# Patient Record
Sex: MALE | Race: WHITE | NOT HISPANIC OR LATINO | URBAN - METROPOLITAN AREA
[De-identification: names, ages, dates, MRNs, and addresses within clinical notes are randomized per-mention and may not be internally consistent; named-entity substitution may affect disease eponyms.]

---

## 2017-01-01 ENCOUNTER — ONCOLOGY VISIT (OUTPATIENT)
Dept: TRANSPLANT | Facility: CLINIC | Age: 57
End: 2017-01-01
Attending: INTERNAL MEDICINE
Payer: COMMERCIAL

## 2017-01-01 ENCOUNTER — RESULTS ONLY (OUTPATIENT)
Dept: OTHER | Facility: CLINIC | Age: 57
End: 2017-01-01

## 2017-01-01 ENCOUNTER — APPOINTMENT (OUTPATIENT)
Dept: OCCUPATIONAL THERAPY | Facility: CLINIC | Age: 57
DRG: 014 | End: 2017-01-01
Attending: PHYSICIAN ASSISTANT
Payer: COMMERCIAL

## 2017-01-01 ENCOUNTER — OFFICE VISIT (OUTPATIENT)
Dept: TRANSPLANT | Facility: CLINIC | Age: 57
End: 2017-01-01
Attending: PHYSICIAN ASSISTANT
Payer: COMMERCIAL

## 2017-01-01 ENCOUNTER — TRANSFERRED RECORDS (OUTPATIENT)
Dept: HEALTH INFORMATION MANAGEMENT | Facility: CLINIC | Age: 57
End: 2017-01-01

## 2017-01-01 ENCOUNTER — MEDICAL CORRESPONDENCE (OUTPATIENT)
Dept: TRANSPLANT | Facility: CLINIC | Age: 57
End: 2017-01-01

## 2017-01-01 ENCOUNTER — TELEPHONE (OUTPATIENT)
Dept: TRANSPLANT | Facility: CLINIC | Age: 57
End: 2017-01-01

## 2017-01-01 ENCOUNTER — ONCOLOGY VISIT (OUTPATIENT)
Dept: TRANSPLANT | Facility: CLINIC | Age: 57
End: 2017-01-01
Attending: STUDENT IN AN ORGANIZED HEALTH CARE EDUCATION/TRAINING PROGRAM
Payer: COMMERCIAL

## 2017-01-01 ENCOUNTER — ONCOLOGY VISIT (OUTPATIENT)
Dept: RADIATION ONCOLOGY | Facility: CLINIC | Age: 57
End: 2017-01-01

## 2017-01-01 ENCOUNTER — APPOINTMENT (OUTPATIENT)
Dept: LAB | Facility: CLINIC | Age: 57
End: 2017-01-01
Attending: PHYSICIAN ASSISTANT
Payer: COMMERCIAL

## 2017-01-01 ENCOUNTER — APPOINTMENT (OUTPATIENT)
Dept: INTERVENTIONAL RADIOLOGY/VASCULAR | Facility: CLINIC | Age: 57
DRG: 014 | End: 2017-01-01
Attending: INTERNAL MEDICINE
Payer: COMMERCIAL

## 2017-01-01 ENCOUNTER — ALLIED HEALTH/NURSE VISIT (OUTPATIENT)
Dept: RESEARCH | Facility: CLINIC | Age: 57
End: 2017-01-01

## 2017-01-01 ENCOUNTER — RESEARCH ENCOUNTER (OUTPATIENT)
Dept: ONCOLOGY | Facility: CLINIC | Age: 57
End: 2017-01-01

## 2017-01-01 ENCOUNTER — OFFICE VISIT (OUTPATIENT)
Dept: INFECTIOUS DISEASES | Facility: CLINIC | Age: 57
End: 2017-01-01
Attending: INTERNAL MEDICINE
Payer: COMMERCIAL

## 2017-01-01 ENCOUNTER — OFFICE VISIT (OUTPATIENT)
Dept: TRANSPLANT | Facility: CLINIC | Age: 57
End: 2017-01-01

## 2017-01-01 ENCOUNTER — OFFICE VISIT (OUTPATIENT)
Dept: RADIATION ONCOLOGY | Facility: CLINIC | Age: 57
End: 2017-01-01
Attending: RADIOLOGY
Payer: COMMERCIAL

## 2017-01-01 ENCOUNTER — APPOINTMENT (OUTPATIENT)
Dept: LAB | Facility: CLINIC | Age: 57
End: 2017-01-01
Attending: INTERNAL MEDICINE
Payer: COMMERCIAL

## 2017-01-01 ENCOUNTER — NURSE TRIAGE (OUTPATIENT)
Dept: NURSING | Facility: CLINIC | Age: 57
End: 2017-01-01

## 2017-01-01 ENCOUNTER — APPOINTMENT (OUTPATIENT)
Dept: PHYSICAL THERAPY | Facility: CLINIC | Age: 57
DRG: 014 | End: 2017-01-01
Attending: INTERNAL MEDICINE
Payer: COMMERCIAL

## 2017-01-01 ENCOUNTER — APPOINTMENT (OUTPATIENT)
Dept: LAB | Facility: CLINIC | Age: 57
End: 2017-01-01
Attending: STUDENT IN AN ORGANIZED HEALTH CARE EDUCATION/TRAINING PROGRAM
Payer: COMMERCIAL

## 2017-01-01 ENCOUNTER — APPOINTMENT (OUTPATIENT)
Dept: LAB | Facility: CLINIC | Age: 57
End: 2017-01-01
Attending: NURSE PRACTITIONER
Payer: COMMERCIAL

## 2017-01-01 ENCOUNTER — APPOINTMENT (OUTPATIENT)
Dept: MEDSURG UNIT | Facility: CLINIC | Age: 57
DRG: 014 | End: 2017-01-01
Attending: INTERNAL MEDICINE
Payer: COMMERCIAL

## 2017-01-01 ENCOUNTER — HOSPITAL ENCOUNTER (INPATIENT)
Facility: CLINIC | Age: 57
LOS: 17 days | Discharge: HOME | DRG: 014 | End: 2017-08-04
Attending: INTERNAL MEDICINE | Admitting: INTERNAL MEDICINE
Payer: COMMERCIAL

## 2017-01-01 ENCOUNTER — OFFICE VISIT (OUTPATIENT)
Dept: PULMONOLOGY | Facility: CLINIC | Age: 57
End: 2017-01-01
Attending: INTERNAL MEDICINE
Payer: COMMERCIAL

## 2017-01-01 ENCOUNTER — OFFICE VISIT (OUTPATIENT)
Dept: INTERVENTIONAL RADIOLOGY/VASCULAR | Facility: CLINIC | Age: 57
End: 2017-01-01
Attending: INTERNAL MEDICINE

## 2017-01-01 ENCOUNTER — TELEPHONE (OUTPATIENT)
Dept: OTHER | Facility: CLINIC | Age: 57
End: 2017-01-01

## 2017-01-01 ENCOUNTER — HOSPITAL ENCOUNTER (OUTPATIENT)
Dept: NUCLEAR MEDICINE | Facility: CLINIC | Age: 57
Setting detail: NUCLEAR MEDICINE
Discharge: HOME | End: 2017-06-27
Attending: INTERNAL MEDICINE | Admitting: INTERNAL MEDICINE
Payer: COMMERCIAL

## 2017-01-01 ENCOUNTER — CARE COORDINATION (OUTPATIENT)
Dept: TRANSPLANT | Facility: CLINIC | Age: 57
End: 2017-01-01

## 2017-01-01 ENCOUNTER — HOSPITAL PATHOLOGY (OUTPATIENT)
Dept: OTHER | Facility: CLINIC | Age: 57
End: 2017-01-01

## 2017-01-01 ENCOUNTER — TELEPHONE (OUTPATIENT)
Dept: PHARMACY | Facility: CLINIC | Age: 57
End: 2017-01-01

## 2017-01-01 ENCOUNTER — OFFICE VISIT (OUTPATIENT)
Dept: ENDOCRINOLOGY | Facility: CLINIC | Age: 57
End: 2017-01-01

## 2017-01-01 ENCOUNTER — HOSPITAL ENCOUNTER (OUTPATIENT)
Facility: CLINIC | Age: 57
Setting detail: SPECIMEN
Discharge: HOME | End: 2017-04-27
Attending: INTERNAL MEDICINE | Admitting: INTERNAL MEDICINE
Payer: COMMERCIAL

## 2017-01-01 ENCOUNTER — HOSPITAL ENCOUNTER (OUTPATIENT)
Facility: CLINIC | Age: 57
Setting detail: SPECIMEN
Discharge: HOME | End: 2017-06-21
Attending: INTERNAL MEDICINE
Payer: COMMERCIAL

## 2017-01-01 ENCOUNTER — SURGERY (OUTPATIENT)
Age: 57
End: 2017-01-01

## 2017-01-01 ENCOUNTER — APPOINTMENT (OUTPATIENT)
Dept: OCCUPATIONAL THERAPY | Facility: CLINIC | Age: 57
DRG: 014 | End: 2017-01-01
Attending: INTERNAL MEDICINE
Payer: COMMERCIAL

## 2017-01-01 ENCOUNTER — TELEPHONE (OUTPATIENT)
Dept: PULMONOLOGY | Facility: CLINIC | Age: 57
End: 2017-01-01

## 2017-01-01 ENCOUNTER — ONCOLOGY VISIT (OUTPATIENT)
Dept: TRANSPLANT | Facility: CLINIC | Age: 57
End: 2017-01-01
Attending: NURSE PRACTITIONER
Payer: COMMERCIAL

## 2017-01-01 ENCOUNTER — HOSPITAL ENCOUNTER (OUTPATIENT)
Facility: CLINIC | Age: 57
Discharge: HOME | End: 2017-06-30
Attending: INTERNAL MEDICINE | Admitting: INTERNAL MEDICINE
Payer: COMMERCIAL

## 2017-01-01 VITALS
SYSTOLIC BLOOD PRESSURE: 151 MMHG | WEIGHT: 205.9 LBS | BODY MASS INDEX: 31.21 KG/M2 | TEMPERATURE: 97.4 F | HEART RATE: 84 BPM | SYSTOLIC BLOOD PRESSURE: 147 MMHG | HEART RATE: 78 BPM | TEMPERATURE: 97.9 F | DIASTOLIC BLOOD PRESSURE: 89 MMHG | RESPIRATION RATE: 16 BRPM | DIASTOLIC BLOOD PRESSURE: 88 MMHG | WEIGHT: 198.2 LBS | OXYGEN SATURATION: 98 % | OXYGEN SATURATION: 96 % | BODY MASS INDEX: 30.14 KG/M2 | RESPIRATION RATE: 16 BRPM

## 2017-01-01 VITALS
SYSTOLIC BLOOD PRESSURE: 142 MMHG | BODY MASS INDEX: 29.99 KG/M2 | DIASTOLIC BLOOD PRESSURE: 83 MMHG | RESPIRATION RATE: 16 BRPM | WEIGHT: 197.2 LBS | OXYGEN SATURATION: 98 % | TEMPERATURE: 97.9 F | HEART RATE: 77 BPM

## 2017-01-01 VITALS
BODY MASS INDEX: 30.05 KG/M2 | DIASTOLIC BLOOD PRESSURE: 85 MMHG | OXYGEN SATURATION: 97 % | RESPIRATION RATE: 16 BRPM | SYSTOLIC BLOOD PRESSURE: 141 MMHG | WEIGHT: 197.6 LBS | HEART RATE: 64 BPM | TEMPERATURE: 97.4 F

## 2017-01-01 VITALS
TEMPERATURE: 98.2 F | BODY MASS INDEX: 31.08 KG/M2 | WEIGHT: 205.1 LBS | RESPIRATION RATE: 18 BRPM | DIASTOLIC BLOOD PRESSURE: 85 MMHG | SYSTOLIC BLOOD PRESSURE: 135 MMHG | OXYGEN SATURATION: 98 % | HEART RATE: 89 BPM

## 2017-01-01 VITALS
SYSTOLIC BLOOD PRESSURE: 132 MMHG | DIASTOLIC BLOOD PRESSURE: 81 MMHG | TEMPERATURE: 97.3 F | RESPIRATION RATE: 16 BRPM | BODY MASS INDEX: 30.93 KG/M2 | OXYGEN SATURATION: 98 % | HEIGHT: 68 IN | HEART RATE: 89 BPM | WEIGHT: 204.1 LBS

## 2017-01-01 VITALS
DIASTOLIC BLOOD PRESSURE: 89 MMHG | WEIGHT: 203.5 LBS | TEMPERATURE: 97.4 F | OXYGEN SATURATION: 98 % | HEART RATE: 89 BPM | SYSTOLIC BLOOD PRESSURE: 146 MMHG | BODY MASS INDEX: 30.84 KG/M2

## 2017-01-01 VITALS
WEIGHT: 204.9 LBS | HEART RATE: 82 BPM | SYSTOLIC BLOOD PRESSURE: 138 MMHG | RESPIRATION RATE: 18 BRPM | DIASTOLIC BLOOD PRESSURE: 78 MMHG | TEMPERATURE: 97.3 F | OXYGEN SATURATION: 96 %

## 2017-01-01 VITALS
DIASTOLIC BLOOD PRESSURE: 99 MMHG | SYSTOLIC BLOOD PRESSURE: 160 MMHG | TEMPERATURE: 98.6 F | RESPIRATION RATE: 16 BRPM | HEART RATE: 96 BPM

## 2017-01-01 VITALS
HEART RATE: 73 BPM | SYSTOLIC BLOOD PRESSURE: 134 MMHG | DIASTOLIC BLOOD PRESSURE: 71 MMHG | OXYGEN SATURATION: 98 % | WEIGHT: 203.4 LBS | BODY MASS INDEX: 30.83 KG/M2 | RESPIRATION RATE: 16 BRPM | TEMPERATURE: 97.6 F

## 2017-01-01 VITALS
DIASTOLIC BLOOD PRESSURE: 88 MMHG | SYSTOLIC BLOOD PRESSURE: 160 MMHG | HEART RATE: 75 BPM | OXYGEN SATURATION: 99 % | RESPIRATION RATE: 16 BRPM | TEMPERATURE: 98.7 F

## 2017-01-01 VITALS
DIASTOLIC BLOOD PRESSURE: 84 MMHG | BODY MASS INDEX: 30.81 KG/M2 | SYSTOLIC BLOOD PRESSURE: 152 MMHG | OXYGEN SATURATION: 98 % | TEMPERATURE: 97.6 F | HEART RATE: 85 BPM | WEIGHT: 203.3 LBS

## 2017-01-01 VITALS — BODY MASS INDEX: 30.35 KG/M2 | WEIGHT: 200 LBS

## 2017-01-01 VITALS
BODY MASS INDEX: 30.92 KG/M2 | SYSTOLIC BLOOD PRESSURE: 145 MMHG | HEART RATE: 82 BPM | TEMPERATURE: 97.7 F | OXYGEN SATURATION: 97 % | WEIGHT: 204 LBS | DIASTOLIC BLOOD PRESSURE: 79 MMHG

## 2017-01-01 VITALS
TEMPERATURE: 98.7 F | OXYGEN SATURATION: 99 % | HEIGHT: 69 IN | SYSTOLIC BLOOD PRESSURE: 179 MMHG | HEART RATE: 89 BPM | DIASTOLIC BLOOD PRESSURE: 93 MMHG | BODY MASS INDEX: 30.21 KG/M2 | WEIGHT: 204 LBS

## 2017-01-01 VITALS
TEMPERATURE: 98.1 F | OXYGEN SATURATION: 96 % | SYSTOLIC BLOOD PRESSURE: 134 MMHG | RESPIRATION RATE: 16 BRPM | HEART RATE: 90 BPM | BODY MASS INDEX: 31.37 KG/M2 | WEIGHT: 207 LBS | DIASTOLIC BLOOD PRESSURE: 86 MMHG

## 2017-01-01 VITALS
DIASTOLIC BLOOD PRESSURE: 85 MMHG | WEIGHT: 203.3 LBS | HEART RATE: 79 BPM | BODY MASS INDEX: 30.81 KG/M2 | RESPIRATION RATE: 18 BRPM | SYSTOLIC BLOOD PRESSURE: 133 MMHG | TEMPERATURE: 97.8 F | OXYGEN SATURATION: 97 %

## 2017-01-01 VITALS
RESPIRATION RATE: 16 BRPM | BODY MASS INDEX: 30.9 KG/M2 | HEART RATE: 71 BPM | TEMPERATURE: 97.7 F | WEIGHT: 196.2 LBS | HEART RATE: 94 BPM | TEMPERATURE: 98.2 F | SYSTOLIC BLOOD PRESSURE: 151 MMHG | WEIGHT: 203.9 LBS | DIASTOLIC BLOOD PRESSURE: 83 MMHG | DIASTOLIC BLOOD PRESSURE: 81 MMHG | SYSTOLIC BLOOD PRESSURE: 149 MMHG | OXYGEN SATURATION: 97 % | HEIGHT: 68 IN | HEIGHT: 68 IN | BODY MASS INDEX: 29.73 KG/M2 | OXYGEN SATURATION: 98 %

## 2017-01-01 VITALS
SYSTOLIC BLOOD PRESSURE: 146 MMHG | BODY MASS INDEX: 30.01 KG/M2 | HEIGHT: 68 IN | HEART RATE: 78 BPM | WEIGHT: 198 LBS | OXYGEN SATURATION: 97 % | DIASTOLIC BLOOD PRESSURE: 82 MMHG | RESPIRATION RATE: 18 BRPM | TEMPERATURE: 97.7 F

## 2017-01-01 VITALS
HEIGHT: 68 IN | TEMPERATURE: 98 F | OXYGEN SATURATION: 100 % | RESPIRATION RATE: 16 BRPM | SYSTOLIC BLOOD PRESSURE: 137 MMHG | DIASTOLIC BLOOD PRESSURE: 79 MMHG | BODY MASS INDEX: 30.36 KG/M2 | WEIGHT: 200.3 LBS | HEART RATE: 78 BPM

## 2017-01-01 VITALS
HEIGHT: 68 IN | TEMPERATURE: 98 F | SYSTOLIC BLOOD PRESSURE: 143 MMHG | HEART RATE: 95 BPM | WEIGHT: 206 LBS | OXYGEN SATURATION: 98 % | BODY MASS INDEX: 31.22 KG/M2 | DIASTOLIC BLOOD PRESSURE: 83 MMHG | RESPIRATION RATE: 16 BRPM

## 2017-01-01 VITALS
OXYGEN SATURATION: 100 % | HEART RATE: 71 BPM | RESPIRATION RATE: 16 BRPM | DIASTOLIC BLOOD PRESSURE: 84 MMHG | TEMPERATURE: 98.5 F | WEIGHT: 197.8 LBS | SYSTOLIC BLOOD PRESSURE: 145 MMHG | BODY MASS INDEX: 30.08 KG/M2

## 2017-01-01 VITALS
BODY MASS INDEX: 30.42 KG/M2 | HEIGHT: 68 IN | OXYGEN SATURATION: 98 % | WEIGHT: 200.7 LBS | TEMPERATURE: 98.4 F | RESPIRATION RATE: 18 BRPM | HEART RATE: 95 BPM | SYSTOLIC BLOOD PRESSURE: 148 MMHG | DIASTOLIC BLOOD PRESSURE: 88 MMHG

## 2017-01-01 VITALS — HEIGHT: 68 IN | BODY MASS INDEX: 30.46 KG/M2 | WEIGHT: 201 LBS

## 2017-01-01 VITALS
SYSTOLIC BLOOD PRESSURE: 146 MMHG | OXYGEN SATURATION: 94 % | RESPIRATION RATE: 10 BRPM | WEIGHT: 200 LBS | DIASTOLIC BLOOD PRESSURE: 83 MMHG | BODY MASS INDEX: 29.62 KG/M2 | HEIGHT: 69 IN

## 2017-01-01 VITALS
HEART RATE: 100 BPM | DIASTOLIC BLOOD PRESSURE: 96 MMHG | BODY MASS INDEX: 31.02 KG/M2 | TEMPERATURE: 97.8 F | WEIGHT: 204.7 LBS | SYSTOLIC BLOOD PRESSURE: 169 MMHG | OXYGEN SATURATION: 99 %

## 2017-01-01 VITALS
HEART RATE: 81 BPM | SYSTOLIC BLOOD PRESSURE: 150 MMHG | WEIGHT: 203 LBS | RESPIRATION RATE: 18 BRPM | DIASTOLIC BLOOD PRESSURE: 91 MMHG | TEMPERATURE: 98 F | OXYGEN SATURATION: 98 % | BODY MASS INDEX: 30.77 KG/M2

## 2017-01-01 VITALS
BODY MASS INDEX: 31.21 KG/M2 | WEIGHT: 205.91 LBS | SYSTOLIC BLOOD PRESSURE: 138 MMHG | HEART RATE: 77 BPM | TEMPERATURE: 97.8 F | RESPIRATION RATE: 18 BRPM | DIASTOLIC BLOOD PRESSURE: 9 MMHG | OXYGEN SATURATION: 97 %

## 2017-01-01 VITALS
RESPIRATION RATE: 16 BRPM | WEIGHT: 197.3 LBS | OXYGEN SATURATION: 96 % | DIASTOLIC BLOOD PRESSURE: 83 MMHG | HEART RATE: 80 BPM | BODY MASS INDEX: 30.01 KG/M2 | TEMPERATURE: 97.8 F | SYSTOLIC BLOOD PRESSURE: 138 MMHG

## 2017-01-01 VITALS
HEART RATE: 74 BPM | DIASTOLIC BLOOD PRESSURE: 90 MMHG | HEIGHT: 68 IN | BODY MASS INDEX: 29.86 KG/M2 | WEIGHT: 197 LBS | SYSTOLIC BLOOD PRESSURE: 139 MMHG

## 2017-01-01 VITALS
BODY MASS INDEX: 30.08 KG/M2 | TEMPERATURE: 97.9 F | DIASTOLIC BLOOD PRESSURE: 83 MMHG | HEART RATE: 78 BPM | OXYGEN SATURATION: 99 % | SYSTOLIC BLOOD PRESSURE: 157 MMHG | WEIGHT: 197.75 LBS

## 2017-01-01 VITALS
SYSTOLIC BLOOD PRESSURE: 129 MMHG | OXYGEN SATURATION: 97 % | RESPIRATION RATE: 18 BRPM | HEIGHT: 68 IN | HEART RATE: 77 BPM | BODY MASS INDEX: 30.26 KG/M2 | TEMPERATURE: 97.2 F | DIASTOLIC BLOOD PRESSURE: 77 MMHG | WEIGHT: 199.7 LBS

## 2017-01-01 VITALS
TEMPERATURE: 97.9 F | HEART RATE: 74 BPM | BODY MASS INDEX: 30.77 KG/M2 | WEIGHT: 203 LBS | SYSTOLIC BLOOD PRESSURE: 130 MMHG | OXYGEN SATURATION: 98 % | DIASTOLIC BLOOD PRESSURE: 84 MMHG

## 2017-01-01 VITALS
BODY MASS INDEX: 31.05 KG/M2 | OXYGEN SATURATION: 96 % | SYSTOLIC BLOOD PRESSURE: 137 MMHG | RESPIRATION RATE: 16 BRPM | HEART RATE: 78 BPM | TEMPERATURE: 97.5 F | DIASTOLIC BLOOD PRESSURE: 79 MMHG | WEIGHT: 204.9 LBS

## 2017-01-01 VITALS
OXYGEN SATURATION: 98 % | SYSTOLIC BLOOD PRESSURE: 154 MMHG | RESPIRATION RATE: 16 BRPM | HEART RATE: 66 BPM | DIASTOLIC BLOOD PRESSURE: 81 MMHG | TEMPERATURE: 98 F

## 2017-01-01 VITALS
TEMPERATURE: 97.7 F | SYSTOLIC BLOOD PRESSURE: 148 MMHG | OXYGEN SATURATION: 99 % | HEART RATE: 92 BPM | DIASTOLIC BLOOD PRESSURE: 89 MMHG | BODY MASS INDEX: 30.79 KG/M2 | WEIGHT: 202.9 LBS

## 2017-01-01 VITALS
RESPIRATION RATE: 16 BRPM | SYSTOLIC BLOOD PRESSURE: 137 MMHG | DIASTOLIC BLOOD PRESSURE: 80 MMHG | OXYGEN SATURATION: 98 % | TEMPERATURE: 98.4 F | HEART RATE: 70 BPM

## 2017-01-01 VITALS — SYSTOLIC BLOOD PRESSURE: 145 MMHG | OXYGEN SATURATION: 99 % | DIASTOLIC BLOOD PRESSURE: 84 MMHG | HEART RATE: 85 BPM

## 2017-01-01 DIAGNOSIS — C92.01 AML (ACUTE MYELOID LEUKEMIA) IN REMISSION (H): Primary | ICD-10-CM

## 2017-01-01 DIAGNOSIS — C92.01 ACUTE MYELOID LEUKEMIA IN REMISSION (H): ICD-10-CM

## 2017-01-01 DIAGNOSIS — C92.01 AML (ACUTE MYELOID LEUKEMIA) IN REMISSION (H): ICD-10-CM

## 2017-01-01 DIAGNOSIS — R91.8 PULMONARY NODULES: Primary | ICD-10-CM

## 2017-01-01 DIAGNOSIS — C92.00 AML (ACUTE MYELOGENOUS LEUKEMIA) (H): ICD-10-CM

## 2017-01-01 DIAGNOSIS — B44.9 ASPERGILLOSIS (H): ICD-10-CM

## 2017-01-01 DIAGNOSIS — E11.9 TYPE 2 DIABETES MELLITUS WITHOUT COMPLICATION, WITHOUT LONG-TERM CURRENT USE OF INSULIN (H): Primary | ICD-10-CM

## 2017-01-01 DIAGNOSIS — Z71.9 VISIT FOR COUNSELING: Primary | ICD-10-CM

## 2017-01-01 DIAGNOSIS — C92.01 ACUTE MYELOID LEUKEMIA IN REMISSION (H): Primary | ICD-10-CM

## 2017-01-01 DIAGNOSIS — B49 FUNGAL PNEUMONIA: Primary | ICD-10-CM

## 2017-01-01 DIAGNOSIS — Z94.84 HISTORY OF PERIPHERAL STEM CELL TRANSPLANT (H): ICD-10-CM

## 2017-01-01 DIAGNOSIS — Z86.2 PERSONAL HISTORY OF DISEASES OF BLOOD AND BLOOD-FORMING ORGANS: ICD-10-CM

## 2017-01-01 DIAGNOSIS — Z76.82 STEM CELL TRANSPLANT CANDIDATE: ICD-10-CM

## 2017-01-01 DIAGNOSIS — Z71.9 ENCOUNTER FOR COUNSELING: Primary | ICD-10-CM

## 2017-01-01 DIAGNOSIS — C92.00 ACUTE MYELOID LEUKEMIA NOT HAVING ACHIEVED REMISSION (H): Primary | ICD-10-CM

## 2017-01-01 DIAGNOSIS — Z53.9 ERRONEOUS ENCOUNTER--DISREGARD: Primary | ICD-10-CM

## 2017-01-01 DIAGNOSIS — R93.89 ABNORMAL FINDING ON IMAGING: Primary | ICD-10-CM

## 2017-01-01 DIAGNOSIS — Z76.82 STEM CELL TRANSPLANT CANDIDATE: Primary | ICD-10-CM

## 2017-01-01 DIAGNOSIS — J16.8 FUNGAL PNEUMONIA: Primary | ICD-10-CM

## 2017-01-01 DIAGNOSIS — Z94.81 S/P ALLOGENEIC BONE MARROW TRANSPLANT (H): ICD-10-CM

## 2017-01-01 DIAGNOSIS — C92.00 ACUTE MYELOGENOUS LEUKEMIA (H): Primary | ICD-10-CM

## 2017-01-01 DIAGNOSIS — C92.00 AML (ACUTE MYELOGENOUS LEUKEMIA) (H): Primary | ICD-10-CM

## 2017-01-01 DIAGNOSIS — B44.9 ASPERGILLOSIS (H): Primary | ICD-10-CM

## 2017-01-01 DIAGNOSIS — C92.00 AML (ACUTE MYELOID LEUKEMIA) (H): Primary | ICD-10-CM

## 2017-01-01 DIAGNOSIS — E11.9 TYPE 2 DIABETES MELLITUS WITHOUT COMPLICATION, WITHOUT LONG-TERM CURRENT USE OF INSULIN (H): ICD-10-CM

## 2017-01-01 DIAGNOSIS — Z94.84 HISTORY OF PERIPHERAL STEM CELL TRANSPLANT (H): Primary | ICD-10-CM

## 2017-01-01 LAB
A* LOCUS: NORMAL
A*: NORMAL
ABO + RH BLD: NORMAL
ABTEST METHOD: NORMAL
ACID FAST STN SPEC QL: NORMAL
ACID FAST STN SPEC QL: NORMAL
ALBUMIN SERPL-MCNC: 2.8 G/DL (ref 3.4–5)
ALBUMIN SERPL-MCNC: 3 G/DL (ref 3.4–5)
ALBUMIN SERPL-MCNC: 3.2 G/DL (ref 3.4–5)
ALBUMIN SERPL-MCNC: 3.3 G/DL (ref 3.4–5)
ALBUMIN SERPL-MCNC: 3.3 G/DL (ref 3.4–5)
ALBUMIN SERPL-MCNC: 3.4 G/DL (ref 3.4–5)
ALBUMIN SERPL-MCNC: 3.5 G/DL (ref 3.4–5)
ALBUMIN SERPL-MCNC: 3.5 G/DL (ref 3.4–5)
ALBUMIN SERPL-MCNC: 3.6 G/DL (ref 3.4–5)
ALBUMIN SERPL-MCNC: 3.6 G/DL (ref 3.4–5)
ALBUMIN SERPL-MCNC: 3.7 G/DL (ref 3.4–5)
ALBUMIN SERPL-MCNC: 3.8 G/DL (ref 3.4–5)
ALBUMIN SERPL-MCNC: 3.8 G/DL (ref 3.4–5)
ALBUMIN SERPL-MCNC: 3.9 G/DL (ref 3.4–5)
ALBUMIN UR-MCNC: NEGATIVE MG/DL
ALP SERPL-CCNC: 100 U/L (ref 40–150)
ALP SERPL-CCNC: 53 U/L (ref 40–150)
ALP SERPL-CCNC: 55 U/L (ref 40–150)
ALP SERPL-CCNC: 58 U/L (ref 40–150)
ALP SERPL-CCNC: 65 U/L (ref 40–150)
ALP SERPL-CCNC: 66 U/L (ref 40–150)
ALP SERPL-CCNC: 72 U/L (ref 40–150)
ALP SERPL-CCNC: 75 U/L (ref 40–150)
ALP SERPL-CCNC: 75 U/L (ref 40–150)
ALP SERPL-CCNC: 76 U/L (ref 40–150)
ALP SERPL-CCNC: 76 U/L (ref 40–150)
ALP SERPL-CCNC: 80 U/L (ref 40–150)
ALP SERPL-CCNC: 80 U/L (ref 40–150)
ALP SERPL-CCNC: 82 U/L (ref 40–150)
ALP SERPL-CCNC: 90 U/L (ref 40–150)
ALP SERPL-CCNC: 92 U/L (ref 40–150)
ALT SERPL W P-5'-P-CCNC: 21 U/L (ref 0–70)
ALT SERPL W P-5'-P-CCNC: 22 U/L (ref 0–70)
ALT SERPL W P-5'-P-CCNC: 23 U/L (ref 0–70)
ALT SERPL W P-5'-P-CCNC: 23 U/L (ref 0–70)
ALT SERPL W P-5'-P-CCNC: 24 U/L (ref 0–70)
ALT SERPL W P-5'-P-CCNC: 25 U/L (ref 0–70)
ALT SERPL W P-5'-P-CCNC: 26 U/L (ref 0–70)
ALT SERPL W P-5'-P-CCNC: 27 U/L (ref 0–70)
ALT SERPL W P-5'-P-CCNC: 28 U/L (ref 0–70)
ALT SERPL W P-5'-P-CCNC: 30 U/L (ref 0–70)
ALT SERPL W P-5'-P-CCNC: 32 U/L (ref 0–70)
ALT SERPL W P-5'-P-CCNC: 32 U/L (ref 0–70)
ALT SERPL W P-5'-P-CCNC: 49 U/L (ref 0–70)
ANION GAP SERPL CALCULATED.3IONS-SCNC: 10 MMOL/L (ref 3–14)
ANION GAP SERPL CALCULATED.3IONS-SCNC: 11 MMOL/L (ref 3–14)
ANION GAP SERPL CALCULATED.3IONS-SCNC: 5 MMOL/L (ref 3–14)
ANION GAP SERPL CALCULATED.3IONS-SCNC: 6 MMOL/L (ref 3–14)
ANION GAP SERPL CALCULATED.3IONS-SCNC: 7 MMOL/L (ref 3–14)
ANION GAP SERPL CALCULATED.3IONS-SCNC: 8 MMOL/L (ref 3–14)
ANION GAP SERPL CALCULATED.3IONS-SCNC: 9 MMOL/L (ref 3–14)
ANISOCYTOSIS BLD QL SMEAR: SLIGHT
APPEARANCE CSF: CLEAR
APPEARANCE FLD: CLEAR
APPEARANCE FLD: CLEAR
APPEARANCE UR: CLEAR
APTT PPP: 26 SEC (ref 22–37)
APTT PPP: 30 SEC (ref 22–37)
ASBTTEST METHOD: NORMAL
ASPERGILLUS GALACTOMANNAN ANTIGEN BAL: ABNORMAL
ASPERGILLUS GALACTOMANNAN ANTIGEN BAL: ABNORMAL
AST SERPL W P-5'-P-CCNC: 10 U/L (ref 0–45)
AST SERPL W P-5'-P-CCNC: 14 U/L (ref 0–45)
AST SERPL W P-5'-P-CCNC: 15 U/L (ref 0–45)
AST SERPL W P-5'-P-CCNC: 16 U/L (ref 0–45)
AST SERPL W P-5'-P-CCNC: 18 U/L (ref 0–45)
AST SERPL W P-5'-P-CCNC: 20 U/L (ref 0–45)
AST SERPL W P-5'-P-CCNC: 21 U/L (ref 0–45)
AST SERPL W P-5'-P-CCNC: 21 U/L (ref 0–45)
AST SERPL W P-5'-P-CCNC: 22 U/L (ref 0–45)
AST SERPL W P-5'-P-CCNC: 26 U/L (ref 0–45)
AST SERPL W P-5'-P-CCNC: 7 U/L (ref 0–45)
B* LOCUS: NORMAL
B*: NORMAL
BACTERIA SPEC CULT: NORMAL
BASOPHILS # BLD AUTO: 0 10E9/L (ref 0–0.2)
BASOPHILS # BLD AUTO: 0.1 10E9/L (ref 0–0.2)
BASOPHILS NFR BLD AUTO: 0 %
BASOPHILS NFR BLD AUTO: 0.2 %
BASOPHILS NFR BLD AUTO: 0.3 %
BASOPHILS NFR BLD AUTO: 0.6 %
BASOPHILS NFR BLD AUTO: 0.7 %
BASOPHILS NFR BLD AUTO: 0.8 %
BASOPHILS NFR BLD AUTO: 0.9 %
BASOPHILS NFR BLD AUTO: 1 %
BASOPHILS NFR BLD AUTO: 1 %
BASOPHILS NFR BLD AUTO: 1.6 %
BASOPHILS NFR BLD AUTO: 1.8 %
BASOPHILS NFR BLD AUTO: 1.8 %
BASOPHILS NFR BLD AUTO: 2.1 %
BILIRUB DIRECT SERPL-MCNC: 0.1 MG/DL (ref 0–0.2)
BILIRUB DIRECT SERPL-MCNC: 0.1 MG/DL (ref 0–0.2)
BILIRUB SERPL-MCNC: 0.3 MG/DL (ref 0.2–1.3)
BILIRUB SERPL-MCNC: 0.3 MG/DL (ref 0.2–1.3)
BILIRUB SERPL-MCNC: 0.5 MG/DL (ref 0.2–1.3)
BILIRUB SERPL-MCNC: 0.6 MG/DL (ref 0.2–1.3)
BILIRUB SERPL-MCNC: 0.7 MG/DL (ref 0.2–1.3)
BILIRUB SERPL-MCNC: 0.8 MG/DL (ref 0.2–1.3)
BILIRUB SERPL-MCNC: 0.9 MG/DL (ref 0.2–1.3)
BILIRUB UR QL STRIP: NEGATIVE
BLD GP AB SCN SERPL QL: NORMAL
BLOOD BANK CMNT PATIENT-IMP: NORMAL
BRONCHOSCOPY: NORMAL
BUN SERPL-MCNC: 10 MG/DL (ref 7–30)
BUN SERPL-MCNC: 11 MG/DL (ref 7–30)
BUN SERPL-MCNC: 12 MG/DL (ref 7–30)
BUN SERPL-MCNC: 13 MG/DL (ref 7–30)
BUN SERPL-MCNC: 14 MG/DL (ref 7–30)
BUN SERPL-MCNC: 15 MG/DL (ref 7–30)
BUN SERPL-MCNC: 16 MG/DL (ref 7–30)
BUN SERPL-MCNC: 16 MG/DL (ref 7–30)
BUN SERPL-MCNC: 18 MG/DL (ref 7–30)
BUN SERPL-MCNC: 19 MG/DL (ref 7–30)
BUN SERPL-MCNC: 8 MG/DL (ref 7–30)
BW-1: NORMAL
C* LOCUS: NORMAL
C*: NORMAL
CALCIUM SERPL-MCNC: 7.8 MG/DL (ref 8.5–10.1)
CALCIUM SERPL-MCNC: 7.8 MG/DL (ref 8.5–10.1)
CALCIUM SERPL-MCNC: 8.1 MG/DL (ref 8.5–10.1)
CALCIUM SERPL-MCNC: 8.3 MG/DL (ref 8.5–10.1)
CALCIUM SERPL-MCNC: 8.4 MG/DL (ref 8.5–10.1)
CALCIUM SERPL-MCNC: 8.5 MG/DL (ref 8.5–10.1)
CALCIUM SERPL-MCNC: 8.6 MG/DL (ref 8.5–10.1)
CALCIUM SERPL-MCNC: 8.7 MG/DL (ref 8.5–10.1)
CALCIUM SERPL-MCNC: 8.8 MG/DL (ref 8.5–10.1)
CALCIUM SERPL-MCNC: 8.9 MG/DL (ref 8.5–10.1)
CALCIUM SERPL-MCNC: 9 MG/DL (ref 8.5–10.1)
CHLORIDE SERPL-SCNC: 102 MMOL/L (ref 94–109)
CHLORIDE SERPL-SCNC: 102 MMOL/L (ref 94–109)
CHLORIDE SERPL-SCNC: 103 MMOL/L (ref 94–109)
CHLORIDE SERPL-SCNC: 104 MMOL/L (ref 94–109)
CHLORIDE SERPL-SCNC: 105 MMOL/L (ref 94–109)
CHLORIDE SERPL-SCNC: 106 MMOL/L (ref 94–109)
CHLORIDE SERPL-SCNC: 107 MMOL/L (ref 94–109)
CHLORIDE SERPL-SCNC: 107 MMOL/L (ref 94–109)
CHLORIDE SERPL-SCNC: 108 MMOL/L (ref 94–109)
CHLORIDE SERPL-SCNC: 108 MMOL/L (ref 94–109)
CHLORIDE SERPL-SCNC: 110 MMOL/L (ref 94–109)
CHLORIDE SERPL-SCNC: 99 MMOL/L (ref 94–109)
CMV DNA SPEC NAA+PROBE-ACNC: NORMAL [IU]/ML
CMV DNA SPEC NAA+PROBE-LOG#: NORMAL {LOG_IU}/ML
CMV IGG SERPL QL IA: 4.2 AI (ref 0–0.8)
CO2 SERPL-SCNC: 23 MMOL/L (ref 20–32)
CO2 SERPL-SCNC: 24 MMOL/L (ref 20–32)
CO2 SERPL-SCNC: 25 MMOL/L (ref 20–32)
CO2 SERPL-SCNC: 26 MMOL/L (ref 20–32)
CO2 SERPL-SCNC: 27 MMOL/L (ref 20–32)
CO2 SERPL-SCNC: 27 MMOL/L (ref 20–32)
CO2 SERPL-SCNC: 28 MMOL/L (ref 20–32)
CO2 SERPL-SCNC: 29 MMOL/L (ref 20–32)
COLOR CSF: COLORLESS
COLOR FLD: COLORLESS
COLOR FLD: COLORLESS
COLOR UR AUTO: YELLOW
COPATH REPORT: NORMAL
CREAT SERPL-MCNC: 0.6 MG/DL (ref 0.66–1.25)
CREAT SERPL-MCNC: 0.65 MG/DL (ref 0.66–1.25)
CREAT SERPL-MCNC: 0.65 MG/DL (ref 0.66–1.25)
CREAT SERPL-MCNC: 0.66 MG/DL (ref 0.66–1.25)
CREAT SERPL-MCNC: 0.66 MG/DL (ref 0.66–1.25)
CREAT SERPL-MCNC: 0.7 MG/DL (ref 0.66–1.25)
CREAT SERPL-MCNC: 0.71 MG/DL (ref 0.66–1.25)
CREAT SERPL-MCNC: 0.72 MG/DL (ref 0.66–1.25)
CREAT SERPL-MCNC: 0.72 MG/DL (ref 0.66–1.25)
CREAT SERPL-MCNC: 0.73 MG/DL (ref 0.66–1.25)
CREAT SERPL-MCNC: 0.74 MG/DL (ref 0.66–1.25)
CREAT SERPL-MCNC: 0.75 MG/DL (ref 0.66–1.25)
CREAT SERPL-MCNC: 0.76 MG/DL (ref 0.66–1.25)
CREAT SERPL-MCNC: 0.76 MG/DL (ref 0.66–1.25)
CREAT SERPL-MCNC: 0.83 MG/DL (ref 0.66–1.25)
CREAT SERPL-MCNC: 0.83 MG/DL (ref 0.66–1.25)
CREAT SERPL-MCNC: 0.88 MG/DL (ref 0.66–1.25)
CREAT SERPL-MCNC: 0.93 MG/DL (ref 0.66–1.25)
CREAT SERPL-MCNC: 0.93 MG/DL (ref 0.66–1.25)
CREAT SERPL-MCNC: 0.94 MG/DL (ref 0.66–1.25)
CREAT SERPL-MCNC: 0.96 MG/DL (ref 0.66–1.25)
CREAT SERPL-MCNC: 0.96 MG/DL (ref 0.66–1.25)
CREAT SERPL-MCNC: 0.98 MG/DL (ref 0.66–1.25)
CREAT SERPL-MCNC: 0.99 MG/DL (ref 0.66–1.25)
CREAT SERPL-MCNC: 1 MG/DL (ref 0.66–1.25)
CREAT SERPL-MCNC: 1.02 MG/DL (ref 0.66–1.25)
CREAT SERPL-MCNC: 1.03 MG/DL (ref 0.66–1.25)
CREAT SERPL-MCNC: 1.05 MG/DL (ref 0.66–1.25)
CREAT SERPL-MCNC: 1.08 MG/DL (ref 0.66–1.25)
CREAT SERPL-MCNC: 1.09 MG/DL (ref 0.66–1.25)
CREAT SERPL-MCNC: 1.1 MG/DL (ref 0.66–1.25)
CREAT SERPL-MCNC: 1.16 MG/DL (ref 0.66–1.25)
CREAT SERPL-MCNC: 1.16 MG/DL (ref 0.66–1.25)
CREAT SERPL-MCNC: 1.17 MG/DL (ref 0.66–1.25)
CREAT SERPL-MCNC: 1.21 MG/DL (ref 0.66–1.25)
CREAT SERPL-MCNC: 1.22 MG/DL (ref 0.66–1.25)
CREAT SERPL-MCNC: 1.23 MG/DL (ref 0.66–1.25)
CREAT SERPL-MCNC: 1.25 MG/DL (ref 0.66–1.25)
CREAT SERPL-MCNC: 1.26 MG/DL (ref 0.66–1.25)
CREAT SERPL-MCNC: 1.28 MG/DL (ref 0.66–1.25)
CREAT SERPL-MCNC: 1.39 MG/DL (ref 0.66–1.25)
DACRYOCYTES BLD QL SMEAR: SLIGHT
DIFFERENTIAL METHOD BLD: ABNORMAL
DLCOCOR-%PRED-PRE: 125 %
DLCOCOR-PRE: 36.25 ML/MIN/MMHG
DLCOUNC-%PRED-PRE: 95 %
DLCOUNC-PRE: 27.62 ML/MIN/MMHG
DLCOUNC-PRED: 28.8 ML/MIN/MMHG
DPA1*: NORMAL
DPA1*LOCUS: NORMAL
DPB1*: NORMAL
DPB1*LOCUS: NORMAL
DQA1*: NORMAL
DQA1*LOCUS: NORMAL
DQB1* LOCUS: NORMAL
DQB1*: NORMAL
DRB1* LOCUS: NORMAL
DRB1*: NORMAL
DRB3* LOCUS: NORMAL
DRSBTTEST METHOD: NORMAL
DRSSO TEST METHOD: NORMAL
EBV DNA # SPEC NAA+PROBE: NORMAL {COPIES}/ML
EBV DNA SPEC NAA+PROBE-LOG#: NORMAL {LOG_COPIES}/ML
EBV VCA IGG SER QL IA: 1.1 AI (ref 0–0.8)
EOSINOPHIL # BLD AUTO: 0 10E9/L (ref 0–0.7)
EOSINOPHIL # BLD AUTO: 0.1 10E9/L (ref 0–0.7)
EOSINOPHIL # BLD AUTO: 0.2 10E9/L (ref 0–0.7)
EOSINOPHIL # BLD AUTO: 0.2 10E9/L (ref 0–0.7)
EOSINOPHIL # BLD AUTO: 0.3 10E9/L (ref 0–0.7)
EOSINOPHIL # BLD AUTO: 0.3 10E9/L (ref 0–0.7)
EOSINOPHIL NFR BLD AUTO: 0 %
EOSINOPHIL NFR BLD AUTO: 0.1 %
EOSINOPHIL NFR BLD AUTO: 0.9 %
EOSINOPHIL NFR BLD AUTO: 0.9 %
EOSINOPHIL NFR BLD AUTO: 1 %
EOSINOPHIL NFR BLD AUTO: 1.2 %
EOSINOPHIL NFR BLD AUTO: 1.3 %
EOSINOPHIL NFR BLD AUTO: 1.5 %
EOSINOPHIL NFR BLD AUTO: 1.6 %
EOSINOPHIL NFR BLD AUTO: 1.6 %
EOSINOPHIL NFR BLD AUTO: 1.7 %
EOSINOPHIL NFR BLD AUTO: 1.8 %
EOSINOPHIL NFR BLD AUTO: 1.8 %
EOSINOPHIL NFR BLD AUTO: 2.9 %
EOSINOPHIL NFR BLD AUTO: 3 %
EOSINOPHIL NFR BLD AUTO: 3.1 %
EOSINOPHIL NFR BLD AUTO: 3.3 %
EOSINOPHIL NFR BLD AUTO: 3.6 %
EOSINOPHIL NFR BLD AUTO: 4.4 %
EOSINOPHIL NFR BLD AUTO: 4.6 %
EOSINOPHIL NFR BLD AUTO: 5.1 %
ERV-%PRED-PRE: 112 %
ERV-PRE: 1.19 L
ERV-PRED: 1.06 L
ERYTHROCYTE [DISTWIDTH] IN BLOOD BY AUTOMATED COUNT: 13.3 % (ref 10–15)
ERYTHROCYTE [DISTWIDTH] IN BLOOD BY AUTOMATED COUNT: 13.3 % (ref 10–15)
ERYTHROCYTE [DISTWIDTH] IN BLOOD BY AUTOMATED COUNT: 13.4 % (ref 10–15)
ERYTHROCYTE [DISTWIDTH] IN BLOOD BY AUTOMATED COUNT: 13.6 % (ref 10–15)
ERYTHROCYTE [DISTWIDTH] IN BLOOD BY AUTOMATED COUNT: 13.9 % (ref 10–15)
ERYTHROCYTE [DISTWIDTH] IN BLOOD BY AUTOMATED COUNT: 14.3 % (ref 10–15)
ERYTHROCYTE [DISTWIDTH] IN BLOOD BY AUTOMATED COUNT: 14.6 % (ref 10–15)
ERYTHROCYTE [DISTWIDTH] IN BLOOD BY AUTOMATED COUNT: 14.8 % (ref 10–15)
ERYTHROCYTE [DISTWIDTH] IN BLOOD BY AUTOMATED COUNT: 14.9 % (ref 10–15)
ERYTHROCYTE [DISTWIDTH] IN BLOOD BY AUTOMATED COUNT: 15 % (ref 10–15)
ERYTHROCYTE [DISTWIDTH] IN BLOOD BY AUTOMATED COUNT: 15 % (ref 10–15)
ERYTHROCYTE [DISTWIDTH] IN BLOOD BY AUTOMATED COUNT: 15.2 % (ref 10–15)
ERYTHROCYTE [DISTWIDTH] IN BLOOD BY AUTOMATED COUNT: 15.3 % (ref 10–15)
ERYTHROCYTE [DISTWIDTH] IN BLOOD BY AUTOMATED COUNT: 15.5 % (ref 10–15)
ERYTHROCYTE [DISTWIDTH] IN BLOOD BY AUTOMATED COUNT: 15.6 % (ref 10–15)
ERYTHROCYTE [DISTWIDTH] IN BLOOD BY AUTOMATED COUNT: 15.7 % (ref 10–15)
ERYTHROCYTE [DISTWIDTH] IN BLOOD BY AUTOMATED COUNT: 15.8 % (ref 10–15)
ERYTHROCYTE [DISTWIDTH] IN BLOOD BY AUTOMATED COUNT: 15.9 % (ref 10–15)
ERYTHROCYTE [DISTWIDTH] IN BLOOD BY AUTOMATED COUNT: 16 % (ref 10–15)
ERYTHROCYTE [DISTWIDTH] IN BLOOD BY AUTOMATED COUNT: 16.2 % (ref 10–15)
ERYTHROCYTE [DISTWIDTH] IN BLOOD BY AUTOMATED COUNT: 16.2 % (ref 10–15)
ERYTHROCYTE [DISTWIDTH] IN BLOOD BY AUTOMATED COUNT: 16.3 % (ref 10–15)
ERYTHROCYTE [DISTWIDTH] IN BLOOD BY AUTOMATED COUNT: 16.4 % (ref 10–15)
ERYTHROCYTE [DISTWIDTH] IN BLOOD BY AUTOMATED COUNT: 16.4 % (ref 10–15)
ERYTHROCYTE [DISTWIDTH] IN BLOOD BY AUTOMATED COUNT: 16.6 % (ref 10–15)
ERYTHROCYTE [DISTWIDTH] IN BLOOD BY AUTOMATED COUNT: 17 % (ref 10–15)
ERYTHROCYTE [DISTWIDTH] IN BLOOD BY AUTOMATED COUNT: 17 % (ref 10–15)
ERYTHROCYTE [DISTWIDTH] IN BLOOD BY AUTOMATED COUNT: 17.2 % (ref 10–15)
ERYTHROCYTE [DISTWIDTH] IN BLOOD BY AUTOMATED COUNT: 17.5 % (ref 10–15)
ERYTHROCYTE [DISTWIDTH] IN BLOOD BY AUTOMATED COUNT: 17.6 % (ref 10–15)
ERYTHROCYTE [DISTWIDTH] IN BLOOD BY AUTOMATED COUNT: 19.2 % (ref 10–15)
ERYTHROCYTE [DISTWIDTH] IN BLOOD BY AUTOMATED COUNT: 20.2 % (ref 10–15)
EXPTIME-PRE: 8.64 SEC
FEF2575-%PRED-PRE: 85 %
FEF2575-PRE: 2.52 L/SEC
FEF2575-PRED: 2.96 L/SEC
FEFMAX-%PRED-PRE: 110 %
FEFMAX-PRE: 10.24 L/SEC
FEFMAX-PRED: 9.24 L/SEC
FEV1-%PRED-PRE: 101 %
FEV1-PRE: 3.4 L
FEV1FEV6-PRE: 76 %
FEV1FEV6-PRED: 80 %
FEV1FVC-PRE: 75 %
FEV1FVC-PRED: 79 %
FEV1SVC-PRE: 71 %
FEV1SVC-PRED: 69 %
FIFMAX-PRE: 7.78 L/SEC
FLUAV H1 2009 PAND RNA SPEC QL NAA+PROBE: NEGATIVE
FLUAV H1 2009 PAND RNA SPEC QL NAA+PROBE: NEGATIVE
FLUAV H1 RNA SPEC QL NAA+PROBE: NEGATIVE
FLUAV H1 RNA SPEC QL NAA+PROBE: NEGATIVE
FLUAV H3 RNA SPEC QL NAA+PROBE: NEGATIVE
FLUAV H3 RNA SPEC QL NAA+PROBE: NEGATIVE
FLUAV RNA SPEC QL NAA+PROBE: NEGATIVE
FLUAV RNA SPEC QL NAA+PROBE: NEGATIVE
FLUBV RNA SPEC QL NAA+PROBE: NEGATIVE
FLUBV RNA SPEC QL NAA+PROBE: NEGATIVE
FRCPLETH-%PRED-PRE: 100 %
FRCPLETH-PRE: 3.54 L
FRCPLETH-PRED: 3.52 L
FUNGUS SPEC CULT: NORMAL
FUNGUS SPEC CULT: NORMAL
FVC-%PRED-PRE: 107 %
FVC-PRE: 4.54 L
FVC-PRED: 4.24 L
GALACTOMANNAN AG SERPL-ACNC: 2.27
GALACTOMANNAN AG SERPL-ACNC: 4.05
GFR SERPL CREATININE-BSD FRML MDRD: 53 ML/MIN/1.7M2
GFR SERPL CREATININE-BSD FRML MDRD: 58 ML/MIN/1.7M2
GFR SERPL CREATININE-BSD FRML MDRD: 59 ML/MIN/1.7M2
GFR SERPL CREATININE-BSD FRML MDRD: 60 ML/MIN/1.7M2
GFR SERPL CREATININE-BSD FRML MDRD: 61 ML/MIN/1.7M2
GFR SERPL CREATININE-BSD FRML MDRD: 61 ML/MIN/1.7M2
GFR SERPL CREATININE-BSD FRML MDRD: 62 ML/MIN/1.7M2
GFR SERPL CREATININE-BSD FRML MDRD: 64 ML/MIN/1.7M2
GFR SERPL CREATININE-BSD FRML MDRD: 65 ML/MIN/1.7M2
GFR SERPL CREATININE-BSD FRML MDRD: 65 ML/MIN/1.7M2
GFR SERPL CREATININE-BSD FRML MDRD: 69 ML/MIN/1.7M2
GFR SERPL CREATININE-BSD FRML MDRD: 70 ML/MIN/1.7M2
GFR SERPL CREATININE-BSD FRML MDRD: 71 ML/MIN/1.7M2
GFR SERPL CREATININE-BSD FRML MDRD: 73 ML/MIN/1.7M2
GFR SERPL CREATININE-BSD FRML MDRD: 75 ML/MIN/1.7M2
GFR SERPL CREATININE-BSD FRML MDRD: 75 ML/MIN/1.7M2
GFR SERPL CREATININE-BSD FRML MDRD: 77 ML/MIN/1.7M2
GFR SERPL CREATININE-BSD FRML MDRD: 78 ML/MIN/1.7M2
GFR SERPL CREATININE-BSD FRML MDRD: 79 ML/MIN/1.7M2
GFR SERPL CREATININE-BSD FRML MDRD: 81 ML/MIN/1.7M2
GFR SERPL CREATININE-BSD FRML MDRD: 81 ML/MIN/1.7M2
GFR SERPL CREATININE-BSD FRML MDRD: 83 ML/MIN/1.7M2
GFR SERPL CREATININE-BSD FRML MDRD: 84 ML/MIN/1.7M2
GFR SERPL CREATININE-BSD FRML MDRD: 84 ML/MIN/1.7M2
GFR SERPL CREATININE-BSD FRML MDRD: 89 ML/MIN/1.7M2
GFR SERPL CREATININE-BSD FRML MDRD: >90 ML/MIN/1.7M2
GFR SERPL CREATININE-BSD FRML MDRD: ABNORMAL ML/MIN/1.7M2
GLUCOSE BLDC GLUCOMTR-MCNC: 110 MG/DL (ref 70–99)
GLUCOSE BLDC GLUCOMTR-MCNC: 111 MG/DL (ref 70–99)
GLUCOSE BLDC GLUCOMTR-MCNC: 113 MG/DL (ref 70–99)
GLUCOSE BLDC GLUCOMTR-MCNC: 116 MG/DL (ref 70–99)
GLUCOSE BLDC GLUCOMTR-MCNC: 117 MG/DL (ref 70–99)
GLUCOSE BLDC GLUCOMTR-MCNC: 122 MG/DL (ref 70–99)
GLUCOSE BLDC GLUCOMTR-MCNC: 123 MG/DL (ref 70–99)
GLUCOSE BLDC GLUCOMTR-MCNC: 124 MG/DL (ref 70–99)
GLUCOSE BLDC GLUCOMTR-MCNC: 125 MG/DL (ref 70–99)
GLUCOSE BLDC GLUCOMTR-MCNC: 125 MG/DL (ref 70–99)
GLUCOSE BLDC GLUCOMTR-MCNC: 129 MG/DL (ref 70–99)
GLUCOSE BLDC GLUCOMTR-MCNC: 131 MG/DL (ref 70–99)
GLUCOSE BLDC GLUCOMTR-MCNC: 134 MG/DL (ref 70–99)
GLUCOSE BLDC GLUCOMTR-MCNC: 134 MG/DL (ref 70–99)
GLUCOSE BLDC GLUCOMTR-MCNC: 135 MG/DL (ref 70–99)
GLUCOSE BLDC GLUCOMTR-MCNC: 137 MG/DL (ref 70–99)
GLUCOSE BLDC GLUCOMTR-MCNC: 139 MG/DL (ref 70–99)
GLUCOSE BLDC GLUCOMTR-MCNC: 142 MG/DL (ref 70–99)
GLUCOSE BLDC GLUCOMTR-MCNC: 143 MG/DL (ref 70–99)
GLUCOSE BLDC GLUCOMTR-MCNC: 144 MG/DL (ref 70–99)
GLUCOSE BLDC GLUCOMTR-MCNC: 148 MG/DL (ref 70–99)
GLUCOSE BLDC GLUCOMTR-MCNC: 149 MG/DL (ref 70–99)
GLUCOSE BLDC GLUCOMTR-MCNC: 150 MG/DL (ref 70–99)
GLUCOSE BLDC GLUCOMTR-MCNC: 151 MG/DL (ref 70–99)
GLUCOSE BLDC GLUCOMTR-MCNC: 151 MG/DL (ref 70–99)
GLUCOSE BLDC GLUCOMTR-MCNC: 153 MG/DL (ref 70–99)
GLUCOSE BLDC GLUCOMTR-MCNC: 154 MG/DL (ref 70–99)
GLUCOSE BLDC GLUCOMTR-MCNC: 158 MG/DL (ref 70–99)
GLUCOSE BLDC GLUCOMTR-MCNC: 163 MG/DL (ref 70–99)
GLUCOSE BLDC GLUCOMTR-MCNC: 166 MG/DL (ref 70–99)
GLUCOSE BLDC GLUCOMTR-MCNC: 167 MG/DL (ref 70–99)
GLUCOSE BLDC GLUCOMTR-MCNC: 170 MG/DL (ref 70–99)
GLUCOSE BLDC GLUCOMTR-MCNC: 174 MG/DL (ref 70–99)
GLUCOSE BLDC GLUCOMTR-MCNC: 181 MG/DL (ref 70–99)
GLUCOSE BLDC GLUCOMTR-MCNC: 183 MG/DL (ref 70–99)
GLUCOSE BLDC GLUCOMTR-MCNC: 190 MG/DL (ref 70–99)
GLUCOSE BLDC GLUCOMTR-MCNC: 191 MG/DL (ref 70–99)
GLUCOSE BLDC GLUCOMTR-MCNC: 193 MG/DL (ref 70–99)
GLUCOSE BLDC GLUCOMTR-MCNC: 195 MG/DL (ref 70–99)
GLUCOSE BLDC GLUCOMTR-MCNC: 196 MG/DL (ref 70–99)
GLUCOSE BLDC GLUCOMTR-MCNC: 197 MG/DL (ref 70–99)
GLUCOSE BLDC GLUCOMTR-MCNC: 199 MG/DL (ref 70–99)
GLUCOSE BLDC GLUCOMTR-MCNC: 201 MG/DL (ref 70–99)
GLUCOSE BLDC GLUCOMTR-MCNC: 202 MG/DL (ref 70–99)
GLUCOSE BLDC GLUCOMTR-MCNC: 202 MG/DL (ref 70–99)
GLUCOSE BLDC GLUCOMTR-MCNC: 204 MG/DL (ref 70–99)
GLUCOSE BLDC GLUCOMTR-MCNC: 205 MG/DL (ref 70–99)
GLUCOSE BLDC GLUCOMTR-MCNC: 206 MG/DL (ref 70–99)
GLUCOSE BLDC GLUCOMTR-MCNC: 211 MG/DL (ref 70–99)
GLUCOSE BLDC GLUCOMTR-MCNC: 215 MG/DL (ref 70–99)
GLUCOSE BLDC GLUCOMTR-MCNC: 215 MG/DL (ref 70–99)
GLUCOSE BLDC GLUCOMTR-MCNC: 219 MG/DL (ref 70–99)
GLUCOSE BLDC GLUCOMTR-MCNC: 224 MG/DL (ref 70–99)
GLUCOSE BLDC GLUCOMTR-MCNC: 229 MG/DL (ref 70–99)
GLUCOSE BLDC GLUCOMTR-MCNC: 230 MG/DL (ref 70–99)
GLUCOSE BLDC GLUCOMTR-MCNC: 235 MG/DL (ref 70–99)
GLUCOSE BLDC GLUCOMTR-MCNC: 243 MG/DL (ref 70–99)
GLUCOSE BLDC GLUCOMTR-MCNC: 244 MG/DL (ref 70–99)
GLUCOSE BLDC GLUCOMTR-MCNC: 244 MG/DL (ref 70–99)
GLUCOSE BLDC GLUCOMTR-MCNC: 258 MG/DL (ref 70–99)
GLUCOSE BLDC GLUCOMTR-MCNC: 259 MG/DL (ref 70–99)
GLUCOSE BLDC GLUCOMTR-MCNC: 271 MG/DL (ref 70–99)
GLUCOSE BLDC GLUCOMTR-MCNC: 273 MG/DL (ref 70–99)
GLUCOSE BLDC GLUCOMTR-MCNC: 274 MG/DL (ref 70–99)
GLUCOSE BLDC GLUCOMTR-MCNC: 275 MG/DL (ref 70–99)
GLUCOSE BLDC GLUCOMTR-MCNC: 280 MG/DL (ref 70–99)
GLUCOSE BLDC GLUCOMTR-MCNC: 293 MG/DL (ref 70–99)
GLUCOSE BLDC GLUCOMTR-MCNC: 293 MG/DL (ref 70–99)
GLUCOSE BLDC GLUCOMTR-MCNC: 307 MG/DL (ref 70–99)
GLUCOSE BLDC GLUCOMTR-MCNC: 310 MG/DL (ref 70–99)
GLUCOSE BLDC GLUCOMTR-MCNC: 311 MG/DL (ref 70–99)
GLUCOSE BLDC GLUCOMTR-MCNC: 340 MG/DL (ref 70–99)
GLUCOSE BLDC GLUCOMTR-MCNC: 92 MG/DL (ref 70–99)
GLUCOSE CSF-MCNC: 78 MG/DL (ref 40–70)
GLUCOSE SERPL-MCNC: 107 MG/DL (ref 70–99)
GLUCOSE SERPL-MCNC: 112 MG/DL (ref 70–99)
GLUCOSE SERPL-MCNC: 122 MG/DL (ref 70–99)
GLUCOSE SERPL-MCNC: 122 MG/DL (ref 70–99)
GLUCOSE SERPL-MCNC: 132 MG/DL (ref 70–99)
GLUCOSE SERPL-MCNC: 133 MG/DL (ref 70–99)
GLUCOSE SERPL-MCNC: 137 MG/DL (ref 70–99)
GLUCOSE SERPL-MCNC: 140 MG/DL (ref 70–99)
GLUCOSE SERPL-MCNC: 142 MG/DL (ref 70–99)
GLUCOSE SERPL-MCNC: 142 MG/DL (ref 70–99)
GLUCOSE SERPL-MCNC: 144 MG/DL (ref 70–99)
GLUCOSE SERPL-MCNC: 145 MG/DL (ref 70–99)
GLUCOSE SERPL-MCNC: 148 MG/DL (ref 70–99)
GLUCOSE SERPL-MCNC: 150 MG/DL (ref 70–99)
GLUCOSE SERPL-MCNC: 152 MG/DL (ref 70–99)
GLUCOSE SERPL-MCNC: 153 MG/DL (ref 70–99)
GLUCOSE SERPL-MCNC: 154 MG/DL (ref 70–99)
GLUCOSE SERPL-MCNC: 158 MG/DL (ref 70–99)
GLUCOSE SERPL-MCNC: 158 MG/DL (ref 70–99)
GLUCOSE SERPL-MCNC: 159 MG/DL (ref 70–99)
GLUCOSE SERPL-MCNC: 160 MG/DL (ref 70–99)
GLUCOSE SERPL-MCNC: 168 MG/DL (ref 70–99)
GLUCOSE SERPL-MCNC: 170 MG/DL (ref 70–99)
GLUCOSE SERPL-MCNC: 172 MG/DL (ref 70–99)
GLUCOSE SERPL-MCNC: 172 MG/DL (ref 70–99)
GLUCOSE SERPL-MCNC: 177 MG/DL (ref 70–99)
GLUCOSE SERPL-MCNC: 178 MG/DL (ref 70–99)
GLUCOSE SERPL-MCNC: 186 MG/DL (ref 70–99)
GLUCOSE SERPL-MCNC: 194 MG/DL (ref 70–99)
GLUCOSE SERPL-MCNC: 195 MG/DL (ref 70–99)
GLUCOSE SERPL-MCNC: 197 MG/DL (ref 70–99)
GLUCOSE SERPL-MCNC: 203 MG/DL (ref 70–99)
GLUCOSE SERPL-MCNC: 203 MG/DL (ref 70–99)
GLUCOSE SERPL-MCNC: 207 MG/DL (ref 70–99)
GLUCOSE SERPL-MCNC: 224 MG/DL (ref 70–99)
GLUCOSE SERPL-MCNC: 228 MG/DL (ref 70–99)
GLUCOSE SERPL-MCNC: 240 MG/DL (ref 70–99)
GLUCOSE UR STRIP-MCNC: NEGATIVE MG/DL
GRAM STN SPEC: NORMAL
GRAM STN SPEC: NORMAL
HADV DNA SPEC QL NAA+PROBE: NEGATIVE
HBA1C MFR BLD: 5.7 % (ref 4.3–6)
HBV CORE AB SERPL QL IA: NONREACTIVE
HBV SURFACE AB SERPL IA-ACNC: 2.7 M[IU]/ML
HBV SURFACE AG SERPL QL IA: NONREACTIVE
HCT VFR BLD AUTO: 26 % (ref 40–53)
HCT VFR BLD AUTO: 26.9 % (ref 40–53)
HCT VFR BLD AUTO: 30.6 % (ref 40–53)
HCT VFR BLD AUTO: 30.7 % (ref 40–53)
HCT VFR BLD AUTO: 30.7 % (ref 40–53)
HCT VFR BLD AUTO: 30.9 % (ref 40–53)
HCT VFR BLD AUTO: 31 % (ref 40–53)
HCT VFR BLD AUTO: 31.1 % (ref 40–53)
HCT VFR BLD AUTO: 31.4 % (ref 40–53)
HCT VFR BLD AUTO: 31.4 % (ref 40–53)
HCT VFR BLD AUTO: 31.6 % (ref 40–53)
HCT VFR BLD AUTO: 31.8 % (ref 40–53)
HCT VFR BLD AUTO: 31.9 % (ref 40–53)
HCT VFR BLD AUTO: 32.2 % (ref 40–53)
HCT VFR BLD AUTO: 32.5 % (ref 40–53)
HCT VFR BLD AUTO: 32.6 % (ref 40–53)
HCT VFR BLD AUTO: 32.7 % (ref 40–53)
HCT VFR BLD AUTO: 32.7 % (ref 40–53)
HCT VFR BLD AUTO: 33 % (ref 40–53)
HCT VFR BLD AUTO: 33.1 % (ref 40–53)
HCT VFR BLD AUTO: 33.2 % (ref 40–53)
HCT VFR BLD AUTO: 33.3 % (ref 40–53)
HCT VFR BLD AUTO: 33.5 % (ref 40–53)
HCT VFR BLD AUTO: 33.8 % (ref 40–53)
HCT VFR BLD AUTO: 33.9 % (ref 40–53)
HCT VFR BLD AUTO: 34 % (ref 40–53)
HCT VFR BLD AUTO: 34.1 % (ref 40–53)
HCT VFR BLD AUTO: 34.3 % (ref 40–53)
HCT VFR BLD AUTO: 34.4 % (ref 40–53)
HCT VFR BLD AUTO: 34.8 % (ref 40–53)
HCT VFR BLD AUTO: 34.8 % (ref 40–53)
HCT VFR BLD AUTO: 35.2 % (ref 40–53)
HCT VFR BLD AUTO: 35.3 % (ref 40–53)
HCT VFR BLD AUTO: 35.6 % (ref 40–53)
HCT VFR BLD AUTO: 35.7 % (ref 40–53)
HCT VFR BLD AUTO: 36.3 % (ref 40–53)
HCV AB SERPL QL IA: NORMAL
HGB BLD-MCNC: 10.4 G/DL (ref 13.3–17.7)
HGB BLD-MCNC: 10.5 G/DL (ref 13.3–17.7)
HGB BLD-MCNC: 10.6 G/DL (ref 13.3–17.7)
HGB BLD-MCNC: 10.6 G/DL (ref 13.3–17.7)
HGB BLD-MCNC: 10.7 G/DL (ref 13.3–17.7)
HGB BLD-MCNC: 10.8 G/DL (ref 13.3–17.7)
HGB BLD-MCNC: 10.8 G/DL (ref 13.3–17.7)
HGB BLD-MCNC: 10.9 G/DL (ref 13.3–17.7)
HGB BLD-MCNC: 11 G/DL (ref 13.3–17.7)
HGB BLD-MCNC: 11.1 G/DL (ref 13.3–17.7)
HGB BLD-MCNC: 11.2 G/DL (ref 13.3–17.7)
HGB BLD-MCNC: 11.3 G/DL (ref 13.3–17.7)
HGB BLD-MCNC: 11.4 G/DL (ref 13.3–17.7)
HGB BLD-MCNC: 11.5 G/DL (ref 13.3–17.7)
HGB BLD-MCNC: 11.5 G/DL (ref 13.3–17.7)
HGB BLD-MCNC: 11.6 G/DL (ref 13.3–17.7)
HGB BLD-MCNC: 11.6 G/DL (ref 13.3–17.7)
HGB BLD-MCNC: 11.7 G/DL (ref 13.3–17.7)
HGB BLD-MCNC: 11.7 G/DL (ref 13.3–17.7)
HGB BLD-MCNC: 11.8 G/DL (ref 13.3–17.7)
HGB BLD-MCNC: 12 G/DL (ref 13.3–17.7)
HGB BLD-MCNC: 8.3 G/DL (ref 13.3–17.7)
HGB BLD-MCNC: 8.5 G/DL (ref 13.3–17.7)
HGB UR QL STRIP: NEGATIVE
HIV 1+2 AB+HIV1 P24 AG SERPL QL IA: NORMAL
HMPV RNA SPEC QL NAA+PROBE: NEGATIVE
HMPV RNA SPEC QL NAA+PROBE: NEGATIVE
HPIV1 RNA SPEC QL NAA+PROBE: NEGATIVE
HPIV1 RNA SPEC QL NAA+PROBE: NEGATIVE
HPIV2 RNA SPEC QL NAA+PROBE: NEGATIVE
HPIV2 RNA SPEC QL NAA+PROBE: NEGATIVE
HPIV3 RNA SPEC QL NAA+PROBE: NEGATIVE
HPIV3 RNA SPEC QL NAA+PROBE: NEGATIVE
HSV1 IGG SERPL QL IA: 1.6 AI (ref 0–0.8)
HSV2 IGG SERPL QL IA: ABNORMAL AI (ref 0–0.8)
HTLV I+II AB PATRN SER IB-IMP: NORMAL
HYPOCHROMIA BLD QL: PRESENT
IC-%PRED-PRE: 93 %
IC-PRE: 3.57 L
IC-PRED: 3.83 L
IGA SERPL-MCNC: 167 MG/DL (ref 70–380)
IGG SERPL-MCNC: 821 MG/DL (ref 695–1620)
IGM SERPL-MCNC: 27 MG/DL (ref 60–265)
IMM GRANULOCYTES # BLD: 0 10E9/L (ref 0–0.4)
IMM GRANULOCYTES # BLD: 0.1 10E9/L (ref 0–0.4)
IMM GRANULOCYTES # BLD: 0.1 10E9/L (ref 0–0.4)
IMM GRANULOCYTES # BLD: 0.2 10E9/L (ref 0–0.4)
IMM GRANULOCYTES NFR BLD: 0.1 %
IMM GRANULOCYTES NFR BLD: 0.2 %
IMM GRANULOCYTES NFR BLD: 0.2 %
IMM GRANULOCYTES NFR BLD: 0.3 %
IMM GRANULOCYTES NFR BLD: 0.4 %
IMM GRANULOCYTES NFR BLD: 0.5 %
IMM GRANULOCYTES NFR BLD: 0.6 %
IMM GRANULOCYTES NFR BLD: 0.7 %
IMM GRANULOCYTES NFR BLD: 0.7 %
IMM GRANULOCYTES NFR BLD: 0.9 %
IMM GRANULOCYTES NFR BLD: 1 %
IMM GRANULOCYTES NFR BLD: 1 %
IMM GRANULOCYTES NFR BLD: 1.1 %
IMM GRANULOCYTES NFR BLD: 1.2 %
IMM GRANULOCYTES NFR BLD: 1.3 %
IMM GRANULOCYTES NFR BLD: 1.5 %
IMM GRANULOCYTES NFR BLD: 1.6 %
INR PPP: 0.96 (ref 0.86–1.14)
INR PPP: 1.05 (ref 0.86–1.14)
INR PPP: 1.11 (ref 0.86–1.14)
INR PPP: 1.14 (ref 0.86–1.14)
INTERPRETATION ECG - MUSE: NORMAL
KETONES UR STRIP-MCNC: NEGATIVE MG/DL
LEUKOCYTE ESTERASE UR QL STRIP: NEGATIVE
LYMPHOCYTES # BLD AUTO: 0 10E9/L (ref 0.8–5.3)
LYMPHOCYTES # BLD AUTO: 0.1 10E9/L (ref 0.8–5.3)
LYMPHOCYTES # BLD AUTO: 0.1 10E9/L (ref 0.8–5.3)
LYMPHOCYTES # BLD AUTO: 0.2 10E9/L (ref 0.8–5.3)
LYMPHOCYTES # BLD AUTO: 0.3 10E9/L (ref 0.8–5.3)
LYMPHOCYTES # BLD AUTO: 0.4 10E9/L (ref 0.8–5.3)
LYMPHOCYTES # BLD AUTO: 0.4 10E9/L (ref 0.8–5.3)
LYMPHOCYTES # BLD AUTO: 0.5 10E9/L (ref 0.8–5.3)
LYMPHOCYTES # BLD AUTO: 0.5 10E9/L (ref 0.8–5.3)
LYMPHOCYTES # BLD AUTO: 0.7 10E9/L (ref 0.8–5.3)
LYMPHOCYTES # BLD AUTO: 0.8 10E9/L (ref 0.8–5.3)
LYMPHOCYTES # BLD AUTO: 0.9 10E9/L (ref 0.8–5.3)
LYMPHOCYTES # BLD AUTO: 1 10E9/L (ref 0.8–5.3)
LYMPHOCYTES # BLD AUTO: 1.2 10E9/L (ref 0.8–5.3)
LYMPHOCYTES # BLD AUTO: 1.3 10E9/L (ref 0.8–5.3)
LYMPHOCYTES # BLD AUTO: 1.3 10E9/L (ref 0.8–5.3)
LYMPHOCYTES # BLD AUTO: 1.4 10E9/L (ref 0.8–5.3)
LYMPHOCYTES # BLD AUTO: 1.4 10E9/L (ref 0.8–5.3)
LYMPHOCYTES NFR BLD AUTO: 0.7 %
LYMPHOCYTES NFR BLD AUTO: 0.8 %
LYMPHOCYTES NFR BLD AUTO: 1.1 %
LYMPHOCYTES NFR BLD AUTO: 1.6 %
LYMPHOCYTES NFR BLD AUTO: 1.7 %
LYMPHOCYTES NFR BLD AUTO: 12.4 %
LYMPHOCYTES NFR BLD AUTO: 13 %
LYMPHOCYTES NFR BLD AUTO: 13.3 %
LYMPHOCYTES NFR BLD AUTO: 13.5 %
LYMPHOCYTES NFR BLD AUTO: 14.7 %
LYMPHOCYTES NFR BLD AUTO: 14.9 %
LYMPHOCYTES NFR BLD AUTO: 15.7 %
LYMPHOCYTES NFR BLD AUTO: 16.2 %
LYMPHOCYTES NFR BLD AUTO: 17.4 %
LYMPHOCYTES NFR BLD AUTO: 19.3 %
LYMPHOCYTES NFR BLD AUTO: 20.2 %
LYMPHOCYTES NFR BLD AUTO: 20.8 %
LYMPHOCYTES NFR BLD AUTO: 21.1 %
LYMPHOCYTES NFR BLD AUTO: 21.8 %
LYMPHOCYTES NFR BLD AUTO: 23 %
LYMPHOCYTES NFR BLD AUTO: 24.2 %
LYMPHOCYTES NFR BLD AUTO: 24.3 %
LYMPHOCYTES NFR BLD AUTO: 27.2 %
LYMPHOCYTES NFR BLD AUTO: 27.8 %
LYMPHOCYTES NFR BLD AUTO: 28.4 %
LYMPHOCYTES NFR BLD AUTO: 28.8 %
LYMPHOCYTES NFR BLD AUTO: 3.4 %
LYMPHOCYTES NFR BLD AUTO: 3.9 %
LYMPHOCYTES NFR BLD AUTO: 30.8 %
LYMPHOCYTES NFR BLD AUTO: 31.8 %
LYMPHOCYTES NFR BLD AUTO: 32.1 %
LYMPHOCYTES NFR BLD AUTO: 33.9 %
LYMPHOCYTES NFR BLD AUTO: 35.4 %
LYMPHOCYTES NFR BLD AUTO: 36.1 %
LYMPHOCYTES NFR BLD AUTO: 38 %
LYMPHOCYTES NFR BLD AUTO: 39.6 %
LYMPHOCYTES NFR BLD AUTO: 39.6 %
LYMPHOCYTES NFR BLD AUTO: 39.9 %
LYMPHOCYTES NFR BLD AUTO: 42.4 %
LYMPHOCYTES NFR BLD AUTO: 42.6 %
LYMPHOCYTES NFR BLD AUTO: 43.6 %
LYMPHOCYTES NFR BLD AUTO: 48.2 %
LYMPHOCYTES NFR FLD MANUAL: 1 %
LYMPHOCYTES NFR FLD MANUAL: 15 %
Lab: NORMAL
MACROCYTES BLD QL SMEAR: PRESENT
MAGNESIUM SERPL-MCNC: 1.4 MG/DL (ref 1.6–2.3)
MAGNESIUM SERPL-MCNC: 1.4 MG/DL (ref 1.6–2.3)
MAGNESIUM SERPL-MCNC: 1.5 MG/DL (ref 1.6–2.3)
MAGNESIUM SERPL-MCNC: 1.6 MG/DL (ref 1.6–2.3)
MAGNESIUM SERPL-MCNC: 1.7 MG/DL (ref 1.6–2.3)
MAGNESIUM SERPL-MCNC: 1.8 MG/DL (ref 1.6–2.3)
MAGNESIUM SERPL-MCNC: 1.9 MG/DL (ref 1.6–2.3)
MAGNESIUM SERPL-MCNC: 2 MG/DL (ref 1.6–2.3)
MAGNESIUM SERPL-MCNC: 2.3 MG/DL (ref 1.6–2.3)
MAGNESIUM SERPL-MCNC: 2.4 MG/DL (ref 1.6–2.3)
MAGNESIUM SERPL-MCNC: 2.5 MG/DL (ref 1.6–2.3)
MCH RBC QN AUTO: 29.5 PG (ref 26.5–33)
MCH RBC QN AUTO: 29.6 PG (ref 26.5–33)
MCH RBC QN AUTO: 29.9 PG (ref 26.5–33)
MCH RBC QN AUTO: 30 PG (ref 26.5–33)
MCH RBC QN AUTO: 30 PG (ref 26.5–33)
MCH RBC QN AUTO: 30.1 PG (ref 26.5–33)
MCH RBC QN AUTO: 30.2 PG (ref 26.5–33)
MCH RBC QN AUTO: 30.3 PG (ref 26.5–33)
MCH RBC QN AUTO: 30.4 PG (ref 26.5–33)
MCH RBC QN AUTO: 30.5 PG (ref 26.5–33)
MCH RBC QN AUTO: 30.6 PG (ref 26.5–33)
MCH RBC QN AUTO: 30.7 PG (ref 26.5–33)
MCH RBC QN AUTO: 30.8 PG (ref 26.5–33)
MCH RBC QN AUTO: 30.9 PG (ref 26.5–33)
MCH RBC QN AUTO: 31 PG (ref 26.5–33)
MCH RBC QN AUTO: 31.1 PG (ref 26.5–33)
MCH RBC QN AUTO: 31.2 PG (ref 26.5–33)
MCH RBC QN AUTO: 31.2 PG (ref 26.5–33)
MCH RBC QN AUTO: 31.4 PG (ref 26.5–33)
MCH RBC QN AUTO: 31.4 PG (ref 26.5–33)
MCH RBC QN AUTO: 31.5 PG (ref 26.5–33)
MCH RBC QN AUTO: 31.7 PG (ref 26.5–33)
MCH RBC QN AUTO: 31.8 PG (ref 26.5–33)
MCH RBC QN AUTO: 32.1 PG (ref 26.5–33)
MCHC RBC AUTO-ENTMCNC: 31.6 G/DL (ref 31.5–36.5)
MCHC RBC AUTO-ENTMCNC: 31.9 G/DL (ref 31.5–36.5)
MCHC RBC AUTO-ENTMCNC: 32.2 G/DL (ref 31.5–36.5)
MCHC RBC AUTO-ENTMCNC: 32.3 G/DL (ref 31.5–36.5)
MCHC RBC AUTO-ENTMCNC: 32.3 G/DL (ref 31.5–36.5)
MCHC RBC AUTO-ENTMCNC: 32.4 G/DL (ref 31.5–36.5)
MCHC RBC AUTO-ENTMCNC: 32.4 G/DL (ref 31.5–36.5)
MCHC RBC AUTO-ENTMCNC: 32.6 G/DL (ref 31.5–36.5)
MCHC RBC AUTO-ENTMCNC: 33 G/DL (ref 31.5–36.5)
MCHC RBC AUTO-ENTMCNC: 33 G/DL (ref 31.5–36.5)
MCHC RBC AUTO-ENTMCNC: 33.1 G/DL (ref 31.5–36.5)
MCHC RBC AUTO-ENTMCNC: 33.2 G/DL (ref 31.5–36.5)
MCHC RBC AUTO-ENTMCNC: 33.3 G/DL (ref 31.5–36.5)
MCHC RBC AUTO-ENTMCNC: 33.4 G/DL (ref 31.5–36.5)
MCHC RBC AUTO-ENTMCNC: 33.4 G/DL (ref 31.5–36.5)
MCHC RBC AUTO-ENTMCNC: 33.5 G/DL (ref 31.5–36.5)
MCHC RBC AUTO-ENTMCNC: 33.6 G/DL (ref 31.5–36.5)
MCHC RBC AUTO-ENTMCNC: 33.8 G/DL (ref 31.5–36.5)
MCHC RBC AUTO-ENTMCNC: 33.9 G/DL (ref 31.5–36.5)
MCHC RBC AUTO-ENTMCNC: 33.9 G/DL (ref 31.5–36.5)
MCHC RBC AUTO-ENTMCNC: 34 G/DL (ref 31.5–36.5)
MCHC RBC AUTO-ENTMCNC: 34.1 G/DL (ref 31.5–36.5)
MCHC RBC AUTO-ENTMCNC: 34.2 G/DL (ref 31.5–36.5)
MCHC RBC AUTO-ENTMCNC: 34.2 G/DL (ref 31.5–36.5)
MCHC RBC AUTO-ENTMCNC: 34.3 G/DL (ref 31.5–36.5)
MCHC RBC AUTO-ENTMCNC: 34.3 G/DL (ref 31.5–36.5)
MCHC RBC AUTO-ENTMCNC: 34.5 G/DL (ref 31.5–36.5)
MCHC RBC AUTO-ENTMCNC: 34.6 G/DL (ref 31.5–36.5)
MCHC RBC AUTO-ENTMCNC: 34.7 G/DL (ref 31.5–36.5)
MCHC RBC AUTO-ENTMCNC: 34.8 G/DL (ref 31.5–36.5)
MCHC RBC AUTO-ENTMCNC: 35 G/DL (ref 31.5–36.5)
MCHC RBC AUTO-ENTMCNC: 35 G/DL (ref 31.5–36.5)
MCV RBC AUTO: 90 FL (ref 78–100)
MCV RBC AUTO: 91 FL (ref 78–100)
MCV RBC AUTO: 92 FL (ref 78–100)
MCV RBC AUTO: 93 FL (ref 78–100)
MCV RBC AUTO: 94 FL (ref 78–100)
MCV RBC AUTO: 95 FL (ref 78–100)
MCV RBC AUTO: 95 FL (ref 78–100)
METAMYELOCYTES # BLD: 0 10E9/L
METAMYELOCYTES # BLD: 0.1 10E9/L
METAMYELOCYTES # BLD: 0.1 10E9/L
METAMYELOCYTES # BLD: 0.2 10E9/L
METAMYELOCYTES NFR BLD MANUAL: 0.9 %
METAMYELOCYTES NFR BLD MANUAL: 1.8 %
METAMYELOCYTES NFR BLD MANUAL: 1.8 %
METAMYELOCYTES NFR BLD MANUAL: 2 %
METAMYELOCYTES NFR BLD MANUAL: 2.7 %
MICRO REPORT STATUS: NORMAL
MICROBIOLOGIST REVIEW: NORMAL
MICROBIOLOGIST REVIEW: NORMAL
MONOCYTES # BLD AUTO: 0 10E9/L (ref 0–1.3)
MONOCYTES # BLD AUTO: 0.1 10E9/L (ref 0–1.3)
MONOCYTES # BLD AUTO: 0.1 10E9/L (ref 0–1.3)
MONOCYTES # BLD AUTO: 0.2 10E9/L (ref 0–1.3)
MONOCYTES # BLD AUTO: 0.3 10E9/L (ref 0–1.3)
MONOCYTES # BLD AUTO: 0.4 10E9/L (ref 0–1.3)
MONOCYTES # BLD AUTO: 0.5 10E9/L (ref 0–1.3)
MONOCYTES # BLD AUTO: 0.6 10E9/L (ref 0–1.3)
MONOCYTES # BLD AUTO: 0.7 10E9/L (ref 0–1.3)
MONOCYTES # BLD AUTO: 0.8 10E9/L (ref 0–1.3)
MONOCYTES # BLD AUTO: 1 10E9/L (ref 0–1.3)
MONOCYTES # BLD AUTO: 1 10E9/L (ref 0–1.3)
MONOCYTES # BLD AUTO: 1.1 10E9/L (ref 0–1.3)
MONOCYTES # BLD AUTO: 1.4 10E9/L (ref 0–1.3)
MONOCYTES NFR BLD AUTO: 0.4 %
MONOCYTES NFR BLD AUTO: 0.7 %
MONOCYTES NFR BLD AUTO: 0.7 %
MONOCYTES NFR BLD AUTO: 1 %
MONOCYTES NFR BLD AUTO: 1.6 %
MONOCYTES NFR BLD AUTO: 1.8 %
MONOCYTES NFR BLD AUTO: 10.6 %
MONOCYTES NFR BLD AUTO: 11 %
MONOCYTES NFR BLD AUTO: 11.9 %
MONOCYTES NFR BLD AUTO: 12.1 %
MONOCYTES NFR BLD AUTO: 12.1 %
MONOCYTES NFR BLD AUTO: 12.6 %
MONOCYTES NFR BLD AUTO: 12.6 %
MONOCYTES NFR BLD AUTO: 12.7 %
MONOCYTES NFR BLD AUTO: 13.4 %
MONOCYTES NFR BLD AUTO: 13.7 %
MONOCYTES NFR BLD AUTO: 13.9 %
MONOCYTES NFR BLD AUTO: 14.2 %
MONOCYTES NFR BLD AUTO: 14.9 %
MONOCYTES NFR BLD AUTO: 15.1 %
MONOCYTES NFR BLD AUTO: 15.2 %
MONOCYTES NFR BLD AUTO: 15.3 %
MONOCYTES NFR BLD AUTO: 16 %
MONOCYTES NFR BLD AUTO: 17.5 %
MONOCYTES NFR BLD AUTO: 18 %
MONOCYTES NFR BLD AUTO: 18.4 %
MONOCYTES NFR BLD AUTO: 18.5 %
MONOCYTES NFR BLD AUTO: 18.7 %
MONOCYTES NFR BLD AUTO: 18.9 %
MONOCYTES NFR BLD AUTO: 19.1 %
MONOCYTES NFR BLD AUTO: 2.3 %
MONOCYTES NFR BLD AUTO: 20.4 %
MONOCYTES NFR BLD AUTO: 21 %
MONOCYTES NFR BLD AUTO: 22.5 %
MONOCYTES NFR BLD AUTO: 27 %
MONOCYTES NFR BLD AUTO: 28.1 %
MONOCYTES NFR BLD AUTO: 4.4 %
MONOCYTES NFR BLD AUTO: 7.9 %
MONOCYTES NFR BLD AUTO: 8.1 %
MONOCYTES NFR BLD AUTO: 8.8 %
MONOCYTES NFR BLD AUTO: 8.9 %
MONOCYTES NFR BLD AUTO: 9.1 %
MPX SERIES: NORMAL
MUCOUS THREADS #/AREA URNS LPF: PRESENT /LPF
MYCOBACTERIUM SPEC CULT: NORMAL
MYELOCYTES # BLD: 0.1 10E9/L
MYELOCYTES # BLD: 0.2 10E9/L
MYELOCYTES NFR BLD MANUAL: 0.9 %
MYELOCYTES NFR BLD MANUAL: 1.8 %
MYELOCYTES NFR BLD MANUAL: 1.8 %
MYELOCYTES NFR BLD MANUAL: 2.6 %
MYELOCYTES NFR BLD MANUAL: 4.5 %
NEUTROPHILS # BLD AUTO: 0.2 10E9/L (ref 1.6–8.3)
NEUTROPHILS # BLD AUTO: 0.3 10E9/L (ref 1.6–8.3)
NEUTROPHILS # BLD AUTO: 0.8 10E9/L (ref 1.6–8.3)
NEUTROPHILS # BLD AUTO: 0.8 10E9/L (ref 1.6–8.3)
NEUTROPHILS # BLD AUTO: 1 10E9/L (ref 1.6–8.3)
NEUTROPHILS # BLD AUTO: 1 10E9/L (ref 1.6–8.3)
NEUTROPHILS # BLD AUTO: 1.2 10E9/L (ref 1.6–8.3)
NEUTROPHILS # BLD AUTO: 1.4 10E9/L (ref 1.6–8.3)
NEUTROPHILS # BLD AUTO: 1.5 10E9/L (ref 1.6–8.3)
NEUTROPHILS # BLD AUTO: 1.5 10E9/L (ref 1.6–8.3)
NEUTROPHILS # BLD AUTO: 1.6 10E9/L (ref 1.6–8.3)
NEUTROPHILS # BLD AUTO: 1.6 10E9/L (ref 1.6–8.3)
NEUTROPHILS # BLD AUTO: 1.7 10E9/L (ref 1.6–8.3)
NEUTROPHILS # BLD AUTO: 1.8 10E9/L (ref 1.6–8.3)
NEUTROPHILS # BLD AUTO: 1.9 10E9/L (ref 1.6–8.3)
NEUTROPHILS # BLD AUTO: 11.8 10E9/L (ref 1.6–8.3)
NEUTROPHILS # BLD AUTO: 2 10E9/L (ref 1.6–8.3)
NEUTROPHILS # BLD AUTO: 2 10E9/L (ref 1.6–8.3)
NEUTROPHILS # BLD AUTO: 2.3 10E9/L (ref 1.6–8.3)
NEUTROPHILS # BLD AUTO: 2.3 10E9/L (ref 1.6–8.3)
NEUTROPHILS # BLD AUTO: 2.4 10E9/L (ref 1.6–8.3)
NEUTROPHILS # BLD AUTO: 2.4 10E9/L (ref 1.6–8.3)
NEUTROPHILS # BLD AUTO: 2.6 10E9/L (ref 1.6–8.3)
NEUTROPHILS # BLD AUTO: 2.7 10E9/L (ref 1.6–8.3)
NEUTROPHILS # BLD AUTO: 2.7 10E9/L (ref 1.6–8.3)
NEUTROPHILS # BLD AUTO: 3 10E9/L (ref 1.6–8.3)
NEUTROPHILS # BLD AUTO: 3.3 10E9/L (ref 1.6–8.3)
NEUTROPHILS # BLD AUTO: 3.4 10E9/L (ref 1.6–8.3)
NEUTROPHILS # BLD AUTO: 3.5 10E9/L (ref 1.6–8.3)
NEUTROPHILS # BLD AUTO: 3.5 10E9/L (ref 1.6–8.3)
NEUTROPHILS # BLD AUTO: 4.3 10E9/L (ref 1.6–8.3)
NEUTROPHILS # BLD AUTO: 4.4 10E9/L (ref 1.6–8.3)
NEUTROPHILS # BLD AUTO: 5 10E9/L (ref 1.6–8.3)
NEUTROPHILS # BLD AUTO: 5.5 10E9/L (ref 1.6–8.3)
NEUTROPHILS # BLD AUTO: 7.6 10E9/L (ref 1.6–8.3)
NEUTROPHILS # BLD AUTO: 9.7 10E9/L (ref 1.6–8.3)
NEUTROPHILS NFR BLD AUTO: 22.8 %
NEUTROPHILS NFR BLD AUTO: 39 %
NEUTROPHILS NFR BLD AUTO: 42.4 %
NEUTROPHILS NFR BLD AUTO: 42.6 %
NEUTROPHILS NFR BLD AUTO: 42.9 %
NEUTROPHILS NFR BLD AUTO: 43.2 %
NEUTROPHILS NFR BLD AUTO: 44.8 %
NEUTROPHILS NFR BLD AUTO: 45.7 %
NEUTROPHILS NFR BLD AUTO: 46.7 %
NEUTROPHILS NFR BLD AUTO: 47.8 %
NEUTROPHILS NFR BLD AUTO: 47.9 %
NEUTROPHILS NFR BLD AUTO: 48.5 %
NEUTROPHILS NFR BLD AUTO: 50.3 %
NEUTROPHILS NFR BLD AUTO: 50.4 %
NEUTROPHILS NFR BLD AUTO: 51.6 %
NEUTROPHILS NFR BLD AUTO: 53.7 %
NEUTROPHILS NFR BLD AUTO: 54 %
NEUTROPHILS NFR BLD AUTO: 55 %
NEUTROPHILS NFR BLD AUTO: 56.7 %
NEUTROPHILS NFR BLD AUTO: 56.9 %
NEUTROPHILS NFR BLD AUTO: 57.7 %
NEUTROPHILS NFR BLD AUTO: 59.1 %
NEUTROPHILS NFR BLD AUTO: 59.1 %
NEUTROPHILS NFR BLD AUTO: 59.6 %
NEUTROPHILS NFR BLD AUTO: 62.6 %
NEUTROPHILS NFR BLD AUTO: 62.8 %
NEUTROPHILS NFR BLD AUTO: 63.1 %
NEUTROPHILS NFR BLD AUTO: 63.8 %
NEUTROPHILS NFR BLD AUTO: 64.2 %
NEUTROPHILS NFR BLD AUTO: 65.1 %
NEUTROPHILS NFR BLD AUTO: 68.4 %
NEUTROPHILS NFR BLD AUTO: 71.7 %
NEUTROPHILS NFR BLD AUTO: 72.6 %
NEUTROPHILS NFR BLD AUTO: 73 %
NEUTROPHILS NFR BLD AUTO: 76.3 %
NEUTROPHILS NFR BLD AUTO: 93.3 %
NEUTROPHILS NFR BLD AUTO: 94.7 %
NEUTROPHILS NFR BLD AUTO: 94.8 %
NEUTROPHILS NFR BLD AUTO: 96.8 %
NEUTROPHILS NFR BLD AUTO: 97.8 %
NEUTROPHILS NFR BLD AUTO: 97.8 %
NEUTROPHILS NFR BLD AUTO: 98.2 %
NEUTS BAND NFR FLD MANUAL: 24 %
NEUTS BAND NFR FLD MANUAL: 5 %
NITRATE UR QL: NEGATIVE
NRBC # BLD AUTO: 0 10*3/UL
NRBC # BLD AUTO: 0.1 10*3/UL
NRBC # BLD AUTO: 0.1 10*3/UL
NRBC # BLD AUTO: 0.3 10*3/UL
NRBC # BLD AUTO: 0.3 10*3/UL
NRBC BLD AUTO-RTO: 0 /100
NRBC BLD AUTO-RTO: 1 /100
NRBC BLD AUTO-RTO: 2 /100
NRBC BLD AUTO-RTO: 4 /100
NRBC BLD AUTO-RTO: 5 /100
OTHER CELLS FLD MANUAL: 75 %
OTHER CELLS FLD MANUAL: 80 %
OVALOCYTES BLD QL SMEAR: SLIGHT
PH UR STRIP: 5 PH (ref 5–7)
PHOSPHATE SERPL-MCNC: 2.1 MG/DL (ref 2.5–4.5)
PHOSPHATE SERPL-MCNC: 2.1 MG/DL (ref 2.5–4.5)
PHOSPHATE SERPL-MCNC: 2.3 MG/DL (ref 2.5–4.5)
PHOSPHATE SERPL-MCNC: 2.4 MG/DL (ref 2.5–4.5)
PHOSPHATE SERPL-MCNC: 3.1 MG/DL (ref 2.5–4.5)
PHOSPHATE SERPL-MCNC: 3.1 MG/DL (ref 2.5–4.5)
PLATELET # BLD AUTO: 114 10E9/L (ref 150–450)
PLATELET # BLD AUTO: 115 10E9/L (ref 150–450)
PLATELET # BLD AUTO: 117 10E9/L (ref 150–450)
PLATELET # BLD AUTO: 132 10E9/L (ref 150–450)
PLATELET # BLD AUTO: 133 10E9/L (ref 150–450)
PLATELET # BLD AUTO: 134 10E9/L (ref 150–450)
PLATELET # BLD AUTO: 134 10E9/L (ref 150–450)
PLATELET # BLD AUTO: 135 10E9/L (ref 150–450)
PLATELET # BLD AUTO: 137 10E9/L (ref 150–450)
PLATELET # BLD AUTO: 137 10E9/L (ref 150–450)
PLATELET # BLD AUTO: 138 10E9/L (ref 150–450)
PLATELET # BLD AUTO: 142 10E9/L (ref 150–450)
PLATELET # BLD AUTO: 144 10E9/L (ref 150–450)
PLATELET # BLD AUTO: 145 10E9/L (ref 150–450)
PLATELET # BLD AUTO: 147 10E9/L (ref 150–450)
PLATELET # BLD AUTO: 152 10E9/L (ref 150–450)
PLATELET # BLD AUTO: 156 10E9/L (ref 150–450)
PLATELET # BLD AUTO: 157 10E9/L (ref 150–450)
PLATELET # BLD AUTO: 158 10E9/L (ref 150–450)
PLATELET # BLD AUTO: 158 10E9/L (ref 150–450)
PLATELET # BLD AUTO: 163 10E9/L (ref 150–450)
PLATELET # BLD AUTO: 182 10E9/L (ref 150–450)
PLATELET # BLD AUTO: 182 10E9/L (ref 150–450)
PLATELET # BLD AUTO: 193 10E9/L (ref 150–450)
PLATELET # BLD AUTO: 223 10E9/L (ref 150–450)
PLATELET # BLD AUTO: 259 10E9/L (ref 150–450)
PLATELET # BLD AUTO: 30 10E9/L (ref 150–450)
PLATELET # BLD AUTO: 31 10E9/L (ref 150–450)
PLATELET # BLD AUTO: 36 10E9/L (ref 150–450)
PLATELET # BLD AUTO: 38 10E9/L (ref 150–450)
PLATELET # BLD AUTO: 46 10E9/L (ref 150–450)
PLATELET # BLD AUTO: 56 10E9/L (ref 150–450)
PLATELET # BLD AUTO: 66 10E9/L (ref 150–450)
PLATELET # BLD AUTO: 74 10E9/L (ref 150–450)
PLATELET # BLD AUTO: 75 10E9/L (ref 150–450)
PLATELET # BLD AUTO: 79 10E9/L (ref 150–450)
PLATELET # BLD AUTO: 82 10E9/L (ref 150–450)
PLATELET # BLD AUTO: 87 10E9/L (ref 150–450)
PLATELET # BLD AUTO: 94 10E9/L (ref 150–450)
PLATELET # BLD EST: ABNORMAL 10*3/UL
POIKILOCYTOSIS BLD QL SMEAR: SLIGHT
POIKILOCYTOSIS BLD QL SMEAR: SLIGHT
POLYCHROMASIA BLD QL SMEAR: SLIGHT
POTASSIUM SERPL-SCNC: 3.5 MMOL/L (ref 3.4–5.3)
POTASSIUM SERPL-SCNC: 3.6 MMOL/L (ref 3.4–5.3)
POTASSIUM SERPL-SCNC: 3.7 MMOL/L (ref 3.4–5.3)
POTASSIUM SERPL-SCNC: 3.8 MMOL/L (ref 3.4–5.3)
POTASSIUM SERPL-SCNC: 3.8 MMOL/L (ref 3.4–5.3)
POTASSIUM SERPL-SCNC: 3.9 MMOL/L (ref 3.4–5.3)
POTASSIUM SERPL-SCNC: 4 MMOL/L (ref 3.4–5.3)
POTASSIUM SERPL-SCNC: 4.1 MMOL/L (ref 3.4–5.3)
POTASSIUM SERPL-SCNC: 4.2 MMOL/L (ref 3.4–5.3)
POTASSIUM SERPL-SCNC: 4.3 MMOL/L (ref 3.4–5.3)
POTASSIUM SERPL-SCNC: 4.4 MMOL/L (ref 3.4–5.3)
PROMYELOCYTES # BLD MANUAL: 0 10E9/L
PROMYELOCYTES NFR BLD MANUAL: 0.9 %
PROT CSF-MCNC: 43 MG/DL (ref 15–60)
PROT SERPL-MCNC: 5.6 G/DL (ref 6.8–8.8)
PROT SERPL-MCNC: 6 G/DL (ref 6.8–8.8)
PROT SERPL-MCNC: 6.1 G/DL (ref 6.8–8.8)
PROT SERPL-MCNC: 6.5 G/DL (ref 6.8–8.8)
PROT SERPL-MCNC: 6.7 G/DL (ref 6.8–8.8)
PROT SERPL-MCNC: 6.8 G/DL (ref 6.8–8.8)
PROT SERPL-MCNC: 6.9 G/DL (ref 6.8–8.8)
PROT SERPL-MCNC: 7.1 G/DL (ref 6.8–8.8)
PROT SERPL-MCNC: 7.2 G/DL (ref 6.8–8.8)
PROT SERPL-MCNC: 7.3 G/DL (ref 6.8–8.8)
PROT SERPL-MCNC: 7.3 G/DL (ref 6.8–8.8)
PROT SERPL-MCNC: 7.4 G/DL (ref 6.8–8.8)
RBC # BLD AUTO: 2.8 10E12/L (ref 4.4–5.9)
RBC # BLD AUTO: 2.88 10E12/L (ref 4.4–5.9)
RBC # BLD AUTO: 3.34 10E12/L (ref 4.4–5.9)
RBC # BLD AUTO: 3.38 10E12/L (ref 4.4–5.9)
RBC # BLD AUTO: 3.41 10E12/L (ref 4.4–5.9)
RBC # BLD AUTO: 3.43 10E12/L (ref 4.4–5.9)
RBC # BLD AUTO: 3.46 10E12/L (ref 4.4–5.9)
RBC # BLD AUTO: 3.47 10E12/L (ref 4.4–5.9)
RBC # BLD AUTO: 3.47 10E12/L (ref 4.4–5.9)
RBC # BLD AUTO: 3.49 10E12/L (ref 4.4–5.9)
RBC # BLD AUTO: 3.52 10E12/L (ref 4.4–5.9)
RBC # BLD AUTO: 3.52 10E12/L (ref 4.4–5.9)
RBC # BLD AUTO: 3.54 10E12/L (ref 4.4–5.9)
RBC # BLD AUTO: 3.54 10E12/L (ref 4.4–5.9)
RBC # BLD AUTO: 3.56 10E12/L (ref 4.4–5.9)
RBC # BLD AUTO: 3.59 10E12/L (ref 4.4–5.9)
RBC # BLD AUTO: 3.6 10E12/L (ref 4.4–5.9)
RBC # BLD AUTO: 3.6 10E12/L (ref 4.4–5.9)
RBC # BLD AUTO: 3.61 10E12/L (ref 4.4–5.9)
RBC # BLD AUTO: 3.62 10E12/L (ref 4.4–5.9)
RBC # BLD AUTO: 3.63 10E12/L (ref 4.4–5.9)
RBC # BLD AUTO: 3.63 10E12/L (ref 4.4–5.9)
RBC # BLD AUTO: 3.65 10E12/L (ref 4.4–5.9)
RBC # BLD AUTO: 3.67 10E12/L (ref 4.4–5.9)
RBC # BLD AUTO: 3.68 10E12/L (ref 4.4–5.9)
RBC # BLD AUTO: 3.7 10E12/L (ref 4.4–5.9)
RBC # BLD AUTO: 3.72 10E12/L (ref 4.4–5.9)
RBC # BLD AUTO: 3.73 10E12/L (ref 4.4–5.9)
RBC # BLD AUTO: 3.73 10E12/L (ref 4.4–5.9)
RBC # BLD AUTO: 3.74 10E12/L (ref 4.4–5.9)
RBC # BLD AUTO: 3.75 10E12/L (ref 4.4–5.9)
RBC # BLD AUTO: 3.76 10E12/L (ref 4.4–5.9)
RBC # BLD AUTO: 3.76 10E12/L (ref 4.4–5.9)
RBC # BLD AUTO: 3.8 10E12/L (ref 4.4–5.9)
RBC # BLD AUTO: 3.81 10E12/L (ref 4.4–5.9)
RBC # BLD AUTO: 3.81 10E12/L (ref 4.4–5.9)
RBC # BLD AUTO: 3.87 10E12/L (ref 4.4–5.9)
RBC # BLD AUTO: 3.88 10E12/L (ref 4.4–5.9)
RBC # CSF MANUAL: 0 /UL (ref 0–2)
RBC #/AREA URNS AUTO: 1 /HPF (ref 0–2)
RBC INCLUSIONS BLD: SLIGHT
RBC MORPH BLD: NORMAL
RBC MORPH BLD: NORMAL
RHINOVIRUS RNA SPEC QL NAA+PROBE: NEGATIVE
RHINOVIRUS RNA SPEC QL NAA+PROBE: NEGATIVE
RSV RNA SPEC QL NAA+PROBE: NEGATIVE
RVPLETH-%PRED-PRE: 101 %
RVPLETH-PRE: 2.35 L
RVPLETH-PRED: 2.31 L
SBTA* LOCUS: NORMAL
SBTA*: NORMAL
SBTB* LOCUS: NORMAL
SBTB*: NORMAL
SBTC* LOCUS: NORMAL
SBTC*: NORMAL
SBTDQB1*: NORMAL
SBTDQB1*LOCUS: NORMAL
SBTDRB1* LOCUS - FCIS: NORMAL
SBTDRB1*: NORMAL
SBTDRB3*LOCUS NMDP: NORMAL
SBTDRB3*LOCUS: NORMAL
SODIUM SERPL-SCNC: 133 MMOL/L (ref 133–144)
SODIUM SERPL-SCNC: 135 MMOL/L (ref 133–144)
SODIUM SERPL-SCNC: 136 MMOL/L (ref 133–144)
SODIUM SERPL-SCNC: 137 MMOL/L (ref 133–144)
SODIUM SERPL-SCNC: 138 MMOL/L (ref 133–144)
SODIUM SERPL-SCNC: 139 MMOL/L (ref 133–144)
SODIUM SERPL-SCNC: 140 MMOL/L (ref 133–144)
SODIUM SERPL-SCNC: 141 MMOL/L (ref 133–144)
SODIUM SERPL-SCNC: 141 MMOL/L (ref 133–144)
SODIUM SERPL-SCNC: 142 MMOL/L (ref 133–144)
SODIUM SERPL-SCNC: 142 MMOL/L (ref 133–144)
SP GR UR STRIP: 1.01 (ref 1–1.03)
SPECIMEN EXP DATE BLD: NORMAL
SPECIMEN SOURCE FLD: NORMAL
SPECIMEN SOURCE FLD: NORMAL
SPECIMEN SOURCE: NORMAL
T CRUZI AB SER DONR QL: NORMAL
T PALLIDUM IGG+IGM SER QL: NEGATIVE
TACROLIMUS BLD-MCNC: 10.2 UG/L (ref 5–15)
TACROLIMUS BLD-MCNC: 11 UG/L (ref 5–15)
TACROLIMUS BLD-MCNC: 11.3 UG/L (ref 5–15)
TACROLIMUS BLD-MCNC: 11.4 UG/L (ref 5–15)
TACROLIMUS BLD-MCNC: 11.5 UG/L (ref 5–15)
TACROLIMUS BLD-MCNC: 11.8 UG/L (ref 5–15)
TACROLIMUS BLD-MCNC: 11.9 UG/L (ref 5–15)
TACROLIMUS BLD-MCNC: 12.3 UG/L (ref 5–15)
TACROLIMUS BLD-MCNC: 12.7 UG/L (ref 5–15)
TACROLIMUS BLD-MCNC: 13.2 UG/L (ref 5–15)
TACROLIMUS BLD-MCNC: 13.4 UG/L (ref 5–15)
TACROLIMUS BLD-MCNC: 13.6 UG/L (ref 5–15)
TACROLIMUS BLD-MCNC: 14.3 UG/L (ref 5–15)
TACROLIMUS BLD-MCNC: 15.3 UG/L (ref 5–15)
TACROLIMUS BLD-MCNC: 16.6 UG/L (ref 5–15)
TACROLIMUS BLD-MCNC: 18.4 UG/L (ref 5–15)
TACROLIMUS BLD-MCNC: 21.4 UG/L (ref 5–15)
TACROLIMUS BLD-MCNC: 7.8 UG/L (ref 5–15)
TACROLIMUS BLD-MCNC: 9 UG/L (ref 5–15)
TACROLIMUS BLD-MCNC: 9.3 UG/L (ref 5–15)
TLCPLETH-%PRED-PRE: 102 %
TLCPLETH-PRE: 7.12 L
TLCPLETH-PRED: 6.92 L
TME LAST DOSE: ABNORMAL H
TME LAST DOSE: NORMAL H
TUBE # CSF: 3 #
URATE SERPL-MCNC: 4.8 MG/DL (ref 3.5–7.2)
URATE SERPL-MCNC: 6.4 MG/DL (ref 3.5–7.2)
URN SPEC COLLECT METH UR: ABNORMAL
UROBILINOGEN UR STRIP-MCNC: 0 MG/DL (ref 0–2)
VA-%PRED-PRE: 99 %
VA-PRE: 6.57 L
VARIANT LYMPHS BLD QL SMEAR: PRESENT
VC-%PRED-PRE: 97 %
VC-PRE: 4.76 L
VC-PRED: 4.89 L
VZV IGG SER QL IA: 1.5 AI (ref 0–0.8)
WBC # BLD AUTO: 0.7 10E9/L (ref 4–11)
WBC # BLD AUTO: 1 10E9/L (ref 4–11)
WBC # BLD AUTO: 1 10E9/L (ref 4–11)
WBC # BLD AUTO: 1.3 10E9/L (ref 4–11)
WBC # BLD AUTO: 1.5 10E9/L (ref 4–11)
WBC # BLD AUTO: 10.4 10E9/L (ref 4–11)
WBC # BLD AUTO: 12.4 10E9/L (ref 4–11)
WBC # BLD AUTO: 2.2 10E9/L (ref 4–11)
WBC # BLD AUTO: 2.4 10E9/L (ref 4–11)
WBC # BLD AUTO: 2.4 10E9/L (ref 4–11)
WBC # BLD AUTO: 2.5 10E9/L (ref 4–11)
WBC # BLD AUTO: 2.7 10E9/L (ref 4–11)
WBC # BLD AUTO: 2.8 10E9/L (ref 4–11)
WBC # BLD AUTO: 3 10E9/L (ref 4–11)
WBC # BLD AUTO: 3.1 10E9/L (ref 4–11)
WBC # BLD AUTO: 3.2 10E9/L (ref 4–11)
WBC # BLD AUTO: 3.3 10E9/L (ref 4–11)
WBC # BLD AUTO: 3.4 10E9/L (ref 4–11)
WBC # BLD AUTO: 3.5 10E9/L (ref 4–11)
WBC # BLD AUTO: 3.7 10E9/L (ref 4–11)
WBC # BLD AUTO: 4.1 10E9/L (ref 4–11)
WBC # BLD AUTO: 4.3 10E9/L (ref 4–11)
WBC # BLD AUTO: 4.5 10E9/L (ref 4–11)
WBC # BLD AUTO: 5.1 10E9/L (ref 4–11)
WBC # BLD AUTO: 5.4 10E9/L (ref 4–11)
WBC # BLD AUTO: 5.6 10E9/L (ref 4–11)
WBC # BLD AUTO: 5.7 10E9/L (ref 4–11)
WBC # BLD AUTO: 5.8 10E9/L (ref 4–11)
WBC # BLD AUTO: 6.7 10E9/L (ref 4–11)
WBC # BLD AUTO: 6.9 10E9/L (ref 4–11)
WBC # BLD AUTO: 7.6 10E9/L (ref 4–11)
WBC # BLD AUTO: 7.8 10E9/L (ref 4–11)
WBC # CSF MANUAL: 0 /UL (ref 0–5)
WBC # FLD AUTO: 120 /UL
WBC # FLD AUTO: 164 /UL
WBC #/AREA URNS AUTO: 1 /HPF (ref 0–2)
WNV RNA SPEC QL NAA+PROBE: NONREACTIVE

## 2017-01-01 PROCEDURE — 36592 COLLECT BLOOD FROM PICC: CPT

## 2017-01-01 PROCEDURE — 83735 ASSAY OF MAGNESIUM: CPT | Performed by: INTERNAL MEDICINE

## 2017-01-01 PROCEDURE — 82962 GLUCOSE BLOOD TEST: CPT

## 2017-01-01 PROCEDURE — 25000132 ZZH RX MED GY IP 250 OP 250 PS 637: Performed by: NURSE PRACTITIONER

## 2017-01-01 PROCEDURE — G0364 BONE MARROW ASPIRATE &BIOPSY: HCPCS | Mod: ZF

## 2017-01-01 PROCEDURE — 25000132 ZZH RX MED GY IP 250 OP 250 PS 637: Performed by: PHYSICIAN ASSISTANT

## 2017-01-01 PROCEDURE — 80053 COMPREHEN METABOLIC PANEL: CPT | Performed by: PHYSICIAN ASSISTANT

## 2017-01-01 PROCEDURE — 20000002 ZZH R&B BMT INTERMEDIATE

## 2017-01-01 PROCEDURE — 00000146 ZZHCL STATISTIC GLUCOSE BY METER IP

## 2017-01-01 PROCEDURE — 81370 HLA I & II TYPING LR: CPT | Performed by: INTERNAL MEDICINE

## 2017-01-01 PROCEDURE — 77412 RADIATION TX DELIVERY LVL 3: CPT | Performed by: RADIOLOGY

## 2017-01-01 PROCEDURE — 00000161 ZZHCL STATISTIC H-SPHEME PROCESS B/S: Performed by: PHYSICIAN ASSISTANT

## 2017-01-01 PROCEDURE — 25000125 ZZHC RX 250: Performed by: PHYSICIAN ASSISTANT

## 2017-01-01 PROCEDURE — 25000132 ZZH RX MED GY IP 250 OP 250 PS 637: Performed by: INTERNAL MEDICINE

## 2017-01-01 PROCEDURE — 81382 HLA II TYPING 1 LOC HR: CPT | Performed by: INTERNAL MEDICINE

## 2017-01-01 PROCEDURE — 80197 ASSAY OF TACROLIMUS: CPT | Performed by: INTERNAL MEDICINE

## 2017-01-01 PROCEDURE — 85610 PROTHROMBIN TIME: CPT | Performed by: PHYSICIAN ASSISTANT

## 2017-01-01 PROCEDURE — 88280 CHROMOSOME KARYOTYPE STUDY: CPT | Performed by: PHYSICIAN ASSISTANT

## 2017-01-01 PROCEDURE — 76937 US GUIDE VASCULAR ACCESS: CPT

## 2017-01-01 PROCEDURE — 20600000 ZZH R&B BMT

## 2017-01-01 PROCEDURE — 83735 ASSAY OF MAGNESIUM: CPT | Performed by: PHYSICIAN ASSISTANT

## 2017-01-01 PROCEDURE — 86850 RBC ANTIBODY SCREEN: CPT | Performed by: PHYSICIAN ASSISTANT

## 2017-01-01 PROCEDURE — 25000128 H RX IP 250 OP 636: Performed by: INTERNAL MEDICINE

## 2017-01-01 PROCEDURE — 80048 BASIC METABOLIC PNL TOTAL CA: CPT | Performed by: PHYSICIAN ASSISTANT

## 2017-01-01 PROCEDURE — 85025 COMPLETE CBC W/AUTO DIFF WBC: CPT | Performed by: PHYSICIAN ASSISTANT

## 2017-01-01 PROCEDURE — 25000128 H RX IP 250 OP 636: Mod: ZF | Performed by: INTERNAL MEDICINE

## 2017-01-01 PROCEDURE — 25000128 H RX IP 250 OP 636: Performed by: NURSE PRACTITIONER

## 2017-01-01 PROCEDURE — 34300033 ZZH RX 343: Performed by: INTERNAL MEDICINE

## 2017-01-01 PROCEDURE — 25000125 ZZHC RX 250: Mod: ZF | Performed by: NURSE PRACTITIONER

## 2017-01-01 PROCEDURE — 88184 FLOWCYTOMETRY/ TC 1 MARKER: CPT | Performed by: INTERNAL MEDICINE

## 2017-01-01 PROCEDURE — 99212 OFFICE O/P EST SF 10 MIN: CPT

## 2017-01-01 PROCEDURE — 97110 THERAPEUTIC EXERCISES: CPT | Mod: GO

## 2017-01-01 PROCEDURE — C1751 CATH, INF, PER/CENT/MIDLINE: HCPCS

## 2017-01-01 PROCEDURE — 25000131 ZZH RX MED GY IP 250 OP 636 PS 637: Performed by: PHYSICIAN ASSISTANT

## 2017-01-01 PROCEDURE — 40000133 ZZH STATISTIC OT WARD VISIT: Performed by: OCCUPATIONAL THERAPIST

## 2017-01-01 PROCEDURE — A9560 TC99M LABELED RBC: HCPCS | Performed by: INTERNAL MEDICINE

## 2017-01-01 PROCEDURE — 88313 SPECIAL STAINS GROUP 2: CPT | Performed by: INTERNAL MEDICINE

## 2017-01-01 PROCEDURE — 25800025 ZZH RX 258: Performed by: PHYSICIAN ASSISTANT

## 2017-01-01 PROCEDURE — S5010 5% DEXTROSE AND 0.45% SALINE: HCPCS | Performed by: PHYSICIAN ASSISTANT

## 2017-01-01 PROCEDURE — 36415 COLL VENOUS BLD VENIPUNCTURE: CPT | Performed by: INTERNAL MEDICINE

## 2017-01-01 PROCEDURE — 80197 ASSAY OF TACROLIMUS: CPT | Performed by: PHYSICIAN ASSISTANT

## 2017-01-01 PROCEDURE — 38221 DX BONE MARROW BIOPSIES: CPT | Mod: ZF

## 2017-01-01 PROCEDURE — 82945 GLUCOSE OTHER FLUID: CPT | Performed by: INTERNAL MEDICINE

## 2017-01-01 PROCEDURE — 40000951 ZZHCL STATISTIC BONE MARROW INTERP TC 85097: Performed by: INTERNAL MEDICINE

## 2017-01-01 PROCEDURE — 00000058 ZZHCL STATISTIC BONE MARROW ASP PERF TC 38220: Performed by: PHYSICIAN ASSISTANT

## 2017-01-01 PROCEDURE — 77331 SPECIAL RADIATION DOSIMETRY: CPT | Performed by: RADIOLOGY

## 2017-01-01 PROCEDURE — 77470 SPECIAL RADIATION TREATMENT: CPT | Performed by: RADIOLOGY

## 2017-01-01 PROCEDURE — 40000133 ZZH STATISTIC OT WARD VISIT

## 2017-01-01 PROCEDURE — 25000128 H RX IP 250 OP 636: Mod: ZF | Performed by: PHYSICIAN ASSISTANT

## 2017-01-01 PROCEDURE — 84550 ASSAY OF BLOOD/URIC ACID: CPT | Performed by: INTERNAL MEDICINE

## 2017-01-01 PROCEDURE — 40000268 ZZH STATISTIC NO CHARGES: Mod: ZF

## 2017-01-01 PROCEDURE — 84100 ASSAY OF PHOSPHORUS: CPT | Performed by: PHYSICIAN ASSISTANT

## 2017-01-01 PROCEDURE — 96372 THER/PROPH/DIAG INJ SC/IM: CPT

## 2017-01-01 PROCEDURE — 25000128 H RX IP 250 OP 636: Performed by: PHYSICIAN ASSISTANT

## 2017-01-01 PROCEDURE — 89051 BODY FLUID CELL COUNT: CPT | Mod: 91 | Performed by: INTERNAL MEDICINE

## 2017-01-01 PROCEDURE — 88275 CYTOGENETICS 100-300: CPT | Performed by: PHYSICIAN ASSISTANT

## 2017-01-01 PROCEDURE — 85730 THROMBOPLASTIN TIME PARTIAL: CPT | Performed by: INTERNAL MEDICINE

## 2017-01-01 PROCEDURE — 99152 MOD SED SAME PHYS/QHP 5/>YRS: CPT

## 2017-01-01 PROCEDURE — 88264 CHROMOSOME ANALYSIS 20-25: CPT | Performed by: PHYSICIAN ASSISTANT

## 2017-01-01 PROCEDURE — 77336 RADIATION PHYSICS CONSULT: CPT | Performed by: RADIOLOGY

## 2017-01-01 PROCEDURE — 81378 HLA I & II TYPING HR: CPT | Mod: XU | Performed by: INTERNAL MEDICINE

## 2017-01-01 PROCEDURE — 82248 BILIRUBIN DIRECT: CPT | Performed by: PHYSICIAN ASSISTANT

## 2017-01-01 PROCEDURE — 85025 COMPLETE CBC W/AUTO DIFF WBC: CPT | Performed by: INTERNAL MEDICINE

## 2017-01-01 PROCEDURE — 99213 OFFICE O/P EST LOW 20 MIN: CPT

## 2017-01-01 PROCEDURE — 27210732 ZZH ACCESSORY CR1

## 2017-01-01 PROCEDURE — 36415 COLL VENOUS BLD VENIPUNCTURE: CPT | Mod: ZF

## 2017-01-01 PROCEDURE — 85025 COMPLETE CBC W/AUTO DIFF WBC: CPT | Performed by: STUDENT IN AN ORGANIZED HEALTH CARE EDUCATION/TRAINING PROGRAM

## 2017-01-01 PROCEDURE — 25000131 ZZH RX MED GY IP 250 OP 636 PS 637: Performed by: NURSE PRACTITIONER

## 2017-01-01 PROCEDURE — 88311 DECALCIFY TISSUE: CPT | Performed by: PHYSICIAN ASSISTANT

## 2017-01-01 PROCEDURE — 93010 ELECTROCARDIOGRAM REPORT: CPT | Mod: ZP | Performed by: INTERNAL MEDICINE

## 2017-01-01 PROCEDURE — 81376 HLA II TYPING 1 LOCUS LR: CPT | Mod: XU | Performed by: INTERNAL MEDICINE

## 2017-01-01 PROCEDURE — 25000128 H RX IP 250 OP 636

## 2017-01-01 PROCEDURE — 82784 ASSAY IGA/IGD/IGG/IGM EACH: CPT | Performed by: PHYSICIAN ASSISTANT

## 2017-01-01 PROCEDURE — 25000125 ZZHC RX 250: Performed by: INTERNAL MEDICINE

## 2017-01-01 PROCEDURE — 88237 TISSUE CULTURE BONE MARROW: CPT | Performed by: PHYSICIAN ASSISTANT

## 2017-01-01 PROCEDURE — 87081 CULTURE SCREEN ONLY: CPT | Performed by: PHYSICIAN ASSISTANT

## 2017-01-01 PROCEDURE — 40000424 ZZHCL STATISTIC BONE MARROW CORE PERF TC 38221: Performed by: PHYSICIAN ASSISTANT

## 2017-01-01 PROCEDURE — 80076 HEPATIC FUNCTION PANEL: CPT | Performed by: PHYSICIAN ASSISTANT

## 2017-01-01 PROCEDURE — 86790 VIRUS ANTIBODY NOS: CPT | Performed by: INTERNAL MEDICINE

## 2017-01-01 PROCEDURE — 99212 OFFICE O/P EST SF 10 MIN: CPT | Mod: ZF

## 2017-01-01 PROCEDURE — 88185 FLOWCYTOMETRY/TC ADD-ON: CPT | Performed by: PHYSICIAN ASSISTANT

## 2017-01-01 PROCEDURE — 80048 BASIC METABOLIC PNL TOTAL CA: CPT | Performed by: INTERNAL MEDICINE

## 2017-01-01 PROCEDURE — 97165 OT EVAL LOW COMPLEX 30 MIN: CPT | Mod: GO | Performed by: OCCUPATIONAL THERAPIST

## 2017-01-01 PROCEDURE — 81500001 ZZH ACQUISITION BONE MARROW RELATED DONOR

## 2017-01-01 PROCEDURE — 87205 SMEAR GRAM STAIN: CPT | Performed by: INTERNAL MEDICINE

## 2017-01-01 PROCEDURE — 99211 OFF/OP EST MAY X REQ PHY/QHP: CPT | Mod: ZF

## 2017-01-01 PROCEDURE — 87798 DETECT AGENT NOS DNA AMP: CPT | Performed by: INTERNAL MEDICINE

## 2017-01-01 PROCEDURE — 88305 TISSUE EXAM BY PATHOLOGIST: CPT | Performed by: INTERNAL MEDICINE

## 2017-01-01 PROCEDURE — 88185 FLOWCYTOMETRY/TC ADD-ON: CPT | Performed by: INTERNAL MEDICINE

## 2017-01-01 PROCEDURE — 85610 PROTHROMBIN TIME: CPT | Performed by: INTERNAL MEDICINE

## 2017-01-01 PROCEDURE — 87516 HEPATITIS B DNA AMP PROBE: CPT | Performed by: INTERNAL MEDICINE

## 2017-01-01 PROCEDURE — 88161 CYTOPATH SMEAR OTHER SOURCE: CPT | Performed by: PHYSICIAN ASSISTANT

## 2017-01-01 PROCEDURE — 80053 COMPREHEN METABOLIC PANEL: CPT | Performed by: INTERNAL MEDICINE

## 2017-01-01 PROCEDURE — 88271 CYTOGENETICS DNA PROBE: CPT | Performed by: INTERNAL MEDICINE

## 2017-01-01 PROCEDURE — 85025 COMPLETE CBC W/AUTO DIFF WBC: CPT | Performed by: NURSE PRACTITIONER

## 2017-01-01 PROCEDURE — 25000125 ZZHC RX 250: Performed by: NURSE PRACTITIONER

## 2017-01-01 PROCEDURE — 86695 HERPES SIMPLEX TYPE 1 TEST: CPT | Performed by: INTERNAL MEDICINE

## 2017-01-01 PROCEDURE — 40000809 ZZH STATISTIC NO DOCUMENTATION TO SUPPORT CHARGE

## 2017-01-01 PROCEDURE — 86901 BLOOD TYPING SEROLOGIC RH(D): CPT | Performed by: PHYSICIAN ASSISTANT

## 2017-01-01 PROCEDURE — 87799 DETECT AGENT NOS DNA QUANT: CPT | Performed by: INTERNAL MEDICINE

## 2017-01-01 PROCEDURE — 07DR3ZX EXTRACTION OF ILIAC BONE MARROW, PERCUTANEOUS APPROACH, DIAGNOSTIC: ICD-10-PCS | Performed by: PHYSICIAN ASSISTANT

## 2017-01-01 PROCEDURE — 87799 DETECT AGENT NOS DNA QUANT: CPT | Performed by: PHYSICIAN ASSISTANT

## 2017-01-01 PROCEDURE — 88271 CYTOGENETICS DNA PROBE: CPT | Performed by: PHYSICIAN ASSISTANT

## 2017-01-01 PROCEDURE — 00000345 ZZHCL STATISTIC REV BONE MARROW OUTSIDE SLIDES TC 88321: Performed by: INTERNAL MEDICINE

## 2017-01-01 PROCEDURE — 25000131 ZZH RX MED GY IP 250 OP 636 PS 637: Performed by: INTERNAL MEDICINE

## 2017-01-01 PROCEDURE — 94642 AEROSOL INHALATION TREATMENT: CPT

## 2017-01-01 PROCEDURE — 40000611 ZZHCL STATISTIC MORPHOLOGY W/INTERP HEMEPATH TC 85060: Performed by: INTERNAL MEDICINE

## 2017-01-01 PROCEDURE — 77332 RADIATION TREATMENT AID(S): CPT | Performed by: RADIOLOGY

## 2017-01-01 PROCEDURE — 88184 FLOWCYTOMETRY/ TC 1 MARKER: CPT | Performed by: PHYSICIAN ASSISTANT

## 2017-01-01 PROCEDURE — 99152 MOD SED SAME PHYS/QHP 5/>YRS: CPT | Performed by: INTERNAL MEDICINE

## 2017-01-01 PROCEDURE — 87340 HEPATITIS B SURFACE AG IA: CPT | Performed by: INTERNAL MEDICINE

## 2017-01-01 PROCEDURE — 88342 IMHCHEM/IMCYTCHM 1ST ANTB: CPT | Performed by: PHYSICIAN ASSISTANT

## 2017-01-01 PROCEDURE — 00000058 ZZHCL STATISTIC BONE MARROW ASP PERF TC 38220: Performed by: INTERNAL MEDICINE

## 2017-01-01 PROCEDURE — 80048 BASIC METABOLIC PNL TOTAL CA: CPT | Performed by: NURSE PRACTITIONER

## 2017-01-01 PROCEDURE — 25000128 H RX IP 250 OP 636: Mod: ZF | Performed by: NURSE PRACTITIONER

## 2017-01-01 PROCEDURE — 88280 CHROMOSOME KARYOTYPE STUDY: CPT | Performed by: INTERNAL MEDICINE

## 2017-01-01 PROCEDURE — 87305 ASPERGILLUS AG IA: CPT | Mod: 91 | Performed by: INTERNAL MEDICINE

## 2017-01-01 PROCEDURE — G0008 ADMIN INFLUENZA VIRUS VAC: HCPCS

## 2017-01-01 PROCEDURE — 97110 THERAPEUTIC EXERCISES: CPT | Mod: GO | Performed by: OCCUPATIONAL THERAPIST

## 2017-01-01 PROCEDURE — 40001005 ZZHCL STATISTIC FLOW >15 ABY TC 88189: Performed by: PHYSICIAN ASSISTANT

## 2017-01-01 PROCEDURE — 88264 CHROMOSOME ANALYSIS 20-25: CPT | Performed by: INTERNAL MEDICINE

## 2017-01-01 PROCEDURE — 89050 BODY FLUID CELL COUNT: CPT | Performed by: INTERNAL MEDICINE

## 2017-01-01 PROCEDURE — 81267 CHIMERISM ANAL NO CELL SELEC: CPT | Performed by: PHYSICIAN ASSISTANT

## 2017-01-01 PROCEDURE — 81268 CHIMERISM ANAL W/CELL SELECT: CPT | Performed by: PHYSICIAN ASSISTANT

## 2017-01-01 PROCEDURE — 86900 BLOOD TYPING SEROLOGIC ABO: CPT | Performed by: PHYSICIAN ASSISTANT

## 2017-01-01 PROCEDURE — 86706 HEP B SURFACE ANTIBODY: CPT | Performed by: INTERNAL MEDICINE

## 2017-01-01 PROCEDURE — 84157 ASSAY OF PROTEIN OTHER: CPT | Performed by: INTERNAL MEDICINE

## 2017-01-01 PROCEDURE — 40000951 ZZHCL STATISTIC BONE MARROW INTERP TC 85097: Performed by: PHYSICIAN ASSISTANT

## 2017-01-01 PROCEDURE — 87102 FUNGUS ISOLATION CULTURE: CPT | Performed by: INTERNAL MEDICINE

## 2017-01-01 PROCEDURE — 87206 SMEAR FLUORESCENT/ACID STAI: CPT | Mod: 91 | Performed by: INTERNAL MEDICINE

## 2017-01-01 PROCEDURE — 99212 OFFICE O/P EST SF 10 MIN: CPT | Mod: 25

## 2017-01-01 PROCEDURE — 87081 CULTURE SCREEN ONLY: CPT | Performed by: INTERNAL MEDICINE

## 2017-01-01 PROCEDURE — 25000128 H RX IP 250 OP 636: Mod: ZF | Performed by: STUDENT IN AN ORGANIZED HEALTH CARE EDUCATION/TRAINING PROGRAM

## 2017-01-01 PROCEDURE — 83735 ASSAY OF MAGNESIUM: CPT | Performed by: STUDENT IN AN ORGANIZED HEALTH CARE EDUCATION/TRAINING PROGRAM

## 2017-01-01 PROCEDURE — 62270 DX LMBR SPI PNXR: CPT | Mod: ZF

## 2017-01-01 PROCEDURE — 40000172 ZZH STATISTIC PROCEDURE PREP ONLY

## 2017-01-01 PROCEDURE — 40000269 ZZH STATISTIC NO CHARGE FACILITY FEE

## 2017-01-01 PROCEDURE — 86665 EPSTEIN-BARR CAPSID VCA: CPT | Performed by: INTERNAL MEDICINE

## 2017-01-01 PROCEDURE — 99214 OFFICE O/P EST MOD 30 MIN: CPT

## 2017-01-01 PROCEDURE — 87521 HEPATITIS C PROBE&RVRS TRNSC: CPT | Performed by: INTERNAL MEDICINE

## 2017-01-01 PROCEDURE — 40000424 ZZHCL STATISTIC BONE MARROW CORE PERF TC 38221: Performed by: INTERNAL MEDICINE

## 2017-01-01 PROCEDURE — 25000132 ZZH RX MED GY IP 250 OP 250 PS 637: Mod: ZF | Performed by: INTERNAL MEDICINE

## 2017-01-01 PROCEDURE — 84132 ASSAY OF SERUM POTASSIUM: CPT | Performed by: PHYSICIAN ASSISTANT

## 2017-01-01 PROCEDURE — 80053 COMPREHEN METABOLIC PANEL: CPT | Performed by: STUDENT IN AN ORGANIZED HEALTH CARE EDUCATION/TRAINING PROGRAM

## 2017-01-01 PROCEDURE — 40000611 ZZHCL STATISTIC MORPHOLOGY W/INTERP HEMEPATH TC 85060: Performed by: PHYSICIAN ASSISTANT

## 2017-01-01 PROCEDURE — 81268 CHIMERISM ANAL W/CELL SELECT: CPT | Performed by: INTERNAL MEDICINE

## 2017-01-01 PROCEDURE — 86787 VARICELLA-ZOSTER ANTIBODY: CPT | Performed by: INTERNAL MEDICINE

## 2017-01-01 PROCEDURE — 88305 TISSUE EXAM BY PATHOLOGIST: CPT | Performed by: PHYSICIAN ASSISTANT

## 2017-01-01 PROCEDURE — 87015 SPECIMEN INFECT AGNT CONCNTJ: CPT | Performed by: INTERNAL MEDICINE

## 2017-01-01 PROCEDURE — 38214 VOLUME DEPLETE OF HARVEST: CPT | Performed by: INTERNAL MEDICINE

## 2017-01-01 PROCEDURE — 27210738 ZZH ACCESSORY CR2

## 2017-01-01 PROCEDURE — 85730 THROMBOPLASTIN TIME PARTIAL: CPT | Performed by: PHYSICIAN ASSISTANT

## 2017-01-01 PROCEDURE — 84295 ASSAY OF SERUM SODIUM: CPT | Performed by: PHYSICIAN ASSISTANT

## 2017-01-01 PROCEDURE — 99215 OFFICE O/P EST HI 40 MIN: CPT | Mod: 25,ZF

## 2017-01-01 PROCEDURE — 86803 HEPATITIS C AB TEST: CPT | Performed by: INTERNAL MEDICINE

## 2017-01-01 PROCEDURE — 80048 BASIC METABOLIC PNL TOTAL CA: CPT | Performed by: STUDENT IN AN ORGANIZED HEALTH CARE EDUCATION/TRAINING PROGRAM

## 2017-01-01 PROCEDURE — 00000346 ZZHCL STATISTIC REVIEW OUTSIDE SLIDES TC 88321: Performed by: INTERNAL MEDICINE

## 2017-01-01 PROCEDURE — 00000161 ZZHCL STATISTIC H-SPHEME PROCESS B/S: Performed by: INTERNAL MEDICINE

## 2017-01-01 PROCEDURE — 87070 CULTURE OTHR SPECIMN AEROBIC: CPT | Performed by: INTERNAL MEDICINE

## 2017-01-01 PROCEDURE — 81378 HLA I & II TYPING HR: CPT | Performed by: INTERNAL MEDICINE

## 2017-01-01 PROCEDURE — 83735 ASSAY OF MAGNESIUM: CPT | Performed by: NURSE PRACTITIONER

## 2017-01-01 PROCEDURE — 86644 CMV ANTIBODY: CPT | Performed by: INTERNAL MEDICINE

## 2017-01-01 PROCEDURE — 88161 CYTOPATH SMEAR OTHER SOURCE: CPT | Performed by: INTERNAL MEDICINE

## 2017-01-01 PROCEDURE — 88108 CYTOPATH CONCENTRATE TECH: CPT | Performed by: INTERNAL MEDICINE

## 2017-01-01 PROCEDURE — 40001005 ZZHCL STATISTIC FLOW >15 ABY TC 88189: Performed by: INTERNAL MEDICINE

## 2017-01-01 PROCEDURE — 06H033Z INSERTION OF INFUSION DEVICE INTO INFERIOR VENA CAVA, PERCUTANEOUS APPROACH: ICD-10-PCS | Performed by: PHYSICIAN ASSISTANT

## 2017-01-01 PROCEDURE — 25000131 ZZH RX MED GY IP 250 OP 636 PS 637: Performed by: CLINICAL NURSE SPECIALIST

## 2017-01-01 PROCEDURE — 87535 HIV-1 PROBE&REVERSE TRNSCRPJ: CPT | Performed by: INTERNAL MEDICINE

## 2017-01-01 PROCEDURE — 77290 THER RAD SIMULAJ FIELD CPLX: CPT | Performed by: NURSE PRACTITIONER

## 2017-01-01 PROCEDURE — 93005 ELECTROCARDIOGRAM TRACING: CPT

## 2017-01-01 PROCEDURE — 31624 DX BRONCHOSCOPE/LAVAGE: CPT | Performed by: INTERNAL MEDICINE

## 2017-01-01 PROCEDURE — 86780 TREPONEMA PALLIDUM: CPT | Performed by: INTERNAL MEDICINE

## 2017-01-01 PROCEDURE — 81376 HLA II TYPING 1 LOCUS LR: CPT | Performed by: INTERNAL MEDICINE

## 2017-01-01 PROCEDURE — 00000102 ZZHCL STATISTIC CYTO WRIGHT STAIN TC: Performed by: INTERNAL MEDICINE

## 2017-01-01 PROCEDURE — 3E04305 INTRODUCTION OF OTHER ANTINEOPLASTIC INTO CENTRAL VEIN, PERCUTANEOUS APPROACH: ICD-10-PCS | Performed by: INTERNAL MEDICINE

## 2017-01-01 PROCEDURE — 40000795 ZZHCL STATISTIC DNA PROCESS AND HOLD: Performed by: INTERNAL MEDICINE

## 2017-01-01 PROCEDURE — 77321 SPECIAL TELETX PORT PLAN: CPT | Performed by: RADIOLOGY

## 2017-01-01 PROCEDURE — 84550 ASSAY OF BLOOD/URIC ACID: CPT | Performed by: PHYSICIAN ASSISTANT

## 2017-01-01 PROCEDURE — 88237 TISSUE CULTURE BONE MARROW: CPT | Performed by: INTERNAL MEDICINE

## 2017-01-01 PROCEDURE — 77334 RADIATION TREATMENT AID(S): CPT | Performed by: RADIOLOGY

## 2017-01-01 PROCEDURE — 90686 IIV4 VACC NO PRSV 0.5 ML IM: CPT | Mod: ZF | Performed by: INTERNAL MEDICINE

## 2017-01-01 PROCEDURE — 88275 CYTOGENETICS 100-300: CPT | Performed by: INTERNAL MEDICINE

## 2017-01-01 PROCEDURE — 80197 ASSAY OF TACROLIMUS: CPT | Performed by: STUDENT IN AN ORGANIZED HEALTH CARE EDUCATION/TRAINING PROGRAM

## 2017-01-01 PROCEDURE — 87389 HIV-1 AG W/HIV-1&-2 AB AG IA: CPT | Performed by: INTERNAL MEDICINE

## 2017-01-01 PROCEDURE — 30243Y2 TRANSFUSION OF ALLOGENEIC RELATED HEMATOPOIETIC STEM CELLS INTO CENTRAL VEIN, PERCUTANEOUS APPROACH: ICD-10-PCS | Performed by: INTERNAL MEDICINE

## 2017-01-01 PROCEDURE — 86704 HEP B CORE ANTIBODY TOTAL: CPT | Performed by: INTERNAL MEDICINE

## 2017-01-01 PROCEDURE — 81267 CHIMERISM ANAL NO CELL SELEC: CPT | Performed by: INTERNAL MEDICINE

## 2017-01-01 PROCEDURE — 99153 MOD SED SAME PHYS/QHP EA: CPT

## 2017-01-01 PROCEDURE — 78472 GATED HEART PLANAR SINGLE: CPT

## 2017-01-01 PROCEDURE — 81001 URINALYSIS AUTO W/SCOPE: CPT | Performed by: INTERNAL MEDICINE

## 2017-01-01 PROCEDURE — 81265 STR MARKERS SPECIMEN ANAL: CPT | Performed by: INTERNAL MEDICINE

## 2017-01-01 PROCEDURE — 86696 HERPES SIMPLEX TYPE 2 TEST: CPT | Performed by: INTERNAL MEDICINE

## 2017-01-01 PROCEDURE — 93010 ELECTROCARDIOGRAM REPORT: CPT | Performed by: INTERNAL MEDICINE

## 2017-01-01 PROCEDURE — 77001 FLUOROGUIDE FOR VEIN DEVICE: CPT

## 2017-01-01 PROCEDURE — 87116 MYCOBACTERIA CULTURE: CPT | Performed by: INTERNAL MEDICINE

## 2017-01-01 PROCEDURE — 27210995 ZZH RX 272: Performed by: INTERNAL MEDICINE

## 2017-01-01 PROCEDURE — C1769 GUIDE WIRE: HCPCS

## 2017-01-01 PROCEDURE — 88311 DECALCIFY TISSUE: CPT | Performed by: INTERNAL MEDICINE

## 2017-01-01 PROCEDURE — 99213 OFFICE O/P EST LOW 20 MIN: CPT | Mod: ZF

## 2017-01-01 PROCEDURE — 86901 BLOOD TYPING SEROLOGIC RH(D): CPT | Performed by: INTERNAL MEDICINE

## 2017-01-01 PROCEDURE — 27210904 ZZH KIT CR6

## 2017-01-01 PROCEDURE — 27210908 ZZH NEEDLE CR4

## 2017-01-01 PROCEDURE — 86850 RBC ANTIBODY SCREEN: CPT | Performed by: INTERNAL MEDICINE

## 2017-01-01 PROCEDURE — 86900 BLOOD TYPING SEROLOGIC ABO: CPT | Performed by: INTERNAL MEDICINE

## 2017-01-01 PROCEDURE — 87633 RESP VIRUS 12-25 TARGETS: CPT | Performed by: INTERNAL MEDICINE

## 2017-01-01 RX ORDER — HEPARIN SODIUM,PORCINE 10 UNIT/ML
5 VIAL (ML) INTRAVENOUS ONCE
Status: COMPLETED | OUTPATIENT
Start: 2017-01-01 | End: 2017-01-01

## 2017-01-01 RX ORDER — DEXAMETHASONE 4 MG/1
8 TABLET ORAL ONCE
Status: COMPLETED | OUTPATIENT
Start: 2017-01-01 | End: 2017-01-01

## 2017-01-01 RX ORDER — DIPHENHYDRAMINE HCL 25 MG
25 CAPSULE ORAL ONCE
Status: CANCELLED | OUTPATIENT
Start: 2017-01-01

## 2017-01-01 RX ORDER — CYCLOBENZAPRINE HCL 5 MG
5 TABLET ORAL 3 TIMES DAILY PRN
Status: DISCONTINUED | OUTPATIENT
Start: 2017-01-01 | End: 2017-01-01 | Stop reason: HOSPADM

## 2017-01-01 RX ORDER — HEPARIN SODIUM,PORCINE 10 UNIT/ML
5 VIAL (ML) INTRAVENOUS
Status: CANCELLED | OUTPATIENT
Start: 2017-01-01

## 2017-01-01 RX ORDER — ACYCLOVIR 800 MG/1
800 TABLET ORAL 2 TIMES DAILY
Qty: 150 TABLET | Refills: 11 | COMMUNITY
Start: 2017-01-01

## 2017-01-01 RX ORDER — HYDRALAZINE HYDROCHLORIDE 20 MG/ML
10-20 INJECTION INTRAMUSCULAR; INTRAVENOUS EVERY 4 HOURS PRN
Status: DISCONTINUED | OUTPATIENT
Start: 2017-01-01 | End: 2017-01-01 | Stop reason: HOSPADM

## 2017-01-01 RX ORDER — METHYLPREDNISOLONE SODIUM SUCCINATE 125 MG/2ML
125 INJECTION, POWDER, LYOPHILIZED, FOR SOLUTION INTRAMUSCULAR; INTRAVENOUS
Status: CANCELLED
Start: 2017-01-01

## 2017-01-01 RX ORDER — URSODIOL 300 MG/1
300 CAPSULE ORAL 3 TIMES DAILY
Qty: 270 CAPSULE | Refills: 3 | Status: SHIPPED | OUTPATIENT
Start: 2017-01-01 | End: 2017-01-01

## 2017-01-01 RX ORDER — FUROSEMIDE 10 MG/ML
10 INJECTION INTRAMUSCULAR; INTRAVENOUS
Status: ACTIVE | OUTPATIENT
Start: 2017-01-01 | End: 2017-01-01

## 2017-01-01 RX ORDER — ZOLPIDEM TARTRATE 5 MG/1
5-10 TABLET ORAL
Status: DISCONTINUED | OUTPATIENT
Start: 2017-01-01 | End: 2017-01-01 | Stop reason: HOSPADM

## 2017-01-01 RX ORDER — HEPARIN SODIUM,PORCINE 10 UNIT/ML
5 VIAL (ML) INTRAVENOUS
Status: DISCONTINUED | OUTPATIENT
Start: 2017-01-01 | End: 2017-01-01 | Stop reason: HOSPADM

## 2017-01-01 RX ORDER — MEPERIDINE HYDROCHLORIDE 25 MG/ML
25 INJECTION INTRAMUSCULAR; INTRAVENOUS; SUBCUTANEOUS EVERY 30 MIN PRN
Status: CANCELLED | OUTPATIENT
Start: 2017-01-01

## 2017-01-01 RX ORDER — CEFAZOLIN SODIUM 2 G/100ML
2 INJECTION, SOLUTION INTRAVENOUS
Status: COMPLETED | OUTPATIENT
Start: 2017-01-01 | End: 2017-01-01

## 2017-01-01 RX ORDER — ONDANSETRON 4 MG/1
TABLET, ORALLY DISINTEGRATING ORAL EVERY 4 HOURS PRN
Status: ON HOLD | COMMUNITY
End: 2017-01-01

## 2017-01-01 RX ORDER — DEXAMETHASONE 4 MG/1
4 TABLET ORAL ONCE
Status: COMPLETED | OUTPATIENT
Start: 2017-01-01 | End: 2017-01-01

## 2017-01-01 RX ORDER — LORAZEPAM 2 MG/ML
0.5 INJECTION INTRAMUSCULAR EVERY 4 HOURS PRN
Status: CANCELLED
Start: 2017-01-01

## 2017-01-01 RX ORDER — DILTIAZEM HYDROCHLORIDE 180 MG/1
180 CAPSULE, COATED, EXTENDED RELEASE ORAL DAILY
Status: DISCONTINUED | OUTPATIENT
Start: 2017-01-01 | End: 2017-01-01

## 2017-01-01 RX ORDER — DILTIAZEM HYDROCHLORIDE 120 MG/1
240 CAPSULE, COATED, EXTENDED RELEASE ORAL DAILY
Status: DISCONTINUED | OUTPATIENT
Start: 2017-01-01 | End: 2017-01-01

## 2017-01-01 RX ORDER — OXYCODONE HYDROCHLORIDE 5 MG/1
5 CAPSULE ORAL EVERY 4 HOURS PRN
COMMUNITY
End: 2017-01-01

## 2017-01-01 RX ORDER — NALOXONE HYDROCHLORIDE 0.4 MG/ML
.1-.4 INJECTION, SOLUTION INTRAMUSCULAR; INTRAVENOUS; SUBCUTANEOUS
Status: DISCONTINUED | OUTPATIENT
Start: 2017-01-01 | End: 2017-01-01 | Stop reason: HOSPADM

## 2017-01-01 RX ORDER — FENTANYL CITRATE 50 UG/ML
25-50 INJECTION, SOLUTION INTRAMUSCULAR; INTRAVENOUS EVERY 5 MIN PRN
Status: DISCONTINUED | OUTPATIENT
Start: 2017-01-01 | End: 2017-01-01 | Stop reason: HOSPADM

## 2017-01-01 RX ORDER — LANCETS 28 GAUGE
EACH MISCELLANEOUS
Status: ON HOLD | COMMUNITY
End: 2017-01-01

## 2017-01-01 RX ORDER — METFORMIN HCL 500 MG
1000 TABLET, EXTENDED RELEASE 24 HR ORAL
Qty: 60 TABLET | Refills: 11 | Status: SHIPPED | OUTPATIENT
Start: 2017-01-01

## 2017-01-01 RX ORDER — MAGNESIUM HYDROXIDE 1200 MG/15ML
LIQUID ORAL PRN
Status: DISCONTINUED | OUTPATIENT
Start: 2017-01-01 | End: 2017-01-01 | Stop reason: HOSPADM

## 2017-01-01 RX ORDER — ACYCLOVIR 800 MG/1
800 TABLET ORAL
Status: DISCONTINUED | OUTPATIENT
Start: 2017-01-01 | End: 2017-01-01 | Stop reason: HOSPADM

## 2017-01-01 RX ORDER — NICOTINE POLACRILEX 4 MG
15-30 LOZENGE BUCCAL
Status: DISCONTINUED | OUTPATIENT
Start: 2017-01-01 | End: 2017-01-01 | Stop reason: HOSPADM

## 2017-01-01 RX ORDER — PANTOPRAZOLE SODIUM 40 MG/1
40 TABLET, DELAYED RELEASE ORAL DAILY
Status: DISCONTINUED | OUTPATIENT
Start: 2017-01-01 | End: 2017-01-01 | Stop reason: HOSPADM

## 2017-01-01 RX ORDER — LORAZEPAM 0.5 MG/1
.5-1 TABLET ORAL EVERY 4 HOURS PRN
Qty: 40 TABLET | Refills: 0 | Status: SHIPPED | OUTPATIENT
Start: 2017-01-01 | End: 2017-01-01

## 2017-01-01 RX ORDER — PENTAMIDINE ISETHIONATE 300 MG/300MG
300 INHALANT RESPIRATORY (INHALATION) ONCE
Status: CANCELLED
Start: 2017-01-01 | End: 2017-01-01

## 2017-01-01 RX ORDER — EPINEPHRINE 0.3 MG/.3ML
0.3 INJECTION SUBCUTANEOUS EVERY 5 MIN PRN
Status: CANCELLED | OUTPATIENT
Start: 2017-01-01

## 2017-01-01 RX ORDER — HEPARIN SODIUM,PORCINE 10 UNIT/ML
5-10 VIAL (ML) INTRAVENOUS
Status: DISCONTINUED | OUTPATIENT
Start: 2017-01-01 | End: 2017-01-01 | Stop reason: HOSPADM

## 2017-01-01 RX ORDER — SODIUM CHLORIDE 9 MG/ML
INJECTION, SOLUTION INTRAVENOUS CONTINUOUS
Status: DISCONTINUED | OUTPATIENT
Start: 2017-01-01 | End: 2017-01-01

## 2017-01-01 RX ORDER — POTASSIUM CHLORIDE 1.5 G/1.58G
20-40 POWDER, FOR SOLUTION ORAL
Status: DISCONTINUED | OUTPATIENT
Start: 2017-01-01 | End: 2017-01-01 | Stop reason: HOSPADM

## 2017-01-01 RX ORDER — SODIUM CHLORIDE 9 MG/ML
1000 INJECTION, SOLUTION INTRAVENOUS CONTINUOUS PRN
Status: CANCELLED
Start: 2017-01-01

## 2017-01-01 RX ORDER — ACETAMINOPHEN 325 MG/1
650 TABLET ORAL ONCE
Status: COMPLETED | OUTPATIENT
Start: 2017-01-01 | End: 2017-01-01

## 2017-01-01 RX ORDER — FUROSEMIDE 10 MG/ML
20 INJECTION INTRAMUSCULAR; INTRAVENOUS EVERY 8 HOURS PRN
Status: ACTIVE | OUTPATIENT
Start: 2017-01-01 | End: 2017-01-01

## 2017-01-01 RX ORDER — DIPHENHYDRAMINE HYDROCHLORIDE 50 MG/ML
50 INJECTION INTRAMUSCULAR; INTRAVENOUS
Status: DISCONTINUED | OUTPATIENT
Start: 2017-01-01 | End: 2017-01-01 | Stop reason: HOSPADM

## 2017-01-01 RX ORDER — FUROSEMIDE 10 MG/ML
20 INJECTION INTRAMUSCULAR; INTRAVENOUS EVERY 8 HOURS PRN
Status: DISCONTINUED | OUTPATIENT
Start: 2017-01-01 | End: 2017-01-01

## 2017-01-01 RX ORDER — SODIUM CHLORIDE 9 MG/ML
1000 INJECTION, SOLUTION INTRAVENOUS CONTINUOUS PRN
Status: DISCONTINUED | OUTPATIENT
Start: 2017-01-01 | End: 2017-01-01 | Stop reason: HOSPADM

## 2017-01-01 RX ORDER — GUAIFENESIN 600 MG/1
600 TABLET, EXTENDED RELEASE ORAL
COMMUNITY
Start: 2017-01-01

## 2017-01-01 RX ORDER — DIPHENHYDRAMINE HCL 25 MG
25 CAPSULE ORAL ONCE
Status: COMPLETED | OUTPATIENT
Start: 2017-01-01 | End: 2017-01-01

## 2017-01-01 RX ORDER — MAGNESIUM OXIDE 400 MG/1
400 TABLET ORAL 2 TIMES DAILY
Qty: 60 TABLET | Refills: 1 | Status: SHIPPED | OUTPATIENT
Start: 2017-01-01 | End: 2017-01-01

## 2017-01-01 RX ORDER — LOSARTAN POTASSIUM 50 MG/1
50 TABLET ORAL DAILY
Status: DISCONTINUED | OUTPATIENT
Start: 2017-01-01 | End: 2017-01-01

## 2017-01-01 RX ORDER — MAGNESIUM OXIDE 400 MG/1
400 TABLET ORAL 3 TIMES DAILY
Qty: 60 TABLET | Refills: 1 | COMMUNITY
Start: 2017-01-01 | End: 2017-01-01

## 2017-01-01 RX ORDER — DIPHENHYDRAMINE HYDROCHLORIDE 50 MG/ML
50 INJECTION INTRAMUSCULAR; INTRAVENOUS
Status: CANCELLED
Start: 2017-01-01

## 2017-01-01 RX ORDER — ACYCLOVIR 800 MG/1
800 TABLET ORAL
Status: DISCONTINUED | OUTPATIENT
Start: 2017-01-01 | End: 2017-01-01

## 2017-01-01 RX ORDER — TRIAMCINOLONE ACETONIDE 1 MG/G
CREAM TOPICAL
Qty: 80 G | Refills: 1 | Status: SHIPPED | OUTPATIENT
Start: 2017-01-01

## 2017-01-01 RX ORDER — VORICONAZOLE 200 MG/1
300 TABLET, FILM COATED ORAL 2 TIMES DAILY
Qty: 90 TABLET | Refills: 1 | Status: SHIPPED | OUTPATIENT
Start: 2017-01-01 | End: 2017-01-01

## 2017-01-01 RX ORDER — TACROLIMUS 0.5 MG/1
2 CAPSULE ORAL 2 TIMES DAILY
Qty: 56 CAPSULE | Refills: 0 | Status: SHIPPED | OUTPATIENT
Start: 2017-01-01 | End: 2017-01-01

## 2017-01-01 RX ORDER — MYCOPHENOLATE MOFETIL 500 MG/1
1500 TABLET ORAL 2 TIMES DAILY
Qty: 84 TABLET | Refills: 0 | Status: SHIPPED | OUTPATIENT
Start: 2017-01-01 | End: 2017-01-01

## 2017-01-01 RX ORDER — PENTAMIDINE ISETHIONATE 300 MG/300MG
300 INHALANT RESPIRATORY (INHALATION) ONCE
Status: COMPLETED | OUTPATIENT
Start: 2017-01-01 | End: 2017-01-01

## 2017-01-01 RX ORDER — METRONIDAZOLE 500 MG/1
500 TABLET ORAL
COMMUNITY
End: 2017-01-01

## 2017-01-01 RX ORDER — DIPHENHYDRAMINE HCL 25 MG
25 CAPSULE ORAL ONCE
Status: DISCONTINUED | OUTPATIENT
Start: 2017-01-01 | End: 2017-01-01 | Stop reason: HOSPADM

## 2017-01-01 RX ORDER — EPINEPHRINE 1 MG/ML
0.3 INJECTION INTRAMUSCULAR; INTRAVENOUS; SUBCUTANEOUS EVERY 5 MIN PRN
Status: CANCELLED | OUTPATIENT
Start: 2017-01-01

## 2017-01-01 RX ORDER — VORICONAZOLE 200 MG/1
200 TABLET, FILM COATED ORAL 2 TIMES DAILY
Status: DISCONTINUED | OUTPATIENT
Start: 2017-01-01 | End: 2017-01-01

## 2017-01-01 RX ORDER — LEVOFLOXACIN 250 MG/1
250 TABLET, FILM COATED ORAL DAILY
Qty: 14 TABLET | Refills: 0 | Status: SHIPPED | OUTPATIENT
Start: 2017-01-01 | End: 2017-01-01

## 2017-01-01 RX ORDER — DEXTROSE MONOHYDRATE 50 MG/ML
INJECTION, SOLUTION INTRAVENOUS
Status: COMPLETED
Start: 2017-01-01 | End: 2017-01-01

## 2017-01-01 RX ORDER — LEVOFLOXACIN 250 MG/1
250 TABLET, FILM COATED ORAL
Status: DISCONTINUED | OUTPATIENT
Start: 2017-01-01 | End: 2017-01-01 | Stop reason: HOSPADM

## 2017-01-01 RX ORDER — ONDANSETRON 8 MG/1
8 TABLET, FILM COATED ORAL EVERY 8 HOURS
Status: DISCONTINUED | OUTPATIENT
Start: 2017-01-01 | End: 2017-01-01 | Stop reason: HOSPADM

## 2017-01-01 RX ORDER — PROCHLORPERAZINE MALEATE 5 MG
5-10 TABLET ORAL EVERY 6 HOURS PRN
Status: DISCONTINUED | OUTPATIENT
Start: 2017-01-01 | End: 2017-01-01 | Stop reason: HOSPADM

## 2017-01-01 RX ORDER — ONDANSETRON 8 MG/1
8 TABLET, FILM COATED ORAL EVERY 8 HOURS PRN
Qty: 30 TABLET | Refills: 1 | Status: SHIPPED | OUTPATIENT
Start: 2017-01-01

## 2017-01-01 RX ORDER — OXYCODONE HYDROCHLORIDE 5 MG/1
5 TABLET ORAL EVERY 4 HOURS PRN
Status: DISCONTINUED | OUTPATIENT
Start: 2017-01-01 | End: 2017-01-01 | Stop reason: HOSPADM

## 2017-01-01 RX ORDER — VORICONAZOLE 200 MG/1
200 TABLET, FILM COATED ORAL 2 TIMES DAILY
Qty: 60 TABLET | Refills: 1 | COMMUNITY
Start: 2017-01-01

## 2017-01-01 RX ORDER — SULFAMETHOXAZOLE/TRIMETHOPRIM 800-160 MG
1 TABLET ORAL
Status: DISCONTINUED | OUTPATIENT
Start: 2017-01-01 | End: 2017-01-01

## 2017-01-01 RX ORDER — LOSARTAN POTASSIUM 25 MG/1
TABLET ORAL
Qty: 30 TABLET | Refills: 1 | Status: SHIPPED | OUTPATIENT
Start: 2017-01-01 | End: 2017-01-01

## 2017-01-01 RX ORDER — ALBUTEROL SULFATE 0.83 MG/ML
2.5 SOLUTION RESPIRATORY (INHALATION)
Status: CANCELLED | OUTPATIENT
Start: 2017-01-01

## 2017-01-01 RX ORDER — MYCOPHENOLATE MOFETIL 500 MG/1
1500 TABLET ORAL 2 TIMES DAILY
COMMUNITY
Start: 2017-01-01 | End: 2017-01-01

## 2017-01-01 RX ORDER — PANTOPRAZOLE SODIUM 40 MG/1
40 TABLET, DELAYED RELEASE ORAL DAILY
Qty: 30 TABLET | Refills: 1 | Status: SHIPPED | OUTPATIENT
Start: 2017-01-01 | End: 2017-01-01

## 2017-01-01 RX ORDER — ACYCLOVIR 800 MG/1
800 TABLET ORAL
Qty: 150 TABLET | Refills: 11 | Status: SHIPPED | OUTPATIENT
Start: 2017-01-01 | End: 2017-01-01

## 2017-01-01 RX ORDER — FUROSEMIDE 10 MG/ML
10 INJECTION INTRAMUSCULAR; INTRAVENOUS
Status: DISCONTINUED | OUTPATIENT
Start: 2017-01-01 | End: 2017-01-01

## 2017-01-01 RX ORDER — LORAZEPAM 0.5 MG/1
.5-1 TABLET ORAL EVERY 4 HOURS PRN
Qty: 40 TABLET | Refills: 0 | Status: SHIPPED | OUTPATIENT
Start: 2017-01-01

## 2017-01-01 RX ORDER — ACETAMINOPHEN 325 MG/1
650 TABLET ORAL ONCE
Status: CANCELLED | OUTPATIENT
Start: 2017-01-01

## 2017-01-01 RX ORDER — ALBUTEROL SULFATE 90 UG/1
1-2 AEROSOL, METERED RESPIRATORY (INHALATION)
Status: CANCELLED
Start: 2017-01-01

## 2017-01-01 RX ORDER — DILTIAZEM HYDROCHLORIDE 120 MG/1
CAPSULE, EXTENDED RELEASE ORAL DAILY
Status: ON HOLD | COMMUNITY
End: 2017-01-01

## 2017-01-01 RX ORDER — ALBUTEROL SULFATE 90 UG/1
1-2 AEROSOL, METERED RESPIRATORY (INHALATION)
Status: DISCONTINUED | OUTPATIENT
Start: 2017-01-01 | End: 2017-01-01 | Stop reason: HOSPADM

## 2017-01-01 RX ORDER — DILTIAZEM HYDROCHLORIDE 180 MG/1
360 CAPSULE, COATED, EXTENDED RELEASE ORAL DAILY
Status: DISCONTINUED | OUTPATIENT
Start: 2017-01-01 | End: 2017-01-01 | Stop reason: HOSPADM

## 2017-01-01 RX ORDER — SULFAMETHOXAZOLE/TRIMETHOPRIM 800-160 MG
TABLET ORAL
Qty: 30 TABLET | Refills: 1 | Status: SHIPPED | OUTPATIENT
Start: 2017-01-01

## 2017-01-01 RX ORDER — HEPARIN SODIUM,PORCINE 10 UNIT/ML
3 VIAL (ML) INTRAVENOUS ONCE
Status: DISCONTINUED | OUTPATIENT
Start: 2017-01-01 | End: 2017-01-01 | Stop reason: HOSPADM

## 2017-01-01 RX ORDER — LIDOCAINE 40 MG/G
CREAM TOPICAL
Status: DISCONTINUED | OUTPATIENT
Start: 2017-01-01 | End: 2017-01-01 | Stop reason: HOSPADM

## 2017-01-01 RX ORDER — URSODIOL 300 MG/1
300 CAPSULE ORAL 3 TIMES DAILY
Qty: 90 CAPSULE | Refills: 1 | Status: SHIPPED | OUTPATIENT
Start: 2017-01-01 | End: 2017-01-01

## 2017-01-01 RX ORDER — ACETAMINOPHEN 325 MG/1
325-650 TABLET ORAL EVERY 4 HOURS PRN
Status: DISCONTINUED | OUTPATIENT
Start: 2017-01-01 | End: 2017-01-01 | Stop reason: HOSPADM

## 2017-01-01 RX ORDER — LORATADINE 10 MG/1
10 TABLET ORAL DAILY
Qty: 7 TABLET | Refills: 0 | Status: SHIPPED | OUTPATIENT
Start: 2017-01-01 | End: 2017-01-01

## 2017-01-01 RX ORDER — LORATADINE 10 MG/1
10 TABLET ORAL DAILY
Status: DISCONTINUED | OUTPATIENT
Start: 2017-01-01 | End: 2017-01-01 | Stop reason: HOSPADM

## 2017-01-01 RX ORDER — FLUCONAZOLE 100 MG/1
100 TABLET ORAL DAILY
Qty: 30 TABLET | Refills: 11 | Status: SHIPPED | OUTPATIENT
Start: 2017-01-01 | End: 2017-01-01

## 2017-01-01 RX ORDER — ZOLPIDEM TARTRATE 5 MG/1
5-10 TABLET ORAL
Qty: 45 TABLET | Refills: 0 | Status: SHIPPED | OUTPATIENT
Start: 2017-01-01

## 2017-01-01 RX ORDER — FUROSEMIDE 10 MG/ML
20 INJECTION INTRAMUSCULAR; INTRAVENOUS ONCE
Status: COMPLETED | OUTPATIENT
Start: 2017-01-01 | End: 2017-01-01

## 2017-01-01 RX ORDER — POTASSIUM CHLORIDE 7.45 MG/ML
10 INJECTION INTRAVENOUS
Status: DISCONTINUED | OUTPATIENT
Start: 2017-01-01 | End: 2017-01-01 | Stop reason: HOSPADM

## 2017-01-01 RX ORDER — SULFAMETHOXAZOLE/TRIMETHOPRIM 800-160 MG
1 TABLET ORAL
Status: DISCONTINUED | OUTPATIENT
Start: 2017-01-01 | End: 2017-01-01 | Stop reason: HOSPADM

## 2017-01-01 RX ORDER — VORICONAZOLE 200 MG/1
200 TABLET, FILM COATED ORAL 2 TIMES DAILY
Status: DISCONTINUED | OUTPATIENT
Start: 2017-01-01 | End: 2017-01-01 | Stop reason: HOSPADM

## 2017-01-01 RX ORDER — HYDRALAZINE HYDROCHLORIDE 20 MG/ML
10 INJECTION INTRAMUSCULAR; INTRAVENOUS ONCE
Status: COMPLETED | OUTPATIENT
Start: 2017-01-01 | End: 2017-01-01

## 2017-01-01 RX ORDER — TACROLIMUS 0.5 MG/1
1 CAPSULE ORAL 2 TIMES DAILY
Qty: 56 CAPSULE | Refills: 0 | COMMUNITY
Start: 2017-01-01 | End: 2018-01-01

## 2017-01-01 RX ORDER — ACETAMINOPHEN 325 MG/1
650 TABLET ORAL
Status: ON HOLD | COMMUNITY
Start: 2017-01-01 | End: 2017-01-01

## 2017-01-01 RX ORDER — MEPERIDINE HYDROCHLORIDE 25 MG/ML
25 INJECTION INTRAMUSCULAR; INTRAVENOUS; SUBCUTANEOUS EVERY 30 MIN PRN
Status: DISCONTINUED | OUTPATIENT
Start: 2017-01-01 | End: 2017-01-01 | Stop reason: HOSPADM

## 2017-01-01 RX ORDER — DILTIAZEM HYDROCHLORIDE 120 MG/1
120 CAPSULE, COATED, EXTENDED RELEASE ORAL
Status: ON HOLD | COMMUNITY
End: 2017-01-01

## 2017-01-01 RX ORDER — DEXTROSE MONOHYDRATE 25 G/50ML
25-50 INJECTION, SOLUTION INTRAVENOUS
Status: DISCONTINUED | OUTPATIENT
Start: 2017-01-01 | End: 2017-01-01

## 2017-01-01 RX ORDER — MYCOPHENOLATE MOFETIL 250 MG/1
1500 CAPSULE ORAL 2 TIMES DAILY
Qty: 84 CAPSULE | Refills: 0 | Status: SHIPPED | OUTPATIENT
Start: 2017-01-01 | End: 2017-01-01

## 2017-01-01 RX ORDER — FUROSEMIDE 10 MG/ML
10 INJECTION INTRAMUSCULAR; INTRAVENOUS ONCE
Status: COMPLETED | OUTPATIENT
Start: 2017-01-01 | End: 2017-01-01

## 2017-01-01 RX ORDER — CYCLOBENZAPRINE HCL 5 MG
5 TABLET ORAL 3 TIMES DAILY PRN
COMMUNITY
Start: 2017-01-01

## 2017-01-01 RX ORDER — EPINEPHRINE 0.3 MG/.3ML
0.3 INJECTION SUBCUTANEOUS EVERY 5 MIN PRN
Status: DISCONTINUED | OUTPATIENT
Start: 2017-01-01 | End: 2017-01-01 | Stop reason: HOSPADM

## 2017-01-01 RX ORDER — TACROLIMUS 0.5 MG/1
1.5 CAPSULE ORAL 2 TIMES DAILY
Qty: 180 CAPSULE | Refills: 1 | Status: SHIPPED | OUTPATIENT
Start: 2017-01-01 | End: 2017-01-01 | Stop reason: DRUGHIGH

## 2017-01-01 RX ORDER — IBUPROFEN 200 MG
200-600 TABLET ORAL
COMMUNITY
End: 2017-01-01

## 2017-01-01 RX ORDER — HEPARIN SODIUM,PORCINE 10 UNIT/ML
3 VIAL (ML) INTRAVENOUS DAILY
Status: ON HOLD | COMMUNITY
End: 2017-01-01

## 2017-01-01 RX ORDER — PRAVASTATIN SODIUM 20 MG
20 TABLET ORAL DAILY
Qty: 30 TABLET | Refills: 11 | Status: SHIPPED | OUTPATIENT
Start: 2017-01-01

## 2017-01-01 RX ORDER — LOSARTAN POTASSIUM 50 MG/1
TABLET ORAL
Qty: 30 TABLET | Refills: 1 | Status: SHIPPED | OUTPATIENT
Start: 2017-01-01 | End: 2017-01-01

## 2017-01-01 RX ORDER — URSODIOL 300 MG/1
300 CAPSULE ORAL 3 TIMES DAILY
Status: DISCONTINUED | OUTPATIENT
Start: 2017-01-01 | End: 2017-01-01 | Stop reason: HOSPADM

## 2017-01-01 RX ORDER — ZOLPIDEM TARTRATE 10 MG/1
TABLET ORAL
Status: ON HOLD | COMMUNITY
End: 2017-01-01

## 2017-01-01 RX ORDER — LORAZEPAM 2 MG/ML
0.5 INJECTION INTRAMUSCULAR EVERY 4 HOURS PRN
Status: DISCONTINUED | OUTPATIENT
Start: 2017-01-01 | End: 2017-01-01 | Stop reason: HOSPADM

## 2017-01-01 RX ORDER — TADALAFIL 20 MG/1
20 TABLET ORAL
Status: ON HOLD | COMMUNITY
Start: 2017-01-01 | End: 2017-01-01

## 2017-01-01 RX ORDER — CYCLOBENZAPRINE HCL 5 MG
TABLET ORAL
Status: ON HOLD | COMMUNITY
Start: 2017-01-01 | End: 2017-01-01

## 2017-01-01 RX ORDER — POTASSIUM CL/LIDO/0.9 % NACL 10MEQ/0.1L
10 INTRAVENOUS SOLUTION, PIGGYBACK (ML) INTRAVENOUS
Status: DISCONTINUED | OUTPATIENT
Start: 2017-01-01 | End: 2017-01-01 | Stop reason: HOSPADM

## 2017-01-01 RX ORDER — MYCOPHENOLATE MOFETIL 250 MG/1
1500 CAPSULE ORAL
Status: DISCONTINUED | OUTPATIENT
Start: 2017-01-01 | End: 2017-01-01 | Stop reason: HOSPADM

## 2017-01-01 RX ORDER — PANTOPRAZOLE SODIUM 40 MG/1
40 TABLET, DELAYED RELEASE ORAL DAILY
Qty: 90 TABLET | Refills: 1 | Status: SHIPPED | OUTPATIENT
Start: 2017-01-01

## 2017-01-01 RX ORDER — MAGNESIUM SULFATE HEPTAHYDRATE 40 MG/ML
4 INJECTION, SOLUTION INTRAVENOUS EVERY 4 HOURS PRN
Status: DISCONTINUED | OUTPATIENT
Start: 2017-01-01 | End: 2017-01-01 | Stop reason: HOSPADM

## 2017-01-01 RX ORDER — TACROLIMUS 1 MG/1
2 CAPSULE ORAL
Status: DISCONTINUED | OUTPATIENT
Start: 2017-01-01 | End: 2017-01-01

## 2017-01-01 RX ORDER — DILTIAZEM HYDROCHLORIDE 360 MG/1
360 CAPSULE, EXTENDED RELEASE ORAL DAILY
Qty: 30 CAPSULE | Refills: 1 | Status: SHIPPED | OUTPATIENT
Start: 2017-01-01 | End: 2017-01-01

## 2017-01-01 RX ORDER — HEPARIN SODIUM,PORCINE 10 UNIT/ML
5 VIAL (ML) INTRAVENOUS EVERY 24 HOURS
Status: DISCONTINUED | OUTPATIENT
Start: 2017-01-01 | End: 2017-01-01 | Stop reason: HOSPADM

## 2017-01-01 RX ORDER — TACROLIMUS 1 MG/1
1 CAPSULE ORAL
Status: DISCONTINUED | OUTPATIENT
Start: 2017-01-01 | End: 2017-01-01 | Stop reason: HOSPADM

## 2017-01-01 RX ORDER — HEPARIN SODIUM,PORCINE 10 UNIT/ML
5 VIAL (ML) INTRAVENOUS DAILY
Qty: 30 VIAL | Refills: 3 | Status: SHIPPED | OUTPATIENT
Start: 2017-01-01

## 2017-01-01 RX ORDER — LORAZEPAM 2 MG/ML
.5-1 INJECTION INTRAMUSCULAR EVERY 4 HOURS PRN
Status: DISCONTINUED | OUTPATIENT
Start: 2017-01-01 | End: 2017-01-01 | Stop reason: HOSPADM

## 2017-01-01 RX ORDER — POTASSIUM CHLORIDE 29.8 MG/ML
20 INJECTION INTRAVENOUS
Status: DISCONTINUED | OUTPATIENT
Start: 2017-01-01 | End: 2017-01-01 | Stop reason: HOSPADM

## 2017-01-01 RX ORDER — MAGNESIUM OXIDE 400 MG/1
TABLET ORAL
Qty: 60 TABLET | Refills: 1 | COMMUNITY
Start: 2017-01-01 | End: 2017-01-01

## 2017-01-01 RX ORDER — ALLOPURINOL 300 MG/1
300 TABLET ORAL DAILY
Status: COMPLETED | OUTPATIENT
Start: 2017-01-01 | End: 2017-01-01

## 2017-01-01 RX ORDER — ALBUTEROL SULFATE 0.83 MG/ML
2.5 SOLUTION RESPIRATORY (INHALATION)
Status: DISCONTINUED | OUTPATIENT
Start: 2017-01-01 | End: 2017-01-01 | Stop reason: HOSPADM

## 2017-01-01 RX ORDER — DILTIAZEM HYDROCHLORIDE 120 MG/1
120 CAPSULE, COATED, EXTENDED RELEASE ORAL DAILY
Status: DISCONTINUED | OUTPATIENT
Start: 2017-01-01 | End: 2017-01-01

## 2017-01-01 RX ORDER — LORAZEPAM 0.5 MG/1
.5-1 TABLET ORAL EVERY 4 HOURS PRN
Status: DISCONTINUED | OUTPATIENT
Start: 2017-01-01 | End: 2017-01-01 | Stop reason: HOSPADM

## 2017-01-01 RX ORDER — VORICONAZOLE 200 MG/1
300 TABLET, FILM COATED ORAL 2 TIMES DAILY
Qty: 90 TABLET | Refills: 3 | Status: SHIPPED | OUTPATIENT
Start: 2017-01-01 | End: 2017-01-01

## 2017-01-01 RX ORDER — ALBUTEROL SULFATE 0.83 MG/ML
SOLUTION RESPIRATORY (INHALATION) PRN
Status: DISCONTINUED | OUTPATIENT
Start: 2017-01-01 | End: 2017-01-01 | Stop reason: HOSPADM

## 2017-01-01 RX ORDER — METHYLPREDNISOLONE SODIUM SUCCINATE 125 MG/2ML
125 INJECTION, POWDER, LYOPHILIZED, FOR SOLUTION INTRAMUSCULAR; INTRAVENOUS
Status: DISCONTINUED | OUTPATIENT
Start: 2017-01-01 | End: 2017-01-01 | Stop reason: HOSPADM

## 2017-01-01 RX ORDER — HYDRALAZINE HYDROCHLORIDE 25 MG/1
25 TABLET, FILM COATED ORAL EVERY 6 HOURS PRN
Status: DISCONTINUED | OUTPATIENT
Start: 2017-01-01 | End: 2017-01-01

## 2017-01-01 RX ORDER — POTASSIUM CHLORIDE 750 MG/1
20-40 TABLET, EXTENDED RELEASE ORAL
Status: DISCONTINUED | OUTPATIENT
Start: 2017-01-01 | End: 2017-01-01 | Stop reason: HOSPADM

## 2017-01-01 RX ORDER — MICAFUNGIN 20 MG/ML
INJECTION, POWDER, LYOPHILIZED, FOR SOLUTION INTRAVENOUS DAILY
Status: ON HOLD | COMMUNITY
End: 2017-01-01

## 2017-01-01 RX ORDER — MYCOPHENOLATE MOFETIL 500 MG/1
1500 TABLET ORAL
Status: DISCONTINUED | OUTPATIENT
Start: 2017-01-01 | End: 2017-01-01

## 2017-01-01 RX ORDER — HEPARIN SODIUM,PORCINE 10 UNIT/ML
5 VIAL (ML) INTRAVENOUS
Status: COMPLETED | OUTPATIENT
Start: 2017-01-01 | End: 2017-01-01

## 2017-01-01 RX ORDER — MAGNESIUM OXIDE 400 MG/1
TABLET ORAL
Qty: 100 TABLET | Refills: 1 | Status: SHIPPED | OUTPATIENT
Start: 2017-01-01

## 2017-01-01 RX ORDER — MAGNESIUM OXIDE 400 MG/1
800 TABLET ORAL 2 TIMES DAILY
Qty: 60 TABLET | Refills: 1 | COMMUNITY
Start: 2017-01-01 | End: 2017-01-01

## 2017-01-01 RX ORDER — LOSARTAN POTASSIUM 50 MG/1
50 TABLET ORAL DAILY
Qty: 30 TABLET | Refills: 1 | Status: SHIPPED | OUTPATIENT
Start: 2017-01-01 | End: 2018-01-01

## 2017-01-01 RX ORDER — MEPERIDINE HYDROCHLORIDE 25 MG/ML
25-50 INJECTION INTRAMUSCULAR; INTRAVENOUS; SUBCUTANEOUS
Status: ACTIVE | OUTPATIENT
Start: 2017-01-01 | End: 2017-01-01

## 2017-01-01 RX ORDER — ONDANSETRON 8 MG/1
8 TABLET, FILM COATED ORAL EVERY 8 HOURS
Status: DISCONTINUED | OUTPATIENT
Start: 2017-01-01 | End: 2017-01-01

## 2017-01-01 RX ORDER — LEVOFLOXACIN 250 MG/1
250 TABLET, FILM COATED ORAL
Status: DISCONTINUED | OUTPATIENT
Start: 2017-01-01 | End: 2017-01-01

## 2017-01-01 RX ORDER — SODIUM CHLORIDE 9 MG/ML
INJECTION, SOLUTION INTRAVENOUS CONTINUOUS
Status: ACTIVE | OUTPATIENT
Start: 2017-01-01 | End: 2017-01-01

## 2017-01-01 RX ORDER — HEPARIN SODIUM,PORCINE 10 UNIT/ML
5-10 VIAL (ML) INTRAVENOUS EVERY 24 HOURS
Status: DISCONTINUED | OUTPATIENT
Start: 2017-01-01 | End: 2017-01-01 | Stop reason: HOSPADM

## 2017-01-01 RX ORDER — DILTIAZEM HYDROCHLORIDE 360 MG/1
360 CAPSULE, EXTENDED RELEASE ORAL DAILY
Qty: 90 CAPSULE | Refills: 1 | Status: SHIPPED | OUTPATIENT
Start: 2017-01-01 | End: 2018-01-01

## 2017-01-01 RX ORDER — ZOLPIDEM TARTRATE 5 MG/1
5 TABLET ORAL
Status: DISCONTINUED | OUTPATIENT
Start: 2017-01-01 | End: 2017-01-01

## 2017-01-01 RX ORDER — METFORMIN HCL 500 MG
TABLET, EXTENDED RELEASE 24 HR ORAL
Qty: 60 TABLET | Refills: 3 | Status: SHIPPED | OUTPATIENT
Start: 2017-01-01 | End: 2017-01-01

## 2017-01-01 RX ORDER — LOSARTAN POTASSIUM 50 MG/1
50 TABLET ORAL DAILY
Qty: 30 TABLET | Refills: 1 | Status: SHIPPED | OUTPATIENT
Start: 2017-01-01 | End: 2017-01-01

## 2017-01-01 RX ORDER — FENTANYL CITRATE 50 UG/ML
INJECTION, SOLUTION INTRAMUSCULAR; INTRAVENOUS PRN
Status: DISCONTINUED | OUTPATIENT
Start: 2017-01-01 | End: 2017-01-01 | Stop reason: HOSPADM

## 2017-01-01 RX ORDER — SODIUM CHLORIDE 9 MG/ML
INJECTION, SOLUTION INTRAVENOUS CONTINUOUS
Status: DISCONTINUED | OUTPATIENT
Start: 2017-01-01 | End: 2017-01-01 | Stop reason: HOSPADM

## 2017-01-01 RX ORDER — FLUMAZENIL 0.1 MG/ML
0.2 INJECTION, SOLUTION INTRAVENOUS
Status: DISCONTINUED | OUTPATIENT
Start: 2017-01-01 | End: 2017-01-01 | Stop reason: HOSPADM

## 2017-01-01 RX ORDER — ACYCLOVIR 800 MG/1
800 TABLET ORAL
Qty: 150 TABLET | Refills: 1 | Status: SHIPPED | OUTPATIENT
Start: 2017-01-01 | End: 2017-01-01

## 2017-01-01 RX ORDER — LEVOFLOXACIN 500 MG/1
500 TABLET, FILM COATED ORAL
COMMUNITY
Start: 2017-01-01 | End: 2017-01-01

## 2017-01-01 RX ADMIN — ZOLPIDEM TARTRATE 10 MG: 5 TABLET, FILM COATED ORAL at 22:15

## 2017-01-01 RX ADMIN — SODIUM CHLORIDE, PRESERVATIVE FREE 5 ML: 5 INJECTION INTRAVENOUS at 08:30

## 2017-01-01 RX ADMIN — Medication 2 G: at 06:12

## 2017-01-01 RX ADMIN — ZOLPIDEM TARTRATE 5 MG: 5 TABLET, FILM COATED ORAL at 22:30

## 2017-01-01 RX ADMIN — ONDANSETRON HYDROCHLORIDE 8 MG: 8 TABLET, FILM COATED ORAL at 08:46

## 2017-01-01 RX ADMIN — MYCOPHENOLATE MOFETIL 1500 MG: 500 INJECTION, POWDER, LYOPHILIZED, FOR SOLUTION INTRAVENOUS at 20:01

## 2017-01-01 RX ADMIN — CYCLOBENZAPRINE HYDROCHLORIDE 5 MG: 5 TABLET, FILM COATED ORAL at 22:19

## 2017-01-01 RX ADMIN — URSODIOL 300 MG: 300 CAPSULE ORAL at 14:32

## 2017-01-01 RX ADMIN — LORAZEPAM 1 MG: 0.5 TABLET ORAL at 02:18

## 2017-01-01 RX ADMIN — FLUDARABINE PHOSPHATE 64 MG: 25 INJECTION, SOLUTION INTRAVENOUS at 17:59

## 2017-01-01 RX ADMIN — TACROLIMUS 2 MG: 1 CAPSULE ORAL at 20:42

## 2017-01-01 RX ADMIN — LEVOFLOXACIN 250 MG: 250 TABLET, FILM COATED ORAL at 10:56

## 2017-01-01 RX ADMIN — CYCLOBENZAPRINE HYDROCHLORIDE 5 MG: 5 TABLET, FILM COATED ORAL at 23:08

## 2017-01-01 RX ADMIN — ONDANSETRON HYDROCHLORIDE 8 MG: 8 TABLET, FILM COATED ORAL at 17:25

## 2017-01-01 RX ADMIN — FUROSEMIDE 20 MG: 10 INJECTION, SOLUTION INTRAVENOUS at 10:04

## 2017-01-01 RX ADMIN — ACYCLOVIR 800 MG: 800 TABLET ORAL at 17:49

## 2017-01-01 RX ADMIN — PANTOPRAZOLE SODIUM 40 MG: 40 TABLET, DELAYED RELEASE ORAL at 08:45

## 2017-01-01 RX ADMIN — LOSARTAN POTASSIUM 50 MG: 50 TABLET, FILM COATED ORAL at 08:23

## 2017-01-01 RX ADMIN — ONDANSETRON HYDROCHLORIDE 8 MG: 8 TABLET, FILM COATED ORAL at 16:30

## 2017-01-01 RX ADMIN — DEXAMETHASONE 10 MG: 2 TABLET ORAL at 08:14

## 2017-01-01 RX ADMIN — SODIUM CHLORIDE: 9 INJECTION, SOLUTION INTRAVENOUS at 14:03

## 2017-01-01 RX ADMIN — ZOLPIDEM TARTRATE 5 MG: 5 TABLET, FILM COATED ORAL at 23:01

## 2017-01-01 RX ADMIN — CYCLOBENZAPRINE HYDROCHLORIDE 5 MG: 5 TABLET, FILM COATED ORAL at 22:29

## 2017-01-01 RX ADMIN — PSYLLIUM HUSK 1 PACKET: 3.4 POWDER ORAL at 08:33

## 2017-01-01 RX ADMIN — DILTIAZEM HYDROCHLORIDE 360 MG: 180 CAPSULE, EXTENDED RELEASE ORAL at 18:26

## 2017-01-01 RX ADMIN — FENTANYL CITRATE 25 MCG: 50 INJECTION, SOLUTION INTRAMUSCULAR; INTRAVENOUS at 09:04

## 2017-01-01 RX ADMIN — FILGRASTIM 480 MCG: 480 INJECTION, SOLUTION INTRAVENOUS; SUBCUTANEOUS at 19:56

## 2017-01-01 RX ADMIN — URSODIOL 300 MG: 300 CAPSULE ORAL at 20:10

## 2017-01-01 RX ADMIN — ACYCLOVIR 800 MG: 800 TABLET ORAL at 18:22

## 2017-01-01 RX ADMIN — MIDAZOLAM 1 MG: 1 INJECTION INTRAMUSCULAR; INTRAVENOUS at 10:53

## 2017-01-01 RX ADMIN — ACETAMINOPHEN 650 MG: 325 TABLET, FILM COATED ORAL at 22:33

## 2017-01-01 RX ADMIN — MICAFUNGIN SODIUM 100 MG: 10 INJECTION, POWDER, LYOPHILIZED, FOR SOLUTION INTRAVENOUS at 19:45

## 2017-01-01 RX ADMIN — SODIUM CHLORIDE, PRESERVATIVE FREE 5 ML: 5 INJECTION INTRAVENOUS at 12:02

## 2017-01-01 RX ADMIN — PSYLLIUM HUSK 1 PACKET: 3.4 POWDER ORAL at 08:16

## 2017-01-01 RX ADMIN — URSODIOL 300 MG: 300 CAPSULE ORAL at 15:09

## 2017-01-01 RX ADMIN — MICAFUNGIN SODIUM 100 MG: 10 INJECTION, POWDER, LYOPHILIZED, FOR SOLUTION INTRAVENOUS at 20:21

## 2017-01-01 RX ADMIN — SODIUM CHLORIDE: 9 INJECTION, SOLUTION INTRAVENOUS at 08:58

## 2017-01-01 RX ADMIN — ACYCLOVIR 800 MG: 800 TABLET ORAL at 11:00

## 2017-01-01 RX ADMIN — ACYCLOVIR 800 MG: 800 TABLET ORAL at 14:41

## 2017-01-01 RX ADMIN — DILTIAZEM HYDROCHLORIDE 300 MG: 180 CAPSULE, EXTENDED RELEASE ORAL at 18:52

## 2017-01-01 RX ADMIN — INSULIN GLARGINE 10 UNITS: 100 INJECTION, SOLUTION SUBCUTANEOUS at 11:02

## 2017-01-01 RX ADMIN — SODIUM CHLORIDE, PRESERVATIVE FREE 5 ML: 5 INJECTION INTRAVENOUS at 02:16

## 2017-01-01 RX ADMIN — MESNA 930 MG: 100 INJECTION, SOLUTION INTRAVENOUS at 02:04

## 2017-01-01 RX ADMIN — ONDANSETRON HYDROCHLORIDE 8 MG: 8 TABLET, FILM COATED ORAL at 22:18

## 2017-01-01 RX ADMIN — DEXTROSE AND SODIUM CHLORIDE: 5; 450 INJECTION, SOLUTION INTRAVENOUS at 23:32

## 2017-01-01 RX ADMIN — MESNA 930 MG: 100 INJECTION, SOLUTION INTRAVENOUS at 08:18

## 2017-01-01 RX ADMIN — FILGRASTIM 480 MCG: 480 INJECTION, SOLUTION INTRAVENOUS; SUBCUTANEOUS at 19:41

## 2017-01-01 RX ADMIN — Medication 2 G: at 15:08

## 2017-01-01 RX ADMIN — CYCLOBENZAPRINE HYDROCHLORIDE 5 MG: 5 TABLET, FILM COATED ORAL at 22:34

## 2017-01-01 RX ADMIN — URSODIOL 300 MG: 300 CAPSULE ORAL at 08:00

## 2017-01-01 RX ADMIN — ACYCLOVIR 800 MG: 800 TABLET ORAL at 10:35

## 2017-01-01 RX ADMIN — LORAZEPAM 1 MG: 0.5 TABLET ORAL at 14:11

## 2017-01-01 RX ADMIN — ACYCLOVIR 800 MG: 800 TABLET ORAL at 22:23

## 2017-01-01 RX ADMIN — PANTOPRAZOLE SODIUM 40 MG: 40 TABLET, DELAYED RELEASE ORAL at 08:23

## 2017-01-01 RX ADMIN — ZOLPIDEM TARTRATE 5 MG: 5 TABLET, FILM COATED ORAL at 22:42

## 2017-01-01 RX ADMIN — HYDRALAZINE HYDROCHLORIDE 20 MG: 20 INJECTION INTRAMUSCULAR; INTRAVENOUS at 15:53

## 2017-01-01 RX ADMIN — HEPARIN SODIUM 5000 UNITS: 1000 INJECTION, SOLUTION INTRAVENOUS; SUBCUTANEOUS at 10:26

## 2017-01-01 RX ADMIN — PANTOPRAZOLE SODIUM 40 MG: 40 TABLET, DELAYED RELEASE ORAL at 08:14

## 2017-01-01 RX ADMIN — FILGRASTIM 480 MCG: 480 INJECTION, SOLUTION INTRAVENOUS; SUBCUTANEOUS at 19:03

## 2017-01-01 RX ADMIN — SODIUM CHLORIDE, PRESERVATIVE FREE 5 ML: 5 INJECTION INTRAVENOUS at 10:45

## 2017-01-01 RX ADMIN — ACYCLOVIR 800 MG: 800 TABLET ORAL at 11:14

## 2017-01-01 RX ADMIN — ALLOPURINOL 300 MG: 300 TABLET ORAL at 08:10

## 2017-01-01 RX ADMIN — ACYCLOVIR 800 MG: 800 TABLET ORAL at 08:28

## 2017-01-01 RX ADMIN — SODIUM CHLORIDE, PRESERVATIVE FREE 5 ML: 5 INJECTION INTRAVENOUS at 10:19

## 2017-01-01 RX ADMIN — ACYCLOVIR 800 MG: 800 TABLET ORAL at 09:06

## 2017-01-01 RX ADMIN — TACROLIMUS 0.03 MG/KG/DAY: 5 INJECTION, SOLUTION INTRAVENOUS at 09:43

## 2017-01-01 RX ADMIN — TACROLIMUS 60 MCG/HR: 5 INJECTION, SOLUTION INTRAVENOUS at 22:34

## 2017-01-01 RX ADMIN — MIDAZOLAM 2 MG: 1 INJECTION INTRAMUSCULAR; INTRAVENOUS at 13:35

## 2017-01-01 RX ADMIN — ACYCLOVIR 800 MG: 800 TABLET ORAL at 13:59

## 2017-01-01 RX ADMIN — MYCOPHENOLATE MOFETIL 1500 MG: 500 INJECTION, POWDER, LYOPHILIZED, FOR SOLUTION INTRAVENOUS at 20:12

## 2017-01-01 RX ADMIN — MIDAZOLAM HYDROCHLORIDE 1 MG: 1 INJECTION, SOLUTION INTRAMUSCULAR; INTRAVENOUS at 08:52

## 2017-01-01 RX ADMIN — DILTIAZEM HYDROCHLORIDE 240 MG: 120 CAPSULE, EXTENDED RELEASE ORAL at 17:42

## 2017-01-01 RX ADMIN — ACYCLOVIR 800 MG: 800 TABLET ORAL at 09:02

## 2017-01-01 RX ADMIN — ZOLPIDEM TARTRATE 5 MG: 5 TABLET, FILM COATED ORAL at 22:31

## 2017-01-01 RX ADMIN — POTASSIUM CHLORIDE 20 MEQ: 29.8 INJECTION, SOLUTION INTRAVENOUS at 06:12

## 2017-01-01 RX ADMIN — TACROLIMUS 1.5 MG: 1 CAPSULE ORAL at 20:02

## 2017-01-01 RX ADMIN — URSODIOL 300 MG: 300 CAPSULE ORAL at 08:32

## 2017-01-01 RX ADMIN — Medication 62.5 MG: at 07:58

## 2017-01-01 RX ADMIN — MIDAZOLAM 1 MG: 1 INJECTION INTRAMUSCULAR; INTRAVENOUS at 10:12

## 2017-01-01 RX ADMIN — INSULIN ASPART 3 UNITS: 100 INJECTION, SOLUTION INTRAVENOUS; SUBCUTANEOUS at 09:00

## 2017-01-01 RX ADMIN — ONDANSETRON HYDROCHLORIDE 8 MG: 8 TABLET, FILM COATED ORAL at 08:59

## 2017-01-01 RX ADMIN — ACYCLOVIR 800 MG: 800 TABLET ORAL at 22:34

## 2017-01-01 RX ADMIN — VORICONAZOLE 200 MG: 200 TABLET, FILM COATED ORAL at 21:43

## 2017-01-01 RX ADMIN — ACETAMINOPHEN 650 MG: 325 TABLET, FILM COATED ORAL at 09:00

## 2017-01-01 RX ADMIN — SODIUM CHLORIDE, PRESERVATIVE FREE 5 ML: 5 INJECTION INTRAVENOUS at 09:13

## 2017-01-01 RX ADMIN — PSYLLIUM HUSK 1 PACKET: 3.4 POWDER ORAL at 08:34

## 2017-01-01 RX ADMIN — FILGRASTIM 480 MCG: 480 INJECTION, SOLUTION INTRAVENOUS; SUBCUTANEOUS at 20:35

## 2017-01-01 RX ADMIN — INSULIN GLARGINE 15 UNITS: 100 INJECTION, SOLUTION SUBCUTANEOUS at 13:08

## 2017-01-01 RX ADMIN — CYCLOBENZAPRINE HYDROCHLORIDE 5 MG: 5 TABLET, FILM COATED ORAL at 22:32

## 2017-01-01 RX ADMIN — SODIUM CHLORIDE, PRESERVATIVE FREE 5 ML: 5 INJECTION INTRAVENOUS at 16:40

## 2017-01-01 RX ADMIN — ACYCLOVIR 800 MG: 800 TABLET ORAL at 22:29

## 2017-01-01 RX ADMIN — VORICONAZOLE 200 MG: 200 TABLET, FILM COATED ORAL at 08:10

## 2017-01-01 RX ADMIN — ONDANSETRON HYDROCHLORIDE 8 MG: 8 TABLET, FILM COATED ORAL at 22:22

## 2017-01-01 RX ADMIN — ONDANSETRON HYDROCHLORIDE 8 MG: 8 TABLET, FILM COATED ORAL at 15:45

## 2017-01-01 RX ADMIN — LORAZEPAM 1 MG: 0.5 TABLET ORAL at 15:39

## 2017-01-01 RX ADMIN — ACYCLOVIR 800 MG: 800 TABLET ORAL at 17:42

## 2017-01-01 RX ADMIN — HYDRALAZINE HYDROCHLORIDE 20 MG: 20 INJECTION INTRAMUSCULAR; INTRAVENOUS at 08:39

## 2017-01-01 RX ADMIN — ONDANSETRON HYDROCHLORIDE 8 MG: 8 TABLET, FILM COATED ORAL at 00:10

## 2017-01-01 RX ADMIN — ACYCLOVIR 800 MG: 800 TABLET ORAL at 11:12

## 2017-01-01 RX ADMIN — INSULIN ASPART 2 UNITS: 100 INJECTION, SOLUTION INTRAVENOUS; SUBCUTANEOUS at 22:36

## 2017-01-01 RX ADMIN — PSYLLIUM HUSK 1 PACKET: 3.4 POWDER ORAL at 08:43

## 2017-01-01 RX ADMIN — ONDANSETRON HYDROCHLORIDE 8 MG: 8 TABLET, FILM COATED ORAL at 23:43

## 2017-01-01 RX ADMIN — OXYCODONE HYDROCHLORIDE 5 MG: 5 TABLET ORAL at 09:10

## 2017-01-01 RX ADMIN — HYDRALAZINE HYDROCHLORIDE 20 MG: 20 INJECTION INTRAMUSCULAR; INTRAVENOUS at 18:51

## 2017-01-01 RX ADMIN — SODIUM CHLORIDE, PRESERVATIVE FREE 5 ML: 5 INJECTION INTRAVENOUS at 11:47

## 2017-01-01 RX ADMIN — PSYLLIUM HUSK 1 PACKET: 3.4 POWDER ORAL at 08:11

## 2017-01-01 RX ADMIN — SODIUM CHLORIDE, PRESERVATIVE FREE 5 ML: 5 INJECTION INTRAVENOUS at 16:10

## 2017-01-01 RX ADMIN — TACROLIMUS 0.03 MG/KG/DAY: 5 INJECTION, SOLUTION INTRAVENOUS at 02:56

## 2017-01-01 RX ADMIN — ACYCLOVIR 800 MG: 800 TABLET ORAL at 10:56

## 2017-01-01 RX ADMIN — TACROLIMUS 0.03 MG/KG/DAY: 5 INJECTION, SOLUTION INTRAVENOUS at 03:35

## 2017-01-01 RX ADMIN — TACROLIMUS 2 MG: 1 CAPSULE ORAL at 08:22

## 2017-01-01 RX ADMIN — SODIUM CHLORIDE, PRESERVATIVE FREE 5 ML: 5 INJECTION INTRAVENOUS at 11:25

## 2017-01-01 RX ADMIN — SODIUM CHLORIDE, PRESERVATIVE FREE 5 ML: 5 INJECTION INTRAVENOUS at 11:33

## 2017-01-01 RX ADMIN — INSULIN GLARGINE 15 UNITS: 100 INJECTION, SOLUTION SUBCUTANEOUS at 13:16

## 2017-01-01 RX ADMIN — ACYCLOVIR 800 MG: 800 TABLET ORAL at 08:12

## 2017-01-01 RX ADMIN — DEXTROSE MONOHYDRATE 1000 ML: 50 INJECTION, SOLUTION INTRAVENOUS at 19:02

## 2017-01-01 RX ADMIN — LOSARTAN POTASSIUM 50 MG: 50 TABLET, FILM COATED ORAL at 08:28

## 2017-01-01 RX ADMIN — ONDANSETRON HYDROCHLORIDE 8 MG: 8 TABLET, FILM COATED ORAL at 08:17

## 2017-01-01 RX ADMIN — SODIUM CHLORIDE, PRESERVATIVE FREE 5 ML: 5 INJECTION INTRAVENOUS at 11:32

## 2017-01-01 RX ADMIN — SODIUM CHLORIDE, PRESERVATIVE FREE 5 ML: 5 INJECTION INTRAVENOUS at 10:35

## 2017-01-01 RX ADMIN — SODIUM CHLORIDE, PRESERVATIVE FREE 5 ML: 5 INJECTION INTRAVENOUS at 10:21

## 2017-01-01 RX ADMIN — PROCHLORPERAZINE MALEATE 10 MG: 5 TABLET, FILM COATED ORAL at 14:32

## 2017-01-01 RX ADMIN — ACETAMINOPHEN 650 MG: 325 TABLET, FILM COATED ORAL at 02:04

## 2017-01-01 RX ADMIN — PANTOPRAZOLE SODIUM 40 MG: 40 TABLET, DELAYED RELEASE ORAL at 08:33

## 2017-01-01 RX ADMIN — DEXAMETHASONE 8 MG: 4 TABLET ORAL at 08:59

## 2017-01-01 RX ADMIN — INSULIN ASPART 1 UNITS: 100 INJECTION, SOLUTION INTRAVENOUS; SUBCUTANEOUS at 22:14

## 2017-01-01 RX ADMIN — FENTANYL CITRATE 50 MCG: 50 INJECTION, SOLUTION INTRAMUSCULAR; INTRAVENOUS at 10:38

## 2017-01-01 RX ADMIN — ONDANSETRON HYDROCHLORIDE 8 MG: 8 TABLET, FILM COATED ORAL at 22:30

## 2017-01-01 RX ADMIN — LEVOFLOXACIN 250 MG: 250 TABLET, FILM COATED ORAL at 10:01

## 2017-01-01 RX ADMIN — DIPHENHYDRAMINE HYDROCHLORIDE 25 MG: 25 CAPSULE ORAL at 17:58

## 2017-01-01 RX ADMIN — ACYCLOVIR 800 MG: 800 TABLET ORAL at 12:03

## 2017-01-01 RX ADMIN — PANTOPRAZOLE SODIUM 40 MG: 40 TABLET, DELAYED RELEASE ORAL at 08:43

## 2017-01-01 RX ADMIN — ACETAMINOPHEN 650 MG: 325 TABLET, FILM COATED ORAL at 19:31

## 2017-01-01 RX ADMIN — HYDRALAZINE HYDROCHLORIDE 10 MG: 20 INJECTION INTRAMUSCULAR; INTRAVENOUS at 20:31

## 2017-01-01 RX ADMIN — SODIUM CHLORIDE: 9 INJECTION, SOLUTION INTRAVENOUS at 10:27

## 2017-01-01 RX ADMIN — PANTOPRAZOLE SODIUM 40 MG: 40 TABLET, DELAYED RELEASE ORAL at 08:28

## 2017-01-01 RX ADMIN — URSODIOL 300 MG: 300 CAPSULE ORAL at 19:53

## 2017-01-01 RX ADMIN — URSODIOL 300 MG: 300 CAPSULE ORAL at 14:09

## 2017-01-01 RX ADMIN — INSULIN ASPART 1 UNITS: 100 INJECTION, SOLUTION INTRAVENOUS; SUBCUTANEOUS at 08:40

## 2017-01-01 RX ADMIN — LEVOFLOXACIN 250 MG: 250 TABLET, FILM COATED ORAL at 10:32

## 2017-01-01 RX ADMIN — PANTOPRAZOLE SODIUM 40 MG: 40 TABLET, DELAYED RELEASE ORAL at 08:16

## 2017-01-01 RX ADMIN — VORICONAZOLE 200 MG: 200 TABLET, FILM COATED ORAL at 20:57

## 2017-01-01 RX ADMIN — ACYCLOVIR 800 MG: 800 TABLET ORAL at 11:07

## 2017-01-01 RX ADMIN — ACYCLOVIR 800 MG: 800 TABLET ORAL at 08:45

## 2017-01-01 RX ADMIN — ACYCLOVIR 800 MG: 800 TABLET ORAL at 10:44

## 2017-01-01 RX ADMIN — SODIUM CHLORIDE, PRESERVATIVE FREE 5 ML: 5 INJECTION INTRAVENOUS at 11:21

## 2017-01-01 RX ADMIN — VORICONAZOLE 200 MG: 200 TABLET, FILM COATED ORAL at 08:12

## 2017-01-01 RX ADMIN — MYCOPHENOLATE MOFETIL 1500 MG: 500 INJECTION, POWDER, LYOPHILIZED, FOR SOLUTION INTRAVENOUS at 08:03

## 2017-01-01 RX ADMIN — FUROSEMIDE 10 MG: 10 INJECTION, SOLUTION INTRAVENOUS at 13:10

## 2017-01-01 RX ADMIN — LEVOFLOXACIN 250 MG: 250 TABLET, FILM COATED ORAL at 11:07

## 2017-01-01 RX ADMIN — ACYCLOVIR 800 MG: 800 TABLET ORAL at 14:21

## 2017-01-01 RX ADMIN — ONDANSETRON HYDROCHLORIDE 8 MG: 8 TABLET, FILM COATED ORAL at 22:32

## 2017-01-01 RX ADMIN — Medication 62.5 MG: at 08:43

## 2017-01-01 RX ADMIN — VORICONAZOLE 200 MG: 200 TABLET, FILM COATED ORAL at 07:58

## 2017-01-01 RX ADMIN — DILTIAZEM HYDROCHLORIDE 300 MG: 180 CAPSULE, EXTENDED RELEASE ORAL at 17:44

## 2017-01-01 RX ADMIN — URSODIOL 300 MG: 300 CAPSULE ORAL at 08:10

## 2017-01-01 RX ADMIN — SODIUM CHLORIDE, PRESERVATIVE FREE 5 ML: 5 INJECTION INTRAVENOUS at 11:02

## 2017-01-01 RX ADMIN — ACYCLOVIR 800 MG: 800 TABLET ORAL at 18:24

## 2017-01-01 RX ADMIN — SODIUM CHLORIDE, PRESERVATIVE FREE 5 ML: 5 INJECTION INTRAVENOUS at 05:15

## 2017-01-01 RX ADMIN — PANTOPRAZOLE SODIUM 40 MG: 40 TABLET, DELAYED RELEASE ORAL at 07:59

## 2017-01-01 RX ADMIN — LORATADINE 10 MG: 10 TABLET ORAL at 08:12

## 2017-01-01 RX ADMIN — ONDANSETRON HYDROCHLORIDE 8 MG: 8 TABLET, FILM COATED ORAL at 08:43

## 2017-01-01 RX ADMIN — MICAFUNGIN SODIUM 100 MG: 10 INJECTION, POWDER, LYOPHILIZED, FOR SOLUTION INTRAVENOUS at 20:22

## 2017-01-01 RX ADMIN — ACYCLOVIR 800 MG: 800 TABLET ORAL at 10:31

## 2017-01-01 RX ADMIN — PROCHLORPERAZINE MALEATE 10 MG: 5 TABLET, FILM COATED ORAL at 19:04

## 2017-01-01 RX ADMIN — Medication 2 G: at 05:52

## 2017-01-01 RX ADMIN — URSODIOL 300 MG: 300 CAPSULE ORAL at 19:58

## 2017-01-01 RX ADMIN — SODIUM CHLORIDE, PRESERVATIVE FREE 5 ML: 5 INJECTION INTRAVENOUS at 08:43

## 2017-01-01 RX ADMIN — ALLOPURINOL 300 MG: 300 TABLET ORAL at 08:14

## 2017-01-01 RX ADMIN — INSULIN ASPART 2 UNITS: 100 INJECTION, SOLUTION INTRAVENOUS; SUBCUTANEOUS at 17:31

## 2017-01-01 RX ADMIN — LIDOCAINE HYDROCHLORIDE 10 ML: 20 SOLUTION ORAL; TOPICAL at 08:50

## 2017-01-01 RX ADMIN — URSODIOL 300 MG: 300 CAPSULE ORAL at 08:15

## 2017-01-01 RX ADMIN — MYCOPHENOLATE MOFETIL 1500 MG: 500 INJECTION, POWDER, LYOPHILIZED, FOR SOLUTION INTRAVENOUS at 08:11

## 2017-01-01 RX ADMIN — HYDRALAZINE HYDROCHLORIDE 25 MG: 25 TABLET ORAL at 19:56

## 2017-01-01 RX ADMIN — PSYLLIUM HUSK 1 PACKET: 3.4 POWDER ORAL at 08:23

## 2017-01-01 RX ADMIN — MYCOPHENOLATE MOFETIL 1500 MG: 500 INJECTION, POWDER, LYOPHILIZED, FOR SOLUTION INTRAVENOUS at 20:56

## 2017-01-01 RX ADMIN — MYCOPHENOLATE MOFETIL 1500 MG: 250 CAPSULE ORAL at 08:28

## 2017-01-01 RX ADMIN — CYCLOBENZAPRINE HYDROCHLORIDE 5 MG: 5 TABLET, FILM COATED ORAL at 22:30

## 2017-01-01 RX ADMIN — PANTOPRAZOLE SODIUM 40 MG: 40 TABLET, DELAYED RELEASE ORAL at 08:22

## 2017-01-01 RX ADMIN — FENTANYL CITRATE 50 MCG: 50 INJECTION, SOLUTION INTRAMUSCULAR; INTRAVENOUS at 10:53

## 2017-01-01 RX ADMIN — SODIUM CHLORIDE, PRESERVATIVE FREE 5 ML: 5 INJECTION INTRAVENOUS at 09:14

## 2017-01-01 RX ADMIN — TACROLIMUS 1.5 MG: 1 CAPSULE ORAL at 08:57

## 2017-01-01 RX ADMIN — Medication 62.5 MG: at 08:17

## 2017-01-01 RX ADMIN — ACETAMINOPHEN 650 MG: 325 TABLET ORAL at 13:32

## 2017-01-01 RX ADMIN — ACYCLOVIR 800 MG: 800 TABLET ORAL at 17:47

## 2017-01-01 RX ADMIN — DILTIAZEM HYDROCHLORIDE 360 MG: 180 CAPSULE, EXTENDED RELEASE ORAL at 17:51

## 2017-01-01 RX ADMIN — FLUDARABINE PHOSPHATE 64 MG: 25 INJECTION, SOLUTION INTRAVENOUS at 18:20

## 2017-01-01 RX ADMIN — PSYLLIUM HUSK 1 PACKET: 3.4 POWDER ORAL at 22:32

## 2017-01-01 RX ADMIN — ACYCLOVIR 800 MG: 800 TABLET ORAL at 13:56

## 2017-01-01 RX ADMIN — MICAFUNGIN SODIUM 100 MG: 10 INJECTION, POWDER, LYOPHILIZED, FOR SOLUTION INTRAVENOUS at 20:53

## 2017-01-01 RX ADMIN — ZOLPIDEM TARTRATE 10 MG: 5 TABLET, FILM COATED ORAL at 22:34

## 2017-01-01 RX ADMIN — ONDANSETRON HYDROCHLORIDE 8 MG: 8 TABLET, FILM COATED ORAL at 08:33

## 2017-01-01 RX ADMIN — SODIUM CHLORIDE, PRESERVATIVE FREE 5 ML: 5 INJECTION INTRAVENOUS at 12:01

## 2017-01-01 RX ADMIN — LORATADINE 10 MG: 10 TABLET ORAL at 08:35

## 2017-01-01 RX ADMIN — VORICONAZOLE 200 MG: 200 TABLET, FILM COATED ORAL at 08:27

## 2017-01-01 RX ADMIN — URSODIOL 300 MG: 300 CAPSULE ORAL at 14:11

## 2017-01-01 RX ADMIN — DIPHENHYDRAMINE HYDROCHLORIDE 25 MG: 25 CAPSULE ORAL at 11:51

## 2017-01-01 RX ADMIN — MIDAZOLAM HYDROCHLORIDE 0.5 MG: 1 INJECTION, SOLUTION INTRAMUSCULAR; INTRAVENOUS at 08:57

## 2017-01-01 RX ADMIN — ACYCLOVIR 800 MG: 800 TABLET ORAL at 11:23

## 2017-01-01 RX ADMIN — INSULIN ASPART 6 UNITS: 100 INJECTION, SOLUTION INTRAVENOUS; SUBCUTANEOUS at 21:56

## 2017-01-01 RX ADMIN — SODIUM CHLORIDE, PRESERVATIVE FREE 5 ML: 5 INJECTION INTRAVENOUS at 11:00

## 2017-01-01 RX ADMIN — ZOLPIDEM TARTRATE 5 MG: 5 TABLET, FILM COATED ORAL at 22:32

## 2017-01-01 RX ADMIN — SODIUM CHLORIDE, PRESERVATIVE FREE 5 ML: 5 INJECTION INTRAVENOUS at 10:07

## 2017-01-01 RX ADMIN — URSODIOL 300 MG: 300 CAPSULE ORAL at 14:52

## 2017-01-01 RX ADMIN — MICAFUNGIN SODIUM 100 MG: 10 INJECTION, POWDER, LYOPHILIZED, FOR SOLUTION INTRAVENOUS at 19:53

## 2017-01-01 RX ADMIN — SODIUM CHLORIDE: 9 INJECTION, SOLUTION INTRAVENOUS at 10:00

## 2017-01-01 RX ADMIN — ONDANSETRON HYDROCHLORIDE 8 MG: 8 TABLET, FILM COATED ORAL at 23:08

## 2017-01-01 RX ADMIN — SODIUM CHLORIDE, PRESERVATIVE FREE 5 ML: 5 INJECTION INTRAVENOUS at 10:23

## 2017-01-01 RX ADMIN — ZOLPIDEM TARTRATE 5 MG: 5 TABLET, FILM COATED ORAL at 22:48

## 2017-01-01 RX ADMIN — PANTOPRAZOLE SODIUM 40 MG: 40 TABLET, DELAYED RELEASE ORAL at 08:10

## 2017-01-01 RX ADMIN — ACETAMINOPHEN 650 MG: 325 TABLET, FILM COATED ORAL at 22:34

## 2017-01-01 RX ADMIN — ACETAMINOPHEN 650 MG: 325 TABLET, FILM COATED ORAL at 22:38

## 2017-01-01 RX ADMIN — SODIUM CHLORIDE, PRESERVATIVE FREE 5 ML: 5 INJECTION INTRAVENOUS at 15:19

## 2017-01-01 RX ADMIN — ZOLPIDEM TARTRATE 10 MG: 5 TABLET, FILM COATED ORAL at 22:21

## 2017-01-01 RX ADMIN — ACYCLOVIR 800 MG: 800 TABLET ORAL at 21:26

## 2017-01-01 RX ADMIN — DILTIAZEM HYDROCHLORIDE 120 MG: 120 CAPSULE, EXTENDED RELEASE ORAL at 20:08

## 2017-01-01 RX ADMIN — SODIUM CHLORIDE, PRESERVATIVE FREE 5 ML: 5 INJECTION INTRAVENOUS at 11:27

## 2017-01-01 RX ADMIN — ACYCLOVIR 800 MG: 800 TABLET ORAL at 18:02

## 2017-01-01 RX ADMIN — MIDAZOLAM 2 MG: 1 INJECTION INTRAMUSCULAR; INTRAVENOUS at 14:40

## 2017-01-01 RX ADMIN — MESNA 930 MG: 100 INJECTION, SOLUTION INTRAVENOUS at 22:57

## 2017-01-01 RX ADMIN — TACROLIMUS 2 MG: 1 CAPSULE ORAL at 20:57

## 2017-01-01 RX ADMIN — ONDANSETRON HYDROCHLORIDE 8 MG: 8 TABLET, FILM COATED ORAL at 23:45

## 2017-01-01 RX ADMIN — DILTIAZEM HYDROCHLORIDE 300 MG: 180 CAPSULE, EXTENDED RELEASE ORAL at 17:47

## 2017-01-01 RX ADMIN — ONDANSETRON HYDROCHLORIDE 8 MG: 8 TABLET, FILM COATED ORAL at 08:30

## 2017-01-01 RX ADMIN — FENTANYL CITRATE 50 MCG: 50 INJECTION, SOLUTION INTRAMUSCULAR; INTRAVENOUS at 08:52

## 2017-01-01 RX ADMIN — Medication 2 G: at 05:28

## 2017-01-01 RX ADMIN — SODIUM CHLORIDE, PRESERVATIVE FREE 5 ML: 5 INJECTION INTRAVENOUS at 11:16

## 2017-01-01 RX ADMIN — ONDANSETRON HYDROCHLORIDE 8 MG: 8 TABLET, FILM COATED ORAL at 15:54

## 2017-01-01 RX ADMIN — DEXTROSE AND SODIUM CHLORIDE: 5; 450 INJECTION, SOLUTION INTRAVENOUS at 08:27

## 2017-01-01 RX ADMIN — PSYLLIUM HUSK 1 PACKET: 3.4 POWDER ORAL at 14:21

## 2017-01-01 RX ADMIN — ONDANSETRON HYDROCHLORIDE 8 MG: 8 TABLET, FILM COATED ORAL at 17:58

## 2017-01-01 RX ADMIN — ACYCLOVIR 800 MG: 800 TABLET ORAL at 18:52

## 2017-01-01 RX ADMIN — ACYCLOVIR 800 MG: 800 TABLET ORAL at 22:48

## 2017-01-01 RX ADMIN — PSYLLIUM HUSK 1 PACKET: 3.4 POWDER ORAL at 13:59

## 2017-01-01 RX ADMIN — ACYCLOVIR 800 MG: 800 TABLET ORAL at 22:21

## 2017-01-01 RX ADMIN — ACYCLOVIR 800 MG: 800 TABLET ORAL at 12:25

## 2017-01-01 RX ADMIN — INSULIN ASPART 4 UNITS: 100 INJECTION, SOLUTION INTRAVENOUS; SUBCUTANEOUS at 12:15

## 2017-01-01 RX ADMIN — LORAZEPAM 0.5 MG: 0.5 TABLET ORAL at 17:45

## 2017-01-01 RX ADMIN — URSODIOL 300 MG: 300 CAPSULE ORAL at 13:20

## 2017-01-01 RX ADMIN — PANTOPRAZOLE SODIUM 40 MG: 40 TABLET, DELAYED RELEASE ORAL at 09:06

## 2017-01-01 RX ADMIN — SODIUM PHOSPHATE, MONOBASIC, MONOHYDRATE AND SODIUM PHOSPHATE, DIBASIC, ANHYDROUS 15 MMOL: 276; 142 INJECTION, SOLUTION INTRAVENOUS at 08:49

## 2017-01-01 RX ADMIN — VORICONAZOLE 200 MG: 200 TABLET, FILM COATED ORAL at 19:44

## 2017-01-01 RX ADMIN — ONDANSETRON HYDROCHLORIDE 8 MG: 8 TABLET, FILM COATED ORAL at 16:11

## 2017-01-01 RX ADMIN — ONDANSETRON HYDROCHLORIDE 8 MG: 8 TABLET, FILM COATED ORAL at 08:40

## 2017-01-01 RX ADMIN — PROCHLORPERAZINE MALEATE 5 MG: 5 TABLET, FILM COATED ORAL at 14:58

## 2017-01-01 RX ADMIN — PSYLLIUM HUSK 1 PACKET: 3.4 POWDER ORAL at 08:47

## 2017-01-01 RX ADMIN — SODIUM CHLORIDE, PRESERVATIVE FREE 5 ML: 5 INJECTION INTRAVENOUS at 10:54

## 2017-01-01 RX ADMIN — MYCOPHENOLATE MOFETIL 1500 MG: 500 INJECTION, POWDER, LYOPHILIZED, FOR SOLUTION INTRAVENOUS at 08:17

## 2017-01-01 RX ADMIN — LORAZEPAM 0.5 MG: 0.5 TABLET ORAL at 20:17

## 2017-01-01 RX ADMIN — SODIUM CHLORIDE, PRESERVATIVE FREE 5 ML: 5 INJECTION INTRAVENOUS at 05:33

## 2017-01-01 RX ADMIN — MIDAZOLAM 1 MG: 1 INJECTION INTRAMUSCULAR; INTRAVENOUS at 10:38

## 2017-01-01 RX ADMIN — LORAZEPAM 1 MG: 0.5 TABLET ORAL at 12:45

## 2017-01-01 RX ADMIN — SODIUM CHLORIDE, PRESERVATIVE FREE 5 ML: 5 INJECTION INTRAVENOUS at 21:06

## 2017-01-01 RX ADMIN — HYDRALAZINE HYDROCHLORIDE 10 MG: 20 INJECTION INTRAMUSCULAR; INTRAVENOUS at 23:57

## 2017-01-01 RX ADMIN — LEVOFLOXACIN 250 MG: 250 TABLET, FILM COATED ORAL at 11:00

## 2017-01-01 RX ADMIN — ACETAMINOPHEN 650 MG: 325 TABLET, FILM COATED ORAL at 23:40

## 2017-01-01 RX ADMIN — POTASSIUM CHLORIDE 20 MEQ: 750 TABLET, EXTENDED RELEASE ORAL at 08:18

## 2017-01-01 RX ADMIN — ACETAMINOPHEN 650 MG: 325 TABLET, FILM COATED ORAL at 22:31

## 2017-01-01 RX ADMIN — CYCLOBENZAPRINE HYDROCHLORIDE 5 MG: 5 TABLET, FILM COATED ORAL at 22:16

## 2017-01-01 RX ADMIN — Medication 2 G: at 05:06

## 2017-01-01 RX ADMIN — URSODIOL 300 MG: 300 CAPSULE ORAL at 20:06

## 2017-01-01 RX ADMIN — DIPHENHYDRAMINE HYDROCHLORIDE 25 MG: 25 CAPSULE ORAL at 13:50

## 2017-01-01 RX ADMIN — ACETAMINOPHEN 650 MG: 325 TABLET ORAL at 11:51

## 2017-01-01 RX ADMIN — PANTOPRAZOLE SODIUM 40 MG: 40 TABLET, DELAYED RELEASE ORAL at 08:12

## 2017-01-01 RX ADMIN — LEVOFLOXACIN 250 MG: 250 TABLET, FILM COATED ORAL at 10:25

## 2017-01-01 RX ADMIN — ACYCLOVIR 800 MG: 800 TABLET ORAL at 08:22

## 2017-01-01 RX ADMIN — LORAZEPAM 1 MG: 0.5 TABLET ORAL at 12:00

## 2017-01-01 RX ADMIN — LORATADINE 10 MG: 10 TABLET ORAL at 08:17

## 2017-01-01 RX ADMIN — MIDAZOLAM 2 MG: 1 INJECTION INTRAMUSCULAR; INTRAVENOUS at 09:40

## 2017-01-01 RX ADMIN — ACYCLOVIR 800 MG: 800 TABLET ORAL at 18:42

## 2017-01-01 RX ADMIN — ONDANSETRON HYDROCHLORIDE 8 MG: 8 TABLET, FILM COATED ORAL at 16:07

## 2017-01-01 RX ADMIN — ACETAMINOPHEN 650 MG: 325 TABLET, FILM COATED ORAL at 05:32

## 2017-01-01 RX ADMIN — FLUDARABINE PHOSPHATE 64 MG: 25 INJECTION, SOLUTION INTRAVENOUS at 17:34

## 2017-01-01 RX ADMIN — HYDRALAZINE HYDROCHLORIDE 20 MG: 20 INJECTION INTRAMUSCULAR; INTRAVENOUS at 18:57

## 2017-01-01 RX ADMIN — LORAZEPAM 1 MG: 0.5 TABLET ORAL at 11:18

## 2017-01-01 RX ADMIN — ONDANSETRON HYDROCHLORIDE 8 MG: 8 TABLET, FILM COATED ORAL at 16:33

## 2017-01-01 RX ADMIN — Medication 62.5 MG: at 08:32

## 2017-01-01 RX ADMIN — URSODIOL 300 MG: 300 CAPSULE ORAL at 20:48

## 2017-01-01 RX ADMIN — LORAZEPAM 1 MG: 0.5 TABLET ORAL at 19:11

## 2017-01-01 RX ADMIN — FLUDARABINE PHOSPHATE 64 MG: 25 INJECTION, SOLUTION INTRAVENOUS at 17:22

## 2017-01-01 RX ADMIN — ACYCLOVIR 800 MG: 800 TABLET ORAL at 11:44

## 2017-01-01 RX ADMIN — URSODIOL 300 MG: 300 CAPSULE ORAL at 08:29

## 2017-01-01 RX ADMIN — TACROLIMUS 0.03 MG/KG/DAY: 5 INJECTION, SOLUTION INTRAVENOUS at 00:24

## 2017-01-01 RX ADMIN — INSULIN ASPART 1 UNITS: 100 INJECTION, SOLUTION INTRAVENOUS; SUBCUTANEOUS at 22:24

## 2017-01-01 RX ADMIN — CYCLOBENZAPRINE HYDROCHLORIDE 5 MG: 5 TABLET, FILM COATED ORAL at 22:31

## 2017-01-01 RX ADMIN — Medication 62.5 MG: at 08:12

## 2017-01-01 RX ADMIN — MICAFUNGIN SODIUM 100 MG: 10 INJECTION, POWDER, LYOPHILIZED, FOR SOLUTION INTRAVENOUS at 20:12

## 2017-01-01 RX ADMIN — ONDANSETRON HYDROCHLORIDE 8 MG: 8 TABLET, FILM COATED ORAL at 00:05

## 2017-01-01 RX ADMIN — MESNA 930 MG: 100 INJECTION, SOLUTION INTRAVENOUS at 05:06

## 2017-01-01 RX ADMIN — MICAFUNGIN SODIUM 100 MG: 10 INJECTION, POWDER, LYOPHILIZED, FOR SOLUTION INTRAVENOUS at 19:52

## 2017-01-01 RX ADMIN — CYCLOBENZAPRINE HYDROCHLORIDE 5 MG: 5 TABLET, FILM COATED ORAL at 12:03

## 2017-01-01 RX ADMIN — DEXAMETHASONE 4 MG: 4 TABLET ORAL at 08:45

## 2017-01-01 RX ADMIN — TACROLIMUS 0.03 MG/KG/DAY: 5 INJECTION, SOLUTION INTRAVENOUS at 06:12

## 2017-01-01 RX ADMIN — URSODIOL 300 MG: 300 CAPSULE ORAL at 14:18

## 2017-01-01 RX ADMIN — PSYLLIUM HUSK 1 PACKET: 3.4 POWDER ORAL at 08:12

## 2017-01-01 RX ADMIN — PSYLLIUM HUSK 1 PACKET: 3.4 POWDER ORAL at 14:41

## 2017-01-01 RX ADMIN — SODIUM CHLORIDE: 9 INJECTION, SOLUTION INTRAVENOUS at 12:01

## 2017-01-01 RX ADMIN — FILGRASTIM 480 MCG: 480 INJECTION, SOLUTION INTRAVENOUS; SUBCUTANEOUS at 14:12

## 2017-01-01 RX ADMIN — ACETAMINOPHEN 650 MG: 325 TABLET, FILM COATED ORAL at 20:50

## 2017-01-01 RX ADMIN — URSODIOL 300 MG: 300 CAPSULE ORAL at 14:25

## 2017-01-01 RX ADMIN — VORICONAZOLE 200 MG: 200 TABLET, FILM COATED ORAL at 08:16

## 2017-01-01 RX ADMIN — INSULIN ASPART 1 UNITS: 100 INJECTION, SOLUTION INTRAVENOUS; SUBCUTANEOUS at 22:36

## 2017-01-01 RX ADMIN — ALLOPURINOL 300 MG: 300 TABLET ORAL at 08:28

## 2017-01-01 RX ADMIN — ACYCLOVIR 800 MG: 800 TABLET ORAL at 07:59

## 2017-01-01 RX ADMIN — SODIUM CHLORIDE, PRESERVATIVE FREE 5 ML: 5 INJECTION INTRAVENOUS at 22:19

## 2017-01-01 RX ADMIN — MYCOPHENOLATE MOFETIL 1500 MG: 500 INJECTION, POWDER, LYOPHILIZED, FOR SOLUTION INTRAVENOUS at 19:01

## 2017-01-01 RX ADMIN — URSODIOL 300 MG: 300 CAPSULE ORAL at 08:13

## 2017-01-01 RX ADMIN — ACYCLOVIR 800 MG: 800 TABLET ORAL at 18:25

## 2017-01-01 RX ADMIN — ONDANSETRON HYDROCHLORIDE 8 MG: 8 TABLET, FILM COATED ORAL at 16:39

## 2017-01-01 RX ADMIN — LORAZEPAM 0.5 MG: 0.5 TABLET ORAL at 11:07

## 2017-01-01 RX ADMIN — URSODIOL 300 MG: 300 CAPSULE ORAL at 19:56

## 2017-01-01 RX ADMIN — URSODIOL 300 MG: 300 CAPSULE ORAL at 20:02

## 2017-01-01 RX ADMIN — ONDANSETRON HYDROCHLORIDE 8 MG: 8 TABLET, FILM COATED ORAL at 08:00

## 2017-01-01 RX ADMIN — ACYCLOVIR 800 MG: 800 TABLET ORAL at 14:53

## 2017-01-01 RX ADMIN — URSODIOL 300 MG: 300 CAPSULE ORAL at 13:15

## 2017-01-01 RX ADMIN — ACETAMINOPHEN 650 MG: 325 TABLET ORAL at 13:50

## 2017-01-01 RX ADMIN — URSODIOL 300 MG: 300 CAPSULE ORAL at 14:49

## 2017-01-01 RX ADMIN — PSYLLIUM HUSK 1 PACKET: 3.4 POWDER ORAL at 13:15

## 2017-01-01 RX ADMIN — CYCLOBENZAPRINE HYDROCHLORIDE 5 MG: 5 TABLET, FILM COATED ORAL at 23:01

## 2017-01-01 RX ADMIN — POTASSIUM CHLORIDE 20 MEQ: 750 TABLET, EXTENDED RELEASE ORAL at 18:46

## 2017-01-01 RX ADMIN — ACYCLOVIR 800 MG: 800 TABLET ORAL at 11:04

## 2017-01-01 RX ADMIN — SODIUM CHLORIDE, PRESERVATIVE FREE 5 ML: 5 INJECTION INTRAVENOUS at 10:55

## 2017-01-01 RX ADMIN — ACYCLOVIR 800 MG: 800 TABLET ORAL at 18:06

## 2017-01-01 RX ADMIN — VORICONAZOLE 200 MG: 200 TABLET, FILM COATED ORAL at 08:33

## 2017-01-01 RX ADMIN — URSODIOL 300 MG: 300 CAPSULE ORAL at 14:53

## 2017-01-01 RX ADMIN — LORAZEPAM 1 MG: 0.5 TABLET ORAL at 09:22

## 2017-01-01 RX ADMIN — INSULIN ASPART 1 UNITS: 100 INJECTION, SOLUTION INTRAVENOUS; SUBCUTANEOUS at 22:33

## 2017-01-01 RX ADMIN — PSYLLIUM HUSK 1 PACKET: 3.4 POWDER ORAL at 14:32

## 2017-01-01 RX ADMIN — SODIUM CHLORIDE 1000 ML: 9 INJECTION, SOLUTION INTRAVENOUS at 00:48

## 2017-01-01 RX ADMIN — Medication 62.5 MG: at 08:33

## 2017-01-01 RX ADMIN — LORAZEPAM 0.5 MG: 0.5 TABLET ORAL at 20:05

## 2017-01-01 RX ADMIN — SODIUM PHOSPHATE, MONOBASIC, MONOHYDRATE AND SODIUM PHOSPHATE, DIBASIC, ANHYDROUS 15 MMOL: 276; 142 INJECTION, SOLUTION INTRAVENOUS at 05:36

## 2017-01-01 RX ADMIN — ONDANSETRON HYDROCHLORIDE 8 MG: 8 TABLET, FILM COATED ORAL at 15:53

## 2017-01-01 RX ADMIN — DEXAMETHASONE 6 MG: 2 TABLET ORAL at 08:33

## 2017-01-01 RX ADMIN — ALLOPURINOL 300 MG: 300 TABLET ORAL at 08:33

## 2017-01-01 RX ADMIN — INSULIN ASPART 4 UNITS: 100 INJECTION, SOLUTION INTRAVENOUS; SUBCUTANEOUS at 22:21

## 2017-01-01 RX ADMIN — CYCLOBENZAPRINE HYDROCHLORIDE 5 MG: 5 TABLET, FILM COATED ORAL at 22:21

## 2017-01-01 RX ADMIN — ACYCLOVIR 800 MG: 800 TABLET ORAL at 22:33

## 2017-01-01 RX ADMIN — ONDANSETRON HYDROCHLORIDE 8 MG: 8 TABLET, FILM COATED ORAL at 00:04

## 2017-01-01 RX ADMIN — Medication 2 G: at 06:02

## 2017-01-01 RX ADMIN — VORICONAZOLE 200 MG: 200 TABLET, FILM COATED ORAL at 08:22

## 2017-01-01 RX ADMIN — URSODIOL 300 MG: 300 CAPSULE ORAL at 19:44

## 2017-01-01 RX ADMIN — MIDAZOLAM 2 MG: 1 INJECTION INTRAMUSCULAR; INTRAVENOUS at 09:29

## 2017-01-01 RX ADMIN — PANTOPRAZOLE SODIUM 40 MG: 40 TABLET, DELAYED RELEASE ORAL at 08:29

## 2017-01-01 RX ADMIN — LEVOFLOXACIN 250 MG: 250 TABLET, FILM COATED ORAL at 10:04

## 2017-01-01 RX ADMIN — CYCLOBENZAPRINE HYDROCHLORIDE 5 MG: 5 TABLET, FILM COATED ORAL at 21:06

## 2017-01-01 RX ADMIN — ACYCLOVIR 800 MG: 800 TABLET ORAL at 14:49

## 2017-01-01 RX ADMIN — CYCLOBENZAPRINE HYDROCHLORIDE 5 MG: 5 TABLET, FILM COATED ORAL at 22:48

## 2017-01-01 RX ADMIN — INSULIN ASPART 3 UNITS: 100 INJECTION, SOLUTION INTRAVENOUS; SUBCUTANEOUS at 17:59

## 2017-01-01 RX ADMIN — PENTAMIDINE ISETHIONATE 300 MG: 300 INHALANT RESPIRATORY (INHALATION) at 12:17

## 2017-01-01 RX ADMIN — POTASSIUM CHLORIDE 20 MEQ: 750 TABLET, EXTENDED RELEASE ORAL at 15:08

## 2017-01-01 RX ADMIN — ACYCLOVIR 800 MG: 800 TABLET ORAL at 14:18

## 2017-01-01 RX ADMIN — DILTIAZEM HYDROCHLORIDE 240 MG: 120 CAPSULE, EXTENDED RELEASE ORAL at 17:30

## 2017-01-01 RX ADMIN — MICAFUNGIN SODIUM 100 MG: 10 INJECTION, POWDER, LYOPHILIZED, FOR SOLUTION INTRAVENOUS at 20:09

## 2017-01-01 RX ADMIN — ACYCLOVIR 800 MG: 800 TABLET ORAL at 14:19

## 2017-01-01 RX ADMIN — LORAZEPAM 0.5 MG: 0.5 TABLET ORAL at 10:26

## 2017-01-01 RX ADMIN — VORICONAZOLE 200 MG: 200 TABLET, FILM COATED ORAL at 08:43

## 2017-01-01 RX ADMIN — POTASSIUM CHLORIDE 20 MEQ: 29.8 INJECTION, SOLUTION INTRAVENOUS at 04:16

## 2017-01-01 RX ADMIN — PANTOPRAZOLE SODIUM 40 MG: 40 TABLET, DELAYED RELEASE ORAL at 08:59

## 2017-01-01 RX ADMIN — SODIUM CHLORIDE, PRESERVATIVE FREE 5 ML: 5 INJECTION INTRAVENOUS at 10:01

## 2017-01-01 RX ADMIN — ZOLPIDEM TARTRATE 10 MG: 5 TABLET, FILM COATED ORAL at 22:29

## 2017-01-01 RX ADMIN — FUROSEMIDE 20 MG: 10 INJECTION, SOLUTION INTRAVENOUS at 11:50

## 2017-01-01 RX ADMIN — Medication 2 G: at 04:48

## 2017-01-01 RX ADMIN — MYCOPHENOLATE MOFETIL 1500 MG: 500 INJECTION, POWDER, LYOPHILIZED, FOR SOLUTION INTRAVENOUS at 08:46

## 2017-01-01 RX ADMIN — URSODIOL 300 MG: 300 CAPSULE ORAL at 20:51

## 2017-01-01 RX ADMIN — LORAZEPAM 1 MG: 0.5 TABLET ORAL at 13:48

## 2017-01-01 RX ADMIN — ACYCLOVIR 800 MG: 800 TABLET ORAL at 08:33

## 2017-01-01 RX ADMIN — URSODIOL 300 MG: 300 CAPSULE ORAL at 19:03

## 2017-01-01 RX ADMIN — URSODIOL 300 MG: 300 CAPSULE ORAL at 15:08

## 2017-01-01 RX ADMIN — PROCHLORPERAZINE MALEATE 10 MG: 5 TABLET, FILM COATED ORAL at 10:03

## 2017-01-01 RX ADMIN — LOSARTAN POTASSIUM 50 MG: 50 TABLET, FILM COATED ORAL at 08:17

## 2017-01-01 RX ADMIN — URSODIOL 300 MG: 300 CAPSULE ORAL at 19:31

## 2017-01-01 RX ADMIN — PANTOPRAZOLE SODIUM 40 MG: 40 TABLET, DELAYED RELEASE ORAL at 15:07

## 2017-01-01 RX ADMIN — MYCOPHENOLATE MOFETIL 1500 MG: 500 INJECTION, POWDER, LYOPHILIZED, FOR SOLUTION INTRAVENOUS at 09:02

## 2017-01-01 RX ADMIN — URSODIOL 300 MG: 300 CAPSULE ORAL at 20:57

## 2017-01-01 RX ADMIN — HYDRALAZINE HYDROCHLORIDE 10 MG: 20 INJECTION INTRAMUSCULAR; INTRAVENOUS at 11:04

## 2017-01-01 RX ADMIN — ACYCLOVIR 800 MG: 800 TABLET ORAL at 22:30

## 2017-01-01 RX ADMIN — ACYCLOVIR 800 MG: 800 TABLET ORAL at 22:15

## 2017-01-01 RX ADMIN — Medication 62.5 MG: at 08:44

## 2017-01-01 RX ADMIN — ACYCLOVIR 800 MG: 800 TABLET ORAL at 13:15

## 2017-01-01 RX ADMIN — SODIUM CHLORIDE, PRESERVATIVE FREE 5 ML: 5 INJECTION INTRAVENOUS at 10:17

## 2017-01-01 RX ADMIN — ZOLPIDEM TARTRATE 10 MG: 5 TABLET, FILM COATED ORAL at 22:30

## 2017-01-01 RX ADMIN — PSYLLIUM HUSK 1 PACKET: 3.4 POWDER ORAL at 19:32

## 2017-01-01 RX ADMIN — DILTIAZEM HYDROCHLORIDE 180 MG: 180 CAPSULE, COATED, EXTENDED RELEASE ORAL at 19:09

## 2017-01-01 RX ADMIN — ACYCLOVIR 800 MG: 800 TABLET ORAL at 14:52

## 2017-01-01 RX ADMIN — ONDANSETRON HYDROCHLORIDE 8 MG: 8 TABLET, FILM COATED ORAL at 08:22

## 2017-01-01 RX ADMIN — MYCOPHENOLATE MOFETIL 1500 MG: 500 INJECTION, POWDER, LYOPHILIZED, FOR SOLUTION INTRAVENOUS at 08:12

## 2017-01-01 RX ADMIN — PSYLLIUM HUSK 1 PACKET: 3.4 POWDER ORAL at 14:09

## 2017-01-01 RX ADMIN — SODIUM PHOSPHATE, MONOBASIC, MONOHYDRATE AND SODIUM PHOSPHATE, DIBASIC, ANHYDROUS 15 MMOL: 276; 142 INJECTION, SOLUTION INTRAVENOUS at 06:12

## 2017-01-01 RX ADMIN — ACETAMINOPHEN 650 MG: 325 TABLET, FILM COATED ORAL at 12:03

## 2017-01-01 RX ADMIN — POTASSIUM CHLORIDE 20 MEQ: 750 TABLET, EXTENDED RELEASE ORAL at 18:55

## 2017-01-01 RX ADMIN — DIPHENHYDRAMINE HYDROCHLORIDE 25 MG: 25 CAPSULE ORAL at 12:56

## 2017-01-01 RX ADMIN — ALLOPURINOL 300 MG: 300 TABLET ORAL at 08:59

## 2017-01-01 RX ADMIN — URSODIOL 300 MG: 300 CAPSULE ORAL at 19:52

## 2017-01-01 RX ADMIN — MYCOPHENOLATE MOFETIL 1500 MG: 250 CAPSULE ORAL at 20:58

## 2017-01-01 RX ADMIN — URSODIOL 300 MG: 300 CAPSULE ORAL at 20:08

## 2017-01-01 RX ADMIN — DIPHENHYDRAMINE HYDROCHLORIDE 25 MG: 25 CAPSULE ORAL at 19:31

## 2017-01-01 RX ADMIN — SODIUM CHLORIDE, PRESERVATIVE FREE 5 ML: 5 INJECTION INTRAVENOUS at 10:59

## 2017-01-01 RX ADMIN — ACETAMINOPHEN 650 MG: 325 TABLET, FILM COATED ORAL at 14:28

## 2017-01-01 RX ADMIN — FILGRASTIM 480 MCG: 480 INJECTION, SOLUTION INTRAVENOUS; SUBCUTANEOUS at 19:45

## 2017-01-01 RX ADMIN — ACYCLOVIR 800 MG: 800 TABLET ORAL at 17:30

## 2017-01-01 RX ADMIN — LEVOFLOXACIN 250 MG: 250 TABLET, FILM COATED ORAL at 11:23

## 2017-01-01 RX ADMIN — MIDAZOLAM HYDROCHLORIDE 1 MG: 1 INJECTION, SOLUTION INTRAMUSCULAR; INTRAVENOUS at 08:55

## 2017-01-01 RX ADMIN — ACYCLOVIR 800 MG: 800 TABLET ORAL at 08:10

## 2017-01-01 RX ADMIN — ZOLPIDEM TARTRATE 5 MG: 5 TABLET, FILM COATED ORAL at 23:08

## 2017-01-01 RX ADMIN — HYDRALAZINE HYDROCHLORIDE 20 MG: 20 INJECTION INTRAMUSCULAR; INTRAVENOUS at 22:36

## 2017-01-01 RX ADMIN — SODIUM CHLORIDE, PRESERVATIVE FREE 5 ML: 5 INJECTION INTRAVENOUS at 10:00

## 2017-01-01 RX ADMIN — MIDAZOLAM HYDROCHLORIDE 0.5 MG: 1 INJECTION, SOLUTION INTRAMUSCULAR; INTRAVENOUS at 09:04

## 2017-01-01 RX ADMIN — ACYCLOVIR 800 MG: 800 TABLET ORAL at 18:58

## 2017-01-01 RX ADMIN — LORAZEPAM 0.5 MG: 0.5 TABLET ORAL at 09:57

## 2017-01-01 RX ADMIN — Medication 2 MG: at 20:00

## 2017-01-01 RX ADMIN — MICAFUNGIN SODIUM 100 MG: 10 INJECTION, POWDER, LYOPHILIZED, FOR SOLUTION INTRAVENOUS at 19:01

## 2017-01-01 RX ADMIN — ACYCLOVIR 800 MG: 800 TABLET ORAL at 08:15

## 2017-01-01 RX ADMIN — FILGRASTIM 480 MCG: 480 INJECTION, SOLUTION INTRAVENOUS; SUBCUTANEOUS at 19:59

## 2017-01-01 RX ADMIN — CYCLOBENZAPRINE HYDROCHLORIDE 5 MG: 5 TABLET, FILM COATED ORAL at 22:35

## 2017-01-01 RX ADMIN — SODIUM CHLORIDE, PRESERVATIVE FREE 5 ML: 5 INJECTION INTRAVENOUS at 10:20

## 2017-01-01 RX ADMIN — DILTIAZEM HYDROCHLORIDE 360 MG: 180 CAPSULE, EXTENDED RELEASE ORAL at 18:21

## 2017-01-01 RX ADMIN — URSODIOL 300 MG: 300 CAPSULE ORAL at 08:33

## 2017-01-01 RX ADMIN — FENTANYL CITRATE 50 MCG: 50 INJECTION, SOLUTION INTRAMUSCULAR; INTRAVENOUS at 08:54

## 2017-01-01 RX ADMIN — ONDANSETRON HYDROCHLORIDE 8 MG: 8 TABLET, FILM COATED ORAL at 23:01

## 2017-01-01 RX ADMIN — HYDRALAZINE HYDROCHLORIDE 20 MG: 20 INJECTION INTRAMUSCULAR; INTRAVENOUS at 17:01

## 2017-01-01 RX ADMIN — Medication 62.5 MG: at 08:27

## 2017-01-01 RX ADMIN — PSYLLIUM HUSK 1 PACKET: 3.4 POWDER ORAL at 20:42

## 2017-01-01 RX ADMIN — HYDRALAZINE HYDROCHLORIDE 20 MG: 20 INJECTION INTRAMUSCULAR; INTRAVENOUS at 18:16

## 2017-01-01 RX ADMIN — ONDANSETRON HYDROCHLORIDE 8 MG: 8 TABLET, FILM COATED ORAL at 00:34

## 2017-01-01 RX ADMIN — ONDANSETRON HYDROCHLORIDE 8 MG: 8 TABLET, FILM COATED ORAL at 22:35

## 2017-01-01 RX ADMIN — LORATADINE 10 MG: 10 TABLET ORAL at 08:28

## 2017-01-01 RX ADMIN — ALBUTEROL SULFATE 2.5 MG: 2.5 SOLUTION RESPIRATORY (INHALATION) at 08:37

## 2017-01-01 RX ADMIN — MYCOPHENOLATE MOFETIL 1500 MG: 500 INJECTION, POWDER, LYOPHILIZED, FOR SOLUTION INTRAVENOUS at 08:34

## 2017-01-01 RX ADMIN — FENTANYL CITRATE 50 MCG: 50 INJECTION, SOLUTION INTRAMUSCULAR; INTRAVENOUS at 10:12

## 2017-01-01 RX ADMIN — ACYCLOVIR 800 MG: 800 TABLET ORAL at 17:44

## 2017-01-01 RX ADMIN — ACETAMINOPHEN 650 MG: 325 TABLET, FILM COATED ORAL at 23:49

## 2017-01-01 RX ADMIN — SODIUM CHLORIDE, PRESERVATIVE FREE 5 ML: 5 INJECTION INTRAVENOUS at 10:22

## 2017-01-01 RX ADMIN — VORICONAZOLE 200 MG: 200 TABLET, FILM COATED ORAL at 20:02

## 2017-01-01 RX ADMIN — CYCLOPHOSPHAMIDE 4625 MG: 2 INJECTION, POWDER, FOR SOLUTION INTRAVENOUS; ORAL at 20:16

## 2017-01-01 RX ADMIN — DIPHENHYDRAMINE HYDROCHLORIDE 25 MG: 25 CAPSULE ORAL at 13:32

## 2017-01-01 RX ADMIN — LORAZEPAM 0.5 MG: 0.5 TABLET ORAL at 18:01

## 2017-01-01 RX ADMIN — ONDANSETRON HYDROCHLORIDE 8 MG: 8 TABLET, FILM COATED ORAL at 16:40

## 2017-01-01 RX ADMIN — URSODIOL 300 MG: 300 CAPSULE ORAL at 13:56

## 2017-01-01 RX ADMIN — URSODIOL 300 MG: 300 CAPSULE ORAL at 08:46

## 2017-01-01 RX ADMIN — SODIUM CHLORIDE 240 ML: 900 IRRIGANT IRRIGATION at 09:07

## 2017-01-01 RX ADMIN — INSULIN ASPART 1 UNITS: 100 INJECTION, SOLUTION INTRAVENOUS; SUBCUTANEOUS at 12:25

## 2017-01-01 RX ADMIN — DILTIAZEM HYDROCHLORIDE 300 MG: 180 CAPSULE, EXTENDED RELEASE ORAL at 17:58

## 2017-01-01 RX ADMIN — DEXAMETHASONE 10 MG: 2 TABLET ORAL at 08:40

## 2017-01-01 RX ADMIN — ACETAMINOPHEN 650 MG: 325 TABLET, FILM COATED ORAL at 15:13

## 2017-01-01 RX ADMIN — URSODIOL 300 MG: 300 CAPSULE ORAL at 08:12

## 2017-01-01 RX ADMIN — MYCOPHENOLATE MOFETIL 1500 MG: 500 INJECTION, POWDER, LYOPHILIZED, FOR SOLUTION INTRAVENOUS at 09:04

## 2017-01-01 RX ADMIN — INSULIN GLARGINE 15 UNITS: 100 INJECTION, SOLUTION SUBCUTANEOUS at 12:02

## 2017-01-01 RX ADMIN — LEVOFLOXACIN 250 MG: 250 TABLET, FILM COATED ORAL at 11:04

## 2017-01-01 RX ADMIN — PSYLLIUM HUSK 1 PACKET: 3.4 POWDER ORAL at 15:53

## 2017-01-01 RX ADMIN — ONDANSETRON HYDROCHLORIDE 8 MG: 8 TABLET, FILM COATED ORAL at 08:24

## 2017-01-01 RX ADMIN — ONDANSETRON HYDROCHLORIDE 8 MG: 8 TABLET, FILM COATED ORAL at 08:28

## 2017-01-01 RX ADMIN — Medication 62.5 MG: at 08:59

## 2017-01-01 RX ADMIN — PSYLLIUM HUSK 1 PACKET: 3.4 POWDER ORAL at 08:02

## 2017-01-01 RX ADMIN — ZOLPIDEM TARTRATE 5 MG: 5 TABLET, FILM COATED ORAL at 22:23

## 2017-01-01 RX ADMIN — PENTAMIDINE ISETHIONATE 300 MG: 300 INHALANT RESPIRATORY (INHALATION) at 12:11

## 2017-01-01 RX ADMIN — ACYCLOVIR 800 MG: 800 TABLET ORAL at 20:58

## 2017-01-01 RX ADMIN — ALLOPURINOL 300 MG: 300 TABLET ORAL at 08:45

## 2017-01-01 RX ADMIN — ACETAMINOPHEN 650 MG: 325 TABLET, FILM COATED ORAL at 17:59

## 2017-01-01 RX ADMIN — HYDRALAZINE HYDROCHLORIDE 10 MG: 20 INJECTION INTRAMUSCULAR; INTRAVENOUS at 13:10

## 2017-01-01 RX ADMIN — ONDANSETRON HYDROCHLORIDE 8 MG: 8 TABLET, FILM COATED ORAL at 08:12

## 2017-01-01 RX ADMIN — ONDANSETRON HYDROCHLORIDE 8 MG: 8 TABLET, FILM COATED ORAL at 15:47

## 2017-01-01 RX ADMIN — ONDANSETRON HYDROCHLORIDE 8 MG: 8 TABLET, FILM COATED ORAL at 08:10

## 2017-01-01 RX ADMIN — ACETAMINOPHEN 650 MG: 325 TABLET, FILM COATED ORAL at 22:35

## 2017-01-01 RX ADMIN — TACROLIMUS 60 MCG/HR: 5 INJECTION, SOLUTION INTRAVENOUS at 00:48

## 2017-01-01 RX ADMIN — ACYCLOVIR 800 MG: 800 TABLET ORAL at 22:16

## 2017-01-01 RX ADMIN — PENTAMIDINE ISETHIONATE 300 MG: 300 INHALANT RESPIRATORY (INHALATION) at 13:04

## 2017-01-01 RX ADMIN — HYDRALAZINE HYDROCHLORIDE 20 MG: 20 INJECTION INTRAMUSCULAR; INTRAVENOUS at 10:48

## 2017-01-01 RX ADMIN — URSODIOL 300 MG: 300 CAPSULE ORAL at 14:21

## 2017-01-01 RX ADMIN — SODIUM CHLORIDE 1000 ML: 9 INJECTION, SOLUTION INTRAVENOUS at 20:29

## 2017-01-01 RX ADMIN — Medication 62.5 MG: at 08:10

## 2017-01-01 RX ADMIN — PSYLLIUM HUSK 1 PACKET: 3.4 POWDER ORAL at 20:02

## 2017-01-01 RX ADMIN — ACYCLOVIR 800 MG: 800 TABLET ORAL at 12:35

## 2017-01-01 RX ADMIN — MYCOPHENOLATE MOFETIL 1500 MG: 250 CAPSULE ORAL at 08:01

## 2017-01-01 RX ADMIN — Medication 62.5 MG: at 08:39

## 2017-01-01 RX ADMIN — URSODIOL 300 MG: 300 CAPSULE ORAL at 08:23

## 2017-01-01 RX ADMIN — SODIUM CHLORIDE, PRESERVATIVE FREE 5 ML: 5 INJECTION INTRAVENOUS at 08:03

## 2017-01-01 RX ADMIN — FILGRASTIM 480 MCG: 480 INJECTION, SOLUTION INTRAVENOUS; SUBCUTANEOUS at 20:11

## 2017-01-01 RX ADMIN — LIDOCAINE HYDROCHLORIDE 15 ML: 10 INJECTION, SOLUTION EPIDURAL; INFILTRATION; INTRACAUDAL; PERINEURAL at 09:01

## 2017-01-01 RX ADMIN — LORAZEPAM 0.5 MG: 0.5 TABLET ORAL at 04:03

## 2017-01-01 RX ADMIN — ACETAMINOPHEN 650 MG: 325 TABLET, FILM COATED ORAL at 10:48

## 2017-01-01 RX ADMIN — URSODIOL 300 MG: 300 CAPSULE ORAL at 13:10

## 2017-01-01 RX ADMIN — DEXTROSE AND SODIUM CHLORIDE 1000 ML: 5; 450 INJECTION, SOLUTION INTRAVENOUS at 15:10

## 2017-01-01 RX ADMIN — TACROLIMUS 50 MCG/HR: 5 INJECTION, SOLUTION INTRAVENOUS at 22:30

## 2017-01-01 RX ADMIN — PSYLLIUM HUSK 1 PACKET: 3.4 POWDER ORAL at 20:01

## 2017-01-01 RX ADMIN — ONDANSETRON HYDROCHLORIDE 8 MG: 8 TABLET, FILM COATED ORAL at 16:59

## 2017-01-01 RX ADMIN — LORAZEPAM 0.5 MG: 0.5 TABLET ORAL at 11:01

## 2017-01-01 RX ADMIN — FENTANYL CITRATE 25 MCG: 50 INJECTION, SOLUTION INTRAMUSCULAR; INTRAVENOUS at 09:01

## 2017-01-01 RX ADMIN — ACETAMINOPHEN 325 MG: 325 TABLET, FILM COATED ORAL at 22:15

## 2017-01-01 RX ADMIN — ONDANSETRON HYDROCHLORIDE 8 MG: 8 TABLET, FILM COATED ORAL at 16:45

## 2017-01-01 RX ADMIN — DILTIAZEM HYDROCHLORIDE 360 MG: 180 CAPSULE, EXTENDED RELEASE ORAL at 18:24

## 2017-01-01 RX ADMIN — SODIUM CHLORIDE, PRESERVATIVE FREE 5 ML: 5 INJECTION INTRAVENOUS at 15:20

## 2017-01-01 RX ADMIN — MYCOPHENOLATE MOFETIL 1500 MG: 250 CAPSULE ORAL at 20:02

## 2017-01-01 RX ADMIN — CEFAZOLIN SODIUM 2 G: 2 INJECTION, SOLUTION INTRAVENOUS at 08:58

## 2017-01-01 RX ADMIN — LEVOFLOXACIN 250 MG: 250 TABLET, FILM COATED ORAL at 10:44

## 2017-01-01 RX ADMIN — ACETAMINOPHEN 650 MG: 325 TABLET, FILM COATED ORAL at 18:55

## 2017-01-01 RX ADMIN — DEXTROSE AND SODIUM CHLORIDE 1000 ML: 5; 450 INJECTION, SOLUTION INTRAVENOUS at 00:05

## 2017-01-01 RX ADMIN — ACETAMINOPHEN 650 MG: 325 TABLET ORAL at 12:56

## 2017-01-01 RX ADMIN — SODIUM CHLORIDE, PRESERVATIVE FREE 5 ML: 5 INJECTION INTRAVENOUS at 11:35

## 2017-01-01 RX ADMIN — POTASSIUM CHLORIDE 20 MEQ: 750 TABLET, EXTENDED RELEASE ORAL at 08:21

## 2017-01-01 RX ADMIN — MYCOPHENOLATE MOFETIL 1500 MG: 500 INJECTION, POWDER, LYOPHILIZED, FOR SOLUTION INTRAVENOUS at 20:30

## 2017-01-01 RX ADMIN — VORICONAZOLE 200 MG: 200 TABLET, FILM COATED ORAL at 20:43

## 2017-01-01 RX ADMIN — DEXTROSE AND SODIUM CHLORIDE 1000 ML: 5; 450 INJECTION, SOLUTION INTRAVENOUS at 15:12

## 2017-01-01 RX ADMIN — DILTIAZEM HYDROCHLORIDE 300 MG: 180 CAPSULE, EXTENDED RELEASE ORAL at 18:42

## 2017-01-01 RX ADMIN — TACROLIMUS 1.5 MG: 1 CAPSULE ORAL at 08:00

## 2017-01-01 RX ADMIN — ALLOPURINOL 300 MG: 300 TABLET ORAL at 15:09

## 2017-01-01 RX ADMIN — MIDAZOLAM 1 MG: 1 INJECTION INTRAMUSCULAR; INTRAVENOUS at 10:19

## 2017-01-01 RX ADMIN — URSODIOL 300 MG: 300 CAPSULE ORAL at 13:59

## 2017-01-01 RX ADMIN — Medication 2 G: at 05:16

## 2017-01-01 RX ADMIN — ONDANSETRON HYDROCHLORIDE 8 MG: 8 TABLET, FILM COATED ORAL at 08:32

## 2017-01-01 RX ADMIN — DEXAMETHASONE 10 MG: 2 TABLET ORAL at 08:28

## 2017-01-01 RX ADMIN — ZOLPIDEM TARTRATE 10 MG: 5 TABLET, FILM COATED ORAL at 22:35

## 2017-01-01 RX ADMIN — DILTIAZEM HYDROCHLORIDE 240 MG: 120 CAPSULE, EXTENDED RELEASE ORAL at 17:44

## 2017-01-01 RX ADMIN — ONDANSETRON HYDROCHLORIDE 8 MG: 8 TABLET, FILM COATED ORAL at 15:58

## 2017-01-01 RX ADMIN — POTASSIUM CHLORIDE 20 MEQ: 750 TABLET, EXTENDED RELEASE ORAL at 08:28

## 2017-01-01 RX ADMIN — ACYCLOVIR 800 MG: 800 TABLET ORAL at 13:20

## 2017-01-01 RX ADMIN — SODIUM CHLORIDE, PRESERVATIVE FREE 5 ML: 5 INJECTION INTRAVENOUS at 14:21

## 2017-01-01 RX ADMIN — DEXAMETHASONE 10 MG: 2 TABLET ORAL at 17:25

## 2017-01-01 RX ADMIN — LORAZEPAM 0.5 MG: 0.5 TABLET ORAL at 11:18

## 2017-01-01 RX ADMIN — Medication 62.5 MG: at 08:22

## 2017-01-01 RX ADMIN — ACETAMINOPHEN 650 MG: 325 TABLET, FILM COATED ORAL at 22:21

## 2017-01-01 RX ADMIN — TACROLIMUS 50 MCG/HR: 5 INJECTION, SOLUTION INTRAVENOUS at 19:44

## 2017-01-01 RX ADMIN — LORATADINE 10 MG: 10 TABLET ORAL at 08:00

## 2017-01-01 RX ADMIN — ACYCLOVIR 800 MG: 800 TABLET ORAL at 14:09

## 2017-01-01 RX ADMIN — INSULIN ASPART 1 UNITS: 100 INJECTION, SOLUTION INTRAVENOUS; SUBCUTANEOUS at 08:23

## 2017-01-01 RX ADMIN — SODIUM PHOSPHATE, MONOBASIC, MONOHYDRATE AND SODIUM PHOSPHATE, DIBASIC, ANHYDROUS 15 MMOL: 276; 142 INJECTION, SOLUTION INTRAVENOUS at 05:53

## 2017-01-01 RX ADMIN — SODIUM CHLORIDE, PRESERVATIVE FREE 5 ML: 5 INJECTION INTRAVENOUS at 08:31

## 2017-01-01 RX ADMIN — FENTANYL CITRATE 25 MCG: 50 INJECTION, SOLUTION INTRAMUSCULAR; INTRAVENOUS at 08:56

## 2017-01-01 RX ADMIN — URSODIOL 300 MG: 300 CAPSULE ORAL at 13:40

## 2017-01-01 RX ADMIN — POTASSIUM CHLORIDE 20 MEQ: 29.8 INJECTION, SOLUTION INTRAVENOUS at 03:53

## 2017-01-01 RX ADMIN — INSULIN ASPART 3 UNITS: 100 INJECTION, SOLUTION INTRAVENOUS; SUBCUTANEOUS at 22:33

## 2017-01-01 RX ADMIN — SODIUM CHLORIDE, PRESERVATIVE FREE 5 ML: 5 INJECTION INTRAVENOUS at 08:39

## 2017-01-01 RX ADMIN — URSODIOL 300 MG: 300 CAPSULE ORAL at 08:27

## 2017-01-01 RX ADMIN — VORICONAZOLE 200 MG: 200 TABLET, FILM COATED ORAL at 08:32

## 2017-01-01 RX ADMIN — URSODIOL 300 MG: 300 CAPSULE ORAL at 20:12

## 2017-01-01 RX ADMIN — LORAZEPAM 0.5 MG: 0.5 TABLET ORAL at 22:23

## 2017-01-01 RX ADMIN — URSODIOL 300 MG: 300 CAPSULE ORAL at 08:59

## 2017-01-01 RX ADMIN — SODIUM CHLORIDE, PRESERVATIVE FREE 5 ML: 5 INJECTION INTRAVENOUS at 18:59

## 2017-01-01 RX ADMIN — ONDANSETRON HYDROCHLORIDE 8 MG: 8 TABLET, FILM COATED ORAL at 22:31

## 2017-01-01 RX ADMIN — HYDRALAZINE HYDROCHLORIDE 20 MG: 20 INJECTION INTRAMUSCULAR; INTRAVENOUS at 08:44

## 2017-01-01 RX ADMIN — Medication 26.5 MCI.: at 10:30

## 2017-01-01 RX ADMIN — ACETAMINOPHEN 650 MG: 325 TABLET, FILM COATED ORAL at 22:28

## 2017-01-01 RX ADMIN — Medication 2 G: at 05:00

## 2017-01-01 RX ADMIN — ACYCLOVIR 800 MG: 800 TABLET ORAL at 08:43

## 2017-01-01 RX ADMIN — SODIUM CHLORIDE, PRESERVATIVE FREE 5 ML: 5 INJECTION INTRAVENOUS at 14:20

## 2017-01-01 RX ADMIN — MYCOPHENOLATE MOFETIL 1500 MG: 500 INJECTION, POWDER, LYOPHILIZED, FOR SOLUTION INTRAVENOUS at 19:59

## 2017-01-01 RX ADMIN — URSODIOL 300 MG: 300 CAPSULE ORAL at 14:41

## 2017-01-01 RX ADMIN — PSYLLIUM HUSK 1 PACKET: 3.4 POWDER ORAL at 19:58

## 2017-01-01 RX ADMIN — MESNA 930 MG: 100 INJECTION, SOLUTION INTRAVENOUS at 19:56

## 2017-01-01 RX ADMIN — PSYLLIUM HUSK 1 PACKET: 3.4 POWDER ORAL at 19:59

## 2017-01-01 RX ADMIN — SODIUM CHLORIDE, PRESERVATIVE FREE 5 ML: 5 INJECTION INTRAVENOUS at 20:53

## 2017-01-01 RX ADMIN — Medication 2 G: at 05:01

## 2017-01-01 RX ADMIN — HYDRALAZINE HYDROCHLORIDE 20 MG: 20 INJECTION INTRAMUSCULAR; INTRAVENOUS at 23:10

## 2017-01-01 RX ADMIN — HYDRALAZINE HYDROCHLORIDE 20 MG: 20 INJECTION INTRAMUSCULAR; INTRAVENOUS at 00:09

## 2017-01-01 RX ADMIN — LORAZEPAM 0.5 MG: 0.5 TABLET ORAL at 20:16

## 2017-01-01 RX ADMIN — URSODIOL 300 MG: 300 CAPSULE ORAL at 08:16

## 2017-01-01 RX ADMIN — INFLUENZA A VIRUS A/MICHIGAN/45/2015 X-275 (H1N1) ANTIGEN (FORMALDEHYDE INACTIVATED), INFLUENZA A VIRUS A/HONG KONG/4801/2014 X-263B (H3N2) ANTIGEN (FORMALDEHYDE INACTIVATED), INFLUENZA B VIRUS B/PHUKET/3073/2013 ANTIGEN (FORMALDEHYDE INACTIVATED), AND INFLUENZA B VIRUS B/BRISBANE/60/2008 ANTIGEN (FORMALDEHYDE INACTIVATED) 0.5 ML: 15; 15; 15; 15 INJECTION, SUSPENSION INTRAMUSCULAR at 12:57

## 2017-01-01 RX ADMIN — ACETAMINOPHEN 650 MG: 325 TABLET, FILM COATED ORAL at 13:27

## 2017-01-01 RX ADMIN — SODIUM CHLORIDE, PRESERVATIVE FREE 5 ML: 5 INJECTION INTRAVENOUS at 12:16

## 2017-01-01 RX ADMIN — URSODIOL 300 MG: 300 CAPSULE ORAL at 08:43

## 2017-01-01 RX ADMIN — MYCOPHENOLATE MOFETIL 1500 MG: 250 CAPSULE ORAL at 20:40

## 2017-01-01 RX ADMIN — LEVOFLOXACIN 250 MG: 250 TABLET, FILM COATED ORAL at 10:35

## 2017-01-01 RX ADMIN — ACYCLOVIR 800 MG: 800 TABLET ORAL at 13:40

## 2017-01-01 RX ADMIN — DILTIAZEM HYDROCHLORIDE 360 MG: 180 CAPSULE, EXTENDED RELEASE ORAL at 18:58

## 2017-01-01 RX ADMIN — PROCHLORPERAZINE MALEATE 10 MG: 5 TABLET, FILM COATED ORAL at 20:48

## 2017-01-01 RX ADMIN — FENTANYL CITRATE 50 MCG: 50 INJECTION, SOLUTION INTRAMUSCULAR; INTRAVENOUS at 10:19

## 2017-01-01 RX ADMIN — PSYLLIUM HUSK 1 PACKET: 3.4 POWDER ORAL at 20:48

## 2017-01-01 RX ADMIN — DILTIAZEM HYDROCHLORIDE 300 MG: 180 CAPSULE, EXTENDED RELEASE ORAL at 18:02

## 2017-01-01 RX ADMIN — PSYLLIUM HUSK 1 PACKET: 3.4 POWDER ORAL at 13:56

## 2017-01-01 RX ADMIN — DEXTROSE AND SODIUM CHLORIDE: 5; 450 INJECTION, SOLUTION INTRAVENOUS at 08:23

## 2017-01-01 RX ADMIN — ALLOPURINOL 300 MG: 300 TABLET ORAL at 08:39

## 2017-01-01 RX ADMIN — ACYCLOVIR 800 MG: 800 TABLET ORAL at 14:32

## 2017-01-01 RX ADMIN — PSYLLIUM HUSK 1 PACKET: 3.4 POWDER ORAL at 08:28

## 2017-01-01 RX ADMIN — VORICONAZOLE 200 MG: 200 TABLET, FILM COATED ORAL at 19:58

## 2017-01-01 RX ADMIN — LOSARTAN POTASSIUM 50 MG: 50 TABLET, FILM COATED ORAL at 08:16

## 2017-01-01 RX ADMIN — FLUDARABINE PHOSPHATE 64 MG: 25 INJECTION, SOLUTION INTRAVENOUS at 17:37

## 2017-01-01 RX ADMIN — MYCOPHENOLATE MOFETIL 1500 MG: 500 INJECTION, POWDER, LYOPHILIZED, FOR SOLUTION INTRAVENOUS at 20:07

## 2017-01-01 RX ADMIN — MYCOPHENOLATE MOFETIL 1500 MG: 250 CAPSULE ORAL at 08:23

## 2017-01-01 RX ADMIN — URSODIOL 300 MG: 300 CAPSULE ORAL at 20:42

## 2017-01-01 RX ADMIN — LIDOCAINE HYDROCHLORIDE 9 ML: 40 INJECTION, SOLUTION RETROBULBAR; TOPICAL at 08:56

## 2017-01-01 RX ADMIN — VORICONAZOLE 200 MG: 200 TABLET, FILM COATED ORAL at 20:51

## 2017-01-01 RX ADMIN — MYCOPHENOLATE MOFETIL 1500 MG: 500 INJECTION, POWDER, LYOPHILIZED, FOR SOLUTION INTRAVENOUS at 20:54

## 2017-01-01 RX ADMIN — VORICONAZOLE 200 MG: 200 TABLET, FILM COATED ORAL at 19:31

## 2017-01-01 RX ADMIN — SODIUM CHLORIDE, PRESERVATIVE FREE 5 ML: 5 INJECTION INTRAVENOUS at 17:31

## 2017-01-01 RX ADMIN — URSODIOL 300 MG: 300 CAPSULE ORAL at 08:40

## 2017-01-01 RX ADMIN — LORATADINE 10 MG: 10 TABLET ORAL at 08:22

## 2017-01-01 RX ADMIN — LIDOCAINE HYDROCHLORIDE 20 ML: 10 INJECTION, SOLUTION EPIDURAL; INFILTRATION; INTRACAUDAL; PERINEURAL at 10:25

## 2017-01-01 RX ADMIN — DILTIAZEM HYDROCHLORIDE 120 MG: 120 CAPSULE, EXTENDED RELEASE ORAL at 19:52

## 2017-01-01 RX ADMIN — MYCOPHENOLATE MOFETIL 1500 MG: 500 INJECTION, POWDER, LYOPHILIZED, FOR SOLUTION INTRAVENOUS at 08:39

## 2017-01-01 RX ADMIN — Medication 2 G: at 03:53

## 2017-01-01 ASSESSMENT — ENCOUNTER SYMPTOMS
INCREASED ENERGY: 1
ORTHOPNEA: 0
FEVER: 0
DOUBLE VISION: 0
PALPITATIONS: 0
WEIGHT LOSS: 0
MYALGIAS: 0
EYE IRRITATION: 0
DECREASED APPETITE: 1
TASTE DISTURBANCE: 1
SYNCOPE: 0
CLAUDICATION: 0
BRUISES/BLEEDS EASILY: 0
ARTHRALGIAS: 0
SINUS PAIN: 0
SINUS CONGESTION: 0
SKIN CHANGES: 0
EXERCISE INTOLERANCE: 0
EYE PAIN: 0
NAIL CHANGES: 0
HALLUCINATIONS: 0
MUSCLE CRAMPS: 0
NIGHT SWEATS: 0
TACHYCARDIA: 0
HYPERTENSION: 1
LIGHT-HEADEDNESS: 0
HYPOTENSION: 0
SLEEP DISTURBANCES DUE TO BREATHING: 0
POLYDIPSIA: 0
FATIGUE: 1
LEG SWELLING: 1
BACK PAIN: 0
STIFFNESS: 0
JOINT SWELLING: 0
MUSCLE WEAKNESS: 0
POLYPHAGIA: 0
EYE WATERING: 0
HOARSE VOICE: 0
CHILLS: 0
POOR WOUND HEALING: 0
EYE REDNESS: 0
NECK PAIN: 0
LEG PAIN: 0
WEIGHT GAIN: 0
SORE THROAT: 0
TROUBLE SWALLOWING: 0
NECK MASS: 0
SMELL DISTURBANCE: 0
ALTERED TEMPERATURE REGULATION: 1

## 2017-01-01 ASSESSMENT — PAIN DESCRIPTION - DESCRIPTORS
DESCRIPTORS: ACHING
DESCRIPTORS: SORE
DESCRIPTORS: TENDER
DESCRIPTORS: TENDER
DESCRIPTORS: SORE
DESCRIPTORS: SORE
DESCRIPTORS: HEADACHE
DESCRIPTORS: ACHING
DESCRIPTORS: HEADACHE
DESCRIPTORS: SORE
DESCRIPTORS: TENDER

## 2017-01-01 ASSESSMENT — PAIN SCALES - GENERAL
PAINLEVEL: NO PAIN (0)
PAINLEVEL: MODERATE PAIN (4)
PAINLEVEL: NO PAIN (0)
PAINLEVEL: MILD PAIN (3)
PAINLEVEL: NO PAIN (0)

## 2017-01-01 ASSESSMENT — PATIENT HEALTH QUESTIONNAIRE - PHQ9: SUM OF ALL RESPONSES TO PHQ QUESTIONS 1-9: 4

## 2017-02-07 ENCOUNTER — TRANSFERRED RECORDS (OUTPATIENT)
Dept: HEALTH INFORMATION MANAGEMENT | Facility: CLINIC | Age: 57
End: 2017-02-07

## 2017-05-31 NOTE — MR AVS SNAPSHOT
"              After Visit Summary   5/31/2017    Norman Real    MRN: [id number]           Patient Information     Date Of Birth          1960        Visit Information        Provider Department      5/31/2017 10:30 AM Coordinator, Pete Bmt Nurse;  2 118 CONSULT [facility] Blood and Marrow Transplant        Today's Diagnoses     AML (acute myeloid leukemia) (H)    -  1          Clinics and Surgery Center ([facility])  [address] [place] [address]  Phone: [phone]  Clinic Hours:   Monday-Thursday: 7am to 7pm   Friday: 7am to 5:30pm   Weekends and holidays:    8am to noon (in general)  If your fever is 100.5  or greater,   call the clinic.  After hours call the   hospital at [phone] or   [phone]. Ask for the BMT   fellow on-call            Follow-ups after your visit        Who to contact     If you have questions or need follow up information about today's clinic visit or your schedule please contact [facility] BLOOD AND MARROW TRANSPLANT directly at [phone].  Normal or non-critical lab and imaging results will be communicated to you by [employer], letter or phone within 4 business days after the clinic has received the results. If you do not hear from us within 7 days, please contact the clinic through [employer] or phone. If you have a critical or abnormal lab result, we will notify you by phone as soon as possible.  Submit refill requests through [employer] or call your pharmacy and they will forward the refill request to us. Please allow 3 business days for your refill to be completed.          Additional Information About Your Visit        [employer] Information     [employer] lets you send messages to your doctor, view your test results, renew your prescriptions, schedule appointments and more. To sign up, go to [email or web address] . Click on \"Log in\" on the left side of the screen, which will take you to the Welcome page. Then click on \"Sign up Now\" on the right side of the page. "     You will be asked to enter the access code listed below, as well as some personal information. Please follow the directions to create your username and password.     Your access code is: [email or web address]  Expires: 8/15/2017  6:30 AM     Your access code will  in 90 days. If you need help or a new code, please call your [place] clinic or [phone].        Care EveryWhere ID     This is your Care EveryWhere ID. This could be used by other organizations to access your [place] medical records  [id number]         Blood Pressure from Last 3 Encounters:   17 138/78    Weight from Last 3 Encounters:   17 92.9 kg (204 lb 14.4 oz)              Today, you had the following     No orders found for display       Recent Review Flowsheet Data     There is no flowsheet data to display.               Primary Care Provider    None Specified       No primary provider on file.        Thank you!     Thank you for choosing [facility] BLOOD AND MARROW TRANSPLANT  for your care. Our goal is always to provide you with excellent care. Hearing back from our patients is one way we can continue to improve our services. Please take a few minutes to complete the written survey that you may receive in the mail after your visit with us. Thank you!             Your Updated Medication List - Protect others around you: Learn how to safely use, store and throw away your medicines at www.disposemymeds.org.          This list is accurate as of: 17 11:10 AM.  Always use your most recent med list.                   Brand Name Dispense Instructions for use    aspirin 81 MG tablet      Take by mouth daily       cholecalciferol 1000 UNITS capsule    vitamin  -D     Take 1 capsule by mouth daily       diltiazem 120 MG 24 hr ER beaded capsule    TIAZAC     Take by mouth daily       FLONASE NA          GUAIFENESIN 600/ PO          LIPITOR PO      Take 10 mg by mouth       metFORMIN 500 MG tablet    GLUCOPHAGE     Take 500 mg by  mouth daily       MULTI COMPLETE PO          ondansetron 4 MG ODT tab    ZOFRAN-ODT     Take by mouth every 4 hours as needed for nausea       oxyCODONE 5 MG capsule    OXY-IR     Take 5 mg by mouth every 4 hours as needed for moderate to severe pain       PSYLLIUM PO      1 tsp by mouth 2 times daily       zolpidem 10 MG tablet    AMBIEN     Take by mouth nightly as needed for sleep 1/2 tab to one tablet at bedtime

## 2017-05-31 NOTE — LETTER
5/31/2017      RE: Norman Real  [address]   [place] [address]       May 31, 2017       Jose Pitts MD    [facility]    [address] [place] [address]  [id number]      RE:  Norman Farhan       Dear Jose:       Today I had the pleasure to see and examine in consultation Mr. Norman Real, a 56-year-old gentleman with recently diagnosed acute myelogenous leukemia.  The patient came today accompanied by his wife.  You are well aware of the patient's medical history, but I will summarize it for our medical records.       The patient was found to have acute leukemia on random blood test.  He went for his annual physical and to check his hemoglobin A1c.  He was found to have abnormal blood counts; therefore, it was repeated the next day and was found to have elevated peripheral blood blast count.  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  By flow cytometry, the blast population was CD13, CD33, CD34, , HLA-DR and MPO positive.  Molecular tests for NPM1 and CEBPA1 were sent and are reportedly negative.  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  The source documents for those reports are still being obtained by our office.  The patient underwent induction chemotherapy with 7+3 with idarubicin and cytarabine.  The recovery marrow obtained recently shows no morphological evidence of acute myelogenous leukemia.  However, flow cytometry identifies about 0.5% of blasts that have a similar phenotype as the original diagnostic materials, consistent with minimal residual disease.       The patient's induction course was fairly well-tolerated with some low-grade temperatures, but no serious infections.  He otherwise is doing well except that he has a lot of right leg pain since he had the second bone  marrow biopsy.  The pain had gotten worse in the last week and now he is using crutches instead of a cane.  The source of that pain is unclear and has not been investigated yet.       Today he has a good appetite and food is tasting normal again.  He has had no fevers.  He is taking his usual baby aspirin.  He has had no rashes, fevers, cough or shortness of breath.      The remaining complete review of systems is significant for type 2 diabetes and hypertension.  He has no other findings on his complete review of systems except for what is mentioned above.        Past Medical History:  While the patient denies any allergies, in his chart he is reported to have developed a reaction to ACE inhibitors and to prazosin which caused sedation and sleepiness with edema.  The ACE inhibitor caused hives, cough and anaphylaxis.  The patient did not bring that up when asked about allergies.  His past medical history is also very significant for prostate cancer diagnosed 6 years ago.  He underwent surgery in  and then had an elevation of the PSA and was treated with radiation therapy for 39 doses in .  He also had recurrent cardiac arrhythmia with tachycardia and required three ablations.  That has been asymptomatic.  In 2012, just before the radiation, he had a left-sided colectomy for recurrent diverticulitis that he had had at that point for over 10 years.  He had red cells and platelet transfusions with his induction and had no serious reactions to them.        He stopped smoking several years ago.  He does smoke 1 pack per day for several decades.  He denies abuse of alcohol or drugs.      His family history is significant for one sister, age 60, who is healthy.  He has one son, age 29, and a daughter, age 34, both in good clinical health.  His father  in an accident and his mother  of COPD.  He has an uncle who had prostate cancer.       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  His wife comes with him and they live together [place] of [place].       On physical exam today, the patient is alert and oriented and not in acute distress.  He is not pale or jaundiced.  He is very pleasant and his mood is appropriate and not stressed.  He does not have any palpable adenopathy in the neck, supraclavicular, submandibular or axillary regions.  His buccal mucosa shows good dentition and no drainage from the nasopharynx.  His lungs are clear bilaterally with no crackles or wheezes.  His heart has a regular rhythm and rate with no gallop, rub or murmur and no clinical arrhythmia.  His abdomen is flat, soft and nontender with no palpable organomegaly or masses.  He has bilateral trace edema in the lower extremities.  He has no skin rashes.  Deep tendon reflexes in his legs are symmetric.  His strength seems symmetric as well, but he had difficulty with some maneuvers because of the acute pain that it causes on the right hip area rather than the back.  Close examination of the bone marrow biopsy site shows no erythema, edema or any inflammatory signs.  There is also no pain on palpation of the biopsy site as well.       Assessment and Recommendations:  Mr. Norman Real is a 56-year-old gentleman with recently diagnosed acute myelogenous leukemia, with trisomy 3 and no molecular markers.       I first reviewed with the patient the natural history and risk stratification of acute myelogenous leukemia.  This patient's AML is clearly not good risk.  In addition, this is possibly secondary to his radiation therapy treatments for his prostate cancer.  With these key features, I am afraid that the probability of long-term disease-free survival is poor with chemotherapy alone and I would recommend for this patient to consider allogeneic transplantation in first complete remission.  I explained to him the trade-offs of consolidation  chemotherapy versus allogeneic transplant in first complete remission; in particular, the early risk of treatment-related mortality versus superior long-term disease-free survival with allogeneic transplant as compared to very low risk of treatment-related mortality with chemotherapy alone, but a very high risk of relapse and poor disease-free survival.       We then talked about how we evaluate and get a patient to transplantation, starting with the pre-transplant evaluation, the hospitalization for the chemotherapy, the post-transplant recovery in both the inpatient and outpatient settings.  We also talked about the most serious and significant complications of transplantation including infections, organ damage, leukemia relapse and yrgen-xetfuj-nkkx disease.  I spent a little time talking about acute and chronic epbnc-rejctb-znks disease, its clinical manifestations and treatment with prednisone.      We then talked about donors.  In his case we already know that his sister is a matched donor which could facilitate proceeding with an allogeneic transplant.  Because of his age and associated comorbidities including the radiation to the pelvis, the cardiac arrhythmias and the history of diverticulitis and diabetes, he would in our center receive a reduced-intensity conditioning regimen with the transplant.  He would also be a potential candidate for post-transplant maintenance therapy to reduce the risk of relapse.  Overall, I quoted to the patient a 35-40% chance of long-term survival with the difference in that being split between treatment-related mortality and relapse.       The patient and his wife had the opportunity to ask several questions that were answered to their satisfaction.  As we discussed on the phone today, I agree with the plan of giving him one cycle of consolidation with high-dose cytarabine.  Once he recovers counts from the cytarabine, I would refer him back to us so we can pursue the  pre-transplant evaluation with a bone marrow biopsy and organ function examination in our center.  We will obtain a spinal tap as usual, although your initial one was already negative.  Lastly, I would consider consultation to evaluate this right hip pain that is quite unusual and it has been quite limiting to his movement.  The cause of this has to be elucidated before transplant procedures are initiated.           Charanjit Umanzor MD

## 2017-05-31 NOTE — MR AVS SNAPSHOT
"              After Visit Summary   5/31/2017    Norman Real    MRN: [id number]           Patient Information     Date Of Birth          1960        Visit Information        Provider Department      5/31/2017 9:00 AM Charanjit Umanzor MD [facility] Blood and Marrow Transplant        Today's Diagnoses     Acute myeloid leukemia in remission (H)    -  1          Clinics and Surgery Center ([facility]  [address] [place] [address]  Phone: [phone]  Clinic Hours:   Monday-Thursday:7am to 7pm   Friday: 7am to 5pm   Weekends and holidays:    8am to noon (in general)  If your fever is 100.5  or greater,   call the clinic.  After hours call the   hospital at [phone] or   [phone]. Ask for the BMT   fellow on-call           Care Instructions    Pt left/no instructions as 1142    [place]  BMT          Follow-ups after your visit        Who to contact     If you have questions or need follow up information about today's clinic visit or your schedule please contact [facility] BLOOD AND MARROW TRANSPLANT directly at [phone].  Normal or non-critical lab and imaging results will be communicated to you by [employer], letter or phone within 4 business days after the clinic has received the results. If you do not hear from us within 7 days, please contact the clinic through [employer] or phone. If you have a critical or abnormal lab result, we will notify you by phone as soon as possible.  Submit refill requests through [employer] or call your pharmacy and they will forward the refill request to us. Please allow 3 business days for your refill to be completed.          Additional Information About Your Visit        [employer] Information     [employer] lets you send messages to your doctor, view your test results, renew your prescriptions, schedule appointments and more. To sign up, go to [email or web address] . Click on \"Log in\" on the left side of the screen, which will take you to the Welcome page. " "Then click on \"Sign up Now\" on the right side of the page.     You will be asked to enter the access code listed below, as well as some personal information. Please follow the directions to create your username and password.     Your access code is: [email or web address]  Expires: 8/15/2017  6:30 AM     Your access code will  in 90 days. If you need help or a new code, please call your [place] clinic or [phone].        Care EveryWhere ID     This is your Care EveryWhere ID. This could be used by other organizations to access your [place] medical records  [id number]        Your Vitals Were     Pulse Temperature Respirations Pulse Oximetry          82 97.3  F (36.3  C) (Oral) 18 96%         Blood Pressure from Last 3 Encounters:   17 138/78    Weight from Last 3 Encounters:   17 92.9 kg (204 lb 14.4 oz)              Today, you had the following     No orders found for display       Recent Review Flowsheet Data     There is no flowsheet data to display.               Primary Care Provider    None Specified       No primary provider on file.        Thank you!     Thank you for choosing [facility] BLOOD AND MARROW TRANSPLANT  for your care. Our goal is always to provide you with excellent care. Hearing back from our patients is one way we can continue to improve our services. Please take a few minutes to complete the written survey that you may receive in the mail after your visit with us. Thank you!             Your Updated Medication List - Protect others around you: Learn how to safely use, store and throw away your medicines at www.disposemymeds.org.          This list is accurate as of: 17 11:59 PM.  Always use your most recent med list.                   Brand Name Dispense Instructions for use    aspirin 81 MG tablet      Take by mouth daily       cholecalciferol 1000 UNITS capsule    vitamin  -D     Take 1 capsule by mouth daily       diltiazem 120 MG 24 hr ER beaded capsule    TIAZAC     Take " by mouth daily       FLONASE NA          GUAIFENESIN 600/ PO          LIPITOR PO      Take 10 mg by mouth       metFORMIN 500 MG tablet    GLUCOPHAGE     Take 500 mg by mouth daily       MULTI COMPLETE PO          ondansetron 4 MG ODT tab    ZOFRAN-ODT     Take by mouth every 4 hours as needed for nausea       oxyCODONE 5 MG capsule    OXY-IR     Take 5 mg by mouth every 4 hours as needed for moderate to severe pain       PSYLLIUM PO      1 tsp by mouth 2 times daily       zolpidem 10 MG tablet    CHING     Take by mouth nightly as needed for sleep 1/2 tab to one tablet at bedtime

## 2017-05-31 NOTE — PROGRESS NOTES
May 31, 2017       Jose Pitts MD    [facility]    [address] [place] [address]  [id number]      RE:  Norman Real       Dear Jose:       Today I had the pleasure to see and examine in consultation Mr. Norman Real, a 56-year-old gentleman with recently diagnosed acute myelogenous leukemia.  The patient came today accompanied by his wife.  You are well aware of the patient's medical history, but I will summarize it for our medical records.       The patient was found to have acute leukemia on random blood test.  He went for his annual physical and to check his hemoglobin A1c.  He was found to have abnormal blood counts; therefore, it was repeated the next day and was found to have elevated peripheral blood blast count.  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  By flow cytometry, the blast population was CD13, CD33, CD34, , HLA-DR and MPO positive.  Molecular tests for NPM1 and CEBPA1 were sent and are reportedly negative.  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  The source documents for those reports are still being obtained by our office.  The patient underwent induction chemotherapy with 7+3 with idarubicin and cytarabine.  The recovery marrow obtained recently shows no morphological evidence of acute myelogenous leukemia.  However, flow cytometry identifies about 0.5% of blasts that have a similar phenotype as the original diagnostic materials, consistent with minimal residual disease.       The patient's induction course was fairly well-tolerated with some low-grade temperatures, but no serious infections.  He otherwise is doing well except that he has a lot of right leg pain since he had the second bone marrow biopsy.  The pain had gotten worse in the last week and now he is  using crutches instead of a cane.  The source of that pain is unclear and has not been investigated yet.       Today he has a good appetite and food is tasting normal again.  He has had no fevers.  He is taking his usual baby aspirin.  He has had no rashes, fevers, cough or shortness of breath.      The remaining complete review of systems is significant for type 2 diabetes and hypertension.  He has no other findings on his complete review of systems except for what is mentioned above.        Past Medical History:  While the patient denies any allergies, in his chart he is reported to have developed a reaction to ACE inhibitors and to prazosin which caused sedation and sleepiness with edema.  The ACE inhibitor caused hives, cough and anaphylaxis.  The patient did not bring that up when asked about allergies.  His past medical history is also very significant for prostate cancer diagnosed 6 years ago.  He underwent surgery in  and then had an elevation of the PSA and was treated with radiation therapy for 39 doses in .  He also had recurrent cardiac arrhythmia with tachycardia and required three ablations.  That has been asymptomatic.  In , just before the radiation, he had a left-sided colectomy for recurrent diverticulitis that he had had at that point for over 10 years.  He had red cells and platelet transfusions with his induction and had no serious reactions to them.        He stopped smoking several years ago.  He does smoke 1 pack per day for several decades.  He denies abuse of alcohol or drugs.      His family history is significant for one sister, age 60, who is healthy.  He has one son, age 29, and a daughter, age 34, both in good clinical health.  His father  in an accident and his mother  of COPD.  He has an uncle who had prostate cancer.       His social history shows that he is a salesperson and just recently retired and was going to start working for another company for a couple of  years.  His wife comes with him and they live together [place] [facility].       On physical exam today, the patient is alert and oriented and not in acute distress.  He is not pale or jaundiced.  He is very pleasant and his mood is appropriate and not stressed.  He does not have any palpable adenopathy in the neck, supraclavicular, submandibular or axillary regions.  His buccal mucosa shows good dentition and no drainage from the nasopharynx.  His lungs are clear bilaterally with no crackles or wheezes.  His heart has a regular rhythm and rate with no gallop, rub or murmur and no clinical arrhythmia.  His abdomen is flat, soft and nontender with no palpable organomegaly or masses.  He has bilateral trace edema in the lower extremities.  He has no skin rashes.  Deep tendon reflexes in his legs are symmetric.  His strength seems symmetric as well, but he had difficulty with some maneuvers because of the acute pain that it causes on the right hip area rather than the back.  Close examination of the bone marrow biopsy site shows no erythema, edema or any inflammatory signs.  There is also no pain on palpation of the biopsy site as well.       Assessment and Recommendations:  Mr. Norman Real is a 56-year-old gentleman with recently diagnosed acute myelogenous leukemia, with trisomy 3 and no molecular markers.       I first reviewed with the patient the natural history and risk stratification of acute myelogenous leukemia.  This patient's AML is clearly not good risk.  In addition, this is possibly secondary to his radiation therapy treatments for his prostate cancer.  With these key features, I am afraid that the probability of long-term disease-free survival is poor with chemotherapy alone and I would recommend for this patient to consider allogeneic transplantation in first complete remission.  I explained to him the trade-offs of consolidation chemotherapy versus allogeneic transplant in first complete remission; in  particular, the early risk of treatment-related mortality versus superior long-term disease-free survival with allogeneic transplant as compared to very low risk of treatment-related mortality with chemotherapy alone, but a very high risk of relapse and poor disease-free survival.       We then talked about how we evaluate and get a patient to transplantation, starting with the pre-transplant evaluation, the hospitalization for the chemotherapy, the post-transplant recovery in both the inpatient and outpatient settings.  We also talked about the most serious and significant complications of transplantation including infections, organ damage, leukemia relapse and khczm-ellwpv-dalg disease.  I spent a little time talking about acute and chronic uhdbb-vhcdft-pwbi disease, its clinical manifestations and treatment with prednisone.      We then talked about donors.  In his case we already know that his sister is a matched donor which could facilitate proceeding with an allogeneic transplant.  Because of his age and associated comorbidities including the radiation to the pelvis, the cardiac arrhythmias and the history of diverticulitis and diabetes, he would in our center receive a reduced-intensity conditioning regimen with the transplant.  He would also be a potential candidate for post-transplant maintenance therapy to reduce the risk of relapse.  Overall, I quoted to the patient a 35-40% chance of long-term survival with the difference in that being split between treatment-related mortality and relapse.       The patient and his wife had the opportunity to ask several questions that were answered to their satisfaction.  As we discussed on the phone today, I agree with the plan of giving him one cycle of consolidation with high-dose cytarabine.  Once he recovers counts from the cytarabine, I would refer him back to us so we can pursue the pre-transplant evaluation with a bone marrow biopsy and organ function  examination in our center.  We will obtain a spinal tap as usual, although your initial one was already negative.  Lastly, I would consider consultation to evaluate this right hip pain that is quite unusual and it has been quite limiting to his movement.  The cause of this has to be elucidated before transplant procedures are initiated.

## 2017-05-31 NOTE — PROGRESS NOTES
Met with patient and wife after new evaluation with Dr Umanzor regarding plan and BMT nurse coordinator role. Provided patient with Andrea Vines's contact information for future questions and plan. Patient's sister is a match per Dr Umanzor. Patient verbalized understanding of provided information.

## 2017-05-31 NOTE — MR AVS SNAPSHOT
"              After Visit Summary   5/31/2017    Norman Real    MRN: [id number]           Patient Information     Date Of Birth          1960        Visit Information        Provider Department      5/31/2017 11:00 AM [facility];  2 [address] CONSULT [facility] Blood and Marrow Transplant        Today's Diagnoses     Visit for counseling    -  1          [facility] ([facility]  [address] [place] [id number]  Phone: [phone]  Clinic Hours:   Monday-Thursday:7am to 7pm   Friday: 7am to 5pm   Weekends and holidays:    8am to noon (in general)  If your fever is 100.5  or greater,   call the clinic.  After hours call the   hospital at [phone] or   [phone]. Ask for the BMT   fellow on-call            Follow-ups after your visit        Who to contact     If you have questions or need follow up information about today's clinic visit or your schedule please contact [facility] BLOOD AND MARROW TRANSPLANT directly at [phone].  Normal or non-critical lab and imaging results will be communicated to you by Milohart, letter or phone within 4 business days after the clinic has received the results. If you do not hear from us within 7 days, please contact the clinic through Diwaneet or phone. If you have a critical or abnormal lab result, we will notify you by phone as soon as possible.  Submit refill requests through [email or web address] or call your pharmacy and they will forward the refill request to us. Please allow 3 business days for your refill to be completed.          Additional Information About Your Visit        Milohart Information     [email or web address] lets you send messages to your doctor, view your test results, renew your prescriptions, schedule appointments and more. To sign up, go to [email or web address] . Click on \"Log in\" on the left side of the screen, which will take you to the Welcome page. Then click on \"Sign up Now\" on the right side of the page.     You will be asked " to enter the access code listed below, as well as some personal information. Please follow the directions to create your username and password.     Your access code is: [email or web address]  Expires: 8/15/2017  6:30 AM     Your access code will  in 90 days. If you need help or a new code, please call your [place] clinic or [phone].        Care EveryWhere ID     This is your Care EveryWhere ID. This could be used by other organizations to access your [place] medical records  [id number]         Blood Pressure from Last 3 Encounters:   17 138/78    Weight from Last 3 Encounters:   17 92.9 kg (204 lb 14.4 oz)              Today, you had the following     No orders found for display       Recent Review Flowsheet Data     There is no flowsheet data to display.               Primary Care Provider    None Specified       No primary provider on file.        Thank you!     Thank you for choosing [facility] BLOOD AND MARROW TRANSPLANT  for your care. Our goal is always to provide you with excellent care. Hearing back from our patients is one way we can continue to improve our services. Please take a few minutes to complete the written survey that you may receive in the mail after your visit with us. Thank you!             Your Updated Medication List - Protect others around you: Learn how to safely use, store and throw away your medicines at www.disposemymeds.org.          This list is accurate as of: 17 11:59 PM.  Always use your most recent med list.                   Brand Name Dispense Instructions for use    aspirin 81 MG tablet      Take by mouth daily       cholecalciferol 1000 UNITS capsule    vitamin  -D     Take 1 capsule by mouth daily       diltiazem 120 MG 24 hr ER beaded capsule    TIAZAC     Take by mouth daily       FLONASE NA          GUAIFENESIN 600/ PO          LIPITOR PO      Take 10 mg by mouth       metFORMIN 500 MG tablet    GLUCOPHAGE     Take 500 mg by mouth daily        MULTI COMPLETE PO          ondansetron 4 MG ODT tab    ZOFRAN-ODT     Take by mouth every 4 hours as needed for nausea       oxyCODONE 5 MG capsule    OXY-IR     Take 5 mg by mouth every 4 hours as needed for moderate to severe pain       PSYLLIUM PO      1 tsp by mouth 2 times daily       zolpidem 10 MG tablet    AMBIEN     Take by mouth nightly as needed for sleep 1/2 tab to one tablet at bedtime

## 2017-05-31 NOTE — NURSING NOTE
Oncology Rooming Note    May 31, 2017 9:08 AM   Norman Real is a 56 year old male who presents for:    Chief Complaint   Patient presents with     RECHECK     New eval with AML here for provider     Initial Vitals: /78  Pulse 82  Temp 97.3  F (36.3  C) (Oral)  Resp 18  Wt 92.9 kg (204 lb 14.4 oz)  SpO2 96% There is no height or weight on file to calculate BMI. There is no height or weight on file to calculate BSA.  Moderate Pain (4) Comment: hip   No LMP for male patient.  Allergies reviewed: Yes  Medications reviewed: Yes    Medications: Medication refills not needed today.  Pharmacy name entered into EPIC: Data Unavailable    Clinical concerns: NA NA was NOT notified.    11 minutes for nursing intake (face to face time)     Marsha De CMA      Unable to do Oncology screening.

## 2017-06-02 NOTE — PROGRESS NOTES
Blood and Marrow Transplant   New Transplant Visit with   Clinical     Norman Salazar Farhan  6/2/2017    With whom do you live:   Wife - Agapito Real    Relocation Requirement:   Norman and his wife live in [place], [place] - this is 4 hr 20 min / 230 miles from the medical center. Patient will be required to relocate for a minimum of 100 days post-transplant.     Diagnosis:   AML    Transplant Type:   Allogeneic    Family Information  Next of Kin:   Agapito Real    Phone Number:   [phone]    Siblings:   Isabelle (sister) - lives in [place], [place]    Children:   Mendy German - daughter; [place], [place]; aware of his diagnosis  Mushtaq Real - son; [place], [place]; aware of his diagnosis    Grandchildren:  Gilmar (9; very close to patient) and Loki (2) - aware of his illness    Employment  Patient was previously employed as a part-time  for [employer]. He is not currently working.    Source of Income:   They have savings and Norman has applied for SSDI - first check will be issued in October 2017.    Spouse/Partner Employed:   Yes - ambulance manager - her boss says that she can be away and maintain her job and HR states she cannot. They will figure this out and then let her know. The plan was always for Agapito to retire when Norman did so they are able to travel together. If her job is not there when she is ready to return she is prepared for this.    Do you have concerns or questions about finances or insurance related to BMT?:   No    Have you completed a health care directive?:   Norman has filled out the from but has not given it to any providers. He and his wife have had conversation about his wishes for medical care if he were unable to share those with providers.     Support:  Is there a network of people who are available to support you and/or your family?:   Yes - although somewhat limited d/t distance to their home.     Education Provided:   BMT packet provided, BMT book provided, caregiver,  local lodging, HCD, and resources    Comments:   Norman comes to his NT appointment with his wife Agapito. They live north of [place], [place] and are aware of the need to stay locally post-transplant. They are also aware that Isabelle could stay at [facility] while she is donating as well - she may choose to stay with her daughter in [place]. They are considering touring [facility] after their appointment today to see what this option is like. We also talked about [facility] apartments as an option for them. This may allow their family to visit more if they have a place to stay. They plan to check their benefits for meals/travel/lodging with their insurance to see if reimbursement would be available for [facility]. Norman and his grand kids stay in touch by Face Time when they are not able to see in other in person. He has a matched sibling as a donor and the medical team believes that he will be ready in 3-4 weeks to begin Work-up. He is going to be hospitalized again next week for more chemo. No other questions or concerns at this time.     Stefany Hilton  [phone] - Pager  6/2/2017

## 2017-06-26 NOTE — PROGRESS NOTES
Pt arrived, ambulatory to BMT clinic.  Accompanied by wife.  PT here for nurse workup visit.  Pt's HT/WT measured and VSS.  PT's calendar review completed and rationale for upcoming exams/consults discussed.  Multiple questions answered re: allo BMT and common complications, engraftment, inpatient routines, neutropenic diet, activity restrictions, infection control.  Pt reports understanding to teaching received.  Pt's sister to be donor for BMT.  Clinic consent discussed and pt's signature obtained.  Labs obtained via PICC line.  Excellent blood return obtained.  UA collected.  Pt to have CT and chest XR after this appt.  PT reports understanding of today's plan of care.  Use of complimentary [facility] shuttle encouraged by this RN.  [facility] and reports knowledge of this shuttle already.  Total time spent with patient is 60 min.

## 2017-06-26 NOTE — NURSING NOTE
"Oncology Rooming Note    June 26, 2017 4:55 PM   Norman Real is a 56 year old male who presents for:    Chief Complaint   Patient presents with     RECHECK     Nurse visit Pre BMT work-up for AML     Initial Vitals: /88  Pulse 95  Temp 98.4  F (36.9  C) (Oral)  Resp 18  Ht 1.729 m (5' 8.07\")  Wt 91 kg (200 lb 11.2 oz)  SpO2 98%  BMI 30.45 kg/m2 Estimated body mass index is 30.45 kg/(m^2) as calculated from the following:    Height as of this encounter: 1.729 m (5' 8.07\").    Weight as of this encounter: 91 kg (200 lb 11.2 oz). Body surface area is 2.09 meters squared.  No Pain (0) Comment: Data Unavailable   No LMP for male patient.  Allergies reviewed: Yes  Medications reviewed: Yes, pt unaware of current abx he is taking. Pt will bring copy of home meds for update tomorrow.     Medications: Medication refills not needed today.  Pharmacy name entered into EPIC: Data Unavailable    Clinical concerns: None, see details of visit in progress note.     60 minutes for nursing intake (face to face time)     Felipe Toscano RN              "

## 2017-06-26 NOTE — MR AVS SNAPSHOT
After Visit Summary   6/26/2017    Norman Real    MRN: [id number]           Patient Information     Date Of Birth          1960        Visit Information        Provider Department      6/26/2017 1:00 PM 1Pete Bmt Nurse [facility] Blood [facility] Trans[facility]        Today's Diagnoses     Acute myeloid leukemia in remission (H)        Personal history of diseases of blood and blood-forming organs              [facility] [place]  [address] [place] [address]  Phone: [phone]  Clinic Hours:   Monday-Thursday:7am to 7pm   Friday: 7am to 5pm   Weekends and holidays:    8am to noon (in general)  If your fever is 100.5  or greater,   call the clinic.  After hours call the   hospital at [phone] or   [phone]. Ask for the BMT   fellow on-call            Follow-ups after your visit        Your next 10 appointments already scheduled     Jun 27, 2017  9:30 AM CDT   CONSULT with Alfred Scherer MD   Radiation Oncology Clinic ([facility])    [facility]  1st Floor  [address] [address] [phone]            Jun 27, 2017 11:00 AM CDT   NM HEART MUGA REST with UUNM3   [facility] [place], Nuclear Medicine ([facility] [place])    [address] [facility] [address] [phone]           Please bring a list of your medicines to the exam. (Include vitamins, minerals and over-the-counter drugs.) You should wear comfortable clothes. Leave your valuables at home. Please bring related prior results and films.  Tell your doctor:   If you are breastfeeding or may be pregnant.   If you have had a barium test within the past few days. Barium may change the results of certain exams.   If you think you may need sedation (medicine to help you relax).  You may eat and drink as normal.  Please call your Imaging Department at your exam site with any questions.            Jun  27, 2017  1:00 PM CDT   PHARM D CONSULT WITH ROOM with  Bmt Pharm D, [facility],  2 119 CONSULT [facility] Blood and Marrow Transplant ([facility])    [address]  [address] [facility] [id number] [phone]            Jun 27, 2017  1:30 PM CDT   BMT NURSE COORD WITH ROOM with  Bmt Nurse Coordinator,  2 119 CONSULT [facility] Blood and Marrow Transplant ([facility])    [address]  [address] [facility] [address] [phone]            Jun 28, 2017 12:30 PM CDT   (Arrive by 12:15 PM)   New Patient Visit with KAVYA Mcneil [facility] Interventional Radiology ([facility])    [address] [place] [address]  [address]st [facility] [address] [phone]            Jun 28, 2017  2:00 PM CDT   FULL PULMONARY FUNCTION with  PFL D   [facility] Pulmonary Function Testing ([facility])    [address] [place] [address]  3rd Floor  [facility] [id number] [phone]            Jun 28, 2017  3:00 PM CDT   RESEARCH WITH ROOM with  Bmt Research Coordinator,  2 119 CONSULT [facility] Blood and Marrow Transplant ([facility])    [address] [place] [address]  [address] [facility] [id number] [phone]              Who to contact     If you have questions or need follow up information about today's clinic visit or your schedule please contact [facility] BLOOD AND MARROW TRANSPLANT directly at [phone].  Normal or non-critical lab and imaging results will be communicated to you by MyChart, letter or phone within 4 business days after the clinic has received the results. If you do not hear from us within 7 days, please contact the clinic through MyChart or phone. If you have a critical or abnormal lab result, we will notify you by phone as soon as possible.  Submit refill requests through "[employer] or call your pharmacy and they will forward the refill request  "to us. Please allow 3 business days for your refill to be completed.          Additional Information About Your Visit        MyChart Information     [employer] lets you send messages to your doctor, view your test results, renew your prescriptions, schedule appointments and more. To sign up, go to [email or web address] . Click on \"Log in\" on the left side of the screen, which will take you to the Welcome page. Then click on \"Sign up Now\" on the right side of the page.     You will be asked to enter the access code listed below, as well as some personal information. Please follow the directions to create your username and password.     Your access code is: [email or web address]  Expires: 8/15/2017  6:30 AM     Your access code will  in 90 days. If you need help or a new code, please call your [facility] clinic or [phone].        Care EveryWhere ID     This is your Care EveryWhere ID. This could be used by other organizations to access your [facility] medical records  [id number]        Your Vitals Were     Pulse Temperature Respirations Height Pulse Oximetry BMI (Body Mass Index)    95 98.4  F (36.9  C) (Oral) 18 1.729 m (5' 8.07\") 98% 30.45 kg/m2       Blood Pressure from Last 3 Encounters:   17 148/88   17 138/78    Weight from Last 3 Encounters:   17 91 kg (200 lb 11.2 oz)   17 92.9 kg (204 lb 14.4 oz)              We Performed the Following     ABO/Rh type and screen     Anti Treponema     CBC with platelets differential     CMV Antibody IgG - BMT recipient     Comprehensive metabolic panel     EBV Capsid Antibody IgG - BMT recipient     HBV HCV HIV WNV by STONEY-BMT recipient     Hepatitis B core antibody-BMT recipient     Hepatitis B Surface Antibody     Hepatitis B surface antigen-BMT recipient     Hepatitis C antibody     Herpes Simplex Virus 1 and 2 IgG     HIV Antigen Antibody Combo - BMT recipient     HTLV I and 2 antibody with reflex-BMT recipient     If Allo: Pre BMT Polymorphism " "Determination Recep. Molecular Diagnostic Lab: (If double cord chava slip, \"Double Cord Transplant\")     INR     Partial thromboplastin time     Routine UA with microscopic     Trypanosoma Cruzi-BMT recipient     Uric acid     Varicella Zoster Virus Antibody IgG          Today's Medication Changes          These changes are accurate as of: 6/26/17  5:03 PM.  If you have any questions, ask your nurse or doctor.               Stop taking these medicines if you haven't already. Please contact your care team if you have questions.     GUAIFENESIN 600/ PO   Stopped by:  1,  Bmt Nurse           PSYLLIUM PO   Stopped by:  1,  Bmt Nurse                    Recent Review Flowsheet Data     BMT Recent Results Latest Ref Rng & Units 6/26/2017    WBC 4.0 - 11.0 10e9/L 6.7    Hemoglobin 13.3 - 17.7 g/dL 8.3(L)    Platelet Count 150 - 450 10e9/L 259    Neutrophils (Absolute) 1.6 - 8.3 10e9/L 4.3    INR 0.86 - 1.14 1.14    Sodium 133 - 144 mmol/L 138    Potassium 3.4 - 5.3 mmol/L 3.8    Chloride 94 - 109 mmol/L 106    Glucose 70 - 99 mg/dL 142(H)    Urea Nitrogen 7 - 30 mg/dL 10    Creatinine 0.66 - 1.25 mg/dL 0.83    Calcium (Total) 8.5 - 10.1 mg/dL 8.4(L)    Protein (Total) 6.8 - 8.8 g/dL 6.8    Albumin 3.4 - 5.0 g/dL 3.3(L)    Alkaline Phosphatase 40 - 150 U/L 82    AST 0 - 45 U/L 18    ALT 0 - 70 U/L 23    MCV 78 - 100 fl 93               Primary Care Provider    None Specified       No primary provider on file.        Equal Access to Services     VAL FLANAGAN : Hadruby Ratliff, joana manning, qavishal kendrick. So [facility] [phone].    ATENCIÓN: Si habla español, tiene a gusman disposición servicios gratuitos de asistencia lingüística. Llame al [phone].    We comply with applicable federal civil rights laws and [place] laws. We do not discriminate on the basis of race, color, national origin, age, disability sex, sexual orientation or gender " identity.            Thank you!     Thank you for choosing [facility] BLOOD AND MARROW TRANSPLANT  for your care. Our goal is always to provide you with excellent care. Hearing back from our patients is one way we can continue to improve our services. Please take a few minutes to complete the written survey that you may receive in the mail after your visit with us. Thank you!             Your Updated Medication List - Protect others around you: Learn how to safely use, store and throw away your medicines at www.disposemymeds.org.          This list is accurate as of: 6/26/17  5:03 PM.  Always use your most recent med list.                   Brand Name Dispense Instructions for use Diagnosis    aspirin 81 MG tablet      Take by mouth daily        cholecalciferol 1000 UNITS capsule    vitamin  -D     Take 1 capsule by mouth daily        cyclobenzaprine 5 MG tablet    FLEXERIL     Flexeril Take 5mg by mouth at bedtime and increase gradually to 1 tablet every 8-12hours as needed for muscle pain/spasm. Max 3 tablets/day        diltiazem 120 MG 24 hr ER beaded capsule    TIAZAC     Take by mouth daily        FLONASE NA           heparin lock flush 10 UNIT/ML Soln injection      3 mLs by Intracatheter route daily In each lumen        LIPITOR PO      Take 10 mg by mouth        metFORMIN 500 MG tablet    GLUCOPHAGE     Take 500 mg by mouth daily        MULTI COMPLETE PO           ondansetron 4 MG ODT tab    ZOFRAN-ODT     Take by mouth every 4 hours as needed for nausea        oxyCODONE 5 MG capsule    OXY-IR     Take 5 mg by mouth every 4 hours as needed for moderate to severe pain        zolpidem 10 MG tablet    AMBIEN     Take by mouth nightly as needed for sleep 1/2 tab to one tablet at bedtime

## 2017-06-27 NOTE — PROGRESS NOTES
Radiation Oncology Consult  Patient comes in for initial consultation in the Radiation Oncology Department at the request of Dr. Umanzor to determine eligibility for TBI prior to transplant.     History of Present Illness:  Norman ramirez is a pleasant 56 year old male in no acute distress and is accompanied by his wife.  He was at a routine appointment and having blood work when he was found to have abnormal counts.  He did have slight fatigue but hadn't thought much about it.  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  He started induction with 7+3 with idarubicin and cytarabine.  Bone marrow after induction was negative but MRD testing showed 0.4% leukemic blasts.  He had one round of consolidation with HiDAC starting on 6/1/17.  His bone marrow after consolidation was clear of any leukemia.  He has a repeat BM and LP on 6/29/17.  He feels good now with just slight fatigue.  He tolerated his chemotherapy very well.    He does have a history of prostate cancer and did receive radiation (see below).  He tolerated radiation very well.    His sister will be his donor.        Previous Radiation:  6,840 cGy prostate/pelvic radiation in 38 fractions of 180 cGy completed on 11/29/2012 at [facility].    Previous Chemotherapy:  As above per HPI.    Implantable Cardiac Device (ICD):  NONE    Medications:  Current Outpatient Prescriptions   Medication     heparin lock flush 10 UNIT/ML SOLN injection     cyclobenzaprine (FLEXERIL) 5 MG tablet     ondansetron (ZOFRAN-ODT) 4 MG ODT tab     oxyCODONE (OXY-IR) 5 MG capsule     Atorvastatin Calcium (LIPITOR PO)     diltiazem (TIAZAC) 120 MG 24 hr ER beaded capsule     Fluticasone Propionate (FLONASE NA)     metFORMIN (GLUCOPHAGE) 500 MG tablet     zolpidem (AMBIEN) 10 MG tablet     Multiple Vitamins-Minerals (MULTI COMPLETE PO)     cholecalciferol (VITAMIN  -D)  1000 UNITS capsule     aspirin 81 MG tablet     No current facility-administered medications for this visit.      Facility-Administered Medications Ordered in Other Visits   Medication     technetium Tc99M labeled red blood cells (UltraTag RBC) radioisotope injection 20-30 mCi       Allergies:   No Known Allergies    Past Medical History:  Past Medical History:   Diagnosis Date     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 05/26/2017     Diabetes (H)      Hypertension      Prostate neoplasm, M0 (H) 2011     Tachycardia        Past Surgical History:  Past Surgical History:   Procedure Laterality Date     CARDIAC SURGERY      ablation       Family History:  family history includes Chronic Obstructive Pulmonary Disease in his mother.        Social History:  Patient is  and lives in [place], [place].  Retired as a  for [employer].  Has 2 children.    Pain Assessment 0-10:  Patient rates pain at a 0.    Review of Symptoms:  Constitutional: Negative for fever, chills, weight loss.  Mild fatigue.   Overall patient feels well.  HENT: Negative for nosebleeds, congestion, sore throat and neck pain.   Respiratory: Negative for cough, hemoptysis, sputum production, shortness of breath and wheezing.   Cardiovascular: Negative for chest pain, palpitations, claudication, leg swelling and PND.   Gastrointestinal: Negative for heartburn, nausea, vomiting, abdominal pain, diarrhea, constipation and blood in stool.   Genitourinary: Negative for dysuria.   Musculoskeletal: Negative for myalgias and joint pain.  He does have some low back and hip pain since his last BM biopsy.  It is improving and he is working with a chiropractor.  Skin: Negative for itching and rash.   Neurological: Negative for dizziness, tingling, weakness and headaches.   Endo/Heme/Allergies: Does not bruise/bleed easily.   Psychiatric/Behavioral: Negative for depression and suicidal ideas. The patient is not nervous/anxious.     Physical  Exam:  Wt 90.7 kg (200 lb)  BMI 30.35 kg/m2  GENERAL: Well-developed, well-nourished, globally oriented.   HEENT: Normocephalic, atraumatic. Oral cavity and oropharynx without mucosal lesions.   NECK: Supple, full range of motion, no palpable cervical or supraclavicular lymphadenopathy.   LUNGS: Clear to auscultation bilaterally.   CARDIOVASCULAR: Regular rate and rhythm without murmur.   ABDOMEN: Soft, nondistended, nontender, no hepatosplenomegaly.  EXTREMITIES: Without cyanosis, clubbing or edema. .   LYMPHATICS: No palpable supraclavicular, axillary, inguinal, femoral adenopathy.   NEUROLOGIC: Alert and oriented.  He walks with a cane and slight limp.    Labs:  CBC RESULTS:   Recent Labs   Lab Test  06/26/17   1455   WBC  6.7   RBC  2.80*   HGB  8.3*   HCT  26.0*   MCV  93   MCH  29.6   MCHC  31.9   RDW  19.2*   PLT  259       Pregnancy status:  Male    All pertinent labs, scans, and test results have been reviewed.      Assessment/Plan:  AML  Patient currently undergoing work-up for sibling NMA transplant per protocol 2015-32 which calls for  cGy.    PS: STAFF RADIATION ONCOLOGIST    I saw the patient who appears to be a suitable candidate for TBI prior to hematopoietic transplant per protocol.  Conditioning for this protocol is nonmyeloablative and includes chemotherapy and total body irradiation given in 1 treatment for a total dose of 200 cGy.  Patient has previous radiation to prostate, however, he will be able tolerate 200 cGy and recommend 200 cGy to be delivered as total body irradiation(TBI) using photons. The treatment will consist of  a single treatment(fraction). The total body will be treated with patient in incumbent position with compensators.    Risks and benefits of TBI were discussed including the potential short term side effects but not limited to nausea, vomiting, mucositis, alopecia, and potential organ damage.  Also discussed potential long term side effects including but not  limited to cataracts, sterility, chronic organ dysfunction, neurological effects, hormone insufficiencies, and secondary malignancies.    Patient and family were given a chance to ask questions.  They seem to understand risks and benefits of radiation conditioning prior to transplant.  Informed consent was obtained.  Simulation and measurements were performed under my direction.    If you have any questions or concerns please do not hesitate to contact me. Patient will be followed by BMT staff after transplant.    Alfred Scherer MD  [phone]    CC  Patient Care Team:  Charanjit Reed MD as BMT Physician (Internal Medicine)  Jose Pitts MD as Referring Physician  CHARANJIT REED

## 2017-06-27 NOTE — MR AVS SNAPSHOT
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)    -  1       Follow-ups after your visit        Your next 10 appointments already scheduled     Jun 27, 2017  1:00 PM CDT   PHARM D CONSULT WITH ROOM with  Bmt Pharm D, RPH,  2 119 CONSULT [facility] Blood and Marrow Transplant ([facility])    [address] [place] [address]  [address] [facility] [address]   [phone]            Jun 27, 2017  1:30 PM CDT   BMT NURSE COORD WITH ROOM with  Bmt Nurse Coordinator,  2 119 CONSULT [facility] Blood and Marrow Transplant ([facility])    [address] [place] [address]  [address] [facility] [address]   [phone]            Jun 28, 2017 12:30 PM CDT   (Arrive by 12:15 PM)   New Patient [facility] with [facility]il [facility] Inter[facility] ([facility])    [address] [place] [address]  1st [facility] [id number] [phone]            Jun 28, 2017  2:00 PM CDT   FULL PULMONARY FUNCTION with  PFL LUZ MARIA   [facility] Pulmonary Function Testing ([facility])    [address] [place] [address]  3rd Floor  [facility] [address]   [phone]            Jun 28, 2017  3:00 PM CDT   RESEARCH WITH ROOM with  Bmt Research Coordinator,  2 119 CONSULT [facility] Blood and Marrow Transplant ([facility])    [address] [place] [address]  [address] [facility] [address]   [phone]            Jun 29, 2017  9:00 AM CDT   [facility] Lab Draw with  [facility] LAB DRAW   [facility] Lab Draw ([facility])    [address] [place] [address]  [address] [facility] [id number] [phone]             2017  9:30 AM CDT   Bone Marrow Biopsy with  BMT MIMA #1   [facility] Blood and Marrow Transplant ([facility] [place])    [address] [facility]  [address] [facility] [address] [phone]              Who to contact     Please call your clinic at [phone] to:    Ask questions about your health    Make or cancel appointments    Discuss your medicines    Learn about your test results    Speak to your doctor   If you have compliments or concerns about an experience at your clinic, or if you wish to file a complaint, please contact [facility] Patient Relations at [phone] or email us at [email or web address]         Additional Information About Your Visit        [employer] Information     [employer] is an electronic gateway that provides easy, online access to your medical records. With [employer], you can request a clinic appointment, read your test results, renew a prescription or communicate with your care team.     To sign up for [employer] visit the website at [email or web address]   You will be asked to enter the access code listed below, as well as some personal information. Please follow the directions to create your username and password.     Your access code is: [email or web address]  Expires: 8/15/2017  6:30 AM     Your access code will  in 90 days. If you need help or a new code, please contact your [facility] or call [phone] for assistance.        Care EveryWhere ID     This is your Care EveryWhere ID. This could be used by other organizations to access your [facility] medical records  [id number]        Your Vitals Were     BMI (Body Mass Index)                   30.35 kg/m2            Blood Pressure from Last 3 Encounters:   17 148/88   17 138/78    Weight from Last 3 Encounters:   17 90.7 kg (200 lb)   17 91 kg (200 lb 11.2 oz)   17 92.9 kg (204 lb 14.4 oz)               Today, you had the following     No orders found for display       Primary Care Provider    None Specified       No primary provider on file.        Equal Access to Services     ASHLYN FLANAGAN : Hadii aad ku hadsimonegeovanna Ratliff, joana rainerjasvirha, raven jain camisherely, vishal kayin hayaanorah almanzajohanna nicolassueal lópez. So [facility] [phone].    ATENCIÓN: Si habla español, tiene a gusman disposición servicios gratuitos de asistencia lingüística. Llame al [phone].    We comply with applicable federal civil rights laws and [place] laws. We do not discriminate on the basis of race, color, national origin, age, disability sex, sexual orientation or gender identity.            Thank you!     Thank you for choosing RADIATION ONCOLOGY CLINIC  for your care. Our goal is always to provide you with excellent care. Hearing back from our patients is one way we can continue to improve our services. Please take a few minutes to complete the written survey that you may receive in the mail after your visit with us. Thank you!             Your Updated Medication List - Protect others around you: Learn how to safely use, store and throw away your medicines at www.disposemymeds.org.          This list is accurate as of: 6/27/17 12:28 PM.  Always use your most recent med list.                   Brand Name Dispense Instructions for use Diagnosis    aspirin 81 MG tablet      Take by mouth daily        cholecalciferol 1000 UNITS capsule    vitamin  -D     Take 1 capsule by mouth daily        cyclobenzaprine 5 MG tablet    FLEXERIL     Flexeril Take 5mg by mouth at bedtime and increase gradually to 1 tablet every 8-12hours as needed for muscle pain/spasm. Max 3 tablets/day        diltiazem 120 MG 24 hr ER beaded capsule    TIAZAC     Take by mouth daily        FLONASE NA           heparin lock flush 10 UNIT/ML Soln injection      3 mLs by Intracatheter route daily In each lumen        LIPITOR PO      Take 10 mg by mouth        metFORMIN  500 MG tablet    GLUCOPHAGE     Take 500 mg by mouth daily        MULTI COMPLETE PO           ondansetron 4 MG ODT tab    ZOFRAN-ODT     Take by mouth every 4 hours as needed for nausea        oxyCODONE 5 MG capsule    OXY-IR     Take 5 mg by mouth every 4 hours as needed for moderate to severe pain        zolpidem 10 MG tablet    AMBIEN     Take by mouth nightly as needed for sleep 1/2 tab to one tablet at bedtime

## 2017-06-27 NOTE — PROGRESS NOTES
Pharmacy Assessment - Pre-Stem Cell Transplant    Assessments & Recommendations:  1) Hold metformin and atorvastatin during the acute phase of transplant  2) Hold MVI around chemotherapy  3) Ursodiol for VOD prophylaxis    History of Present Illness:  Norman Real is a 56 year old year old male diagnosed with AML.  He has been treated with 7+3.  He is now being work up for NMA allo HCT Stem Cell Transplant on protocol MT 2015-32, which utilizes Cy?Flu/TBI as a conditioning regimen.    Pertinent labs/tests:  Viral Testing:  CMV(+) / HSV1(+) / EBV(+)  Ejection Fraction: _____% (6/27/17)  QTc: ______msec (pending date)    Weights:   Wt Readings from Last 3 Encounters:   06/27/17 90.7 kg (200 lb)   06/26/17 91 kg (200 lb 11.2 oz)   05/31/17 92.9 kg (204 lb 14.4 oz)   Ideal body weight: 68.6 kg (151 lb 2.5 oz)  Adjusted ideal body weight: 77.4 kg (170 lb 11.1 oz)  % IBW:  132  There is no height or weight on file to calculate BMI.    Primary BMT Physician: Dr. Umanzor  BMT RN Coordinator:  Janie Robles    Past Medical History:  Past Medical History:   Diagnosis Date     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 05/26/2017     Diabetes (H)      Hypertension      Prostate neoplasm, M0 (H) 2011     Tachycardia        Medication Allergies:  No Known Allergies    Current Medications (pre-admit):  Current Outpatient Prescriptions   Medication Sig Dispense Refill     heparin lock flush 10 UNIT/ML SOLN injection 3 mLs by Intracatheter route daily In each lumen       cyclobenzaprine (FLEXERIL) 5 MG tablet Flexeril Take 5mg by mouth at bedtime and increase gradually to 1 tablet every 8-12hours as needed for muscle pain/spasm. Max 3 tablets/day       ondansetron (ZOFRAN-ODT) 4 MG ODT tab Take by mouth every 4 hours as needed for nausea       oxyCODONE (OXY-IR) 5 MG capsule Take 5 mg by mouth every 4 hours as needed for moderate to severe pain       Atorvastatin Calcium (LIPITOR PO) Take 10 mg by mouth       diltiazem  (TIAZAC) 120 MG 24 hr ER beaded capsule Take by mouth daily       Fluticasone Propionate (FLONASE NA)        metFORMIN (GLUCOPHAGE) 500 MG tablet Take 500 mg by mouth daily       zolpidem (AMBIEN) 10 MG tablet Take by mouth nightly as needed for sleep 1/2 tab to one tablet at bedtime       Multiple Vitamins-Minerals (MULTI COMPLETE PO)        cholecalciferol (VITAMIN  -D) 1000 UNITS capsule Take 1 capsule by mouth daily       aspirin 81 MG tablet Take by mouth daily         Herbal Medication/Nutritional Supplements:  Swamp tea, he will stop drinking the tea while neutropenic     Smoking/Past Drug Use:  Previous smoker (1 ppd) now quits several years ago. Now chews gum.      Nausea/Vomiting, Pain, or other issues:  No issues, states ondansetron has worked well, and uses prune juice for constipation and issues with his diverticulitis. Pain issues -  Hip post bx states having some nerve damage but slightly improved. He takes oxycodone as needed.     Summary:  I met with Norman Real and his wife for approximately 30 minutes.  We discussed his current and transplant medications including the conditioning chemotherapy, antiemetics prophylactic antibiotics, immunosuppression, GVHD medications and miscellaneous meds (filgrastim, ursodiol, MgO, amlodipine and vaccinations).

## 2017-06-27 NOTE — MR AVS SNAPSHOT
After Visit Summary   6/27/2017    Norman Real    MRN: [id number]           Patient Information     Date Of Birth          1960        Visit Information        Provider Department      6/27/2017 1:00 PM D, Pete Bmt Pharm, RPH;  2 119 CONSULT  [facility] Blood and Marrow Transplant        Today's Diagnoses     Acute myeloid leukemia in remission (H)        Personal history of diseases of blood and blood-forming organs              [facility] ([facility])  [address] [place] [address]  Phone: [phone]  Clinic Hours:   Monday-Thursday:7am to 7pm   Friday: 7am to 5pm   Weekends and holidays:    8am to noon (in general)  If your fever is 100.5  or greater,   call the clinic.  After hours call the   hospital at [phone] or   [phone]. Ask for the BMT   fellow on-call            Follow-ups after your visit        Your next 10 appointments already scheduled     Jun 28, 2017 12:30 PM CDT   (Arrive by 12:15 PM)   New Patient Visit with KAVYA Mcneil   [facility] Interventional Radiology ([facility])    [address] [place] [address]  1st [facility] [id number] [phone]            Jun 28, 2017  2:00 PM CDT   FULL PULMONARY FUNCTION with  PFL LUZ MARIA   [facility] Pulmonary Function Testing ([facility])    [address] [place] [address]  3rd [facility] [address]   [phone]            Jun 28, 2017  3:00 PM CDT   RESEARCH WITH ROOM with  Bmt Research Coordinator,  2 119 CONSULT [facility] Blood and Marrow Transplant ([facility])    [address] [place] [address]  2nd [facility] [address]   [phone]            Jun 29, 2017  9:00 AM CDT   [facility] Lab Draw with  [facility] LAB DRAW   [facility] Lab Draw ([facility])    [address] [place] [address]   [phone]            Jun 29, 2017  9:30 AM CDT   Bone  "Marrow Biopsy with  BMT MIMA #1, UU BONE MARROW BIOPSY   [facility] Blood and Marrow Transplant ([facility])    [address] [facility]  2nd [facility] [address]   [phone]            Jun 29, 2017 10:30 AM CDT   Lumbar Puncture with  BMT MIMA #1   [facility] Blood and Marrow Transplant ([facility])    [address] [address]   [phone]            Jun 29, 2017 11:30 AM CDT   BMT Nurse Visit with  Bmt Nurse 1   [facility] Blood and Marrow Transplant ([facility])    [address] [address]   [phone]            Jul 05, 2017  9:30 AM CDT   (Arrive by 9:15 AM)   BMT SW PSA with LEANDRO Yeboah   [facility] Blood and Marrow Transplant ([facility])    [address] [id number] [phone]              Who to contact     If you have questions or need follow up information about today's clinic visit or your schedule please contact [facility] BLOOD AND MARROW TRANSPLANT directly at [phone].  Normal or non-critical lab and imaging results will be communicated to you by [employer], letter or phone within 4 business days after the clinic has received the results. If you do not hear from us within 7 days, please contact the clinic through [employer] or phone. If you have a critical or abnormal lab result, we will notify you by phone as soon as possible.  Submit refill requests through [employer] or call your pharmacy and they will forward the refill request to us. Please allow 3 business days for your refill to be completed.          Additional Information About Your Visit        [employer] Information     [employer] lets you send messages to your doctor, view your test results, renew your prescriptions, schedule appointments and more. To sign up, go to [email or web address] . Click on \"Log in\" on the left side " "of the screen, which will take you to the Welcome page. Then click on \"Sign up Now\" on the right side of the page.     You will be asked to enter the access code listed below, as well as some personal information. Please follow the directions to create your username and password.     Your access code is: [email or web address]  Expires: 8/15/2017  6:30 AM     Your access code will  in 90 days. If you need help or a new code, please call your [place] clinic or [phone].        Care EveryWhere ID     This is your Care EveryWhere ID. This could be used by other organizations to access your [place] medical records  [id number]         Blood Pressure from Last 3 Encounters:   17 148/88   17 138/78    Weight from Last 3 Encounters:   17 90.7 kg (200 lb)   17 91 kg (200 lb 11.2 oz)   17 92.9 kg (204 lb 14.4 oz)              We Performed the Following     Consult with PharmD        Recent Review Flowsheet Data     BMT Recent Results Latest Ref Rng & Units 2017    WBC 4.0 - 11.0 10e9/L 6.7    Hemoglobin 13.3 - 17.7 g/dL 8.3(L)    Platelet Count 150 - 450 10e9/L 259    Neutrophils (Absolute) 1.6 - 8.3 10e9/L 4.3    INR 0.86 - 1.14 1.14    Sodium 133 - 144 mmol/L 138    Potassium 3.4 - 5.3 mmol/L 3.8    Chloride 94 - 109 mmol/L 106    Glucose 70 - 99 mg/dL 142(H)    Urea Nitrogen 7 - 30 mg/dL 10    Creatinine 0.66 - 1.25 mg/dL 0.83    Calcium (Total) 8.5 - 10.1 mg/dL 8.4(L)    Protein (Total) 6.8 - 8.8 g/dL 6.8    Albumin 3.4 - 5.0 g/dL 3.3(L)    Alkaline Phosphatase 40 - 150 U/L 82    AST 0 - 45 U/L 18    ALT 0 - 70 U/L 23    MCV 78 - 100 fl 93               Primary Care Provider    None Specified       No primary provider on file.        Equal Access to Services     ASHLYN LÓPEZ: Hadii sapna Ratliff, joana manning, raven mchugh, vishal lópez. [facility] [phone].    ATENCIÓN: Si habla español, tiene a gusman disposición servicios " ammon de asistencia lingüística. Leyla pepe [phone].    We comply with applicable federal civil rights laws and [place] laws. We do not discriminate on the basis of race, color, national origin, age, disability sex, sexual orientation or gender identity.            Thank you!     Thank you for choosing [facility] BLOOD AND MARROW TRANSPLANT  for your care. Our goal is always to provide you with excellent care. Hearing back from our patients is one way we can continue to improve our services. Please take a few minutes to complete the written survey that you may receive in the mail after your visit with us. Thank you!             Your Updated Medication List - Protect others around you: Learn how to safely use, store and throw away your medicines at www.disposemymeds.org.          This list is accurate as of: 6/27/17  2:18 PM.  Always use your most recent med list.                   Brand Name Dispense Instructions for use Diagnosis    aspirin 81 MG tablet      Take by mouth daily        cholecalciferol 1000 UNITS capsule    vitamin  -D     Take 1 capsule by mouth daily        cyclobenzaprine 5 MG tablet    FLEXERIL     Flexeril Take 5mg by mouth at bedtime and increase gradually to 1 tablet every 8-12hours as needed for muscle pain/spasm. Max 3 tablets/day        diltiazem 120 MG 24 hr ER beaded capsule    TIAZAC     Take by mouth daily        FLONASE NA           heparin lock flush 10 UNIT/ML Soln injection      3 mLs by Intracatheter route daily In each lumen        LIPITOR PO      Take 10 mg by mouth        metFORMIN 500 MG tablet    GLUCOPHAGE     Take 500 mg by mouth daily        MULTI COMPLETE PO           ondansetron 4 MG ODT tab    ZOFRAN-ODT     Take by mouth every 4 hours as needed for nausea        oxyCODONE 5 MG capsule    OXY-IR     Take 5 mg by mouth every 4 hours as needed for moderate to severe pain        zolpidem 10 MG tablet    AMBIEN     Take by mouth nightly as needed for sleep 1/2 tab to  one tablet at bedtime

## 2017-06-27 NOTE — MR AVS SNAPSHOT
After Visit Summary   6/27/2017    Norman Real    MRN: [id number]           Patient Information     Date Of Birth          1960        Visit Information        Provider Department      6/27/2017 1:30 PM Coordinator, Pete Bmt Nurse;  2 119 CONSULT [facility] Blood and Marrow Transplant        Today's Diagnoses     Acute myeloid leukemia in remission (H)        Personal history of diseases of blood and blood-forming organs              [facility] ([facility])  [address] [place] [address]  Phone: [phone]  Clinic Hours:   Monday-Thursday:7am to 7pm   Friday: 7am to 5pm   Weekends and holidays:    8am to noon (in general)  If your fever is 100.5  or greater,   call the clinic.  After hours call the   hospital at [phone] or   [phone]. Ask for the BMT   fellow on-call            Follow-ups after your visit        Your next 10 appointments already scheduled     Jun 29, 2017 11:30 AM CDT   BMT Nurse Visit with  Bmt Nurse [address] [place] [address] Blood and Marrow Transplant ([facility])    [address] [place] [id number] [phone]            Jun 29, 2017  3:00 PM CDT   (Arrive by 2:45 PM)   NEW LUNG NODULE with Tim Ahn MD   [facility]    [address] [place] [id number] [phone]            Jun 30, 2017   Procedure with Tim Ahn MD   [facility] End[facility] ([facility])    [address] [facility] [id number] [phone]           The [facility] is located on the corner of [facility] and [facility] on the [facility]. It is easily accessible from virtually any point in the [place] area, via [address].            Jul 05, 2017  9:30 AM CDT   (Arrive by 9:15 AM)   BMT SW PSA with LEANDRO Yeboah,  2  "114 CONSULT [facility] Blood and Marrow Transplant ([facility])    [address] [facility]  2nd [facility] [address] [phone]            Jul 05, 2017 11:00 AM CDT   BMT Workup Visit with UC BMT DOM   [facility] Blood and Marrow Transplant ([facility])    [address] [id number] [phone]              Future tests that were ordered for you today     Open Future Orders        Priority Expected Expires Ordered    BRONCHOSCOPY W LAVAGE Routine 6/28/2017 6/29/2018 6/28/2017            Who to contact     If you have questions or need follow up information about today's clinic visit or your schedule please contact [facility] BLOOD AND MARROW TRANSPLANT directly at [phone].  Normal or non-critical lab and imaging results will be communicated to you by [employer], letter or phone within 4 business days after the clinic has received the results. If you do not hear from us within 7 days, please contact the clinic through [employer] or phone. If you have a critical or abnormal lab result, we will notify you by phone as soon as possible.  Submit refill requests through [employer] or call your pharmacy and they will forward the refill request to us. Please allow 3 business days for your refill to be completed.          Additional Information About Your Visit        [employer] Information     [employer] lets you send messages to your doctor, view your test results, renew your prescriptions, schedule appointments and more. To sign up, go to [email or web address] . Click on \"Log in\" on the left side of the screen, which will take you to the Welcome page. Then click on \"Sign up Now\" on the right side of the page.     You will be asked to enter the access code listed below, as well as some personal information. Please follow the directions to create your username and password.     Your access code is: [email or web address]  Expires: 8/15/2017  6:30 " AM     Your access code will  in 90 days. If you need help or a new code, please call your [place] clinic or [phone].        Care EveryWhere ID     This is your Care EveryWhere ID. This could be used by other organizations to access your [place] medical records  [id number]         Blood Pressure from Last 3 Encounters:   17 148/89   17 145/84   17 148/88    Weight from Last 3 Encounters:   17 92 kg (202 lb 14.4 oz)   17 90.7 kg (200 lb)   17 91 kg (200 lb 11.2 oz)              We Performed the Following     BMT Coordinator        Recent Review Flowsheet Data     BMT Recent Results Latest Ref Rng & Units 2017    WBC 4.0 - 11.0 10e9/L 6.7 5.4    Hemoglobin 13.3 - 17.7 g/dL 8.3(L) 8.5(L)    Platelet Count 150 - 450 10e9/L 259 223    Neutrophils (Absolute) 1.6 - 8.3 10e9/L 4.3 3.5    INR 0.86 - 1.14 1.14 -    Sodium 133 - 144 mmol/L 138 -    Potassium 3.4 - 5.3 mmol/L 3.8 -    Chloride 94 - 109 mmol/L 106 -    Glucose 70 - 99 mg/dL 142(H) -    Urea Nitrogen 7 - 30 mg/dL 10 -    Creatinine 0.66 - 1.25 mg/dL 0.83 -    Calcium (Total) 8.5 - 10.1 mg/dL 8.4(L) -    Protein (Total) 6.8 - 8.8 g/dL 6.8 -    Albumin 3.4 - 5.0 g/dL 3.3(L) -    Alkaline Phosphatase 40 - 150 U/L 82 -    AST 0 - 45 U/L 18 -    ALT 0 - 70 U/L 23 -    MCV 78 - 100 fl 93 93               Primary Care Provider    None Specified       No primary provider on file.        Equal Access to Services     ASHLYN FLANAGAN : Greg Ratliff, joana manning, vishal thao. [facility] [phone].    ATENCIÓN: Si habla español, tiene a gusman disposición servicios gratuitos de asistencia lingüística. Llame al [phone].    We comply with applicable federal civil rights laws and [place] laws. We do not discriminate on the basis of race, color, national origin, age, disability sex, sexual orientation or gender identity.            Thank  you!     Thank you for choosing [facility] BLOOD AND MARROW TRANSPLANT  for your care. Our goal is always to provide you with excellent care. Hearing back from our patients is one way we can continue to improve our services. Please take a few minutes to complete the written survey that you may receive in the mail after your visit with us. Thank you!             Your Updated Medication List - Protect others around you: Learn how to safely use, store and throw away your medicines at www.disposemymeds.org.          This list is accurate as of: 6/27/17 11:59 PM.  Always use your most recent med list.                   Brand Name Dispense Instructions for use Diagnosis    aspirin 81 MG tablet      Take by mouth daily        cholecalciferol 1000 UNITS capsule    vitamin  -D     Take 2 capsules by mouth daily        cyclobenzaprine 5 MG tablet    FLEXERIL     Flexeril Take 5mg by mouth at bedtime and increase gradually to 1 tablet every 8-12hours as needed for muscle pain/spasm. Max 3 tablets/day        diltiazem 120 MG 24 hr ER beaded capsule    TIAZAC     Take by mouth daily        FLONASE NA           heparin lock flush 10 UNIT/ML Soln injection      3 mLs by Intracatheter route daily In each lumen        LIPITOR PO      Take 10 mg by mouth        metFORMIN 500 MG tablet    GLUCOPHAGE     Take 500 mg by mouth daily        MULTI COMPLETE PO           ondansetron 4 MG ODT tab    ZOFRAN-ODT     Take by mouth every 4 hours as needed for nausea        oxyCODONE 5 MG capsule    OXY-IR     Take 5 mg by mouth every 4 hours as needed for moderate to severe pain        zolpidem 10 MG tablet    AMBIEN     Take by mouth nightly as needed for sleep 1/2 tab to one tablet at bedtime

## 2017-06-27 NOTE — LETTER
6/27/2017       RE: Norman Real  PO   [facility] [address]     Dear Colleague,    Thank you for referring your patient, Norman Real, to the RADIATION ONCOLOGY CLINIC. Please see a copy of my visit note below.    Radiation Oncology Consult  Patient comes in for initial consultation in the Radiation Oncology Department at the request of Dr. Umanzor to determine eligibility for TBI prior to transplant.     History of Present Illness:  Norman ramirez is a pleasant 56 year old male in no acute distress and is accompanied by his wife.  He was at a routine appointment and having blood work when he was found to have abnormal counts.  He did have slight fatigue but hadn't thought much about it.  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  He started induction with 7+3 with idarubicin and cytarabine.  Bone marrow after induction was negative but MRD testing showed 0.4% leukemic blasts.  He had one round of consolidation with HiDAC starting on 6/1/17.  His bone marrow after consolidation was clear of any leukemia.  He has a repeat BM and LP on 6/29/17.  He feels good now with just slight fatigue.  He tolerated his chemotherapy very well.    He does have a history of prostate cancer and did receive radiation (see below).  He tolerated radiation very well.    His sister will be his donor.        Previous Radiation:  6,840 cGy prostate/pelvic radiation in 38 fractions of 180 cGy completed on 11/29/2012 at [facility].    Previous Chemotherapy:  As above per HPI.    Implantable Cardiac Device (ICD):  NONE    Medications:  Current Outpatient Prescriptions   Medication     heparin lock flush 10 UNIT/ML SOLN injection     cyclobenzaprine (FLEXERIL) 5 MG tablet     ondansetron (ZOFRAN-ODT) 4 MG ODT tab     oxyCODONE (OXY-IR) 5 MG capsule     Atorvastatin Calcium (LIPITOR PO)     diltiazem (TIAZAC) 120 MG  24 hr ER beaded capsule     Fluticasone Propionate (FLONASE NA)     metFORMIN (GLUCOPHAGE) 500 MG tablet     zolpidem (AMBIEN) 10 MG tablet     Multiple Vitamins-Minerals (MULTI COMPLETE PO)     cholecalciferol (VITAMIN  -D) 1000 UNITS capsule     aspirin 81 MG tablet     No current facility-administered medications for this visit.      Facility-Administered Medications Ordered in Other Visits   Medication     technetium Tc99M labeled red blood cells (UltraTag RBC) radioisotope injection 20-30 mCi       Allergies:   No Known Allergies    Past Medical History:  Past Medical History:   Diagnosis Date     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 05/26/2017     Diabetes (H)      Hypertension      Prostate neoplasm, M0 (H) 2011     Tachycardia        Past Surgical History:  Past Surgical History:   Procedure Laterality Date     CARDIAC SURGERY      ablation       Family History:  family history includes Chronic Obstructive Pulmonary Disease in his mother.        Social History:  Patient is  and lives in [place], [place].  Retired as a  for [employer].  Has 2 children.    Pain Assessment 0-10:  Patient rates pain at a 0.    Review of Symptoms:  Constitutional: Negative for fever, chills, weight loss.  Mild fatigue.   Overall patient feels well.  HENT: Negative for nosebleeds, congestion, sore throat and neck pain.   Respiratory: Negative for cough, hemoptysis, sputum production, shortness of breath and wheezing.   Cardiovascular: Negative for chest pain, palpitations, claudication, leg swelling and PND.   Gastrointestinal: Negative for heartburn, nausea, vomiting, abdominal pain, diarrhea, constipation and blood in stool.   Genitourinary: Negative for dysuria.   Musculoskeletal: Negative for myalgias and joint pain.  He does have some low back and hip pain since his last BM biopsy.  It is improving and he is working with a chiropractor.  Skin: Negative for itching and rash.   Neurological: Negative for  dizziness, tingling, weakness and headaches.   Endo/Heme/Allergies: Does not bruise/bleed easily.   Psychiatric/Behavioral: Negative for depression and suicidal ideas. The patient is not nervous/anxious.     Physical Exam:  Wt 90.7 kg (200 lb)  BMI 30.35 kg/m2  GENERAL: Well-developed, well-nourished, globally oriented.   HEENT: Normocephalic, atraumatic. Oral cavity and oropharynx without mucosal lesions.   NECK: Supple, full range of motion, no palpable cervical or supraclavicular lymphadenopathy.   LUNGS: Clear to auscultation bilaterally.   CARDIOVASCULAR: Regular rate and rhythm without murmur.   ABDOMEN: Soft, nondistended, nontender, no hepatosplenomegaly.  EXTREMITIES: Without cyanosis, clubbing or edema. .   LYMPHATICS: No palpable supraclavicular, axillary, inguinal, femoral adenopathy.   NEUROLOGIC: Alert and oriented.  He walks with a cane and slight limp.    Labs:  CBC RESULTS:   Recent Labs   Lab Test  06/26/17   1455   WBC  6.7   RBC  2.80*   HGB  8.3*   HCT  26.0*   MCV  93   MCH  29.6   MCHC  31.9   RDW  19.2*   PLT  259       Pregnancy status:  Male    All pertinent labs, scans, and test results have been reviewed.      Assessment/Plan:  AML  Patient currently undergoing work-up for sibling NMA transplant per protocol 2015-32 which calls for  cGy.    PS: STAFF RADIATION ONCOLOGIST    I saw the patient who appears to be a suitable candidate for TBI prior to hematopoietic transplant per protocol.  Conditioning for this protocol is nonmyeloablative and includes chemotherapy and total body irradiation given in 1 treatment for a total dose of 200 cGy.  Patient has previous radiation to prostate, however, he will be able tolerate 200 cGy and recommend 200 cGy to be delivered as total body irradiation(TBI) using photons. The treatment will consist of  a single treatment(fraction). The total body will be treated with patient in incumbent position with compensators.    Risks and benefits of TBI were  discussed including the potential short term side effects but not limited to nausea, vomiting, mucositis, alopecia, and potential organ damage.  Also discussed potential long term side effects including but not limited to cataracts, sterility, chronic organ dysfunction, neurological effects, hormone insufficiencies, and secondary malignancies.    Patient and family were given a chance to ask questions.  They seem to understand risks and benefits of radiation conditioning prior to transplant.  Informed consent was obtained.  Simulation and measurements were performed under my direction.    If you have any questions or concerns please do not hesitate to contact me. Patient will be followed by BMT staff after transplant.    Alfred Scherer MD  [phone]    CC  Patient Care Team:  Charanjit Reed MD as BMT Physician (Internal Medicine)  Jose Pitts MD as Referring Physician  CHARANJIT REED      Again, thank you for allowing me to participate in the care of your patient.      Sincerely,    Alfred Scherre MD

## 2017-06-28 PROBLEM — R91.8 PULMONARY NODULES: Status: ACTIVE | Noted: 2017-01-01

## 2017-06-28 PROBLEM — C92.00 AML (ACUTE MYELOGENOUS LEUKEMIA) (H): Status: ACTIVE | Noted: 2017-01-01

## 2017-06-28 NOTE — LETTER
6/28/2017       RE: Norman Real  PO   [facility] [address]     Dear Colleague,    Thank you for referring your patient, Norman Real, to the [facility] INTERVENTIONAL RADIOLOGY at [facility]. Please see a copy of my visit note below.    First Name: Norman  Age: 56 year old   Referring Physician: Dr. Umanzor   REASON FOR REFERRAL: Education and evaluation for tunneled catheter placement  Patient is undergoing w/u for allogeneic BMT using  related donor (sister)    HPI:  This is a pt with a PMH of prostate cancer in 2011, type 2 diabetes, and hypertension.  He was at a routine appointment and having blood work when he was found to have abnormal counts.  He did have slight fatigue but hadn't thought much about it.  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  He started induction with 7+3 with idarubicin and cytarabine.  Bone marrow after induction was negative but MRD testing showed 0.4% leukemic blasts.  He had one round of consolidation with HiDAC starting on 6/1/17.  His bone marrow after consolidation was clear of any leukemia.  He has a repeat BM and LP on 6/29/17. He is now undergoing work-up for allogeneic BMT, using a sister as the donor.    LINE HX:  1. April 2017 to present:  Left arm PICC.  No problems with the functioning of the PICC.    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 05/26/2017     Diabetes (H)      Hypertension      Prostate cancer (H) 2011    had radiatoin therapy     Tachycardia      PAST SURGICAL HISTORY:   Past Surgical History:   Procedure Laterality Date     CARDIAC SURGERY      ablation     FAMILY HISTORY:   Family History   Problem Relation Age of Onset     Chronic Obstructive Pulmonary Disease Mother      SOCIAL HISTORY:   Social History   Substance Use  Topics     Smoking status: Former Smoker     Quit date: 5/31/2010     Smokeless tobacco: Not on file     Alcohol use No     PROBLEM LIST:   Patient Active Problem List    Diagnosis Date Noted     AML (acute myelogenous leukemia) (H) 06/28/2017     Priority: Medium     Pulmonary nodules 06/28/2017     Priority: Medium     MEDICATIONS:   Prescription Medications as of 6/29/2017             ACYCLOVIR PO Take 400 mg by mouth 2 times daily    LOSARTAN POTASSIUM PO Take 50 mg by mouth daily    heparin lock flush 10 UNIT/ML SOLN injection 3 mLs by Intracatheter route daily In each lumen    cyclobenzaprine (FLEXERIL) 5 MG tablet Flexeril Take 5mg by mouth at bedtime and increase gradually to 1 tablet every 8-12hours as needed for muscle pain/spasm. Max 3 tablets/day    ondansetron (ZOFRAN-ODT) 4 MG ODT tab Take by mouth every 4 hours as needed for nausea    oxyCODONE (OXY-IR) 5 MG capsule Take 5 mg by mouth every 4 hours as needed for moderate to severe pain    Atorvastatin Calcium (LIPITOR PO) Take 10 mg by mouth    diltiazem (TIAZAC) 120 MG 24 hr ER beaded capsule Take by mouth daily    Fluticasone Propionate (FLONASE NA)     metFORMIN (GLUCOPHAGE) 500 MG tablet Take 500 mg by mouth daily    zolpidem (AMBIEN) 10 MG tablet Take by mouth nightly as needed for sleep 1/2 tab to one tablet at bedtime    Multiple Vitamins-Minerals (MULTI COMPLETE PO)     cholecalciferol (VITAMIN  -D) 1000 UNITS capsule Take 2 capsules by mouth daily     aspirin 81 MG tablet Take by mouth daily      Facility Administered Medications as of 6/29/2017             midazolam (VERSED) injection 2 mg Inject 2 mLs (2 mg) into the vein once        ALLERGIES:  No Known Allergies  VITALS: /84  Pulse 85  SpO2 99%    ROS:  Answers for HPI/ROS submitted by the patient on 6/28/2017     Physical Examination: Vital signs are reviewed and they are stable  Constitutional: Pleasant, middle aged gentleman, in no acute physical distress, came with his wife,  Agapito to the appt  Neck: Neck supple. No adenopathy  Musculoskeletal: he is walking with a cane, he states that after the last BM bx he had pain down the back of his leg and has been limping since, but it is getting better  Skin: no suspicious lesions or rashes  Neurologic: negative  Psychiatric: affect normal/bright and mentation appears normal.    RESULTS:  BMP RESULTS:  Lab Results   Component Value Date     06/26/2017    POTASSIUM 3.8 06/26/2017    CHLORIDE 106 06/26/2017    CO2 25 06/26/2017    ANIONGAP 7 06/26/2017     (H) 06/26/2017    BUN 10 06/26/2017    CR 0.83 06/26/2017    GFRESTIMATED >90  Non  GFR Calc   06/26/2017    GFRESTBLACK >90   GFR Calc   06/26/2017    SANDRA 8.4 (L) 06/26/2017        CBC RESULTS:  Lab Results   Component Value Date    WBC 5.4 06/29/2017    RBC 2.88 (L) 06/29/2017    HGB 8.5 (L) 06/29/2017    HCT 26.9 (L) 06/29/2017    MCV 93 06/29/2017    MCH 29.5 06/29/2017    MCHC 31.6 06/29/2017    RDW 20.2 (H) 06/29/2017     06/29/2017       INR/PTT:  Lab Results   Component Value Date    INR 1.14 06/26/2017    PTT 30 06/26/2017     NOTE:  Remove the PICC at the end of the procedure  Hold the metformin the morning of the procedure as he will not be eating that morning    ASSESSMENT/PLAN:  Type of catheter: 9.5 Fr.  Double lumen tunneled power Cooper     Preferred Location: Right  Internal Jugular Vein     Platelet count is   Lab Results   Component Value Date     06/26/2017    , and coagulation factors are   Lab Results   Component Value Date    INR 1.14 06/26/2017    ,  Lab Results   Component Value Date    PTT 30 06/26/2017   . The patient is at low risk for bleeding during the procedure.    PROVIDER NOTE:  The tunneled catheter placement procedure and its risks including but not limited to bleeding, infection, fibrin sheath formation and blood clots was explained to Norman and his wife, Agapito.    CONSENT: Affirmation of informed  written consent was not obtained.     INSTRUCTIONS/GUIDELINES:   Eating and drinking restriction guidelines were reviewed., Anti-bacterial scrub was given and instructions for its use were reviewed to decrease the risk of infection on the day of the procedure., I explained the expected length of time the tunneled catheter could remain in place., I explained that when they went to the [facility] they would be taught about exit site dressing care, flushing of the lumens and how to care for the catheter when bathing., The role of Interventional Radiology was reviewed. and The principles of infection control were reviewed.     30 minutes was spent with Norman and his wife, Agapito.  20 minutes was spent in counseling.    CC  Patient Care Team:  Charanjit Umanzor MD as BMT Physician (Internal Medicine)  Jose Pitts MD as Referring Physician    Again, thank you for allowing me to participate in the care of your patient.    KAVYA Rivas CNS

## 2017-06-28 NOTE — MR AVS SNAPSHOT
After Visit Summary   6/28/2017    Norman Real    MRN: [id number]           Patient Information     Date Of Birth          1960        Visit Information        Provider Department      6/28/2017 12:00 PM 1, Pete Bmt Nurse [facility] Blood and Marrow Transplant        Today's Diagnoses     AML (acute myelogenous leukemia) (H)    -  1          [facility] ([facility])  [address] [place] [address]  Phone: [phone]  Clinic Hours:   Monday-Thursday:7am to 7pm   Friday: 7am to 5pm   Weekends and holidays:    8am to noon (in general)  If your fever is 100.5  or greater,   call the clinic.  After hours call the   hospital at [phone] or   [phone]. Ask for the BMT   fellow on-call            Follow-ups after your visit        Your next 10 appointments already scheduled     Jun 28, 2017 12:00 PM CDT   BMT Nurse Visit with  Bmt Nurse 1   [facility] Blood and Marrow Transplant ([facility])    [address] [place] [address]  2nd [facility] [id number] [phone]            Jun 28, 2017 12:30 PM CDT   (Arrive by 12:15 PM)   New Patient Visit with KAVYA Mcneil   [facility] Interventional Radiology ([facility])    [address] [place] [address]  1st [facility] [id number] [phone]            Jun 28, 2017  2:00 PM CDT   FULL PULMONARY FUNCTION with  PFL D   [facility] Pulmonary Function Testing ([facility])    [address] [place] [address]  3rd [facility] [address]   [phone]            Jun 28, 2017  3:00 PM CDT   RESEARCH WITH ROOM with  Bmt Research Coordinator,  2 119 CONSULT RM   [facility] Blood and Marrow Transplant ([facility])    [address] [place] [address]  2nd [facility] [id number] [phone]            Jun 29, 2017  9:00 AM CDT   M[facility]ic Lab Draw with  MASONIC LAB DRAW   [facility] Masonic Lab Draw ([facility]  "Surgery Center)    [address] [address]   [phone]            Jun 29, 2017  9:30 AM CDT   Bone Marrow Biopsy with  BMT MIMA #1, UU BONE MARROW BIOPSY   [facility] Blood and Marrow Transplant ([facility])    [address] [place] [id number]   [phone]            Jun 29, 2017 10:30 AM CDT   Lumbar Puncture with  BMT MIMA #1   [facility] Blood and Marrow Transplant ([facility])    [address] [place] [address]   [phone]            Jun 29, 2017 11:30 AM CDT   BMT Nurse Visit with  Bmt Nurse 1   [facility] Blood [facility] Marrow Transplant ([facility])    [address] [place] [id number]   [phone]              Who to contact     If you have questions or need follow up information about today's clinic visit or your schedule please contact [facility] BLOOD [facility] MARROW TRANSPLANT directly at [phone].  Normal or non-critical lab and imaging results will be communicated to you by bSafehart, letter or phone within 4 business days after the clinic has received the results. If you do not hear from us within 7 days, please contact the clinic through [employer] or phone. If you have a critical or abnormal lab result, we will notify you by phone as soon as possible.  Submit refill requests through [employer] or call your pharmacy and they will forward the refill request to us. Please allow 3 business days for your refill to be completed.          Additional Information About Your Visit        Jigsee Information     Jigsee lets you send messages to your doctor, view your test results, renew your prescriptions, schedule appointments and more. To sign up, go to [email or web address] . Click on \"Log in\" on the left side of the screen, which will take you to the Welcome page. Then click on \"Sign up Now\" on the right side of the page. "     You will be asked to enter the access code listed below, as well as some personal information. Please follow the directions to create your username and password.     Your access code is: [email or web address]  Expires: 8/15/2017  6:30 AM     Your access code will  in 90 days. If you need help or a new code, please call your [place] clinic or [phone].        Care EveryWhere ID     This is your Care EveryWhere ID. This could be used by other organizations to access your [place] medical records  [id number]         Blood Pressure from Last 3 Encounters:   17 148/88   17 138/78    Weight from Last 3 Encounters:   17 90.7 kg (200 lb)   17 91 kg (200 lb 11.2 oz)   17 92.9 kg (204 lb 14.4 oz)              Today, you had the following     No orders found for display       Recent Review Flowsheet Data     BMT Recent Results Latest Ref Rng & Units 2017    WBC 4.0 - 11.0 10e9/L 6.7    Hemoglobin 13.3 - 17.7 g/dL 8.3(L)    Platelet Count 150 - 450 10e9/L 259    Neutrophils (Absolute) 1.6 - 8.3 10e9/L 4.3    INR 0.86 - 1.14 1.14    Sodium 133 - 144 mmol/L 138    Potassium 3.4 - 5.3 mmol/L 3.8    Chloride 94 - 109 mmol/L 106    Glucose 70 - 99 mg/dL 142(H)    Urea Nitrogen 7 - 30 mg/dL 10    Creatinine 0.66 - 1.25 mg/dL 0.83    Calcium (Total) 8.5 - 10.1 mg/dL 8.4(L)    Protein (Total) 6.8 - 8.8 g/dL 6.8    Albumin 3.4 - 5.0 g/dL 3.3(L)    Alkaline Phosphatase 40 - 150 U/L 82    AST 0 - 45 U/L 18    ALT 0 - 70 U/L 23    MCV 78 - 100 fl 93               Primary Care Provider    None Specified       No primary provider on file.        Equal Access to Services     [facility] MASHA : Greg Ratliff, joana spearadaha, qaybta kaalmada vishal mchugh ah. So [facility] [phone].    ATENCIÓN: Si habla español, tiene a gusman disposición servicios gratuitos de asistencia lingüística. Llame al [phone].    We comply with applicable federal civil rights  laws and [place] laws. We do not discriminate on the basis of race, color, national origin, age, disability sex, sexual orientation or gender identity.            Thank you!     Thank you for choosing [facility] BLOOD AND MARROW TRANSPLANT  for your care. Our goal is always to provide you with excellent care. Hearing back from our patients is one way we can continue to improve our services. Please take a few minutes to complete the written survey that you may receive in the mail after your visit with us. Thank you!             Your Updated Medication List - Protect others around you: Learn how to safely use, store and throw away your medicines at www.disposemymeds.org.          This list is accurate as of: 6/28/17 11:26 AM.  Always use your most recent med list.                   Brand Name Dispense Instructions for use Diagnosis    aspirin 81 MG tablet      Take by mouth daily        cholecalciferol 1000 UNITS capsule    vitamin  -D     Take 1 capsule by mouth daily        cyclobenzaprine 5 MG tablet    FLEXERIL     Flexeril Take 5mg by mouth at bedtime and increase gradually to 1 tablet every 8-12hours as needed for muscle pain/spasm. Max 3 tablets/day        diltiazem 120 MG 24 hr ER beaded capsule    TIAZAC     Take by mouth daily        FLONASE NA           heparin lock flush 10 UNIT/ML Soln injection      3 mLs by Intracatheter route daily In each lumen        LIPITOR PO      Take 10 mg by mouth        metFORMIN 500 MG tablet    GLUCOPHAGE     Take 500 mg by mouth daily        MULTI COMPLETE PO           ondansetron 4 MG ODT tab    ZOFRAN-ODT     Take by mouth every 4 hours as needed for nausea        oxyCODONE 5 MG capsule    OXY-IR     Take 5 mg by mouth every 4 hours as needed for moderate to severe pain        zolpidem 10 MG tablet    AMBIEN     Take by mouth nightly as needed for sleep 1/2 tab to one tablet at bedtime

## 2017-06-28 NOTE — PROGRESS NOTES
First Name: Norman  Age: 56 year old   Referring Physician: Dr. Umanzor   REASON FOR REFERRAL: Education and evaluation for tunneled catheter placement  Patient is undergoing w/u for allogeneic BMT using  related donor (sister)    HPI:  This is a pt with a PMH of prostate cancer in 2011, type 2 diabetes, and hypertension.  He was at a routine appointment and having blood work when he was found to have abnormal counts.  He did have slight fatigue but hadn't thought much about it.  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  He started induction with 7+3 with idarubicin and cytarabine.  Bone marrow after induction was negative but MRD testing showed 0.4% leukemic blasts.  He had one round of consolidation with HiDAC starting on 6/1/17.  His bone marrow after consolidation was clear of any leukemia.  He has a repeat BM and LP on 6/29/17. He is now undergoing work-up for allogeneic BMT, using a sister as the donor.    LINE HX:  1. April 2017 to present:  Left arm PICC.  No problems with the functioning of the PICC.    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 05/26/2017     Diabetes (H)      Hypertension      Prostate cancer (H) 2011    had radiatoin therapy     Tachycardia      PAST SURGICAL HISTORY:   Past Surgical History:   Procedure Laterality Date     CARDIAC SURGERY      ablation     FAMILY HISTORY:   Family History   Problem Relation Age of Onset     Chronic Obstructive Pulmonary Disease Mother      SOCIAL HISTORY:   Social History   Substance Use Topics     Smoking status: Former Smoker     Quit date: 5/31/2010     Smokeless tobacco: Not on file     Alcohol use No     PROBLEM LIST:   Patient Active Problem List    Diagnosis Date Noted     AML (acute myelogenous leukemia) (H) 06/28/2017     Priority: Medium     Pulmonary nodules  06/28/2017     Priority: Medium     MEDICATIONS:   Prescription Medications as of 6/29/2017             ACYCLOVIR PO Take 400 mg by mouth 2 times daily    LOSARTAN POTASSIUM PO Take 50 mg by mouth daily    heparin lock flush 10 UNIT/ML SOLN injection 3 mLs by Intracatheter route daily In each lumen    cyclobenzaprine (FLEXERIL) 5 MG tablet Flexeril Take 5mg by mouth at bedtime and increase gradually to 1 tablet every 8-12hours as needed for muscle pain/spasm. Max 3 tablets/day    ondansetron (ZOFRAN-ODT) 4 MG ODT tab Take by mouth every 4 hours as needed for nausea    oxyCODONE (OXY-IR) 5 MG capsule Take 5 mg by mouth every 4 hours as needed for moderate to severe pain    Atorvastatin Calcium (LIPITOR PO) Take 10 mg by mouth    diltiazem (TIAZAC) 120 MG 24 hr ER beaded capsule Take by mouth daily    Fluticasone Propionate (FLONASE NA)     metFORMIN (GLUCOPHAGE) 500 MG tablet Take 500 mg by mouth daily    zolpidem (AMBIEN) 10 MG tablet Take by mouth nightly as needed for sleep 1/2 tab to one tablet at bedtime    Multiple Vitamins-Minerals (MULTI COMPLETE PO)     cholecalciferol (VITAMIN  -D) 1000 UNITS capsule Take 2 capsules by mouth daily     aspirin 81 MG tablet Take by mouth daily      Facility Administered Medications as of 6/29/2017             midazolam (VERSED) injection 2 mg Inject 2 mLs (2 mg) into the vein once        ALLERGIES:  No Known Allergies  VITALS: /84  Pulse 85  SpO2 99%    ROS:  Answers for HPI/ROS submitted by the patient on 6/28/2017   General Symptoms: Yes  Skin Symptoms: Yes  HENT Symptoms: Yes  EYE SYMPTOMS: Yes  HEART SYMPTOMS: Yes  LUNG SYMPTOMS: No  INTESTINAL SYMPTOMS: No  URINARY SYMPTOMS: No  REPRODUCTIVE SYMPTOMS: No  SKELETAL SYMPTOMS: Yes  BLOOD SYMPTOMS: Yes  NERVOUS SYSTEM SYMPTOMS: No  MENTAL HEALTH SYMPTOMS: No  Fever: No  Loss of appetite: Yes  Weight loss: No  Weight gain: No  Fatigue: Yes  Night sweats: No  Chills: No  Increased stress: Yes  Excessive hunger:  No  Excessive thirst: No  Feeling hot or cold when others believe the temperature is normal: Yes  Loss of height: No  Post-operative complications: No  Surgical site pain: No  Hallucinations: No  Change in or Loss of Energy: Yes  Hyperactivity: No  Confusion: No  Changes in hair: Yes  Changes in moles/birth marks: No  Itching: No  Rashes: No  Changes in nails: No  Acne: Yes  Change in facial hair: Yes  Warts: No  Non-healing sores: No  Scarring: No  Flaking of skin: No  Color changes of hands/feet in cold : No  Sun sensitivity: No  Skin thickening: No  Ear pain: No  Ear discharge: No  Hearing loss: No  Tinnitus: No  Nosebleeds: No  Congestion: No  Sinus pain: No  Trouble swallowing: No   Voice hoarseness: No  Mouth sores: Yes  Sore throat: No  Tooth pain: No  Gum tenderness: No  Bleeding gums: No  Change in taste: Yes  Change in sense of smell: No  Dry mouth: No  Hearing aid used: No  Neck lump: No  Eye pain: No  Vision loss: No  Dry eyes: No  Watery eyes: No  Eye bulging: No  Double vision: No  Flashing of lights: No  Spots: Yes  Floaters: No  Redness: No  Crossed eyes: No  Tunnel Vision: No  Yellowing of eyes: No  Eye irritation: No  Chest pain or pressure: No  Fast or irregular heartbeat: No  Pain in legs with walking: No  Swelling in feet or ankles: Yes  Trouble breathing while lying down: No  Fingers or Toes appear blue: No  High blood pressure: Yes  Low blood pressure: No  Fainting: No  Murmurs: No  Chest pain on exertion: No  Chest pain at rest: No  Cramping pain in leg during exercise: No  Pacemaker: No  Varicose veins: No  Edema or swelling: No  Fast heart beat: No  Wake up at night with shortness of breath: No  Heart flutters: No  Light-headedness: No  Exercise intolerance: No  Back pain: No  Muscle aches: No  Neck pain: No  Swollen joints: No  Joint pain: No  Bone pain: No  Muscle cramps: No  Muscle weakness: No  Joint stiffness: No  Bone fracture: No  Anemia: No  Easy bleeding or bruising: No    Physical  Examination: Vital signs are reviewed and they are stable  Constitutional: Pleasant, middle aged gentleman, in no acute physical distress, came with his wife, Agapito to the appt  Neck: Neck supple. No adenopathy  Musculoskeletal: he is walking with a cane, he states that after the last BM bx he had pain down the back of his leg and has been limping since, but it is getting better  Skin: no suspicious lesions or rashes  Neurologic: negative  Psychiatric: affect normal/bright and mentation appears normal.    RESULTS:  BMP RESULTS:  Lab Results   Component Value Date     06/26/2017    POTASSIUM 3.8 06/26/2017    CHLORIDE 106 06/26/2017    CO2 25 06/26/2017    ANIONGAP 7 06/26/2017     (H) 06/26/2017    BUN 10 06/26/2017    CR 0.83 06/26/2017    GFRESTIMATED >90  Non  GFR Calc   06/26/2017    GFRESTBLACK >90   GFR Calc   06/26/2017    SANDRA 8.4 (L) 06/26/2017        CBC RESULTS:  Lab Results   Component Value Date    WBC 5.4 06/29/2017    RBC 2.88 (L) 06/29/2017    HGB 8.5 (L) 06/29/2017    HCT 26.9 (L) 06/29/2017    MCV 93 06/29/2017    MCH 29.5 06/29/2017    MCHC 31.6 06/29/2017    RDW 20.2 (H) 06/29/2017     06/29/2017       INR/PTT:  Lab Results   Component Value Date    INR 1.14 06/26/2017    PTT 30 06/26/2017     NOTE:  Remove the PICC at the end of the procedure  Hold the metformin the morning of the procedure as he will not be eating that morning    ASSESSMENT/PLAN:  Type of catheter: 9.5 Fr.  Double lumen tunneled power Cooper     Preferred Location: Right  Internal Jugular Vein     Platelet count is   Lab Results   Component Value Date     06/26/2017    , and coagulation factors are   Lab Results   Component Value Date    INR 1.14 06/26/2017    ,  Lab Results   Component Value Date    PTT 30 06/26/2017   . The patient is at low risk for bleeding during the procedure.    PROVIDER NOTE:  The tunneled catheter placement procedure and its risks including but  not limited to bleeding, infection, fibrin sheath formation and blood clots was explained to Norman and his wife, Agapito.    CONSENT: Affirmation of informed written consent was not obtained.     INSTRUCTIONS/GUIDELINES:   Eating and drinking restriction guidelines were reviewed., Anti-bacterial scrub was given and instructions for its use were reviewed to decrease the risk of infection on the day of the procedure., I explained the expected length of time the tunneled catheter could remain in place., I explained that when they [facility] they would be taught about exit site dressing care, flushing of the lumens and how to care for the catheter when bathing., The role of Interventional Radiology was reviewed. and The principles of infection control were reviewed.     30 minutes was spent with Norman and his wife, Agapito.  20 minutes was spent in counseling.  Niharika Groves MS, APRN, CNS  Clinical Nurse Specialist  Interventional Radiology  [phone] (voice mail)  [phone] (pager)    CC  Patient Care Team:  Charanjit Reed MD as BMT Physician (Internal Medicine)  Jose Pitts MD as Referring Physician  CHARANJIT REED

## 2017-06-28 NOTE — TELEPHONE ENCOUNTER
Contacted patient to hold ASA prior to possible bronch to be done on Fri 6/30/17 per Dr Ahn. Patient has not been taking ASA as of late.

## 2017-06-28 NOTE — PATIENT INSTRUCTIONS
Tunneled Catheter Placement Instructions    1. Arrive in the [facility] Waiting Room on the day of your procedure, 2nd floor of the hospital, located down the [address] from the main lobby as instructed by the BMT clinic.  You will be coming 1.5 hours prior to the actual procedure time    2. No solid foods or milk products for 6 hours prior to the procedure    3. May have clear liquids up to 2 hours prior to the procedure (water, apple juice, broth, coffee or tea without milk or sugar, jell-o, white grape juice)    4. Anti-bacterial scrub  -Two times the day before your procedure, and once the day of your procedure  -Think of a big square:  mid chest to ear lobes, both sides of the chest and neck  -Use a clean washcloth each time.  Wet the washcloth.  Place a capful of the scrub on the wet washcloth and rub it in, wash the area and leave on for 5 minutes    -After 5 minutes, rinse off the washcloth, then rinse off the skin and pat the skin dry.with a clean towel  -Or if you prefer, you may wash the scrub off in the shower  -No lotion or powders on the neck or chest area.  But you may use deodorant.

## 2017-06-28 NOTE — MR AVS SNAPSHOT
After Visit Summary   6/28/2017    Norman Real    MRN: [id number]           Patient Information     Date Of Birth          1960        Visit Information        Provider Department      6/28/2017 12:30 PM Niharika Groves APRN [facility] Interventional Radiology        Today's Diagnoses     Acute myeloid leukemia in remission (H)        Personal history of diseases of blood and blood-forming organs          Care Instructions    Tunneled Catheter Placement Instructions    1. Arrive in the [facility] Waiting Room on the day of your procedure, 2nd floor of the hospital, located down the [address] from the main lobby as instructed by the BMT clinic.  You will be coming 1.5 hours prior to the actual procedure time    2. No solid foods or milk products for 6 hours prior to the procedure    3. May have clear liquids up to 2 hours prior to the procedure (water, apple juice, broth, coffee or tea without milk or sugar, jell-o, white grape juice)    4. Anti-bacterial scrub  -Two times the day before your procedure, and once the day of your procedure  -Think of a big square:  mid chest to ear lobes, both sides of the chest and neck  -Use a clean washcloth each time.  Wet the washcloth.  Place a capful of the scrub on the wet washcloth and rub it in, wash the area and leave on for 5 minutes    -After 5 minutes, rinse off the washcloth, then rinse off the skin and pat the skin dry.with a clean towel  -Or if you prefer, you may wash the scrub off in the shower  -No lotion or powders on the neck or chest area.  But you may use deodorant.          Follow-ups after your visit        Your next 10 appointments already scheduled     Jun 28, 2017 12:30 PM CDT   (Arrive by 12:15 PM)   New Patient Visit with KAVYA Mcneil [facility] Interventional Radiology ([facility])    [address] [place] [address]  [address] [facility] [address] [phone]            Jun 28, 2017  2:00 PM CDT    FULL PULMONARY FUNCTION with  PFL D   [facility] Pulmonary Function Testing ([facility])    [address]  3rd Floor  [facility] [address] [phone]            Jun 28, 2017  3:00 PM CDT   RESEARCH WITH ROOM with  Bmt Research Coordinator, LENA 2 119 CONSULT RM   [facility] Blood and Marrow Transplant ([facility])    [address]  2nd Floor  [facility] [address]   [phone]            Jun 29, 2017  9:00 AM CDT   [facility] Lab Draw with  MASONIC LAB DRAW   [facility] Mason[facility] Lab Draw ([facility])    [address]  2nd Floor  [facility] [address]   [phone]            Jun 29, 2017  9:30 AM CDT   Bone Marrow Biopsy with  BMT MIMA #1, UU BONE MARROW BIOPSY   [facility] Blood and Marrow Transplant ([facility])    [address]  [address] [facility] [address]   [phone]            Jun 29, 2017 10:30 AM CDT   Lumbar Puncture with  BMT MIMA #1   [facility] Blood and Marrow Transplant ([facility])    [address]  [address] [facility] [address]   [phone]            Jun 29, 2017 11:30 AM CDT   BMT Nurse Visit with  Bmt Nurse 1   [facility] Blood and Marrow Transplant ([facility])    [address] [place] [address]  [address] [facility] [address]   [phone]            Jun 29, 2017  3:00 PM CDT   (Arrive by 2:45 PM)   NEW LUNG NODULE with Tim Ahn MD   [facility] ([facility])    [address] [place] [address]  [address] [facility] [address] [phone]              Who to contact     Please call your clinic at [phone] to:    Ask questions about your health    Make or cancel appointments    Discuss your medicines    Learn about your test results    Speak to your doctor   If you have compliments or concerns about an experience at your clinic, or if  you wish to file a complaint, please contact [facility] Patient Relations at [phone] or email us at [email or web address]         Additional Information About Your Visit        Uniiverse Information     [employer]iverse is an electronic gateway that provides easy, online access to your medical records. With Uniiverse, you can request a clinic appointment, read your test results, renew a prescription or communicate with your care team.     To sign up for [employer] visit the website at [email or web address]   You will be asked to enter the access code listed below, as well as some personal information. Please follow the directions to create your username and password.     Your access code is: [email or web address]  Expires: 8/15/2017  6:30 AM     Your access code will  in 90 days. If you need help or a new code, please contact your [facility] or call [phone] for assistance.        Care EveryWhere ID     This is your Care EveryWhere ID. This could be used by other organizations to access your [facility] medical records  [id number]        Your Vitals Were     Pulse Pulse Oximetry                85 99%           Blood Pressure from Last 3 Encounters:   17 145/84   17 148/88   17 138/78    Weight from Last 3 Encounters:   17 90.7 kg (200 lb)   17 91 kg (200 lb 11.2 oz)   17 92.9 kg (204 lb 14.4 oz)              We Performed the Following     Consult to C.V. Radiology for line placement        Primary Care Provider    None Specified       No primary provider on file.        Equal Access to Services     [facility]: Hadii sapna Ratliff, waaxda luqadaha, qaybta kaalvishal fletcher . So [facility] [phone].    ATENCIÓN: Si habla español, tiene a gusman disposición servicios gratuitos de asistencia lingüística. Llame al [phone].    We comply with applicable federal civil  rights laws and [place] laws. We do not discriminate on the basis of race, color, national origin, age, disability sex, sexual orientation or gender identity.            Thank you!     Thank you for choosing [facility] INTERVENTIONAL RADIOLOGY  for your care. Our goal is always to provide you with excellent care. Hearing back from our patients is one way we can continue to improve our services. Please take a few minutes to complete the written survey that you may receive in the mail after your visit with us. Thank you!             Your Updated Medication List - Protect others around you: Learn how to safely use, store and throw away your medicines at www.disposemymeds.org.          This list is accurate as of: 6/28/17 12:26 PM.  Always use your most recent med list.                   Brand Name Dispense Instructions for use Diagnosis    ACYCLOVIR PO      Take 400 mg by mouth 2 times daily        aspirin 81 MG tablet      Take by mouth daily        cholecalciferol 1000 UNITS capsule    vitamin  -D     Take 2 capsules by mouth daily        cyclobenzaprine 5 MG tablet    FLEXERIL     Flexeril Take 5mg by mouth at bedtime and increase gradually to 1 tablet every 8-12hours as needed for muscle pain/spasm. Max 3 tablets/day        diltiazem 120 MG 24 hr ER beaded capsule    TIAZAC     Take by mouth daily        FLONASE NA           heparin lock flush 10 UNIT/ML Soln injection      3 mLs by Intracatheter route daily In each lumen        LIPITOR PO      Take 10 mg by mouth        LOSARTAN POTASSIUM PO      Take 50 mg by mouth daily        metFORMIN 500 MG tablet    GLUCOPHAGE     Take 500 mg by mouth daily        MULTI COMPLETE PO           ondansetron 4 MG ODT tab    ZOFRAN-ODT     Take by mouth every 4 hours as needed for nausea        oxyCODONE 5 MG capsule    OXY-IR     Take 5 mg by mouth every 4 hours as needed for moderate to severe pain        zolpidem 10 MG tablet    AMBIEN     Take by mouth nightly as needed for  sleep 1/2 tab to one tablet at bedtime

## 2017-06-29 NOTE — PROGRESS NOTES
BMT Teaching Flowsheet    Norman Real is a 56 year old male  Diagnoses of Acute myeloid leukemia in remission (H) and Personal history of diseases of blood and blood-forming organs were pertinent to this visit.    Teaching Topic: Allogeneic related sibling HCT    Person(s) involved in teaching: Patient and Spouse  Motivation Level  Asks Questions: Yes  Eager to Learn: Yes  Cooperative: Yes  Receptive (willing/able to accept information): Yes  Any cultural factors/[employer]al beliefs that may influence understanding or compliance? No    Patient and wife demonstrates understanding of the following:  - Reason for the appointment, diagnosis and treatment plan: Yes  - Knowledge of proper use of medications and conditions for which they are ordered (with special attention to potential side effects or drug interactions): Yes  - Which situations necessitate calling provider and whom to contact: Yes    Teaching concerns addressed: Reviewed transplant calender, consents, medications, side effects, GVHD, clinic flow and routine, limitations after transplant, caregiver role after transplant, and when to call.      Proper use and care of (medical equipment, care aids, etc.) Yes, pt uses a cane occasionally   Pain management techniques: Yes  Patient instructed on hand hygiene: Yes  How and/when to access community resources: Yes    Infection Control:  Patient and wife demonstrates understanding of the following:  Surgical procedure site care taught NA  Signs and symptoms of infection taught Yes  Wound care taught NA  Central venous catheter care taught NA    Instructional Materials Used/Given: BMT teaching binder    Time spent with patient: 90 minutes.    Specific Concerns: No, explain: patient verbalized understanding of provided material via teach back method. No further questions or concerns at this time.

## 2017-06-29 NOTE — PROGRESS NOTES
BMT ONC Adult Lumbar Puncture Procedure Note    June 29, 2017    Procedure: Lumbar Puncture to obtain CSF     Diagnosis: AML    Learning needs assessment complete within 12 months: YES    Vitals reviewed:  YES    Informed Consent: Procedure, benefits, risks, and alternatives explained to the patient who verbalized understanding of the information and agreed to proceed with lumbar puncture. Risks include bleeding, headache, and or infection.  Patient safety checklist completed.    Labs: Reviewed:      Lab Results   Component Value Date    INR 1.14 06/26/2017     06/29/2017       Description:. The patient was seated w/ LP chair.  The L4-5 disc space was prepped and draped in a sterile fashion. Local anesthesia with 5mL 1% preservative-free lidocaine was used. A 22 gauge, 4 inch needle was placed on the second attempt.  8 mL spinal fluid collected. Specimen appears clear. Specimen sent for cell count and differential, cytology, protein and glucose.  The needle was removed and a bandage was applied to the site.  Patient tolerated well.    Post-procedure pain assessment: 0 out of 10 on the numeric pain rating scale  Interventions: No    Complications: No     Disposition: Patient will remain on back for 1 hour post procedure. Avoid soaking in a tub tonight.  Call if develops headaches, fevers and or chills.     Performed by:   Irma Black completed procedure

## 2017-06-29 NOTE — NURSING NOTE
"Oncology Rooming Note    June 29, 2017 2:59 PM   Norman Real is a 56 year old male who presents for:    Chief Complaint   Patient presents with     Oncology Clinic Visit     new lung nodule      Initial Vitals: There were no vitals taken for this visit. Estimated body mass index is 30.79 kg/(m^2) as calculated from the following:    Height as of 6/26/17: 1.729 m (5' 8.07\").    Weight as of an earlier encounter on 6/29/17: 92 kg (202 lb 14.4 oz). There is no height or weight on file to calculate BSA.  Data Unavailable Comment: Data Unavailable   No LMP for male patient.  Allergies reviewed: Yes  Medications reviewed: Yes    Medications: Medication refills not needed today.  Pharmacy name entered into EPIC: Data Unavailable    Clinical concerns: no doc was NOT notified.    5 minutes for nursing intake (face to face time)     Chata Wilder CMA              "

## 2017-06-29 NOTE — PROGRESS NOTES
BMT Teaching Flowsheet    Norman Real is a 56 year old male  Diagnoses of Acute myeloid leukemia in remission (H) and Personal history of diseases of blood and blood-forming organs were pertinent to this visit.    Teaching Topic: bone marrow biopsy    Person(s) involved in teaching: Patient and Spouse  Motivation Level  Asks Questions: Yes  Eager to Learn: Yes  Cooperative: Yes  Receptive (willing/able to accept information): Yes  Any cultural factors/Advent beliefs that may influence understanding or compliance? NA    Patient and Family demonstrates understanding of the following:  - Reason for the appointment, diagnosis and treatment plan: Yes  - Knowledge of proper use of medications and conditions for which they are ordered (with special attention to potential side effects or drug interactions): Yes  - Which situations necessitate calling provider and whom to contact: Yes    Teaching concerns addressed: Pt instructed to keep site dry for 24 hrs, assess site for redness, pain swelling, and drainage as signs of infection, and not to operate machinery as pt has receive sedative medication.     Proper use and care of (medical equipment, care aids, etc.) Yes  Pain management techniques: Yes  Patient instructed on hand hygiene: Yes  How and/when to access community resources: Yes    Infection Control:  Patient and Family demonstrates understanding of the following:  Surgical procedure site care taught Yes  Signs and symptoms of infection taught Yes  Wound care taught Yes  Central venous catheter care taught Yes    Instructional Materials Used/Given: Post procedure information given to pt, pt and family instructed to assess signs of infection at biopsy site post d/c. Teaching provided post biopsy.     Time spent with patient: 30 minutes.    Specific Concerns: No, explain: Vss, pt reports no pain post biopsy, pt remained prone for 30min, site is c,d,i. Denies any concerns. Pt and family verbalize understanding  of pot procedure instructions.

## 2017-06-29 NOTE — PROGRESS NOTES
Chief Complaint   Patient presents with     RECHECK     Pre BMT for AML here for EKG       Marsha De,DEVIN June 29, 2017 1:51 PM

## 2017-06-29 NOTE — LETTER
6/29/2017       RE: Norman Real  PO   [facility] [address]     Dear Colleague,    Thank you for referring your patient, Norman Real, to the [facility] at [facility]. Please see a copy of my visit note below.    Reason for Visit  Norman Real is a 56 year old year old male who is being seen for Oncology Clinic Visit (new lung nodule )    Pulmonary HPI  55 YO with history of AML referred to the Pulmonary BMT clinic by Dr. Umanzor for evaluation of an abnormal CT.  Underwent 7+3 induction chemotherapy without pulmonary complications.  States had URI symptoms followed by productive cough at start of admission for chemotherapy in 4-17, states symptoms lasted for 10 days then resolved.  Does not recall being on specific antibiotics for a respiratory infection at that time.  Does not recall any CT imaging, did have CXR but does not recall that he was told it was abnormal or that he had pneumonia.  Since discharge he has not had any respiratory symptoms, no cough or SOB.  Episode of pneumonia ~ 15 years ago, not hospitalized.  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 No tobacco since 2010, 20 pk year history. CT chest shows tree and bud opacities in RML and LLL, 1.1 x 0.7 cm nodule in RLL.    The patient was seen and examined by Tim Ahn     Current Outpatient Prescriptions   Medication     filgrastim-sndz (ZARXIO) 480 MCG/0.8ML SOSY syringe     tadalafil (CIALIS) 20 MG tablet     PSYLLIUM PO     UNABLE TO FIND     levofloxacin (LEVAQUIN) 500 MG tablet     ibuprofen (ADVIL/MOTRIN) 200 MG tablet     guaiFENesin (MUCINEX) 600 MG 12 hr tablet     blood glucose monitoring (FREESTYLE) lancets     filgrastim (NEUPOGEN) 480 MCG/1.6ML injection     metroNIDAZOLE (FLAGYL) 500 MG tablet     UNABLE TO FIND     diltiazem 120 MG 24 hr capsule     acetaminophen (TYLENOL)  325 MG tablet     blood glucose monitoring (PRECISION XTRA GLUCOSE) test strip     ACYCLOVIR PO     LOSARTAN POTASSIUM PO     heparin lock flush 10 UNIT/ML SOLN injection     cyclobenzaprine (FLEXERIL) 5 MG tablet     ondansetron (ZOFRAN-ODT) 4 MG ODT tab     oxyCODONE (OXY-IR) 5 MG capsule     Atorvastatin Calcium (LIPITOR PO)     diltiazem (TIAZAC) 120 MG 24 hr ER beaded capsule     Fluticasone Propionate (FLONASE NA)     metFORMIN (GLUCOPHAGE) 500 MG tablet     zolpidem (AMBIEN) 10 MG tablet     Multiple Vitamins-Minerals (MULTI COMPLETE PO)     cholecalciferol (VITAMIN  -D) 1000 UNITS capsule     aspirin 81 MG tablet     No current facility-administered medications for this visit.      Allergies   Allergen Reactions     Ace Inhibitors Anaphylaxis, Cough and Hives     Terazosin Swelling     Other reaction(s): Sedation/Sleepy     Past Medical History:   Diagnosis Date     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 2017     Diabetes (H)      Hypertension      Prostate cancer (H)     had radiatoin therapy     Tachycardia        Past Surgical History:   Procedure Laterality Date     CARDIAC SURGERY      ablation       Social History     Social History     Marital status:      Spouse name: N/A     Number of children: N/A     Years of education: N/A     Occupational History     Not on file.     Social History Main Topics     Smoking status: Former Smoker     Quit date: 2010     Smokeless tobacco: Not on file     Alcohol use No     Drug use: No     Sexual activity: Not on file     Other Topics Concern     Not on file     Social History Narrative       FH:  Father-  from trauma, Mother- COPD, uncle- prostate cancer  ROS Pulmonary  A complete ROS was reviewed.  There were no vitals taken for this visit.  Exam:   GENERAL APPEARANCE: Well developed, well nourished, alert, and in no apparent distress.  EYES: PERRL, EOMI  HENT: Nasal mucosa with no edema and no hyperemia. No nasal polyps.  EARS:  Canals clear, TMs normal  MOUTH: Oral mucosa is moist, without any lesions, no tonsillar enlargement, no oropharyngeal exudate.  NECK: supple, no masses, no thyromegaly.  LYMPHATICS: No significant axillary, cervical, or supraclavicular nodes.  RESP:  Good air flow throughout.  No crackles. No rhonchi. No wheezes.  CV: Normal S1, S2, regular rhythm, normal rate. No murmur.  No rub. No gallop. No LE edema.   ABDOMEN:  Bowel sounds normal, soft, nontender, no HSM or masses.   MS: extremities normal. No clubbing. No cyanosis.  SKIN: no rash on limited exam  NEURO: Mentation intact, speech normal, normal strength and tone, normal gait and stance  PSYCH: mentation appears normal. and affect normal/bright  Results:  PFT's personally reviewed in clinic  FVC 4.54 (107%), FEV-1 3.40 (101%), FEV-1/FVC 75%  %, %, %  DLCO 95%  No airflow limitation.  Normal lung volumes.  Normal diffusion capacity.  Recent Results (from the past 168 hour(s))   Routine UA with microscopic    Collection Time: 06/26/17  2:30 PM   Result Value Ref Range    Color Urine Yellow     Appearance Urine Clear     Glucose Urine Negative NEG mg/dL    Bilirubin Urine Negative NEG    Ketones Urine Negative NEG mg/dL    Specific Gravity Urine 1.012 1.003 - 1.035    Blood Urine Negative NEG    pH Urine 5.0 5.0 - 7.0 pH    Protein Albumin Urine Negative NEG mg/dL    Urobilinogen mg/dL 0.0 0.0 - 2.0 mg/dL    Nitrite Urine Negative NEG    Leukocyte Esterase Urine Negative NEG    Source Unspecified Urine     WBC Urine 1 0 - 2 /HPF    RBC Urine 1 0 - 2 /HPF    Mucous Urine Present (A) NEG /LPF   INR    Collection Time: 06/26/17  2:55 PM   Result Value Ref Range    INR 1.14 0.86 - 1.14   Partial thromboplastin time    Collection Time: 06/26/17  2:55 PM   Result Value Ref Range    PTT 30 22 - 37 sec   CBC with platelets differential    Collection Time: 06/26/17  2:55 PM   Result Value Ref Range    WBC 6.7 4.0 - 11.0 10e9/L    RBC Count 2.80 (L) 4.4  - 5.9 10e12/L    Hemoglobin 8.3 (L) 13.3 - 17.7 g/dL    Hematocrit 26.0 (L) 40.0 - 53.0 %    MCV 93 78 - 100 fl    MCH 29.6 26.5 - 33.0 pg    MCHC 31.9 31.5 - 36.5 g/dL    RDW 19.2 (H) 10.0 - 15.0 %    Platelet Count 259 150 - 450 10e9/L    Diff Method Automated Method     % Neutrophils 64.2 %    % Lymphocytes 13.5 %    % Monocytes 20.4 %    % Eosinophils 0.0 %    % Basophils 0.9 %    % Immature Granulocytes 1.0 %    Nucleated RBCs 4 (H) 0 /100    Absolute Neutrophil 4.3 1.6 - 8.3 10e9/L    Absolute Lymphocytes 0.9 0.8 - 5.3 10e9/L    Absolute Monocytes 1.4 (H) 0.0 - 1.3 10e9/L    Absolute Eosinophils 0.0 0.0 - 0.7 10e9/L    Absolute Basophils 0.1 0.0 - 0.2 10e9/L    Abs Immature Granulocytes 0.1 0 - 0.4 10e9/L    Absolute Nucleated RBC 0.3    Varicella Zoster Virus Antibody IgG    Collection Time: 06/26/17  2:55 PM   Result Value Ref Range    Varicella Zoster Virus Antibody IgG 1.5 (H) 0.0 - 0.8 AI   Herpes Simplex Virus 1 and 2 IgG    Collection Time: 06/26/17  2:55 PM   Result Value Ref Range    Herpes Simplex Virus Type 1 IgG 1.6 (H) 0.0 - 0.8 AI    Herpes Simplex Virus Type 2 IgG  0.0 - 0.8 AI     <0.2  No HSV-2 IgG antibodies detected.   Antibody index (AI) values reflect qualitative changes in antibody   concentration that cannot be directly associated with clinical condition or   disease state.     Hepatitis B Surface Antibody    Collection Time: 06/26/17  2:55 PM   Result Value Ref Range    Hepatitis B Surface Antibody 2.70 <8.00 m[IU]/mL   Comprehensive metabolic panel    Collection Time: 06/26/17  2:55 PM   Result Value Ref Range    Sodium 138 133 - 144 mmol/L    Potassium 3.8 3.4 - 5.3 mmol/L    Chloride 106 94 - 109 mmol/L    Carbon Dioxide 25 20 - 32 mmol/L    Anion Gap 7 3 - 14 mmol/L    Glucose 142 (H) 70 - 99 mg/dL    Urea Nitrogen 10 7 - 30 mg/dL    Creatinine 0.83 0.66 - 1.25 mg/dL    GFR Estimate >90  Non  GFR Calc   >60 mL/min/1.7m2    GFR Estimate If Black >90    GFR Calc   >60 mL/min/1.7m2    Calcium 8.4 (L) 8.5 - 10.1 mg/dL    Bilirubin Total 0.5 0.2 - 1.3 mg/dL    Albumin 3.3 (L) 3.4 - 5.0 g/dL    Protein Total 6.8 6.8 - 8.8 g/dL    Alkaline Phosphatase 82 40 - 150 U/L    ALT 23 0 - 70 U/L    AST 18 0 - 45 U/L   Uric acid    Collection Time: 06/26/17  2:55 PM   Result Value Ref Range    Uric Acid 6.4 3.5 - 7.2 mg/dL   CMV Antibody IgG - BMT recipient    Collection Time: 06/26/17  2:55 PM   Result Value Ref Range    CMV Antibody IgG 4.2 (H) 0.0 - 0.8 AI   Hepatitis C antibody    Collection Time: 06/26/17  2:55 PM   Result Value Ref Range    Hepatitis C Antibody  NR     Nonreactive   Assay performance characteristics have not been established for newborns,   infants, and children     Hepatitis B core antibody-BMT recipient    Collection Time: 06/26/17  2:55 PM   Result Value Ref Range    Hepatitis B Core Vandana Nonreactive NR   Anti Treponema    Collection Time: 06/26/17  2:55 PM   Result Value Ref Range    Treponema pallidum Antibody Negative NEG   HIV Antigen Antibody Combo - BMT recipient    Collection Time: 06/26/17  2:55 PM   Result Value Ref Range    HIV Antigen Antibody Combo  NR     Nonreactive   HIV-1 p24 Ag & HIV-1/HIV-2 Ab Not Detected     EBV Capsid Antibody IgG - BMT recipient    Collection Time: 06/26/17  2:55 PM   Result Value Ref Range    EBV Capsid Antibody IgG 1.1 (H) 0.0 - 0.8 AI   Hepatitis B surface antigen-BMT recipient    Collection Time: 06/26/17  2:55 PM   Result Value Ref Range    Hep B Surface Agn Nonreactive NR   HTLV I and 2 antibody with reflex-BMT recipient    Collection Time: 06/26/17  2:55 PM   Result Value Ref Range    HTLV I/II Antibodies       Negative  Reference range: Negative  (Note)  Based on the non-reactive anti-HTLV WINTER screen, the HTLV  Western Blot is not indicated and therefore not performed.  INTERPRETIVE INFORMATION:  HTLV I/II Antibodies w/Reflex                            to Confirm  This assay should not be used for blood  donor screening,  associated re-entry protocols, or for screening Human Cell,  Tissues and Cellular and Tissue-Based Products (HCT/P).  Performed by [facility],  [address], [facility] [place] [address] [phone]  [email or web address], Damien Gil MD, Lab. Director     Trypanosoma Cruzi-BMT recipient    Collection Time: 06/26/17  2:55 PM   Result Value Ref Range    Trypanosoma Cruzi  NR     Nonreactive   Tests performed at [facility] [place] [address]     ABO/Rh type and screen    Collection Time: 06/26/17  3:28 PM   Result Value Ref Range    ABO O     RH(D)  Pos     Antibody Screen Neg     Test Valid Only At       [facility]    Specimen Expires 06/29/2017    HBV HCV HIV WNV by STONEY-BMT recipient    Collection Time: 06/26/17  3:29 PM   Result Value Ref Range    MPX Series       Nonreactive   MPX Series: Failure to detect HIV, HCV or HBV by PCR does not rule out the   possibility of infection. This result should be evaluated in the context of the   individual's risk factors and clinical findings.      West Nile Virus by PCR Nonreactive    General PFT Lab (Please always keep checked)    Collection Time: 06/28/17 12:45 PM   Result Value Ref Range    FVC-Pred 4.24 L    FVC-Pre 4.54 L    FVC-%Pred-Pre 107 %    FEV1-Pre 3.40 L    FEV1-%Pred-Pre 101 %    FEV1FVC-Pred 79 %    FEV1FVC-Pre 75 %    FEFMax-Pred 9.24 L/sec    FEFMax-Pre 10.24 L/sec    FEFMax-%Pred-Pre 110 %    FEF2575-Pred 2.96 L/sec    FEF2575-Pre 2.52 L/sec    UXZ8921-%Pred-Pre 85 %    ExpTime-Pre 8.64 sec    FIFMax-Pre 7.78 L/sec    VC-Pred 4.89 L    VC-Pre 4.76 L    VC-%Pred-Pre 97 %    IC-Pred 3.83 L    IC-Pre 3.57 L    IC-%Pred-Pre 93 %    ERV-Pred 1.06 L    ERV-Pre 1.19 L    ERV-%Pred-Pre 112 %    FEV1FEV6-Pred 80 %    FEV1FEV6-Pre 76 %    FRCPleth-Pred 3.52 L    FRCPleth-Pre 3.54 L    FRCPleth-%Pred-Pre 100 %    RVPleth-Pred 2.31 L    RVPleth-Pre 2.35 L    RVPleth-%Pred-Pre 101 %    TLCPleth-Pred  6.92 L    TLCPleth-Pre 7.12 L    TLCPleth-%Pred-Pre 102 %    DLCOunc-Pred 28.80 ml/min/mmHg    DLCOunc-Pre 27.62 ml/min/mmHg    DLCOunc-%Pred-Pre 95 %    DLCOcor-Pre 36.25 ml/min/mmHg    DLCOcor-%Pred-Pre 125 %    VA-Pre 6.57 L    VA-%Pred-Pre 99 %    FEV1SVC-Pred 69 %    FEV1SVC-Pre 71 %   CBC with platelets differential    Collection Time: 06/29/17  9:20 AM   Result Value Ref Range    WBC 5.4 4.0 - 11.0 10e9/L    RBC Count 2.88 (L) 4.4 - 5.9 10e12/L    Hemoglobin 8.5 (L) 13.3 - 17.7 g/dL    Hematocrit 26.9 (L) 40.0 - 53.0 %    MCV 93 78 - 100 fl    MCH 29.5 26.5 - 33.0 pg    MCHC 31.6 31.5 - 36.5 g/dL    RDW 20.2 (H) 10.0 - 15.0 %    Platelet Count 223 150 - 450 10e9/L    Diff Method Automated Method     % Neutrophils 65.1 %    % Lymphocytes 13.3 %    % Monocytes 18.7 %    % Eosinophils 0.0 %    % Basophils 1.8 %    % Immature Granulocytes 1.1 %    Nucleated RBCs 5 (H) 0 /100    Absolute Neutrophil 3.5 1.6 - 8.3 10e9/L    Absolute Lymphocytes 0.7 (L) 0.8 - 5.3 10e9/L    Absolute Monocytes 1.0 0.0 - 1.3 10e9/L    Absolute Eosinophils 0.0 0.0 - 0.7 10e9/L    Absolute Basophils 0.1 0.0 - 0.2 10e9/L    Abs Immature Granulocytes 0.1 0 - 0.4 10e9/L    Absolute Nucleated RBC 0.3    Glucose CSF: Tube 2    Collection Time: 06/29/17 10:55 AM   Result Value Ref Range    Glucose CSF 78 (H) 40 - 70 mg/dL   Protein total CSF: Tube 3    Collection Time: 06/29/17 10:55 AM   Result Value Ref Range    Protein Total CSF 43 15 - 60 mg/dL       Assessment and plan:   56 YO with AML, no current pulmonary symptoms but has an abnormal CT worrisome for an atypical infection.  Pulmonary symptoms at time of admission for induction chemotherapy but these resolved within a week or 10 days.  No prior history of pulmonary disease.    1. Bronchoscopy in am with lavage of superior and posterior segments of LLL  2. Start Vfend, continue until WBC recovers after transplant and CT normalizes  3. Ok to proceed with transplant as long he is on  treatment dosages of anti-fungal agents  4. Needs f/u CT in 4-6 weeks  5. Will follow pulmonary nodule, may be related to infection but has 20 pk yr tobacco history so will need to exclude malignancy.    RTC in 6 weeks or after discharged from transplant admission.  Patient to call with any questions or concerns      Again, thank you for allowing me to participate in the care of your patient.      Sincerely,    Tim Ahn MD

## 2017-06-29 NOTE — PROGRESS NOTES
BMT Teaching Flowsheet    Norman Real is a 56 year old male  Diagnoses of Acute myeloid leukemia in remission (H) and Personal history of diseases of blood and blood-forming organs were pertinent to this visit.    Teaching Topic: Lumbar Puncture    Person(s) involved in teaching: Patient and Spouse  Motivation Level  Asks Questions: Yes  Eager to Learn: Yes  Cooperative: Yes  Receptive (willing/able to accept information): Yes  Any cultural factors/Sabianism beliefs that may influence understanding or compliance? NA    Patient and Family demonstrates understanding of the following:  - Reason for the appointment, diagnosis and treatment plan: Yes  - Knowledge of proper use of medications and conditions for which they are ordered (with special attention to potential side effects or drug interactions): Yes  - Which situations necessitate calling provider and whom to contact: Yes    Teaching concerns addressed: None    Proper use and care of (medical equipment, care aids, etc.) Yes  Pain management techniques: Yes  Patient instructed on hand hygiene: Yes  How and/when to access community resources: Yes    Infection Control:  Patient and Family demonstrates understanding of the following:  Surgical procedure site care taught Yes  Signs and symptoms of infection taught Yes  Wound care taught Yes  Central venous catheter care taught NA    Time spent with patient: 30 minutes.    Specific Concerns: No, explain:  Pt denies pain and headache post procedure. LP and bmbx site c,d,i. Pt instructed to limit activity for the day and to no operate any machinery. Pt and spouse verbalize signs and symptoms of infection to puncture and biopsy site. Pt instructed to stay hydrated and to drink caffienated beverage to prevent LP induced headache and if headache occurs, to take tylenol. Pt d/c'd with spouse, ambulatory with walker.

## 2017-06-29 NOTE — MR AVS SNAPSHOT
After Visit Summary   6/29/2017    Norman Real    MRN: [id number]           Patient Information     Date Of Birth          1960        Visit Information        Provider Department      6/29/2017 9:30 AM UU BONE MARROW BIOPSY;  BMT MIMA #1 [facility] Blood and Marrow Transplant        Today's Diagnoses     Acute myeloid leukemia in remission (H)        Personal history of diseases of blood and blood-forming organs              [facility] ([facility])  [address] [place] [address]  Phone: [phone]  Clinic Hours:   Monday-Thursday:7am to 7pm   Friday: 7am to 5pm   Weekends and holidays:    8am to noon (in general)  If your fever is 100.5  or greater,   call the clinic.  After hours call the   hospital at [phone] or   [phone]. Ask for the BMT   fellow on-call            Follow-ups after your visit        Your next 10 appointments already scheduled     Jun 29, 2017 10:30 AM CDT   Lumbar Puncture with  BMT MIMA #1   [facility] Blood and Marrow Transplant ([facility])    [address] [place] [address]   [phone]            Jun 29, 2017 11:30 AM CDT   BMT Nurse Visit with  Bmt Nurse 1   [facility] Blood and Marrow Transplant ([facility])    [address] [place] [address]   [phone]            Jun 29, 2017  3:00 PM CDT   (Arrive by 2:45 PM)   NEW LUNG NODULE with Tim Ahn MD   [facility] ([facility])    [address] [place] [address]   [phone]            Jun 30, 2017   Procedure with Tim Ahn MD   [facility] [place], Endoscopy ([facility])    [address] [facility] [id number] [phone]           [facility] is located on the corner of [address] [facility] and [address] [facility]  "of [facility]. It is easily accessible from virtually any point in the [facility] area, via [address] and [address].            Jul 05, 2017  9:30 AM CDT   (Arrive by 9:15 AM)   BMT SW PSA with LEANDRO Yeboah,  2 114 CONSULT [facility] Blood and Marrow Transplant ([facility])    [address]  2nd Floor  [facility] [address] [phone]            Jul 05, 2017 11:00 AM CDT   BMT Workup Visit with LENA BMT DOM   [facility] Blood and Marrow Transplant ([facility])    [address]  [address] [facility] [address] [phone]              Future tests that were ordered for you today     Open Future Orders        Priority Expected Expires Ordered    BRONCHOSCOPY W LAVAGE Routine 6/28/2017 6/29/2018 6/28/2017            Who to contact     If you have questions or need follow up information about today's clinic visit or your schedule please contact [facility] BLOOD AND MARROW TRANSPLANT directly at [phone].  Normal or non-critical lab and imaging results will be communicated to you by Biohart, letter or phone within 4 business days after the clinic has received the results. If you do not hear from us within 7 days, please contact the clinic through ActivIdentityt or phone. If you have a critical or abnormal lab result, we will notify you by phone as soon as possible.  Submit refill requests through [employer] or call your pharmacy and they will forward the refill request to us. Please allow 3 business days for your refill to be completed.          Additional Information About Your Visit        [employer] Information     [employer] lets you send messages to your doctor, view your test results, renew your prescriptions, schedule appointments and more. To sign up, go to [email or web address] . Click on \"Log in\" on the left side of the screen, which will take you to the Welcome page. Then click on \"Sign up Now\" on the right side of the page.     You " will be asked to enter the access code listed below, as well as some personal information. Please follow the directions to create your username and password.     Your access code is: [email or web address]  Expires: 8/15/2017  6:30 AM     Your access code will  in 90 days. If you need help or a new code, please call your [place] clinic or [phone].        Care EveryWhere ID     This is your Care EveryWhere ID. This could be used by other organizations to access your [place] medical records  [id number]        Your Vitals Were     Pulse Temperature Pulse Oximetry BMI (Body Mass Index)          92 97.7  F (36.5  C) (Oral) 99% 30.79 kg/m2         Blood Pressure from Last 3 Encounters:   17 148/89   17 145/84   17 148/88    Weight from Last 3 Encounters:   17 92 kg (202 lb 14.4 oz)   17 90.7 kg (200 lb)   17 91 kg (200 lb 11.2 oz)              We Performed the Following     Bone Marrow Aspirate (Charge)     Bone Marrow Biopsy (Charge)     Bone marrow biopsy     CBC with platelets differential     CHROMOSOME BONE MARROW With Professional Interpretation     FISH With Professional Interpretation     Leukemia Lymphoma Evaluation (Flow Cytometry)     Pre BMT polymorphism determination recep - Bone Marrow        Recent Review Flowsheet Data     BMT Recent Results Latest Ref Rng & Units 2017    WBC 4.0 - 11.0 10e9/L 6.7 5.4    Hemoglobin 13.3 - 17.7 g/dL 8.3(L) 8.5(L)    Platelet Count 150 - 450 10e9/L 259 223    Neutrophils (Absolute) 1.6 - 8.3 10e9/L 4.3 3.5    INR 0.86 - 1.14 1.14 -    Sodium 133 - 144 mmol/L 138 -    Potassium 3.4 - 5.3 mmol/L 3.8 -    Chloride 94 - 109 mmol/L 106 -    Glucose 70 - 99 mg/dL 142(H) -    Urea Nitrogen 7 - 30 mg/dL 10 -    Creatinine 0.66 - 1.25 mg/dL 0.83 -    Calcium (Total) 8.5 - 10.1 mg/dL 8.4(L) -    Protein (Total) 6.8 - 8.8 g/dL 6.8 -    Albumin 3.4 - 5.0 g/dL 3.3(L) -    Alkaline Phosphatase 40 - 150 U/L 82 -    AST 0 - 45 U/L 18 -     ALT 0 - 70 U/L 23 -    MCV 78 - 100 fl 93 93               Primary Care Provider    None Specified       No primary provider on file.        Equal Access to Services     ASHLYN FLANAGAN : Hadruby aad ku hadray Ratliff, joana ryanmansi, raven mchugh, vishal lovenorah rene. So [facility] [phone].    ATENCIÓN: Si habla español, tiene a gusman disposición servicios gratuitos de asistencia lingüística. Llame al [phone].    We comply with applicable federal civil rights laws and [place] laws. We do not discriminate on the basis of race, color, national origin, age, disability sex, sexual orientation or gender identity.            Thank you!     Thank you for choosing [facility] BLOOD AND MARROW TRANSPLANT  for your care. Our goal is always to provide you with excellent care. Hearing back from our patients is one way we can continue to improve our services. Please take a few minutes to complete the written survey that you may receive in the mail after your visit with us. Thank you!             Your Updated Medication List - Protect others around you: Learn how to safely use, store and throw away your medicines at www.disposemymeds.org.          This list is accurate as of: 6/29/17 10:10 AM.  Always use your most recent med list.                   Brand Name Dispense Instructions for use Diagnosis    ACYCLOVIR PO      Take 400 mg by mouth 2 times daily        aspirin 81 MG tablet      Take by mouth daily        cholecalciferol 1000 UNITS capsule    vitamin  -D     Take 2 capsules by mouth daily        cyclobenzaprine 5 MG tablet    FLEXERIL     Flexeril Take 5mg by mouth at bedtime and increase gradually to 1 tablet every 8-12hours as needed for muscle pain/spasm. Max 3 tablets/day        diltiazem 120 MG 24 hr ER beaded capsule    TIAZAC     Take by mouth daily        FLONASE NA           heparin lock flush 10 UNIT/ML Soln injection      3 mLs by Intracatheter route daily In each lumen         LIPITOR PO      Take 10 mg by mouth        LOSARTAN POTASSIUM PO      Take 50 mg by mouth daily        metFORMIN 500 MG tablet    GLUCOPHAGE     Take 500 mg by mouth daily        MULTI COMPLETE PO           ondansetron 4 MG ODT tab    ZOFRAN-ODT     Take by mouth every 4 hours as needed for nausea        oxyCODONE 5 MG capsule    OXY-IR     Take 5 mg by mouth every 4 hours as needed for moderate to severe pain        zolpidem 10 MG tablet    AMBIEN     Take by mouth nightly as needed for sleep 1/2 tab to one tablet at bedtime

## 2017-06-29 NOTE — MR AVS SNAPSHOT
After Visit Summary   6/29/2017    Norman Real    MRN: [id number]           Patient Information     Date Of Birth          1960        Visit Information        Provider Department      6/29/2017 11:30 AM 1, Lena Bmt Nurse [facility] Blood and Marrow Transplant        Today's Diagnoses     Acute myeloid leukemia in remission (H)        Personal history of diseases of blood and blood-forming organs              [facility] ([facility])  [address] [place] [address]  Phone: [phone]  Clinic Hours:   Monday-Thursday:7am to 7pm   Friday: 7am to 5pm   Weekends and holidays:    8am to noon (in general)  If your fever is 100.5  or greater,   call the clinic.  After hours call the   hospital at [phone] or   [phone]. Ask for the BMT   fellow on-call            Follow-ups after your visit        Your next 10 appointments already scheduled     Jun 29, 2017  3:00 PM CDT   (Arrive by 2:45 PM)   NEW LUNG NODULE with Tim Ahn MD   [facility] ([facility] [place])    [address] [place] [address] [phone]            Jun 30, 2017   Procedure with Tim Ahn MD   [facility] [place], Endoscopy ([facility])    [address] [facility] [id number] [phone]           The [facility] is located on the corner of [facility] and [facility] on the [facility]. It is easily accessible from virtually any point in the [facility] area, via [address] and [address].            Jul 05, 2017  9:30 AM CDT   (Arrive by 9:15 AM)   BMT SW PSA with LEANDRO Yeboah,  2 114 CONSULT RM   [facility] Blood and Marrow Transplant ([facility])    [address] [place] [address] [phone]            Jul 05, 2017 11:00 AM CDT   BMT Workup Visit with LENA BMT DOM   [facility] Blood  "and Marrow Transplant ([facility] [place])    [address] [facility] [facility]  [address]nd [employer] [facility] [address] [phone]              Future tests that were ordered for you today     Open Future Orders        Priority Expected Expires Ordered    BRONCHOSCOPY W LAVAGE Routine 2017            Who to contact     If you have questions or need follow up information about today's clinic visit or your schedule please contact [facility] BLOOD AND MARROW TRANSPLANT directly at [phone].  Normal or non-critical lab and imaging results will be communicated to you by Fubleshart, letter or phone within 4 business days after the clinic has received the results. If you do not hear from us within 7 days, please contact the clinic through NuScriptRxt or phone. If you have a critical or abnormal lab result, we will notify you by phone as soon as possible.  Submit refill requests through [employer] or call your pharmacy and they will forward the refill request to us. Please allow 3 business days for your refill to be completed.          Additional Information About Your Visit        [employer] Information     [employer] lets you send messages to your doctor, view your test results, renew your prescriptions, schedule appointments and more. To sign up, go to [email or web address] . Click on \"Log in\" on the left side of the screen, which will take you to the Welcome page. Then click on \"Sign up Now\" on the right side of the page.     You will be asked to enter the access code listed below, as well as some personal information. Please follow the directions to create your username and password.     Your access code is: [email or web address]  Expires: 8/15/2017  6:30 AM     Your access code will  in 90 days. If you need help or a new code, please call your [place] clinic or [phone].        Care EveryWhere ID     This is your Care EveryWhere ID. This could be used by other organizations to access your [place] " medical records  [id number]         Blood Pressure from Last 3 Encounters:   06/29/17 148/89   06/28/17 145/84   06/26/17 148/88    Weight from Last 3 Encounters:   06/29/17 92 kg (202 lb 14.4 oz)   06/27/17 90.7 kg (200 lb)   06/26/17 91 kg (200 lb 11.2 oz)              We Performed the Following     EKG 12-lead complete w/read - Clinics        Recent Review Flowsheet Data     BMT Recent Results Latest Ref Rng & Units 6/26/2017 6/29/2017    WBC 4.0 - 11.0 10e9/L 6.7 5.4    Hemoglobin 13.3 - 17.7 g/dL 8.3(L) 8.5(L)    Platelet Count 150 - 450 10e9/L 259 223    Neutrophils (Absolute) 1.6 - 8.3 10e9/L 4.3 3.5    INR 0.86 - 1.14 1.14 -    Sodium 133 - 144 mmol/L 138 -    Potassium 3.4 - 5.3 mmol/L 3.8 -    Chloride 94 - 109 mmol/L 106 -    Glucose 70 - 99 mg/dL 142(H) -    Urea Nitrogen 7 - 30 mg/dL 10 -    Creatinine 0.66 - 1.25 mg/dL 0.83 -    Calcium (Total) 8.5 - 10.1 mg/dL 8.4(L) -    Protein (Total) 6.8 - 8.8 g/dL 6.8 -    Albumin 3.4 - 5.0 g/dL 3.3(L) -    Alkaline Phosphatase 40 - 150 U/L 82 -    AST 0 - 45 U/L 18 -    ALT 0 - 70 U/L 23 -    MCV 78 - 100 fl 93 93               Primary Care Provider    None Specified       No primary provider on file.        Equal Access to Services     ASHLYN FLANAGAN : Hadruby Ratliff, joana manning, vishal thao. So [facility] [phone].    ATENCIÓN: Si habla español, tiene a gusman disposición servicios gratuitos de asistencia lingüística. Llame al [phone].    We comply with applicable federal civil rights laws and [place] laws. We do not discriminate on the basis of race, color, national origin, age, disability sex, sexual orientation or gender identity.            Thank you!     Thank you for choosing [facility] BLOOD AND MARROW TRANSPLANT  for your care. Our goal is always to provide you with excellent care. Hearing back from our patients is one way we can continue to improve our services. Please take a  few minutes to complete the written survey that you may receive in the mail after your visit with us. Thank you!             Your Updated Medication List - Protect others around you: Learn how to safely use, store and throw away your medicines at www.disposemymeds.org.          This list is accurate as of: 6/29/17  1:51 PM.  Always use your most recent med list.                   Brand Name Dispense Instructions for use Diagnosis    ACYCLOVIR PO      Take 400 mg by mouth 2 times daily        aspirin 81 MG tablet      Take by mouth daily        cholecalciferol 1000 UNITS capsule    vitamin  -D     Take 2 capsules by mouth daily        cyclobenzaprine 5 MG tablet    FLEXERIL     Flexeril Take 5mg by mouth at bedtime and increase gradually to 1 tablet every 8-12hours as needed for muscle pain/spasm. Max 3 tablets/day        diltiazem 120 MG 24 hr ER beaded capsule    TIAZAC     Take by mouth daily        FLONASE NA           heparin lock flush 10 UNIT/ML Soln injection      3 mLs by Intracatheter route daily In each lumen        LIPITOR PO      Take 10 mg by mouth        LOSARTAN POTASSIUM PO      Take 50 mg by mouth daily        metFORMIN 500 MG tablet    GLUCOPHAGE     Take 500 mg by mouth daily        MULTI COMPLETE PO           ondansetron 4 MG ODT tab    ZOFRAN-ODT     Take by mouth every 4 hours as needed for nausea        oxyCODONE 5 MG capsule    OXY-IR     Take 5 mg by mouth every 4 hours as needed for moderate to severe pain        zolpidem 10 MG tablet    AMBIEN     Take by mouth nightly as needed for sleep 1/2 tab to one tablet at bedtime

## 2017-06-29 NOTE — MR AVS SNAPSHOT
After Visit Summary   6/29/2017    Norman Real    MRN: [id number]           Patient Information     Date Of Birth          1960        Visit Information        Provider Department      6/29/2017 10:30 AM  BMT [facility] #1 [facility] Blood and Marrow Transplant        Today's Diagnoses     Acute myeloid leukemia in remission (H)        Personal history of diseases of blood and blood-forming organs              [facility] ([facility]  [address] [place] [address]  Phone: [phone]  Clinic Hours:   Monday-Thursday:7am to 7pm   Friday: 7am to 5pm   Weekends and holidays:    8am to noon (in general)  If your fever is 100.5  or greater,   call the clinic.  After hours call the   hospital at [phone] or   [phone]. Ask for the BMT   fellow on-call           Care Instructions    No instructions/pt left     [place]          Follow-ups after your visit        Your next 10 appointments already scheduled     Jun 29, 2017  3:00 PM CDT   (Arrive by 2:45 PM)   NEW LUNG NODULE with Tim Ahn MD   [facility] ([facility])    [address] [place] [id number] [phone]            Jun 30, 2017   Procedure with Tim Ahn MD   [facility] [place], Endoscopy ([facility], [facility])    [address] [facility] [place] [id number] [phone]           The [facility] is located on the corner of [facility] and [facility] on the [facility]. It is easily accessible from virtually any point in the [place] [facility] area, via [employer] and [address]            Jul 05, 2017  9:30 AM CDT   (Arrive by 9:15 AM)   BMT SW PSA with LEANDRO Yeboah,  2 114 CONSULT RM   [facility] Blood and Marrow Transplant ([facility])    [address] [place] [id number] [phone]            Jul 05, 2017 11:00  "AM CDT   BMT Workup Visit with UC BMT DOM   [facility] Blood and Marrow Transplant ([facility])    [address]  [address] [facility] [address] [phone]              Future tests that were ordered for you today     Open Future Orders        Priority Expected Expires Ordered    BRONCHOSCOPY W LAVAGE Routine 2017            Who to contact     If you have questions or need follow up information about today's clinic visit or your schedule please contact [facility] BLOOD [facility] MARROW TRANSPL[facility] directly at [phone].  Normal or non-critical lab and imaging results will be communicated to you by Vyconhart, letter or phone within 4 business days after the clinic has received the results. If you do not hear from us within 7 days, please contact the clinic through [employer] or phone. If you have a critical or abnormal lab result, we will notify you by phone as soon as possible.  Submit refill requests through [employer] or call your pharmacy and they will forward the refill request to us. Please allow 3 business days for your refill to be completed.          Additional Information About Your Visit        MyChart Information     [employer] lets you send messages to your doctor, view your test results, renew your prescriptions, schedule appointments and more. To sign up, go to [email or web address] . Click on \"Log in\" on the left side of the screen, which will take you to the Welcome page. Then click on \"Sign up Now\" on the right side of the page.     You will be asked to enter the access code listed below, as well as some personal information. Please follow the directions to create your username and password.     Your access code is: [email or web address]  Expires: 8/15/2017  6:30 AM     Your access code will  in 90 days. If you need help or a new code, please call your [place] clinic or [phone].        Care EveryWhere ID     This is your Care EveryWhere ID. This " could be used by other organizations to access your [place] medical records  [id number]         Blood Pressure from Last 3 Encounters:   06/29/17 148/89   06/28/17 145/84   06/26/17 148/88    Weight from Last 3 Encounters:   06/29/17 92 kg (202 lb 14.4 oz)   06/27/17 90.7 kg (200 lb)   06/26/17 91 kg (200 lb 11.2 oz)              We Performed the Following     Cell count with differential CSF: Tube 1     Cytology non gyn     Glucose CSF: Tube 2     Lumbar Puncture (Charge)     Protein total CSF: Tube 3        Recent Review Flowsheet Data     BMT Recent Results Latest Ref Rng & Units 6/26/2017 6/29/2017    WBC 4.0 - 11.0 10e9/L 6.7 5.4    Hemoglobin 13.3 - 17.7 g/dL 8.3(L) 8.5(L)    Platelet Count 150 - 450 10e9/L 259 223    Neutrophils (Absolute) 1.6 - 8.3 10e9/L 4.3 3.5    INR 0.86 - 1.14 1.14 -    Sodium 133 - 144 mmol/L 138 -    Potassium 3.4 - 5.3 mmol/L 3.8 -    Chloride 94 - 109 mmol/L 106 -    Glucose 70 - 99 mg/dL 142(H) -    Urea Nitrogen 7 - 30 mg/dL 10 -    Creatinine 0.66 - 1.25 mg/dL 0.83 -    Calcium (Total) 8.5 - 10.1 mg/dL 8.4(L) -    Protein (Total) 6.8 - 8.8 g/dL 6.8 -    Albumin 3.4 - 5.0 g/dL 3.3(L) -    Alkaline Phosphatase 40 - 150 U/L 82 -    AST 0 - 45 U/L 18 -    ALT 0 - 70 U/L 23 -    MCV 78 - 100 fl 93 93               Primary Care Provider    None Specified       No primary provider on file.        Equal Access to Services     ASHLYN FLANAGAN : Greg Ratliff, joana manning, vishal thao [facility] [phone].    ATENCIÓN: Si habla español, tiene a gusman disposición servicios gratuitos de asistencia lingüística. Llame al [phone].    We comply with applicable federal civil rights laws and [place] laws. We do not discriminate on the basis of race, color, national origin, age, disability sex, sexual orientation or gender identity.            Thank you!     Thank you for choosing [facility] BLOOD AND MARROW TRANSPLANT   for your care. Our goal is always to provide you with excellent care. Hearing back from our patients is one way we can continue to improve our services. Please take a few minutes to complete the written survey that you may receive in the mail after your visit with us. Thank you!             Your Updated Medication List - Protect others around you: Learn how to safely use, store and throw away your medicines at www.disposemymeds.org.          This list is accurate as of: 6/29/17  1:01 PM.  Always use your most recent med list.                   Brand Name Dispense Instructions for use Diagnosis    ACYCLOVIR PO      Take 400 mg by mouth 2 times daily        aspirin 81 MG tablet      Take by mouth daily        cholecalciferol 1000 UNITS capsule    vitamin  -D     Take 2 capsules by mouth daily        cyclobenzaprine 5 MG tablet    FLEXERIL     Flexeril Take 5mg by mouth at bedtime and increase gradually to 1 tablet every 8-12hours as needed for muscle pain/spasm. Max 3 tablets/day        diltiazem 120 MG 24 hr ER beaded capsule    TIAZAC     Take by mouth daily        FLONASE NA           heparin lock flush 10 UNIT/ML Soln injection      3 mLs by Intracatheter route daily In each lumen        LIPITOR PO      Take 10 mg by mouth        LOSARTAN POTASSIUM PO      Take 50 mg by mouth daily        metFORMIN 500 MG tablet    GLUCOPHAGE     Take 500 mg by mouth daily        MULTI COMPLETE PO           ondansetron 4 MG ODT tab    ZOFRAN-ODT     Take by mouth every 4 hours as needed for nausea        oxyCODONE 5 MG capsule    OXY-IR     Take 5 mg by mouth every 4 hours as needed for moderate to severe pain        zolpidem 10 MG tablet    AMBIEN     Take by mouth nightly as needed for sleep 1/2 tab to one tablet at bedtime

## 2017-06-29 NOTE — NURSING NOTE
Chief Complaint   Patient presents with     RECHECK     Pre BMT for AML here for EKG       Marsha De CMA June 29, 2017 1:51 PM  An EKG was preformed on the patient.

## 2017-06-29 NOTE — NURSING NOTE
"Oncology Rooming Note    June 29, 2017 9:46 AM   Norman Real is a 56 year old male who presents for:    Chief Complaint   Patient presents with     Bone Marrow Biopsy     Pt with hx of AML here for bmbx and LP.     Initial Vitals: /89  Pulse 92  Temp 97.7  F (36.5  C) (Oral)  Wt 92 kg (202 lb 14.4 oz)  SpO2 99%  BMI 30.79 kg/m2 Estimated body mass index is 30.79 kg/(m^2) as calculated from the following:    Height as of 6/26/17: 1.729 m (5' 8.07\").    Weight as of this encounter: 92 kg (202 lb 14.4 oz). Body surface area is 2.1 meters squared.  No Pain (0) Comment: Data Unavailable   No LMP for male patient.  Allergies reviewed: Yes  Medications reviewed: Yes    Medications: Medication refills not needed today.  Pharmacy name entered into EPIC: Data Unavailable    Clinical concerns: Pt would like biopsy done on left side and versed to be administered during procedure.     0 minutes for nursing intake (face to face time)     Felipe Toscano RN              "

## 2017-06-29 NOTE — PROGRESS NOTES
BMT ONC Adult Bone Marrow Biopsy Procedure Note  June 29, 2017  Blood pressure 148/89, pulse 92, temperature 97.7  F (36.5  C), temperature source Oral, weight 92 kg (202 lb 14.4 oz), SpO2 99 %.    Learning needs assessment complete within 12 months? YES    DIAGNOSIS: AML    PROCEDURE: Unilateral Bone Marrow Biopsy and Unilateral Aspirate    [place] [facility]    Patient s identification was positively verified by verbal identification and invasive procedure safety checklist was completed. Informed consent was obtained. Following the administration of 2mg IV Midazolam as pre-medication, patient was placed in the prone position and prepped and draped in a sterile manner. Approximately 10 cc of 1% Lidocaine was used over the left posterior iliac spine. Following this a 3 mm incision was made. Trephine bone marrow core(s) was (were) obtained from the [facility]. Bone marrow aspirates were obtained from the [facility]. Aspirates were sent for morphology, immunophenotyping, cytogenetics and molecular diagnostics RFLP. A total of approximately 16 ml of marrow was aspirated. Following this procedure a sterile dressing was applied to the bone marrow biopsy site(s). The patient was placed in the supine position to maintain pressure on the biopsy site. Post-procedure wound care instructions were given.     Complications: NO    Pre-procedural pain: 0 out of 10 on the numeric pain rating scale.     Procedural pain: 2 out of 10 on the numeric pain rating scale.     Post-procedural pain assessment: 0 out of 10 on the numeric pain rating scale.     Interventions: NO    Length of procedure:20 minutes or less      Procedure performed by: Irma Templeton pa-c  [phone]

## 2017-06-29 NOTE — MR AVS SNAPSHOT
"              After Visit Summary   2017    Norman Real    MRN: [id number]           Patient Information     Date Of Birth          1960        Visit Information        Provider Department      2017 3:00 PM Tim Ahn MD [facility]        Today's Diagnoses     Fungal pneumonia    -  1       Follow-ups after your visit        Follow-up notes from your care team     Return in about 6 weeks (around 8/10/2017).      Who to contact     If you have questions or need follow up information about today's clinic visit or your schedule please contact [facility] directly at [phone].  Normal or non-critical lab and imaging results will be communicated to you by SOHMhart, letter or phone within 4 business days after the clinic has received the results. If you do not hear from us within 7 days, please contact the clinic through [employer] or phone. If you have a critical or abnormal lab result, we will notify you by phone as soon as possible.  Submit refill requests through [employer] or call your pharmacy and they will forward the refill request to us. Please allow 3 business days for your refill to be completed.          Additional Information About Your Visit        MyChart Information     [employer] lets you send messages to your doctor, view your test results, renew your prescriptions, schedule appointments and more. To sign up, go to [email or web address] . Click on \"Log in\" on the left side of the screen, which will take you to the Welcome page. Then click on \"Sign up Now\" on the right side of the page.     You will be asked to enter the access code listed below, as well as some personal information. Please follow the directions to create your username and password.     Your access code is: [email or web address]  Expires: 8/15/2017  6:30 AM     Your access code will  in 90 days. If you need help or a new code, please call your [place] clinic or [phone].   "      Care EveryWhere ID     This is your Care EveryWhere ID. This could be used by other organizations to access your [place] medical records  [id number]         Blood Pressure from Last 3 Encounters:   07/05/17 (!) 160/99   06/30/17 146/83   06/29/17 148/89    Weight from Last 3 Encounters:   06/30/17 90.7 kg (200 lb)   06/29/17 92 kg (202 lb 14.4 oz)   06/27/17 90.7 kg (200 lb)              Today, you had the following     No orders found for display         Today's Medication Changes          These changes are accurate as of: 6/29/17 11:59 PM.  If you have any questions, ask your nurse or doctor.               Start taking these medicines.        Dose/Directions    voriconazole 200 MG tablet   Commonly known as:  VFEND   Used for:  Fungal pneumonia   Started by:  Tim Ahn MD        Dose:  300 mg   Take 1.5 tablets (300 mg) by mouth 2 times daily   Quantity:  90 tablet   Refills:  3            Where to get your medicines      These medications were sent to [facility] [address] [facility] [address]  [address] [facility] [address] [place] [address]    Hours:  TRANSPLANT PHONE NUMBER [phone] Phone:  [phone]     voriconazole 200 MG tablet                Primary Care Provider    None Specified       No primary provider on file.        Equal Access to Services     ASHLYN FLANAGAN AH: Greg santiagoo Sobrenda, waaxda luqadaha, qaybta kaalmada adeegyada, vishal albright . So [facility] [phone].    ATENCIÓN: Si habla español, tiene a gusman disposición servicios gratuitos de asistencia lingüística. Llame al [phone].    We comply with applicable federal civil rights laws and [place] laws. We do not discriminate on the basis of race, color, national origin, age, disability sex, sexual orientation or gender identity.            Thank you!     Thank you for choosing [facility]  for your care. Our goal is always to  provide you with excellent care. Hearing back from our patients is one way we can continue to improve our services. Please take a few minutes to complete the written survey that you may receive in the mail after your visit with us. Thank you!             Your Updated Medication List - Protect others around you: Learn how to safely use, store and throw away your medicines at www.disposemymeds.org.          This list is accurate as of: 6/29/17 11:59 PM.  Always use your most recent med list.                   Brand Name Dispense Instructions for use Diagnosis    acetaminophen 325 MG tablet    TYLENOL     Take 650 mg by mouth        ACYCLOVIR PO      Take 400 mg by mouth 2 times daily        aspirin 81 MG tablet      Take by mouth daily        blood glucose monitoring lancets      1 time per day. To check BG for type 2 DM.        cholecalciferol 1000 UNITS capsule    vitamin  -D     Take 2 capsules by mouth daily        cyclobenzaprine 5 MG tablet    FLEXERIL     Flexeril Take 5mg by mouth at bedtime and increase gradually to 1 tablet every 8-12hours as needed for muscle pain/spasm. Max 3 tablets/day        diltiazem 120 MG 24 hr capsule      Take 120 mg by mouth        diltiazem 120 MG 24 hr ER beaded capsule    TIAZAC     Take by mouth daily        filgrastim 480 MCG/1.6ML injection    NEUPOGEN     Inject 480 mcg Subcutaneous        FLONASE NA           guaiFENesin 600 MG 12 hr tablet    MUCINEX     Take 600 mg by mouth        heparin lock flush 10 UNIT/ML Soln injection      3 mLs by Intracatheter route daily In each lumen        ibuprofen 200 MG tablet    ADVIL/MOTRIN     Take 200-600 mg by mouth        levofloxacin 500 MG tablet    LEVAQUIN     Take 500 mg by mouth        LIPITOR PO      Take 10 mg by mouth        LOSARTAN POTASSIUM PO      Take 50 mg by mouth daily        metFORMIN 500 MG tablet    GLUCOPHAGE     Take 500 mg by mouth daily        metroNIDAZOLE 500 MG tablet    FLAGYL     Take 500 mg by mouth         MULTI COMPLETE PO           ondansetron 4 MG ODT tab    ZOFRAN-ODT     Take by mouth every 4 hours as needed for nausea        oxyCODONE 5 MG capsule    OXY-IR     Take 5 mg by mouth every 4 hours as needed for moderate to severe pain        PRECISION XTRA GLUCOSE test strip   Generic drug:  blood glucose monitoring      1 strip        PSYLLIUM PO      Take 1 tsp. by mouth        tadalafil 20 MG tablet    CIALIS     Take 20 mg by mouth        * UNABLE TO FIND      Take 1 tablet by mouth        * UNABLE TO FIND           voriconazole 200 MG tablet    VFEND    90 tablet    Take 1.5 tablets (300 mg) by mouth 2 times daily    Fungal pneumonia       ZARXIO 480 MCG/0.8ML Sosy syringe   Generic drug:  filgrastim-sndz      INJECT 1 SYRINGE 0.8 ML (480 MCG) SUBCUTANEOUSLY ONCE DAILY        zolpidem 10 MG tablet    AMBIEN     Take by mouth nightly as needed for sleep 1/2 tab to one tablet at bedtime        * Notice:  This list has 2 medication(s) that are the same as other medications prescribed for you. Read the directions carefully, and ask your doctor or other care provider to review them with you.

## 2017-06-29 NOTE — PROGRESS NOTES
Reason for Visit  Norman Real is a 56 year old year old male who is being seen for Oncology Clinic Visit (new lung nodule )    Pulmonary HPI  55 YO with history of AML referred to the Pulmonary BMT clinic by Dr. Umanzor for evaluation of an abnormal CT.  Underwent 7+3 induction chemotherapy without pulmonary complications.  States had URI symptoms followed by productive cough at start of admission for chemotherapy in 4-17, states symptoms lasted for 10 days then resolved.  Does not recall being on specific antibiotics for a respiratory infection at that time.  Does not recall any CT imaging, did have CXR but does not recall that he was told it was abnormal or that he had pneumonia.  Since discharge he has not had any respiratory symptoms, no cough or SOB.  Episode of pneumonia ~ 15 years ago, not hospitalized.  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 No tobacco since 2010, 20 pk year history. CT chest shows tree and bud opacities in RML and LLL, 1.1 x 0.7 cm nodule in RLL.    The patient was seen and examined by Tim Ahn           Current Outpatient Prescriptions   Medication     filgrastim-sndz (ZARXIO) 480 MCG/0.8ML SOSY syringe     tadalafil (CIALIS) 20 MG tablet     PSYLLIUM PO     UNABLE TO FIND     levofloxacin (LEVAQUIN) 500 MG tablet     ibuprofen (ADVIL/MOTRIN) 200 MG tablet     guaiFENesin (MUCINEX) 600 MG 12 hr tablet     blood glucose monitoring (FREESTYLE) lancets     filgrastim (NEUPOGEN) 480 MCG/1.6ML injection     metroNIDAZOLE (FLAGYL) 500 MG tablet     UNABLE TO FIND     diltiazem 120 MG 24 hr capsule     acetaminophen (TYLENOL) 325 MG tablet     blood glucose monitoring (PRECISION XTRA GLUCOSE) test strip     ACYCLOVIR PO     LOSARTAN POTASSIUM PO     heparin lock flush 10 UNIT/ML SOLN injection     cyclobenzaprine (FLEXERIL) 5 MG tablet     ondansetron (ZOFRAN-ODT) 4 MG ODT tab     oxyCODONE  (OXY-IR) 5 MG capsule     Atorvastatin Calcium (LIPITOR PO)     diltiazem (TIAZAC) 120 MG 24 hr ER beaded capsule     Fluticasone Propionate (FLONASE NA)     metFORMIN (GLUCOPHAGE) 500 MG tablet     zolpidem (AMBIEN) 10 MG tablet     Multiple Vitamins-Minerals (MULTI COMPLETE PO)     cholecalciferol (VITAMIN  -D) 1000 UNITS capsule     aspirin 81 MG tablet     No current facility-administered medications for this visit.      Allergies   Allergen Reactions     Ace Inhibitors Anaphylaxis, Cough and Hives     Terazosin Swelling     Other reaction(s): Sedation/Sleepy     Past Medical History:   Diagnosis Date     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 2017     Diabetes (H)      Hypertension      Prostate cancer (H)     had radiatoin therapy     Tachycardia        Past Surgical History:   Procedure Laterality Date     CARDIAC SURGERY      ablation       Social History     Social History     Marital status:      Spouse name: N/A     Number of children: N/A     Years of education: N/A     Occupational History     Not on file.     Social History Main Topics     Smoking status: Former Smoker     Quit date: 2010     Smokeless tobacco: Not on file     Alcohol use No     Drug use: No     Sexual activity: Not on file     Other Topics Concern     Not on file     Social History Narrative       FH:  Father-  from trauma, Mother- COPD, uncle- prostate cancer  ROS Pulmonary  A complete ROS was reviewed.  There were no vitals taken for this visit.  Exam:   GENERAL APPEARANCE: Well developed, well nourished, alert, and in no apparent distress.  EYES: PERRL, EOMI  HENT: Nasal mucosa with no edema and no hyperemia. No nasal polyps.  EARS: Canals clear, TMs normal  MOUTH: Oral mucosa is moist, without any lesions, no tonsillar enlargement, no oropharyngeal exudate.  NECK: supple, no masses, no thyromegaly.  LYMPHATICS: No significant axillary, cervical, or supraclavicular nodes.  RESP:  Good air flow  throughout.  No crackles. No rhonchi. No wheezes.  CV: Normal S1, S2, regular rhythm, normal rate. No murmur.  No rub. No gallop. No LE edema.   ABDOMEN:  Bowel sounds normal, soft, nontender, no HSM or masses.   MS: extremities normal. No clubbing. No cyanosis.  SKIN: no rash on limited exam  NEURO: Mentation intact, speech normal, normal strength and tone, normal gait and stance  PSYCH: mentation appears normal. and affect normal/bright  Results:  PFT's personally reviewed in clinic  FVC 4.54 (107%), FEV-1 3.40 (101%), FEV-1/FVC 75%  %, %, %  DLCO 95%  No airflow limitation.  Normal lung volumes.  Normal diffusion capacity.  Recent Results (from the past 168 hour(s))   Routine UA with microscopic    Collection Time: 06/26/17  2:30 PM   Result Value Ref Range    Color Urine Yellow     Appearance Urine Clear     Glucose Urine Negative NEG mg/dL    Bilirubin Urine Negative NEG    Ketones Urine Negative NEG mg/dL    Specific Gravity Urine 1.012 1.003 - 1.035    Blood Urine Negative NEG    pH Urine 5.0 5.0 - 7.0 pH    Protein Albumin Urine Negative NEG mg/dL    Urobilinogen mg/dL 0.0 0.0 - 2.0 mg/dL    Nitrite Urine Negative NEG    Leukocyte Esterase Urine Negative NEG    Source Unspecified Urine     WBC Urine 1 0 - 2 /HPF    RBC Urine 1 0 - 2 /HPF    Mucous Urine Present (A) NEG /LPF   INR    Collection Time: 06/26/17  2:55 PM   Result Value Ref Range    INR 1.14 0.86 - 1.14   Partial thromboplastin time    Collection Time: 06/26/17  2:55 PM   Result Value Ref Range    PTT 30 22 - 37 sec   CBC with platelets differential    Collection Time: 06/26/17  2:55 PM   Result Value Ref Range    WBC 6.7 4.0 - 11.0 10e9/L    RBC Count 2.80 (L) 4.4 - 5.9 10e12/L    Hemoglobin 8.3 (L) 13.3 - 17.7 g/dL    Hematocrit 26.0 (L) 40.0 - 53.0 %    MCV 93 78 - 100 fl    MCH 29.6 26.5 - 33.0 pg    MCHC 31.9 31.5 - 36.5 g/dL    RDW 19.2 (H) 10.0 - 15.0 %    Platelet Count 259 150 - 450 10e9/L    Diff Method Automated  Method     % Neutrophils 64.2 %    % Lymphocytes 13.5 %    % Monocytes 20.4 %    % Eosinophils 0.0 %    % Basophils 0.9 %    % Immature Granulocytes 1.0 %    Nucleated RBCs 4 (H) 0 /100    Absolute Neutrophil 4.3 1.6 - 8.3 10e9/L    Absolute Lymphocytes 0.9 0.8 - 5.3 10e9/L    Absolute Monocytes 1.4 (H) 0.0 - 1.3 10e9/L    Absolute Eosinophils 0.0 0.0 - 0.7 10e9/L    Absolute Basophils 0.1 0.0 - 0.2 10e9/L    Abs Immature Granulocytes 0.1 0 - 0.4 10e9/L    Absolute Nucleated RBC 0.3    Varicella Zoster Virus Antibody IgG    Collection Time: 06/26/17  2:55 PM   Result Value Ref Range    Varicella Zoster Virus Antibody IgG 1.5 (H) 0.0 - 0.8 AI   Herpes Simplex Virus 1 and 2 IgG    Collection Time: 06/26/17  2:55 PM   Result Value Ref Range    Herpes Simplex Virus Type 1 IgG 1.6 (H) 0.0 - 0.8 AI    Herpes Simplex Virus Type 2 IgG  0.0 - 0.8 AI     <0.2  No HSV-2 IgG antibodies detected.   Antibody index (AI) values reflect qualitative changes in antibody   concentration that cannot be directly associated with clinical condition or   disease state.     Hepatitis B Surface Antibody    Collection Time: 06/26/17  2:55 PM   Result Value Ref Range    Hepatitis B Surface Antibody 2.70 <8.00 m[IU]/mL   Comprehensive metabolic panel    Collection Time: 06/26/17  2:55 PM   Result Value Ref Range    Sodium 138 133 - 144 mmol/L    Potassium 3.8 3.4 - 5.3 mmol/L    Chloride 106 94 - 109 mmol/L    Carbon Dioxide 25 20 - 32 mmol/L    Anion Gap 7 3 - 14 mmol/L    Glucose 142 (H) 70 - 99 mg/dL    Urea Nitrogen 10 7 - 30 mg/dL    Creatinine 0.83 0.66 - 1.25 mg/dL    GFR Estimate >90  Non  GFR Calc   >60 mL/min/1.7m2    GFR Estimate If Black >90   GFR Calc   >60 mL/min/1.7m2    Calcium 8.4 (L) 8.5 - 10.1 mg/dL    Bilirubin Total 0.5 0.2 - 1.3 mg/dL    Albumin 3.3 (L) 3.4 - 5.0 g/dL    Protein Total 6.8 6.8 - 8.8 g/dL    Alkaline Phosphatase 82 40 - 150 U/L    ALT 23 0 - 70 U/L    AST 18 0 - 45 U/L   Uric  acid    Collection Time: 06/26/17  2:55 PM   Result Value Ref Range    Uric Acid 6.4 3.5 - 7.2 mg/dL   CMV Antibody IgG - BMT recipient    Collection Time: 06/26/17  2:55 PM   Result Value Ref Range    CMV Antibody IgG 4.2 (H) 0.0 - 0.8 AI   Hepatitis C antibody    Collection Time: 06/26/17  2:55 PM   Result Value Ref Range    Hepatitis C Antibody  NR     Nonreactive   Assay performance characteristics have not been established for newborns,   infants, and children     Hepatitis B core antibody-BMT recipient    Collection Time: 06/26/17  2:55 PM   Result Value Ref Range    Hepatitis B Core Vandana Nonreactive NR   Anti Treponema    Collection Time: 06/26/17  2:55 PM   Result Value Ref Range    Treponema pallidum Antibody Negative NEG   HIV Antigen Antibody Combo - BMT recipient    Collection Time: 06/26/17  2:55 PM   Result Value Ref Range    HIV Antigen Antibody Combo  NR     Nonreactive   HIV-1 p24 Ag & HIV-1/HIV-2 Ab Not Detected     EBV Capsid Antibody IgG - BMT recipient    Collection Time: 06/26/17  2:55 PM   Result Value Ref Range    EBV Capsid Antibody IgG 1.1 (H) 0.0 - 0.8 AI   Hepatitis B surface antigen-BMT recipient    Collection Time: 06/26/17  2:55 PM   Result Value Ref Range    Hep B Surface Agn Nonreactive NR   HTLV I and 2 antibody with reflex-BMT recipient    Collection Time: 06/26/17  2:55 PM   Result Value Ref Range    HTLV I/II Antibodies       Negative  Reference range: Negative  (Note)  Based on the non-reactive anti-HTLV WINTER screen, the HTLV  Western Blot is not indicated and therefore not performed.  INTERPRETIVE INFORMATION:  HTLV I/II Antibodies w/Reflex                            to Confirm  This assay should not be used for blood donor screening,  associated re-entry protocols, or for screening Human Cell,  Tissues and Cellular and Tissue-Based Products (HCT/P).  Performed by [facility],  [address] [place] [address] [phone]  [email or web address], Damien Gil MD, Lab. Director      Trypanosoma Cruzi-BMT recipient    Collection Time: 06/26/17  2:55 PM   Result Value Ref Range    Trypanosoma Cruzi  NR     Nonreactive   Tests performed at [facility] [place] [address]     ABO/Rh type and screen    Collection Time: 06/26/17  3:28 PM   Result Value Ref Range    ABO O     RH(D)  Pos     Antibody Screen Neg     Test Valid Only At       [facility]    Specimen Expires 06/29/2017    HBV HCV HIV WNV by STONEY-BMT recipient    Collection Time: 06/26/17  3:29 PM   Result Value Ref Range    MPX Series       Nonreactive   MPX Series: Failure to detect HIV, HCV or HBV by PCR does not rule out the   possibility of infection. This result should be evaluated in the context of the   individual's risk factors and clinical findings.      West Nile Virus by PCR Nonreactive    General PFT Lab (Please always keep checked)    Collection Time: 06/28/17 12:45 PM   Result Value Ref Range    FVC-Pred 4.24 L    FVC-Pre 4.54 L    FVC-%Pred-Pre 107 %    FEV1-Pre 3.40 L    FEV1-%Pred-Pre 101 %    FEV1FVC-Pred 79 %    FEV1FVC-Pre 75 %    FEFMax-Pred 9.24 L/sec    FEFMax-Pre 10.24 L/sec    FEFMax-%Pred-Pre 110 %    FEF2575-Pred 2.96 L/sec    FEF2575-Pre 2.52 L/sec    GUT4459-%Pred-Pre 85 %    ExpTime-Pre 8.64 sec    FIFMax-Pre 7.78 L/sec    VC-Pred 4.89 L    VC-Pre 4.76 L    VC-%Pred-Pre 97 %    IC-Pred 3.83 L    IC-Pre 3.57 L    IC-%Pred-Pre 93 %    ERV-Pred 1.06 L    ERV-Pre 1.19 L    ERV-%Pred-Pre 112 %    FEV1FEV6-Pred 80 %    FEV1FEV6-Pre 76 %    FRCPleth-Pred 3.52 L    FRCPleth-Pre 3.54 L    FRCPleth-%Pred-Pre 100 %    RVPleth-Pred 2.31 L    RVPleth-Pre 2.35 L    RVPleth-%Pred-Pre 101 %    TLCPleth-Pred 6.92 L    TLCPleth-Pre 7.12 L    TLCPleth-%Pred-Pre 102 %    DLCOunc-Pred 28.80 ml/min/mmHg    DLCOunc-Pre 27.62 ml/min/mmHg    DLCOunc-%Pred-Pre 95 %    DLCOcor-Pre 36.25 ml/min/mmHg    DLCOcor-%Pred-Pre 125 %    VA-Pre 6.57 L    VA-%Pred-Pre 99 %    FEV1SVC-Pred  69 %    FEV1SVC-Pre 71 %   CBC with platelets differential    Collection Time: 06/29/17  9:20 AM   Result Value Ref Range    WBC 5.4 4.0 - 11.0 10e9/L    RBC Count 2.88 (L) 4.4 - 5.9 10e12/L    Hemoglobin 8.5 (L) 13.3 - 17.7 g/dL    Hematocrit 26.9 (L) 40.0 - 53.0 %    MCV 93 78 - 100 fl    MCH 29.5 26.5 - 33.0 pg    MCHC 31.6 31.5 - 36.5 g/dL    RDW 20.2 (H) 10.0 - 15.0 %    Platelet Count 223 150 - 450 10e9/L    Diff Method Automated Method     % Neutrophils 65.1 %    % Lymphocytes 13.3 %    % Monocytes 18.7 %    % Eosinophils 0.0 %    % Basophils 1.8 %    % Immature Granulocytes 1.1 %    Nucleated RBCs 5 (H) 0 /100    Absolute Neutrophil 3.5 1.6 - 8.3 10e9/L    Absolute Lymphocytes 0.7 (L) 0.8 - 5.3 10e9/L    Absolute Monocytes 1.0 0.0 - 1.3 10e9/L    Absolute Eosinophils 0.0 0.0 - 0.7 10e9/L    Absolute Basophils 0.1 0.0 - 0.2 10e9/L    Abs Immature Granulocytes 0.1 0 - 0.4 10e9/L    Absolute Nucleated RBC 0.3    Glucose CSF: Tube 2    Collection Time: 06/29/17 10:55 AM   Result Value Ref Range    Glucose CSF 78 (H) 40 - 70 mg/dL   Protein total CSF: Tube 3    Collection Time: 06/29/17 10:55 AM   Result Value Ref Range    Protein Total CSF 43 15 - 60 mg/dL       Assessment and plan:   58 YO with AML, no current pulmonary symptoms but has an abnormal CT worrisome for an atypical infection.  Pulmonary symptoms at time of admission for induction chemotherapy but these resolved within a week or 10 days.  No prior history of pulmonary disease.    1. Bronchoscopy in am with lavage of superior and posterior segments of LLL  2. Start Vfend, continue until WBC recovers after transplant and CT normalizes  3. Ok to proceed with transplant as long he is on treatment dosages of anti-fungal agents  4. Needs f/u CT in 4-6 weeks  5. Will follow pulmonary nodule, may be related to infection but has 20 pk yr tobacco history so will need to exclude malignancy.    RTC in 6 weeks or after discharged from transplant admission.   Patient to call with any questions or concerns

## 2017-06-30 NOTE — IP AVS SNAPSHOT
[facility], [place], Endoscopy    [address] [address]    Phone:  [phone]                                       After Visit Summary   6/30/2017    Norman Real    MRN: [id number]           After Visit Summary Signature Page     I have received my discharge instructions, and my questions have been answered. I have discussed any challenges I see with this plan with the nurse or doctor.    ..........................................................................................................................................  Patient/Patient Representative Signature      ..........................................................................................................................................  Patient Representative Print Name and Relationship to Patient    ..................................................               ................................................  Date                                            Time    ..........................................................................................................................................  Reviewed by Signature/Title    ...................................................              ..............................................  Date                                                            Time

## 2017-06-30 NOTE — DISCHARGE INSTRUCTIONS
Discharge Instructions after Bronchoscopy    Activity   You had medicine to relax and for pain. You may feel dizzy or sleepy.  For 24 hours:    Do not drive or use heavy equipment.    Do not make important decisions.    Do not drink any alcohol.    Diet   When you can swallow easily, you may go back to your regular diet, medicines  and light exercise.    Follow-up   We took fluid samples to study. We will call you with the results in about 10 business days.    Call right away if you have:    Unusual chest pain    Temperature above 100.6  F (37.5  C)    Coughing that does not stop.    If you have severe pain, bleeding, or shortness of breath, go to an emergency room.    If you have questions, call:  Monday to Friday, 7 a.m. to 4:30 p.m.  Endoscopy: [phone] (We may have to call you back)    After hours:  Hospital: [phone] (Ask for the pulmonary fellow on call)

## 2017-06-30 NOTE — OR NURSING
Procedure: Bronchoscopy with lavage  Sedation: Conscious sedation  O2: 2 LPM NC, room air post  Tolerated: 2-4 LPM during procedure, room air post. VS stable during and post procedure, no abd or chest pain noted  Specimens: Specimens handled by RT  Report: Given to recovery RN  Pt to recovery area in stable condition, accompanied by RN    Aleksandra Rice RN

## 2017-06-30 NOTE — IP AVS SNAPSHOT
MRN:[id number]                      After Visit Summary   6/30/2017    Norman Real    MRN: [id number]           Thank you!     Thank you for choosing [facility] for your care. Our goal is always to provide you with excellent care. Hearing back from our patients is one way we can continue to improve our services. Please take a few minutes to complete the written survey that you may receive in the mail after you visit with us. Thank you!        Patient Information     Date Of Birth          1960        About your hospital stay     You were admitted on:  June 30, 2017 You last received care in the:  [facility], [facility], Endoscopy    You were discharged on:  June 30, 2017       Who to Call     For medical emergencies, please call 911.  For non-urgent questions about your medical care, please call your primary care provider or clinic, None  For questions related to your surgery, please call your surgery clinic        Attending Provider     Provider Tim Haro MD Internal Medicine       Primary Care Provider    None Specified      Your next 10 appointments already scheduled     Jul 05, 2017  9:30 AM CDT   (Arrive by 9:15 AM)   BMT SW PSA with LEANDRO Yeboah,  2 114 CONSULT [facility] Blood and Marrow Transplant [facility])    [address] [place] [id number] [phone]            Jul 05, 2017 11:00 AM CDT   BMT Workup Visit with [facility] Blood and Marrow Transplant [facility])    [address] [place] [id number]   [phone]              Further instructions from your care team       Discharge Instructions after Bronchoscopy    Activity   You had medicine to relax and for pain. You may feel dizzy or sleepy.  For 24 hours:    Do not drive or use heavy equipment.    Do not make important decisions.    Do not drink any alcohol.    Diet   When you  "can swallow easily, you may go back to your regular diet, medicines  and light exercise.    Follow-up   We took fluid samples to study. We will call you with the results in about 10 business days.    Call right away if you have:    Unusual chest pain    Temperature above 100.6  F (37.5  C)    Coughing that does not stop.    If you have severe pain, bleeding, or shortness of breath, go to an emergency room.    If you have questions, call:  Monday to Friday, 7 a.m. to 4:30 p.m.  Endoscopy: [phone] (We may have to call you back)    After hours:  Hospital: [phone] (Ask for the pulmonary fellow on call)    Pending Results     Date and Time Order Name Status Description    6/30/2017 0919 Respiratory Virus Panel by PCR In process     6/30/2017 0919 Aspergillus Galactomannan Agn Bronchial In process     6/30/2017 0919 Bronchial Culture Aerobic Bacterial In process     6/30/2017 0919 Nocardia culture In process     6/30/2017 0919 Legionella culture In process     6/30/2017 0919 Gram stain In process     6/30/2017 0919 Fungus Culture, non-blood In process     6/30/2017 0919 CMV DNA quantification In process     6/30/2017 0919 Cell count with differential fluid In process     6/30/2017 0919 AFB Stain Non Blood In process     6/30/2017 0919 AFB Culture Non Blood In process     6/29/2017 0912 PRE BMT POLYMORPHISM DETERMINATION RECEP In process     6/29/2017 0912 FISH In process     6/29/2017 0912 CHROMOSOME BONE MARROW In process     6/29/2017 0912 BONE MARROW BIOPSY In process             Admission Information     Date & Time Provider Department Dept. Phone    6/30/2017 Tim Ahn MD [facility], [place], Endoscopy [phone]      Your Vitals Were     Blood Pressure Respirations Height Weight Pulse Oximetry BMI (Body Mass Index)    146/83 10 1.753 m (5' 9\") 90.7 kg (200 lb) 94% 29.53 kg/m2      MyChart Information     [employer] lets you send messages to your doctor, view your test results, renew your prescriptions, " "schedule appointments and more. To sign up, go to [email or web address] . Click on \"Log in\" on the left side of the screen, which will take you to the Welcome page. Then click on \"Sign up Now\" on the right side of the page.     You will be asked to enter the access code listed below, as well as some personal information. Please follow the directions to create your username and password.     Your access code is: [email or web address]  Expires: 8/15/2017  6:30 AM     Your access code will  in 90 days. If you need help or a new code, please call your [place] clinic or [phone].        Care EveryWhere ID     This is your Care EveryWhere ID. This could be used by other organizations to access your [place] medical records  [id number]        Equal Access to Services     ASHLYN FLANAGAN : Greg Ratliff, joana manning, [facility]ven mchugh, vishal albright . So [facility] [phone].    ATENCIÓN: Si habla español, tiene a gusman disposición servicios gratuitos de asistencia lingüística. Llame al [phone].    We comply with applicable federal civil rights laws and [place] laws. We do not discriminate on the basis of race, color, national origin, age, disability sex, sexual orientation or gender identity.               Review of your medicines      UNREVIEWED medicines. Ask your doctor about these medicines        Dose / Directions    acetaminophen 325 MG tablet   Commonly known as:  TYLENOL        Dose:  650 mg   Take 650 mg by mouth   Refills:  0       ACYCLOVIR PO        Dose:  400 mg   Take 400 mg by mouth 2 times daily   Refills:  0       aspirin 81 MG tablet        Take by mouth daily   Refills:  0       cholecalciferol 1000 UNITS capsule   Commonly known as:  vitamin  -D        Dose:  2 capsule   Take 2 capsules by mouth daily   Refills:  0       cyclobenzaprine 5 MG tablet   Commonly known as:  FLEXERIL        Flexeril Take 5mg by mouth at bedtime and increase gradually " to 1 tablet every 8-12hours as needed for muscle pain/spasm. Max 3 tablets/day   Refills:  0       diltiazem 120 MG 24 hr capsule        Dose:  120 mg   Take 120 mg by mouth   Refills:  0       diltiazem 120 MG 24 hr ER beaded capsule   Commonly known as:  TIAZAC        Take by mouth daily   Refills:  0       filgrastim 480 MCG/1.6ML injection   Commonly known as:  NEUPOGEN        Dose:  480 mcg   Inject 480 mcg Subcutaneous   Refills:  0       FLONASE NA        Refills:  0       guaiFENesin 600 MG 12 hr tablet   Commonly known as:  MUCINEX        Dose:  600 mg   Take 600 mg by mouth   Refills:  0       heparin lock flush 10 UNIT/ML Soln injection        Dose:  3 mL   3 mLs by Intracatheter route daily In each lumen   Refills:  0       ibuprofen 200 MG tablet   Commonly known as:  ADVIL/MOTRIN        Dose:  200-600 mg   Take 200-600 mg by mouth   Refills:  0       levofloxacin 500 MG tablet   Commonly known as:  LEVAQUIN        Dose:  500 mg   Take 500 mg by mouth   Refills:  0       LIPITOR PO        Dose:  10 mg   Take 10 mg by mouth   Refills:  0       LOSARTAN POTASSIUM PO        Dose:  50 mg   Take 50 mg by mouth daily   Refills:  0       metFORMIN 500 MG tablet   Commonly known as:  GLUCOPHAGE        Dose:  500 mg   Take 500 mg by mouth daily   Refills:  0       metroNIDAZOLE 500 MG tablet   Commonly known as:  FLAGYL        Dose:  500 mg   Take 500 mg by mouth   Refills:  0       MULTI COMPLETE PO        Refills:  0       ondansetron 4 MG ODT tab   Commonly known as:  ZOFRAN-ODT        Take by mouth every 4 hours as needed for nausea   Refills:  0       oxyCODONE 5 MG capsule   Commonly known as:  OXY-IR        Dose:  5 mg   Take 5 mg by mouth every 4 hours as needed for moderate to severe pain   Refills:  0       PSYLLIUM PO        Dose:  1 tsp.   Take 1 tsp. by mouth   Refills:  0       tadalafil 20 MG tablet   Commonly known as:  CIALIS        Dose:  20 mg   Take 20 mg by mouth   Refills:  0       * UNABLE  TO FIND        Dose:  1 tablet   Take 1 tablet by mouth   Refills:  0       * UNABLE TO FIND        Refills:  0       voriconazole 200 MG tablet   Commonly known as:  VFEND   Used for:  Fungal pneumonia        Dose:  300 mg   Take 1.5 tablets (300 mg) by mouth 2 times daily   Quantity:  90 tablet   Refills:  3       ZARXIO 480 MCG/0.8ML Sosy syringe   Generic drug:  filgrastim-sndz        INJECT 1 SYRINGE 0.8 ML (480 MCG) SUBCUTANEOUSLY ONCE DAILY   Refills:  0       zolpidem 10 MG tablet   Commonly known as:  AMBIEN        Take by mouth nightly as needed for sleep 1/2 tab to one tablet at bedtime   Refills:  0       * Notice:  This list has 2 medication(s) that are the same as other medications prescribed for you. Read the directions carefully, and ask your doctor or other care provider to review them with you.      CONTINUE these medicines which have NOT CHANGED        Dose / Directions    blood glucose monitoring lancets        1 time per day. To check BG for type 2 DM.   Refills:  0       PRECISION XTRA GLUCOSE test strip   Generic drug:  blood glucose monitoring        Dose:  1 strip   1 strip   Refills:  0                Protect others around you: Learn how to safely use, store and throw away your medicines at www.disposemymeds.org.             Medication List: This is a list of all your medications and when to take them. Check marks below indicate your daily home schedule. Keep this list as a reference.      Medications           Morning Afternoon Evening Bedtime As Needed    acetaminophen 325 MG tablet   Commonly known as:  TYLENOL   Take 650 mg by mouth                                ACYCLOVIR PO   Take 400 mg by mouth 2 times daily                                aspirin 81 MG tablet   Take by mouth daily                                blood glucose monitoring lancets   1 time per day. To check BG for type 2 DM.                                cholecalciferol 1000 UNITS capsule   Commonly known as:  vitamin   -D   Take 2 capsules by mouth daily                                cyclobenzaprine 5 MG tablet   Commonly known as:  FLEXERIL   Flexeril Take 5mg by mouth at bedtime and increase gradually to 1 tablet every 8-12hours as needed for muscle pain/spasm. Max 3 tablets/day                                diltiazem 120 MG 24 hr capsule   Take 120 mg by mouth                                diltiazem 120 MG 24 hr ER beaded capsule   Commonly known as:  TIAZAC   Take by mouth daily                                filgrastim 480 MCG/1.6ML injection   Commonly known as:  NEUPOGEN   Inject 480 mcg Subcutaneous                                FLONASE NA                                guaiFENesin 600 MG 12 hr tablet   Commonly known as:  MUCINEX   Take 600 mg by mouth                                heparin lock flush 10 UNIT/ML Soln injection   3 mLs by Intracatheter route daily In each lumen                                ibuprofen 200 MG tablet   Commonly known as:  ADVIL/MOTRIN   Take 200-600 mg by mouth                                levofloxacin 500 MG tablet   Commonly known as:  LEVAQUIN   Take 500 mg by mouth                                LIPITOR PO   Take 10 mg by mouth                                LOSARTAN POTASSIUM PO   Take 50 mg by mouth daily                                metFORMIN 500 MG tablet   Commonly known as:  GLUCOPHAGE   Take 500 mg by mouth daily                                metroNIDAZOLE 500 MG tablet   Commonly known as:  FLAGYL   Take 500 mg by mouth                                MULTI COMPLETE PO                                ondansetron 4 MG ODT tab   Commonly known as:  ZOFRAN-ODT   Take by mouth every 4 hours as needed for nausea                                oxyCODONE 5 MG capsule   Commonly known as:  OXY-IR   Take 5 mg by mouth every 4 hours as needed for moderate to severe pain                                PRECISION XTRA GLUCOSE test strip   1 strip   Generic drug:  blood glucose  monitoring                                PSYLLIUM PO   Take 1 tsp. by mouth                                tadalafil 20 MG tablet   Commonly known as:  CIALIS   Take 20 mg by mouth                                * UNABLE TO FIND   Take 1 tablet by mouth                                * UNABLE TO FIND                                voriconazole 200 MG tablet   Commonly known as:  VFEND   Take 1.5 tablets (300 mg) by mouth 2 times daily                                ZARXIO 480 MCG/0.8ML Sosy syringe   INJECT 1 SYRINGE 0.8 ML (480 MCG) SUBCUTANEOUSLY ONCE DAILY   Generic drug:  filgrastim-sndz                                zolpidem 10 MG tablet   Commonly known as:  AMBIEN   Take by mouth nightly as needed for sleep 1/2 tab to one tablet at bedtime                                * Notice:  This list has 2 medication(s) that are the same as other medications prescribed for you. Read the directions carefully, and ask your doctor or other care provider to review them with you.

## 2017-07-05 NOTE — NURSING NOTE
"Oncology Rooming Note    July 5, 2017 11:10 AM   Norman Real is a 56 year old male who presents for:    Chief Complaint   Patient presents with     RECHECK     bmt work up exit interview     Initial Vitals: BP (!) 160/99  Pulse 96  Temp 98.6  F (37  C)  Resp 16 Estimated body mass index is 29.53 kg/(m^2) as calculated from the following:    Height as of 6/30/17: 1.753 m (5' 9\").    Weight as of 6/30/17: 90.7 kg (200 lb). There is no height or weight on file to calculate BSA.  No Pain (0) Comment: Data Unavailable   No LMP for male patient.  Allergies reviewed: Yes  Medications reviewed: Yes    Medications: Medication refills not needed today.  Pharmacy name entered into EPIC: Data Unavailable    Clinical concerns: feet swelled up yesterday, pt is  Wondering if that is a side effect of Vori     12 minutes for nursing intake (face to face time)     Tori Schofield RN              "

## 2017-07-05 NOTE — MR AVS SNAPSHOT
After Visit Summary   7/5/2017    Norman eRal    MRN: [id number]           Patient Information     Date Of Birth          1960        Visit Information        Provider Department      7/5/2017 11:00 AM  BMT [facility] Blood and Marrow Transplant        Today's Diagnoses     Pulmonary nodules    -  1    Acute myeloid leukemia in remission (H)        Personal history of diseases of blood and blood-forming organs              [facility] [place]  [address] [place] [address]  Phone: [phone]  Clinic Hours:   Monday-Thursday:7am to 7pm   Friday: 7am to 5pm   Weekends and holidays:    8am to noon (in general)  If your fever is 100.5  or greater,   call the clinic.  After hours call the   hospital at [phone] or   [phone]. Ask for the BMT   fellow on-call            Follow-ups after your visit        Additional Services     INFECTIOUS DISEASE REFERRAL       Your provider has referred you to: [facility]: [facility] (Infectious Disease and HIV Clinic) [facility] [phone]   [email or web address]    PLEASE SEE PT FOR BMT CLEARANCE. + GALACTOMANNAN ANTIGEN.     Please be aware that coverage of these services is subject to the terms and limitations of your health insurance plan.  Call member services at your health plan with any benefit or coverage questions.      Please bring the following with you to your appointment:    (1) Any X-Rays, CTs or MRIs which have been performed.  Contact the facility where they were done to arrange for  prior to your scheduled appointment.    (2) List of current medications   (3) This referral request   (4) Any documents/labs given to you for this referral                  Who to contact     If you have questions or need follow up information about today's clinic visit or your schedule please contact [facility] BLOOD AND MARROW TRANSPLANT directly at  "[phone].  Normal or non-critical lab and imaging results will be communicated to you by MyChart, letter or phone within 4 business days after the clinic has received the results. If you do not hear from us within 7 days, please contact the clinic through [email or web address] or phone. If you have a critical or abnormal lab result, we will notify you by phone as soon as possible.  Submit refill requests through [employer] or call your pharmacy and they will forward the refill request to us. Please allow 3 business days for your refill to be completed.          Additional Information About Your Visit        [email or web address] Information     [employer] lets you send messages to your doctor, view your test results, renew your prescriptions, schedule appointments and more. To sign up, go to [email or web address] . Click on \"Log in\" on the left side of the screen, which will take you to the Welcome page. Then click on \"Sign up Now\" on the right side of the page.     You will be asked to enter the access code listed below, as well as some personal information. Please follow the directions to create your username and password.     Your access code is: [email or web address]  Expires: 8/15/2017  6:30 AM     Your access code will  in 90 days. If you need help or a new code, please call your [place] clinic or [phone].        Care EveryWhere ID     This is your Care EveryWhere ID. This could be used by other organizations to access your [place] medical records  [id number]        Your Vitals Were     Pulse Temperature Respirations             96 98.6  F (37  C) 16          Blood Pressure from Last 3 Encounters:   17 (!) 160/99   17 146/83   17 148/89    Weight from Last 3 Encounters:   17 90.7 kg (200 lb)   17 92 kg (202 lb 14.4 oz)   17 90.7 kg (200 lb)              We Performed the Following     History and physical by physician     INFECTIOUS DISEASE REFERRAL     PROLONGED SERV,OFFICE,1ST HR        Recent " Review Flowsheet Data     BMT Recent Results Latest Ref Rng & Units 6/26/2017 6/29/2017 6/30/2017    WBC 4.0 - 11.0 10e9/L 6.7 5.4 -    Hemoglobin 13.3 - 17.7 g/dL 8.3(L) 8.5(L) -    Platelet Count 150 - 450 10e9/L 259 223 -    Neutrophils (Absolute) 1.6 - 8.3 10e9/L 4.3 3.5 -    INR 0.86 - 1.14 1.14 - -    Sodium 133 - 144 mmol/L 138 - -    Potassium 3.4 - 5.3 mmol/L 3.8 - -    Chloride 94 - 109 mmol/L 106 - -    Glucose 70 - 99 mg/dL 142(H) - 125(H)    Urea Nitrogen 7 - 30 mg/dL 10 - -    Creatinine 0.66 - 1.25 mg/dL 0.83 - -    Calcium (Total) 8.5 - 10.1 mg/dL 8.4(L) - -    Protein (Total) 6.8 - 8.8 g/dL 6.8 - -    Albumin 3.4 - 5.0 g/dL 3.3(L) - -    Alkaline Phosphatase 40 - 150 U/L 82 - -    AST 0 - 45 U/L 18 - -    ALT 0 - 70 U/L 23 - -    MCV 78 - 100 fl 93 93 -               Primary Care Provider    None Specified       No primary provider on file.        Equal Access to Services     ASHLYN FLANAGAN : Greg ta Sobrenda, waaxda luqadaha, qaybta kaalvishal fletcher. So [facility] [phone].    ATENCIÓN: Si habla español, tiene a gusman disposición servicios gratuitos de asistencia lingüística. Llame al [phone].    We comply with applicable federal civil rights laws and [place] laws. We do not discriminate on the basis of race, color, national origin, age, disability sex, sexual orientation or gender identity.            Thank you!     Thank you for choosing [facility] BLOOD AND MARROW TRANSPLANT  for your care. Our goal is always to provide you with excellent care. Hearing back from our patients is one way we can continue to improve our services. Please take a few minutes to complete the written survey that you may receive in the mail after your visit with us. Thank you!             Your Updated Medication List - Protect others around you: Learn how to safely use, store and throw away your medicines at www.disposemymeds.org.          This list is accurate as  of: 7/5/17  3:45 PM.  Always use your most recent med list.                   Brand Name Dispense Instructions for use Diagnosis    acetaminophen 325 MG tablet    TYLENOL     Take 650 mg by mouth        ACYCLOVIR PO      Take 400 mg by mouth 2 times daily        aspirin 81 MG tablet      Take by mouth daily        blood glucose monitoring lancets      1 time per day. To check BG for type 2 DM.        cholecalciferol 1000 UNITS capsule    vitamin  -D     Take 2 capsules by mouth daily        cyclobenzaprine 5 MG tablet    FLEXERIL     Flexeril Take 5mg by mouth at bedtime and increase gradually to 1 tablet every 8-12hours as needed for muscle pain/spasm. Max 3 tablets/day        diltiazem 120 MG 24 hr capsule      Take 120 mg by mouth        diltiazem 120 MG 24 hr ER beaded capsule    TIAZAC     Take by mouth daily        filgrastim 480 MCG/1.6ML injection    NEUPOGEN     Inject 480 mcg Subcutaneous        FLONASE NA           guaiFENesin 600 MG 12 hr tablet    MUCINEX     Take 600 mg by mouth        heparin lock flush 10 UNIT/ML Soln injection      3 mLs by Intracatheter route daily In each lumen        ibuprofen 200 MG tablet    ADVIL/MOTRIN     Take 200-600 mg by mouth        levofloxacin 500 MG tablet    LEVAQUIN     Take 500 mg by mouth        LIPITOR PO      Take 10 mg by mouth        LOSARTAN POTASSIUM PO      Take 50 mg by mouth daily        metFORMIN 500 MG tablet    GLUCOPHAGE     Take 500 mg by mouth daily        metroNIDAZOLE 500 MG tablet    FLAGYL     Take 500 mg by mouth        MULTI COMPLETE PO           ondansetron 4 MG ODT tab    ZOFRAN-ODT     Take by mouth every 4 hours as needed for nausea        oxyCODONE 5 MG capsule    OXY-IR     Take 5 mg by mouth every 4 hours as needed for moderate to severe pain        PRECISION XTRA GLUCOSE test strip   Generic drug:  blood glucose monitoring      1 strip        PSYLLIUM PO      Take 1 tsp. by mouth        tadalafil 20 MG tablet    CIALIS     Take 20 mg  by mouth        * UNABLE TO FIND      Take 1 tablet by mouth        * UNABLE TO FIND           voriconazole 200 MG tablet    VFEND    90 tablet    Take 1.5 tablets (300 mg) by mouth 2 times daily    Fungal pneumonia       ZARXIO 480 MCG/0.8ML Sosy syringe   Generic drug:  filgrastim-sndz      INJECT 1 SYRINGE 0.8 ML (480 MCG) SUBCUTANEOUSLY ONCE DAILY        zolpidem 10 MG tablet    AMBIEN     Take by mouth nightly as needed for sleep 1/2 tab to one tablet at bedtime        * Notice:  This list has 2 medication(s) that are the same as other medications prescribed for you. Read the directions carefully, and ask your doctor or other care provider to review them with you.

## 2017-07-05 NOTE — PROGRESS NOTES
Patient needs to be off vfend prior to BMT admission d/t cytoxan interaction and will need to bridge w/ micafungin 100mg IV daily until admission (approx 7/12/17). Pt wishes to go home to [place] until admission for BMT and will receive micafungin from [facility]. Order faxed to [facility] at [phone]. Pt already has a PICC line and is agreeable with the plan. I will notify pt next week to confirm admission and [place] line time.

## 2017-07-05 NOTE — MR AVS SNAPSHOT
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  After hours call the   hospital at [phone] or   [phone]. Ask for the BMT   fellow on-call            Follow-ups after your visit        Who to contact     If you have questions or need follow up information about today's clinic visit or your schedule please contact [facility] BLOOD AND MARROW TRANSPLANT directly at [phone].  Normal or non-critical lab and imaging results will be communicated to you by [employer], letter or phone within 4 business days after the clinic has received the results. If you do not hear from us within 7 days, please contact the clinic through [employer] or phone. If you have a critical or abnormal lab result, we will notify you by phone as soon as possible.  Submit refill requests through ufindads or call your pharmacy and they will forward the refill request to us. Please allow 3 business days for your refill to be completed.          Additional Information About Your Visit        MyChart Information     ufindads lets you send messages to your doctor, view your test results, renew your prescriptions, schedule appointments and more. To sign up, go to [email or web address] . Click on \"Log in\" on the left side of the screen, " "which will take you to the Welcome page. Then click on \"Sign up Now\" on the right side of the page.     You will be asked to enter the access code listed below, as well as some personal information. Please follow the directions to create your username and password.     Your access code is: [email or web address]  Expires: 8/15/2017  6:30 AM     Your access code will  in 90 days. If you need help or a new code, please call your [facility] clinic or [phone].        Care EveryWhere ID     This is your Care EveryWhere ID. This could be used by other organizations to access your [facility] medical records  [id number]         Blood Pressure from Last 3 Encounters:   17 (!) 160/99   17 146/83   17 148/89    Weight from Last 3 Encounters:   17 90.7 kg (200 lb)   17 92 kg (202 lb 14.4 oz)   17 90.7 kg (200 lb)              We Performed the Following      Consult        Recent Review Flowsheet Data     BMT Recent Results Latest Ref Rng & Units 2017    WBC 4.0 - 11.0 10e9/L 6.7 5.4 -    Hemoglobin 13.3 - 17.7 g/dL 8.3(L) 8.5(L) -    Platelet Count 150 - 450 10e9/L 259 223 -    Neutrophils (Absolute) 1.6 - 8.3 10e9/L 4.3 3.5 -    INR 0.86 - 1.14 1.14 - -    Sodium 133 - 144 mmol/L 138 - -    Potassium 3.4 - 5.3 mmol/L 3.8 - -    Chloride 94 - 109 mmol/L 106 - -    Glucose 70 - 99 mg/dL 142(H) - 125(H)    Urea Nitrogen 7 - 30 mg/dL 10 - -    Creatinine 0.66 - 1.25 mg/dL 0.83 - -    Calcium (Total) 8.5 - 10.1 mg/dL 8.4(L) - -    Protein (Total) 6.8 - 8.8 g/dL 6.8 - -    Albumin 3.4 - 5.0 g/dL 3.3(L) - -    Alkaline Phosphatase 40 - 150 U/L 82 - -    AST 0 - 45 U/L 18 - -    ALT 0 - 70 U/L 23 - -    MCV 78 - 100 fl 93 93 -               Primary Care Provider    None Specified       No primary provider on file.        Equal Access to Services     ASHLYN FLANAGAN AH: joana Cedeno, vishal thao " nicolassueal saavedraJoycelynaanorah ah. So [facility] [phone].    ATENCIÓN: Si brandy mcintyre, tiene a gusman disposición servicios gratuitos de asistencia lingüística. Leyla al [phone].    We comply with applicable federal civil rights laws and [place] laws. We do not discriminate on the basis of race, color, national origin, age, disability sex, sexual orientation or gender identity.            Thank you!     Thank you for choosing [facility] BLOOD AND MARROW TRANSPLANT  for your care. Our goal is always to provide you with excellent care. Hearing back from our patients is one way we can continue to improve our services. Please take a few minutes to complete the written survey that you may receive in the mail after your visit with us. Thank you!             Your Updated Medication List - Protect others around you: Learn how to safely use, store and throw away your medicines at www.disposemymeds.org.          This list is accurate as of: 7/5/17 11:59 PM.  Always use your most recent med list.                   Brand Name Dispense Instructions for use Diagnosis    acetaminophen 325 MG tablet    TYLENOL     Take 650 mg by mouth        ACYCLOVIR PO      Take 400 mg by mouth 2 times daily        aspirin 81 MG tablet      Take by mouth daily        blood glucose monitoring lancets      1 time per day. To check BG for type 2 DM.        cholecalciferol 1000 UNITS capsule    vitamin  -D     Take 2 capsules by mouth daily        cyclobenzaprine 5 MG tablet    FLEXERIL     Flexeril Take 5mg by mouth at bedtime and increase gradually to 1 tablet every 8-12hours as needed for muscle pain/spasm. Max 3 tablets/day        diltiazem 120 MG 24 hr capsule      Take 120 mg by mouth        diltiazem 120 MG 24 hr ER beaded capsule    TIAZAC     Take by mouth daily        filgrastim 480 MCG/1.6ML injection    NEUPOGEN     Inject 480 mcg Subcutaneous        FLONASE NA           guaiFENesin 600 MG 12 hr tablet    MUCINEX     Take 600 mg by mouth        heparin  lock flush 10 UNIT/ML Soln injection      3 mLs by Intracatheter route daily In each lumen        ibuprofen 200 MG tablet    ADVIL/MOTRIN     Take 200-600 mg by mouth        levofloxacin 500 MG tablet    LEVAQUIN     Take 500 mg by mouth        LIPITOR PO      Take 10 mg by mouth        LOSARTAN POTASSIUM PO      Take 50 mg by mouth daily        metFORMIN 500 MG tablet    GLUCOPHAGE     Take 500 mg by mouth daily        metroNIDAZOLE 500 MG tablet    FLAGYL     Take 500 mg by mouth        MULTI COMPLETE PO           ondansetron 4 MG ODT tab    ZOFRAN-ODT     Take by mouth every 4 hours as needed for nausea        oxyCODONE 5 MG capsule    OXY-IR     Take 5 mg by mouth every 4 hours as needed for moderate to severe pain        PRECISION XTRA GLUCOSE test strip   Generic drug:  blood glucose monitoring      1 strip        PSYLLIUM PO      Take 1 tsp. by mouth        tadalafil 20 MG tablet    CIALIS     Take 20 mg by mouth        * UNABLE TO FIND      Take 1 tablet by mouth        * UNABLE TO FIND           voriconazole 200 MG tablet    VFEND    90 tablet    Take 1.5 tablets (300 mg) by mouth 2 times daily    Fungal pneumonia       ZARXIO 480 MCG/0.8ML Sosy syringe   Generic drug:  filgrastim-sndz      INJECT 1 SYRINGE 0.8 ML (480 MCG) SUBCUTANEOUSLY ONCE DAILY        zolpidem 10 MG tablet    AMBIEN     Take by mouth nightly as needed for sleep 1/2 tab to one tablet at bedtime        * Notice:  This list has 2 medication(s) that are the same as other medications prescribed for you. Read the directions carefully, and ask your doctor or other care provider to review them with you.

## 2017-07-05 NOTE — PROGRESS NOTES
Blood and Marrow Transplant Program      Norman Real is a 56 year old male, here for formal review prior to his HCT.    Oncology Treatment History:   Treatment/Chemotherapy Number of Cycles Date Range Outcomes & Complications   7+3 1 5/17 None significant, achieved first remission     Interval History      Pt has been feeling well. He underwent bronchoscopy without complications. He is currently on voriconazole. No fevers, chills, sweats, weight loss, bone pain, nausea, vomiting, diarrhea, skin rash, or any other new symptoms.       Bone Marrow Workup Results:  Blood Counts Recent Labs   Lab Test  06/29/17   0920   WBC  5.4   ANEU  3.5   ALYM  0.7*   DEBRA  1.0   AEOS  0.0   HGB  8.5*   HCT  26.9*   PLT  223      Bone Marrow 6/29/17 Bone marrow, posterior iliac crest, left decalcified trephine biopsy and touch imprint; left particle crush, direct aspirate smear, and concentrated aspirate smear; and peripheral blood smear:     -No morphologic or immunophenotypic evidence of leukemia     -Hypercellular bone marrow for age (approximately 70%) with trilineage hematopoiesis, and no increase in blasts     - Peripheral blood showing marked normochromic, normocytic anemia; increased erythrocyte regeneration, rare to occasional teardrop cells; lymphocytopenia     COMMENT:   Concurrent flow cytometry ([id number]) shows no increase in myeloid blasts and no abnormal myeloid blast population.    Blood Type Recent Labs   Lab Test  06/26/17   1528   ABO  O      Chemistries Recent Labs   Lab Test  06/26/17   1455   NA  138   POTASSIUM  3.8   CHLORIDE  106   CO2  25   BUN  10   CR  0.83      Liver Tests Recent Labs   Lab Test  06/26/17   1455   BILITOTAL  0.5   ALKPHOS  82   AST  18   ALT  23          Chest X-Ray: 6/26/17  Clear lungs   Chest CT 6/26/17  1. Centrilobular nodularity and tree-in-bud opacities within the right  middle and left lower lobes, respectively, which may represent  pneumonia and/or aspiration  pneumonitis.  2. Indeterminant spiculated soft tissue pulmonary nodule within the  right lower lobe, measuring 1.1 x 0.7 cm. Three to six month follow-up  CT recommended in an immunosuppressed patient.   3. Cholelithiasis without CT evidence of cholecystitis.      PFTs FVC%  Recent Labs   Lab Test  06/28/17   1245   20003  107       FEV1%  Recent Labs   Lab Test  06/28/17   1245   20016  101       DLCO%  Recent Labs   Lab Test  06/28/17   1245   26358  125      ECHO or MUGA: MUGA 6/27/17  LVEF 53%, normal wall motion and chamber size     EKG Normal EKG 6/29/17   Serologies: CMV +, EBV +, HSV +, VZV+,       Vitamin D No results for input(s): VITDT in the last 30204 hours.     Family History:   Family History   Problem Relation Age of Onset     Chronic Obstructive Pulmonary Disease Mother        Social History:   Social History     Social History     Marital status:      Spouse name: N/A     Number of children: N/A     Years of education: N/A     Occupational History     Not on file.     Social History Main Topics     Smoking status: Former Smoker     Quit date: 5/31/2010     Smokeless tobacco: Not on file     Alcohol use No     Drug use: No     Sexual activity: Not on file     Other Topics Concern     Not on file     Social History Narrative       Past Medical History:   Past Medical History:   Diagnosis Date     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 05/26/2017     Diabetes (H)      Hypertension      Prostate cancer (H) 2011    had radiatoin therapy     Tachycardia         Past Surgical History:   Past Surgical History:   Procedure Laterality Date     CARDIAC SURGERY      ablation       Allergies:   Allergies   Allergen Reactions     Ace Inhibitors Anaphylaxis, Cough and Hives     Terazosin Swelling     Other reaction(s): Sedation/Sleepy       Home Medications      Prior to Admission medications    Medication Sig Start Date End Date Taking? Authorizing Provider   filgrastim-sndz (ZARXIO) 480 MCG/0.8ML  SOSY syringe INJECT 1 SYRINGE 0.8 ML (480 MCG) SUBCUTANEOUSLY ONCE DAILY 6/12/17   Reported, Patient   tadalafil (CIALIS) 20 MG tablet Take 20 mg by mouth 4/7/17   Reported, Patient   PSYLLIUM PO Take 1 tsp. by mouth    Reported, Patient   UNABLE TO FIND Take 1 tablet by mouth    Reported, Patient   levofloxacin (LEVAQUIN) 500 MG tablet Take 500 mg by mouth 6/12/17   Reported, Patient   ibuprofen (ADVIL/MOTRIN) 200 MG tablet Take 200-600 mg by mouth    Reported, Patient   guaiFENesin (MUCINEX) 600 MG 12 hr tablet Take 600 mg by mouth 5/19/17   Reported, Patient   blood glucose monitoring (FREESTYLE) lancets 1 time per day. To check BG for type 2 DM.     Reported, Patient   filgrastim (NEUPOGEN) 480 MCG/1.6ML injection Inject 480 mcg Subcutaneous 6/6/17   Reported, Patient   metroNIDAZOLE (FLAGYL) 500 MG tablet Take 500 mg by mouth    Reported, Patient   UNABLE TO FIND     Reported, Patient   diltiazem 120 MG 24 hr capsule Take 120 mg by mouth    Reported, Patient   acetaminophen (TYLENOL) 325 MG tablet Take 650 mg by mouth 6/6/17   Reported, Patient   blood glucose monitoring (PRECISION XTRA GLUCOSE) test strip 1 strip 11/20/13   Reported, Patient   voriconazole (VFEND) 200 MG tablet Take 1.5 tablets (300 mg) by mouth 2 times daily 6/29/17   Tim Ahn MD   ACYCLOVIR PO Take 400 mg by mouth 2 times daily    Reported, Patient   LOSARTAN POTASSIUM PO Take 50 mg by mouth daily    Reported, Patient   heparin lock flush 10 UNIT/ML SOLN injection 3 mLs by Intracatheter route daily In each lumen    Reported, Patient   cyclobenzaprine (FLEXERIL) 5 MG tablet Flexeril Take 5mg by mouth at bedtime and increase gradually to 1 tablet every 8-12hours as needed for muscle pain/spasm. Max 3 tablets/day 6/16/17   Reported, Patient   ondansetron (ZOFRAN-ODT) 4 MG ODT tab Take by mouth every 4 hours as needed for nausea    Reported, Patient   oxyCODONE (OXY-IR) 5 MG capsule Take 5 mg by mouth every 4 hours as needed for  moderate to severe pain    Reported, Patient   Atorvastatin Calcium (LIPITOR PO) Take 10 mg by mouth    Reported, Patient   diltiazem (TIAZAC) 120 MG 24 hr ER beaded capsule Take by mouth daily    Reported, Patient   Fluticasone Propionate (FLONASE NA)     Reported, Patient   metFORMIN (GLUCOPHAGE) 500 MG tablet Take 500 mg by mouth daily    Reported, Patient   zolpidem (AMBIEN) 10 MG tablet Take by mouth nightly as needed for sleep 1/2 tab to one tablet at bedtime    Reported, Patient   Multiple Vitamins-Minerals (MULTI COMPLETE PO)     Reported, Patient   cholecalciferol (VITAMIN  -D) 1000 UNITS capsule Take 2 capsules by mouth daily     Reported, Patient   aspirin 81 MG tablet Take by mouth daily    Reported, Patient       Review of Systems    Review of Systems:  CONSTITUTIONAL: NEGATIVE for fever, chills, change in weight  INTEGUMENTARY/SKIN: NEGATIVE for worrisome rashes, moles or lesions  EYES: NEGATIVE for vision changes or irritation  ENT/MOUTH: NEGATIVE for ear, mouth and throat problems  RESP: NEGATIVE for significant cough or SOB  BREAST: NEGATIVE for masses, tenderness or discharge  CV: NEGATIVE for chest pain, palpitations or peripheral edema  GI: NEGATIVE for nausea, abdominal pain, heartburn, or change in bowel habits  : NEGATIVE for frequency, dysuria, or hematuria  MUSCULOSKELETAL: NEGATIVE for significant arthralgias or myalgia  NEURO: NEGATIVE for weakness, dizziness or paresthesias  ENDOCRINE: NEGATIVE for temperature intolerance, skin/hair changes  HEME/ALLERGY: NEGATIVE for bleeding problems  PSYCHIATRIC: NEGATIVE for changes in mood or affect    PHYSICAL EXAM      Weight     Wt Readings from Last 3 Encounters:   06/30/17 90.7 kg (200 lb)   06/29/17 92 kg (202 lb 14.4 oz)   06/27/17 90.7 kg (200 lb)          BP (!) 160/99  Pulse 96  Temp 98.6  F (37  C)  Resp 16     KPS: 100    General: NAD   Eyes: sclera anicteric   Nose/Mouth/Throat: OP clear, buccal mucosa moist, no ulcerations   Lungs:  CTA bilaterally  Cardiovascular: RRR, no M/R/G   Abdominal/Rectal: +BS, soft, NT, ND, No HSM   Lymphatics: No edema  Skin: No rashes or petechaie  Neuro:alert, conversant  Additional Findings:  Left arm PICC    OVERALL ASSESSMENT AND PLAN     Norman Real is a 56 year old male with a malignancy that is currently incurable by standard chemotherapeutic approaches: 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 To date, the only modality that offers a chance at long-term survival and potential cure is allogeneic hematopoietic stem cell transplantation.  Based upon my assessment of disease risk and comorbidities, Norman is a suitable candidate for allogeneic HCT.      We had a detailed discussion about the rationale for transplant in this situation, requirements for proceeding, which include insurance approval, identification of an adequate source of donor hematopoetic progenitor cells, availability of a full-time caregiver along with residence within 30 minutes of the [facility] through at least day +100 post transplant, and compliance with the immunosuppression and supportive care medication regimen prescribed [facility] providers [facility]      We had a gaby discussion about the risks of the transplant itself, including toxicities from the preparative regimen, severe infections, the need for red blood cell and platelet transfusion support, the risk of graft rejection, the risk of acute and chronic [id number] disease, the need for immunosuppressive medications, the potential for severe organ toxicity including lungs, liver, skin, and kidneys, the possibility of long-term disability, and the risk of death due to complications of the transplant.  We discussed the risk of disease relapse despite going forward with a transplant.  Norman expressed understanding of these risks.    Under consideration at this time is a reduced intensity preparative regimen, with HLA-matched sister as the donor source.      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 Treated with voriconazole now. Will need coverage with micafungin while off vori, then resume vori after chemo completed. Seek ID clearance prior to HCT.    Norman Real had all questions answered and is ready to proceed as discussed in detail today.      Leonela Mancilla    _______________________________________________    ATTENDING NOTE    I have seen and personally evaluated the patient today.  I reviewed vitals, medications, laboratory results, and I viewed pertinent imaging studies.  After doing so, I formulated a plan with Dr. Mancilla as documented in this note.  I personally communicated recommendations to the patient.      Ingrid Pineda MD, pager [phone]      Total time: 75 min, with greater than 50% of the time spend in direct counseling        Patient Care Team       Relationship Specialty Notifications Start End    Charanjit Umanzor MD BMT Physician Internal Medicine  5/17/17     Phone: [phone] Fax: [phone]          [facility] [address] [facility] [address] [facility] [address]    Jose Pitts MD Referring Physician   5/17/17     Phone: [phone] Fax: [phone]         [facility]  [address] [place] [id number]

## 2017-07-06 NOTE — PROGRESS NOTES
Blood and Marrow Transplant   Psychosocial Assessment with   Clinical     Assessment completed on 7/6/2017 of living situation, support system, financial status, functional status, coping, stressors, need for resources and social work intervention provided as needed. Information for this assessment was provided by pt and pt's wife-Agapito's report in addition to medical chart review and consultation with medical team.     Present at assessment: Patient and Patient's Wife-Agapito were present for this assessment conducted by Zeynep Meléndez, BMT .     Diagnosis: Acute Myelogenous Leukemia (AML)    Transplant type: Allogeneic    Donor: Related allogeneic donor stem cell transplant              Donor: Jennifer    Physician: Charanjit Umanzor MD    Nurse Coordinator: Janie Robles RN    Permanent Address:   [address] [address]    Local Address:   [address] main desk: [phone]    **Pt and pt's wife would like to be placed on the [facility] wait list. They enjoy the [facility]; however, they will decide if they want to continue staying at the [facility] or transition to [facility] if they are called for an apartment. Pt is currently on [facility] wait list.     Contact Information:  Pt Home Phone: [phone]  Pt Cell Phone: [phone]  Pt's Wife-Agapito's Phone: [phone]  Pt's SisterAbe's Phone: [phone]  Pt's Mother-In-Law-Nely's Phone: [phone]    Presenting Information:  Pt is a 56 year old male diagnosed with AML who presents for evaluation for allogeneic transplant at the [facility] ([facility] Pt was accompanied to today's visit by his wife-Agapito.     Decision Making: Self    Health Care Directive: Yes. Pt has a health care directive and will bring it when they return to the BMT program.     Relationship Status: Pt and pt's spouse described relationship  as stable and supportive.     Special Needs: Local Lodging Needed. Pt and pt's wife currently staying at the [facility].    Family/Support System: Pt endorsed a large support system including family and close friends who will be available to support pt throughout transplant process.     Spouse: Agapito Real  Children: 2 Children; Daughter-Yuan German (Lives in [place], [place]) and Son-Mushtaq Real (Lives in [place], [place])  Grandchildren: 2 Grandchildren; Gilmar (9) and Dewey (2)  Parents:   Siblings: Sister-Isabelle (Lives in [place], [place] - Sister is pt's Donor)    Caregiver: SW discussed with pt the caregiver role and expectation at length. Pt is agreeable to having a full time caregiver for a minimum of 100 days until cleared by the BMT physician. Pt and pt's wife-Agapito confirmed understanding of the caregiver requirement. Pt completed caregiver contract; SW to add to medical record. Pt shared that he has a good amount of  individuals to assist with caregiving as required. Pt's primary caregiver will be his wife-Agapito with his sister-Isabelle and mother-in-law as a secondary. Pt signed the caregiver contract which will be scanned into the EMR. Caregiver education and resources provided. No caregiver concerns identified.     Pt's Wife-Agapito's Phone: [phone]  Pt's Sister-Isabelle's Phone: [phone]  Pt's Mother-In-Law-Nely's Phone: [phone]    Permanent Living Situation: Pt resides in [place], [place].    Temporary Living Situation:  Pt will be living at [facility].     Transportation Mode: Pt and pt's wife plan to walk/ride the shuttle to appointments. Pt is aware of driving restrictions post-BMT and the need for the caregiver is to drive until cleared to drive by the BMT physician. Pt will utilize the hospital security shuttle for transportation to and from the [facility] and BMT Clinic/Hospital.    Insurance: Pt has [email or web address] health insurance. Pt denied specific insurance concerns at this time. SW reiterated  "information about the [employer]  should specific insurance questions arise as pt moves through transplant process. Future Insurance questions referred to [employer] .    Sources of Income: Pt is supported by Savings; pt applied for SSDI and will start receiving payments October 2017. Pt's wife is not receiving any income from her employer at this time. SW provided information on kamlesh options. SW provided pt with [employer] and [facility] [employer] and encouraged pt to complete applications.    Employment: Pt is retired. Pt  for [employer]. Pt's wife is an Ambulance Manager. Pt's wife is over her allowed 6 weeks of leave and will need a letter for her employer once pt is admitted into the hospital.    Mental Health: Pt denied a history of mental health concerns, specific diagnoses or medications at this time. ALEXANDER explained that it's not uncommon for patients going through transplant to experience symptoms of depression/anxiety. SW completed a PHQ 9 Assessment with the pt and pt scored a 4 which indicates no sign of depression.     Chemical Use: Pt shared that he quit smoking 7 years ago. No current alcohol use. No use of marijuana or other drugs. Based on the information provided, there appear to be no specific risks or concerns identified at this time.     Trauma/Loss/Abuse History: Multiple losses associated with cancer diagnosis and treatment, including health, employment, changes to physical appearance, etc.     Spirituality: Pt shared he attends [facility] [employer]. ALEXANDER explained that there are Chaplains on the unit and pt can request to meet with a  at anytime. Pt would like a blessing ceremony on day of transplant.    Coping: Pt noted that he is currently feeling \"positive, hopeful, worried, nervous, and ready to begin\". Pt shared that he finds it helpful staying at the [facility] as he is able to talk others who are going through the transplant as well. ALEXANDER " "discussed the [employer] as another option and pt asked for contact information for the Peer Connect; SW provided.    Pt shared that his main coping mechanisms are talking with family/friends, prayer/spiritual practices, reading books, and exercise. Pt noted that he enjoys being outside, fixing things and \"tinkering around in his garage\". SW and pt discussed additional positive coping mechanisms that pt can utilize while in the hospital. While hospitalized, pt plans to use ipad, read books and use phone.     Caregiver Coping: Pt's wife-Agapito noted that she is feeling \"positive, fearful, hopeful, nervous, scared, and ready to begin\" at this time. Pt's wife shared that she is scared about the outcome. Emotional support provided. Pt's wife noted that she michael by talking with family/friends, prayer/spiritual practices, and working on her hobby (cross stitching). Pt's wife said she can best be supported by \"emotional support, motivator, positive and by keeping [facility] updated\".     Education Provided: Transplant process expectations, Caregiver requirements, Caregiver self-care, Financial issues related to transplant, Financial resources/grants available, Common psychosocial stressors pre/post transplant, Support group(s) available, Hopsital resources available, Web site information, Social Work role and Resources for grandchildren.    Interventions Provided: Psychosocial support and education     Assessment and Recommendations for Team:  Pt is a 56 year old male diagnosed with AML who is here undergoing preparation for a planned allogeneic transplant. Pt is pleasant, calm and able to articulate concerns/coping mechanisms in an appropriate manner. Pt feels comfortable communicating with the medical team. Pt has a strong supportive network of family and friends who are involved.     Pt has developed strong coping mechanisms such as talking with family/friends, prayer/spiritual practices, reading " books, and exercise. Pt and pt's family will benefit from ongoing psychosocial support in regards to coping with the adjustment to the BMT process.     Pt has a large support system and a well-developed caregiver plan. Pt verbalizes understanding of the transplant process and wanting to proceed. SW provided contact information and encouraged pt to contact SW with questions, concerns, resources and for support.    Important Information:   -Pt is currently staying at the [facility]; [facility]  -Pt's wife will need a letter for her employer once pt is admitted to the hospital.  -Pt was provided the [employer] and [employer] applications.  -Pt would like a [employer] Ceremony.    Follow up Planned:   Initiate financial resources - [employer] applications provided.  Psychosocial support  Spiritual Heralth referral - Pt would like a Albert Ceremony  Letter(s) of advocacy - pt's wife needs a letter for her employer    Zeynep REEVES, TAB  Phone: [phone]

## 2017-07-14 PROBLEM — Z76.82 STEM CELL TRANSPLANT CANDIDATE: Status: ACTIVE | Noted: 2017-01-01

## 2017-07-14 PROBLEM — B44.9 ASPERGILLOSIS (H): Status: ACTIVE | Noted: 2017-01-01

## 2017-07-14 PROBLEM — I73.9 PERIPHERAL VASCULAR DISEASE (H): Status: ACTIVE | Noted: 2017-01-01

## 2017-07-14 PROBLEM — I47.10 PAROXYSMAL SUPRAVENTRICULAR TACHYCARDIA (H): Status: ACTIVE | Noted: 2017-01-01

## 2017-07-14 PROBLEM — I45.6 ANOMALOUS ATRIOVENTRICULAR EXCITATION: Status: ACTIVE | Noted: 2017-01-01

## 2017-07-14 PROBLEM — E78.5 DYSLIPIDEMIA: Status: ACTIVE | Noted: 2017-01-01

## 2017-07-14 NOTE — MR AVS SNAPSHOT
After Visit Summary   7/14/2017    Norman Real    MRN: [id number]           Patient Information     Date Of Birth          1960        Visit Information        Provider Department      7/14/2017 12:00 PM Montse Beren MD [facility] and Infectious Diseases        Today's Diagnoses     Aspergillosis (H)    -  1    Personal history of diseases of blood and blood-forming organs        Stem cell transplant candidate          Care Instructions    Norman was seen today (July 14, 2017) at the Pediatric Infectious Diseases clinic ([facility]) for evaluation of lung nodules prior to BMT for AML.    The following is a brief outline of the plan as we discussed during the  visit: Mr. Real is a 56 year old male with PMH of prostate cancer s/p radiation therapy, diagnosed with AML s/p induction chemotherapy who had a CT chest (6/26) that was not prompted by any particular symptoms. He was found to have some slight changes in his imaging concerning for possible fungal infection. He did not have any infectious complications during chemotherapy and denied any current fever, cough, or trouble breathing. He was on voriconazole for several weeks and was recently changed to micafungin. We see no reasons to delay BMT at this time, and agree with continuing micafungin/voriconazole while cultures are pending.    We ordered the following laboratory tests: None    We will contact you with any pertinent results as we get them. Meanwhile  feel free to contact our clinic at any time with questions and  clarifications.    A follow up appointment wasn't scheduled.    Thank you,    Montse Breen MD          Follow-ups after your visit        Follow-up notes from your care team     Return if symptoms worsen or fail to improve.      Your next 10 appointments already scheduled     Aug 10, 2017  3:30 PM CDT   (Arrive by 3:15 PM)   Return  "Visit with Tim Ahn MD   [facility]    [address] [place] [address] [phone]              Who to contact     If you have questions or need follow up information about today's clinic visit or your schedule please contact [facility] AND INFECTIOUS DISEASES directly at [phone].  Normal or non-critical lab and imaging results will be communicated to you by MyChart, letter or phone within 4 business days after the clinic has received the results. If you do not hear from us within 7 days, please contact the clinic through [employer] or phone. If you have a critical or abnormal lab result, we will notify you by phone as soon as possible.  Submit refill requests through [employer] or call your pharmacy and they will forward the refill request to us. Please allow 3 business days for your refill to be completed.          Additional Information About Your Visit        [employer] Information     [employer] lets you send messages to your doctor, view your test results, renew your prescriptions, schedule appointments and more. To sign up, go to [email or web address] . Click on \"Log in\" on the left side of the screen, which will take you to the Welcome page. Then click on \"Sign up Now\" on the right side of the page.     You will be asked to enter the access code listed below, as well as some personal information. Please follow the directions to create your username and password.     Your access code is: [email or web address]  Expires: 8/15/2017  6:30 AM     Your access code will  in 90 days. If you need help or a new code, please call your [facility] clinic or [phone].        Care EveryWhere ID     This is your Care EveryWhere ID. This could be used by other organizations to access your [facility] medical records  [id number]        Your Vitals Were     Pulse Temperature Height Pulse Oximetry BMI (Body Mass Index)       89 " "98.7  F (37.1  C) (Oral) 1.753 m (5' 9\") 99% 30.13 kg/m2        Blood Pressure from Last 3 Encounters:   07/14/17 (!) 179/93   07/05/17 (!) 160/99   06/30/17 146/83    Weight from Last 3 Encounters:   07/14/17 92.5 kg (204 lb)   06/30/17 90.7 kg (200 lb)   06/29/17 92 kg (202 lb 14.4 oz)              Today, you had the following     No orders found for display       Primary Care Provider    None Specified       No primary provider on file.        Equal Access to Services     [facility]: Hadruby Ratliff, joana manning, raven mchugh, vishal albright . So [facility] [phone].    ATENCIÓN: Si habla español, tiene a gusman disposición servicios gratuitos de asistencia lingüística. Llame al [phone].    We comply with applicable federal civil rights laws and [place] laws. We do not discriminate on the basis of race, color, national origin, age, disability sex, sexual orientation or gender identity.            Thank you!     Thank you for choosing [facility] AND INFECTIOUS DISEASES  for your care. Our goal is always to provide you with excellent care. Hearing back from our patients is one way we can continue to improve our services. Please take a few minutes to complete the written survey that you may receive in the mail after your visit with us. Thank you!             Your Updated Medication List - Protect others around you: Learn how to safely use, store and throw away your medicines at www.disposemymeds.org.          This list is accurate as of: 7/14/17  3:14 PM.  Always use your most recent med list.                   Brand Name Dispense Instructions for use Diagnosis    acetaminophen 325 MG tablet    TYLENOL     Take 650 mg by mouth        aspirin 81 MG tablet      Take by mouth daily        blood glucose monitoring lancets      1 time per day. To check BG for type 2 DM.        cholecalciferol 1000 UNITS capsule    vitamin  -D     Take 1 capsule by " mouth daily        cyclobenzaprine 5 MG tablet    FLEXERIL     Flexeril Take 5mg by mouth at bedtime and increase gradually to 1 tablet every 8-12hours as needed for muscle pain/spasm. Max 3 tablets/day        diltiazem 120 MG 24 hr capsule      Take 120 mg by mouth        diltiazem 120 MG 24 hr ER beaded capsule    TIAZAC     Take by mouth daily        filgrastim 480 MCG/1.6ML injection    NEUPOGEN     Inject 480 mcg Subcutaneous        FLONASE NA      Spray in nostril as needed        guaiFENesin 600 MG 12 hr tablet    MUCINEX     Take 600 mg by mouth        heparin lock flush 10 UNIT/ML Soln injection      3 mLs by Intracatheter route daily In each lumen        LIPITOR PO      Take 10 mg by mouth        metFORMIN 500 MG tablet    GLUCOPHAGE     Take 500 mg by mouth daily        micafungin 100 MG injection    MYCAMINE     Inject into the vein daily        MULTI COMPLETE PO           ondansetron 4 MG ODT tab    ZOFRAN-ODT     Take by mouth every 4 hours as needed for nausea        oxyCODONE 5 MG capsule    OXY-IR     Take 5 mg by mouth every 4 hours as needed for moderate to severe pain        PRECISION XTRA GLUCOSE test strip   Generic drug:  blood glucose monitoring      1 strip        PSYLLIUM PO      Take 1 tsp. by mouth        tadalafil 20 MG tablet    CIALIS     Take 20 mg by mouth        UNABLE TO FIND      Take 1 tablet by mouth        ZARXIO 480 MCG/0.8ML Sosy syringe   Generic drug:  filgrastim-sndz      INJECT 1 SYRINGE 0.8 ML (480 MCG) SUBCUTANEOUSLY ONCE DAILY        zolpidem 10 MG tablet    AMBIEN     Take by mouth nightly as needed for sleep 1/2 tab to one tablet at bedtime

## 2017-07-14 NOTE — NURSING NOTE
Chief Complaint   Patient presents with     Consult     Work up for BMT   Pt roomed, vitals, meds, and allergies reviewed with pt. Pt ready for provider.  Akash Nicholas, CMA

## 2017-07-14 NOTE — PATIENT INSTRUCTIONS
Norman was seen today (July 14, 2017) at the Pediatric Infectious Diseases clinic ([facility]) for evaluation of lung nodules prior to BMT for AML.    The following is a brief outline of the plan as we discussed during the  visit: Mr. Real is a 56 year old male with PMH of prostate cancer s/p radiation therapy, diagnosed with AML s/p induction chemotherapy who had a CT chest (6/26) that was not prompted by any particular symptoms. He was found to have some slight changes in his imaging concerning for possible fungal infection. He did not have any infectious complications during chemotherapy and denied any current fever, cough, or trouble breathing. He was on voriconazole for several weeks and was recently changed to micafungin. We see no reasons to delay BMT at this time, and agree with continuing micafungin/voriconazole while cultures are pending.    We ordered the following laboratory tests: None    We will contact you with any pertinent results as we get them. Meanwhile  feel free to contact our clinic at any time with questions and  clarifications.    A follow up appointment wasn't scheduled.    Thank you,    Montse Breen MD

## 2017-07-14 NOTE — LETTER
7/14/2017       RE: Norman Real  [address] [place] [id number]     Dear Colleague,    Thank you for referring your patient, Norman Real, to the [facility] AND INFECTIOUS DISEASES at [facility]. Please see a copy of my visit note below.    [facility]  Transplant Infectious Disease Consult Note - New Patient     Patient:  Norman Real, Date of birth 1960, Medical record number [id number]  Date of Visit:  07/14/2017  Consult requested by Dr. Umanzor for evaluation of lung nodules and positive BAL galactomannan in a patient with AML undergoing evaluation prior to BMT.         Assessment and Recommendations:   Recommendations:  - agree with micafungin during the preparative regimen for BMT. Since we are treating infection rather than using prophylaxis, a dose of approximately 1 mg per kilogram per day is desired, and that is the dose he is receiving of micafungin. Once he is through his preparative regimen, the potential for change back to mold active azole therapy can be reassessed.  - await final results of 6/30/2017 BAL cultures  - no further imaging or testing needed at this point in time  - no contraindications noted to proceeding with BMT from an ID perspective  - no return visit to the infectious disease clinic is scheduled at this time, as he is likely to be admitted for stem cell transplantation.    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 Started on voriconazole on 6/29 when seen by Dr. Ahn. Bronchoscopy with normal anatomy and moderate thin secretions on 6/30. BAL cultures with no growth to date, galactomannan positive bilaterally (4.05 and 2.27). Transitioned to IV micafungin due to concern for interaction with preparatory  chemotherapy.  - QTc interval: 409 msec on 6/29/2017  - Bacterial prophylaxis: none at this time  - Pneumocystis prophylaxis: none at this time, to begin on day +28 following transplantation  - Viral serostatus & prophylaxis: CMV+, EBV+, HSV 1+/2neg, VZV+, none  - Fungal prophylaxis: micafungin 100mg daily (1mg/kg) as above  - Immunization status: none needed currently. Standard procedure is to reimmunize at the one year anniversary visit following transplantation.  - Gamma globulin status: unknown  - Isolation status:  Good hand hygiene.    Patient seen and discussed with transplant ID attending, Dr. Lanny Farias.    Montse Breen MD, [id number]  Fellow in Pediatric and Adult Infectious Disease  pager:  [phone]    Attestation:  Norman Real was seen in the hospital on 07/14/2017, with Montse Breen MD, ID Fellow, pager [phone]. I reviewed the history & exam, vital signs, medications, labs and imaging. I personally reviewed the 6/26/2017 CT imaging. This is my first day seeing Norman: chart reviewed to date. Assessment and plan were jointly made. I agree with the fellow's note and plan of care. He has very subtle although abnormal lung fields on chest CT imaging performed prior to the upcoming stem cell transplantation 6/26/2017. Bronchoscopy detected Aspergillus galactomannan from both lungs, so technically he probably has some minimal underlying Aspergillus infection. However, I do believe that this infection has been nipped in the bud by the rather immediate use of antifungal therapy, so proceeding with stem cell transplantation while he is covered with mold active antifungal therapy is acceptable in this individual case. Lanny Farias MD. Pager [phone]        History of Infectious Disease Illness:   Mr. Real is a 56 year old male with PMH of prostate cancer diagnosed in 2011 s/p surgery and radiation therapy, tachycardia s/p 3 ablations, recurrent diverticulitis s/p cathy-colectomy,  and AML s/p 7+3 induction chemotherapy complicated by severe sciatica following BMBx. He underwent a CT chesty on 6/26 as a routine part of his work-up. This identified tree and bud opacities in the RML and LLL as well as a 1.1 x 0.7 cm nodule in the RLL. He was evaluated by Dr. Ahn in clinic on 6/29/2017. He was started on voriconazole and scheduled for a BAL on 6/30. The bronchoscopy showed normal anatomy with moderate, thin secretions. Cultures with no growth to date, however galactomannan was positive from each lung. He reports that he has been completely asymptomatic with no fever, cough, or trouble breathing. He has no nodes or skin lesions.    Transplants: undergoing evaluation prior to BMT.    Review of Systems:  CONSTITUTIONAL:  No fevers or chills, good energy  EYES: negative for icterus or acute vision changes  ENT:  negative for acute hearing loss, tinnitus, sore throat, runny nose  RESPIRATORY:  negative for cough, sputum or dyspnea  CARDIOVASCULAR:  negative for chest pain, palpitations  GASTROINTESTINAL:  negative for nausea, vomiting, diarrhea or constipation  GENITOURINARY:  negative for dysuria or hematuria  HEME:  No easy bruising or bleeding  INTEGUMENT:  negative for rash or pruritus, small area of healing skin at prior BMBx  NEURO:  Negative for headache or tremor    Past Medical History:   Diagnosis Date     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 05/26/2017     Diabetes (H)      Hypertension      Prostate cancer (H) 2011    had radiatoin therapy     Tachycardia        Past Surgical History:   Procedure Laterality Date     CARDIAC SURGERY      ablation       Family History   Problem Relation Age of Onset     Chronic Obstructive Pulmonary Disease Mother        Social History     Social History Narrative    Lives with wife in [place], [place]. 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 [place]. Previously liked to garden and care for the lawn, but has been careful not to participate in those activities since he was diagnosed. Likes to be outdoors and hunt. No pets or animal exposures. Travelled to [place] within the last year, but otherwise no recent travel. No history of travel to the [facility]. Has two children that live in the area and two grandchildren (9 and 2 years of age) that he visits with.      Social History   Substance Use Topics     Smoking status: Former Smoker     Quit date: 5/31/2010     Smokeless tobacco: Not on file     Alcohol use No       Immunization History   Administered Date(s) Administered     Influenza (H1N1) 04/03/2010     Influenza (High Dose) 3 valent vaccine 11/04/2010, 10/11/2011, 10/10/2012, 12/17/2014, 10/21/2015, 12/07/2016     Pneumococcal 23 valent 11/22/2010     TDAP Vaccine (Adacel) 11/22/2010     Tetanus 02/01/2000       Patient Active Problem List   Diagnosis     AML (acute myelogenous leukemia) (H)     Aspergillosis (H)     Stem cell transplant candidate     Allergic rhinitis     Anomalous atrioventricular excitation     Diabetes mellitus (H)     Diverticulitis of intestine     Dyslipidemia     Impotence of organic origin     History of malignant neoplasm of prostate     History of urethral stricture     Hypertension     Paroxysmal supraventricular tachycardia (H)     Peripheral vascular disease (H)     Seborrheic eczema     Sleep disorder     Tobacco dependence in remission            Current Medications & Allergies:   micafungin 100 mg IV daily    Current Outpatient Prescriptions on File Prior to Visit:  filgrastim-sndz (ZARXIO) 480 MCG/0.8ML SOSY syringe INJECT 1 SYRINGE 0.8 ML (480 MCG) SUBCUTANEOUSLY ONCE DAILY   tadalafil (CIALIS) 20 MG tablet Take 20 mg by mouth   PSYLLIUM PO Take 1 tsp. by mouth   UNABLE TO FIND Take 1 tablet by mouth   guaiFENesin (MUCINEX) 600 MG 12 hr tablet Take 600 mg by mouth   blood glucose monitoring  "(FREESTYLE) lancets 1 time per day. To check BG for type 2 DM.    diltiazem 120 MG 24 hr capsule Take 120 mg by mouth   acetaminophen (TYLENOL) 325 MG tablet Take 650 mg by mouth   blood glucose monitoring (PRECISION XTRA GLUCOSE) test strip 1 strip   heparin lock flush 10 UNIT/ML SOLN injection 3 mLs by Intracatheter route daily In each lumen   cyclobenzaprine (FLEXERIL) 5 MG tablet Flexeril Take 5mg by mouth at bedtime and increase gradually to 1 tablet every 8-12hours as needed for muscle pain/spasm. Max 3 tablets/day   oxyCODONE (OXY-IR) 5 MG capsule Take 5 mg by mouth every 4 hours as needed for moderate to severe pain   Atorvastatin Calcium (LIPITOR PO) Take 10 mg by mouth   diltiazem (TIAZAC) 120 MG 24 hr ER beaded capsule Take by mouth daily   Fluticasone Propionate (FLONASE NA) Spray in nostril as needed    metFORMIN (GLUCOPHAGE) 500 MG tablet Take 500 mg by mouth daily   zolpidem (AMBIEN) 10 MG tablet Take by mouth nightly as needed for sleep 1/2 tab to one tablet at bedtime   Multiple Vitamins-Minerals (MULTI COMPLETE PO)    cholecalciferol (VITAMIN  -D) 1000 UNITS capsule Take 1 capsule by mouth daily    filgrastim (NEUPOGEN) 480 MCG/1.6ML injection Inject 480 mcg Subcutaneous   ondansetron (ZOFRAN-ODT) 4 MG ODT tab Take by mouth every 4 hours as needed for nausea   aspirin 81 MG tablet Take by mouth daily     No current facility-administered medications on file prior to visit.       Allergies   Allergen Reactions     Ace Inhibitors Anaphylaxis, Cough and Hives     Terazosin Swelling     Other reaction(s): Sedation/Sleepy            Physical Exam:   BP (!) 179/93  Pulse 89  Temp 98.7  F (37.1  C) (Oral)  Ht 1.753 m (5' 9\")  Wt 92.5 kg (204 lb)  SpO2 99%  BMI 30.13 kg/m2    Physical Examination:   GENERAL:  well-developed, well-nourished man sitting in clinic in no acute distress.  HEAD:  Head is normocephalic, atraumatic.   EYES:  Eyes have anicteric sclerae    ENT:  Oropharynx is moist without " exudates or ulcers. Tongue is midline.  NECK:  Supple. No cervical lymphadenopathy.  LUNGS:  Clear to auscultation bilaterally. No increased work of breathing.   CARDIOVASCULAR:  Regular rate and rhythm with no murmurs, gallops or rubs.  ABDOMEN:  Normal bowel sounds, soft, nontender.   SKIN:  No acute rashes. Line in place without any surrounding erythema or exudate. Previous bone marrow biopsy skin entrance sites were examined on his back and have a slightly hyperpigmented scar.  NEUROLOGIC:  Grossly nonfocal. Active x4 extremities.         Laboratory Data:     Metabolic Studies     Recent Labs   Lab Test  06/26/17   1455   NA  138   POTASSIUM  3.8   CHLORIDE  106   CO2  25   ANIONGAP  7   BUN  10   CR  0.83   GFRESTIMATED  >90  Non  GFR Calc     GLC  142*   SANDRA  8.4*       Hepatic Studies  Recent Labs   Lab Test  06/26/17   1455   BILITOTAL  0.5   ALKPHOS  82   PROTTOTAL  6.8   ALBUMIN  3.3*   AST  18   ALT  23     Recent Labs   Lab Test  06/26/17   1455   URIC  6.4       Hematology Studies    Recent Labs   Lab Test  06/29/17   0920  06/26/17   1455   WBC  5.4  6.7   ANEU  3.5  4.3   ALYM  0.7*  0.9   DEBRA  1.0  1.4*   AEOS  0.0  0.0   HGB  8.5*  8.3*   HCT  26.9*  26.0*   PLT  223  259       Clotting Studies    Recent Labs   Lab Test  06/26/17   1455   INR  1.14   PTT  30       Urine Studies   Recent Labs   Lab Test  06/26/17   1430   URINEPH  5.0   NITRITE  Negative   LEUKEST  Negative   WBCU  1       CSF testing   Recent Labs   Lab Test  06/29/17   1055   CWBC  0   CRBC  0   CGLU  78*   CTP  43       Microbiology:  Aspergillus testing  Recent Labs   Lab Test  06/30/17   0919  06/30/17   0852   ASPGAI  2.27  4.05   ASPAG  Positive  Reference range: Negative  (Note)  INTERPRETIVE INFORMATION: Aspergillus Galactomannan EIA,  Broncho  A BAL galactomannan index of greater than or equal to 0.5  is considered positive.  This result should be interpreted  in the context of patient history, clinical  signs/symptoms,  and other routine diagnostic tests (e.g., culture,  histologic examination of biopsy material, and radiographic  imaging).  Performed by [facility],  [address] [facility] [address] [phone]  [email or web address], Damien Gil MD, Lab. Director  *  Positive  Reference range: Negative  (Note)  INTERPRETIVE INFORMATION: Aspergillus Galactomannan EIA,  Broncho  A BAL galactomannan index of greater than or equal to 0.5  is considered positive.  This result should be interpreted  in the context of patient history, clinical signs/symptoms,  and other routine diagnostic tests (e.g., culture,  histologic examination of biopsy material, and radiographic  imaging).  Performed by [facility],  [address] [facility] [address] [phone]  [email or web address], Damien Gil MD, Lab. Director  *       Last Culture results with specimen source  Culture Micro   Date Value Ref Range Status   06/30/2017   Final    Culture received and in progress.  Positive AFB results are called as soon as   detected.  Final report to follow in 7 to 8 weeks.  Assayed at [facility] [address]     06/30/2017 Culture negative after 1 week  Final   06/30/2017 No Legionella species isolated  Final   06/30/2017 No growth after 12 days  Final   06/30/2017 Light growth Normal respiratory aneesh  Final   06/30/2017   Final    Culture received and in progress.  Positive AFB results are called as soon as   detected.  Final report to follow in 7 to 8 weeks.  Assayed at [facility] [address]     06/30/2017 Culture negative after 1 week  Final   06/30/2017 No Legionella species isolated  Final   06/30/2017 No growth after 12 days  Final   06/30/2017 Light growth Normal respiratory aneesh  Final    Specimen Description   Date Value Ref Range Status   06/30/2017 Bronchoalveolar Lavage Left lower lobe Posterior  Final   06/30/2017 Bronchoalveolar Lavage Left lower lobe Posterior  Final    06/30/2017 Bronchoalveolar Lavage Left lower lobe Posterior  Final   06/30/2017 Bronchoalveolar Lavage Left lower lobe Posterior  Final   06/30/2017 Bronchoalveolar Lavage Left lower lobe Posterior  Final   06/30/2017 Bronchoalveolar Lavage Left lower lobe Posterior  Final   06/30/2017 Bronchoalveolar Lavage Left lower lobe Posterior  Final   06/30/2017 Bronchoalveolar Lavage Left lower lobe superior  Final   06/30/2017 Bronchoalveolar Lavage Left lower lobe superior  Final   06/30/2017 Bronchoalveolar Lavage Left lower lobe superior  Final   06/30/2017 Bronchoalveolar Lavage Left lower lobe superior  Final   06/30/2017 Bronchoalveolar Lavage Left lower lobe superior  Final   06/30/2017 Bronchoalveolar Lavage Left lower lobe superior  Final   06/30/2017 Bronchoalveolar Lavage Left lower lobe superior  Final          Infectious Disease Testing     Recent Labs   Lab Test  06/26/17   1455   TREPAB  Negative       Virology:  CMV viral loads    Recent Labs   Lab Test  06/30/17   0919  06/30/17   0852   CSPEC  Bronchoalveolar Lavage   Left lower lobe  Posterior  CORRECTED ON 06/30 AT 1123: PREVIOUSLY REPORTED AS Bronchial    Bronchoalveolar Lavage   Left lower lobe  superior  CORRECTED ON 06/30 AT 1115: PREVIOUSLY REPORTED AS Bronchial     CMVLOG  Not Calculated  Not Calculated       Hepatitis B Testing   Recent Labs   Lab Test  06/26/17   1455   AUSAB  2.70   HBCAB  Nonreactive   HEPBANG  Nonreactive        Hepatitis C Antibody   Date Value Ref Range Status   06/26/2017  NR Final    Nonreactive   Assay performance characteristics have not been established for newborns,   infants, and children         CMV Antibody IgG   Date Value Ref Range Status   06/26/2017 4.2 (H) 0.0 - 0.8 AI Final     Comment:     Positive   Antibody index (AI) values reflect qualitative changes in antibody   concentration that cannot be directly associated with clinical condition or   disease state.       Varicella Zoster Virus Antibody IgG    Date Value Ref Range Status   06/26/2017 1.5 (H) 0.0 - 0.8 AI Final     Comment:     Positive, suggests prev. exposure and probable immunity   Antibody index (AI) values reflect qualitative changes in antibody   concentration that cannot be directly associated with clinical condition or   disease state.       EBV Capsid Antibody IgG   Date Value Ref Range Status   06/26/2017 1.1 (H) 0.0 - 0.8 AI Final     Comment:     Positive, suggests recent or past exposure   Antibody index (AI) values reflect qualitative changes in antibody   concentration that cannot be directly associated with clinical condition or   disease state.       Herpes Simplex Virus Type 1 IgG   Date Value Ref Range Status   06/26/2017 1.6 (H) 0.0 - 0.8 AI Final     Comment:     Positive.  IgG antibody to HSV-1 detected.   Antibody index (AI) values reflect qualitative changes in antibody   concentration that cannot be directly associated with clinical condition or   disease state.       Herpes Simplex Virus Type 2 IgG   Date Value Ref Range Status   06/26/2017  0.0 - 0.8 AI Final    <0.2  No HSV-2 IgG antibodies detected.   Antibody index (AI) values reflect qualitative changes in antibody   concentration that cannot be directly associated with clinical condition or   disease state.         Imaging:   EXAMINATION: Chest CT 6/26/2017 3:25 PM.  COMPARISON: chest x-ray performed earlier 6/26/2017.    FINDINGS:  Left arm PICC tip terminates in the high SVC. Multiple pulmonary  nodules. For example, cluster of soft tissue nodules adjacent to the  right major fissure (series 4, image 177. The dominant soft tissue  nodule measures approximately 4 x 4 mm. Soft tissue nodule within the  medial segment of the right middle lobe (series 4, image 204),  measuring approximately 4 x 3 mm. Spiculated soft tissue pulmonary  nodule within the posterior segment of the right lower lobe (series 4,  image 264). This measures 1.1 x 0.7 cm. Tree-in-bud opacities of  the  left lower lobe. No pleural effusion. No pneumothorax. Central  tracheobronchial tree is clear.    Mild atherosclerotic calcifications of the thoracic aorta. Cardiac  size within normal limits. No pericardial effusion. Typical branching  pattern of the great vessels. Visualized thyroid gland is  unremarkable. No enlarged axillary or mediastinal lymph nodes.    Limited evaluation of the upper abdomen is notable for cholelithiasis  without CT evidence of cholecystitis.     No acute fracture or suspicious bony lesions.       Impression    IMPRESSION:   1. Centrilobular nodularity and tree-in-bud opacities within the right  middle and left lower lobes, respectively, which may represent  pneumonia and/or aspiration pneumonitis.  2. Indeterminant spiculated soft tissue pulmonary nodule within the  right lower lobe, measuring 1.1 x 0.7 cm. Three to six month follow-up  CT recommended in an immunosuppressed patient.   3. Cholelithiasis without CT evidence of cholecystitis.        Montse Breen MD

## 2017-07-14 NOTE — PROGRESS NOTES
[facility]  Transplant Infectious Disease Consult Note - New Patient     Patient:  Norman Real, Date of birth 1960, Medical record number [id number]  Date of Visit:  07/14/2017  Consult requested by Dr. Umanzor for evaluation of lung nodules and positive BAL galactomannan in a patient with AML undergoing evaluation prior to BMT.         Assessment and Recommendations:   Recommendations:  - agree with micafungin during the preparative regimen for BMT. Since we are treating infection rather than using prophylaxis, a dose of approximately 1 mg per kilogram per day is desired, and that is the dose he is receiving of micafungin. Once he is through his preparative regimen, the potential for change back to mold active azole therapy can be reassessed.  - await final results of 6/30/2017 BAL cultures  - no further imaging or testing needed at this point in time  - no contraindications noted to proceeding with BMT from an ID perspective  - no return visit to the infectious disease clinic is scheduled at this time, as he is likely to be admitted for stem cell transplantation.    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 Started on voriconazole on 6/29 when seen by Dr. Ahn. Bronchoscopy with normal anatomy and moderate thin secretions on 6/30. BAL cultures with no growth to date, galactomannan positive bilaterally (4.05 and 2.27). Transitioned to IV micafungin due to concern for interaction with preparatory chemotherapy.  - QTc interval: 409 msec on 6/29/2017  - Bacterial prophylaxis: none at this time  - Pneumocystis prophylaxis: none at this time, to begin on day +28 following transplantation  - Viral serostatus & prophylaxis: CMV+, EBV+, HSV 1+/2neg, VZV+, none  - Fungal prophylaxis: micafungin 100mg  daily (1mg/kg) as above  - Immunization status: none needed currently. Standard procedure is to reimmunize at the one year anniversary visit following transplantation.  - Gamma globulin status: unknown  - Isolation status:  Good hand hygiene.    Patient seen and discussed with transplant ID attending, Dr. Lanny Farias.    Montse Breen MD, [id number]  Fellow in Pediatric and Adult Infectious Disease  pager:  [phone]    Attestation:  Norman Real was seen in the hospital on 07/14/2017, with Montse Breen MD, ID Fellow, pager [phone]. I reviewed the history & exam, vital signs, medications, labs and imaging. I personally reviewed the 6/26/2017 CT imaging. This is my first day seeing Norman: chart reviewed to date. Assessment and plan were jointly made. I agree with the fellow's note and plan of care. He has very subtle although abnormal lung fields on chest CT imaging performed prior to the upcoming stem cell transplantation 6/26/2017. Bronchoscopy detected Aspergillus galactomannan from both lungs, so technically he probably has some minimal underlying Aspergillus infection. However, I do believe that this infection has been nipped in the bud by the rather immediate use of antifungal therapy, so proceeding with stem cell transplantation while he is covered with mold active antifungal therapy is acceptable in this individual case. Lanny Farias MD. Pager [phone]        History of Infectious Disease Illness:   Mr. Real is a 56 year old male with PMH of prostate cancer diagnosed in 2011 s/p surgery and radiation therapy, tachycardia s/p 3 ablations, recurrent diverticulitis s/p cathy-colectomy, and AML s/p 7+3 induction chemotherapy complicated by severe sciatica following BMBx. He underwent a CT chesty on 6/26 as a routine part of his work-up. This identified tree and bud opacities in the RML and LLL as well as a 1.1 x 0.7 cm nodule in the RLL. He was evaluated by Dr. Ahn in clinic on  6/29/2017. He was started on voriconazole and scheduled for a BAL on 6/30. The bronchoscopy showed normal anatomy with moderate, thin secretions. Cultures with no growth to date, however galactomannan was positive from each lung. He reports that he has been completely asymptomatic with no fever, cough, or trouble breathing. He has no nodes or skin lesions.    Transplants: undergoing evaluation prior to BMT.    Review of Systems:  CONSTITUTIONAL:  No fevers or chills, good energy  EYES: negative for icterus or acute vision changes  ENT:  negative for acute hearing loss, tinnitus, sore throat, runny nose  RESPIRATORY:  negative for cough, sputum or dyspnea  CARDIOVASCULAR:  negative for chest pain, palpitations  GASTROINTESTINAL:  negative for nausea, vomiting, diarrhea or constipation  GENITOURINARY:  negative for dysuria or hematuria  HEME:  No easy bruising or bleeding  INTEGUMENT:  negative for rash or pruritus, small area of healing skin at prior BMBx  NEURO:  Negative for headache or tremor    Past Medical History:   Diagnosis Date     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 05/26/2017     Diabetes (H)      Hypertension      Prostate cancer (H) 2011    had radiatoin therapy     Tachycardia        Past Surgical History:   Procedure Laterality Date     CARDIAC SURGERY      ablation       Family History   Problem Relation Age of Onset     Chronic Obstructive Pulmonary Disease Mother        Social History     Social History Narrative    Lives with wife in [place], [place]. 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 [place]. Previously liked to garden and care for the lawn, but has been careful not to participate in those activities since he was diagnosed. Likes to be outdoors and hunt. No pets or animal exposures. Travelled to [place] within the last year, but otherwise no recent travel. No history  of travel to the [facility]. Has two children that live in the area and two grandchildren (9 and 2 years of age) that he visits with.      Social History   Substance Use Topics     Smoking status: Former Smoker     Quit date: 5/31/2010     Smokeless tobacco: Not on file     Alcohol use No       Immunization History   Administered Date(s) Administered     Influenza (H1N1) 04/03/2010     Influenza (High Dose) 3 valent vaccine 11/04/2010, 10/11/2011, 10/10/2012, 12/17/2014, 10/21/2015, 12/07/2016     Pneumococcal 23 valent 11/22/2010     TDAP Vaccine (Adacel) 11/22/2010     Tetanus 02/01/2000       Patient Active Problem List   Diagnosis     AML (acute myelogenous leukemia) (H)     Aspergillosis (H)     Stem cell transplant candidate     Allergic rhinitis     Anomalous atrioventricular excitation     Diabetes mellitus (H)     Diverticulitis of intestine     Dyslipidemia     Impotence of organic origin     History of malignant neoplasm of prostate     History of urethral stricture     Hypertension     Paroxysmal supraventricular tachycardia (H)     Peripheral vascular disease (H)     Seborrheic eczema     Sleep disorder     Tobacco dependence in remission            Current Medications & Allergies:   micafungin 100 mg IV daily    Current Outpatient Prescriptions on File Prior to Visit:  filgrastim-sndz (ZARXIO) 480 MCG/0.8ML SOSY syringe INJECT 1 SYRINGE 0.8 ML (480 MCG) SUBCUTANEOUSLY ONCE DAILY   tadalafil (CIALIS) 20 MG tablet Take 20 mg by mouth   PSYLLIUM PO Take 1 tsp. by mouth   UNABLE TO FIND Take 1 tablet by mouth   guaiFENesin (MUCINEX) 600 MG 12 hr tablet Take 600 mg by mouth   blood glucose monitoring (FREESTYLE) lancets 1 time per day. To check BG for type 2 DM.    diltiazem 120 MG 24 hr capsule Take 120 mg by mouth   acetaminophen (TYLENOL) 325 MG tablet Take 650 mg by mouth   blood glucose monitoring (PRECISION XTRA GLUCOSE) test strip 1 strip   heparin lock flush 10 UNIT/ML SOLN injection 3 mLs by  "Intracatheter route daily In each lumen   cyclobenzaprine (FLEXERIL) 5 MG tablet Flexeril Take 5mg by mouth at bedtime and increase gradually to 1 tablet every 8-12hours as needed for muscle pain/spasm. Max 3 tablets/day   oxyCODONE (OXY-IR) 5 MG capsule Take 5 mg by mouth every 4 hours as needed for moderate to severe pain   Atorvastatin Calcium (LIPITOR PO) Take 10 mg by mouth   diltiazem (TIAZAC) 120 MG 24 hr ER beaded capsule Take by mouth daily   Fluticasone Propionate (FLONASE NA) Spray in nostril as needed    metFORMIN (GLUCOPHAGE) 500 MG tablet Take 500 mg by mouth daily   zolpidem (AMBIEN) 10 MG tablet Take by mouth nightly as needed for sleep 1/2 tab to one tablet at bedtime   Multiple Vitamins-Minerals (MULTI COMPLETE PO)    cholecalciferol (VITAMIN  -D) 1000 UNITS capsule Take 1 capsule by mouth daily    filgrastim (NEUPOGEN) 480 MCG/1.6ML injection Inject 480 mcg Subcutaneous   ondansetron (ZOFRAN-ODT) 4 MG ODT tab Take by mouth every 4 hours as needed for nausea   aspirin 81 MG tablet Take by mouth daily     No current facility-administered medications on file prior to visit.       Allergies   Allergen Reactions     Ace Inhibitors Anaphylaxis, Cough and Hives     Terazosin Swelling     Other reaction(s): Sedation/Sleepy            Physical Exam:   BP (!) 179/93  Pulse 89  Temp 98.7  F (37.1  C) (Oral)  Ht 1.753 m (5' 9\")  Wt 92.5 kg (204 lb)  SpO2 99%  BMI 30.13 kg/m2    Physical Examination:   GENERAL:  well-developed, well-nourished man sitting in clinic in no acute distress.  HEAD:  Head is normocephalic, atraumatic.   EYES:  Eyes have anicteric sclerae    ENT:  Oropharynx is moist without exudates or ulcers. Tongue is midline.  NECK:  Supple. No cervical lymphadenopathy.  LUNGS:  Clear to auscultation bilaterally. No increased work of breathing.   CARDIOVASCULAR:  Regular rate and rhythm with no murmurs, gallops or rubs.  ABDOMEN:  Normal bowel sounds, soft, nontender.   SKIN:  No acute " rashes. Line in place without any surrounding erythema or exudate. Previous bone marrow biopsy skin entrance sites were examined on his back and have a slightly hyperpigmented scar.  NEUROLOGIC:  Grossly nonfocal. Active x4 extremities.         Laboratory Data:     Metabolic Studies     Recent Labs   Lab Test  06/26/17   1455   NA  138   POTASSIUM  3.8   CHLORIDE  106   CO2  25   ANIONGAP  7   BUN  10   CR  0.83   GFRESTIMATED  >90  Non  GFR Calc     GLC  142*   SANDRA  8.4*       Hepatic Studies  Recent Labs   Lab Test  06/26/17   1455   BILITOTAL  0.5   ALKPHOS  82   PROTTOTAL  6.8   ALBUMIN  3.3*   AST  18   ALT  23     Recent Labs   Lab Test  06/26/17   1455   URIC  6.4       Hematology Studies    Recent Labs   Lab Test  06/29/17   0920  06/26/17   1455   WBC  5.4  6.7   ANEU  3.5  4.3   ALYM  0.7*  0.9   DERBA  1.0  1.4*   AEOS  0.0  0.0   HGB  8.5*  8.3*   HCT  26.9*  26.0*   PLT  223  259       Clotting Studies    Recent Labs   Lab Test  06/26/17   1455   INR  1.14   PTT  30       Urine Studies   Recent Labs   Lab Test  06/26/17   1430   URINEPH  5.0   NITRITE  Negative   LEUKEST  Negative   WBCU  1       CSF testing   Recent Labs   Lab Test  06/29/17   1055   CWBC  0   CRBC  0   CGLU  78*   CTP  43       Microbiology:  Aspergillus testing  Recent Labs   Lab Test  06/30/17   0919  06/30/17   0852   ASPGAI  2.27  4.05   ASPAG  Positive  Reference range: Negative  (Note)  INTERPRETIVE INFORMATION: Aspergillus Galactomannan EIA,  Broncho  A BAL galactomannan index of greater than or equal to 0.5  is considered positive.  This result should be interpreted  in the context of patient history, clinical signs/symptoms,  and other routine diagnostic tests (e.g., culture,  histologic examination of biopsy material, and radiographic  imaging).  Performed by [facility],  [address] [place] [address] [phone]  [email or web address], Damien Gil MD, Lab. Director  *  Positive  Reference range:  Negative  (Note)  INTERPRETIVE INFORMATION: Aspergillus Galactomannan EIA,  Broncho  A BAL galactomannan index of greater than or equal to 0.5  is considered positive.  This result should be interpreted  in the context of patient history, clinical signs/symptoms,  and other routine diagnostic tests (e.g., culture,  histologic examination of biopsy material, and radiographic  imaging).  Performed by [facility],  [address] [place] [id number] [phone]  [email or web address], Damien Gil MD, Lab. Director  *       Last Culture results with specimen source  Culture Micro   Date Value Ref Range Status   06/30/2017   Final    Culture received and in progress.  Positive AFB results are called as soon as   detected.  Final report to follow in 7 to 8 weeks.  Assayed at [facility] [address]     06/30/2017 Culture negative after 1 week  Final   06/30/2017 No Legionella species isolated  Final   06/30/2017 No growth after 12 days  Final   06/30/2017 Light growth Normal respiratory aneesh  Final   06/30/2017   Final    Culture received and in progress.  Positive AFB results are called as soon as   detected.  Final report to follow in 7 to 8 weeks.  Assayed at [facility] [address]     06/30/2017 Culture negative after 1 week  Final   06/30/2017 No Legionella species isolated  Final   06/30/2017 No growth after 12 days  Final   06/30/2017 Light growth Normal respiratory aneesh  Final    Specimen Description   Date Value Ref Range Status   06/30/2017 Bronchoalveolar Lavage Left lower lobe Posterior  Final   06/30/2017 Bronchoalveolar Lavage Left lower lobe Posterior  Final   06/30/2017 Bronchoalveolar Lavage Left lower lobe Posterior  Final   06/30/2017 Bronchoalveolar Lavage Left lower lobe Posterior  Final   06/30/2017 Bronchoalveolar Lavage Left lower lobe Posterior  Final   06/30/2017 Bronchoalveolar Lavage Left lower lobe Posterior  Final   06/30/2017 Bronchoalveolar  Lavage Left lower lobe Posterior  Final   06/30/2017 Bronchoalveolar Lavage Left lower lobe superior  Final   06/30/2017 Bronchoalveolar Lavage Left lower lobe superior  Final   06/30/2017 Bronchoalveolar Lavage Left lower lobe superior  Final   06/30/2017 Bronchoalveolar Lavage Left lower lobe superior  Final   06/30/2017 Bronchoalveolar Lavage Left lower lobe superior  Final   06/30/2017 Bronchoalveolar Lavage Left lower lobe superior  Final   06/30/2017 Bronchoalveolar Lavage Left lower lobe superior  Final          Infectious Disease Testing     Recent Labs   Lab Test  06/26/17   1455   TREPAB  Negative       Virology:  CMV viral loads    Recent Labs   Lab Test  06/30/17   0919  06/30/17   0852   CSPEC  Bronchoalveolar Lavage   Left lower lobe  Posterior  CORRECTED ON 06/30 AT 1123: PREVIOUSLY REPORTED AS Bronchial    Bronchoalveolar Lavage   Left lower lobe  superior  CORRECTED ON 06/30 AT 1115: PREVIOUSLY REPORTED AS Bronchial     CMVLOG  Not Calculated  Not Calculated       Hepatitis B Testing   Recent Labs   Lab Test  06/26/17   1455   AUSAB  2.70   HBCAB  Nonreactive   HEPBANG  Nonreactive        Hepatitis C Antibody   Date Value Ref Range Status   06/26/2017  NR Final    Nonreactive   Assay performance characteristics have not been established for newborns,   infants, and children         CMV Antibody IgG   Date Value Ref Range Status   06/26/2017 4.2 (H) 0.0 - 0.8 AI Final     Comment:     Positive   Antibody index (AI) values reflect qualitative changes in antibody   concentration that cannot be directly associated with clinical condition or   disease state.       Varicella Zoster Virus Antibody IgG   Date Value Ref Range Status   06/26/2017 1.5 (H) 0.0 - 0.8 AI Final     Comment:     Positive, suggests prev. exposure and probable immunity   Antibody index (AI) values reflect qualitative changes in antibody   concentration that cannot be directly associated with clinical condition or   disease state.        EBV Capsid Antibody IgG   Date Value Ref Range Status   06/26/2017 1.1 (H) 0.0 - 0.8 AI Final     Comment:     Positive, suggests recent or past exposure   Antibody index (AI) values reflect qualitative changes in antibody   concentration that cannot be directly associated with clinical condition or   disease state.       Herpes Simplex Virus Type 1 IgG   Date Value Ref Range Status   06/26/2017 1.6 (H) 0.0 - 0.8 AI Final     Comment:     Positive.  IgG antibody to HSV-1 detected.   Antibody index (AI) values reflect qualitative changes in antibody   concentration that cannot be directly associated with clinical condition or   disease state.       Herpes Simplex Virus Type 2 IgG   Date Value Ref Range Status   06/26/2017  0.0 - 0.8 AI Final    <0.2  No HSV-2 IgG antibodies detected.   Antibody index (AI) values reflect qualitative changes in antibody   concentration that cannot be directly associated with clinical condition or   disease state.         Imaging:   EXAMINATION: Chest CT 6/26/2017 3:25 PM.  COMPARISON: chest x-ray performed earlier 6/26/2017.    FINDINGS:  Left arm PICC tip terminates in the high SVC. Multiple pulmonary  nodules. For example, cluster of soft tissue nodules adjacent to the  right major fissure (series 4, image 177. The dominant soft tissue  nodule measures approximately 4 x 4 mm. Soft tissue nodule within the  medial segment of the right middle lobe (series 4, image 204),  measuring approximately 4 x 3 mm. Spiculated soft tissue pulmonary  nodule within the posterior segment of the right lower lobe (series 4,  image 264). This measures 1.1 x 0.7 cm. Tree-in-bud opacities of the  left lower lobe. No pleural effusion. No pneumothorax. Central  tracheobronchial tree is clear.    Mild atherosclerotic calcifications of the thoracic aorta. Cardiac  size within normal limits. No pericardial effusion. Typical branching  pattern of the great vessels. Visualized thyroid gland is  unremarkable.  No enlarged axillary or mediastinal lymph nodes.    Limited evaluation of the upper abdomen is notable for cholelithiasis  without CT evidence of cholecystitis.     No acute fracture or suspicious bony lesions.       Impression    IMPRESSION:   1. Centrilobular nodularity and tree-in-bud opacities within the right  middle and left lower lobes, respectively, which may represent  pneumonia and/or aspiration pneumonitis.  2. Indeterminant spiculated soft tissue pulmonary nodule within the  right lower lobe, measuring 1.1 x 0.7 cm. Three to six month follow-up  CT recommended in an immunosuppressed patient.   3. Cholelithiasis without CT evidence of cholecystitis.

## 2017-07-17 NOTE — TELEPHONE ENCOUNTER
Spoke with patient over the phone regarding line time and admission for BMT to 5C. Instructed patient to check in at [facility] in the hospital at 8:00am tomorrow 7/18; admission will follow line placement. Patient instructed to be NPO after midnight tonight and to wash neck and chest with hibacleanse twice today and once tomorrow morning. Patient verbalized understanding and had no further questions.

## 2017-07-18 PROBLEM — C92.00 AML (ACUTE MYELOID LEUKEMIA) (H): Status: ACTIVE | Noted: 2017-01-01

## 2017-07-18 NOTE — PROGRESS NOTES
Interventional Radiology Intra-procedural Nursing Note    Patient Name: Norman Real  Medical Record Number: [id number]  Today's Date: July 18, 2017       Start Time: 1045  End of procedure time: 1125  Procedure: tunneled central venous catheter placement and PICC line removal  Fire Safety Score: 2    Consent Review/Timeout Performed by:  Daniel Hopkins PA-C  Procedure Performed By: Daniel Hopkins PA-C    Fentanyl administered: 200 mcg  Versed administered: 4 mg    Start of Sedation Time: 1015  End of Sedation Time: 1125  Total Sedation Time: 70 minutes    Report given to: Cherrie KELLER  Time pt departs:  1135    Other Notes:  Alert male transported via cart from 2A to IR Procedure Room 1 for planned intervention.  ID band confirmed and patient acknowledges understanding of planned procedure. Patient repositioned to procedure table via hover-mat and positioned supine.  Patient prepped and draped per policy see VS flowsheet, MAR for further information.       Right internal jugular site identified and confirmed using ultrasound guidance.   Insertion made under fluorscopy.  New  9.5 F x 47 cm Power Cooper catheter (LOT # [id number] / Expiration date 2018-09-30) inserted and tip confirmed in right atrium.  Dual lumen flushed and heparinized with 10 u/ cc Heparin by Provider.  Site cleansed and dressed per protocol.    Left PICC line removal without complication.  Tip intact.  Site cleansed and dressed per protocol.    Patient condition post procedure is stable.   Patient admitted to  for post-procedure monitoring and continuation of care.    Eliane Kang RN

## 2017-07-18 NOTE — PROGRESS NOTES
Pt admitted to 2A for a central line placement and admission to 5C.  No labs ordered.  Pt has PICC line.  Pt needs consent.  H and P up to date.  Family at bedside.

## 2017-07-18 NOTE — PROGRESS NOTES
CLINICAL    Blood and Marrow Transplant Service      Focus: Counseling and orientation to Unit    Data:  Norman was admitted today for transplant. He is getting settled in his room and asked to be placed on the waiting list for [facility]. His wife is present with him and staying at [facility]. He provided a copy of his HCD to the bedside RN - will have scanned into EMR. No other concerns or questions identified at this time.     Intervention:   Provided assessment of coping, supportive counseling, validation of concerns, encouragement and resources     Assessment:  Norman is admitted today and will start his prep for his planned Sibling Donor Transplant.     Plan: Encouraged patient/family to express any questions/concerns as they arise. SW to remain available supportively and work with the interdisciplinary team regarding patient's plan of care.    Stefany REEVES [facility]  7/18/2017   [phone]

## 2017-07-18 NOTE — H&P
BMT History & Physical     Admission  Name: Norman Real  Date:  7/18/2017  Service: BMT   Chief Complaint:  AML  Informant:  Patient and Chart  Resuscitation Status: Full Code    Patient ID:  Norman Real is a 56 year old male, currently day -7 of his HCT.    Transplant Essential Data:  Diagnosis AMLU Acute myelogeneous leukemia, Unknown  HCT Type Allogeneic    Prep Regimen Cytoxan  Fludarabine  TBI   Donor Source Related PBSC    GVHD Prophylaxis   Mycophenolate  Clinical Trials 2016-19 Platelet Intercept Study     Oncology Treatment History:   Norman Real is a 57yo M who was found to have acute leukemia on random blood test.  He went for his annual physical and to check his hemoglobin A1c.  He was found to have abnormal blood counts; therefore, it was repeated the next day and was found to have elevated peripheral blood blast count.  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  By flow cytometry, the blast population was CD13, CD33, CD34, , HLA-DR and MPO positive.  Molecular tests for NPM1 and CEBPA1 were sent and are reportedly negative.  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  The source documents for those reports are still being obtained by our office.  The patient underwent induction chemotherapy with 7+3 with idarubicin and cytarabine and then went on to consolidation with high dose cytarabine.  The recovery marrow obtained recently shows no morphological evidence of acute myelogenous leukemia.  However, flow cytometry identifies about 0.5% of blasts that have a similar phenotype as the original diagnostic materials, consistent with minimal residual disease.      He had pulmonary nodules found on workup CT chest. He was seen by ID prior to transplant. He will continue  prophylaxis with micafungin through prep, then transition to an azole. He is asymptomatic.    He is being admitted today to begin preparative regimen for transplant. Had his line placed in IR. Overall doing well. No recent fevers, cough or cold symptoms. No n/v/d/c. No rash. No bleeding. Hip pain from prior bmbx has improved significantly. No longer amblates with assistive device. Takes flexeril or tylenol prn.    Treatment/Chemotherapy Number of Cycles Date Range Outcomes & Complications   7+3 1 5/17 None significant, achieved first remission       Bone Marrow Workup Results:  Blood Counts Recent Labs   Lab Test  06/29/17   0920   WBC  5.4   ANEU  3.5   ALYM  0.7*   DEBRA  1.0   AEOS  0.0   HGB  8.5*   HCT  26.9*   PLT  223      Bone Marrow 6/29/17 Bone marrow, posterior iliac crest, left decalcified trephine biopsy and touch imprint; left particle crush, direct aspirate smear, and concentrated aspirate smear; and peripheral blood smear:     -No morphologic or immunophenotypic evidence of leukemia     -Hypercellular bone marrow for age (approximately 70%) with trilineage hematopoiesis, and no increase in blasts     - Peripheral blood showing marked normochromic, normocytic anemia; increased erythrocyte regeneration, rare to occasional teardrop cells; lymphocytopenia     COMMENT:   Concurrent flow cytometry ([id number]) shows no increase in myeloid blasts and no abnormal myeloid blast population.    Blood Type Recent Labs   Lab Test  06/26/17   1528   ABO  O      Chemistries Recent Labs   Lab Test  06/26/17   1455   NA  138   POTASSIUM  3.8   CHLORIDE  106   CO2  25   BUN  10   CR  0.83      Liver Tests Recent Labs   Lab Test  06/26/17   1455   BILITOTAL  0.5   ALKPHOS  82   AST  18   ALT  23      Chest CT 6/26/17  1. Centrilobular nodularity and tree-in-bud opacities within the right  middle and left lower lobes, respectively, which may represent  pneumonia and/or aspiration pneumonitis.  2. Indeterminant spiculated  soft tissue pulmonary nodule within the  right lower lobe, measuring 1.1 x 0.7 cm. Three to six month follow-up  CT recommended in an immunosuppressed patient.   3. Cholelithiasis without CT evidence of cholecystitis.    PFTs FVC%  Recent Labs   Lab Test  06/28/17   1245   20003  107       FEV1%  Recent Labs   Lab Test  06/28/17   1245   20016  101       DLCO%  Recent Labs   Lab Test  06/28/17   1245   20143  125      ECHO or MUGA: MUGA 6/27/17  LVEF 53%, normal wall motion and chamber size   EKG Normal EKG 6/29/17   Serologies: CMV +, EBV +, HSV +, VZV+,     Vitamin D No results for input(s): VITDT in the last 16841 hours.   LP (6/29/17): Cytology: Cerebrospinal Fluid:   Negative for malignancy     Family History:   Family History   Problem Relation Age of Onset     Chronic Obstructive Pulmonary Disease Mother        Social History:   Social History     Social History     Marital status:      Spouse name: N/A     Number of children: N/A     Years of education: N/A     Occupational History     Not on file.     Social History Main Topics     Smoking status: Former Smoker     Quit date: 5/31/2010     Smokeless tobacco: Not on file     Alcohol use No     Drug use: No     Sexual activity: Not on file     Other Topics Concern     Not on file     Social History Narrative    Lives with wife in [place], [place]. 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 [place]. Previously liked to garden and care for the lawn, but has been careful not to participate in those activities since he was diagnosed. Likes to be outdoors and hunt. No pets or animal exposures. Travelled to [place] within the last year, but otherwise no recent travel. No history of travel to the [facility]. Has two children that live in the area and two grandchildren (9 and 2 years of age) that he visits with.        Past Medical History:   Past  Medical History:   Diagnosis Date     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 05/26/2017     Cholelithiasis 06/26/2017     Diabetes (H)      Hypertension      Prostate cancer (H) 2011    had radiatoin therapy     Tachycardia         Past Surgical History:   Past Surgical History:   Procedure Laterality Date     CARDIAC SURGERY      ablation       Allergies:   Allergies   Allergen Reactions     Ace Inhibitors Anaphylaxis, Cough and Hives     Terazosin Swelling     Other reaction(s): Sedation/Sleepy       Home Medications      Prior to Admission medications    Medication Sig Start Date End Date Taking? Authorizing Provider   LOSARTAN POTASSIUM PO Take 50 mg by mouth daily   Yes Reported, Patient   micafungin (MYCAMINE) 100 MG injection Inject into the vein daily   Yes Reported, Patient   guaiFENesin (MUCINEX) 600 MG 12 hr tablet Take 600 mg by mouth 5/19/17  Yes Reported, Patient   filgrastim (NEUPOGEN) 480 MCG/1.6ML injection Inject 480 mcg Subcutaneous 6/6/17  Yes Reported, Patient   cyclobenzaprine (FLEXERIL) 5 MG tablet Flexeril Take 5mg by mouth at bedtime and increase gradually to 1 tablet every 8-12hours as needed for muscle pain/spasm. Max 3 tablets/day 6/16/17  Yes Reported, Patient   ondansetron (ZOFRAN-ODT) 4 MG ODT tab Take by mouth every 4 hours as needed for nausea   Yes Reported, Patient   oxyCODONE (OXY-IR) 5 MG capsule Take 5 mg by mouth every 4 hours as needed for moderate to severe pain   Yes Reported, Patient   Atorvastatin Calcium (LIPITOR PO) Take 10 mg by mouth   Yes Reported, Patient   diltiazem (TIAZAC) 120 MG 24 hr ER beaded capsule Take by mouth daily   Yes Reported, Patient   Fluticasone Propionate (FLONASE NA) Spray in nostril as needed    Yes Reported, Patient   metFORMIN (GLUCOPHAGE) 500 MG tablet Take 500 mg by mouth daily   Yes Reported, Patient   zolpidem (AMBIEN) 10 MG tablet Take by mouth nightly as needed for sleep 1/2 tab to one tablet at bedtime   Yes Reported, Patient    Multiple Vitamins-Minerals (MULTI COMPLETE PO)    Yes Reported, Patient   cholecalciferol (VITAMIN  -D) 1000 UNITS capsule Take 1 capsule by mouth daily    Yes Reported, Patient   filgrastim-sndz (ZARXIO) 480 MCG/0.8ML SOSY syringe INJECT 1 SYRINGE 0.8 ML (480 MCG) SUBCUTANEOUSLY ONCE DAILY 6/12/17   Reported, Patient   tadalafil (CIALIS) 20 MG tablet Take 20 mg by mouth 4/7/17   Reported, Patient   UNABLE TO FIND Take 1 tablet by mouth    Reported, Patient   blood glucose monitoring (FREESTYLE) lancets 1 time per day. To check BG for type 2 DM.     Reported, Patient   diltiazem 120 MG 24 hr capsule Take 120 mg by mouth    Reported, Patient   acetaminophen (TYLENOL) 325 MG tablet Take 650 mg by mouth 6/6/17   Reported, Patient   blood glucose monitoring (PRECISION XTRA GLUCOSE) test strip 1 strip 11/20/13   Reported, Patient   heparin lock flush 10 UNIT/ML SOLN injection 3 mLs by Intracatheter route daily In each lumen    Reported, Patient       Review of Systems    Review of Systems:  CONSTITUTIONAL: NEGATIVE for fever, chills, change in weight  INTEGUMENTARY/SKIN: NEGATIVE for worrisome rashes, moles or lesions  EYES: NEGATIVE for vision changes or irritation  ENT/MOUTH: NEGATIVE for ear, mouth and throat problems  RESP: NEGATIVE for significant cough or SOB  BREAST: NEGATIVE for masses, tenderness or discharge  CV: NEGATIVE for chest pain, palpitations or peripheral edema  GI: NEGATIVE for nausea, abdominal pain, heartburn, or change in bowel habits  : NEGATIVE for frequency, dysuria, or hematuria  MUSCULOSKELETAL: NEGATIVE for significant arthralgias or myalgia  NEURO: NEGATIVE for weakness, dizziness or paresthesias  ENDOCRINE: NEGATIVE for temperature intolerance, skin/hair changes  HEME/ALLERGY: NEGATIVE for bleeding problems  PSYCHIATRIC: NEGATIVE for changes in mood or affect    PHYSICAL EXAM      Weight     Wt Readings from Last 3 Encounters:   07/18/17 91.2 kg (201 lb)   07/14/17 92.5 kg (204 lb)  "  06/30/17 90.7 kg (200 lb)          BP (!) 152/93 (BP Location: Right arm)  Pulse 65  Temp 97.5  F (36.4  C) (Oral)  Resp 16  Ht 1.73 m (5' 8.11\")  Wt 91.2 kg (201 lb)  SpO2 98%  BMI 30.46 kg/m2     General: NAD   Eyes: SAMSON, sclera anicteric   Nose/Mouth/Throat: OP clear, buccal mucosa moist, no ulcerations   Lungs: CTA bilaterally  Cardiovascular: RRR, no M/R/G   Abdominal/Rectal: +BS, soft, NT, ND, No HSM   Lymphatics: No edema  Skin: No rashes or petechaie  Neuro: A&O   Additional Findings: Cooper site NT, no drainage.    ASSESSMENT BY SYSTEMS   Norman Salazar Real is a 56 year old male with AML, here for 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 Today is D-7    D-6 cytoxan and fludarabine  D-5 through D-2 fludarabine  D-1 TBI x 1  D0 = transplant (7/25)    1.  BMT: Prep as above. Allopurinol through day 0. Flush and pre-meds prior to transplant. GCSF starts d+5 and cont to until ANC>1500 x3 consecutive days. Re-stage per protocol.  -dropping plts. Discussed with Dr. Umanzor will get BM BX shilpi at 9:30am. Need to order tests in am.     2.  HEME: Keep Hgb>8 and plts>10K. No pre-meds.                            3.  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   - Bactrim or appropriate PCP therapy to start d+28.                                             4.  GI:   - Zofran and dexamethasone prior to chemo to prevent N/V.   - Ativan and compazine available PRN break-through symptoms.   - Protonix for GI prophy.   - Ursodiol for VOD prophy.  - s/p left sided colectomy for recurrent diverticulitis    5.  GVH: MMF and tac to start day-3 with tac level day -1.    6.  FEN/Renal:   - Monitor creat and lytes. Replete lytes PRN per SS.  - Monitor weight, I+O, lytes per protocol with IVF flush.    7. CV  - HTN: cont losartan 50mg/d and diltiazem XL " 120mg/d  - Hx tachycardia with arrhythmia, s/p 3 ablations  - hold crestor through transplant    8. Endo  - DM type II. Hold metformin 500mg/d on admission. Check BG qid with med ss insulin with dex for n/v prophy    9.   - hx prostate cancer    10. Pain  - leg pain s/p bmbx. Much improved. Oxycodone and flexeril prn    11. Pulm  - spiculated pulm nodule (concern for malignancy w/ hx tobacco use) and GGO (concerning for fungal PNA). F/u CT chest in 4-6 weeks (approx 8/22) per Dr. Ahn recs in workup    Mari Gilbert    ATTENDING ADDENDUM:    I have independently seen and evaluated the patient on July 18, 2017 and reviewed clinical, laboratory, and radiographic findings. I have discussed the plan with the team and agree with the attached note with the following edits:    Norman Real is a 56 year old year old male, with Hx prostate Ca s/p surgery and RT, DM2, HTN, and possible fungal lung dz, and t-AML in CR1 here for AP sib allo. Back pain controlled and afebrile.     Ph/E: Vitals reviewed. No distress. Oropharynx clear. Neck supple. Heart RRR. Lungs clear. Abdomen soft. No peripheral edema. No rash. Neuro nonfocal.     Plan: Plan is Flu/Cy/TBI AP sib allo, sachi/Vfend, Tac/MMF, chemo reviewed and signed. CMV pos to pos, O to O. Hod metformin and use insulin. New mild thrombocytopenia, discussed with Dr. Umanzor and will do a marrow tomorrow morning and as soon as remission confirmed, will proceed with chemo. If repeat CBC tomorrow morning shows significantly improved platelets, we may forgo the marrow. I will look at his smear today to make sure there are not plt clumps.           diltiazem  120 mg Oral Daily     [START ON 7/19/2017] losartan (COZAAR) tablet 50 mg  50 mg Oral Daily     heparin lock flush  5-10 mL Intracatheter Q24H     pantoprazole  40 mg Oral Daily     ursodiol  300 mg Oral TID     heparin lock flush  5 mL Intravenous Q24H     [START ON 7/21/2017] acyclovir  800 mg Oral 5x Daily      micafungin (MYCAMINE) intermittent infusion > 45 kg  100 mg Intravenous Daily     [START ON 7/26/2017] voriconazole  200 mg Oral BID     [START ON 7/24/2017] levofloxacin  250 mg Oral Daily at 10 am     [START ON 8/22/2017] sulfamethoxazole-trimethoprim  1 tablet Oral Q Mon Tues BID     insulin aspart  1-7 Units Subcutaneous TID AC     insulin aspart  1-5 Units Subcutaneous At Bedtime       Tito Perez

## 2017-07-18 NOTE — PROCEDURES
Interventional Radiology Brief Post Procedure Note    Procedure: IR CVC TUNNEL PLACEMENT > 5 YRS OF AGE, IR FOLLOW UP VISIT OUTPATIENT    Proceduralist: Daniel Hopkins PA-C    Assistant: AR Lomas    Time Out: Prior to the start of the procedure and with procedural staff participation, I verbally confirmed the patient s identity using two indicators, relevant allergies, that the procedure was appropriate and matched the consent or emergent situation, and that the correct equipment/implants were available. Immediately prior to starting the procedure I conducted the Time Out with the procedural staff and re-confirmed the patient s name, procedure, and site/side. (The Joint Commission universal protocol was followed.)  Yes    Medications   Medication Event Details Admin User Admin Time   midazolam (VERSED) injection 0.5-1 mg Medication Given Dose: 1 mg; Route: Intravenous Eliane Kang RN 7/18/2017 10:12 AM   fentaNYL (PF) (SUBLIMAZE) injection 25-50 mcg Medication Given Dose: 50 mcg; Route: Intravenous Eliane Kang RN 7/18/2017 10:12 AM   midazolam (VERSED) injection 0.5-1 mg Medication Given Dose: 1 mg; Route: Intravenous Eliane Kang RN 7/18/2017 10:19 AM   fentaNYL (PF) (SUBLIMAZE) injection 25-50 mcg Medication Given Dose: 50 mcg; Route: Intravenous Eliane Kang RN 7/18/2017 10:19 AM   lidocaine 1 % 1-30 mL Medication Given by Other Clinician Dose: 20 mL; Route: Intradermal Eliane Kang RN 7/18/2017 10:25 AM   heparin 5,000 units in 0.9% sodium chloride 1000 mL Medication New Bag by Other Clinician Dose: 5,000 Units; Route: TABLE SOLN; Comment: table solution Eliane Kang RN 7/18/2017 10:26 AM   midazolam (VERSED) injection 0.5-1 mg Medication Given Dose: 1 mg; Route: Intravenous Eliane Kang RN 7/18/2017 10:38 AM   fentaNYL (PF) (SUBLIMAZE) injection 25-50 mcg Medication Given Dose: 50 mcg; Route: Intravenous Eliane Kang RN 7/18/2017 10:38 AM   midazolam (VERSED) injection 0.5-1  mg Medication Given Dose: 1 mg; Route: Intravenous Eliane Kang RN 7/18/2017 10:53 AM   fentaNYL (PF) (SUBLIMAZE) injection 25-50 mcg Medication Given Dose: 50 mcg; Route: Intravenous Eliane Kang RN 7/18/2017 10:53 AM   heparin lock flush 10 UNIT/ML injection 5 mL Medication Given by Other Clinician Dose: 5 mL; Route: Intravenous; Comment: Dual lumen CVC heparinized by Provider. Eliane Kang RN 7/18/2017 11:16 AM       Sedation: IR Nurse Monitored Care   Post Procedure Summary:  Prior to the start of the procedure and with procedural staff participation, I verbally confirmed the patient s identity using two indicators, relevant allergies, that the procedure was appropriate and matched the consent or emergent situation, and that the correct equipment/implants were available. Immediately prior to starting the procedure I conducted the Time Out with the procedural staff and re-confirmed the patient s name, procedure, and site/side. (The Joint Commission universal protocol was followed.)  Yes       Sedatives: Fentanyl and Midazolam (Versed)    Vital signs, airway and pulse oximetry were monitored and remained stable throughout the procedure and sedation was maintained until the procedure was complete.  The patient was monitored by staff until sedation discharge criteria were met.    Patient tolerance: Patient tolerated the procedure well with no immediate complications.    Time of sedation in minutes: 75 Minutes from beginning to end of physician one to one monitoring.        Findings: Image guided placement of right IJ, 9.5 Fr., 26 cm., double lumen tunneled central venous Cooper catheter. Catheter is ready for use.    Estimated Blood Loss: Less than 10 ml    Fluoroscopy Time:  0.2 minute(s)    SPECIMENS: None    Complications: 1. None     Condition: Stable    Plan: Follow up per primary team. Return to IR for catheter removal when indicated.    Comments: See dictated procedure note for full  details.    Oren Hopkins PA-C

## 2017-07-18 NOTE — PROGRESS NOTES
Psychosocial Assessment completed in the BMT Clinic and copied here for staff review.    Blood and Marrow Transplant   Psychosocial Assessment with   Clinical      Assessment completed on 7/6/2017 of living situation, support system, financial status, functional status, coping, stressors, need for resources and social work intervention provided as needed. Information for this assessment was provided by pt and pt's wife-Agapito's report in addition to medical chart review and consultation with medical team.      Present at assessment: Patient and Patient's Wife-Agapito were present for this assessment conducted by Zeynep Meléndez BMT .      Diagnosis: Acute Myelogenous Leukemia (AML)     Transplant type: Allogeneic     Donor: Related allogeneic donor stem cell transplant              Donor: Jennifer     Physician: Charanjit Umanzor MD     Nurse Coordinator: Janie Robles RN     Permanent Address:   [address] [address]     Local Address:   [address] [address]  [employer] main desk: [phone]     **Pt and pt's wife would like to be placed on the [facility] wait list. They enjoy the [facility]; however, they will decide if they want to continue staying at the [facility] or transition to [facility] if they are called for an apartment. Pt is currently on [facility] wait list.      Contact Information:  Pt Home Phone: [phone]  Pt Cell Phone: [phone]  Pt's Wife-Agapito's Phone: [phone]  Pt's Sister-Isabelle's Phone: [phone]  Pt's Mother-In-Law-Nely's Phone: [phone]     Presenting Information:  Pt is a 56 year old male diagnosed with AML who presents for evaluation for allogeneic transplant at the [facility] Pt was accompanied to today's visit by his wife-Agapito.      Decision Making: Self     Health Care Directive: Yes. Pt has a health care directive and will bring it  when they return to the BMT program.      Relationship Status: Pt and pt's spouse described relationship as stable and supportive.      Special Needs: Local Lodging Needed. Pt and pt's wife currently staying at the [facility].     Family/Support System: Pt endorsed a large support system including family and close friends who will be available to support pt throughout transplant process.      Spouse: Agapito Real  Children: 2 Children; Daughter-Yuan German (Lives in [place], [place]) and Son-Mushtaq Real (Lives in [place], [place])  Grandchildren: 2 Grandchildren; [place] (9) and [place] (2)  Parents:   Siblings: Sister-Isabelle (Lives in [place], [place] - Sister is pt's Donor)     Caregiver: SW discussed with pt the caregiver role and expectation at length. Pt is agreeable to having a full time caregiver for a minimum of 100 days until cleared by the BMT physician. Pt and pt's wife-Agapito confirmed understanding of the caregiver requirement. Pt completed caregiver contract; SW to add to medical record. Pt shared that he has a good amount of  individuals to assist with caregiving as required. Pt's primary caregiver will be his wife-Agapito with his sister-Isabelle and mother-in-law as a secondary. Pt signed the caregiver contract which will be scanned into the EMR. Caregiver education and resources provided. No caregiver concerns identified.      Pt's Wife-Agapito's Phone: [phone]  Pt's Sister-Isabelle's Phone: [phone]  Pt's Mother-In-Law-Nely's Phone: [phone]     Permanent Living Situation: Pt resides in [place], [place].     Temporary Living Situation:  Pt will be living at [facility].      Transportation Mode: Pt and pt's wife plan to walk/ride the shuttle to appointments. Pt is aware of driving restrictions post-BMT and the need for the caregiver is to drive until cleared to drive by the BMT physician. Pt will utilize the hospital security shuttle for transportation to and from the [facility] and BMT  "Clinic/Hospital.     Insurance: Pt has [employer] health insurance. Pt denied specific insurance concerns at this time. SW reiterated information about the [employer]  should specific insurance questions arise as pt moves through transplant process. Future Insurance questions referred to [employer] .     Sources of Income: Pt is supported by Savings; pt applied for SSDI and will start receiving payments October 2017. Pt's wife is not receiving any income from her employer at this time. SW provided information on kamlesh options. SW provided pt with [employer] and [facility] [employer] and encouraged pt to complete applications.     Employment: Pt is retired. Pt  for [employer]. Pt's wife is an Ambulance Manager. Pt's wife is over her allowed 6 weeks of leave and will need a letter for her employer once pt is admitted into the hospital.     Mental Health: Pt denied a history of mental health concerns, specific diagnoses or medications at this time. ALEXANDER explained that it's not uncommon for patients going through transplant to experience symptoms of depression/anxiety. SW completed a PHQ 9 Assessment with the pt and pt scored a 4 which indicates no sign of depression.      Chemical Use: Pt shared that he quit smoking 7 years ago. No current alcohol use. No use of marijuana or other drugs. Based on the information provided, there appear to be no specific risks or concerns identified at this time.      Trauma/Loss/Abuse History: Multiple losses associated with cancer diagnosis and treatment, including health, employment, changes to physical appearance, etc.      Spirituality: Pt shared he attends [facility] [employer]. SW explained that there are Chaplains on the unit and pt can request to meet with a  at anytime. Pt would like a blessing ceremony on day of transplant.     Coping: Pt noted that he is currently feeling \"positive, hopeful, worried, nervous, and ready to begin\". Pt " "shared that he finds it helpful staying at the [facility] as he is able to talk others who are going through the transplant as well. SW discussed the [employer] as another option and pt asked for contact information for the Peer Connect; SW provided.     Pt shared that his main coping mechanisms are talking with family/friends, prayer/spiritual practices, reading books, and exercise. Pt noted that he enjoys being outside, fixing things and \"tinkering around in his garage\". SW and pt discussed additional positive coping mechanisms that pt can utilize while in the hospital. While hospitalized, pt plans to use ipad, read books and use phone.      Caregiver Coping: Pt's wife-Agapito noted that she is feeling \"positive, fearful, hopeful, nervous, scared, and ready to begin\" at this time. Pt's wife shared that she is scared about the outcome. Emotional support provided. Pt's wife noted that she michael by talking with family/friends, prayer/spiritual practices, and working on her hobby (cross stitching). Pt's wife said she can best be supported by \"emotional support, motivator, positive and by keeping [facility] updated\".      Education Provided: Transplant process expectations, Caregiver requirements, Caregiver self-care, Financial issues related to transplant, Financial resources/grants available, Common psychosocial stressors pre/post transplant, Support group(s) available, Hospital resources available, Web site information, Social Work role and Resources for grandchildren.     Interventions Provided: Psychosocial support and education      Assessment and Recommendations for Team:  Pt is a 56 year old male diagnosed with AML who is here undergoing preparation for a planned allogeneic transplant. Pt is pleasant, calm and able to articulate concerns/coping mechanisms in an appropriate manner. Pt feels comfortable communicating with the medical team. Pt has a strong supportive network of family and friends " who are involved.      Pt has developed strong coping mechanisms such as talking with family/friends, prayer/spiritual practices, reading books, and exercise. Pt and pt's family will benefit from ongoing psychosocial support in regards to coping with the adjustment to the BMT process.      Pt has a large support system and a well-developed caregiver plan. Pt verbalizes understanding of the transplant process and wanting to proceed. SW provided contact information and encouraged pt to contact SW with questions, concerns, resources and for support.     Important Information:   -Pt is currently staying at the [facility]; [facility].  -Pt's wife will need a letter for her employer once pt is admitted to the hospital.  -Pt was provided the [employer] and [employer] applications.  -Pt would like a [place] Ceremony.     Follow up Planned:   Initiate financial resources - [employer] and [employer] applications provided.  Psychosocial support  Spiritual Health referral - Pt would like a [place] Ceremony  Letter(s) of advocacy - pt's wife needs a letter for her employer     Zeynep REEVES, TAB  Phone: [phone]

## 2017-07-18 NOTE — PLAN OF CARE
Problem: Discharge Planning  Goal: Discharge Planning (Adult, OB, Behavioral, Peds)  Social Work Note  1. Continue to provide support and assessment re: patient's adjustment to illness and coping.  2. Continue to provide support and assessment re: caregiver's adjustment to role of caregiver and coping.  3. Continue to complete ongoing needs assessment for appropriate resources.  4. Patient will remain involved with discharge planning

## 2017-07-18 NOTE — PROGRESS NOTES
Interventional Radiology Pre-Procedure Sedation Assessment   Time of Assessment: 9:20 AM    Expected Level: Moderate Sedation    Indication: Sedation is required for the following type of Procedure: Venous    Sedation and procedural consent: Risks, benefits and alternatives were discussed with Patient    PO Intake: Appropriately NPO for procedure    ASA Class: Class 2 - MILD SYSTEMIC DISEASE, NO ACUTE PROBLEMS, NO FUNCTIONAL LIMITATIONS.    Mallampati: Grade 2:  Soft palate, base of uvula, tonsillar pillars, and portion of posterior pharyngeal wall visible    Lungs: Lungs Clear with good breath sounds bilaterally    Heart: Normal heart sounds and rate    History and physical reviewed and no updates needed. I have reviewed the lab findings, diagnostic data, medications, and the plan for sedation. I have determined this patient to be an appropriate candidate for the planned sedation and procedure and have reassessed the patient IMMEDIATELY PRIOR to sedation and procedure.    Inez Sifuentes PA-C

## 2017-07-18 NOTE — PROGRESS NOTES
SPIRITUAL HEALTH SERVICES  SPIRITUAL ASSESSMENT Progress Note  [facility] ([place]) 5C BMT   ON-CALL VISIT    REFERRAL SOURCE: Patient requested hospital  on admission to the unit.     Shared a brief reflective conversation with Norman regarding his illness narrative. Discussed the possibility of a blessing service on the day of transplant, which he is interested in. Unit  will follow up with planning and creation of the service. He is anticipating transplant next Tuesday 7/25.    PLAN: I will inform the unit  of our visit and of the desire for a blessing service.       Mari Cox  Chaplain Resident  Pager [phone]

## 2017-07-18 NOTE — PROGRESS NOTES
Patient admitted to:  room 5-415  Admitted from: home  Arrived by: bed post CVC placement  Reason for admission: scheduled admit, AML-allo sib  Patient accompanied by: wife Agapito  Belongings: in room with patient  Teaching: admission teaching completed per unit routine

## 2017-07-18 NOTE — PLAN OF CARE
Problem: Goal Outcome Summary  Goal: Goal Outcome Summary  Outcome: No Change  Hypertensive since admission, other vitals stable.  CVC placed in IR, site looks good.  Tylenol given once for insertion site pain.  Transplant prep on hold related to new onset thrombocytopenia.  Plan is to evaluate blood counts in the morning, tentative BMBX set up for 0930 if platelet count continues to be low.  Patient and wife voiced some frustration with this, explained rationale.      Problem: Blood and Marrow Transplantation  Goal: Blood and Marrow Transplantation  Signs and symptoms of listed problems will be absent or manageable.   Outcome: No Change    07/18/17 1756   Blood and Marrow Transplantation   Blood and Marrow Transplantation Assessed all   Blood and Marrow Transplantation Present acute pain

## 2017-07-19 NOTE — PLAN OF CARE
Problem: Goal Outcome Summary  Goal: Goal Outcome Summary  5C-PT: PT orders received and appreciated. CXL and Hold PT today. Pt awaiting biopsy results. PT will hold and initiate services as appropriate.

## 2017-07-19 NOTE — PLAN OF CARE
Problem: Blood and Marrow Transplantation  Goal: Blood and Marrow Transplantation  Signs and symptoms of listed problems will be absent or manageable.   Outcome: No Change    07/19/17 0543   Blood and Marrow Transplantation   Blood and Marrow Transplantation Assessed all   Blood and Marrow Transplantation Present acute pain         Comments:   BP slightly elevated but at pt baseline. OVSS. Pt received tylenol x1 for c/o right seymour pain. It was placed yesterday so, he will need his dressing changed today. Site is CDI and free of any redness or swelling. Left picc removal site dressing CDI. Will need 20K with his breakfast. No other replacements needed.

## 2017-07-19 NOTE — PROCEDURES
"BMT ONC Adult Bone Marrow Biopsy Procedure Note  July 19, 2017  BP (!) 157/97  Pulse 70  Temp 97.1  F (36.2  C) (Axillary)  Resp 16  Ht 1.73 m (5' 8.11\")  Wt 90.7 kg (199 lb 14.4 oz)  SpO2 94%  BMI 30.3 kg/m2     Learning needs assessment complete within 12 months? YES    DIAGNOSIS: AML     PROCEDURE: Unilateral Bone Marrow Biopsy and Unilateral Aspirate    LOCATION: Inpatient [facility]    Patient s identification was positively verified by verbal identification and invasive procedure safety checklist was completed. Informed consent was obtained. Following the administration of Midazolam 2mg IV as pre-medication, patient was placed in the prone position and prepped and draped in a sterile manner. Approximately 20 cc of 1% Lidocaine was used over the left posterior iliac spine. Following this a 3 mm incision was made. Trephine bone marrow core(s) was (were) obtained from the [facility]. Bone marrow aspirates were obtained from the [facility]. Aspirates were sent for morphology, immunophenotyping and cytogenetics. A total of approximately 15 ml of marrow was aspirated. Following this procedure a sterile dressing was applied to the bone marrow biopsy site(s). The patient was placed in the supine position to maintain pressure on the biopsy site. Post-procedure wound care instructions were given.     Complications: NO    Interventions: NO    Length of procedure:20 minutes or less      Procedure performed by: Emily Buckner      "

## 2017-07-19 NOTE — PLAN OF CARE
Problem: Goal Outcome Summary  Goal: Goal Outcome Summary  Outcome: No Change  Afebrile, continues to be hypertensive but other vitals are stable.  Bone marrow biopsy done at the bedside without problems, 2mg versed given for procedure.  Oxycodone and tylenol given for RAC pain/bx site pain.  Plan is to start flush at midnight tonight in anticipation he will start prep tomorrow.    Problem: Blood and Marrow Transplantation  Goal: Blood and Marrow Transplantation  Signs and symptoms of listed problems will be absent or manageable.   Outcome: No Change    07/19/17 1805   Blood and Marrow Transplantation   Blood and Marrow Transplantation Assessed all   Blood and Marrow Transplantation Present acute pain

## 2017-07-19 NOTE — PLAN OF CARE
Problem: Goal Outcome Summary  Goal: Goal Outcome Summary  OT 5C: Cancel and Hold.  Acknowledge order placed for OT eval and treat.  Pt scheduled for Bmbx this AM.  Will await results and determination for plan of care and initiate services as appropriate.

## 2017-07-19 NOTE — PLAN OF CARE
Problem: Goal Outcome Summary  Goal: Goal Outcome Summary  Outcome: No Change  Pt is afeb; DBp is in the 90s and he is on Dilt 120 mg; complained of pain at the RAC site and took Tylenol 650 mg  x 1; also took Ambien for sleep; monitor closely and continue plan of care.

## 2017-07-19 NOTE — PROGRESS NOTES
"BMT Daily Progress Note    Date of Service:7/19/2017    History of Present Illness:  Patient had an uneventful night other than some pain from his new central line.  He is frustrated about the delay in his transplant course due to the need to do a bone marrow biopsy today, but he understand the necessity of it.     Review of Systems:  ROS otherwise unremarkable other than what is noted in the HPI.     Physical Exam:    BP (!) 157/97  Pulse 70  Temp 97.1  F (36.2  C) (Axillary)  Resp 16  Ht 1.73 m (5' 8.11\")  Wt 90.7 kg (199 lb 14.4 oz)  SpO2 94%  BMI 30.3 kg/m2    General: A&O. NAD.   HEENT: SAMSON, sclera anicteric  Neck: supple  Lungs: CTAB.  No wheezes or crackles.    Heart: RRR, no m/r/g  Abdomen: +BS, soft, NT/ND, no HSM  Extremities: No edema  Skin: no rashes or petechiae  Neurologic: Nonfocal  RAC:  New right chest central line; no bleeding or discharge    Laboratory Data:  Lab Results   Component Value Date    WBC 5.6 07/19/2017    ANEU 3.5 07/19/2017    HGB 11.1 (L) 07/19/2017    HCT 34.4 (L) 07/19/2017     (L) 07/19/2017     07/19/2017    POTASSIUM 4.0 07/19/2017    CHLORIDE 106 07/19/2017    CO2 29 07/19/2017     (H) 07/19/2017    BUN 12 07/19/2017    CR 0.76 07/19/2017    MAG 2.4 (H) 07/19/2017    INR 1.05 07/18/2017       Assessment/Plan:  Norman Real is a 56 year old male with AML, here for 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       Prep detailed below; currently on hold due to need to get bone marrow biopsy to confirm remission status before proceeding.  Bone marrow biopsy will be done today, spoke with heme path fellow and requested that results be rushed.  Fellow will page Dr. Umanzor 7/20 am as soon as results are known.    Note: several orders (such as chemo antiemetics, etc) have not yet been released, as we are waiting for bone marrow biopsy results.  If we are proceeding, these will need to be released.      LUZ MARIA-6 cytoxan and fludarabine  D-5 " through D-2 fludarabine  D-1 TBI x 1  D0 = transplant       1.  BMT: Prep as above. Allopurinol through day 0. Flush and pre-meds prior to transplant. GCSF starts d+5 and cont to until ANC>1500 x3 consecutive days. Re-stage per protocol.     2.  HEME: Keep Hgb>8 and plts>10K. No pre-meds.  Plt drop, bone marrow biopsy as above.       3.  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   - Bactrim or appropriate PCP therapy to start d+28.                     4.  GI:   - Zofran and dexamethasone prior to chemo to prevent N/V.   - Ativan and compazine available PRN break-through symptoms.   - Protonix for GI prophy.   - Ursodiol for VOD prophy.  - s/p left sided colectomy for recurrent diverticulitis     5.  GVH: MMF and tac to start day-3 with tac level day -1.     6.  FEN/Renal:   - Monitor creat and lytes. Replete lytes PRN per SS.  - Monitor weight, I+O, lytes per protocol with IVF flush.  - start fluid flush 7/19 at midnight for possible start of chemotherapy 7/20     7. CV  - HTN: cont losartan 50mg/d and diltiazem XL 120mg/d  - Hx tachycardia with arrhythmia, s/p 3 ablations  - hold crestor through transplant     8. Endo  - DM type II. Hold metformin 500mg/d on admission. Check BG qid with med ss insulin with dex for n/v prophy     9.   - hx prostate cancer     10. Pain  - leg pain s/p bmbx. Much improved. Oxycodone and flexeril prn     11. Pulm  - spiculated pulm nodule (concern for malignancy w/ hx tobacco use) and GGO (concerning for fungal PNA). F/u CT chest in 4-6 weeks (approx 8/22) per Dr. Ahn recs in workup.  Note 6/30 BAL + for aspergillus galactomannan; culture negative to date but still in process.      Emily S. Carrier  7/19/2017                   Emily LEE Carrier  7/19/2017    Attending Note:    The patient was seen and examined by me separate  from mid-level provider.   I personally reviewed the today's laboratory results, vital signs and radiology results.    I found that vitals are stable and there was no fever. No acute distress. Patient was alert and oriented and grossly neurologically intact. Mouth is clean. Line insertion non-tender and clear. Lungs clear bilateral. Heart regular. Abdomen soft non-tender, BS present. Lower extremity with no edema. No rash.     Main laboratory finding were   Lab Results   Component Value Date    WBC 5.6 07/19/2017    ANEU 3.5 07/19/2017    HGB 11.1 (L) 07/19/2017    HCT 34.4 (L) 07/19/2017     (L) 07/19/2017     07/19/2017    POTASSIUM 4.0 07/19/2017    CHLORIDE 106 07/19/2017    CO2 29 07/19/2017     (H) 07/19/2017    BUN 12 07/19/2017    CR 0.76 07/19/2017    MAG 2.4 (H) 07/19/2017    INR 1.05 07/18/2017     My plan is to obtain BMbx as planned to rule out leukemia relapse. Reviewed pros and cons with patient one more time. He understands and agrees. Expect results tomorrow. Start hydration for possible chemo.     Charanjit Umanzor M.D.

## 2017-07-20 NOTE — PROGRESS NOTES
SPIRITUAL HEALTH SERVICES  SPIRITUAL ASSESSMENT Progress Note  [facility] ([place]) 5C     REFERRAL SOURCE: Follow-up from On-Call  visit 7/19    Brief visit with Norman and wife Agapito to discuss details surrounding BMT blessing service. Norman is due for his txp 7/25 and expressed interest to on-call  7/19 about including a blessing as part of his txp. I provided a template for them to consider and make edits to as they see fit. Both were pleased with this and expressed excitement in having ownership of planning the blessing. I informed them of my availability and that I would follow up early next week to solidify plans for the blessing.    PLAN: Follow up early next week to finalize blessing service.     Yoel Foy   Intern  Pager [phone]

## 2017-07-20 NOTE — PLAN OF CARE
Problem: Goal Outcome Summary  Goal: Goal Outcome Summary  Outcome: No Change  Afeb, hypertensive. OVSS.  Denied nausea, received tylenol x1 for bmbx pain.  Site c/d/i. Flush started at midnight for possible chemo today, awaiting bmbx results this morning.  Was not using urinal, instructed pt to do so from now on to monitor output. Pt will need 20mEq PO K+, no other replacements needed.  Continue to monitor and continue current plan of care.    Problem: Blood and Marrow Transplantation  Goal: Blood and Marrow Transplantation  Signs and symptoms of listed problems will be absent or manageable.   Outcome: No Change    07/20/17 0600   Blood and Marrow Transplantation   Blood and Marrow Transplantation Assessed all   Blood and Marrow Transplantation Present acute pain

## 2017-07-20 NOTE — PLAN OF CARE
Problem: Goal Outcome Summary  Goal: Goal Outcome Summary  OT 5C: Cancel.  Had been awaiting results of bone marrow biopsy this AM.  Acknowledge plan is now to proceed with transplant.  Pt was with other care provider upon attempt this PM, will plan to initiate services tomorrow 7/21.

## 2017-07-20 NOTE — PLAN OF CARE
Problem: Goal Outcome Summary  Goal: Goal Outcome Summary  PT 5C: PT order for evaluation and treatment is acknowledged. Pt received biopsy results today so medical treatment plan was decided and BMT scheduled. OT will evaluate first and will be consulted regarding PT needs. Rescheduled for 7/21/17.

## 2017-07-20 NOTE — PROGRESS NOTES
BMT Daily Progress Note   07/20/2017    Patient ID:  Norman Real is a 56 year old male, currently day - 6 of his HCT.     Diagnosis AMLU Acute myelogeneous leukemia, Unknown  HCT Type Allogeneic    Prep Regimen Cytoxan  Fludarabine  TBI   Donor Source Related PBSC    GVHD Prophylaxis   Mycophenolate  Primary BMT Provider Dr. Erna MD        INTERVAL  HISTORY     Overnight: Mr. Real denied any acute events overnight. Notes he had some mild bone pain at the bone marrow biopsy site overnight and took tylenol for this. No new constitutional symptoms. Eating adequate amounts of a regular diet. Discussed plan to start chemotherapy today with bone marrow biopsy results negative for AML.     Review of Systems: 10 point ROS negative except as noted above.    Scheduled Medications    diltiazem  180 mg Oral Daily     losartan (COZAAR) tablet 50 mg  50 mg Oral Daily     heparin lock flush  5-10 mL Intracatheter Q24H     pantoprazole  40 mg Oral Daily     ursodiol  300 mg Oral TID     heparin lock flush  5 mL Intravenous Q24H     [START ON 7/21/2017] acyclovir  800 mg Oral 5x Daily     micafungin (MYCAMINE) intermittent infusion > 45 kg  100 mg Intravenous Daily     [START ON 7/26/2017] voriconazole  200 mg Oral BID     [START ON 7/24/2017] levofloxacin  250 mg Oral Daily at 10 am     [START ON 8/22/2017] sulfamethoxazole-trimethoprim  1 tablet Oral Q Mon Tues BID     insulin aspart  1-7 Units Subcutaneous TID AC     insulin aspart  1-5 Units Subcutaneous At Bedtime     Current Facility-Administered Medications   Medication     0.9% sodium chloride infusion     diltiazem 24 hr capsule 180 mg     furosemide (LASIX) injection 10 mg     furosemide (LASIX) injection 20 mg     cyclobenzaprine (FLEXERIL) tablet 5 mg     losartan (COZAAR) tablet 50 mg     oxyCODONE (ROXICODONE) IR tablet 5 mg     lidocaine 1 % 1 mL     lidocaine (LMX4) kit     acetaminophen (TYLENOL) tablet 325-650 mg     acetaminophen (TYLENOL)  tablet 325-650 mg     prochlorperazine (COMPAZINE) injection 5-10 mg    Or     prochlorperazine (COMPAZINE) tablet 5-10 mg     LORazepam (ATIVAN) injection 0.5-1 mg    Or     LORazepam (ATIVAN) tablet 0.5-1 mg     sodium chloride (PF) 0.9% PF flush 10-20 mL     heparin lock flush 10 UNIT/ML injection 5-10 mL     heparin lock flush 10 UNIT/ML injection 5-10 mL     pantoprazole (PROTONIX) EC tablet 40 mg     ursodiol (ACTIGALL) capsule 300 mg     potassium chloride SA (K-DUR/KLOR-CON M) CR tablet 20-40 mEq     potassium chloride (KLOR-CON) Packet 20-40 mEq     potassium chloride 10 mEq in 100 mL intermittent infusion     potassium chloride 10 mEq in 100 mL intermittent infusion with 10 mg lidocaine     potassium chloride 20 mEq in 50 mL intermittent infusion     magnesium sulfate 2 g in NS intermittent infusion ([facility]ar[facility]ium or FV Cmpd)     magnesium sulfate 4 g in 100 mL sterile water (premade)     sodium phosphate 15 mmol in D5W intermittent infusion     sodium phosphate 20 mmol in D5W intermittent infusion     sodium phosphate 25 mmol in D5W intermittent infusion     heparin lock flush 10 UNIT/ML injection 5 mL     heparin lock flush 10 UNIT/ML injection 5-10 mL     [START ON 7/21/2017] acyclovir (ZOVIRAX) tablet 800 mg     micafungin (MYCAMINE) 100 mg in NaCl 0.9 % 100 mL intermittent infusion     [START ON 7/26/2017] voriconazole (VFEND) tablet 200 mg     [START ON 7/24/2017] levofloxacin (LEVAQUIN) tablet 250 mg     [START ON 8/22/2017] sulfamethoxazole-trimethoprim (BACTRIM DS/SEPTRA DS) 800-160 MG per tablet 1 tablet     naloxone (NARCAN) injection 0.1-0.4 mg     glucose 40 % gel 15-30 g    Or     dextrose 50 % injection 25-50 mL    Or     glucagon injection 1 mg     insulin aspart (NovoLOG) inj (RAPID ACTING)     insulin aspart (NovoLOG) inj (RAPID ACTING)     zolpidem (AMBIEN) tablet 5 mg       PHYSICAL EXAM     Weight In/Out     Wt Readings from Last 3 Encounters:   07/20/17 92.1 kg (203 lb 1.6 oz)   07/14/17  "92.5 kg (204 lb)   06/30/17 90.7 kg (200 lb)      I/O last 3 completed shifts:  In: 4075 [P.O.:2400; I.V.:1675]  Out: 0        KPS:  70    BP (!) 159/96 (BP Location: Right arm)  Pulse 77  Temp 97.2  F (36.2  C) (Axillary)  Resp 16  Ht 1.73 m (5' 8.11\")  Wt 92.1 kg (203 lb 1.6 oz)  SpO2 97%  BMI 30.78 kg/m2       General: NAD   Eyes: : SAMSON, sclera anicteric   Nose/Mouth/Throat: OP clear, buccal mucosa moist, no ulcerations   Lungs: CTA bilaterally  Cardiovascular: RRR, no M/R/G   Abdominal/Rectal: +BS, soft, NT, ND, No HSM   Lymphatics: no edema  Skin: no rashes or petechaie  Neuro: A&O   Additional Findings: Cooper site NT, no drainage.      LABS AND IMAGING - PAST 24 HOURS     Results for orders placed or performed during the hospital encounter of 07/18/17 (from the past 24 hour(s))   Glucose by meter   Result Value Ref Range    Glucose 125 (H) 70 - 99 mg/dL   Glucose by meter   Result Value Ref Range    Glucose 196 (H) 70 - 99 mg/dL   Basic metabolic panel   Result Value Ref Range    Sodium 141 133 - 144 mmol/L    Potassium 3.7 3.4 - 5.3 mmol/L    Chloride 105 94 - 109 mmol/L    Carbon Dioxide 28 20 - 32 mmol/L    Anion Gap 7 3 - 14 mmol/L    Glucose 148 (H) 70 - 99 mg/dL    Urea Nitrogen 11 7 - 30 mg/dL    Creatinine 0.74 0.66 - 1.25 mg/dL    GFR Estimate >90  Non  GFR Calc   >60 mL/min/1.7m2    GFR Estimate If Black >90   GFR Calc   >60 mL/min/1.7m2    Calcium 8.9 8.5 - 10.1 mg/dL   CBC with platelets differential   Result Value Ref Range    WBC 5.7 4.0 - 11.0 10e9/L    RBC Count 3.76 (L) 4.4 - 5.9 10e12/L    Hemoglobin 11.4 (L) 13.3 - 17.7 g/dL    Hematocrit 35.2 (L) 40.0 - 53.0 %    MCV 94 78 - 100 fl    MCH 30.3 26.5 - 33.0 pg    MCHC 32.4 31.5 - 36.5 g/dL    RDW 17.0 (H) 10.0 - 15.0 %    Platelet Count 147 (L) 150 - 450 10e9/L    Diff Method Automated Method     % Neutrophils 59.1 %    % Lymphocytes 20.8 %    % Monocytes 12.6 %    % Eosinophils 5.1 %    % Basophils " 2.1 %    % Immature Granulocytes 0.3 %    Nucleated RBCs 0 0 /100    Absolute Neutrophil 3.4 1.6 - 8.3 10e9/L    Absolute Lymphocytes 1.2 0.8 - 5.3 10e9/L    Absolute Monocytes 0.7 0.0 - 1.3 10e9/L    Absolute Eosinophils 0.3 0.0 - 0.7 10e9/L    Absolute Basophils 0.1 0.0 - 0.2 10e9/L    Abs Immature Granulocytes 0.0 0 - 0.4 10e9/L    Absolute Nucleated RBC 0.0    Glucose by meter   Result Value Ref Range    Glucose 166 (H) 70 - 99 mg/dL   Glucose by meter   Result Value Ref Range    Glucose 131 (H) 70 - 99 mg/dL         ASSESSMENT BY SYSTEMS     Norman Salazar Real is a 56 year old male with AML, currently day - 6 for 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       Prep detailed below; currently on hold due to need to get bone marrow biopsy to confirm remission status before proceeding.  Bone marrow biopsy completed 7/20 without complications. Morph and FLOW negative, signed out 7/20 at 9:30 am. Plan to proceed with transplant. He is currently day - 6.      1a. Prep for transplant briefly held 7/19 to obtain a BMBX & r/o active disease with falling platelet count. Flow/Morph reported as negative for malignancy 7/20. Released chemotherapy and dates changed in the plan. Updated [facility] team for new calendar and to reset the BMT date.  - D-6 (7/20) cytoxan and fludarabine today   - D-5 to Day - 2 (7/21-7/24) fludarabine  - D-1 (7/25) TBI x 1  - D-0 (7/26) Transplant       1b.  BMT: Prep as above.  - Allopurinol through day 0.  - Flush and pre-meds prior to transplant.   - GCSF starts d+5 and cont to until ANC>1500 x3 consecutive days.   - Re-stage per protocol.     2.  HEME: Keep Hgb>8 and plts>10K. No pre-meds.   -  Due to transient decrease in plts on admission a BMBX was completed 7/19 to assess for disease.      3.  ID: No focal s/sx of infection at this time.   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   - Bactrim or appropriate PCP therapy to start d+28.                     4.  GI: No GI complaints at this time.   - Zofran and dexamethasone prior to chemo to prevent N/V.   - Ativan and compazine available PRN break-through symptoms.   - Protonix for GI prophy.   - Ursodiol for VOD prophy.  - s/p left sided colectomy for recurrent diverticulitis     5.  GVH: MMF and tac to start day-3 with tac level day -1.     6.  FEN/Renal: Monitor creat and lytes. Replete lytes PRN per SS.  - Monitor weight, I+O, lytes per protocol with IVF flush.  - Continues on Cytoxan fluid flush (started 7/19 at midnight) for chemotherapy today 7/20. Monitor K+/Na+ BID x 24 hrs.      7. CV: Hx HTN. Significant persistent HTN x 24 hrs.  - HTN Management: cont losartan 50mg/d. Increased diltiazem XL from 120mg/d to 180 mg/d 7/20 (selected titration of CCB with plan to start Tac in a few days).  - Hx tachycardia with arrhythmia, s/p 3 ablations  - hold crestor through transplant     8. Endo: Hx DM type II. Hold metformin 500mg/d on admission. Changed IVF flush from D5% + 0.45% NS to NS to prevent hyperglycemia 7/20.  - Check BG qid with med ss insulin with dex for n/v prophy     9. : Hx prostate cancer     10. Pain: Leg pain s/p bmbx. Much improved. Oxycodone and flexeril prn     11. Pulm: Hx spiculated pulm nodule (concern for malignancy w/ hx tobacco use) and GGO (concerning for fungal PNA). F/u CT chest in 4-6 weeks (approx 8/22) per Dr. Ahn recs in workup.    - Note 6/30 BAL + for aspergillus galactomannan; culture negative to date but still in process.     OVERALL PLAN     Anticipated hospital dismissal: Remain admitted through stem cell transplant and cell count recovery     Haydee Black      Attending Note:    The patient was seen and examined by me separate from mid-level provider.   I personally reviewed the today's laboratory results, vital signs and radiology  results.    Doing well. Little pain BMbx site. Good PO intake. I found that vitals are stable and there was no fever. No acute distress. Patient was alert and oriented and grossly neurologically intact. Mouth is clean. Line insertion non-tender and clear. Lungs clear bilateral. Heart regular. Abdomen soft non-tender, BS present. Lower extremity with no edema. No rash. Dressing biopsy site was clean.     Main laboratory finding were   Lab Results   Component Value Date    WBC 5.7 07/20/2017    ANEU 3.4 07/20/2017    HGB 11.4 (L) 07/20/2017    HCT 35.2 (L) 07/20/2017     (L) 07/20/2017     07/20/2017    POTASSIUM 3.7 07/20/2017    CHLORIDE 105 07/20/2017    CO2 28 07/20/2017     (H) 07/20/2017    BUN 11 07/20/2017    CR 0.74 07/20/2017    MAG 2.4 (H) 07/19/2017    INR 1.05 07/18/2017     BMBx and flow no leukemia    My plan is to complete 12h of hydration and start conditioning regimen per protocol. Monitor urine output and weight. Recheck Na in AM. Frequent urination.     Charanjit Umanzor M.D.

## 2017-07-21 NOTE — PLAN OF CARE
Problem: Blood and Marrow Transplantation  Goal: Blood and Marrow Transplantation  Signs and symptoms of listed problems will be absent or manageable.   Outcome: Therapy, progress toward functional goals as expected  Type of chemo infused:Fludrabine.  Pt tolerated infusion: Patient tolerated well.  Interventions: Schedule antiemetics given.  Response to interventions used: Patient tolerated well.  Plan:Continue current plan of care.

## 2017-07-21 NOTE — PLAN OF CARE
Problem: Blood and Marrow Transplantation  Goal: Blood and Marrow Transplantation  Signs and symptoms of listed problems will be absent or manageable.   Outcome: No Change    07/21/17 1820   Blood and Marrow Transplantation   Blood and Marrow Transplantation Assessed all   Blood and Marrow Transplantation Present acute pain;fluid imbalances;metabolic imbalances;nausea/vomiting         Comments:   Afebrile. Hypertensive. Hydralazine given with minimal change. PM diltiazem given early. Got fludarabine this evening. Continues on chemo fluid flush until 11 pm. Meeting Q 2 hour voiding parameter. Weight up today. Lasix given with good results. One stool today. C/o headache relieved with tylenol. Denies V/D. C/o of nausea. Ativan and Zofran given with relief. BGS elevated [id number], covered with SSI. Pt eating and drinking well. Potassium replaced per electrolyte SS. Pt offered no further concerns. Continue to monitor.

## 2017-07-21 NOTE — PROGRESS NOTES
BMT Daily Progress Note   07/21/2017    Patient ID:  Norman Real is a 56 year old male, currently day - 5 of his HCT.     Diagnosis AMLU Acute myelogeneous leukemia, Unknown  HCT Type Allogeneic    Prep Regimen Cytoxan  Fludarabine  TBI   Donor Source Related PBSC    GVHD Prophylaxis   Mycophenolate  Primary BMT Provider Dr. Erna MD        INTERVAL  HISTORY     Overnight: Mr. Real had a mild HA with high BP last night, improved once given PRN hydralazine and higher dose of CCB. Otherwise tolerated chemotherapy well. No new constitutional symptoms. Eating adequate amounts of a regular diet. No recent or acute bleeding events. Denied any oral sores or new skin changes.     Review of Systems: 10 point ROS negative except as noted above.    Scheduled Medications    furosemide  20 mg Intravenous Once     diltiazem  180 mg Oral Daily     [START ON 7/22/2017] diltiazem  240 mg Oral Daily     hydrALAZINE  10 mg Intravenous Once     ondansetron  8 mg Oral Q8H     dexamethasone  10 mg Oral Daily     [START ON 7/24/2017] dexamethasone  8 mg Oral Once     [START ON 7/25/2017] dexamethasone  6 mg Oral Once     [START ON 7/26/2017] dexamethasone  4 mg Oral Once     [START ON 7/26/2017] acetaminophen  650 mg Oral Once     [START ON 7/26/2017] diphenhydrAMINE  25 mg Oral Once     [START ON 7/23/2017] mycophenolate  1,500 mg Intravenous Q12H BMT     allopurinol  300 mg Oral Daily     [START ON 7/27/2017] filgrastim (NEUPOGEN/GRANIX) intravenous  5 mcg/kg (Treatment Plan Recorded) Intravenous Daily at 8 pm     [START ON 7/22/2017] acyclovir  800 mg Oral 5x Daily     [START ON 7/25/2017] levofloxacin  250 mg Oral Daily at 10 am     [START ON 7/27/2017] voriconazole  200 mg Oral BID     micafungin (MYCAMINE) intermittent infusion > 45 kg  100 mg Intravenous Daily     FLUdarabine (FLUDARA) chemo infusion  30 mg/m2 (Treatment Plan Recorded) Intravenous Q24H     [START ON 8/28/2017] sulfamethoxazole-trimethoprim  1  tablet Oral Q Mon Tues BID     losartan (COZAAR) tablet 50 mg  50 mg Oral Daily     heparin lock flush  5-10 mL Intracatheter Q24H     pantoprazole  40 mg Oral Daily     ursodiol  300 mg Oral TID     heparin lock flush  5 mL Intravenous Q24H     insulin aspart  1-7 Units Subcutaneous TID AC     insulin aspart  1-5 Units Subcutaneous At Bedtime     Current Facility-Administered Medications   Medication     furosemide (LASIX) injection 20 mg     diltiazem 24 hr capsule 180 mg     [START ON 7/22/2017] diltiazem 24 hr capsule 240 mg     0.9% sodium chloride infusion     hydrALAZINE (APRESOLINE) injection 10 mg     ondansetron (ZOFRAN) tablet 8 mg     dexamethasone (DECADRON) tablet 10 mg     [START ON 7/24/2017] dexamethasone (DECADRON) tablet 8 mg     [START ON 7/25/2017] dexamethasone (DECADRON) tablet 6 mg     [START ON 7/26/2017] dexamethasone (DECADRON) tablet 4 mg     [START ON 7/26/2017] acetaminophen (TYLENOL) tablet 650 mg     [START ON 7/26/2017] diphenhydrAMINE (BENADRYL) capsule 25 mg     [START ON 7/26/2017] meperidine (DEMEROL) injection 25-50 mg     [START ON 7/23/2017] mycophenolate (GENERIC EQUIV) 1,500 mg in D5W intermittent infusion     [START ON 7/23/2017] tacrolimus (PROGRAF) 5,000 mcg in D5W 250 mL     allopurinol (ZYLOPRIM) tablet 300 mg     [START ON 7/27/2017] filgrastim (NEUPOGEN) 480 mcg in D5W 25 mL infusion     [START ON 7/22/2017] acyclovir (ZOVIRAX) tablet 800 mg     [START ON 7/25/2017] levofloxacin (LEVAQUIN) tablet 250 mg     [START ON 7/27/2017] voriconazole (VFEND) tablet 200 mg     micafungin (MYCAMINE) 100 mg in NaCl 0.9 % 100 mL intermittent infusion     FLUdarabine (FLUDARA) 64 mg in NaCl 0.9 % 113 mL CHEMOTHERAPY     [START ON 8/28/2017] sulfamethoxazole-trimethoprim (BACTRIM DS/SEPTRA DS) 800-160 MG per tablet 1 tablet     furosemide (LASIX) injection 10 mg     furosemide (LASIX) injection 20 mg     cyclobenzaprine (FLEXERIL) tablet 5 mg     losartan (COZAAR) tablet 50 mg      oxyCODONE (ROXICODONE) IR tablet 5 mg     lidocaine 1 % 1 mL     lidocaine (LMX4) kit     acetaminophen (TYLENOL) tablet 325-650 mg     acetaminophen (TYLENOL) tablet 325-650 mg     prochlorperazine (COMPAZINE) injection 5-10 mg    Or     prochlorperazine (COMPAZINE) tablet 5-10 mg     LORazepam (ATIVAN) injection 0.5-1 mg    Or     LORazepam (ATIVAN) tablet 0.5-1 mg     sodium chloride (PF) 0.9% PF flush 10-20 mL     heparin lock flush 10 UNIT/ML injection 5-10 mL     heparin lock flush 10 UNIT/ML injection 5-10 mL     pantoprazole (PROTONIX) EC tablet 40 mg     ursodiol (ACTIGALL) capsule 300 mg     potassium chloride SA (K-DUR/KLOR-CON M) CR tablet 20-40 mEq     potassium chloride (KLOR-CON) Packet 20-40 mEq     potassium chloride 10 mEq in 100 mL intermittent infusion     potassium chloride 10 mEq in 100 mL intermittent infusion with 10 mg lidocaine     potassium chloride 20 mEq in 50 mL intermittent infusion     magnesium sulfate 2 g in NS intermittent infusion ([facility]ar[facility] or FV Cmpd)     magnesium sulfate 4 g in 100 mL sterile water (premade)     sodium phosphate 15 mmol in D5W intermittent infusion     sodium phosphate 20 mmol in D5W intermittent infusion     sodium phosphate 25 mmol in D5W intermittent infusion     heparin lock flush 10 UNIT/ML injection 5 mL     heparin lock flush 10 UNIT/ML injection 5-10 mL     naloxone (NARCAN) injection 0.1-0.4 mg     glucose 40 % gel 15-30 g    Or     dextrose 50 % injection 25-50 mL    Or     glucagon injection 1 mg     insulin aspart (NovoLOG) inj (RAPID ACTING)     insulin aspart (NovoLOG) inj (RAPID ACTING)     zolpidem (AMBIEN) tablet 5 mg       PHYSICAL EXAM     Weight In/Out     Wt Readings from Last 3 Encounters:   07/21/17 94.5 kg (208 lb 4.8 oz)   07/14/17 92.5 kg (204 lb)   06/30/17 90.7 kg (200 lb)      I/O last 3 completed shifts:  In: 9784 [P.O.:3540; I.V.:5631; IV Piggyback:613]  Out: 8050 [Urine:8050]       KPS:  70    BP (!) 157/94  Pulse 76  Temp  "96.2  F (35.7  C) (Axillary)  Resp 16  Ht 1.73 m (5' 8.11\")  Wt 94.5 kg (208 lb 4.8 oz)  SpO2 95%  BMI 31.57 kg/m2   General: NAD   Eyes: SMASON, sclera anicteric   Nose/Mouth/Throat: OP clear, buccal mucosa moist, no ulcerations   Lungs: CTA bilaterally  Cardiovascular: RRR, no M/R/G   Abdominal/Rectal: +BS, soft, NT, ND, No HSM   Lymphatics: no edema  Skin: no rashes or petechaie  Neuro: A&O   Additional Findings: Cooper site NT, no drainage.    LABS AND IMAGING - PAST 24 HOURS     Results for orders placed or performed during the hospital encounter of 07/18/17 (from the past 24 hour(s))   Glucose by meter   Result Value Ref Range    Glucose 131 (H) 70 - 99 mg/dL   Glucose by meter   Result Value Ref Range    Glucose 229 (H) 70 - 99 mg/dL   Sodium   Result Value Ref Range    Sodium 139 133 - 144 mmol/L   Potassium   Result Value Ref Range    Potassium 3.6 3.4 - 5.3 mmol/L   Glucose by meter   Result Value Ref Range    Glucose 204 (H) 70 - 99 mg/dL   Glucose by meter   Result Value Ref Range    Glucose 215 (H) 70 - 99 mg/dL   Basic metabolic panel   Result Value Ref Range    Sodium 133 133 - 144 mmol/L    Potassium 3.7 3.4 - 5.3 mmol/L    Chloride 99 94 - 109 mmol/L    Carbon Dioxide 24 20 - 32 mmol/L    Anion Gap 10 3 - 14 mmol/L    Glucose 240 (H) 70 - 99 mg/dL    Urea Nitrogen 8 7 - 30 mg/dL    Creatinine 0.66 0.66 - 1.25 mg/dL    GFR Estimate >90  Non  GFR Calc   >60 mL/min/1.7m2    GFR Estimate If Black >90   GFR Calc   >60 mL/min/1.7m2    Calcium 8.9 8.5 - 10.1 mg/dL   CBC with platelets differential   Result Value Ref Range    WBC 10.4 4.0 - 11.0 10e9/L    RBC Count 3.87 (L) 4.4 - 5.9 10e12/L    Hemoglobin 11.8 (L) 13.3 - 17.7 g/dL    Hematocrit 35.7 (L) 40.0 - 53.0 %    MCV 92 78 - 100 fl    MCH 30.5 26.5 - 33.0 pg    MCHC 33.1 31.5 - 36.5 g/dL    RDW 16.3 (H) 10.0 - 15.0 %    Platelet Count 135 (L) 150 - 450 10e9/L    Diff Method Automated Method     % Neutrophils 93.3 % "    % Lymphocytes 3.9 %    % Monocytes 1.0 %    % Eosinophils 0.1 %    % Basophils 0.2 %    % Immature Granulocytes 1.5 %    Nucleated RBCs 0 0 /100    Absolute Neutrophil 9.7 (H) 1.6 - 8.3 10e9/L    Absolute Lymphocytes 0.4 (L) 0.8 - 5.3 10e9/L    Absolute Monocytes 0.1 0.0 - 1.3 10e9/L    Absolute Eosinophils 0.0 0.0 - 0.7 10e9/L    Absolute Basophils 0.0 0.0 - 0.2 10e9/L    Abs Immature Granulocytes 0.2 0 - 0.4 10e9/L    Absolute Nucleated RBC 0.0    Magnesium   Result Value Ref Range    Magnesium 1.9 1.6 - 2.3 mg/dL   ABO/Rh type and screen   Result Value Ref Range    ABO O     RH(D)  Pos     Antibody Screen Neg     Test Valid Only At       [facility]    Specimen Expires 07/24/2017    Glucose by meter   Result Value Ref Range    Glucose 243 (H) 70 - 99 mg/dL         ASSESSMENT BY SYSTEMS     Norman Salazar Real is a 56 year old male with AML, currently day - 5 for 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       Prep detailed below; currently on hold due to need to get bone marrow biopsy to confirm remission status before proceeding.  Bone marrow biopsy completed 7/20 without complications. Morph and FLOW negative, signed out 7/20 at 9:30 am. Plan to proceed with transplant. He is currently day - 5.      1a. Prep for transplant briefly held 7/19 to obtain a BMBX & r/o active disease with falling platelet count. Flow/Morph reported as negative for malignancy 7/20. Released chemotherapy and dates changed in the plan. Updated RNCC team for new calendar and to reset the BMT date.  - D - 6 (7/20) cytoxan and fludarabine today   - D - 5 to Day - 2 (7/21-7/24) fludarabine  - D - 1 (7/25) TBI x 1  - D - 0 (7/26) Transplant       1b.  BMT: Prep as above.  - Allopurinol through day 0.  - Flush and pre-meds prior to transplant.   - GCSF starts d+5 and cont to until ANC>1500 x3 consecutive days.   - Re-stage per protocol.     2.  HEME: Keep Hgb>8 and plts>10K.   - No  pre-meds.   - Due to transient decrease in plts on admission a BMBX was completed 7/19 to assess for disease.      3.  ID: No focal s/sx of infection at this time.   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   - Bactrim or appropriate PCP therapy to start d+28.                     4.  GI: No GI complaints at this time.   - Zofran and dexamethasone prior to chemo to prevent N/V.   - Ativan and compazine available PRN break-through symptoms.   - Protonix for GI prophy.   - Ursodiol for VOD prophy.  - s/p left sided colectomy for recurrent diverticulitis     5.  GVH: MMF and tac to start day-3 with tac level day -1.     6.  FEN/Renal: Monitor creat and lytes. Replete lytes PRN per SS.  - Wt slightly up and net positive 3.0 L overnight with cytoxan flush. Will give 20 mg IV lasix today 7/21.  - Na+ down to 133, changed IVF Flush from D5% + 0.45% NS to 0.9% NS. Recheck Na+ per protocol this afternoon at 1600.  - Monitor weight, I+O, lytes per protocol with IVF flush.  - Continues on Cytoxan fluid flush (started 7/19 at midnight) for chemotherapy until tonight. Monitor electrolytes per kim.     7. CV: Hx HTN. Significant persistent HTN x 24 hrs.  - HTN Management: cont losartan 50mg/d. Increased diltiazem XL from 120mg/d to 180 mg/d 7/20. Increase Dilt to 240 starting tomorrow evening prior to initiation of CCB.   - PRN Hydralazine given x 2 in the last 24 hrs.   - Hx tachycardia with arrhythmia, s/p 3 ablations  - hold crestor through transplant     8. Endo: Hx DM type II. -240 last 24 hrs d/t decadron/dextrose in flush.   - Changed IVF flush from D5% + 0.45% NS to NS to prevent hyperglycemia 7/21.  - Check BG qid with med ss insulin with dex for n/v prophy   - Hold metformin 500mg/d on admission.     9. : Hx prostate cancer     10. Pain: Leg pain s/p bmbx.  Much improved. Oxycodone and flexeril prn     11. Pulm: Hx spiculated pulm nodule (concern for malignancy w/ hx tobacco use) and GGO (concerning for fungal PNA). F/u CT chest in 4-6 weeks (approx 8/22) per Dr. Jimy rivera in workup.    - Note 6/30 BAL + for aspergillus galactomannan; culture negative to date but still in process.     OVERALL PLAN     Anticipated hospital dismissal: Remain admitted through stem cell transplant and cell count recovery     Haydee Black      Attending Note:    The patient was seen and examined by me separate from mid-level provider.   I personally reviewed the today's laboratory results, vital signs and radiology results.    Doing well. No more pain on BMbx site. Good PO intake. I found that vitals are stable and there was no fever. No acute distress. Patient was alert and oriented and grossly neurologically intact. Mouth is clean. Line insertion non-tender and clear. Lungs clear bilateral. Heart regular. Abdomen soft non-tender, BS present. Lower extremity with no edema. No rash. Clean and healed biopsy site.     Main laboratory finding were   Lab Results   Component Value Date    WBC 10.4 07/21/2017    ANEU 9.7 (H) 07/21/2017    HGB 11.8 (L) 07/21/2017    HCT 35.7 (L) 07/21/2017     (L) 07/21/2017     07/21/2017    POTASSIUM 3.7 07/21/2017    CHLORIDE 99 07/21/2017    CO2 24 07/21/2017     (H) 07/21/2017    BUN 8 07/21/2017    CR 0.66 07/21/2017    MAG 1.9 07/21/2017    INR 1.05 07/18/2017     BMBx and flow no leukemia    My plan is to complete hydration tonight. fludarabine dose #2 today. Monitor urine output and weight. Change fluids to NS. Increased diltiazem further for HTN. Lasix as needed. Recheck Na in AM. Frequent urination.     Charanjit Umanzor M.D.

## 2017-07-21 NOTE — PLAN OF CARE
Problem: Blood and Marrow Transplantation  Goal: Blood and Marrow Transplantation  Signs and symptoms of listed problems will be absent or manageable.   Outcome: Therapy, progress towards functional goals is fair  Hypertensive BP at 19:00 was 180/91, other vitals stable. Denied Headche and diziness. MD was notified. His 20:00 diltiazem given at 19:00. C/O pain on BMBX side, dressing was removed, the side is c/d/i. Tylenol 650 mg po given. Denied nausea. Good appetite. Potassium check this evening was 3.6 and was covered per order parameter. Continue to monitor pt status.

## 2017-07-21 NOTE — PLAN OF CARE
Problem: Goal Outcome Summary  Goal: Goal Outcome Summary  Outcome: No Change  Afeb, hypertensive.  Hydralazine given x1 with improvement.  OVSS.  Denied nausea, tylenol x1 for bmbx site pain.  Flexeril and ambien x1 as well.  Tolerated cytoxan well last evening, will flush until 2300 tonight.  Meeting void parameters thus far.  Mag replaced, will need 20 mEq PO K+ this morning.  Fludarabine today.  Continue to monitor and continue current plan of care.    Problem: Blood and Marrow Transplantation  Goal: Blood and Marrow Transplantation  Signs and symptoms of listed problems will be absent or manageable.     07/21/17 0630   Blood and Marrow Transplantation   Blood and Marrow Transplantation Assessed all   Blood and Marrow Transplantation Present acute pain;fluid imbalances;metabolic imbalances

## 2017-07-21 NOTE — PROGRESS NOTES
" 07/21/17 0900   Quick Adds   Type of Visit Initial Occupational Therapy Evaluation   Living Environment   Lives With spouse   Living Arrangements house   Home Accessibility no concerns   Number of Stairs to Enter Home 1   Number of Stairs Within Home 10  (To bonus room; pt does not need to access daily )   Stair Railings at Home (yes)   Transportation Available family or friend will provide   Living Environment Comment Pt and spouse will be staying at [facility] for now; possibly rent an appartment in the furture    Self-Care   Dominant Hand right   Usual Activity Tolerance moderate   Current Activity Tolerance moderate   Regular Exercise yes   Activity/Exercise Type walking   Exercise Amount/Frequency 2 times/wk   Equipment Currently Used at Home none   Activity/Exercise/Self-Care Comment Pt is retired salesman and grocery store owner. Pt recent sciatic nerve pain in in RLE; pt was on crutches for pain until July 4th this year.    Functional Level Prior   Ambulation 0-->independent   Transferring 0-->independent   Toileting 0-->independent   Bathing 0-->independent   Dressing 0-->independent   Eating 0-->independent   Communication 0-->understands/communicates without difficulty   Cognition (Pt states he has \"Chemo brain\" but not concerned)   Fall history within last six months no   Which of the above functional risks had a recent onset or change? none       Present no   Language English   General Information   Onset of Illness/Injury or Date of Surgery - Date 07/18/17   Referring Physician Mari Gilbert PA-C   Patient/Family Goals Statement To return home   Additional Occupational Profile Info/Pertinent History of Current Problem Norman Real is a 56 year old male with AML, here for NMA allo sib PBSCT    Precautions/Limitations immunosuppressed   General Observations Pt pleasant and agreeable; wife present and supportive    General Info Comments Activity: up ad sarah; hallway ambulation per " "Allo BMT protocol   Cognitive Status Examination   Orientation orientation to person, place and time   Level of Consciousness alert   Able to Follow Commands WNL/WFL   Personal Safety (Cognitive) WNL/WFL   Memory impaired  (Pt has noticed minor deficit with \"chemo brain\" )   Attention No deficits were identified   Organization/Problem Solving No deficits were identified   Executive Function No deficits were identified   Cognitive Comment No acute concerns    Visual Perception   Visual Perception Wears glasses   Visual Perception Comments No acute concerns    Integumentary/Edema   Integumentary/Edema no deficits were identifed   Posture   Posture not impaired   Range of Motion (ROM)   ROM Quick Adds No deficits were identified   Strength   Strength Comments UE 5/5, LLE 5/5, RLE 4+/5 due to nerve pain   Hand Strength   Hand Strength Comments WFL   Transfer Skill: Bed to Chair/Chair to Bed   Level of [place]: Bed to Chair independent   Transfer Skill: Sit to Stand   Level of [place]: Sit/Stand independent   Balance   Balance Quick Add No deficits identified   Instrumental Activities of Daily Living (IADL)   Previous Responsibilities meal prep;housekeeping;yardwork;medication management;finances   IADL Comments Spouse able to help out PRN   Activities of Daily Living Analysis   Impairments Contributing to Impaired Activities of Daily Living strength decreased;pain   General Therapy Interventions   Planned Therapy Interventions progressive activity/exercise;strengthening   Clinical Impression   Criteria for Skilled Therapeutic Interventions Met yes, treatment indicated   OT Diagnosis Decreased activity tolerance and performace of ADLs/IADLs   Influenced by the following impairments Prolonged hospital stay and acute medical intervention   Assessment of Occupational Performance 1-3 Performance Deficits   Identified Performance Deficits home managment; exercise, lesiure    Clinical Decision Making (Complexity) Low " "complexity   Therapy Frequency 3 times/wk   Predicted Duration of Therapy Intervention (days/wks) 9/11/17   Anticipated Discharge Disposition Home with Assist   Risks and Benefits of Treatment have been explained. Yes   Patient, Family & other staff in agreement with plan of care Yes   [facility] TM \"6 Clicks\"   2016, Trustees of [facility], under license to [employer].  All rights reserved.   6 Clicks Short Forms Daily Activity Inpatient Short Form   [facility]  \"6 Clicks\" Daily Activity Inpatient Short Form   1. Putting on and taking off regular lower body clothing? 4 - None   2. Bathing (including washing, rinsing, drying)? 4 - None   3. Toileting, which includes using toilet, bedpan or urinal? 4 - None   4. Putting on and taking off regular upper body clothing? 4 - None   5. Taking care of personal grooming such as brushing teeth? 4 - None   6. Eating meals? 4 - None   Daily Activity Raw Score (Score out of 24.Lower scores equate to lower levels of function) 24   Total Evaluation Time   Total Evaluation Time (Minutes) 5     "

## 2017-07-21 NOTE — PROGRESS NOTES
Type of chemo infused: Cytoxan  Pt tolerated infusion: Tolerated well  Interventions: Blood return noted pre and post infusion  Response to interventions used: N/A  Plan: Continue current plan of care

## 2017-07-21 NOTE — PLAN OF CARE
Problem: Goal Outcome Summary  Goal: Goal Outcome Summary  OT 5C: Recommend discharge to home with HEP. Completed eval and initiated treatment. Educated pt on role of OT, POC and lab values. Issued pt N95 mask. Pt completed 10 mins on bike. Pt demonstrated safe and independent mobility and completion of self cares at this time. Issued ergometer to pt room. BP high but activity ok'd by RN. ELIDA.

## 2017-07-22 NOTE — PLAN OF CARE
Problem: Blood and Marrow Transplantation  Goal: Blood and Marrow Transplantation  Signs and symptoms of listed problems will be absent or manageable.   Outcome: No Change  [id number]    Patient remains afebrile, denies pain/nausea/vomiting.  Voiding adequate amounts.  Patient is up ad sarah; tolerating regular diet.  Vital signs are WDL:  Blood pressure improving. Will require PO Potassium 20meq replacement with morning medications.  Continue to monitor and notify practitioner of any status changes.  Continue current plan of care.

## 2017-07-22 NOTE — PROGRESS NOTES
BMT Daily Progress Note   07/22/2017    Patient ID:  Norman Real is a 56 year old male, currently day - 4 of his HCT.     Diagnosis AMLU Acute myelogeneous leukemia, Unknown  HCT Type Allogeneic    Prep Regimen Cytoxan  Fludarabine  TBI   Donor Source Related PBSC    GVHD Prophylaxis   Mycophenolate  Primary BMT Provider Dr. Erna MD        INTERVAL  HISTORY     Overnight: Mr. Real denied any acute events overnight. Continues to have high blood pressure, notes he had been reluctant to increase the dose of diltiazem in the past due to concern for constipation. He has been using metamucil as part of his home bowel regimin and denied any issues with constipation at this time. No oral sores, bleeding events, or new constitutional symptoms. Eating adequate amounts of a regular diet.     Review of Systems: 10 point ROS negative except as noted above.    Scheduled Medications    diltiazem  240 mg Oral Daily     ondansetron  8 mg Oral Q8H     dexamethasone  10 mg Oral Daily     [START ON 7/24/2017] dexamethasone  8 mg Oral Once     [START ON 7/25/2017] dexamethasone  6 mg Oral Once     [START ON 7/26/2017] dexamethasone  4 mg Oral Once     [START ON 7/26/2017] acetaminophen  650 mg Oral Once     [START ON 7/26/2017] diphenhydrAMINE  25 mg Oral Once     [START ON 7/23/2017] mycophenolate  1,500 mg Intravenous Q12H BMT     allopurinol  300 mg Oral Daily     [START ON 7/27/2017] filgrastim (NEUPOGEN/GRANIX) intravenous  5 mcg/kg (Treatment Plan Recorded) Intravenous Daily at 8 pm     acyclovir  800 mg Oral 5x Daily     [START ON 7/25/2017] levofloxacin  250 mg Oral Daily at 10 am     [START ON 7/27/2017] voriconazole  200 mg Oral BID     micafungin (MYCAMINE) intermittent infusion > 45 kg  100 mg Intravenous Daily     FLUdarabine (FLUDARA) chemo infusion  30 mg/m2 (Treatment Plan Recorded) Intravenous Q24H     [START ON 8/28/2017] sulfamethoxazole-trimethoprim  1 tablet Oral Q Mon Tues BID     losartan  (COZAAR) tablet 50 mg  50 mg Oral Daily     heparin lock flush  5-10 mL Intracatheter Q24H     pantoprazole  40 mg Oral Daily     ursodiol  300 mg Oral TID     heparin lock flush  5 mL Intravenous Q24H     insulin aspart  1-7 Units Subcutaneous TID AC     insulin aspart  1-5 Units Subcutaneous At Bedtime     Current Facility-Administered Medications   Medication     diltiazem 24 hr capsule 240 mg     hydrALAZINE (APRESOLINE) injection 10-20 mg     ondansetron (ZOFRAN) tablet 8 mg     dexamethasone (DECADRON) tablet 10 mg     [START ON 7/24/2017] dexamethasone (DECADRON) tablet 8 mg     [START ON 7/25/2017] dexamethasone (DECADRON) tablet 6 mg     [START ON 7/26/2017] dexamethasone (DECADRON) tablet 4 mg     [START ON 7/26/2017] acetaminophen (TYLENOL) tablet 650 mg     [START ON 7/26/2017] diphenhydrAMINE (BENADRYL) capsule 25 mg     [START ON 7/26/2017] meperidine (DEMEROL) injection 25-50 mg     [START ON 7/23/2017] mycophenolate (GENERIC EQUIV) 1,500 mg in D5W intermittent infusion     [START ON 7/23/2017] tacrolimus (PROGRAF) 5,000 mcg in D5W 250 mL     allopurinol (ZYLOPRIM) tablet 300 mg     [START ON 7/27/2017] filgrastim (NEUPOGEN) 480 mcg in D5W 25 mL infusion     acyclovir (ZOVIRAX) tablet 800 mg     [START ON 7/25/2017] levofloxacin (LEVAQUIN) tablet 250 mg     [START ON 7/27/2017] voriconazole (VFEND) tablet 200 mg     micafungin (MYCAMINE) 100 mg in NaCl 0.9 % 100 mL intermittent infusion     FLUdarabine (FLUDARA) 64 mg in NaCl 0.9 % 113 mL CHEMOTHERAPY     [START ON 8/28/2017] sulfamethoxazole-trimethoprim (BACTRIM DS/SEPTRA DS) 800-160 MG per tablet 1 tablet     cyclobenzaprine (FLEXERIL) tablet 5 mg     losartan (COZAAR) tablet 50 mg     oxyCODONE (ROXICODONE) IR tablet 5 mg     lidocaine 1 % 1 mL     lidocaine (LMX4) kit     acetaminophen (TYLENOL) tablet 325-650 mg     acetaminophen (TYLENOL) tablet 325-650 mg     prochlorperazine (COMPAZINE) injection 5-10 mg    Or     prochlorperazine (COMPAZINE)  "tablet 5-10 mg     LORazepam (ATIVAN) injection 0.5-1 mg    Or     LORazepam (ATIVAN) tablet 0.5-1 mg     sodium chloride (PF) 0.9% PF flush 10-20 mL     heparin lock flush 10 UNIT/ML injection 5-10 mL     heparin lock flush 10 UNIT/ML injection 5-10 mL     pantoprazole (PROTONIX) EC tablet 40 mg     ursodiol (ACTIGALL) capsule 300 mg     potassium chloride SA (K-DUR/KLOR-CON M) CR tablet 20-40 mEq     potassium chloride (KLOR-CON) Packet 20-40 mEq     potassium chloride 10 mEq in 100 mL intermittent infusion     potassium chloride 10 mEq in 100 mL intermittent infusion with 10 mg lidocaine     potassium chloride 20 mEq in 50 mL intermittent infusion     magnesium sulfate 2 g in NS intermittent infusion (PharMEDium or FV Cmpd)     magnesium sulfate 4 g in 100 mL sterile water (premade)     sodium phosphate 15 mmol in D5W intermittent infusion     sodium phosphate 20 mmol in D5W intermittent infusion     sodium phosphate 25 mmol in D5W intermittent infusion     heparin lock flush 10 UNIT/ML injection 5 mL     heparin lock flush 10 UNIT/ML injection 5-10 mL     naloxone (NARCAN) injection 0.1-0.4 mg     glucose 40 % gel 15-30 g    Or     dextrose 50 % injection 25-50 mL    Or     glucagon injection 1 mg     insulin aspart (NovoLOG) inj (RAPID ACTING)     insulin aspart (NovoLOG) inj (RAPID ACTING)     zolpidem (AMBIEN) tablet 5 mg       PHYSICAL EXAM     Weight In/Out     Wt Readings from Last 3 Encounters:   07/22/17 92.3 kg (203 lb 7.8 oz)   07/14/17 92.5 kg (204 lb)   06/30/17 90.7 kg (200 lb)      I/O last 3 completed shifts:  In: 6871 [P.O.:2220; I.V.:4539; IV Piggyback:112]  Out: 9950 [Urine:9950]       KPS:  70    BP (!) 160/92 (BP Location: Left arm)  Pulse 101  Temp 96.9  F (36.1  C) (Axillary)  Resp 18  Ht 1.73 m (5' 8.11\")  Wt 92.3 kg (203 lb 7.8 oz)  SpO2 99%  BMI 30.84 kg/m2   General: NAD, interactive, appropriate   Eyes: SAMSON, sclera anicteric   Nose/Mouth/Throat: OP clear, buccal mucosa moist, " no ulcerations   Lungs: CTA bilaterally  Cardiovascular: RRR, no M/R/G   Abdominal/Rectal: +BS, soft, NT, ND, No HSM   Lymphatics: no edema  Skin: no rashes or petechaie  Neuro: A&O   Additional Findings: Cooper site NT, no drainage.    LABS AND IMAGING - PAST 24 HOURS     Results for orders placed or performed during the hospital encounter of 07/18/17 (from the past 24 hour(s))   Glucose by meter   Result Value Ref Range    Glucose 293 (H) 70 - 99 mg/dL   Glucose by meter   Result Value Ref Range    Glucose 259 (H) 70 - 99 mg/dL   Sodium   Result Value Ref Range    Sodium 136 133 - 144 mmol/L   Potassium   Result Value Ref Range    Potassium 3.9 3.4 - 5.3 mmol/L   Glucose by meter   Result Value Ref Range    Glucose 235 (H) 70 - 99 mg/dL   Basic metabolic panel   Result Value Ref Range    Sodium 139 133 - 144 mmol/L    Potassium 4.0 3.4 - 5.3 mmol/L    Chloride 108 94 - 109 mmol/L    Carbon Dioxide 24 20 - 32 mmol/L    Anion Gap 7 3 - 14 mmol/L    Glucose 197 (H) 70 - 99 mg/dL    Urea Nitrogen 16 7 - 30 mg/dL    Creatinine 0.73 0.66 - 1.25 mg/dL    GFR Estimate >90  Non  GFR Calc   >60 mL/min/1.7m2    GFR Estimate If Black >90   GFR Calc   >60 mL/min/1.7m2    Calcium 9.0 8.5 - 10.1 mg/dL   CBC with platelets differential   Result Value Ref Range    WBC 12.4 (H) 4.0 - 11.0 10e9/L    RBC Count 3.68 (L) 4.4 - 5.9 10e12/L    Hemoglobin 11.1 (L) 13.3 - 17.7 g/dL    Hematocrit 34.1 (L) 40.0 - 53.0 %    MCV 93 78 - 100 fl    MCH 30.2 26.5 - 33.0 pg    MCHC 32.6 31.5 - 36.5 g/dL    RDW 17.0 (H) 10.0 - 15.0 %    Platelet Count 135 (L) 150 - 450 10e9/L    Diff Method Automated Method     % Neutrophils 94.8 %    % Lymphocytes 3.4 %    % Monocytes 1.6 %    % Eosinophils 0.0 %    % Basophils 0.0 %    % Immature Granulocytes 0.2 %    Nucleated RBCs 0 0 /100    Absolute Neutrophil 11.8 (H) 1.6 - 8.3 10e9/L    Absolute Lymphocytes 0.4 (L) 0.8 - 5.3 10e9/L    Absolute Monocytes 0.2 0.0 - 1.3 10e9/L     Absolute Eosinophils 0.0 0.0 - 0.7 10e9/L    Absolute Basophils 0.0 0.0 - 0.2 10e9/L    Abs Immature Granulocytes 0.0 0 - 0.4 10e9/L    Absolute Nucleated RBC 0.0    Magnesium   Result Value Ref Range    Magnesium 2.5 (H) 1.6 - 2.3 mg/dL   Glucose by meter   Result Value Ref Range    Glucose 144 (H) 70 - 99 mg/dL         ASSESSMENT BY SYSTEMS     Norman Salazar Real is a 56 year old male with AML, currently day - 4 for 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       Prep detailed below: currently on hold due to need to get bone marrow biopsy to confirm remission status before proceeding.  Bone marrow biopsy completed 7/20 without complications. Morph and FLOW negative, signed out 7/20 at 9:30 am. Plan to proceed with transplant. He is currently day - 4.      1a. Prep for transplant briefly held 7/19 to obtain a BMBX & r/o active disease with falling platelet count. Flow/Morph reported as negative for malignancy 7/20. Released chemotherapy and dates changed in the plan. Updated [facility] team for new calendar and to reset the BMT date.  - D - 6 (7/20) cytoxan and fludarabine today   - D - 5 to Day - 2 (7/21-7/24) fludarabine  - D - 1 (7/25) TBI x 1  - D - 0 (7/26) Transplant       1b.  BMT: Prep as above.  - Allopurinol through day 0.  - Flush and pre-meds prior to transplant.   - GCSF starts d+5 and cont to until ANC>1500 x3 consecutive days.   - Re-stage per protocol.     2.  HEME: Keep Hgb>8 and plts>10K.   - No pre-meds.   - Due to transient decrease in plts on admission a BMBX was completed 7/19 to assess for disease.      3.  ID: No focal s/sx of infection at this time.   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   - Bactrim or appropriate PCP therapy to start d+28.                     4.  GI: No GI complaints at this time.   - Hx constipation-  currently using home metamucil   - Zofran and dexamethasone prior to chemo to prevent N/V.   - Ativan and compazine available PRN break-through symptoms.   - Protonix for GI prophy.   - Ursodiol for VOD prophy.  - s/p left sided colectomy for recurrent diverticulitis     5.  GVH: MMF and tac to start day-3 with tac level day -1.     6.  FEN/Renal: Monitor creat and lytes. Replete lytes PRN per SS.  - I/O net even overnight despite 20 mg IV lasix 7/21. Wt 1 kg above baseline. Monitor.   - Na+ down to 133 7/21, improved with NS fluid flush to 139 today 7/22  - Monitor weight, I+O, lytes per protocol with IVF flush.     7. CV: Hx HTN. Significant persistent HTN x 72 hrs despite increasing dose of PO dilt and use of PRN hydralazine. BP x 24 hrs 150-170/800-100  - HTN Management: Cont losartan, increased from 50 mg/d to 62.5 mg /day 7/22. Increased diltiazem XL from 120mg/d to 240 7/21.   - Screening EKG 7/22 pend   - PRN Hydralazine 10-20 mg Q 4 hrs IV   - Hx tachycardia with arrhythmia, s/p 3 ablations  - hold crestor through transplant     8. Endo: Hx DM type II. -240 last 24 hrs d/t decadron/dextrose/holding metformin.   /- Changed IVF flush from D5% + 0.45% to NS flush 7/21  - Check BG qid & increased to high ss insulin 7/22   - Hold metformin 500mg/d on admission.     9. : Hx prostate cancer     10. Pain: Leg pain s/p bmbx. Much improved. Oxycodone and flexeril prn     11. Pulm: Hx spiculated pulm nodule (concern for malignancy w/ hx tobacco use) and GGO (concerning for fungal PNA). F/u CT chest in 4-6 weeks (approx 8/22) per Dr. Ahn recs in workup.    - Note 6/30 BAL + for aspergillus galactomannan; culture negative to date but still in process.     OVERALL PLAN     Anticipated hospital dismissal: Remain admitted through stem cell transplant and cell count recovery     Haydee Black      Attending Note:    The patient was seen and examined by me separate from mid-level provider.   I personally  "reviewed the today's laboratory results, vital signs and radiology results.    Doing well. Good PO intake. I found that vitals are stable and there was no fever. /74  Pulse 101  Temp 96.9  F (36.1  C) (Axillary)  Resp 18  Ht 1.73 m (5' 8.11\")  Wt 92.3 kg (203 lb 7.8 oz)  SpO2 99%  BMI 30.84 kg/m2, but BP as high as 160/90's most of the time. No acute distress. Patient was alert and oriented and grossly neurologically intact. Face and upper anterior chest flushed when I entered the room and improved during visit. He was not on any drips at the time (?) Mouth is clean. Line insertion non-tender and clear. Lungs clear bilateral. Heart regular. Abdomen soft non-tender, BS present. Lower extremity with no edema. No rash.     Main laboratory finding were   Lab Results   Component Value Date    WBC 12.4 (H) 07/22/2017    ANEU 11.8 (H) 07/22/2017    HGB 11.1 (L) 07/22/2017    HCT 34.1 (L) 07/22/2017     (L) 07/22/2017     07/22/2017    POTASSIUM 4.0 07/22/2017    CHLORIDE 108 07/22/2017    CO2 24 07/22/2017     (H) 07/22/2017    BUN 16 07/22/2017    CR 0.73 07/22/2017    MAG 2.5 (H) 07/22/2017    INR 1.05 07/18/2017     BMBx and flow no leukemia    My plan is make no further change to diltiazem but increase the cozaar dose a little. Encouraged PO intake and physical activity as tolerated. Fludarabine dose #3 today. Excellent urine output yesterday. No need for lasix today with stable weight from admission. Change insulin sliding scale to high intensity. Tac+MMF start tomorrow.    Charanjit Umanzor M.D.      "

## 2017-07-22 NOTE — PLAN OF CARE
Problem: Blood and Marrow Transplantation  Goal: Blood and Marrow Transplantation  Signs and symptoms of listed problems will be absent or manageable.   Outcome: No Change    07/22/17 1810   Blood and Marrow Transplantation   Blood and Marrow Transplantation Assessed all   Blood and Marrow Transplantation Present metabolic imbalances;nausea/vomiting         Comments:   Afebrile. Hypertensive, /92. Hydralazine given. BP recheck 136/74. Denies V/D. C/o of nausea. Ativan and Zofran given with relief. BGS elevated 144-310, covered with SSI. Pt eating and drinking well. Potassium replaced per electrolyte SS. Pt offered no further concerns. Continue POC.

## 2017-07-23 NOTE — PLAN OF CARE
Problem: Goal Outcome Summary  Goal: Goal Outcome Summary  Outcome: No Change  Patient afebrile, BPs improved. Reported headache at bedtime, relieved with tylenol. No other complaints. No replacements this needed morning. Starts Tac drip and MMF today. Fludarabine again this evening. Continue BMT plan of care.     Problem: Blood and Marrow Transplantation  Goal: Blood and Marrow Transplantation  Signs and symptoms of listed problems will be absent or manageable.     07/23/17 0511   Blood and Marrow Transplantation   Blood and Marrow Transplantation Assessed all   Blood and Marrow Transplantation Present acute pain

## 2017-07-23 NOTE — PLAN OF CARE
Problem: Goal Outcome Summary  Goal: Goal Outcome Summary  PT/5C: Per OT pt is independent in room. Pt does not have skilled Physical Therapy needs at this time. OT to follow pt during hospital stay for LE strengthening and aerobic conditioning.

## 2017-07-23 NOTE — PROGRESS NOTES
BMT Daily Progress Note   07/23/2017    Patient ID:  Norman Real is a 56 year old male, currently day - 3 of his HCT.     Diagnosis AMLU Acute myelogeneous leukemia, Unknown  HCT Type Allogeneic    Prep Regimen Cytoxan  Fludarabine  TBI   Donor Source Related PBSC    GVHD Prophylaxis   Mycophenolate  Primary BMT Provider Dr. Erna MD        INTERVAL  HISTORY     Overnight: Mr. Real denied any acute events overnight. States he slept well after 2 am. No vomiting or diarrhea. Notes blood pressure was better controlled. Eating adequate amounts of a regular diet. Aware of the plan for chemotherapy today and all questions were answered.     Review of Systems: 10 point ROS negative except as noted above.    Scheduled Medications    insulin glargine  10 Units Subcutaneous Once     losartan (COZAAR) half-tab 62.5 mg  62.5 mg Oral Daily     insulin aspart  1-10 Units Subcutaneous TID AC     insulin aspart  1-7 Units Subcutaneous At Bedtime     diltiazem  240 mg Oral Daily     ondansetron  8 mg Oral Q8H     [START ON 7/24/2017] dexamethasone  8 mg Oral Once     [START ON 7/25/2017] dexamethasone  6 mg Oral Once     [START ON 7/26/2017] dexamethasone  4 mg Oral Once     [START ON 7/26/2017] acetaminophen  650 mg Oral Once     [START ON 7/26/2017] diphenhydrAMINE  25 mg Oral Once     mycophenolate  1,500 mg Intravenous Q12H BMT     allopurinol  300 mg Oral Daily     [START ON 7/27/2017] filgrastim (NEUPOGEN/GRANIX) intravenous  5 mcg/kg (Treatment Plan Recorded) Intravenous Daily at 8 pm     acyclovir  800 mg Oral 5x Daily     [START ON 7/25/2017] levofloxacin  250 mg Oral Daily at 10 am     [START ON 7/27/2017] voriconazole  200 mg Oral BID     micafungin (MYCAMINE) intermittent infusion > 45 kg  100 mg Intravenous Daily     FLUdarabine (FLUDARA) chemo infusion  30 mg/m2 (Treatment Plan Recorded) Intravenous Q24H     [START ON 8/28/2017] sulfamethoxazole-trimethoprim  1 tablet Oral Q Mon Tues BID     heparin  lock flush  5-10 mL Intracatheter Q24H     pantoprazole  40 mg Oral Daily     ursodiol  300 mg Oral TID     heparin lock flush  5 mL Intravenous Q24H     Current Facility-Administered Medications   Medication     insulin glargine (LANTUS) injection 10 Units     psyllium (METAMUCIL) powder 1 Tablespoonful     losartan (COZAAR) half-tab 62.5 mg     insulin aspart (NovoLOG) inj (RAPID ACTING)     insulin aspart (NovoLOG) inj (RAPID ACTING)     diltiazem 24 hr capsule 240 mg     hydrALAZINE (APRESOLINE) injection 10-20 mg     ondansetron (ZOFRAN) tablet 8 mg     [START ON 7/24/2017] dexamethasone (DECADRON) tablet 8 mg     [START ON 7/25/2017] dexamethasone (DECADRON) tablet 6 mg     [START ON 7/26/2017] dexamethasone (DECADRON) tablet 4 mg     [START ON 7/26/2017] acetaminophen (TYLENOL) tablet 650 mg     [START ON 7/26/2017] diphenhydrAMINE (BENADRYL) capsule 25 mg     [START ON 7/26/2017] meperidine (DEMEROL) injection 25-50 mg     mycophenolate (GENERIC EQUIV) 1,500 mg in D5W intermittent infusion     tacrolimus (PROGRAF) 5,000 mcg in D5W 250 mL     allopurinol (ZYLOPRIM) tablet 300 mg     [START ON 7/27/2017] filgrastim (NEUPOGEN) 480 mcg in D5W 25 mL infusion     acyclovir (ZOVIRAX) tablet 800 mg     [START ON 7/25/2017] levofloxacin (LEVAQUIN) tablet 250 mg     [START ON 7/27/2017] voriconazole (VFEND) tablet 200 mg     micafungin (MYCAMINE) 100 mg in NaCl 0.9 % 100 mL intermittent infusion     FLUdarabine (FLUDARA) 64 mg in NaCl 0.9 % 113 mL CHEMOTHERAPY     [START ON 8/28/2017] sulfamethoxazole-trimethoprim (BACTRIM DS/SEPTRA DS) 800-160 MG per tablet 1 tablet     cyclobenzaprine (FLEXERIL) tablet 5 mg     oxyCODONE (ROXICODONE) IR tablet 5 mg     lidocaine 1 % 1 mL     lidocaine (LMX4) kit     acetaminophen (TYLENOL) tablet 325-650 mg     acetaminophen (TYLENOL) tablet 325-650 mg     prochlorperazine (COMPAZINE) injection 5-10 mg    Or     prochlorperazine (COMPAZINE) tablet 5-10 mg     LORazepam (ATIVAN)  "injection 0.5-1 mg    Or     LORazepam (ATIVAN) tablet 0.5-1 mg     sodium chloride (PF) 0.9% PF flush 10-20 mL     heparin lock flush 10 UNIT/ML injection 5-10 mL     heparin lock flush 10 UNIT/ML injection 5-10 mL     pantoprazole (PROTONIX) EC tablet 40 mg     ursodiol (ACTIGALL) capsule 300 mg     potassium chloride SA (K-DUR/KLOR-CON M) CR tablet 20-40 mEq     potassium chloride (KLOR-CON) Packet 20-40 mEq     potassium chloride 10 mEq in 100 mL intermittent infusion     potassium chloride 10 mEq in 100 mL intermittent infusion with 10 mg lidocaine     potassium chloride 20 mEq in 50 mL intermittent infusion     magnesium sulfate 2 g in NS intermittent infusion (PharMEDium or FV Cmpd)     magnesium sulfate 4 g in 100 mL sterile water (premade)     sodium phosphate 15 mmol in D5W intermittent infusion     sodium phosphate 20 mmol in D5W intermittent infusion     sodium phosphate 25 mmol in D5W intermittent infusion     heparin lock flush 10 UNIT/ML injection 5 mL     heparin lock flush 10 UNIT/ML injection 5-10 mL     naloxone (NARCAN) injection 0.1-0.4 mg     glucose 40 % gel 15-30 g    Or     dextrose 50 % injection 25-50 mL    Or     glucagon injection 1 mg     zolpidem (AMBIEN) tablet 5 mg       PHYSICAL EXAM     Weight In/Out     Wt Readings from Last 3 Encounters:   07/23/17 92.1 kg (203 lb)   07/14/17 92.5 kg (204 lb)   06/30/17 90.7 kg (200 lb)      I/O last 3 completed shifts:  In: 2492 [P.O.:2280; I.V.:100; IV Piggyback:112]  Out: 4500 [Urine:4500]       KPS:  70    /78 (BP Location: Left arm)  Pulse 101  Temp 97.1  F (36.2  C) (Axillary)  Resp 16  Ht 1.73 m (5' 8.11\")  Wt 92.1 kg (203 lb)  SpO2 96%  BMI 30.77 kg/m2   General: NAD, interactive, appropriate   Eyes: SAMSON, sclera anicteric   Nose/Mouth/Throat: OP clear, buccal mucosa moist, no ulcerations   Lungs: CTA bilaterally  Cardiovascular: RRR, no M/R/G   Abdominal/Rectal: +BS, soft, NT, ND, No HSM   Lymphatics: No edema  Skin: no " rashes or petechaie  Neuro: A&O   Additional Findings: Cooper site NT, no drainage.    LABS AND IMAGING - PAST 24 HOURS     Results for orders placed or performed during the hospital encounter of 07/18/17 (from the past 24 hour(s))   EKG 12-lead, complete   Result Value Ref Range    Interpretation ECG Click View Image link to view waveform and result    Glucose by meter   Result Value Ref Range    Glucose 311 (H) 70 - 99 mg/dL   Glucose by meter   Result Value Ref Range    Glucose 310 (H) 70 - 99 mg/dL   Glucose by meter   Result Value Ref Range    Glucose 195 (H) 70 - 99 mg/dL   Glucose by meter   Result Value Ref Range    Glucose 340 (H) 70 - 99 mg/dL   Basic metabolic panel   Result Value Ref Range    Sodium 138 133 - 144 mmol/L    Potassium 4.2 3.4 - 5.3 mmol/L    Chloride 106 94 - 109 mmol/L    Carbon Dioxide 26 20 - 32 mmol/L    Anion Gap 7 3 - 14 mmol/L    Glucose 203 (H) 70 - 99 mg/dL    Urea Nitrogen 19 7 - 30 mg/dL    Creatinine 0.76 0.66 - 1.25 mg/dL    GFR Estimate >90  Non  GFR Calc   >60 mL/min/1.7m2    GFR Estimate If Black >90   GFR Calc   >60 mL/min/1.7m2    Calcium 8.9 8.5 - 10.1 mg/dL   CBC with platelets differential   Result Value Ref Range    WBC 7.8 4.0 - 11.0 10e9/L    RBC Count 3.52 (L) 4.4 - 5.9 10e12/L    Hemoglobin 10.8 (L) 13.3 - 17.7 g/dL    Hematocrit 33.3 (L) 40.0 - 53.0 %    MCV 95 78 - 100 fl    MCH 30.7 26.5 - 33.0 pg    MCHC 32.4 31.5 - 36.5 g/dL    RDW 17.5 (H) 10.0 - 15.0 %    Platelet Count 134 (L) 150 - 450 10e9/L    Diff Method Automated Method     % Neutrophils 96.8 %    % Lymphocytes 0.8 %    % Monocytes 2.3 %    % Eosinophils 0.0 %    % Basophils 0.0 %    % Immature Granulocytes 0.1 %    Nucleated RBCs 0 0 /100    Absolute Neutrophil 7.6 1.6 - 8.3 10e9/L    Absolute Lymphocytes 0.1 (L) 0.8 - 5.3 10e9/L    Absolute Monocytes 0.2 0.0 - 1.3 10e9/L    Absolute Eosinophils 0.0 0.0 - 0.7 10e9/L    Absolute Basophils 0.0 0.0 - 0.2 10e9/L    Abs  Immature Granulocytes 0.0 0 - 0.4 10e9/L    Absolute Nucleated RBC 0.0    Glucose by meter   Result Value Ref Range    Glucose 154 (H) 70 - 99 mg/dL         ASSESSMENT BY SYSTEMS     Norman Salazar Real is a 56 year old male with AML, currently day - 3 for 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       Prep detailed below: Initially held prep for a day to obtain bone marrow biopsy with intent to confirm remission status before proceeding.  Bone marrow biopsy completed 7/20 without complications. Morph and FLOW negative for AML, signed out 7/20 at 9:30 am. Restarted treatment plan with first dose of chemotherapy administered on 7/20.       1a. Prep for transplant briefly held 7/19 to obtain a BMBX & r/o active disease with falling platelet count. Flow/Morph reported as negative for malignancy 7/20. Released chemotherapy and dates changed in the plan. Updated [facility] team for new calendar and to reset the BMT date.  - D - 6 (7/20) cytoxan and fludarabine today   - D - 5 to Day - 2 (7/21-7/24) fludarabine  - D - 1 (7/25) TBI x 1  - D - 0 (7/26) Transplant       1b.  BMT: Prep as above.  - Allopurinol through day 0.  - Flush and pre-meds prior to transplant.   - GCSF starts d+5 and cont to until ANC>1500 x3 consecutive days.   - Re-stage per protocol.     2.  HEME: Keep Hgb>8 and plts>10K.   - No pre-meds.   - Due to transient decrease in plts on admission a BMBX was completed 7/19 to assess for disease.      3.  ID: No focal s/sx of infection at this time.   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   - Bactrim or appropriate PCP therapy to start d+28.                     4.  GI: No GI complaints at this time.   - Hx constipation- currently using home metamucil   - Zofran and dexamethasone prior to chemo to prevent N/V.   - Ativan and compazine available PRN  break-through symptoms.   - Protonix for GI prophy.   - Ursodiol for VOD prophy.  - s/p left sided colectomy for recurrent diverticulitis     5.  GVH: Pre-transplant   - MMF and tac to start day-3 today 7/23 with tac level day -1.     6.  FEN/Renal:Eatinga adequate amounts   -  Monitor creat and lytes. Replete lytes PRN per SS.  - Significant auto diuresis overnight, net neg 3.4 L. Wt near baseline today.   - Na+ stable s/p changing flush to NS  - Monitor weight, I+O, lytes per protocol with IVF flush.     7. CV: Hx HTN. Significant persistent HTN for several days after admission, improved with increased dilt/cozaar  - HTN Management: Cont losartan, increased from 50 mg/d to 62.5 mg /day 7/22. Increased diltiazem XL from 120mg/d to 240 7/21. HTN improved 7/23.   - Screening EKG 7/22 NSR with   - PRN Hydralazine 10-20 mg Q 4 hrs IV   - Hx tachycardia with arrhythmia, s/p 3 ablations  - hold crestor through transplant     8. Endo: Hx DM type II. -340 last 24 hrs d/t decadron/holding metformin.   - Changed IVF flush from D5% + 0.45% to NS flush 7/21.  - Glucose 195-340 overnight. Last day of decadron 10 mg, tapers to 8 mg 7/24. Lantus 10 mg x 1 7/23. Re-evaluate tomorrow and consider endocrine consult.    - Check BG qid & increased to high ss insulin 7/22   - Hold metformin 500mg/d on admission.     9. : Hx prostate cancer. Asymptomatic at this time.      10. Pain: Leg pain s/p bmbx. Much improved. Oxycodone and flexeril prn     11. Pulm: Hx spiculated pulm nodule (concern for malignancy w/ hx tobacco use) and GGO (concerning for fungal PNA). F/u CT chest in 4-6 weeks (approx 8/22) per Dr. Ahn recs in workup.    - Note 6/30 BAL + for aspergillus galactomannan; culture negative to date but still in process.     OVERALL PLAN     Anticipated hospital dismissal: Remain admitted through stem cell transplant and cell count recovery     Haydee Black      Attending Note:    The patient was seen and  "examined by me separate from mid-level provider.   I personally reviewed the today's laboratory results, vital signs and radiology results.    Doing well. Good PO intake. I found that vitals are stable and there was no fever. /78 (BP Location: Left arm)  Pulse 101  Temp 97.1  F (36.2  C) (Axillary)  Resp 16  Ht 1.73 m (5' 8.11\")  Wt 92.1 kg (203 lb)  SpO2 96%  BMI 30.77 kg/m2. Improved BP control. No acute distress. Patient was alert and oriented and grossly neurologically intact. Face and upper anterior chest flushed when I entered the room and improved during visit. He was not on any drips at the time (?) Mouth is clean. Line insertion non-tender and clear. Lungs clear bilateral. Heart regular. Abdomen soft non-tender, BS present. Lower extremity with no edema. No rash.     Main laboratory finding were   Lab Results   Component Value Date    WBC 7.8 07/23/2017    ANEU 7.6 07/23/2017    HGB 10.8 (L) 07/23/2017    HCT 33.3 (L) 07/23/2017     (L) 07/23/2017     07/23/2017    POTASSIUM 4.2 07/23/2017    CHLORIDE 106 07/23/2017    CO2 26 07/23/2017     (H) 07/23/2017    BUN 19 07/23/2017    CR 0.76 07/23/2017    MAG 2.5 (H) 07/22/2017    INR 1.05 07/18/2017     BMBx and flow no leukemia    My plan is make no change to diltiazem and cozaar. Encouraged PO intake and physical activity as tolerated. Fludarabine dose #4 today. No need for lasix today. On insulin sliding scale high intensity. We will start Tac+MMF today; review drug profiles and dosing.     Charanjit Umanzor M.D.      "

## 2017-07-23 NOTE — PLAN OF CARE
Problem: Blood and Marrow Transplantation  Goal: Blood and Marrow Transplantation  Signs and symptoms of listed problems will be absent or manageable.   Outcome: No Change    07/23/17 1320   Blood and Marrow Transplantation   Blood and Marrow Transplantation Assessed all   Blood and Marrow Transplantation Present metabolic imbalances;nausea/vomiting         Comments:   Afebrile. VSS. Denies pain, V/D. C/o of nausea, relieved with Zofran and Ativan. Lung sounds clear. MMF and Tac gtt started today. Pt shower and up in chair. No voiding issues. C/o of constipation. Metamucil given. Pt eating and drinking well. -280, covered with SSI. Lantus given once for elevated BGS overnight. Pt offered no further concerns. Continue to POC.

## 2017-07-24 NOTE — PLAN OF CARE
Problem: Goal Outcome Summary  Goal: Goal Outcome Summary  Outcome: No Change  Afebrile, BP elevated to 171/103 for which 20 of PRN hydralazine was given and was effective. OVSS. Pt had no complaints this shift of N/V/D or pain. K and Na Phos replacing, Tac gtt running at 5.8 mL/hr and bag expires at 0345. Continue POC.     Problem: Blood and Marrow Transplantation  Goal: Blood and Marrow Transplantation  Signs and symptoms of listed problems will be absent or manageable.   Outcome: No Change    07/24/17 0628   Blood and Marrow Transplantation   Blood and Marrow Transplantation Assessed all   Blood and Marrow Transplantation Present metabolic imbalances;nausea/vomiting

## 2017-07-24 NOTE — CONSULTS
Diabetes/Hyperglycemia Management Consult    Chief Complaint type 2 diabetes, steroid-induced hyperglycemia  Consult requested by: Haydee HOFF, BMT  History of Present Illness: Norman Real is a 56 year old man with AML, admitted in advance of NMA allo sib PBSCT scheduled for July 26.  On admission his glucose was in the 130s.  Metformin held.  Glucose reached 200s after the start of D5W fluid flush (225-275 cc/h from 7/19 2330-7/21 1000) and persisted high in setting of dexamethasone 10 mg daily.  Yesterday, pt received glargine 10 units x1 along with aspart correction scale.  Post-prandial glucose was up to 290s and fasting BG today 205.  Today, dexamethasone tapered to 8 mg. Pt is eating pretty well, though does not like the food selection much.  His biggest meal has been breakfast (notably containing 200 grams carbohydrate today).  While hospitalized, not having much for night time snacking.  He does have snacks in his room.  So, far has tolerated his chemotherapy fine.  Feeling more fatigued today, so napped. Previously was walking independently in room for exercise (30 min).  Now has stationary bicycle in room.   He's only been on insulin while hospitalized and reports only receiving rapid-acting insulin.  Experienced no hypoglycemia when on insulin.      Recent Labs  Lab 07/24/17  1237 07/24/17  0914 07/24/17  0318 07/23/17  2220 07/23/17  1748 07/23/17  1348 07/23/17  1150  07/23/17  0352  07/22/17  0348  07/21/17  0346  07/20/17  0344  07/19/17  0319   GLC  --   --  207*  --   --   --   --   --  203*  --  197*  --  240*  --  148*  --  122*   * 205*  --  293* 273* 258* 280*  < >  --   < >  --   < >  --   < >  --   < >  --    < > = values in this interval not displayed.      Diabetes Type: type 2   Diabetes Duration: 3 years  Usual Diabetes Regimen: metformin 500 mg daily  Glucose check 4-5 times/week, varying times fasting and post-prandial  Exercise in evenings (walking outdoors or  "bicycling) usually after supper  Moderating carbohydrate intake, likes to have snacks/treats around 2100  Ability to [place] Prescribed Regimen: good  Diabetes Control: usually 120s in morning and 90s later in the day on home meter  [facility]      Able to Detect Hypoglycemia: yes, only experienced once  Usual Diabetes Care Provider: [facility]  Factors Impacting Glucose Control: dextrose fluids, steroids, withholding metformin      Review of Systems  10 point ROS completed with pertinent positives and negatives noted in the HPI    Past medical, family and social histories are reviewed and updated.    Past Medical History  Past Medical History:   Diagnosis Date     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 05/26/2017     Cholelithiasis 06/26/2017     Diabetes (H)      Hypertension      Prostate cancer (H) 2011    had radiatoin therapy     Tachycardia        Family History  Family History   Problem Relation Age of Onset     Chronic Obstructive Pulmonary Disease Mother    positive for diabetes in aunts/uncles.  Pt has  heritage.    Social History  Social History     Social History     Marital status:      Spouse name: N/A     Number of children: 2     Years of education: N/A     Social History Main Topics     Smoking status: Former Smoker     Quit date: 5/31/2010     Smokeless tobacco: None     Alcohol use No     Drug use: No     Sexual activity: Not Asked         Physical Exam  /76 (BP Location: Left arm)  Pulse 79  Temp 96.5  F (35.8  C) (Axillary)  Resp 16  Ht 1.73 m (5' 8.11\")  Wt 92.8 kg (204 lb 9.6 oz)  SpO2 98%  BMI 31.01 kg/m2    General:  pleasant man resting in bed, in no distress. Wife at bedside  HEENT: NC/AT, PER and anicteric, non-injected, oral mucous membranes moist.   Lungs: unlabored respiration, no cough  ABD: central obesity  Skin: warm and dry, no obvious lesions  MSK:  fluid movement of all extremities  Lymp:  no LE edema   Mental " status:  alert, oriented x3, communicating clearly  Psych:  calm, even mood    Laboratory  Recent Labs   Lab Test  07/24/17 0318 07/23/17   0352   NA  138  138   POTASSIUM  4.0  4.2   CHLORIDE  105  106   CO2  24  26   ANIONGAP  8  7   GLC  207*  203*   BUN  13  19   CR  0.65*  0.76   SANDRA  8.7  8.9     CBC RESULTS:   Recent Labs   Lab Test  07/24/17 0318   WBC  4.5   RBC  3.73*   HGB  11.2*   HCT  34.8*   MCV  93   MCH  30.0   MCHC  32.2   RDW  16.6*   PLT  115*       Liver Function Studies -   Recent Labs   Lab Test  07/24/17 0318   PROTTOTAL  6.5*   ALBUMIN  3.3*   BILITOTAL  0.6   ALKPHOS  55   AST  21   ALT  30       Active Medications  Current Facility-Administered Medications   Medication     diltiazem 24 hr capsule 300 mg     psyllium (METAMUCIL/KONSYL) Packet 1 packet     insulin aspart (NovoLOG) inj (RAPID ACTING)     insulin aspart (NovoLOG) inj (RAPID ACTING)     insulin glargine (LANTUS) injection 15 Units     losartan (COZAAR) half-tab 62.5 mg     insulin aspart (NovoLOG) inj (RAPID ACTING)     insulin aspart (NovoLOG) inj (RAPID ACTING)     hydrALAZINE (APRESOLINE) injection 10-20 mg     ondansetron (ZOFRAN) tablet 8 mg     [START ON 7/25/2017] dexamethasone (DECADRON) tablet 6 mg     [START ON 7/26/2017] dexamethasone (DECADRON) tablet 4 mg     [START ON 7/26/2017] acetaminophen (TYLENOL) tablet 650 mg     [START ON 7/26/2017] diphenhydrAMINE (BENADRYL) capsule 25 mg     [START ON 7/26/2017] meperidine (DEMEROL) injection 25-50 mg     mycophenolate (GENERIC EQUIV) 1,500 mg in D5W intermittent infusion     tacrolimus (PROGRAF) 5,000 mcg in D5W 250 mL     allopurinol (ZYLOPRIM) tablet 300 mg     [START ON 7/27/2017] filgrastim (NEUPOGEN) 480 mcg in D5W 25 mL infusion     acyclovir (ZOVIRAX) tablet 800 mg     [START ON 7/25/2017] levofloxacin (LEVAQUIN) tablet 250 mg     [START ON 7/27/2017] voriconazole (VFEND) tablet 200 mg     micafungin (MYCAMINE) 100 mg in NaCl 0.9 % 100 mL intermittent  infusion     FLUdarabine (FLUDARA) 64 mg in NaCl 0.9 % 113 mL CHEMOTHERAPY     [START ON 8/28/2017] sulfamethoxazole-trimethoprim (BACTRIM DS/SEPTRA DS) 800-160 MG per tablet 1 tablet     cyclobenzaprine (FLEXERIL) tablet 5 mg     oxyCODONE (ROXICODONE) IR tablet 5 mg     lidocaine 1 % 1 mL     lidocaine (LMX4) kit     acetaminophen (TYLENOL) tablet 325-650 mg     acetaminophen (TYLENOL) tablet 325-650 mg     prochlorperazine (COMPAZINE) injection 5-10 mg    Or     prochlorperazine (COMPAZINE) tablet 5-10 mg     LORazepam (ATIVAN) injection 0.5-1 mg    Or     LORazepam (ATIVAN) tablet 0.5-1 mg     sodium chloride (PF) 0.9% PF flush 10-20 mL     heparin lock flush 10 UNIT/ML injection 5-10 mL     heparin lock flush 10 UNIT/ML injection 5-10 mL     pantoprazole (PROTONIX) EC tablet 40 mg     ursodiol (ACTIGALL) capsule 300 mg     potassium chloride SA (K-DUR/KLOR-CON M) CR tablet 20-40 mEq     potassium chloride (KLOR-CON) Packet 20-40 mEq     potassium chloride 10 mEq in 100 mL intermittent infusion     potassium chloride 10 mEq in 100 mL intermittent infusion with 10 mg lidocaine     potassium chloride 20 mEq in 50 mL intermittent infusion     magnesium sulfate 2 g in NS intermittent infusion (PharMEDium or FV Cmpd)     magnesium sulfate 4 g in 100 mL sterile water (premade)     sodium phosphate 15 mmol in D5W intermittent infusion     sodium phosphate 20 mmol in D5W intermittent infusion     sodium phosphate 25 mmol in D5W intermittent infusion     heparin lock flush 10 UNIT/ML injection 5 mL     heparin lock flush 10 UNIT/ML injection 5-10 mL     naloxone (NARCAN) injection 0.1-0.4 mg     glucose 40 % gel 15-30 g    Or     dextrose 50 % injection 25-50 mL    Or     glucagon injection 1 mg     zolpidem (AMBIEN) tablet 5 mg       Current Diet    Active Diet Order      Regular Diet Adult      Assessment  Norman Real is a 56 year old man with well-controlled type 2 diabetes (metformin prior to admission),  admitted NMA allo sib PBSCT to treat AML, experiencing increased hyperglycemia related to steroids.      Plan  Glargine 15 units q24h today.  Aspart 1 unit per 10 grams carbohydrate for meals and snacks today.  Continue aspart high correction qAC, HS.  BG monitor qAC, HS 0200.    Anticipate need for scheduled insulin until about 36 hours after last dose dexamethasone.  We will follow.    Marisol HOFF [facility] [phone]    Diabetes Management Team job code: [id number]

## 2017-07-24 NOTE — PROGRESS NOTES
BMT Daily Progress Note   07/24/2017    Patient ID:  Norman Real is a 56 year old male, currently day - 2 of his HCT.     Diagnosis AMLU Acute myelogeneous leukemia, Unknown  HCT Type Allogeneic    Prep Regimen Cytoxan  Fludarabine  TBI   Donor Source Related PBSC    GVHD Prophylaxis   Mycophenolate  Primary BMT Provider Dr. Erna MD        INTERVAL  HISTORY     Overnight: Mr. Real denied any acute events overnight. He has been passing bowel movements but would like his metamucil scheduled TID per his home dosing regimen. No N/V. Tolerating a regular diet. Discussed plan for the day.     Review of Systems: 10 point ROS negative except as noted above.    Scheduled Medications    diltiazem  300 mg Oral Daily     psyllium  1 packet Oral TID     losartan (COZAAR) half-tab 62.5 mg  62.5 mg Oral Daily     insulin aspart  1-10 Units Subcutaneous TID AC     insulin aspart  1-7 Units Subcutaneous At Bedtime     ondansetron  8 mg Oral Q8H     [START ON 7/25/2017] dexamethasone  6 mg Oral Once     [START ON 7/26/2017] dexamethasone  4 mg Oral Once     [START ON 7/26/2017] acetaminophen  650 mg Oral Once     [START ON 7/26/2017] diphenhydrAMINE  25 mg Oral Once     mycophenolate  1,500 mg Intravenous Q12H BMT     allopurinol  300 mg Oral Daily     [START ON 7/27/2017] filgrastim (NEUPOGEN/GRANIX) intravenous  5 mcg/kg (Treatment Plan Recorded) Intravenous Daily at 8 pm     acyclovir  800 mg Oral 5x Daily     [START ON 7/25/2017] levofloxacin  250 mg Oral Daily at 10 am     [START ON 7/27/2017] voriconazole  200 mg Oral BID     micafungin (MYCAMINE) intermittent infusion > 45 kg  100 mg Intravenous Daily     FLUdarabine (FLUDARA) chemo infusion  30 mg/m2 (Treatment Plan Recorded) Intravenous Q24H     [START ON 8/28/2017] sulfamethoxazole-trimethoprim  1 tablet Oral Q Mon Tues BID     heparin lock flush  5-10 mL Intracatheter Q24H     pantoprazole  40 mg Oral Daily     ursodiol  300 mg Oral TID     heparin lock  flush  5 mL Intravenous Q24H     Current Facility-Administered Medications   Medication     diltiazem 24 hr capsule 300 mg     psyllium (METAMUCIL/KONSYL) Packet 1 packet     losartan (COZAAR) half-tab 62.5 mg     insulin aspart (NovoLOG) inj (RAPID ACTING)     insulin aspart (NovoLOG) inj (RAPID ACTING)     hydrALAZINE (APRESOLINE) injection 10-20 mg     ondansetron (ZOFRAN) tablet 8 mg     [START ON 7/25/2017] dexamethasone (DECADRON) tablet 6 mg     [START ON 7/26/2017] dexamethasone (DECADRON) tablet 4 mg     [START ON 7/26/2017] acetaminophen (TYLENOL) tablet 650 mg     [START ON 7/26/2017] diphenhydrAMINE (BENADRYL) capsule 25 mg     [START ON 7/26/2017] meperidine (DEMEROL) injection 25-50 mg     mycophenolate (GENERIC EQUIV) 1,500 mg in D5W intermittent infusion     tacrolimus (PROGRAF) 5,000 mcg in D5W 250 mL     allopurinol (ZYLOPRIM) tablet 300 mg     [START ON 7/27/2017] filgrastim (NEUPOGEN) 480 mcg in D5W 25 mL infusion     acyclovir (ZOVIRAX) tablet 800 mg     [START ON 7/25/2017] levofloxacin (LEVAQUIN) tablet 250 mg     [START ON 7/27/2017] voriconazole (VFEND) tablet 200 mg     micafungin (MYCAMINE) 100 mg in NaCl 0.9 % 100 mL intermittent infusion     FLUdarabine (FLUDARA) 64 mg in NaCl 0.9 % 113 mL CHEMOTHERAPY     [START ON 8/28/2017] sulfamethoxazole-trimethoprim (BACTRIM DS/SEPTRA DS) 800-160 MG per tablet 1 tablet     cyclobenzaprine (FLEXERIL) tablet 5 mg     oxyCODONE (ROXICODONE) IR tablet 5 mg     lidocaine 1 % 1 mL     lidocaine (LMX4) kit     acetaminophen (TYLENOL) tablet 325-650 mg     acetaminophen (TYLENOL) tablet 325-650 mg     prochlorperazine (COMPAZINE) injection 5-10 mg    Or     prochlorperazine (COMPAZINE) tablet 5-10 mg     LORazepam (ATIVAN) injection 0.5-1 mg    Or     LORazepam (ATIVAN) tablet 0.5-1 mg     sodium chloride (PF) 0.9% PF flush 10-20 mL     heparin lock flush 10 UNIT/ML injection 5-10 mL     heparin lock flush 10 UNIT/ML injection 5-10 mL     pantoprazole  "(PROTONIX) EC tablet 40 mg     ursodiol (ACTIGALL) capsule 300 mg     potassium chloride SA (K-DUR/KLOR-CON M) CR tablet 20-40 mEq     potassium chloride (KLOR-CON) Packet 20-40 mEq     potassium chloride 10 mEq in 100 mL intermittent infusion     potassium chloride 10 mEq in 100 mL intermittent infusion with 10 mg lidocaine     potassium chloride 20 mEq in 50 mL intermittent infusion     magnesium sulfate 2 g in NS intermittent infusion (PharMEDium or FV Cmpd)     magnesium sulfate 4 g in 100 mL sterile water (premade)     sodium phosphate 15 mmol in D5W intermittent infusion     sodium phosphate 20 mmol in D5W intermittent infusion     sodium phosphate 25 mmol in D5W intermittent infusion     heparin lock flush 10 UNIT/ML injection 5 mL     heparin lock flush 10 UNIT/ML injection 5-10 mL     naloxone (NARCAN) injection 0.1-0.4 mg     glucose 40 % gel 15-30 g    Or     dextrose 50 % injection 25-50 mL    Or     glucagon injection 1 mg     zolpidem (AMBIEN) tablet 5 mg       PHYSICAL EXAM     Weight In/Out     Wt Readings from Last 3 Encounters:   07/24/17 92.8 kg (204 lb 9.6 oz)   07/14/17 92.5 kg (204 lb)   06/30/17 90.7 kg (200 lb)      I/O last 3 completed shifts:  In: 5798 [P.O.:4200; I.V.:1485; IV Piggyback:113]  Out: 6050 [Urine:6050]       KPS:  70    /76 (BP Location: Left arm)  Pulse 79  Temp 96.5  F (35.8  C) (Axillary)  Resp 16  Ht 1.73 m (5' 8.11\")  Wt 92.8 kg (204 lb 9.6 oz)  SpO2 98%  BMI 31.01 kg/m2   General: NAD, interactive, appropriate   Eyes: SAMSON, sclera anicteric   Nose/Mouth/Throat: OP clear, buccal mucosa moist, no ulcerations   Lungs: CTA bilaterally  Cardiovascular: RRR, no M/R/G   Abdominal/Rectal: +BS, soft, NT, ND, No HSM   Lymphatics: No edema  Skin: no rashes or petechaie  Neuro: A&O   Additional Findings: Cooper site NT, no drainage.    LABS AND IMAGING - PAST 24 HOURS     Results for orders placed or performed during the hospital encounter of 07/18/17 (from the past 24 " hour(s))   Glucose by meter   Result Value Ref Range    Glucose 280 (H) 70 - 99 mg/dL   Glucose by meter   Result Value Ref Range    Glucose 258 (H) 70 - 99 mg/dL   Glucose by meter   Result Value Ref Range    Glucose 273 (H) 70 - 99 mg/dL   Glucose by meter   Result Value Ref Range    Glucose 293 (H) 70 - 99 mg/dL   Basic metabolic panel   Result Value Ref Range    Sodium 138 133 - 144 mmol/L    Potassium 4.0 3.4 - 5.3 mmol/L    Chloride 105 94 - 109 mmol/L    Carbon Dioxide 24 20 - 32 mmol/L    Anion Gap 8 3 - 14 mmol/L    Glucose 207 (H) 70 - 99 mg/dL    Urea Nitrogen 13 7 - 30 mg/dL    Creatinine 0.65 (L) 0.66 - 1.25 mg/dL    GFR Estimate >90  Non  GFR Calc   >60 mL/min/1.7m2    GFR Estimate If Black >90   GFR Calc   >60 mL/min/1.7m2    Calcium 8.7 8.5 - 10.1 mg/dL   CBC with platelets differential   Result Value Ref Range    WBC 4.5 4.0 - 11.0 10e9/L    RBC Count 3.73 (L) 4.4 - 5.9 10e12/L    Hemoglobin 11.2 (L) 13.3 - 17.7 g/dL    Hematocrit 34.8 (L) 40.0 - 53.0 %    MCV 93 78 - 100 fl    MCH 30.0 26.5 - 33.0 pg    MCHC 32.2 31.5 - 36.5 g/dL    RDW 16.6 (H) 10.0 - 15.0 %    Platelet Count 115 (L) 150 - 450 10e9/L    Diff Method Automated Method     % Neutrophils 97.8 %    % Lymphocytes 1.6 %    % Monocytes 0.4 %    % Eosinophils 0.0 %    % Basophils 0.0 %    % Immature Granulocytes 0.2 %    Nucleated RBCs 0 0 /100    Absolute Neutrophil 4.4 1.6 - 8.3 10e9/L    Absolute Lymphocytes 0.1 (L) 0.8 - 5.3 10e9/L    Absolute Monocytes 0.0 0.0 - 1.3 10e9/L    Absolute Eosinophils 0.0 0.0 - 0.7 10e9/L    Absolute Basophils 0.0 0.0 - 0.2 10e9/L    Abs Immature Granulocytes 0.0 0 - 0.4 10e9/L    Absolute Nucleated RBC 0.0    INR   Result Value Ref Range    INR 1.11 0.86 - 1.14   Magnesium   Result Value Ref Range    Magnesium 2.3 1.6 - 2.3 mg/dL   Phosphorus   Result Value Ref Range    Phosphorus 2.1 (L) 2.5 - 4.5 mg/dL   Hepatic panel   Result Value Ref Range    Bilirubin Direct 0.1 0.0 -  0.2 mg/dL    Bilirubin Total 0.6 0.2 - 1.3 mg/dL    Albumin 3.3 (L) 3.4 - 5.0 g/dL    Protein Total 6.5 (L) 6.8 - 8.8 g/dL    Alkaline Phosphatase 55 40 - 150 U/L    ALT 30 0 - 70 U/L    AST 21 0 - 45 U/L   ABO/Rh type and screen   Result Value Ref Range    ABO O     RH(D)  Pos     Antibody Screen Neg     Test Valid Only At       [facility]    Specimen Expires 07/27/2017    Glucose by meter   Result Value Ref Range    Glucose 205 (H) 70 - 99 mg/dL         ASSESSMENT BY SYSTEMS     Norman Salazar Real is a 56 year old male with AML, currently day - 2 for 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       Prep detailed below: Initially held prep for a day to obtain bone marrow biopsy with intent to confirm remission status before proceeding.  Bone marrow biopsy completed 7/20 without complications. Morph and FLOW negative for AML, signed out 7/20 at 9:30 am. Restarted treatment plan with first dose of chemotherapy administered on 7/20.       1a. Prep for transplant briefly held 7/19 to obtain a BMBX & r/o active disease with falling platelet count. Flow/Morph reported as negative for malignancy 7/20. Released chemotherapy and dates changed in the plan. Updated [facility] team for new calendar and to reset the BMT date.  - D - 6 (7/20) cytoxan and fludarabine today   - D - 5 to Day - 2 (7/21-7/24) fludarabine  - D - 1 (7/25) TBI x 1  - D - 0 (7/26) Transplant       1b.  BMT: Prep as above.  - Allopurinol through day 0.  - Flush and pre-meds prior to transplant.   - GCSF starts d+5 and cont to until ANC>1500 x3 consecutive days.   - Re-stage per protocol.     2.  HEME: Keep Hgb>8 and plts>10K.   - No pre-meds.   - Due to transient decrease in plts on admission a BMBX was completed 7/19 to assess for disease.      3.  ID: No focal s/sx of infection at this time.   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   - Bactrim or appropriate PCP therapy to start d+28.                     4.  GI: No GI complaints at this time.   - Hx constipation- currently using home metamucil, inc TID PRN 7/24  - Zofran and dexamethasone prior to chemo to prevent N/V.   - Ativan and compazine available PRN break-through symptoms.   - Protonix for GI prophy.   - Ursodiol for VOD prophy.  - s/p left sided colectomy for recurrent diverticulitis     5.  GVH: Pre-transplant   - MMF and tac started on day-3 7/23 with tac level day -1.     6.  FEN/Renal:Eating adequate amounts   - Monitor creat and lytes. Replete lytes PRN per SS.  - Lasix 10 mg IV x 1 7/24, wt up 1 kg from baseline and net + 500 cc overnight   - Electrolytes WNL  - Monitor weight, I+O, lytes per protocol with IVF flush.     7. CV: Hx HTN. Significant persistent HTN for several days after admission, improved with increased dilt/cozaar however exacerbated w/initiation of tac gtt.  - HTN Management: Cont losartan, increased from 50 mg/d to 62.5 mg/day 7/22. Increased diltiazem XL to 240 7/21 and again 7/24 to 300.   - Screening EKG 7/22 NSR with   - PRN Hydralazine 10-20 mg Q 4 hrs IV   - Hx tachycardia with arrhythmia, s/p 3 ablations  - hold crestor through transplant     8. Endo: Hx DM type II. BG's remain elevated 200's despite high SSI & lantus PRN.   - Endocrine consult 7/24, appreciate involvement & recs     - Check BG qid & increased to high ss insulin 7/22. Lantus 10 u x 1 7/23 with minimal improvement.    - Hold metformin 500mg/d on admission.     9. : Hx prostate cancer. Asymptomatic at this time.      10. Pain: Leg pain s/p bmbx. Much improved. Oxycodone and flexeril prn     11. Pulm: Hx spiculated pulm nodule (concern for malignancy w/ hx tobacco use) and GGO (concerning for fungal PNA). F/u CT chest in 4-6 weeks (approx 8/22) per Dr. Ahn recs in workup.    - Note 6/30 BAL + for  "aspergillus galactomannan; culture negative to date but still in process.     OVERALL PLAN     Anticipated hospital dismissal: Remain admitted through stem cell transplant and cell count recovery     Haydee Black      Attending Note:    The patient was seen and examined by me separate from mid-level provider.   I personally reviewed the today's laboratory results, vital signs and radiology results.    Doing well. Good PO intake. Flushed face skin as usual. I found that vitals are stable and there was no fever. BP (!) 150/91  Pulse 79  Temp 96.9  F (36.1  C) (Axillary)  Resp 16  Ht 1.73 m (5' 8.11\")  Wt 92.4 kg (203 lb 9.6 oz)  SpO2 97%  BMI 30.86 kg/m2. Improved BP control. No acute distress. Patient was alert and oriented and grossly neurologically intact. Face and upper anterior chest erythema. Mouth is clean. Line insertion non-tender and clear. Lungs clear bilateral. Heart regular. Abdomen soft non-tender, BS present. Lower extremity with no edema. No rash.     Main laboratory finding were   Results for SHELLY HAILEYTEJAS MORALES (MRN [id number]) as of 7/25/2017 17:22   Ref. Range 7/24/2017 03:18   WBC Latest Ref Range: 4.0 - 11.0 10e9/L 4.5   Hemoglobin Latest Ref Range: 13.3 - 17.7 g/dL 11.2 (L)   Hematocrit Latest Ref Range: 40.0 - 53.0 % 34.8 (L)   Platelet Count Latest Ref Range: 150 - 450 10e9/L 115 (L)   RBC Count Latest Ref Range: 4.4 - 5.9 10e12/L 3.73 (L)   MCV Latest Ref Range: 78 - 100 fl 93   MCH Latest Ref Range: 26.5 - 33.0 pg 30.0   MCHC Latest Ref Range: 31.5 - 36.5 g/dL 32.2   RDW Latest Ref Range: 10.0 - 15.0 % 16.6 (H)   Results for SHELLYHAILEY ANDREW (MRN [id number]) as of 7/25/2017 17:22   Ref. Range 7/24/2017 03:18   WBC Latest Ref Range: 4.0 - 11.0 10e9/L 4.5   Hemoglobin Latest Ref Range: 13.3 - 17.7 g/dL 11.2 (L)   Hematocrit Latest Ref Range: 40.0 - 53.0 % 34.8 (L)   Platelet Count Latest Ref Range: 150 - 450 10e9/L 115 (L)   RBC Count Latest Ref Range: 4.4 - 5.9 10e12/L 3.73 " (L)   MCV Latest Ref Range: 78 - 100 fl 93   MCH Latest Ref Range: 26.5 - 33.0 pg 30.0   MCHC Latest Ref Range: 31.5 - 36.5 g/dL 32.2   RDW Latest Ref Range: 10.0 - 15.0 % 16.6 (H)   Results for HAILEY BRAVO (MRN [id number]) as of 7/25/2017 17:22   Ref. Range 7/24/2017 03:18   Sodium Latest Ref Range: 133 - 144 mmol/L 138   Potassium Latest Ref Range: 3.4 - 5.3 mmol/L 4.0   Chloride Latest Ref Range: 94 - 109 mmol/L 105   Carbon Dioxide Latest Ref Range: 20 - 32 mmol/L 24   Urea Nitrogen Latest Ref Range: 7 - 30 mg/dL 13   Creatinine Latest Ref Range: 0.66 - 1.25 mg/dL 0.65 (L)   GFR Estimate Latest Ref Range: >60 mL/min/1.7m2 >90...   GFR Estimate If Black Latest Ref Range: >60 mL/min/1.7m2 >90...   Calcium Latest Ref Range: 8.5 - 10.1 mg/dL 8.7   Anion Gap Latest Ref Range: 3 - 14 mmol/L 8   Magnesium Latest Ref Range: 1.6 - 2.3 mg/dL 2.3   Phosphorus Latest Ref Range: 2.5 - 4.5 mg/dL 2.1 (L)       BMBx and flow no leukemia    My plan is make no increase diltiazem to 300 and no change to and cozaar. Encouraged PO intake and physical activity as tolerated. Fludarabine dose #5 today. We will give lasix 10 mg today. On insulin sliding scale high intensity. Tolerating Tac+MMF well.       Charanjit Umanzor M.D.

## 2017-07-24 NOTE — PLAN OF CARE
Problem: Blood and Marrow Transplantation  Goal: Blood and Marrow Transplantation  Signs and symptoms of listed problems will be absent or manageable.   Outcome: No Change    07/23/17 6845   Blood and Marrow Transplantation   Blood and Marrow Transplantation Assessed all   Blood and Marrow Transplantation Present metabolic imbalances;nausea/vomiting      AVSS on room air, with exception of HTN, given hydralazine 20mg x1 with adequate relief. CVC dressing changed, site CDI. BG elevated, 273 and 293 covered with sliding scale. Metamucil given x1, pt says he normally takes it 3x a day at home. Tacrolimus infusing at 5.8 ml/hr, bag expires at 0700. Regular diet, good appetite. Pt denies pain. Given ativan and scheduled zofran for nausea. Pt states that zofran does not seem to be helping but ativan does work well. Would like ambien and flexeril at HS.     Pt would like a treadmill or stationary bike when able.

## 2017-07-24 NOTE — PLAN OF CARE
Problem: Goal Outcome Summary  Goal: Goal Outcome Summary  Pt hypertensive. One time dose of lasix given, along with prn hydralazine. OVSS. Up ad sarah. Voiding spont. BM x 2 this shift. Intermittent nausea noted. Zofran given as scheduled. Good appetite.  and 274.  Sliding scale and carb coverage given. Lantus ordered and given as well.  Tac gtt infusing at 5.8 ml/hr. Evening dose of diltiazem has been increased. Plan for chemo this evening. Radiation tomorrow. Continue with poc.

## 2017-07-24 NOTE — PROGRESS NOTES
Pt hypertensive after using treadmill, recheck /89 scheduled diltiazem and prn hydralazine given will recheck bp with 2000 vs. Fludarabine infusing, blood return present. Continue to monitor per poc.

## 2017-07-24 NOTE — PLAN OF CARE
Problem: Goal Outcome Summary  Goal: Goal Outcome Summary  OT 5C: Pt educated on BLE and BUE exercises, tolerating 10-12 reps of each. Pt educated on ways to modify and grade exercises appropriately according to lab values. Pt reports completing stretches for sciatic nerve pain, provided by chiropractor; pt educated on how to modify/grade stretches according to platelet levels. Issued stationary bike to patient's room with pt demonstrating safe transfer on and off as well as good understanding of changing resistance according to lab values and energy level.Pt continues to be up safe and independence in room with functional mobility. Rec: home with continuation of HEP.

## 2017-07-25 NOTE — PLAN OF CARE
Problem: Blood and Marrow Transplantation  Goal: Blood and Marrow Transplantation  Signs and symptoms of listed problems will be absent or manageable.     07/25/17 1433   Blood and Marrow Transplantation   Blood and Marrow Transplantation Assessed all   Blood and Marrow Transplantation Present nausea/vomiting   Patient vital signs stable. He has had some nausea today ativan and compazine given no emesis. He had a huge breakfast has not had lunch. TBI this morning. Continue to monitor.

## 2017-07-25 NOTE — PROGRESS NOTES
BMT Daily Progress Note   07/25/2017    Patient ID:  Norman Real is a 56 year old male, currently day -1 of his HCT.     Diagnosis AMLU Acute myelogeneous leukemia, Unknown  HCT Type Allogeneic    Prep Regimen Cytoxan  Fludarabine  TBI   Donor Source Related PBSC    GVHD Prophylaxis   Mycophenolate  Primary BMT Provider Dr. Erna MD        INTERVAL  HISTORY     Overnight: No acute events overall doing well.     Review of Systems: 10 point ROS negative except as noted above.    Scheduled Medications    insulin aspart   Subcutaneous Daily with breakfast     diltiazem  300 mg Oral Daily     psyllium  1 packet Oral TID     insulin glargine  15 Units Subcutaneous Q24H     losartan (COZAAR) half-tab 62.5 mg  62.5 mg Oral Daily     insulin aspart  1-10 Units Subcutaneous TID AC     insulin aspart  1-7 Units Subcutaneous At Bedtime     ondansetron  8 mg Oral Q8H     [START ON 7/26/2017] dexamethasone  4 mg Oral Once     [START ON 7/26/2017] acetaminophen  650 mg Oral Once     [START ON 7/26/2017] diphenhydrAMINE  25 mg Oral Once     mycophenolate  1,500 mg Intravenous Q12H BMT     allopurinol  300 mg Oral Daily     [START ON 7/27/2017] filgrastim (NEUPOGEN/GRANIX) intravenous  5 mcg/kg (Treatment Plan Recorded) Intravenous Daily at 8 pm     acyclovir  800 mg Oral 5x Daily     levofloxacin  250 mg Oral Daily at 10 am     [START ON 7/27/2017] voriconazole  200 mg Oral BID     micafungin (MYCAMINE) intermittent infusion > 45 kg  100 mg Intravenous Daily     [START ON 8/28/2017] sulfamethoxazole-trimethoprim  1 tablet Oral Q Mon Tues BID     heparin lock flush  5-10 mL Intracatheter Q24H     pantoprazole  40 mg Oral Daily     ursodiol  300 mg Oral TID     heparin lock flush  5 mL Intravenous Q24H     Current Facility-Administered Medications   Medication     insulin aspart (NovoLOG) inj (RAPID ACTING)     [START ON 7/26/2017] 0.9% sodium chloride infusion     diltiazem 24 hr capsule 300 mg     psyllium  (METAMUCIL/KONSYL) Packet 1 packet     insulin aspart (NovoLOG) inj (RAPID ACTING)     insulin glargine (LANTUS) injection 15 Units     losartan (COZAAR) half-tab 62.5 mg     insulin aspart (NovoLOG) inj (RAPID ACTING)     insulin aspart (NovoLOG) inj (RAPID ACTING)     hydrALAZINE (APRESOLINE) injection 10-20 mg     ondansetron (ZOFRAN) tablet 8 mg     [START ON 7/26/2017] dexamethasone (DECADRON) tablet 4 mg     [START ON 7/26/2017] acetaminophen (TYLENOL) tablet 650 mg     [START ON 7/26/2017] diphenhydrAMINE (BENADRYL) capsule 25 mg     [START ON 7/26/2017] meperidine (DEMEROL) injection 25-50 mg     mycophenolate (GENERIC EQUIV) 1,500 mg in D5W intermittent infusion     tacrolimus (PROGRAF) 5,000 mcg in D5W 250 mL     allopurinol (ZYLOPRIM) tablet 300 mg     [START ON 7/27/2017] filgrastim (NEUPOGEN) 480 mcg in D5W 25 mL infusion     acyclovir (ZOVIRAX) tablet 800 mg     levofloxacin (LEVAQUIN) tablet 250 mg     [START ON 7/27/2017] voriconazole (VFEND) tablet 200 mg     micafungin (MYCAMINE) 100 mg in NaCl 0.9 % 100 mL intermittent infusion     [START ON 8/28/2017] sulfamethoxazole-trimethoprim (BACTRIM DS/SEPTRA DS) 800-160 MG per tablet 1 tablet     cyclobenzaprine (FLEXERIL) tablet 5 mg     oxyCODONE (ROXICODONE) IR tablet 5 mg     lidocaine 1 % 1 mL     lidocaine (LMX4) kit     acetaminophen (TYLENOL) tablet 325-650 mg     acetaminophen (TYLENOL) tablet 325-650 mg     prochlorperazine (COMPAZINE) injection 5-10 mg    Or     prochlorperazine (COMPAZINE) tablet 5-10 mg     LORazepam (ATIVAN) injection 0.5-1 mg    Or     LORazepam (ATIVAN) tablet 0.5-1 mg     sodium chloride (PF) 0.9% PF flush 10-20 mL     heparin lock flush 10 UNIT/ML injection 5-10 mL     heparin lock flush 10 UNIT/ML injection 5-10 mL     pantoprazole (PROTONIX) EC tablet 40 mg     ursodiol (ACTIGALL) capsule 300 mg     potassium chloride SA (K-DUR/KLOR-CON M) CR tablet 20-40 mEq     potassium chloride (KLOR-CON) Packet 20-40 mEq      "potassium chloride 10 mEq in 100 mL intermittent infusion     potassium chloride 10 mEq in 100 mL intermittent infusion with 10 mg lidocaine     potassium chloride 20 mEq in 50 mL intermittent infusion     magnesium sulfate 2 g in NS intermittent infusion (PharMEDium or FV Cmpd)     magnesium sulfate 4 g in 100 mL sterile water (premade)     sodium phosphate 15 mmol in D5W intermittent infusion     sodium phosphate 20 mmol in D5W intermittent infusion     sodium phosphate 25 mmol in D5W intermittent infusion     heparin lock flush 10 UNIT/ML injection 5 mL     heparin lock flush 10 UNIT/ML injection 5-10 mL     naloxone (NARCAN) injection 0.1-0.4 mg     glucose 40 % gel 15-30 g    Or     dextrose 50 % injection 25-50 mL    Or     glucagon injection 1 mg     zolpidem (AMBIEN) tablet 5 mg       PHYSICAL EXAM     Weight In/Out     Wt Readings from Last 3 Encounters:   07/25/17 92.4 kg (203 lb 9.6 oz)   07/14/17 92.5 kg (204 lb)   06/30/17 90.7 kg (200 lb)      I/O last 3 completed shifts:  In: 2910.4 [P.O.:1460; I.V.:1337.4; IV Piggyback:113]  Out: 5350 [Urine:5350]       KPS:  70    BP (!) 154/91 (BP Location: Left arm)  Pulse 79  Temp 96.4  F (35.8  C) (Axillary)  Resp 16  Ht 1.73 m (5' 8.11\")  Wt 92.4 kg (203 lb 9.6 oz)  SpO2 98%  BMI 30.86 kg/m2   General: NAD, interactive, appropriate   Eyes: SAMSON, sclera anicteric   Nose/Mouth/Throat: OP clear, buccal mucosa moist, no ulcerations   Lungs: CTA bilaterally  Cardiovascular: RRR, no M/R/G   Abdominal/Rectal: +BS, soft, NT, ND, No HSM   Lymphatics: No edema  Skin: no rashes or petechaie  Neuro: A&O   Additional Findings: Cooper site NT, no drainage.    LABS AND IMAGING - PAST 24 HOURS     Results for orders placed or performed during the hospital encounter of 07/18/17 (from the past 24 hour(s))   Glucose by meter   Result Value Ref Range    Glucose 205 (H) 70 - 99 mg/dL   Endocrine Diabetes Adult IP Consult: Patient to be seen: Routine within 24 hrs; Call back " #: Haydee [phone]; 55 y/o M hx steroid induced hyperglycemia with -240 on high SSI & Lantus. Would like further assistance with management. Thanks!...    Narrative    Zaldivar, MarisolKAVYA Pendleton CNS     7/24/2017  3:45 PM  Diabetes/Hyperglycemia Management Consult    Chief Complaint type 2 diabetes, steroid-induced hyperglycemia  Consult requested by: Haydee HOFF, BMT  History of Present Illness: Norman Real is a 56 year old   man with AML, admitted in advance of NMA allo sib PBSCT scheduled   for July 26.  On admission his glucose was in the 130s.    Metformin held.  Glucose reached 200s after the start of D5W   fluid flush (225-275 cc/h from 7/19 2330-7/21 1000) and persisted   high in setting of dexamethasone 10 mg daily.  Yesterday, pt   received glargine 10 units x1 along with aspart correction scale.    Post-prandial glucose was up to 290s and fasting BG today 205.    Today, dexamethasone tapered to 8 mg. Pt is eating pretty well,   though does not like the food selection much.  His biggest meal   has been breakfast (notably containing 200 grams carbohydrate   today).  While hospitalized, not having much for night time   snacking.  He does have snacks in his room.  So, far has   tolerated his chemotherapy fine.  Feeling more fatigued today, so   napped. Previously was walking independently in room for exercise   (30 min).  Now has stationary bicycle in room.   He's only been on insulin while hospitalized and reports only   receiving rapid-acting insulin.  Experienced no hypoglycemia when   on insulin.      Recent Labs  Lab 07/24/17  1237 07/24/17  0914 07/24/17  0318 07/23/17  2220 07/23/17  1748 07/23/17  1348 07/23/17  1150  07/23/17  0352  07/22/17  0348  07/21/17  0346  07/20/17  0344  07/19/17  0319   GLC  --   --  207*  --   --   --   --   --  203*  --  197*  --    240*  --  148*  --  122*   * 205*  --  293* 273* 258* 280*  < >  --   < >  --   < >    --   < >  --   < >  --   "  < > = values in this interval not displayed.      Diabetes Type: type 2   Diabetes Duration: 3 years  Usual Diabetes Regimen: metformin 500 mg daily  Glucose check 4-5 times/week, varying times fasting and   post-prandial  Exercise in evenings (walking outdoors or bicycling) usually   after supper  Moderating carbohydrate intake, likes to have snacks/treats   around 2100  Ability to [place] Prescribed Regimen: good  Diabetes Control: usually 120s in morning and 90s later in the   day on home meter  [facility]      Able to Detect Hypoglycemia: yes, only experienced once  Usual Diabetes Care Provider: [facility]  Factors Impacting Glucose Control: dextrose fluids, steroids,   withholding metformin      Review of Systems  10 point ROS completed with pertinent positives and negatives   noted in the HPI    Past medical, family and social histories are reviewed and   updated.    Past Medical History  Past Medical History:   Diagnosis Date     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 05/26/2017     Cholelithiasis 06/26/2017     Diabetes (H)      Hypertension      Prostate cancer (H) 2011    had radiatoin therapy     Tachycardia        Family History  Family History   Problem Relation Age of Onset     Chronic Obstructive Pulmonary Disease Mother    positive for diabetes in aunts/uncles.  Pt has    heritage.    Social History  Social History     Social History     Marital status:      Spouse name: N/A     Number of children: 2     Years of education: N/A     Social History Main Topics     Smoking status: Former Smoker     Quit date: 5/31/2010     Smokeless tobacco: None     Alcohol use No     Drug use: No     Sexual activity: Not Asked         Physical Exam  /76 (BP Location: Left arm)  Pulse 79  Temp 96.5  F (35.8    C) (Axillary)  Resp 16  Ht 1.73 m (5' 8.11\")  Wt 92.8 kg (204   lb 9.6 oz)  SpO2 98%  BMI 31.01 kg/m2    General:  pleasant man resting in bed, " in no distress. Wife at   bedside  HEENT: NC/AT, PER and anicteric, non-injected, oral mucous   membranes moist.   Lungs: unlabored respiration, no cough  ABD: central obesity  Skin: warm and dry, no obvious lesions  MSK:  fluid movement of all extremities  Lymp:  no LE edema   Mental status:  alert, oriented x3, communicating clearly  Psych:  calm, even mood    Laboratory  Recent Labs   Lab Test  07/24/17 0318 07/23/17   0352   NA  138  138   POTASSIUM  4.0  4.2   CHLORIDE  105  106   CO2  24  26   ANIONGAP  8  7   GLC  207*  203*   BUN  13  19   CR  0.65*  0.76   SANDRA  8.7  8.9     CBC RESULTS:   Recent Labs   Lab Test  07/24/17 0318   WBC  4.5   RBC  3.73*   HGB  11.2*   HCT  34.8*   MCV  93   MCH  30.0   MCHC  32.2   RDW  16.6*   PLT  115*       Liver Function Studies -   Recent Labs   Lab Test  07/24/17 0318   PROTTOTAL  6.5*   ALBUMIN  3.3*   BILITOTAL  0.6   ALKPHOS  55   AST  21   ALT  30       Active Medications  Current Facility-Administered Medications   Medication     diltiazem 24 hr capsule 300 mg     psyllium (METAMUCIL/KONSYL) Packet 1 packet     insulin aspart (NovoLOG) inj (RAPID ACTING)     insulin aspart (NovoLOG) inj (RAPID ACTING)     insulin glargine (LANTUS) injection 15 Units     losartan (COZAAR) half-tab 62.5 mg     insulin aspart (NovoLOG) inj (RAPID ACTING)     insulin aspart (NovoLOG) inj (RAPID ACTING)     hydrALAZINE (APRESOLINE) injection 10-20 mg     ondansetron (ZOFRAN) tablet 8 mg     [START ON 7/25/2017] dexamethasone (DECADRON) tablet 6 mg     [START ON 7/26/2017] dexamethasone (DECADRON) tablet 4 mg     [START ON 7/26/2017] acetaminophen (TYLENOL) tablet 650 mg     [START ON 7/26/2017] diphenhydrAMINE (BENADRYL) capsule 25 mg     [START ON 7/26/2017] meperidine (DEMEROL) injection 25-50 mg     mycophenolate (GENERIC EQUIV) 1,500 mg in D5W intermittent   infusion     tacrolimus (PROGRAF) 5,000 mcg in D5W 250 mL     allopurinol (ZYLOPRIM) tablet 300 mg     [START ON  7/27/2017] filgrastim (NEUPOGEN) 480 mcg in D5W 25 mL   infusion     acyclovir (ZOVIRAX) tablet 800 mg     [START ON 7/25/2017] levofloxacin (LEVAQUIN) tablet 250 mg     [START ON 7/27/2017] voriconazole (VFEND) tablet 200 mg     micafungin (MYCAMINE) 100 mg in NaCl 0.9 % 100 mL intermittent   infusion     FLUdarabine (FLUDARA) 64 mg in NaCl 0.9 % 113 mL CHEMOTHERAPY     [START ON 8/28/2017] sulfamethoxazole-trimethoprim (BACTRIM   DS/SEPTRA DS) 800-160 MG per tablet 1 tablet     cyclobenzaprine (FLEXERIL) tablet 5 mg     oxyCODONE (ROXICODONE) IR tablet 5 mg     lidocaine 1 % 1 mL     lidocaine (LMX4) kit     acetaminophen (TYLENOL) tablet 325-650 mg     acetaminophen (TYLENOL) tablet 325-650 mg     prochlorperazine (COMPAZINE) injection 5-10 mg    Or     prochlorperazine (COMPAZINE) tablet 5-10 mg     LORazepam (ATIVAN) injection 0.5-1 mg    Or     LORazepam (ATIVAN) tablet 0.5-1 mg     sodium chloride (PF) 0.9% PF flush 10-20 mL     heparin lock flush 10 UNIT/ML injection 5-10 mL     heparin lock flush 10 UNIT/ML injection 5-10 mL     pantoprazole (PROTONIX) EC tablet 40 mg     ursodiol (ACTIGALL) capsule 300 mg     potassium chloride SA (K-DUR/KLOR-CON M) CR tablet 20-40 mEq     potassium chloride (KLOR-CON) Packet 20-40 mEq     potassium chloride 10 mEq in 100 mL intermittent infusion     potassium chloride 10 mEq in 100 mL intermittent infusion with   10 mg lidocaine     potassium chloride 20 mEq in 50 mL intermittent infusion     magnesium sulfate 2 g in NS intermittent infusion (PharMEDium   or FV Cmpd)     magnesium sulfate 4 g in 100 mL sterile water (premade)     sodium phosphate 15 mmol in D5W intermittent infusion     sodium phosphate 20 mmol in D5W intermittent infusion     sodium phosphate 25 mmol in D5W intermittent infusion     heparin lock flush 10 UNIT/ML injection 5 mL     heparin lock flush 10 UNIT/ML injection 5-10 mL     naloxone (NARCAN) injection 0.1-0.4 mg     glucose 40 % gel 15-30 g    Or      dextrose 50 % injection 25-50 mL    Or     glucagon injection 1 mg     zolpidem (AMBIEN) tablet 5 mg       Current Diet    Active Diet Order      Regular Diet Adult      Assessment  Norman Real is a 56 year old man with well-controlled type   2 diabetes (metformin prior to admission), admitted NMA allo sib   PBSCT to treat AML, experiencing increased hyperglycemia related   to steroids.      Plan  Glargine 15 units q24h today.  Aspart 1 unit per 10 grams carbohydrate for meals and snacks   today.  Continue aspart high correction qAC, HS.  BG monitor qAC, HS 0200.    Anticipate need for scheduled insulin until about 36 hours after   last dose dexamethasone.  We will follow.    Marisol Zaldivar APRN -[id number]    Diabetes Management Team job code: [id number]     Glucose by meter   Result Value Ref Range    Glucose 274 (H) 70 - 99 mg/dL   Glucose by meter   Result Value Ref Range    Glucose 202 (H) 70 - 99 mg/dL   Glucose by meter   Result Value Ref Range    Glucose 271 (H) 70 - 99 mg/dL   Basic metabolic panel   Result Value Ref Range    Sodium 137 133 - 144 mmol/L    Potassium 4.2 3.4 - 5.3 mmol/L    Chloride 105 94 - 109 mmol/L    Carbon Dioxide 26 20 - 32 mmol/L    Anion Gap 6 3 - 14 mmol/L    Glucose 224 (H) 70 - 99 mg/dL    Urea Nitrogen 16 7 - 30 mg/dL    Creatinine 0.60 (L) 0.66 - 1.25 mg/dL    GFR Estimate >90  Non  GFR Calc   >60 mL/min/1.7m2    GFR Estimate If Black >90   GFR Calc   >60 mL/min/1.7m2    Calcium 8.6 8.5 - 10.1 mg/dL   CBC with platelets differential   Result Value Ref Range    WBC 2.8 (L) 4.0 - 11.0 10e9/L    RBC Count 3.65 (L) 4.4 - 5.9 10e12/L    Hemoglobin 10.9 (L) 13.3 - 17.7 g/dL    Hematocrit 33.8 (L) 40.0 - 53.0 %    MCV 93 78 - 100 fl    MCH 29.9 26.5 - 33.0 pg    MCHC 32.2 31.5 - 36.5 g/dL    RDW 16.4 (H) 10.0 - 15.0 %    Platelet Count 94 (L) 150 - 450 10e9/L    Diff Method Automated Method     % Neutrophils 97.8 %    % Lymphocytes 1.1 %    %  Monocytes 0.7 %    % Eosinophils 0.0 %    % Basophils 0.0 %    % Immature Granulocytes 0.4 %    Nucleated RBCs 0 0 /100    Absolute Neutrophil 2.7 1.6 - 8.3 10e9/L    Absolute Lymphocytes 0.0 (L) 0.8 - 5.3 10e9/L    Absolute Monocytes 0.0 0.0 - 1.3 10e9/L    Absolute Eosinophils 0.0 0.0 - 0.7 10e9/L    Absolute Basophils 0.0 0.0 - 0.2 10e9/L    Abs Immature Granulocytes 0.0 0 - 0.4 10e9/L    Absolute Nucleated RBC 0.0    Phosphorus   Result Value Ref Range    Phosphorus 2.1 (L) 2.5 - 4.5 mg/dL   Glucose by meter   Result Value Ref Range    Glucose 181 (H) 70 - 99 mg/dL         ASSESSMENT BY SYSTEMS     Norman Salazar Real is a 56 year old male with AML, currently day - 1 for 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       Prep detailed below: Initially held prep for a day to obtain bone marrow biopsy with intent to confirm remission status before proceeding.  Bone marrow biopsy completed 7/20 without complications. Morph and FLOW negative for AML, signed out 7/20 at 9:30 am. Restarted treatment plan with first dose of chemotherapy administered on 7/20.       1a. Prep for transplant briefly held 7/19 to obtain a BMBX & r/o active disease with falling platelet count. Flow/Morph reported as negative for malignancy 7/20. Released chemotherapy and dates changed in the plan. Updated RN[facility] team for new calendar and to reset the BMT date.  - D - 6 (7/20) cytoxan and fludarabine today   - D - 5 to Day - 2 (7/21-7/24) fludarabine  - D - 1 (7/25) TBI x 1  - D - 0 (7/26) Transplant       1b.  BMT: Prep as above.  - Allopurinol through day 0.  - Flush and pre-meds prior to transplant tomorrow.  - GCSF starts d+5 and cont to until ANC>1500 x3 consecutive days.   - Re-stage per protocol.     2.  HEME: Keep Hgb>8 and plts>10K.   - No pre-meds.   - Due to transient decrease in plts on admission a BMBX was completed 7/19 to assess for disease.      3.  ID: No focal s/sx of infection at this time.   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   - Bactrim or appropriate PCP therapy to start d+28.                     4.  GI: No GI complaints at this time.   - Hx constipation- currently using home metamucil, TID PRN.  - Zofran and dexamethasone prior to chemo to prevent N/V.   - Ativan and compazine available PRN break-through symptoms.   - Protonix for GI prophy.   - Ursodiol for VOD prophy.  - s/p left sided colectomy for recurrent diverticulitis     5.  GVH: Pre-transplant   - MMF and tac started on day-3 7/23 with tac level today day -1.     6.  FEN/Renal:  - No diuresis today.  - Monitor creat and lytes. Replete lytes PRN per SS.  - Monitor weight, I+O, lytes per protocol with IVF flush.     7. CV: Hx HTN. Significant persistent HTN for several days after admission, improved with increased dilt/cozaar however exacerbated w/initiation of tac gtt.  - HTN Management: Cont losartan, increased from 50 mg/d to 62.5 mg/day 7/22. Increased diltiazem XL to 240 7/21 and again 7/24 to 300.   - Screening EKG 7/22 NSR with   - PRN Hydralazine 10-20 mg Q 4 hrs IV   - Hx tachycardia with arrhythmia, s/p 3 ablations  - hold crestor through transplant     8. Endo: Hx DM type II. BG's remain elevated 200's despite high SSI & lantus PRN.   - Endocrine consult 7/24, appreciate involvement & recs     - Per Endo lantus 15U, 1U per 10g carb, and High SSI.    - Hold metformin 500mg/d on admission.     9. : Hx prostate cancer. Asymptomatic at this time.      10. Pain: Leg pain s/p bmbx. Much improved. Oxycodone and flexeril prn     11. Pulm: Hx spiculated pulm nodule (concern for malignancy w/ hx tobacco use) and GGO (concerning for fungal PNA). F/u CT chest in 4-6 weeks (approx 8/22) per Dr. Ahn recs in workup.    - Note 6/30 BAL + for aspergillus galactomannan; culture negative to date but still  "in process.     OVERALL PLAN     Anticipated hospital dismissal: Remain admitted through stem cell transplant and cell count recovery     Saray Ford      Attending Note:    The patient was seen and examined by me separate from mid-level provider.   I personally reviewed the today's laboratory results, vital signs and radiology results.    Doing well. Good PO intake. Tolerated XRT well. I found that vitals are stable and there was no fever. BP (!) 154/91 (BP Location: Left arm)  Pulse 79  Temp 96.4  F (35.8  C) (Axillary)  Resp 16  Ht 1.73 m (5' 8.11\")  Wt 92.4 kg (203 lb 9.6 oz)  SpO2 98%  BMI 30.86 kg/m2. Improved BP control. No acute distress. Patient was alert and oriented and grossly neurologically intact. Less face and upper anterior chest erythema.  Mouth is clean. Line insertion non-tender and clear. Lungs clear bilateral. Heart regular. Abdomen soft non-tender, BS present. Lower extremity with no edema. No rash.     Main laboratory finding were   Lab Results   Component Value Date    WBC 2.8 (L) 07/25/2017    ANEU 2.7 07/25/2017    HGB 10.9 (L) 07/25/2017    HCT 33.8 (L) 07/25/2017    PLT 94 (L) 07/25/2017     07/25/2017    POTASSIUM 4.2 07/25/2017    CHLORIDE 105 07/25/2017    CO2 26 07/25/2017     (H) 07/25/2017    BUN 16 07/25/2017    CR 0.60 (L) 07/25/2017    MAG 2.3 07/24/2017    INR 1.11 07/24/2017     BMBx and flow no leukemia    My plan is make no change to diltiazem and cozaar today. No lasix today as he had great response yesterday. Encouraged PO intake and physical activity as tolerated. Had TBI per protocol today. On insulin sliding scale high intensity. Measure Tac level today.     Charanjit Umanzor M.D.      "

## 2017-07-25 NOTE — MR AVS SNAPSHOT
After Visit Summary   2017    Norman Real    MRN: [id number]           Patient Information     Date Of Birth          1960        Visit Information        Provider Department      2017 10:00 PM Alfred Scherer MD Rad[facility]         Follow-ups after your visit        Your next 10 appointments already scheduled     Aug 10, 2017  3:30 PM CDT   (Arrive by 3:15 PM)   Return Visit with Tim Ahn MD   [facility] ([facility])    [address] [place] [id number] [phone]              Who to contact     Please call your clinic at [phone] to:    Ask questions about your health    Make or cancel appointments    Discuss your medicines    Learn about your test results    Speak to your doctor   If you have compliments or concerns about an experience at your clinic, or if you wish to file a complaint, please contact [facility] Patient Relations at [phone] or email us at [email or web address]         Additional Information About Your Visit        MyChart Information     [employer] is an electronic gateway that provides easy, online access to your medical records. With [employer], you can request a clinic appointment, read your test results, renew a prescription or communicate with your care team.     To sign up for [employer] visit the website at [email or web address]   You will be asked to enter the access code listed below, as well as some personal information. Please follow the directions to create your username and password.     Your access code is: [email or web address]  Expires: 8/15/2017  6:30 AM     Your access code will  in 90 days. If you need help or a new code, please contact your [facility] or call [phone] for assistance.        Care EveryWhere ID     This is your Care EveryWhere ID. This could be used by  other organizations to access your [place] medical records  [id number]         Blood Pressure from Last 3 Encounters:   No data found for BP    Weight from Last 3 Encounters:   No data found for Wt              Today, you had the following     No orders found for display         Today's Medication Changes      Notice     This visit is on the same day as an admission, and a visit start time could not be determined. If the visit took place after discharge, manually review the med list with the patient.             Primary Care Provider    Physician No Ref-Primary       No address on file        Equal Access to Services     ASHLYN FLANAGAN : Hadii aad ku hadasho Soomaali, waaxda luqadaha, qaybta kaalmada adeegyada, waxay idiin hayaan darinel valenzuelasueal albright . So [facility] [phone].    ATENCIÓN: Si habla español, tiene a gusman disposición servicios gratuitos de asistencia lingüística. [facility] [phone].    We comply with applicable federal civil rights laws and [place] laws. We do not discriminate on the basis of race, color, national origin, age, disability sex, sexual orientation or gender identity.            Thank you!     Thank you for choosing [facility]  for your care. Our goal is always to provide you with excellent care. Hearing back from our patients is one way we can continue to improve our services. Please take a few minutes to complete the written survey that you may receive in the mail after your visit with us. Thank you!             Your Updated Medication List - Protect others around you: Learn how to safely use, store and throw away your medicines at www.disposemymeds.org.      Notice     This visit is on the same day as an admission, and a visit start time could not be determined. If the visit took place after discharge, manually review the med list with the patient.

## 2017-07-25 NOTE — PLAN OF CARE
Problem: Goal Outcome Summary  Goal: Goal Outcome Summary  PT 5C: In consultation with OT, no PT needs are identified at this time. He continues to ambulate independently with good balance. OT is following. PT continues on Hold. Will reschedule for next week to reassess needs.

## 2017-07-25 NOTE — MR AVS SNAPSHOT
After Visit Summary   7/25/2017    Norman Real    MRN: [id number]           Patient Information     Date Of Birth          1960        Visit Information        Provider Department      7/25/2017 9:00 AM [facility] RAD ONC [facility] Rad[facility] Oncology [facility]        Today's Diagnoses     ERRONEOUS ENCOUNTER--DISREGARD    -  1       Follow-ups after your visit        Your next 10 appointments already scheduled     Aug 09, 2017 10:30 AM CDT   Masonic Lab Draw with [facility] MASONIC LAB DRAW   [facility] Masonic Lab Draw ([facility])    [address] [place] [id number]   [phone]            Aug 09, 2017 11:00 AM CDT   Return with UC BMT MIMA #2   [facility] Blood and Marrow Transplant ([facility])    [address] [place] [address]   [phone]            Aug 10, 2017  3:30 PM CDT   (Arrive by 3:15 PM)   Return Visit with Tim Ahn MD   [facility] ([facility])    [address] [place] [address]   [phone]            Aug 15, 2017  1:30 PM CDT   Masonic Lab Draw with [facility] MASONIC LAB DRAW   [facility] Masonic Lab Draw ([facility])    [address] [place] [address]   [phone]            Aug 15, 2017  2:00 PM CDT   Bone Marrow Biopsy with UC BMT MIMA #4, UU BONE MARROW BIOPSY   [facility]row Trans[facility] ([facility])    [address] [place] [address]   [phone]            Aug 16, 2017  3:30 PM CDT   Masonic Lab Draw with [facility] MASONIC LAB DRAW   [facility] Lab Draw ([facility])    [address] [place] [address]   [phone]            Aug 16, 2017  4:00 PM CDT   Return with Charanjit Umanzor MD   [facility]row Transplant ([facility])    [address] [place] [address]   [phone]            Aug 25, 2017 11:30 AM CDT   Masonic Lab Draw with  MASONIC LAB DRAW   [facility] Masonic Lab Draw ([facility])    [address] [place] [address]   [phone]            Aug 25, 2017 12:00 PM CDT   (Arrive by 11:45 AM)   BMT 28 Day Anniversary Visit with Charanjit Umanzor MD   [facility] Blood and Marrow Transplant ([facility])    [address] [place] [address]   [phone]              Who to contact     Please call your clinic at [phone] to:    Ask questions about your health    Make or cancel appointments    Discuss your medicines    Learn about your test results    Speak to your doctor   If you have compliments or concerns about an experience at your clinic, or if you wish to file a complaint, please contact [facility] Patient Relations at [phone] or email us at [email or web address]         Additional Information About Your Visit        Tele[employer]ubbleball Information     [employer] is an electronic gateway that provides easy, online access to your medical records. With [employer], you can request a clinic appointment, read your test results, renew a prescription or communicate with your care team.     To sign up for [employer] visit the website at [email or web address]   You will be asked to enter the access code listed below, as well as some personal information. Please follow the directions to create your username and password.     Your access code is: [email or web address]  Expires: 8/15/2017  6:30 AM     Your access code will  in 90 days. If you need help or a new code, please contact your [facility] or call [phone] for assistance.        Care EveryWhere ID     This is your Care EveryWhere ID. This could be used by other  organizations to access your [place] medical records  [id number]         Blood Pressure from Last 3 Encounters:   08/07/17 147/89   08/05/17 145/79   08/04/17 129/77    Weight from Last 3 Encounters:   08/07/17 93.4 kg (205 lb 14.4 oz)   08/05/17 92.5 kg (204 lb)   08/04/17 90.6 kg (199 lb 11.2 oz)              We Performed the Following     Glucose by meter     Glucose by meter     Glucose by meter     Glucose by meter     Glucose by meter     Glucose by meter     Glucose by meter     Glucose by meter     Glucose by meter     Glucose by meter     Glucose by meter     Glucose by meter     Glucose by meter     Glucose by meter     Glucose by meter     Glucose by meter     Glucose by meter     Glucose by meter     Glucose by meter     Glucose by meter     Glucose by meter     Glucose by meter     Glucose by meter     Glucose by meter     Glucose by meter     Glucose by meter     Glucose by meter     Glucose by meter     Glucose by meter     Glucose by meter     Glucose by meter     Glucose by meter     Glucose by meter     Glucose by meter     Glucose by meter     Glucose by meter     Glucose by meter     Glucose by meter     Glucose by meter     Glucose by meter     Glucose by meter          Today's Medication Changes      Notice     This visit is during an admission. Changes to the med list made in this visit will be reflected in the After Visit Summary of the admission.             Primary Care Provider    Physician No Ref-Primary       No address on file        Equal Access to Services     ASHLYN FLANAGAN : Greg Ratliff, joana manning, raven kaalmada lolita, vishal lópez. So [facility] [phone].    ATENCIÓN: Si habla español, tiene a gusman disposición servicios gratuitos de asistencia lingüística. Llame al [phone].    We comply with applicable federal civil rights laws and [place] laws. We do not discriminate on the basis of race, color, national origin, age,  disability sex, sexual orientation or gender identity.            Thank you!     Thank you for choosing [facility]  for your care. Our goal is always to provide you with excellent care. Hearing back from our patients is one way we can continue to improve our services. Please take a few minutes to complete the written survey that you may receive in the mail after your visit with us. Thank you!             Your Updated Medication List - Protect others around you: Learn how to safely use, store and throw away your medicines at www.disposemymeds.org.      Notice     This visit is during an admission. Changes to the med list made in this visit will be reflected in the After Visit Summary of the admission.

## 2017-07-25 NOTE — PROGRESS NOTES
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       Glucose continues above target, but remarkably improved by 0800 today, considering all the carbs not covered with aspart around HS last night     Plan  Glargine 15 units q24h to continue  Aspart 1 unit per 15 grams carbohydrate for breakfast, then 1 per 10 grams carb for lunch, supper, HS snack and PRN snack  Continue aspart high correction qAC, HS.  BG monitor qAC, HS 0200.     Anticipate need for scheduled insulin until about 36 hours after last dose dexamethasone.  We will follow.       Outpatient diabetes follow up: PCP Davon  Plan discussed with patient, bedside RN           Interval History:   The last 24 hours progress and nursing notes reviewed.  Had banana at 2100, then two bags chips later, then some candy.  No aspart for those snacks.  Pt did ride bike and walk to help improve BG.  He's pleasantly surprised glucose improved to 181 by breakfast today.  Feeling pretty well, just back from radiation.  Sister and wife working on project to honor transplant tomorrow.  Dex 6 mg today, then last dose 4 mg tomorrow.        Recent Labs  Lab 07/25/17  1137 07/25/17  0839 07/25/17  0338 07/24/17  2232 07/24/17  1736 07/24/17  1237 07/24/17  0914 07/24/17  0318  07/23/17  0352  07/22/17  0348  07/21/17  0346  07/20/17  0344   GLC  --   --  224*  --   --   --   --  207*  --  203*  --  197*  --  240*  --  148*   * 181*  --  271* 202* 274* 205*  --   < >  --   < >  --   < >  --   < >  --    < > = values in this interval not displayed.            Review of Systems:   See interval hx          Medications:       Active Diet Order      Regular Diet Adult     Physical Exam:  Gen: Alert, up in room, in NAD   HEENT:  "NC/AT, mucous membranes are moist  Resp: Unlabored  Neuro:oriented x3, communicating clearly  /78 (BP Location: Left arm)  Pulse 79  Temp 96.5  F (35.8  C) (Axillary)  Resp 16  Ht 1.73 m (5' 8.11\")  Wt 92.4 kg (203 lb 9.6 oz)  SpO2 97%  BMI 30.86 kg/m2           Data:   No results found for: A1C           CBC RESULTS:   Recent Labs   Lab Test  07/25/17 0338   WBC  2.8*   RBC  3.65*   HGB  10.9*   HCT  33.8*   MCV  93   MCH  29.9   MCHC  32.2   RDW  16.4*   PLT  94*     Recent Labs   Lab Test  07/25/17 0338  07/24/17   0318   NA  137  138   POTASSIUM  4.2  4.0   CHLORIDE  105  105   CO2  26  24   ANIONGAP  6  8   GLC  224*  207*   BUN  16  13   CR  0.60*  0.65*   SANDRA  8.6  8.7     Liver Function Studies -   Recent Labs   Lab Test  07/24/17   0318   PROTTOTAL  6.5*   ALBUMIN  3.3*   BILITOTAL  0.6   ALKPHOS  55   AST  21   ALT  30     Lab Results   Component Value Date    INR 1.11 07/24/2017    INR 1.05 07/18/2017    INR 1.14 06/26/2017         Marisol Zaldivar APRN [facility] [phone]    Diabetes Management job code [id number]          "

## 2017-07-25 NOTE — PROGRESS NOTES
Pt afeb, bp 150/91 will get scheduled diltiazem this laura. Pt chewing home supply of nicotine gum, MD notified, order in place. Tac gtt infusing. Pt bg this evening

## 2017-07-25 NOTE — PLAN OF CARE
Problem: Goal Outcome Summary  Goal: Goal Outcome Summary  Outcome: No Change  Afeb, hypertensive- hydralazine given x1 with improvement.  OVSS.  Denied pain or nausea overnight.  BG's covered with SS insulin but remain elevated.  Tac gtt continues at 5.8mL/hr.  Sodium phos currently running, no other replacements needed.  Flexeril and ambien x1 last evening.  Continue to monitor and continue current plan of care.    Problem: Blood and Marrow Transplantation  Goal: Blood and Marrow Transplantation  Signs and symptoms of listed problems will be absent or manageable.     07/25/17 0554   Blood and Marrow Transplantation   Blood and Marrow Transplantation Assessed all   Blood and Marrow Transplantation Present metabolic imbalances

## 2017-07-25 NOTE — PROGRESS NOTES
"CLINICAL NUTRITION SERVICES - ASSESSMENT NOTE     Nutrition Prescription    RECOMMENDATIONS FOR MDs/PROVIDERS TO ORDER:  - No orders needed at this time    Malnutrition Status:    - Patient does not meet two of the above criteria necessary for diagnosing malnutrition      Recommendations already ordered by Registered Dietitian (RD):  - Will order PRN supplements    Future/Additional Recommendations:   - Monitor PO, order Erick cts/ONS/offer cafe passes if suspected of sub-optimal PO     REASON FOR ASSESSMENT  Norman Real is a/an 56 year old male assessed by the dietitian for LOS. Pt with hx of Acute Myelogeneous Leukemia. Pt scheduled for NMA allo sib PBSCT on July 26 (today if Day-1). TBI planned for today.     NUTRITION HISTORY  Very Briefly visited with pt, unable to get a detailed nutrition hx at this time 2/2 pt needing to meet endo provider.     CURRENT NUTRITION ORDERS  Diet: Regular  Intake/Tolerance: Pt with good appetite and tolerating 75 -100% of meals.    LABS  Labs reviewed    MEDICATIONS  Medications reviewed    ANTHROPOMETRICS  Height: 173 cm (5' 8.11\")  Most Recent Weight: 92.4 kg (203 lb 9.6 oz)    IBW: 70 kg  BMI: Obesity Grade I BMI 30-34.9  Weight History:   Wt Readings from Last 15 Encounters:   07/25/17 92.4 kg (203 lb 9.6 oz)   07/14/17 92.5 kg (204 lb)   06/30/17 90.7 kg (200 lb)   06/29/17 92 kg (202 lb 14.4 oz)   06/27/17 90.7 kg (200 lb)   06/26/17 91 kg (200 lb 11.2 oz)   05/31/17 92.9 kg (204 lb 14.4 oz)     Dosing Weight: 76 kg (adjusted wt)    ASSESSED NUTRITION NEEDS  Estimated Energy Needs: 1900 - 2280 kcals/day (25 - 30 kcals/kg)  Justification: Maintenance  Estimated Protein Needs: 91 - 114 grams protein/day (1.2 - 1.5 grams of pro/kg)  Justification: Hypercatabolism with acute illness and Maintenance  Estimated Fluid Needs: (1 mL/kcal)   Justification: Maintenance and Per provider pending fluid status    PHYSICAL FINDINGS  See malnutrition section below.     MALNUTRITION  % " Intake: No decreased intake noted  % Weight Loss: Weight loss does not meet criteria  Subcutaneous Fat Loss: None observed  Muscle Loss: None observed  Fluid Accumulation/Edema: None noted  Malnutrition Diagnosis: Patient does not meet two of the above criteria necessary for diagnosing malnutrition    NUTRITION DIAGNOSIS  Predicted inadequate nutrient intake kcal/protein related to potential for decreased PO associated with side effects of chemo/post BMT and /or menu fatigue with prolonged hospitalization  as evidenced by LOS x 7      INTERVENTIONS  Implementation  Nutrition Education: Role of RD in pt's POC     PRN supplements     Goals  Patient to consume % of nutritionally adequate meal trays TID, or the equivalent with supplements/snacks.     Monitoring/Evaluation  Progress toward goals will be monitored and evaluated per protocol.    Juan Ayala RD LD  Weekday Pager - [phone]  Weekend Pager - [phone]

## 2017-07-26 NOTE — PROGRESS NOTES
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       Glucose control improving, though not to target.  Last dose in dexamethasone taper today.     Plan  Glargine 15 units q24h to continue  Aspart increased to 1 unit per 12 grams carbohydrate for breakfast, and continue 1 per 10 grams carb for lunch, supper, HS snack and PRN snack  Continue aspart high correction qAC, HS.  BG monitor qAC, HS 0200.     Anticipate need for scheduled insulin until about 36 hours after last dose dexamethasone.  We will follow.       Outpatient diabetes follow up: PCP Davon  Plan discussed with patient, bedside RN           Interval History:   The last 24 hours progress and nursing notes reviewed.  Reasonably good sleep last night.  [place] his breakfast yesterday was just too large-- leading to high BG.  Chose lighter meal today.  Radiation seems to be contributing to his fatigue.  Dex 4 mg today (last dose) and transplant later afternoon/early evening (sister donor).        Recent Labs  Lab 07/26/17  1444 07/26/17  0832 07/26/17  0256 07/25/17  2235 07/25/17  1814 07/25/17  1137 07/25/17  0839 07/25/17  0338  07/24/17  0318  07/23/17  0352  07/22/17  0348  07/21/17  0346   GLC  --   --  195*  --   --   --   --  224*  --  207*  --  203*  --  197*  --  240*   * 167*  --  230* 190* 307* 181*  --   < >  --   < >  --   < >  --   < >  --    < > = values in this interval not displayed.            Review of Systems:   See interval hx          Medications:       Active Diet Order      Regular Diet Adult     Physical Exam:  Gen: Alert, up in chair doing leg exercises, in NAD   HEENT: NC/AT, mucous membranes are moist  Resp: Unlabored  Neuro:oriented x3, communicating clearly  /84  Pulse  "79  Temp 96.6  F (35.9  C) (Axillary)  Resp 16  Ht 1.73 m (5' 8.11\")  Wt 93.3 kg (205 lb 11 oz)  SpO2 98%  BMI 31.17 kg/m2           Data:   No results found for: A1C         Recent Labs   Lab Test  07/26/17   0256  07/25/17   0338   NA  137  137   POTASSIUM  4.1  4.2   CHLORIDE  105  105   CO2  25  26   ANIONGAP  7  6   GLC  195*  224*   BUN  15  16   CR  0.65*  0.60*   SANDRA  8.4*  8.6     CBC RESULTS:   Recent Labs   Lab Test  07/26/17 0256   WBC  2.7*   RBC  3.73*   HGB  11.5*   HCT  34.4*   MCV  92   MCH  30.8   MCHC  33.4   RDW  16.0*   PLT  74*       Marisol Zaldivar APRN [facility] [phone]    Diabetes Management job code [id number]          "

## 2017-07-26 NOTE — PLAN OF CARE
Problem: Goal Outcome Summary  Goal: Goal Outcome Summary  OT 5C: Recommend discharge to home with HEP. Pt completed 15 mins on treadmill but was limited by pain in RLE hip. Issued treadmill to pt room. VSS.

## 2017-07-26 NOTE — PROCEDURES
BMT/Cellular Allogeneic Product Infusion       Patient Vitals for the past 24 hrs:   Temp Temp src Resp Heart Rate BP   07/25/17 1131 96.5  F (35.8  C) Axillary 16 87 154/78   07/25/17 1600 96.9  F (36.1  C) Axillary 16 88 (!) 150/91   07/25/17 1947 96.7  F (35.9  C) Axillary 16 85 (!) 158/96   07/25/17 2232 96.6  F (35.9  C) Axillary 16 77 152/88   07/26/17 0251 96.2  F (35.7  C) Axillary 16 80 149/76   07/26/17 0827 96.5  F (35.8  C) Axillary 16 80 (!) 161/96         BMT INFUSION DOCUMENTATION (last 24 hours)      BMT/Cellular Product Infusion     None                        Baseline Pre-Infusion Evaluation (to be completed by Provider):   Dyspnea: Grade 0 - none  Hypoxia: Grade 0 - not present  Fever: Grade 0 - afebrile  Chills: Grade 0 - none  Febrile Neutropenia: Grade 0 - not present  Sinus Bradycardia: Grade 0 - none  Hypertension: Grade 1 - prehypertension (systolic -129 mm Hg or diastolic BP 80-89 mm Hg) PTA HTN, exacerbated by tacrolimus, titrating his antihypertensive regimen  Hypotension: Grade 0 - none  Chest Pain: Grade 0 - none  Bronchospasm: Grade 0 - none  Pain: Grade 0 - none  Rash: Grade 0 - None  Neurologic Specify: none    If adverse reactions, events or complications occur (fever greater than 2 degrees fahrenheit increase, and severe reactions of the following types: chills, dyspnea, bronchospasm, hyper/hypotension, hypoxia, bradycardia, chest pain, back/flank pain, hypoxia, and any other reaction deemed severe or life threatening; any instance of product bag breakage or unusual product appearance)    Any other events that are >= grade 3, then immediately contact the BMT Attending physician, the Cell Therapy Laboratory Medical Director (pager [phone]) and the Cell Therapy Laboratory ([phone]).  After midnight, holidays & weekends contact the [facility] on the appropriate campus ([facility] [place]: [phone]; [facility] [place]: [phone]).    Mari Gilbert PA-C

## 2017-07-26 NOTE — PLAN OF CARE
Problem: Blood and Marrow Transplantation  Goal: Blood and Marrow Transplantation  Signs and symptoms of listed problems will be absent or manageable.   Patient required Hydralazine this morning for elevated BP, otherwise vital signs have been stable. He has offered no complaints today, he denies nausea and pain. Patient to receive sister's cells this afternoon, flush started at 1200. Continue to monitor.

## 2017-07-26 NOTE — PROGRESS NOTES
BMT Daily Progress Note   07/26/2017    Patient ID:  Norman Real is a 56 year old male, currently day 0 of his HCT.     Diagnosis AMLU Acute myelogeneous leukemia, Unknown  HCT Type Allogeneic    Prep Regimen Cytoxan  Fludarabine  TBI   Donor Source Related PBSC    GVHD Prophylaxis   Mycophenolate  Primary BMT Provider Dr. Erna MD        INTERVAL  HISTORY     Overnight: Mild hypertension, received hydralazine x 1. Mild nausea, no emesis. Using prn antiemetics to good effect. Feels a ltitle fatigued. No fevers or infectious concerns. Transplant today    Review of Systems: 10 point ROS negative except as noted above.    Scheduled Medications    insulin aspart   Subcutaneous Daily with breakfast     insulin aspart   Subcutaneous TID AC     insulin aspart   Subcutaneous TID AC     diltiazem  300 mg Oral Daily     psyllium  1 packet Oral TID     insulin glargine  15 Units Subcutaneous Q24H     losartan (COZAAR) half-tab 62.5 mg  62.5 mg Oral Daily     insulin aspart  1-10 Units Subcutaneous TID AC     insulin aspart  1-7 Units Subcutaneous At Bedtime     ondansetron  8 mg Oral Q8H     acetaminophen  650 mg Oral Once     diphenhydrAMINE  25 mg Oral Once     mycophenolate  1,500 mg Intravenous Q12H BMT     allopurinol  300 mg Oral Daily     [START ON 7/27/2017] filgrastim (NEUPOGEN/GRANIX) intravenous  5 mcg/kg (Treatment Plan Recorded) Intravenous Daily at 8 pm     acyclovir  800 mg Oral 5x Daily     levofloxacin  250 mg Oral Daily at 10 am     [START ON 7/27/2017] voriconazole  200 mg Oral BID     micafungin (MYCAMINE) intermittent infusion > 45 kg  100 mg Intravenous Daily     [START ON 8/28/2017] sulfamethoxazole-trimethoprim  1 tablet Oral Q Mon Tues BID     heparin lock flush  5-10 mL Intracatheter Q24H     pantoprazole  40 mg Oral Daily     ursodiol  300 mg Oral TID     heparin lock flush  5 mL Intravenous Q24H     Current Facility-Administered Medications   Medication     insulin aspart (NovoLOG)  inj (RAPID ACTING)     insulin aspart (NovoLOG) inj (RAPID ACTING)     0.9% sodium chloride infusion     insulin aspart (NovoLOG) inj (RAPID ACTING)     nicotine polacrilex (NICORETTE) gum 2 mg     diltiazem 24 hr capsule 300 mg     psyllium (METAMUCIL/KONSYL) Packet 1 packet     insulin aspart (NovoLOG) inj (RAPID ACTING)     insulin glargine (LANTUS) injection 15 Units     losartan (COZAAR) half-tab 62.5 mg     insulin aspart (NovoLOG) inj (RAPID ACTING)     insulin aspart (NovoLOG) inj (RAPID ACTING)     hydrALAZINE (APRESOLINE) injection 10-20 mg     ondansetron (ZOFRAN) tablet 8 mg     acetaminophen (TYLENOL) tablet 650 mg     diphenhydrAMINE (BENADRYL) capsule 25 mg     meperidine (DEMEROL) injection 25-50 mg     mycophenolate (GENERIC EQUIV) 1,500 mg in D5W intermittent infusion     tacrolimus (PROGRAF) 5,000 mcg in D5W 250 mL     allopurinol (ZYLOPRIM) tablet 300 mg     [START ON 7/27/2017] filgrastim (NEUPOGEN) 480 mcg in D5W 25 mL infusion     acyclovir (ZOVIRAX) tablet 800 mg     levofloxacin (LEVAQUIN) tablet 250 mg     [START ON 7/27/2017] voriconazole (VFEND) tablet 200 mg     micafungin (MYCAMINE) 100 mg in NaCl 0.9 % 100 mL intermittent infusion     [START ON 8/28/2017] sulfamethoxazole-trimethoprim (BACTRIM DS/SEPTRA DS) 800-160 MG per tablet 1 tablet     cyclobenzaprine (FLEXERIL) tablet 5 mg     oxyCODONE (ROXICODONE) IR tablet 5 mg     lidocaine 1 % 1 mL     lidocaine (LMX4) kit     acetaminophen (TYLENOL) tablet 325-650 mg     acetaminophen (TYLENOL) tablet 325-650 mg     prochlorperazine (COMPAZINE) injection 5-10 mg    Or     prochlorperazine (COMPAZINE) tablet 5-10 mg     LORazepam (ATIVAN) injection 0.5-1 mg    Or     LORazepam (ATIVAN) tablet 0.5-1 mg     sodium chloride (PF) 0.9% PF flush 10-20 mL     heparin lock flush 10 UNIT/ML injection 5-10 mL     heparin lock flush 10 UNIT/ML injection 5-10 mL     pantoprazole (PROTONIX) EC tablet 40 mg     ursodiol (ACTIGALL) capsule 300 mg      "potassium chloride SA (K-DUR/KLOR-CON M) CR tablet 20-40 mEq     potassium chloride (KLOR-CON) Packet 20-40 mEq     potassium chloride 10 mEq in 100 mL intermittent infusion     potassium chloride 10 mEq in 100 mL intermittent infusion with 10 mg lidocaine     potassium chloride 20 mEq in 50 mL intermittent infusion     magnesium sulfate 2 g in NS intermittent infusion (PharMEDium or FV Cmpd)     magnesium sulfate 4 g in 100 mL sterile water (premade)     sodium phosphate 15 mmol in D5W intermittent infusion     sodium phosphate 20 mmol in D5W intermittent infusion     sodium phosphate 25 mmol in D5W intermittent infusion     heparin lock flush 10 UNIT/ML injection 5 mL     heparin lock flush 10 UNIT/ML injection 5-10 mL     naloxone (NARCAN) injection 0.1-0.4 mg     glucose 40 % gel 15-30 g    Or     dextrose 50 % injection 25-50 mL    Or     glucagon injection 1 mg     zolpidem (AMBIEN) tablet 5 mg       PHYSICAL EXAM     Weight In/Out     Wt Readings from Last 3 Encounters:   07/26/17 93.3 kg (205 lb 11 oz)   07/14/17 92.5 kg (204 lb)   06/30/17 90.7 kg (200 lb)      I/O last 3 completed shifts:  In: 4607 [P.O.:2900; I.V.:1707]  Out: 4000 [Urine:4000]       KPS:  70    BP (!) 161/96  Pulse 79  Temp 96.5  F (35.8  C) (Axillary)  Resp 16  Ht 1.73 m (5' 8.11\")  Wt 93.3 kg (205 lb 11 oz)  SpO2 98%  BMI 31.17 kg/m2   General: NAD, interactive, appropriate   Eyes: SAMSON, sclera anicteric   Nose/Mouth/Throat: OP clear, buccal mucosa moist, no ulcerations   Lungs: CTA bilaterally  Cardiovascular: RRR, no M/R/G   Abdominal/Rectal: +BS, soft, NT, ND, No HSM   Lymphatics: No edema  Skin: no rashes or petechaie  Neuro: A&O   Additional Findings: Cooper site NT, no drainage.    LABS AND IMAGING - PAST 24 HOURS     Results for orders placed or performed during the hospital encounter of 07/18/17 (from the past 24 hour(s))   Glucose by meter   Result Value Ref Range    Glucose 307 (H) 70 - 99 mg/dL   Basic metabolic panel "   Result Value Ref Range    Sodium 137 133 - 144 mmol/L    Potassium 4.1 3.4 - 5.3 mmol/L    Chloride 105 94 - 109 mmol/L    Carbon Dioxide 25 20 - 32 mmol/L    Anion Gap 7 3 - 14 mmol/L    Glucose 195 (H) 70 - 99 mg/dL    Urea Nitrogen 15 7 - 30 mg/dL    Creatinine 0.65 (L) 0.66 - 1.25 mg/dL    GFR Estimate >90 >60 mL/min/1.7m2    GFR Estimate If Black >90 >60 mL/min/1.7m2    Calcium 8.4 (L) 8.5 - 10.1 mg/dL   CBC with platelets differential   Result Value Ref Range    WBC 2.7 (L) 4.0 - 11.0 10e9/L    RBC Count 3.73 (L) 4.4 - 5.9 10e12/L    Hemoglobin 11.5 (L) 13.3 - 17.7 g/dL    Hematocrit 34.4 (L) 40.0 - 53.0 %    MCV 92 78 - 100 fl    MCH 30.8 26.5 - 33.0 pg    MCHC 33.4 31.5 - 36.5 g/dL    RDW 16.0 (H) 10.0 - 15.0 %    Platelet Count 74 (L) 150 - 450 10e9/L    Diff Method Automated Method     % Neutrophils 98.2 %    % Lymphocytes 0.7 %    % Monocytes 0.7 %    % Eosinophils 0.0 %    % Basophils 0.0 %    % Immature Granulocytes 0.4 %    Nucleated RBCs 0 0 /100    Absolute Neutrophil 2.6 1.6 - 8.3 10e9/L    Absolute Lymphocytes 0.0 (L) 0.8 - 5.3 10e9/L    Absolute Monocytes 0.0 0.0 - 1.3 10e9/L    Absolute Eosinophils 0.0 0.0 - 0.7 10e9/L    Absolute Basophils 0.0 0.0 - 0.2 10e9/L    Abs Immature Granulocytes 0.0 0 - 0.4 10e9/L    Absolute Nucleated RBC 0.0    Magnesium   Result Value Ref Range    Magnesium 2.0 1.6 - 2.3 mg/dL   Phosphorus   Result Value Ref Range    Phosphorus 2.3 (L) 2.5 - 4.5 mg/dL         ASSESSMENT BY SYSTEMS     Norman Lincoln Farhan is a 56 year old male with AML, currently day 0 for 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       Prep detailed below: Initially held prep for a day to obtain bone marrow biopsy with intent to confirm remission status before proceeding.  Bone marrow biopsy completed 7/20 without complications. Morph and FLOW negative for AML, signed out 7/20 at 9:30 am. Restarted treatment plan with first dose of chemotherapy administered on 7/20.       1a. Prep for  transplant briefly held 7/19 to obtain a BMBX & r/o active disease with falling platelet count. Flow/Morph reported as negative for malignancy 7/20. Released chemotherapy and dates changed in the plan. Updated RN[facility] team for new calendar and to reset the BMT date.  - D - 6 (7/20) cytoxan and fludarabine today   - D - 5 to Day - 2 (7/21-7/24) fludarabine  - D - 1 (7/25) TBI x 1  - D - 0 (7/26) Transplant today, no ABO mismatch. Flush 4 hours pre and 6 hours post     1b.  BMT: Prep as above.  - Allopurinol through day 0.  - Flush and pre-meds prior to transplant   - GCSF starts d+5 and cont to until ANC>1500 x3 consecutive days.   - Re-stage per protocol.     2.  HEME: Keep Hgb>8 and plts>10K.   - No pre-meds.   - Due to transient decrease in plts on admission a BMBX was completed 7/19 to assess for disease.      3.  ID: No focal s/sx of infection at this time.   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   - Bactrim or appropriate PCP therapy to start d+28.                     4.  GI:  - Hx constipation- currently using home metamucil, TID PRN.  - Zofran and dexamethasone prior to chemo to prevent N/V. Continue zofran TID with nausea  - Ativan and compazine available PRN break-through symptoms.   - Protonix for GI prophy.   - Ursodiol for VOD prophy.  - s/p left sided colectomy for recurrent diverticulitis     5.  GVH: Pre-transplant   - MMF and tac started on day-3 7/23 with tac level today day -1 = 14.3     6.  FEN/Renal:  - No diuresis today.  - Monitor creat and lytes. Replete lytes PRN per SS.  - Monitor weight, I+O, lytes per protocol with IVF flush.     7. CV: Hx HTN. Significant persistent HTN for several days after admission, improved with increased dilt/cozaar however exacerbated w/initiation of tac gtt. No change to anti-HTN 7/26, consider uptitration of  meds in the coming days if no improvement  - HTN Management: Cont losartan, increased from 50 mg/d to 62.5 mg/day 7/22. Increased diltiazem XL to 240 7/21 and again 7/24 to 300.   - PRN Hydralazine 10-20 mg Q 4 hrs IV   - Hx tachycardia with arrhythmia, s/p 3 ablations  - hold crestor through transplant     8. Endo: Hx DM type II. BG's remain elevated 200's despite high SSI & lantus PRN. Today is last day of dex  - Endocrine consult 7/24, appreciate involvement & recs     - Per Endo lantus 15U, 1U per 10g carb, and High SSI.    - Hold metformin 500mg/d on admission.     9. : Hx prostate cancer. Asymptomatic at this time.      10. Pain: Leg pain s/p bmbx. Much improved. Oxycodone and flexeril prn     11. Pulm: Hx spiculated pulm nodule (concern for malignancy w/ hx tobacco use) and GGO (concerning for fungal PNA). F/u CT chest in 4-6 weeks (approx 8/22) per Dr. Ahn recs in workup.    - Note 6/30 BAL + for aspergillus galactomannan; culture negative to date but still in process.     OVERALL PLAN     Anticipated hospital dismissal: Remain admitted through stem cell transplant and cell count recovery     Mari Gilbert      ATTENDING ADDENDUM:    I have independently seen and evaluated the patient on July 26, 2017 and reviewed clinical, laboratory, and radiographic findings. I have discussed the plan with the team and agree with the attached note with the following edits:    Norman Real is a 56 year old year old male, with AML in CR1, Day 0 AP sib allo. Mild fatigue.     Ph/E: Vitals reviewed. No distress. Oropharynx clear. Neck supple. Heart RRR. Lungs clear. Abdomen soft. No peripheral edema. No rash. Neuro nonfocal.     Plan: Transplant today. Control pressures more aggressively if they stay high tomorrow. Tac/MMF is PPX.       insulin aspart   Subcutaneous TID AC     ondansetron  8 mg Oral Q8H     insulin aspart   Subcutaneous TID AC     diltiazem  300 mg Oral Daily     psyllium  1 packet Oral TID      insulin glargine  15 Units Subcutaneous Q24H     losartan (COZAAR) half-tab 62.5 mg  62.5 mg Oral Daily     insulin aspart  1-10 Units Subcutaneous TID AC     insulin aspart  1-7 Units Subcutaneous At Bedtime     acetaminophen  650 mg Oral Once     diphenhydrAMINE  25 mg Oral Once     mycophenolate  1,500 mg Intravenous Q12H BMT     allopurinol  300 mg Oral Daily     [START ON 7/27/2017] filgrastim (NEUPOGEN/GRANIX) intravenous  5 mcg/kg (Treatment Plan Recorded) Intravenous Daily at 8 pm     acyclovir  800 mg Oral 5x Daily     levofloxacin  250 mg Oral Daily at 10 am     [START ON 7/27/2017] voriconazole  200 mg Oral BID     micafungin (MYCAMINE) intermittent infusion > 45 kg  100 mg Intravenous Daily     [START ON 8/28/2017] sulfamethoxazole-trimethoprim  1 tablet Oral Q Mon Tues BID     heparin lock flush  5-10 mL Intracatheter Q24H     pantoprazole  40 mg Oral Daily     ursodiol  300 mg Oral TID     heparin lock flush  5 mL Intravenous Q24H       Tito Perez

## 2017-07-26 NOTE — PLAN OF CARE
Problem: Goal Outcome Summary  Goal: Goal Outcome Summary  Outcome: No Change  Afeb, slightly hypertensive- 10mg hydralazine given x1 with improvement.  OVSS.  Denied pain, received PO ativan x1 for nausea with relief.  Flexeril and ambien given at HS.  Tac gtt at 5.8mL/hr.  Mag and sodium phos currently running.  Will have allo sib transplant this evening.  Continue to monitor and continue current plan of care.    Problem: Blood and Marrow Transplantation  Goal: Blood and Marrow Transplantation  Signs and symptoms of listed problems will be absent or manageable.     07/26/17 0631   Blood and Marrow Transplantation   Blood and Marrow Transplantation Assessed all   Blood and Marrow Transplantation Present metabolic imbalances;nausea/vomiting

## 2017-07-27 NOTE — PROGRESS NOTES
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       Improving glucose control, into target this morning .  Last dose dexamethasone yesterday.     Plan  Decrease glargine to 10 units q24h, then plan to discontinue  Aspart decrease to 1 unit per 15 grams carbohydrate for meals today, then order expires  Continue aspart high correction qAC, HS.  BG monitor qAC, HS 0200.     Anticipate no need for scheduled insulin tomorrow.  If metformin is still to be avoided at discharge, pt may be just fine controlling glucose with activity and nutrition adjustments.    We will follow.       Outpatient diabetes follow up: PCP Davon  Plan discussed with patient, bedside RN           Interval History:   The last 24 hours progress and nursing notes reviewed.  Ate routinely yesterday. Dexamethasone 4 mg yest morning. Transplant last evening (second planned for today).  Had an okay night.  Says his sister (donor) is feeling worn out today.  Pt asking if he can't be on metformin, would he have to go home on insulin.      Recent Labs  Lab 07/27/17  0758 07/27/17  0350 07/26/17  2239 07/26/17  1956 07/26/17  1813 07/26/17  1444 07/26/17  0832 07/26/17  0256  07/25/17  0338  07/24/17  0318  07/23/17  0352  07/22/17  0348   GLC  --  170*  --   --   --   --   --  195*  --  224*  --  207*  --  203*  --  197*   *  --  197* 148* 170* 202* 167*  --   < >  --   < >  --   < >  --   < >  --    < > = values in this interval not displayed.            Review of Systems:   See interval hx          Medications:       Active Diet Order      Regular Diet Adult     Physical Exam:  Gen: Alert, resting in bed, in NAD.  Family present  HEENT: NC/AT, mucous membranes are moist  Resp:  "Unlabored  Neuro:oriented x3, communicating clearly  /75 (BP Location: Right arm)  Pulse 79  Temp 96.6  F (35.9  C) (Axillary)  Resp 16  Ht 1.73 m (5' 8.11\")  Wt 93.7 kg (206 lb 9.6 oz)  SpO2 98%  BMI 31.31 kg/m2           Data:   No results found for: A1C         Recent Labs   Lab Test  07/27/17   0350  07/26/17   0256   NA  140  137   POTASSIUM  4.0  4.1   CHLORIDE  106  105   CO2  26  25   ANIONGAP  8  7   GLC  170*  195*   BUN  14  15   CR  0.66  0.65*   SANDRA  8.1*  8.4*     CBC RESULTS:   Recent Labs   Lab Test  07/27/17   0350   WBC  2.4*   RBC  3.72*   HGB  11.2*   HCT  33.5*   MCV  90   MCH  30.1   MCHC  33.4   RDW  16.0*   PLT  82*     Marisol Zaldivar APRN [facility] [phone]    Diabetes Management job code [id number]          "

## 2017-07-27 NOTE — MR AVS SNAPSHOT
After Visit Summary   2017    Norman Real    MRN: [id number]           Patient Information     Date Of Birth          1960        Visit Information        Provider Department      2017 10:00 PM Alfred Scherer MD [facility]         Follow-ups after your visit        Your next 10 appointments already scheduled     Aug 10, 2017  3:30 PM CDT   (Arrive by 3:15 PM)   Return Visit with Tim Ahn MD   [facility] ([facility])    [address] [place] [id number] [phone]              Who to contact     Please call your clinic at [phone] to:    Ask questions about your health    Make or cancel appointments    Discuss your medicines    Learn about your test results    Speak to your doctor   If you have compliments or concerns about an experience at your clinic, or if you wish to file a complaint, please contact [facility] Patient Relations at [phone] or email us at [email or web address]         Additional Information About Your Visit        MyChart Information     [email or web address] is an electronic gateway that provides easy, online access to your medical records. With [email or web address], you can request a clinic appointment, read your test results, renew a prescription or communicate with your care team.     To sign up for [email or web address] visit the website at [email or web address]   You will be asked to enter the access code listed below, as well as some personal information. Please follow the directions to create your username and password.     Your access code is: [email or web address]  Expires: 8/15/2017  6:30 AM     Your access code will  in 90 days. If you need help or a new code, please contact your [facility] or call [phone] for assistance.        Care EveryWhere ID     This is your Care EveryWhere ID. This could be used by  other organizations to access your [facility] medical records  [id number]         Blood Pressure from Last 3 Encounters:   No data found for BP    Weight from Last 3 Encounters:   No data found for Wt              Today, you had the following     No orders found for display         Today's Medication Changes      Notice     This visit is during an admission. Changes to the med list made in this visit will be reflected in the After Visit Summary of the admission.             Primary Care Provider    Physician No Ref-Primary       No address on file        Equal Access to Services     ASHLYN FLANAGAN : Hadii aad ku hadsimoneo Sodannyali, waaxda luqadaha, qaybta kaalmada adeegyada, waxbhavesh mark katnorah tena angie jose ramon . So [facility] [phone].    ATENCIÓN: Si habla español, tiene a gusman disposición servicios gratuitos de asistencia lingüística. Llame al [phone].    We comply with applicable federal civil rights laws and [place] laws. We do not discriminate on the basis of race, color, national origin, age, disability sex, sexual orientation or gender identity.            Thank you!     Thank you for choosing [facility]  for your care. Our goal is always to provide you with excellent care. Hearing back from our patients is one way we can continue to improve our services. Please take a few minutes to complete the written survey that you may receive in the mail after your visit with us. Thank you!             Your Updated Medication List - Protect others around you: Learn how to safely use, store and throw away your medicines at www.disposemymeds.org.      Notice     This visit is during an admission. Changes to the med list made in this visit will be reflected in the After Visit Summary of the admission.

## 2017-07-27 NOTE — PLAN OF CARE
Problem: Goal Outcome Summary  Goal: Goal Outcome Summary  Outcome: No Change  Afebrile, slightly hypertensive during transplant - will recheck and assess need for PRN hydralazine. No c/o pain. Mild nausea improved with 10mg PO compazine. Eating and drinking well.  Tolerated allo sib transplant well, plan for second transplant tomorrow (7/27). NS flush until 0107 post transplant. Ate late supper - insulin given. Tac gtt to be changed at 0050. Will continue to monitor.     Problem: Blood and Marrow Transplantation  Goal: Blood and Marrow Transplantation  Signs and symptoms of listed problems will be absent or manageable.   Outcome: No Change    07/26/17 2154   Blood and Marrow Transplantation   Blood and Marrow Transplantation Assessed all   Blood and Marrow Transplantation Present fluid imbalances;metabolic imbalances;nausea/vomiting         BP recheck 147/85. Will continue to monitor.

## 2017-07-27 NOTE — PROGRESS NOTES
Type of transplant: Donor: Allogeneic - Related  Product:      BMT INFUSION DOCUMENTATION (last 24 hours)      BMT/Cellular Product Infusion       07/26/17 1600             [REMOVED] Product 07/26/17 1656 HPC, Apheresis    Product Details Product Release Date: 07/26/17  -JL Product Release Time: 1656 -JL Product Type: HPC, Apheresis  -JL DIN: [id number]  -JL Product Description Code: [id number]  -JL Volume Dispensed (mL): 229 mL  -JL Completion Date (RN to complete): 07/26/17  -LK Completion Time (RN to complete): 1907  -LK    Checked by (Patient RN) Tammie Caban RN  -SUE       Checked by (Witness) Rocio APONTE       Product Volume Infused (mL) 229 mL  -LK       Flush Volume (mL) 120 mL  -LK       Volume Dispensed (mL)          User Key  (r) = Recorded By, (t) = Taken By, (c) = Cosigned By    Initials Name Effective Dates    Rocio Martinez RN 04/29/14 -     Rocio Martínez 04/29/14 -     Tammie Hamm RN -     Tammie Caban RN 07/18/17 -         Preparation: RN Documentation  Patient was premedicated as ordered: yes  Line Type: central line, right  Patient Stable Prior to Infusion: yes  Time Infusion Started: 1851 (Stopped at 1907)  Did the patient tolerate the infusion well: yes  Teaching: Transplant procedure, IV fluids until 0107. Second transplant tomorrow.   Tolerated/Reaction: Patient tolerance of product infusion  Immediate suspected transfusion reaction to the product: none  Did patient have prior history of similar signs/symptoms during this hospitalization?: NA  Symptoms during/after infusion:  (NA)  Did the patient tolerate the infusion well: yes  Medications and treatment for symptoms: NA  Enter comments if clots, leaks, broken bag, infusion delays, other issues with bag/infusion: NA  Did the symptoms resolve?:  (NA)  Intervention: NA  Response to intervention: NA  Plan: Fluid flush 0107, second transplant tomorrow. Will continue to monitor.

## 2017-07-27 NOTE — PLAN OF CARE
Problem: Blood and Marrow Transplantation  Goal: Blood and Marrow Transplantation  Signs and symptoms of listed problems will be absent or manageable.   Outcome: No Change  Afebrile. Pt had elevated blood pressure of 154/86 which warranted hydralazine and he got 10 mg IV with blood pressure down to 136/71. A/O x 4.  Nausea under control with schedule Zofran. Post transplant flush done at 01:00 AM. Voiding well. Only c/o was not able to sleep well and got ativan 0.5 mg po with result. Pt will need 15 mmol of sodium phos this morning. Pt will be getting his second transplant today and will need premedications ordered. Continue to monitor pt and continue with plan of care.    07/26/17 6545   Blood and Marrow Transplantation   Blood and Marrow Transplantation Assessed all   Blood and Marrow Transplantation Present fluid imbalances;metabolic imbalances;nausea/vomiting

## 2017-07-27 NOTE — PLAN OF CARE
Problem: Goal Outcome Summary  Goal: Goal Outcome Summary  Outcome: No Change  AVSS. Denies pain. Denies N/V. Adequate urine output. Tac gtt decreased to 3mL/hr. CVC dressing changed this afternoon. Blood glucose checked, SSI given per orders. NS Flush started at 1400, infusing at 175mL/hr. Pt received phos this morning, recheck tomorrow am. 2nd transplant scheduled for ~1930. Continue current plan of care.

## 2017-07-27 NOTE — PROCEDURES
BMT/Cellular Allogeneic Product Infusion       Patient Vitals for the past 24 hrs:   Temp Temp src Resp Heart Rate BP   07/26/17 1557 96.6  F (35.9  C) Axillary 16 88 155/89   07/26/17 1836 96.3  F (35.7  C) Axillary 16 79 158/90   07/26/17 1913 97.3  F (36.3  C) Axillary 16 79 (!) 163/93   07/26/17 2014 96.5  F (35.8  C) Axillary 18 94 (!) 163/98   07/26/17 2242 - - - - 147/85   07/26/17 2340 97.6  F (36.4  C) Axillary 18 83 154/86   07/27/17 0350 97.7  F (36.5  C) Axillary 16 82 136/71   07/27/17 0755 96.6  F (35.9  C) Axillary 16 71 136/75   07/27/17 1153 97.4  F (36.3  C) Axillary 16 83 145/79         BMT INFUSION DOCUMENTATION (last 24 hours)      BMT/Cellular Product Infusion       07/26/17 1600             [REMOVED] Product 07/26/17 1656 HPC, Apheresis    Product Details Product Release Date: 07/26/17  - Product Release Time: 1656 -JL Product Type: HPC, Apheresis  -JL DIN: [id number]  - Product Description Code: [id number]  -JL Volume Dispensed (mL): 229 mL  -JL Completion Date (RN to complete): 07/26/17  -LK Completion Time (RN to complete): 1907  -LK    Checked by (Patient RN) Tammie Caban RN  -SUE       Checked by (Witness) Rocio Pablo RN  -TIAN       Product Volume Infused (mL) 229 mL  -LK       Flush Volume (mL) 120 mL  -LK       Volume Dispensed (mL)          User Key  (r) = Recorded By, (t) = Taken By, (c) = Cosigned By    Initials Name Effective Dates    Rocio Martinez RN 04/29/14 -     Rocio Martíenz 04/29/14 -     Tammie Hamm RN -     Tammie Caban RN 07/18/17 -         Allogeneic Donor Eligibility Determination and Summary of Records: Eligible        Type of Infusion: Allogeneic      Baseline Pre-Infusion Evaluation (to be completed by Provider):   Dyspnea: Grade 0 - none  Hypoxia: Grade 0 - not present  Fever: Grade 0 - afebrile  Chills: Grade 0 - none  Febrile Neutropenia: Grade 0 - not present  Sinus Bradycardia: Grade 0 - none  Hypertension: Grade 3 - stage 2  hypertension (systolic BP >/ 160 mm Hg or diastolic BP >/ 100 mm Hg); medical intervention indicated; more than one drug or more intensive therapy than previously used indicated  Hypotension: Grade 0 - none  Chest Pain: Grade 0 - none  Bronchospasm: Grade 0 - none  Pain: Grade 0 - none  Rash: Grade 0 - None  Neurologic Specify: none    If adverse reactions, events or complications occur (fever greater than 2 degrees fahrenheit increase, and severe reactions of the following types: chills, dyspnea, bronchospasm, hyper/hypotension, hypoxia, bradycardia, chest pain, back/flank pain, hypoxia, and any other reaction deemed severe or life threatening; any instance of product bag breakage or unusual product appearance)    Any other events that are >= grade 3, then immediately contact the BMT Attending physician, the [facility] Laboratory Medical Director (pager [phone]) and the [facility] Laboratory ([phone]).  After midnight, holidays & weekends contact the [facility] on the appropriate campus ([facility] [place]: [phone]; [facility] [place]: [phone]).    KAVYA Irby CNP

## 2017-07-27 NOTE — PROGRESS NOTES
BMT Daily Progress Note   07/27/2017    Patient ID:  Norman Real is a 56 year old male, currently day +1 of his HCT.     Diagnosis AMLU Acute myelogeneous leukemia, Unknown  HCT Type Allogeneic    Prep Regimen Cytoxan  Fludarabine  TBI   Donor Source Related PBSC    GVHD Prophylaxis   Mycophenolate  Primary BMT Provider Dr. Erna MD        INTERVAL  HISTORY     Overnight: Mr. Real denied any acute events overnight. Remains mildly hypertensive. Denied any fevers, chills, cough, or new cold like symptoms. Eating adequate amounts of a regular diet. Mild nausea without vomiting. Denied diarrhea, passing normal bowel movements. No oral sores, acute bleeding events, or other complications. Continues to have mild hypertension and occasionally receiving PRN hydralazine. He will receive a second dose of stem cells today since initial CD34+ cell product was only 2.33.     Review of Systems: 10 point ROS negative except as noted above.    Scheduled Medications    insulin glargine  10 Units Subcutaneous Q24H     insulin aspart   Subcutaneous TID AC     diphenhydrAMINE  25 mg Oral Once     acetaminophen  650 mg Oral Once     ondansetron  8 mg Oral Q8H     diltiazem  300 mg Oral Daily     psyllium  1 packet Oral TID     losartan (COZAAR) half-tab 62.5 mg  62.5 mg Oral Daily     insulin aspart  1-10 Units Subcutaneous TID AC     insulin aspart  1-7 Units Subcutaneous At Bedtime     mycophenolate  1,500 mg Intravenous Q12H BMT     acyclovir  800 mg Oral 5x Daily     levofloxacin  250 mg Oral Daily at 10 am     voriconazole  200 mg Oral BID     [START ON 8/28/2017] sulfamethoxazole-trimethoprim  1 tablet Oral Q Mon Tues BID     heparin lock flush  5-10 mL Intracatheter Q24H     pantoprazole  40 mg Oral Daily     ursodiol  300 mg Oral TID     heparin lock flush  5 mL Intravenous Q24H     Current Facility-Administered Medications   Medication     insulin glargine (LANTUS) injection 10 Units     insulin aspart  (NovoLOG) inj (RAPID ACTING)     [Rx hold ] filgrastim (NEUPOGEN) 480 mcg in D5W 25 mL infusion     0.9% sodium chloride infusion     diphenhydrAMINE (BENADRYL) capsule 25 mg     acetaminophen (TYLENOL) tablet 650 mg     tacrolimus (PROGRAF) 5,000 mcg in D5W 250 mL     ondansetron (ZOFRAN) tablet 8 mg     nicotine polacrilex (NICORETTE) gum 2 mg     diltiazem 24 hr capsule 300 mg     psyllium (METAMUCIL/KONSYL) Packet 1 packet     losartan (COZAAR) half-tab 62.5 mg     insulin aspart (NovoLOG) inj (RAPID ACTING)     insulin aspart (NovoLOG) inj (RAPID ACTING)     hydrALAZINE (APRESOLINE) injection 10-20 mg     meperidine (DEMEROL) injection 25-50 mg     mycophenolate (GENERIC EQUIV) 1,500 mg in D5W intermittent infusion     acyclovir (ZOVIRAX) tablet 800 mg     levofloxacin (LEVAQUIN) tablet 250 mg     voriconazole (VFEND) tablet 200 mg     [START ON 8/28/2017] sulfamethoxazole-trimethoprim (BACTRIM DS/SEPTRA DS) 800-160 MG per tablet 1 tablet     cyclobenzaprine (FLEXERIL) tablet 5 mg     oxyCODONE (ROXICODONE) IR tablet 5 mg     lidocaine 1 % 1 mL     lidocaine (LMX4) kit     acetaminophen (TYLENOL) tablet 325-650 mg     acetaminophen (TYLENOL) tablet 325-650 mg     prochlorperazine (COMPAZINE) injection 5-10 mg    Or     prochlorperazine (COMPAZINE) tablet 5-10 mg     LORazepam (ATIVAN) injection 0.5-1 mg    Or     LORazepam (ATIVAN) tablet 0.5-1 mg     sodium chloride (PF) 0.9% PF flush 10-20 mL     heparin lock flush 10 UNIT/ML injection 5-10 mL     heparin lock flush 10 UNIT/ML injection 5-10 mL     pantoprazole (PROTONIX) EC tablet 40 mg     ursodiol (ACTIGALL) capsule 300 mg     potassium chloride SA (K-DUR/KLOR-CON M) CR tablet 20-40 mEq     potassium chloride (KLOR-CON) Packet 20-40 mEq     potassium chloride 10 mEq in 100 mL intermittent infusion     potassium chloride 10 mEq in 100 mL intermittent infusion with 10 mg lidocaine     potassium chloride 20 mEq in 50 mL intermittent infusion     magnesium  "sulfate 2 g in NS intermittent infusion (PharMEDium or FV Cmpd)     magnesium sulfate 4 g in 100 mL sterile water (premade)     sodium phosphate 15 mmol in D5W intermittent infusion     sodium phosphate 20 mmol in D5W intermittent infusion     sodium phosphate 25 mmol in D5W intermittent infusion     heparin lock flush 10 UNIT/ML injection 5 mL     heparin lock flush 10 UNIT/ML injection 5-10 mL     naloxone (NARCAN) injection 0.1-0.4 mg     glucose 40 % gel 15-30 g    Or     dextrose 50 % injection 25-50 mL    Or     glucagon injection 1 mg     zolpidem (AMBIEN) tablet 5 mg       PHYSICAL EXAM     Weight In/Out     Wt Readings from Last 3 Encounters:   07/27/17 93.7 kg (206 lb 9.6 oz)   07/14/17 92.5 kg (204 lb)   06/30/17 90.7 kg (200 lb)      I/O last 3 completed shifts:  In: 5403.4 [P.O.:1920; I.V.:3254.4; Blood:229]  Out: 4050 [Urine:4050]       KPS:  70    /79 (BP Location: Right arm)  Pulse 79  Temp 97.4  F (36.3  C) (Axillary)  Resp 16  Ht 1.73 m (5' 8.11\")  Wt 93.7 kg (206 lb 9.6 oz)  SpO2 97%  BMI 31.31 kg/m2   General: NAD, interactive, appropriate   Eyes: SAMSON, sclera anicteric   Nose/Mouth/Throat: OP clear, buccal mucosa moist, no ulcerations   Lungs: CTA bilaterally  Cardiovascular: RRR, no M/R/G   Abdominal/Rectal: +BS, soft, NT, ND, No HSM   Lymphatics: No edema  Skin: no rashes or petechaie  Neuro: A&O   Additional Findings: Cooper site NT, no drainage.    LABS AND IMAGING - PAST 24 HOURS     Results for orders placed or performed during the hospital encounter of 07/18/17 (from the past 24 hour(s))   CBC with platelets differential   Result Value Ref Range    WBC 2.4 (L) 4.0 - 11.0 10e9/L    RBC Count 3.72 (L) 4.4 - 5.9 10e12/L    Hemoglobin 11.2 (L) 13.3 - 17.7 g/dL    Hematocrit 33.5 (L) 40.0 - 53.0 %    MCV 90 78 - 100 fl    MCH 30.1 26.5 - 33.0 pg    MCHC 33.4 31.5 - 36.5 g/dL    RDW 16.0 (H) 10.0 - 15.0 %    Platelet Count 82 (L) 150 - 450 10e9/L    Diff Method Manual Differential "     % Neutrophils 94.7 %    % Lymphocytes 1.7 %    % Monocytes 1.8 %    % Eosinophils 0.0 %    % Basophils 0.0 %    % Metamyelocytes 1.8 %    Absolute Neutrophil 2.3 1.6 - 8.3 10e9/L    Absolute Lymphocytes 0.0 (L) 0.8 - 5.3 10e9/L    Absolute Monocytes 0.0 0.0 - 1.3 10e9/L    Absolute Eosinophils 0.0 0.0 - 0.7 10e9/L    Absolute Basophils 0.0 0.0 - 0.2 10e9/L    Absolute Metamyelocytes 0.0 0 10e9/L    Anisocytosis Slight    ABO/Rh type and screen   Result Value Ref Range    ABO O     RH(D)  Pos     Antibody Screen Neg     Test Valid Only At       [facility]    Specimen Expires 07/30/2017    Comprehensive metabolic panel   Result Value Ref Range    Sodium 140 133 - 144 mmol/L    Potassium 4.0 3.4 - 5.3 mmol/L    Chloride 106 94 - 109 mmol/L    Carbon Dioxide 26 20 - 32 mmol/L    Anion Gap 8 3 - 14 mmol/L    Glucose 170 (H) 70 - 99 mg/dL    Urea Nitrogen 14 7 - 30 mg/dL    Creatinine 0.66 0.66 - 1.25 mg/dL    GFR Estimate >90  Non  GFR Calc   >60 mL/min/1.7m2    GFR Estimate If Black >90   GFR Calc   >60 mL/min/1.7m2    Calcium 8.1 (L) 8.5 - 10.1 mg/dL    Bilirubin Total 0.7 0.2 - 1.3 mg/dL    Albumin 2.8 (L) 3.4 - 5.0 g/dL    Protein Total 5.6 (L) 6.8 - 8.8 g/dL    Alkaline Phosphatase 53 40 - 150 U/L    ALT 49 0 - 70 U/L    AST 16 0 - 45 U/L   Magnesium   Result Value Ref Range    Magnesium 1.9 1.6 - 2.3 mg/dL   Phosphorus   Result Value Ref Range    Phosphorus 2.4 (L) 2.5 - 4.5 mg/dL         ASSESSMENT BY SYSTEMS     Norman Salazar Real is a 56 year old male with AML, currently day +1 for 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 He received additional stem cells from donor 7/27.      Prep detailed below: Initially held prep for a day to obtain bone marrow biopsy with intent to confirm remission status before proceeding.  Bone marrow biopsy completed 7/20 without complications. Morph and FLOW negative for AML, signed out 7/20  at 9:30 am. Restarted treatment plan with first dose of chemotherapy administered on 7/20.       1a. Prep for transplant briefly held 7/19 to obtain a BMBX & r/o active disease with falling platelet count. Flow/Morph reported as negative for malignancy 7/20. Released chemotherapy and dates changed in the plan. Updated [facility] team for new calendar and to reset the BMT date.  - D - 6 (7/20) cytoxan and fludarabine today   - D - 5 to Day - 2 (7/21-7/24) fludarabine  - D - 1 (7/25) TBI x 1  - D - 0 (7/26) Transplant today, no ABO mismatch. Flush 4 hours pre and 6 hours post  - D + 1 (7/27) Additional stem cells will be transplanted this afternoon, initial product only contained 2.33 CD34/kg.      1b.  BMT: Prep as above.  - Flush and pre-meds prior to transplant ordered again today 7/27, standard flush  - GCSF starts d+5 and cont to until ANC>1500 x3 consecutive days.   - Re-stage per protocol.     2.  HEME: Keep Hgb>8 and plts>10K.   - No pre-meds.   - Due to transient decrease in plts on admission a BMBX was completed 7/19 to assess for disease.      3.  ID: No focal s/sx of infection at this time.   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   - Bactrim or appropriate PCP therapy to start d+28.                     4.  GI: Mild nausea without vomiting.   - Hx constipation- currently using home metamucil, TID PRN.  -  Continue zofran TID with nausea  - Ativan and compazine available PRN break-through symptoms.   - Protonix for GI prophy.   - Ursodiol for VOD prophy.  - s/p left sided colectomy for recurrent diverticulitis     5.  GVH: Pre-transplant   - MMF and tac started on day-3 7/23 with tac level day -1 = 14.3. Change Tac gtt to 85 mcg/hr w/transition to Vfend 7/27. Recheck level 7/28.     6.  FEN/Renal: Eating adequate amounts of a regular diet.   - Monitor creat  and lytes. Replete lytes PRN per SS.  - Monitor weight, I+O, lytes per protocol with IVF flush.     7. CV: Hx HTN. Significant persistent HTN for several days after admission, improved w/increased dilt/cozaar however exacerbated w/initiation of tac gtt. No change to anti-HTN 7/26, consider uptitration of meds in the coming days if no improvement. Monitor 7/27.  - HTN Management: Cont losartan, increased from 50 mg/d to 62.5 mg/day 7/22. Increased diltiazem XL to 240 7/21 and again 7/24 to 300.   - PRN Hydralazine 10-20 mg Q 4 hrs IV   - Hx tachycardia with arrhythmia, s/p 3 ablations  - hold crestor through transplant     8. Endo: Hx DM type II. BG's improved, tapered off of decadron.  - Endocrine consult 7/24, appreciate involvement & recs. Mgt per Endo team, see notes.    - Hold metformin 500mg/d on admission.     9. : Hx prostate cancer.   - Asymptomatic at this time.      10. Pain: Leg pain s/p bmbx. Much improved. Oxycodone and flexeril prn     11. Pulm: Hx spiculated pulm nodule (concern for malignancy w/ hx tobacco use) and GGO (concerning for fungal PNA). F/u CT chest in 4-6 weeks (approx 8/22) per Dr. Ahn recs in workup.    - Note 6/30 BAL + for aspergillus galactomannan; culture negative to date but still in process.     OVERALL PLAN     Anticipated hospital dismissal: Remain admitted through stem cell transplant and cell count recovery     Haydee Black      ATTENDING ADDENDUM:    I have independently seen and evaluated the patient on July 27, 2017 and reviewed clinical, laboratory, and radiographic findings. I have discussed the plan with the team and agree with the attached note with the following edits:    Norman Real is a 56 year old year old male, with AML in CR1, Day +1 PA sib allo. Mild fatigue. S/p first infusion yesterday uneventfully, dose about 2.3. To infuse another bag today.     Ph/E: Vitals reviewed. No distress. Oropharynx clear. Neck supple. Heart RRR. Lungs clear.  Abdomen soft. No peripheral edema. No rash. Neuro nonfocal.     Plan: Transplant today again. Cut back on Tac. Switch to vori.       insulin glargine  10 Units Subcutaneous Q24H     insulin aspart   Subcutaneous TID AC     diphenhydrAMINE  25 mg Oral Once     acetaminophen  650 mg Oral Once     ondansetron  8 mg Oral Q8H     diltiazem  300 mg Oral Daily     psyllium  1 packet Oral TID     losartan (COZAAR) half-tab 62.5 mg  62.5 mg Oral Daily     insulin aspart  1-10 Units Subcutaneous TID AC     insulin aspart  1-7 Units Subcutaneous At Bedtime     mycophenolate  1,500 mg Intravenous Q12H BMT     acyclovir  800 mg Oral 5x Daily     levofloxacin  250 mg Oral Daily at 10 am     voriconazole  200 mg Oral BID     [START ON 8/28/2017] sulfamethoxazole-trimethoprim  1 tablet Oral Q Mon Tues BID     heparin lock flush  5-10 mL Intracatheter Q24H     pantoprazole  40 mg Oral Daily     ursodiol  300 mg Oral TID     heparin lock flush  5 mL Intravenous Q24H       Tito Perez

## 2017-07-27 NOTE — PROCEDURES
Infusion of Allogeneic HSCs for AML  July 26, 2017    After verifying patient identity and given pre-medication and IV fluid flush the patient received Allogeneic HSCs through the central line. The patient tolerated the procedure well with no immediate severe complications.    Tito Perez

## 2017-07-27 NOTE — PROCEDURES
Radiation Oncology:  [facility] [address] [place] [address]     HAILEY BRAVO  Med Rec #: [id number]  Diagnosis: C92.01 Acute myeloblastic leukemia, in remission        Total Body Irradiation Physician OTV Note  July 25, 2017             Under my direction a Single fraction of 200cGy TBI was delivered after the set up   parameters were checked in the treatment position in the treatment room.  The chart and   set up information were reviewed.  The patient's questions were answered.     Objective:  Patient appeared relaxed and ready for TBI.  Nausea well controlled  Assessment:    Tolerated the  TBI.    Plan:       Proceed as per BMT program      Treatment Summary:  5Radiation Oncology - Course: 2 Protocol: 2015-32  Treatment Site Current Dose     Modality   From              To                Elapsed Days Fx.  2 TBI                          200 cGy      18 X  7/25/2017  7/25/2017   1                I have checked the following parameters prior to the first treatment:  X Patient Identification  X [id number]  X SSD, Correct placement of the field, correct body position  X Prescription thickness  X Compensator and beam spoiler placement  X Dose prescription, dose rate and energy  X OSLs were ordered for verification during the treatment     Alfred Scherer MD

## 2017-07-28 NOTE — PROGRESS NOTES
BMT Daily Progress Note   07/28/2017    Patient ID:  Norman Real is a 56 year old male, currently day + 2 of his HCT.     Diagnosis AMLU Acute myelogeneous leukemia, Unknown  HCT Type Allogeneic    Prep Regimen Cytoxan  Fludarabine  TBI   Donor Source Related PBSC    GVHD Prophylaxis   Mycophenolate  Primary BMT Provider Dr. Erna MD        INTERVAL  HISTORY     Overnight: Mr. Real denied any acute events overnight. Tolerated transplant without any acute issues overnight. Notes he slept better last night and continues to take Ambien as needed. Discussed increasing Ambien dose to 10 mg, the dose he frequently took at home.  Eating adequate amounts of a regular diet. Mild nausea without vomiting. No diarrhea. Denied any constitutional symptoms or acute bleeding events. Aware of the plan for the day and all questions were answered.     Review of Systems: 10 point ROS negative except as noted above.    Scheduled Medications    filgrastim (NEUPOGEN/GRANIX) intravenous  5 mcg/kg (Treatment Plan Recorded) Intravenous Daily at 8 pm     furosemide  20 mg Intravenous Once     insulin glargine  7 Units Subcutaneous Q24H     ondansetron  8 mg Oral Q8H     diltiazem  300 mg Oral Daily     psyllium  1 packet Oral TID     losartan (COZAAR) half-tab 62.5 mg  62.5 mg Oral Daily     insulin aspart  1-10 Units Subcutaneous TID AC     insulin aspart  1-7 Units Subcutaneous At Bedtime     mycophenolate  1,500 mg Intravenous Q12H BMT     acyclovir  800 mg Oral 5x Daily     levofloxacin  250 mg Oral Daily at 10 am     voriconazole  200 mg Oral BID     [START ON 8/28/2017] sulfamethoxazole-trimethoprim  1 tablet Oral Q Mon Tues BID     heparin lock flush  5-10 mL Intracatheter Q24H     pantoprazole  40 mg Oral Daily     ursodiol  300 mg Oral TID     heparin lock flush  5 mL Intravenous Q24H     Current Facility-Administered Medications   Medication     filgrastim (NEUPOGEN) 480 mcg in D5W 25 mL infusion     furosemide  (LASIX) injection 20 mg     zolpidem (AMBIEN) tablet 5-10 mg     insulin glargine (LANTUS) injection 7 Units     tacrolimus (PROGRAF) 5,000 mcg in D5W 250 mL     ondansetron (ZOFRAN) tablet 8 mg     nicotine polacrilex (NICORETTE) gum 2 mg     diltiazem 24 hr capsule 300 mg     psyllium (METAMUCIL/KONSYL) Packet 1 packet     losartan (COZAAR) half-tab 62.5 mg     insulin aspart (NovoLOG) inj (RAPID ACTING)     insulin aspart (NovoLOG) inj (RAPID ACTING)     hydrALAZINE (APRESOLINE) injection 10-20 mg     mycophenolate (GENERIC EQUIV) 1,500 mg in D5W intermittent infusion     acyclovir (ZOVIRAX) tablet 800 mg     levofloxacin (LEVAQUIN) tablet 250 mg     voriconazole (VFEND) tablet 200 mg     [START ON 8/28/2017] sulfamethoxazole-trimethoprim (BACTRIM DS/SEPTRA DS) 800-160 MG per tablet 1 tablet     cyclobenzaprine (FLEXERIL) tablet 5 mg     oxyCODONE (ROXICODONE) IR tablet 5 mg     lidocaine 1 % 1 mL     lidocaine (LMX4) kit     acetaminophen (TYLENOL) tablet 325-650 mg     acetaminophen (TYLENOL) tablet 325-650 mg     prochlorperazine (COMPAZINE) injection 5-10 mg    Or     prochlorperazine (COMPAZINE) tablet 5-10 mg     LORazepam (ATIVAN) injection 0.5-1 mg    Or     LORazepam (ATIVAN) tablet 0.5-1 mg     sodium chloride (PF) 0.9% PF flush 10-20 mL     heparin lock flush 10 UNIT/ML injection 5-10 mL     heparin lock flush 10 UNIT/ML injection 5-10 mL     pantoprazole (PROTONIX) EC tablet 40 mg     ursodiol (ACTIGALL) capsule 300 mg     potassium chloride SA (K-DUR/KLOR-CON M) CR tablet 20-40 mEq     potassium chloride (KLOR-CON) Packet 20-40 mEq     potassium chloride 10 mEq in 100 mL intermittent infusion     potassium chloride 10 mEq in 100 mL intermittent infusion with 10 mg lidocaine     potassium chloride 20 mEq in 50 mL intermittent infusion     magnesium sulfate 2 g in NS intermittent infusion ([facility]ar[facility] or FV Cmpd)     magnesium sulfate 4 g in 100 mL sterile water (premade)     sodium phosphate 15 mmol in  "D5W intermittent infusion     sodium phosphate 20 mmol in D5W intermittent infusion     sodium phosphate 25 mmol in D5W intermittent infusion     heparin lock flush 10 UNIT/ML injection 5 mL     heparin lock flush 10 UNIT/ML injection 5-10 mL     naloxone (NARCAN) injection 0.1-0.4 mg     glucose 40 % gel 15-30 g    Or     dextrose 50 % injection 25-50 mL    Or     glucagon injection 1 mg       PHYSICAL EXAM     Weight In/Out     Wt Readings from Last 3 Encounters:   07/28/17 94.7 kg (208 lb 11.2 oz)   07/14/17 92.5 kg (204 lb)   06/30/17 90.7 kg (200 lb)      I/O last 3 completed shifts:  In: 4012 [P.O.:570; I.V.:3200; Blood:242]  Out: 4350 [Urine:4350]       KPS:  70    /89 (BP Location: Left arm)  Pulse 72  Temp 97.1  F (36.2  C) (Axillary)  Resp 16  Ht 1.73 m (5' 8.11\")  Wt 94.7 kg (208 lb 11.2 oz)  SpO2 97%  BMI 31.63 kg/m2   General: NAD, interactive, appropriate   Eyes: SAMSON, sclera anicteric   Nose/Mouth/Throat: OP clear, buccal mucosa moist, no ulcerations   Lungs: CTA bilaterally  Cardiovascular: RRR, no M/R/G   Abdominal/Rectal: +BS, soft, NT, ND, No HSM   Lymphatics: No edema  Skin: no rashes or petechaie  Neuro: A&O   Additional Findings: Cooper site NT, no drainage.    LABS AND IMAGING - PAST 24 HOURS     Results for orders placed or performed during the hospital encounter of 07/18/17 (from the past 24 hour(s))   Basic metabolic panel   Result Value Ref Range    Sodium 142 133 - 144 mmol/L    Potassium 3.9 3.4 - 5.3 mmol/L    Chloride 110 (H) 94 - 109 mmol/L    Carbon Dioxide 24 20 - 32 mmol/L    Anion Gap 8 3 - 14 mmol/L    Glucose 137 (H) 70 - 99 mg/dL    Urea Nitrogen 15 7 - 30 mg/dL    Creatinine 0.71 0.66 - 1.25 mg/dL    GFR Estimate >90  Non  GFR Calc   >60 mL/min/1.7m2    GFR Estimate If Black >90   GFR Calc   >60 mL/min/1.7m2    Calcium 7.8 (L) 8.5 - 10.1 mg/dL   CBC with platelets differential   Result Value Ref Range    WBC 1.3 (L) 4.0 - 11.0 " 10e9/L    RBC Count 3.59 (L) 4.4 - 5.9 10e12/L    Hemoglobin 11.0 (L) 13.3 - 17.7 g/dL    Hematocrit 33.0 (L) 40.0 - 53.0 %    MCV 92 78 - 100 fl    MCH 30.6 26.5 - 33.0 pg    MCHC 33.3 31.5 - 36.5 g/dL    RDW 15.8 (H) 10.0 - 15.0 %    Platelet Count 87 (L) 150 - 450 10e9/L    Diff Method Automated Method     % Neutrophils 63.8 %    % Lymphocytes 15.7 %    % Monocytes 18.9 %    % Eosinophils 0.0 %    % Basophils 0.0 %    % Immature Granulocytes 1.6 %    Nucleated RBCs 0 0 /100    Absolute Neutrophil 0.8 (L) 1.6 - 8.3 10e9/L    Absolute Lymphocytes 0.2 (L) 0.8 - 5.3 10e9/L    Absolute Monocytes 0.2 0.0 - 1.3 10e9/L    Absolute Eosinophils 0.0 0.0 - 0.7 10e9/L    Absolute Basophils 0.0 0.0 - 0.2 10e9/L    Abs Immature Granulocytes 0.0 0 - 0.4 10e9/L    Absolute Nucleated RBC 0.0    Magnesium   Result Value Ref Range    Magnesium 1.6 1.6 - 2.3 mg/dL   Phosphorus   Result Value Ref Range    Phosphorus 3.1 2.5 - 4.5 mg/dL         ASSESSMENT BY SYSTEMS     Norman Salazar Real is a 56 year old male with AML, currently day +1 for 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 He received additional stem cells from donor 7/27.      Prep detailed below: Initially held prep for a day to obtain bone marrow biopsy with intent to confirm remission status before proceeding.  Bone marrow biopsy completed 7/20 without complications. Morph and FLOW negative for AML, signed out 7/20 at 9:30 am. Restarted treatment plan with first dose of chemotherapy administered on 7/20.       1a. Prep for transplant briefly held 7/19 to obtain a BMBX & r/o active disease with falling platelet count. Flow/Morph reported as negative for malignancy 7/20. Released chemotherapy and dates changed in the plan. Updated [facility] team for new calendar and to reset the BMT date.  - D - 6 (7/20) cytoxan and fludarabine today   - D - 5 to Day - 2 (7/21-7/24) fludarabine  - D - 1 (7/25) TBI x 1  - D - 0 (7/26) Transplant today, no ABO mismatch. Flush 4 hours  pre and 6 hours post  - D + 1 (7/27) Initial stem cell product only contained 2.33 CD34/kg, additional 0.66 CD34/kg on 7/27 for a total of 2.99.      1b.  BMT: Prep as above.  - GCSF starts d+5 and cont to until ANC>1500 x3 consecutive days.   - Re-stage per protocol.     2.  HEME: Keep Hgb>8 and plts>10K.   - No pre-meds.   - Due to transient decrease in plts on admission a BMBX was completed 7/19 to assess for disease.      3.  ID: No focal s/sx of infection at this time.   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   - Bactrim or appropriate PCP therapy to start d+28.                     4.  GI: Mild nausea without vomiting.   - Hx constipation- currently using home metamucil, TID PRN.  - Continue zofran TID with nausea  - Ativan and compazine available PRN break-through symptoms.   - Protonix for GI prophy.   - Ursodiol for VOD prophy.  - s/p left sided colectomy for recurrent diverticulitis     5.  GVH: No aGVH to date   - Cont MMF  - Tac 21.4 7/27. Tac gtt decreased to 60 mcg/hr w/transition to Vfend & supratheraputic level 7/27 . Recheck 7/28 pend.     6.  FEN/Renal: Eating adequate amounts of a regular diet.   - Wt up 3 kg, slt net positive overnight. Given 20 mg IV lasix 7/28.  - Monitor creat and lytes. Replete lytes PRN per SS.  - Monitor weight, I+O, lytes per protocol with IVF flush.     7. CV: Hx HTN. Significant persistent HTN for several days after admission, improved w/increased dilt/cozaar however exacerbated w/initiation of tac gtt. No change to anti-HTN 7/28 since decreased tac gtt yesterday afternoon w/improvement in BP. Monitor.   - HTN Management: Cont losartan, increased from 50 mg/d to 62.5 mg/day 7/22. Increased diltiazem XL to 240 7/21 and again 7/24 to 300.   - PRN Hydralazine 10-20 mg Q 4 hrs IV   - Hx tachycardia with arrhythmia, s/p 3  ablations  - hold crestor through transplant     8. Endo: Hx DM type II. BG's improved, tapered off of decadron.  - Endocrine consult 7/24, appreciate involvement & recs. Mgt per Endo team, see notes.    - Hold metformin 500mg/d on admission.     9. : Hx prostate cancer.   - Asymptomatic at this time.      10. Neuro: History of chronic insomnia.  - Insomnia: Inc ambien to 5-10 mg QHS PRN 7/28   - Leg pain s/p bmbx. Much improved. Oxycodone and flexeril prn     11. Pulm: Hx spiculated pulm nodule (concern for malignancy w/ hx tobacco use) and GGO (concerning for fungal PNA). F/u CT chest in 4-6 weeks (approx 8/22) per Dr. Ahn recs in workup.    - Note 6/30 BAL + for aspergillus galactomannan; culture negative to date but still in process.     OVERALL PLAN     Anticipated hospital dismissal: Remain admitted through stem cell transplant and cell count recovery     Haydee Black      ATTENDING ADDENDUM:    I have independently seen and evaluated the patient on July 28, 2017 and reviewed clinical, laboratory, and radiographic findings. I have discussed the plan with the team and agree with the attached note with the following edits:    Norman Real is a 56 year old year old male, with AML in CR1, Day +2 AP sib allo. Mild fatigue. S/p second infusion yesterday uneventfully, total dose 3.      Ph/E: Vitals reviewed. No distress. Oropharynx clear. Neck supple. Heart RRR. Lungs clear. Abdomen soft. No peripheral edema. No rash. Neuro nonfocal.     Plan: Met mimimum requirement for cell dose (3 million stem cells/kg). G. Support through count recovery.       filgrastim (NEUPOGEN/GRANIX) intravenous  5 mcg/kg (Treatment Plan Recorded) Intravenous Daily at 8 pm     insulin glargine  7 Units Subcutaneous Q24H     ondansetron  8 mg Oral Q8H     diltiazem  300 mg Oral Daily     psyllium  1 packet Oral TID     losartan (COZAAR) half-tab 62.5 mg  62.5 mg Oral Daily     insulin aspart  1-10 Units Subcutaneous TID AC      insulin aspart  1-7 Units Subcutaneous At Bedtime     mycophenolate  1,500 mg Intravenous Q12H BMT     acyclovir  800 mg Oral 5x Daily     levofloxacin  250 mg Oral Daily at 10 am     voriconazole  200 mg Oral BID     [START ON 8/28/2017] sulfamethoxazole-trimethoprim  1 tablet Oral Q Mon Tues BID     heparin lock flush  5-10 mL Intracatheter Q24H     pantoprazole  40 mg Oral Daily     ursodiol  300 mg Oral TID     heparin lock flush  5 mL Intravenous Q24H       Tito Perez

## 2017-07-28 NOTE — PROGRESS NOTES
"SPIRITUAL HEALTH SERVICES  SPIRITUAL ASSESSMENT Progress Note  [facility] ([place]) 5C     REFERRAL SOURCE: Follow up per previous visit    Follow up visit with Norman after his txp. I was scheduled to facilitate a blessing service on the date of the txp but a  in my family kept me from facilitating. Norman requested that a blessing service be done  while his sister and donor is still around. I planned the final details with Norman and will notify the on-call  to facilitate the service. Norman shared that he is feeling good but tired; \"things are starting to catch up with me.\" He continues to be supported by his wife. Norman requested a prayer before I left which I led and we shared together.    PLAN: Provide materials for BMT blessing to on-call  for . Continue to follow Norman as he remains on 5C.  remains available upon request.    Yoel Foy   Intern  Pager [phone]    "

## 2017-07-28 NOTE — PROGRESS NOTES
"                          Diabetes Consult Daily  Progress Note          Assessment/Plan:   Norman Real is a 56 year old man with well-controlled type 2 diabetes (metformin prior to admission), admitted NMA allo sib PBSCT to treat AML, experiencing increased hyperglycemia related to steroids.       Some spikes in glucose to 200s yesterday midday and evening.  No aspart for carbs at bfast and BG back in the mid 200s midday today.  Fasting .  Need to continue with insulin at this time.     Plan  Decrease glargine to 7 units q24h starting today, unclear if we will be able to dc glargine.  Meal aspart was reordered midday today: 1unit/15g CHO with meals and snacks.  Continue aspart high correction qAC, HS.  BG monitor qAC, HS 0200.       We will follow.       Outpatient diabetes follow up: PCP Davon  Plan discussed with patient, bedside RN           Interval History:   The last 24 hours progress and nursing notes reviewed.  Last steroid dose: dex 4mg on morning of 7/26.    Norman was feeling tired and little nauseated when I visited.  Described having a \"small bfast\", but it mostly contained carbs (muffin, milks, juice, english muffin with PBJ).  No meal aspart was given earlier today, but with this bfast glucose was up to 244 pre lunch.        Recent Labs  Lab 07/28/17  1209 07/28/17  0829 07/28/17  0231 07/27/17  2203 07/27/17  1729 07/27/17  1218 07/27/17  0758 07/27/17  0350  07/26/17  0256  07/25/17  0338  07/24/17  0318  07/23/17  0352   GLC  --   --  137*  --   --   --   --  170*  --  195*  --  224*  --  207*  --  203*   * 111*  --  224* 151* 275* 122*  --   < >  --   < >  --   < >  --   < >  --    < > = values in this interval not displayed.            Review of Systems:   See interval hx          Medications:       Active Diet Order      Regular Diet Adult     Physical Exam:  Gen: Alert, resting in bed, in NAD.    HEENT: NC/AT, mucous membranes are moist  Resp: Unlabored  Neuro:oriented x3, " "communicating clearly  /90  Pulse 84  Temp 96  F (35.6  C) (Axillary)  Resp 16  Ht 1.73 m (5' 8.11\")  Wt 94.7 kg (208 lb 11.2 oz)  SpO2 98%  BMI 31.63 kg/m2           Data:   No results found for: A1C         Recent Labs   Lab Test  07/28/17   0231  07/27/17   0350   NA  142  140   POTASSIUM  3.9  4.0   CHLORIDE  110*  106   CO2  24  26   ANIONGAP  8  8   GLC  137*  170*   BUN  15  14   CR  0.71  0.66   SANDRA  7.8*  8.1*     CBC RESULTS:   Recent Labs   Lab Test  07/28/17   0231   WBC  1.3*   RBC  3.59*   HGB  11.0*   HCT  33.0*   MCV  92   MCH  30.6   MCHC  33.3   RDW  15.8*   PLT  87*     Pamella Gonzales PA-C [phone]  Diabetes Management job code [id number]          "

## 2017-07-28 NOTE — PLAN OF CARE
Problem: Blood and Marrow Transplantation  Goal: Blood and Marrow Transplantation  Signs and symptoms of listed problems will be absent or manageable.   Outcome: No Change  Pt had second half of PBSCT last evening after premedications, Tylenol and benadryl given and 4 hour pre flush and tolerated Transfusion well. Pt proceeded to continue with post 6 hour fluid flush. Up voiding frequently clear yellow urine. No blood noted in urine. Nausea under control with Scheduled Zofran. Ambien and ativan given at bedtime with good result. Pt low low potassium and low magnesium levels this morning and he got replacements per protocol this morning. Tac level continuous at 60mcg/hr + 3 ML / hour. BS result at HS was 224 abd got SSI coverage per order. Continue to monitor pt and continue with plan of care.    07/26/17 2154   Blood and Marrow Transplantation   Blood and Marrow Transplantation Assessed all   Blood and Marrow Transplantation Present fluid imbalances;metabolic imbalances;nausea/vomiting

## 2017-07-28 NOTE — PLAN OF CARE
Problem: Goal Outcome Summary  Goal: Goal Outcome Summary  OT 5C: Recommend discharge to home with HEP. Pt completed 20 mins on Nustep and increased strength in BUE with 2# dowel exercises while standing. Pt feeling more fatigued the past few days but continues to use treadmill and complete HEP independently. VSS.

## 2017-07-28 NOTE — PROGRESS NOTES
BMT SOCIAL WORK NOTE:    Focus: Support/Housing    Data: Pt is a 56 year old male with AML, currently day +2, for NMA allo sib PBSCT.    Interventions: SW met with pt to assess coping, provide psychosocial support and assist with resources as needed. Also present in the room pt's wife-Agapito. Pt was asleep upon SW arrival; pt's wife was awake. SW introduced self/role. SW asked if pt and pt's wife would like to be on the [facility] waiting list now; pt and pt's wife are currently staying at the [facility]. Pt awoke during the conversation. Pt and pt's wife prefer to stay at [facility] for the first 30 days and then get on the [facility] wait list for the last 30 days. Pt and pt's wife will let SW know if/when pt would like to be on the wait list. Pt and pt's wife did not have any further questions or concerns. SW encouraged pt to contact SW for support, questions and/or resources.     Assessment: Pt presented as calm and pleasant, pt was napping upon SW arrival. Pt appears to be coping well. Pt continues to be supported by his wife-Agapito.    Plan: SW will continue to provide psychosocial support and assistance with resources as needed. SW will continue to collaborate with multidisciplinary team regarding pt's plan of care.     Zeynep REEVES, TAB  Phone: [phone]  Pager: [phone]

## 2017-07-28 NOTE — PROCEDURES
Radiotherapy Treatment Summary          Date of Report:  2017             PATIENT: HAILEY BRAVO  MEDICAL RECORD NO: [id number]    : 1960     DIAGNOSIS: C92.01 Acute myeloblastic leukemia, in remission     INTENT OF RADIOTHERAPY:  Part of conditioning regimen for stem cell transplantation        Details of the treatments summarized below are found in records kept in the Department of Radiation Oncology at [facility].  Treatment Summary:  Radiation Oncology - Course: 2 Protocol:   Treatment Site    Dose           Modality        From         To                     Elapsed Days Fx.  2 TBI                            200 cGy 18 X        7/25/2017         2017   1             Dose per Fraction:    Total Dose:  200 cGy             200 cGy       COMMENTS:   Patient tolerated total body radiation without any acute toxicity.     PAIN MANAGEMENT:   Patient did not require any pain management related to total body radiation.  Pain otherwise managed   by inpatient team.     FOLLOW UP PLAN: Total Body Irradiation was well tolerated.  After discharge from the hospital   the patient will be followed in the Bone Marrow Transplant Clinic.        Nurse Practitioner    Staff Physician  MARCO Moon M.D.     Physicist   Karri Goncalves, PhD     CC:   Tim Ahn MD                                    Radiation Oncology:  [facility] [address] [place] [address]

## 2017-07-29 NOTE — PLAN OF CARE
"Problem: Goal Outcome Summary  Goal: Goal Outcome Summary  Outcome: No Change  /70 (BP Location: Left arm)  Pulse 76  Temp 97.6  F (36.4  C)  Resp 18  Ht 1.73 m (5' 8.11\")  Wt 94.7 kg (208 lb 11.2 oz)  SpO2 96%  BMI 31.63 kg/m2  Pt's AVSS, maintaining sat's in the upper 90's while on RA, and Tylenol given at the start of shift for generalized pain. Pt denied pain for the rest of the night. voiding good amount, K+ and Mag replaced per protocol. Continue to have Tacrolimus drip infusing at 3ml/h.Pt slept through the night with no complains. Keep monitoring and notify MD with any new changes.        "

## 2017-07-29 NOTE — PLAN OF CARE
Problem: Individualization  Goal: Patient Preferences  Outcome: No Change  Pt denied nausea and denied pain.  Did take flexaril with HS ambien for back pain prevention.  Ate and took pills well. BP high - required one dose of hydralazine - with adequate response.   Continues on tacrolimus gtt at 60 mcgs/hr.  Eats lots of carbs - but covered for each meal.  Gives own insulin.

## 2017-07-29 NOTE — PROGRESS NOTES
SPIRITUAL HEALTH SERVICES  SPIRITUAL ASSESSMENT Progress Note  [facility] ([place]) 5C     REFERRAL SOURCE: Hospital  request, BMT blessing    Shared brief conversation with Norman. We were planning to do a BMT blessing today, but his sister is sick - they think either the flu or side effects from being a donor - so his family couldn't come today. He requested we try again tomorrow, and I let him know I'd pass the request to tomorrow's .    PLAN: Triaged for on call  tomorrow to complete BMT blessing, if family is present.    Brandi Acevedo   Intern  Pager [phone]

## 2017-07-29 NOTE — PROGRESS NOTES
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       Glucoses improved today compared to yesterday, since meal aspart was restarted.  Pt ok with being on insulin for a period of time after discharge- he knows this is temporary.     Plan  Continue glargine to 7 units q24h   Meal aspart 1unit/15g CHO with meals and snacks- we will determine fixed dose for meals at discharge (likely ~ 4 units/meal, as pt aims to eat ~60g CHO).  Continue aspart high correction qAC, HS.  BG monitor qAC, HS 0200.       We will follow.       Outpatient diabetes follow up: PCP Davon  Plan discussed with patient, bedside RN           Interval History:   The last 24 hours progress and nursing notes reviewed.  Last steroid dose: dex 4mg on morning of 7/26.    Norman reports that activity level is much lower than at home, and this is likely the reason for needing more insulin this admission (controlled on metformin only at home).  He reports that he has been eating more carbs here than at home b/c of limited menu choices, and anticipating that his appetite could decrease soon (as it has in the past when he has gotten chemo).  He is enjoying desserts especially.  He met with the RD today, and will aim to eat about 60g CHO per meal.  He says he is ok with taking insulin at home- in fact he has already had the RN staff teach him how to self inject insulin.  We reviewed how sliding scale works today and he is ok doing this in combo with fixed meal dose of aspart.  He knows this is temporary.  Pt was able to increase activity level a little today.  He was feeling good this afternoon.      Recent Labs  Lab 07/29/17  1701 07/29/17  1222 07/29/17  0814 07/29/17  0320 07/28/17  2231 07/28/17  1702  "07/28/17  1209  07/28/17  0231  07/27/17  0350  07/26/17  0256  07/25/17  0338  07/24/17  0318   GLC  --   --   --  172*  --   --   --   --  137*  --  170*  --  195*  --  224*  --  207*   * 193* 124*  --  183* 150* 244*  < >  --   < >  --   < >  --   < >  --   < >  --    < > = values in this interval not displayed.            Review of Systems:   See interval hx          Medications:       Active Diet Order      Regular Diet Adult     Physical Exam:  Gen: Alert, resting in bed, in NAD.    HEENT: NC/AT, mucous membranes are moist  Resp: Unlabored  Neuro:oriented x3, communicating clearly  BP (!) 156/94 (BP Location: Left arm)  Pulse 76  Temp 97.3  F (36.3  C) (Axillary)  Resp 16  Ht 1.73 m (5' 8.11\")  Wt 91.9 kg (202 lb 8 oz)  SpO2 98%  BMI 30.69 kg/m2           Data:   No results found for: A1C         Recent Labs   Lab Test  07/29/17   0320  07/28/17   0231   NA  140  142   POTASSIUM  3.9  3.9   CHLORIDE  106  110*   CO2  29  24   ANIONGAP  5  8   GLC  172*  137*   BUN  14  15   CR  0.70  0.71   SANDRA  7.8*  7.8*     CBC RESULTS:   Recent Labs   Lab Test  07/29/17   0320   WBC  1.5*   RBC  3.63*   HGB  10.9*   HCT  32.7*   MCV  90   MCH  30.0   MCHC  33.3   RDW  15.6*   PLT  79*       Pamella Gonzales [phone]  Diabetes Management job code [id number]          "

## 2017-07-29 NOTE — PROGRESS NOTES
BMT Daily Progress Note   07/29/2017    Patient ID:  Norman Real is a 56 year old male, currently day + 3 of his HCT.     Diagnosis AMLU Acute myelogeneous leukemia, Unknown  HCT Type Allogeneic    Prep Regimen Cytoxan  Fludarabine  TBI   Donor Source Related PBSC    GVHD Prophylaxis   Mycophenolate  Primary BMT Provider Dr. Erna MD        INTERVAL  HISTORY     Overnight: Mr. Real denied any acute events overnight. He generally is feeling well- minimal nausea, when present antiemetics are effective. Stools remain formed but backing off metamucil. No infectious concerns, no cough, no sob. No edema.     Review of Systems: 10 point ROS negative except as noted above.    Scheduled Medications    filgrastim (NEUPOGEN/GRANIX) intravenous  5 mcg/kg (Treatment Plan Recorded) Intravenous Daily at 8 pm     insulin glargine  7 Units Subcutaneous Q24H     insulin aspart   Subcutaneous TID AC     ondansetron  8 mg Oral Q8H     diltiazem  300 mg Oral Daily     psyllium  1 packet Oral TID     losartan (COZAAR) half-tab 62.5 mg  62.5 mg Oral Daily     insulin aspart  1-10 Units Subcutaneous TID AC     insulin aspart  1-7 Units Subcutaneous At Bedtime     mycophenolate  1,500 mg Intravenous Q12H BMT     acyclovir  800 mg Oral 5x Daily     levofloxacin  250 mg Oral Daily at 10 am     voriconazole  200 mg Oral BID     [START ON 8/28/2017] sulfamethoxazole-trimethoprim  1 tablet Oral Q Mon Tues BID     heparin lock flush  5-10 mL Intracatheter Q24H     pantoprazole  40 mg Oral Daily     ursodiol  300 mg Oral TID     heparin lock flush  5 mL Intravenous Q24H     Current Facility-Administered Medications   Medication     filgrastim (NEUPOGEN) 480 mcg in D5W 25 mL infusion     zolpidem (AMBIEN) tablet 5-10 mg     insulin glargine (LANTUS) injection 7 Units     insulin aspart (NovoLOG) inj (RAPID ACTING)     tacrolimus (PROGRAF) 5,000 mcg in D5W 250 mL     ondansetron (ZOFRAN) tablet 8 mg     nicotine polacrilex  (NICORETTE) gum 2 mg     diltiazem 24 hr capsule 300 mg     psyllium (METAMUCIL/KONSYL) Packet 1 packet     losartan (COZAAR) half-tab 62.5 mg     insulin aspart (NovoLOG) inj (RAPID ACTING)     insulin aspart (NovoLOG) inj (RAPID ACTING)     hydrALAZINE (APRESOLINE) injection 10-20 mg     mycophenolate (GENERIC EQUIV) 1,500 mg in D5W intermittent infusion     acyclovir (ZOVIRAX) tablet 800 mg     levofloxacin (LEVAQUIN) tablet 250 mg     voriconazole (VFEND) tablet 200 mg     [START ON 8/28/2017] sulfamethoxazole-trimethoprim (BACTRIM DS/SEPTRA DS) 800-160 MG per tablet 1 tablet     cyclobenzaprine (FLEXERIL) tablet 5 mg     oxyCODONE (ROXICODONE) IR tablet 5 mg     lidocaine 1 % 1 mL     lidocaine (LMX4) kit     acetaminophen (TYLENOL) tablet 325-650 mg     acetaminophen (TYLENOL) tablet 325-650 mg     prochlorperazine (COMPAZINE) injection 5-10 mg    Or     prochlorperazine (COMPAZINE) tablet 5-10 mg     LORazepam (ATIVAN) injection 0.5-1 mg    Or     LORazepam (ATIVAN) tablet 0.5-1 mg     sodium chloride (PF) 0.9% PF flush 10-20 mL     heparin lock flush 10 UNIT/ML injection 5-10 mL     heparin lock flush 10 UNIT/ML injection 5-10 mL     pantoprazole (PROTONIX) EC tablet 40 mg     ursodiol (ACTIGALL) capsule 300 mg     potassium chloride SA (K-DUR/KLOR-CON M) CR tablet 20-40 mEq     potassium chloride (KLOR-CON) Packet 20-40 mEq     potassium chloride 10 mEq in 100 mL intermittent infusion     potassium chloride 10 mEq in 100 mL intermittent infusion with 10 mg lidocaine     potassium chloride 20 mEq in 50 mL intermittent infusion     magnesium sulfate 2 g in NS intermittent infusion ([facility]ar[facility] or [facility]V Cmpd)     magnesium sulfate 4 g in 100 mL sterile water (premade)     sodium phosphate 15 mmol in D5W intermittent infusion     sodium phosphate 20 mmol in D5W intermittent infusion     sodium phosphate 25 mmol in D5W intermittent infusion     heparin lock flush 10 UNIT/ML injection 5 mL     heparin lock flush 10  "UNIT/ML injection 5-10 mL     naloxone (NARCAN) injection 0.1-0.4 mg     glucose 40 % gel 15-30 g    Or     dextrose 50 % injection 25-50 mL    Or     glucagon injection 1 mg       PHYSICAL EXAM     Weight In/Out     Wt Readings from Last 3 Encounters:   07/28/17 94.7 kg (208 lb 11.2 oz)   07/14/17 92.5 kg (204 lb)   06/30/17 90.7 kg (200 lb)      I/O last 3 completed shifts:  In: 2648.97 [P.O.:1620; I.V.:1028.97]  Out: 4275 [Urine:4275]       KPS:  70    /70 (BP Location: Left arm)  Pulse 76  Temp 97.6  F (36.4  C)  Resp 18  Ht 1.73 m (5' 8.11\")  Wt 94.7 kg (208 lb 11.2 oz)  SpO2 96%  BMI 31.63 kg/m2   General: NAD, interactive, appropriate   Eyes: SAMSON, sclera anicteric   Nose/Mouth/Throat: OP clear, buccal mucosa moist, no ulcerations   Lungs: CTA bilaterally  Cardiovascular: RRR, no M/R/G   Abdominal/Rectal: +BS, soft, NT, ND, No HSM   Lymphatics: No edema  Skin: no rashes or petechaie  Neuro: A&O   Additional Findings: Cooper site NT, no drainage.    LABS AND IMAGING - PAST 24 HOURS     Results for orders placed or performed during the hospital encounter of 07/18/17 (from the past 24 hour(s))   Tacrolimus level   Result Value Ref Range    Tacrolimus Last Dose Blood     Tacrolimus Level 15.3 (H) 5.0 - 15.0 ug/L   Basic metabolic panel   Result Value Ref Range    Sodium 140 133 - 144 mmol/L    Potassium 3.9 3.4 - 5.3 mmol/L    Chloride 106 94 - 109 mmol/L    Carbon Dioxide 29 20 - 32 mmol/L    Anion Gap 5 3 - 14 mmol/L    Glucose 172 (H) 70 - 99 mg/dL    Urea Nitrogen 14 7 - 30 mg/dL    Creatinine 0.70 0.66 - 1.25 mg/dL    GFR Estimate >90  Non  GFR Calc   >60 mL/min/1.7m2    GFR Estimate If Black >90   GFR Calc   >60 mL/min/1.7m2    Calcium 7.8 (L) 8.5 - 10.1 mg/dL   CBC with platelets differential   Result Value Ref Range    WBC 1.5 (L) 4.0 - 11.0 10e9/L    RBC Count 3.63 (L) 4.4 - 5.9 10e12/L    Hemoglobin 10.9 (L) 13.3 - 17.7 g/dL    Hematocrit 32.7 (L) 40.0 - " 53.0 %    MCV 90 78 - 100 fl    MCH 30.0 26.5 - 33.0 pg    MCHC 33.3 31.5 - 36.5 g/dL    RDW 15.6 (H) 10.0 - 15.0 %    Platelet Count 79 (L) 150 - 450 10e9/L    Diff Method Automated Method     % Neutrophils 71.7 %    % Lymphocytes 12.4 %    % Monocytes 15.2 %    % Eosinophils 0.0 %    % Basophils 0.0 %    % Immature Granulocytes 0.7 %    Nucleated RBCs 0 0 /100    Absolute Neutrophil 1.0 (L) 1.6 - 8.3 10e9/L    Absolute Lymphocytes 0.2 (L) 0.8 - 5.3 10e9/L    Absolute Monocytes 0.2 0.0 - 1.3 10e9/L    Absolute Eosinophils 0.0 0.0 - 0.7 10e9/L    Absolute Basophils 0.0 0.0 - 0.2 10e9/L    Abs Immature Granulocytes 0.0 0 - 0.4 10e9/L    Absolute Nucleated RBC 0.0    Magnesium   Result Value Ref Range    Magnesium 1.7 1.6 - 2.3 mg/dL         ASSESSMENT BY SYSTEMS     Norman Salazar Real is a 56 year old male with AML, currently day +3 for 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 He received additional stem cells from donor 7/27.      Prep detailed below: Initially held prep for a day to obtain bone marrow biopsy with intent to confirm remission status before proceeding.  Bone marrow biopsy completed 7/20 without complications. Morph and FLOW negative for AML, signed out 7/20 at 9:30 am. Restarted treatment plan with first dose of chemotherapy administered on 7/20.       1a. Prep for transplant briefly held 7/19 to obtain a BMBX & r/o active disease with falling platelet count. Flow/Morph reported as negative for malignancy 7/20. Released chemotherapy and dates changed in the plan. Updated [facility] team for new calendar and to reset the BMT date.  - D - 6 (7/20) cytoxan and fludarabine today   - D - 5 to Day - 2 (7/21-7/24) fludarabine  - D - 1 (7/25) TBI x 1  - D - 0 (7/26) Transplant today, no ABO mismatch. Flush 4 hours pre and 6 hours post  - D + 1 (7/27) Initial stem cell product only contained 2.33 CD34/kg, additional 0.66 CD34/kg on 7/27 for a total of 2.99.      1b.  BMT: Prep as above.  - GCSF starts  d+5 and cont to until ANC>1500 x3 consecutive days.   - Re-stage per protocol.     2.  HEME: Keep Hgb>8 and plts>10K.   - No pre-meds.       3.  ID: No focal s/sx of infection at this time.   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   - Bactrim or appropriate PCP therapy to start d+28.                     4.  GI: Mild nausea without vomiting.   - Hx constipation- stools loosing so decreasing metamucil   - Continue zofran TID with nausea  - Ativan and compazine available PRN break-through symptoms.   - Protonix for GI prophy.   - Ursodiol for VOD prophy.  - s/p left sided colectomy for recurrent diverticulitis     5.  GVH: No aGVH to date   - Cont MMF  - Tac 21.4 7/27. Tac gtt decreased to 60 mcg/hr (vfend adjustment) 7/28 Tac: 15.3, Cr stable- no change to dose. Recheck      6.  FEN/Renal: Eating adequate amounts of a regular diet.   - Wt improved with lasix yesterday- no additional lasix today.   - Cr and lytes stable.      7. CV: Hx HTN + TAC exacerbated.   - HTN Management: Cont losartan, increased from 50 mg/d to 62.5 mg/day 7/22. Increased diltiazem XL to 240 7/21 and again 7/24 to 300.   - PRN Hydralazine 10-20 mg Q 4 hrs IV   - Hx tachycardia with arrhythmia, s/p 3 ablations  - hold crestor through transplant     8. Endo: Hx DM type II. BG's improved, tapered off of decadron.  - Endocrine consult 7/24, appreciate involvement & recs. Mgt per Endo team, see notes.    - Hold metformin 500mg/d on admission.     9. : Hx prostate cancer.   - Asymptomatic at this time.      10. Neuro: History of chronic insomnia.  - Insomnia: Inc ambien to 5-10 mg QHS PRN  - Leg pain s/p bmbx. Much improved. Oxycodone and flexeril prn     11. Pulm: Hx spiculated pulm nodule (concern for malignancy w/ hx tobacco use) and GGO (concerning for fungal PNA). F/u CT chest in 4-6 weeks (approx 8/22) per Dr. Ahn  recs in workup.    - Note 6/30 BAL + for aspergillus galactomannan; culture negative to date but still in process.     OVERALL PLAN     Anticipated hospital dismissal: Remain admitted through stem cell transplant and cell count recovery     Lacy Patel      ATTENDING ADDENDUM:    I have independently seen and evaluated the patient on July 29, 2017 and reviewed clinical, laboratory, and radiographic findings. I have discussed the plan with the team and agree with the attached note with the following edits:    Norman Real is a 56 year old year old male, with AML in CR1, Day +3 AP sib allo. Mild fatigue.      Ph/E: Vitals reviewed. No distress. Oropharynx clear. Neck supple. Heart RRR. Lungs clear. Abdomen soft. No peripheral edema. No rash. Neuro nonfocal.     Plan: G. Support through count recovery. Cut tac.        filgrastim (NEUPOGEN/GRANIX) intravenous  5 mcg/kg (Treatment Plan Recorded) Intravenous Daily at 8 pm     insulin glargine  7 Units Subcutaneous Q24H     insulin aspart   Subcutaneous TID AC     ondansetron  8 mg Oral Q8H     diltiazem  300 mg Oral Daily     psyllium  1 packet Oral TID     losartan (COZAAR) half-tab 62.5 mg  62.5 mg Oral Daily     insulin aspart  1-10 Units Subcutaneous TID AC     insulin aspart  1-7 Units Subcutaneous At Bedtime     mycophenolate  1,500 mg Intravenous Q12H BMT     acyclovir  800 mg Oral 5x Daily     levofloxacin  250 mg Oral Daily at 10 am     voriconazole  200 mg Oral BID     [START ON 8/28/2017] sulfamethoxazole-trimethoprim  1 tablet Oral Q Mon Tues BID     heparin lock flush  5-10 mL Intracatheter Q24H     pantoprazole  40 mg Oral Daily     ursodiol  300 mg Oral TID     heparin lock flush  5 mL Intravenous Q24H       Tito Perez

## 2017-07-29 NOTE — PLAN OF CARE
Problem: Goal Outcome Summary  Goal: Goal Outcome Summary  Outcome: No Change  A&Ox4. B/P runs 140's/80-90's, pt is on antihypertensive med, below parameter for PRN hydralazine.Hgb is 10.9, plt 79 today. K 3.9, mag 1.7, replacement was done during night shift.  Tylenol 650 mg tab po x1 for back and neck pain with decrease in pain. Bs 124, 193, 143, covered per sliding scale and pt has carb coverage.Intermittent nausea, lorazepam 1 mg tab po PRN x1 with decrease in nausea, pt is on scheduled zofran tab, good appetite.Tacrolimus drip is reduced to 50 mcg/hr. Large urine output.        Problem: Blood and Marrow Transplantation  Goal: Blood and Marrow Transplantation  Signs and symptoms of listed problems will be absent or manageable.   Outcome: No Change        07/29/17 1843   Blood and Marrow Transplantation   Blood and Marrow Transplantation Assessed all   Blood and Marrow Transplantation Present metabolic imbalances;nausea/vomiting

## 2017-07-29 NOTE — PROGRESS NOTES
CLINICAL NUTRITION SERVICES    Reason for Assessment:  Moderate consistent carbohydrate diet nutrition education    Diet History:  Pt reports history of receiving carbohydrate counting/Moderate consistent carbohydrate diet education in the past. He demonstrates fair knowledge of carbohydrate counting and macronutrient impact on blood sugars. Pt reports physical activity helped manage blood sugars PTA. Pt believes blood sugars less manageable since admission due to lack of physical activity at present.     Nutrition Prescription/Recs:  Continue Regular diet.      Interventions:  Nutrition Education  1.  Provided verbal instruction on moderate consistent carbohydrate diet (60-75 gm CHO per meal; 15-30 gm per snack). Discussed macronutrient effects on blood sugar, encouraging pt to pair carbohydrate with protein and/or fat sources. Encouraged pt to engage in physical activity as able. Pt verbalized understanding. Anticipate good compliance.    Follow-up:  Progress will be monitored per protocol.     Melodie Patel RD, LD  Weekend Pager: [phone]

## 2017-07-30 NOTE — PROGRESS NOTES
BMT Daily Progress Note   07/30/2017    Patient ID:  Norman Real is a 56 year old male, currently day +4 of his HCT.     Diagnosis AMLU Acute myelogeneous leukemia, Unknown  HCT Type Allogeneic    Prep Regimen Cytoxan  Fludarabine  TBI   Donor Source Related PBSC    GVHD Prophylaxis   Mycophenolate  Primary BMT Provider Dr. Erna MD        INTERVAL  HISTORY     Overnight: Mr. Real denied any acute events overnight. He generally is feeling well. Though he is having increased bone pain especially lower back and shoulders - willing to try Claritin. He denies any headaches/visual symptoms and was unaware that BP has been higher. No infectious concerns. Able to work with therapy/walk on treadmill in his room daily.     Review of Systems: 10 point ROS negative except as noted above.    Scheduled Medications    loratadine  10 mg Oral Daily     filgrastim (NEUPOGEN/GRANIX) intravenous  5 mcg/kg (Treatment Plan Recorded) Intravenous Daily at 8 pm     insulin glargine  7 Units Subcutaneous Q24H     insulin aspart   Subcutaneous TID AC     ondansetron  8 mg Oral Q8H     diltiazem  300 mg Oral Daily     psyllium  1 packet Oral TID     losartan (COZAAR) half-tab 62.5 mg  62.5 mg Oral Daily     insulin aspart  1-10 Units Subcutaneous TID AC     insulin aspart  1-7 Units Subcutaneous At Bedtime     mycophenolate  1,500 mg Intravenous Q12H BMT     acyclovir  800 mg Oral 5x Daily     levofloxacin  250 mg Oral Daily at 10 am     voriconazole  200 mg Oral BID     [START ON 8/28/2017] sulfamethoxazole-trimethoprim  1 tablet Oral Q Mon Tues BID     heparin lock flush  5-10 mL Intracatheter Q24H     pantoprazole  40 mg Oral Daily     ursodiol  300 mg Oral TID     heparin lock flush  5 mL Intravenous Q24H     Current Facility-Administered Medications   Medication     loratadine (CLARITIN) tablet 10 mg     filgrastim (NEUPOGEN) 480 mcg in D5W 25 mL infusion     zolpidem (AMBIEN) tablet 5-10 mg     insulin glargine  (LANTUS) injection 7 Units     insulin aspart (NovoLOG) inj (RAPID ACTING)     tacrolimus (PROGRAF) 5,000 mcg in D5W 250 mL     ondansetron (ZOFRAN) tablet 8 mg     nicotine polacrilex (NICORETTE) gum 2 mg     diltiazem 24 hr capsule 300 mg     psyllium (METAMUCIL/KONSYL) Packet 1 packet     losartan (COZAAR) half-tab 62.5 mg     insulin aspart (NovoLOG) inj (RAPID ACTING)     insulin aspart (NovoLOG) inj (RAPID ACTING)     hydrALAZINE (APRESOLINE) injection 10-20 mg     mycophenolate (GENERIC EQUIV) 1,500 mg in D5W intermittent infusion     acyclovir (ZOVIRAX) tablet 800 mg     levofloxacin (LEVAQUIN) tablet 250 mg     voriconazole (VFEND) tablet 200 mg     [START ON 8/28/2017] sulfamethoxazole-trimethoprim (BACTRIM DS/SEPTRA DS) 800-160 MG per tablet 1 tablet     cyclobenzaprine (FLEXERIL) tablet 5 mg     oxyCODONE (ROXICODONE) IR tablet 5 mg     lidocaine 1 % 1 mL     lidocaine (LMX4) kit     acetaminophen (TYLENOL) tablet 325-650 mg     acetaminophen (TYLENOL) tablet 325-650 mg     prochlorperazine (COMPAZINE) injection 5-10 mg    Or     prochlorperazine (COMPAZINE) tablet 5-10 mg     LORazepam (ATIVAN) injection 0.5-1 mg    Or     LORazepam (ATIVAN) tablet 0.5-1 mg     sodium chloride (PF) 0.9% PF flush 10-20 mL     heparin lock flush 10 UNIT/ML injection 5-10 mL     heparin lock flush 10 UNIT/ML injection 5-10 mL     pantoprazole (PROTONIX) EC tablet 40 mg     ursodiol (ACTIGALL) capsule 300 mg     potassium chloride SA (K-DUR/KLOR-CON M) CR tablet 20-40 mEq     potassium chloride (KLOR-CON) Packet 20-40 mEq     potassium chloride 10 mEq in 100 mL intermittent infusion     potassium chloride 10 mEq in 100 mL intermittent infusion with 10 mg lidocaine     potassium chloride 20 mEq in 50 mL intermittent infusion     magnesium sulfate 2 g in NS intermittent infusion ([facility]ar[facility] or [facility] Cmpd)     magnesium sulfate 4 g in 100 mL sterile water (premade)     sodium phosphate 15 mmol in D5W intermittent infusion      "sodium phosphate 20 mmol in D5W intermittent infusion     sodium phosphate 25 mmol in D5W intermittent infusion     heparin lock flush 10 UNIT/ML injection 5 mL     heparin lock flush 10 UNIT/ML injection 5-10 mL     naloxone (NARCAN) injection 0.1-0.4 mg     glucose 40 % gel 15-30 g    Or     dextrose 50 % injection 25-50 mL    Or     glucagon injection 1 mg       PHYSICAL EXAM     Weight In/Out     Wt Readings from Last 3 Encounters:   07/30/17 90.9 kg (200 lb 6.4 oz)   07/14/17 92.5 kg (204 lb)   06/30/17 90.7 kg (200 lb)      I/O last 3 completed shifts:  In: 2855 [P.O.:1820; I.V.:761; IV Piggyback:274]  Out: 5475 [Urine:5475]       KPS:  70    /84 (BP Location: Left arm)  Pulse 88  Temp 96.4  F (35.8  C) (Axillary)  Resp 16  Ht 1.73 m (5' 8.11\")  Wt 90.9 kg (200 lb 6.4 oz)  SpO2 97%  BMI 30.37 kg/m2   General: NAD, interactive, appropriate   Eyes: SAMSON, sclera anicteric   Nose/Mouth/Throat: OP clear, buccal mucosa moist, no ulcerations   Lungs: CTA bilaterally  Cardiovascular: RRR, no M/R/G   Abdominal/Rectal: +BS, soft, NT, ND, No HSM   Lymphatics: No edema  Skin: no rashes or petechaie  Neuro: A&O   Additional Findings: Cooper site NT, no drainage.    LABS AND IMAGING - PAST 24 HOURS     Results for orders placed or performed during the hospital encounter of 07/18/17 (from the past 24 hour(s))   Basic metabolic panel   Result Value Ref Range    Sodium 140 133 - 144 mmol/L    Potassium 4.4 3.4 - 5.3 mmol/L    Chloride 107 94 - 109 mmol/L    Carbon Dioxide 25 20 - 32 mmol/L    Anion Gap 8 3 - 14 mmol/L    Glucose 140 (H) 70 - 99 mg/dL    Urea Nitrogen 13 7 - 30 mg/dL    Creatinine 0.72 0.66 - 1.25 mg/dL    GFR Estimate >90  Non  GFR Calc   >60 mL/min/1.7m2    GFR Estimate If Black >90   GFR Calc   >60 mL/min/1.7m2    Calcium 8.5 8.5 - 10.1 mg/dL   CBC with platelets differential   Result Value Ref Range    WBC 1.0 (L) 4.0 - 11.0 10e9/L    RBC Count 3.75 (L) 4.4 - " 5.9 10e12/L    Hemoglobin 11.4 (L) 13.3 - 17.7 g/dL    Hematocrit 34.0 (L) 40.0 - 53.0 %    MCV 91 78 - 100 fl    MCH 30.4 26.5 - 33.0 pg    MCHC 33.5 31.5 - 36.5 g/dL    RDW 15.5 (H) 10.0 - 15.0 %    Platelet Count 66 (L) 150 - 450 10e9/L    Diff Method Manual Differential     % Neutrophils 76.3 %    % Lymphocytes 19.3 %    % Monocytes 4.4 %    % Eosinophils 0.0 %    % Basophils 0.0 %    Nucleated RBCs 2 (H) 0 /100    Absolute Neutrophil 0.8 (L) 1.6 - 8.3 10e9/L    Absolute Lymphocytes 0.2 (L) 0.8 - 5.3 10e9/L    Absolute Monocytes 0.0 0.0 - 1.3 10e9/L    Absolute Eosinophils 0.0 0.0 - 0.7 10e9/L    Absolute Basophils 0.0 0.0 - 0.2 10e9/L    Absolute Nucleated RBC 0.0     Anisocytosis Slight     RBC Fragments Slight     Reactive Lymphs Present    ABO/Rh type and screen   Result Value Ref Range    ABO O     RH(D)  Pos     Antibody Screen Neg     Test Valid Only At       [facility]    Specimen Expires 08/02/2017          ASSESSMENT BY KALPESH Austin Salazar Real is a 56 year old male with AML, currently day +3 for 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 He received additional stem cells from donor 7/27.      Prep detailed below: Initially held prep for a day to obtain bone marrow biopsy with intent to confirm remission status before proceeding.  Bone marrow biopsy completed 7/20 without complications. Morph and FLOW negative for AML, signed out 7/20 at 9:30 am. Restarted treatment plan with first dose of chemotherapy administered on 7/20.       1a. Prep for transplant briefly held 7/19 to obtain a BMBX & r/o active disease with falling platelet count. Flow/Morph reported as negative for malignancy 7/20. Released chemotherapy and dates changed in the plan. Updated RNCC team for new calendar and to reset the BMT date.  - D - 6 (7/20) cytoxan and fludarabine today   - D - 5 to Day - 2 (7/21-7/24) fludarabine  - D - 1 (7/25) TBI x 1  - D - 0 (7/26) Transplant  today, no ABO mismatch. Flush 4 hours pre and 6 hours post  - D + 1 (7/27) Initial stem cell product only contained 2.33 CD34/kg, additional 0.66 CD34/kg on 7/27 for a total of 2.99.      1b.  BMT: Prep as above.  - GCSF starts d+5 and cont to until ANC>1500 x3 consecutive days.   - Re-stage per protocol.  - 7/30 Add claritin 10mg daily for bone pain - d/c if not helpful.      2.  HEME: Keep Hgb>8 and plts>10K.   - No pre-meds.       3.  ID: No focal s/sx of infection at this time.   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   - Bactrim or appropriate PCP therapy to start d+28.                     4.  GI: Mild nausea without vomiting.   - Hx constipation- stools loosing so decreasing metamucil   - Continue zofran TID with nausea  - Ativan and compazine available PRN break-through symptoms.   - Protonix for GI prophy.   - Ursodiol for VOD prophy.  - s/p left sided colectomy for recurrent diverticulitis     5.  GVH: No aGVH to date   - Cont MMF  - Tac 21.4 7/27. Tac gtt decreased to 60 mcg/hr (vfend adjustment) 7/28 Tac: 15.3, Tac gtt decreased 50mch/hr. Recheck level tomorrow, 7/31.      6.  FEN/Renal: Eating adequate amounts of a regular diet.   - Wt improved with lasix yesterday- no additional lasix today.   - Cr and lytes stable.      7. CV: Hx HTN + TAC exacerbated.   - HTN Management: Cont losartan, increased from 50 mg/d to 62.5 mg/day 7/22. Increased diltiazem XL 7/31 360 (max dose).    - PRN Hydralazine 10-20 mg Q 4 hrs IV   - Hx tachycardia with arrhythmia, s/p 3 ablations  - hold crestor through transplant     8. Endo: Hx DM type II. BG's improved, tapered off of decadron.  - Endocrine consult 7/24, appreciate involvement & recs. Mgt per Endo team, see notes.    - Hold metformin 500mg/d on admission.     9. : Hx prostate cancer.   - Asymptomatic at this time.      10. Neuro: History  of chronic insomnia.  - Insomnia: Inc ambien to 5-10 mg QHS PRN  - Leg pain s/p bmbx. Much improved. Oxycodone and flexeril prn     11. Pulm: Hx spiculated pulm nodule (concern for malignancy w/ hx tobacco use) and GGO (concerning for fungal PNA). F/u CT chest in 4-6 weeks (approx 8/22) per Dr. Ahn recs in workup.    - Note 6/30 BAL + for aspergillus galactomannan; culture negative to date but still in process.     OVERALL PLAN     Anticipated hospital dismissal: Remain admitted through stem cell transplant and cell count recovery     Lacy Patel      ATTENDING ADDENDUM:    I have independently seen and evaluated the patient on July 30, 2017 and reviewed clinical, laboratory, and radiographic findings. I have discussed the plan with the team and agree with the attached note with the following edits:    Norman Real is a 56 year old year old male, with AML in CR1, Day +4 AP sib allo. Mild fatigue.      Ph/E: Vitals reviewed. No distress. Oropharynx clear. Neck supple. Heart RRR. Lungs clear. Abdomen soft. No peripheral edema. No rash. Neuro nonfocal.     Plan: G. Support through count recovery. Increase dilt.        loratadine  10 mg Oral Daily     diltiazem  360 mg Oral Daily     filgrastim (NEUPOGEN/GRANIX) intravenous  5 mcg/kg (Treatment Plan Recorded) Intravenous Daily at 8 pm     insulin glargine  7 Units Subcutaneous Q24H     insulin aspart   Subcutaneous TID AC     ondansetron  8 mg Oral Q8H     psyllium  1 packet Oral TID     losartan (COZAAR) half-tab 62.5 mg  62.5 mg Oral Daily     insulin aspart  1-10 Units Subcutaneous TID AC     insulin aspart  1-7 Units Subcutaneous At Bedtime     mycophenolate  1,500 mg Intravenous Q12H BMT     acyclovir  800 mg Oral 5x Daily     levofloxacin  250 mg Oral Daily at 10 am     voriconazole  200 mg Oral BID     [START ON 8/28/2017] sulfamethoxazole-trimethoprim  1 tablet Oral Q Mon Tues BID     heparin lock flush  5-10 mL Intracatheter Q24H      pantoprazole  40 mg Oral Daily     ursodiol  300 mg Oral TID     heparin lock flush  5 mL Intravenous Q24H       Tito Ana

## 2017-07-30 NOTE — PLAN OF CARE
Problem: Goal Outcome Summary  Goal: Goal Outcome Summary  Outcome: No Change  A&Ox4. B/P runs high, pt is on antihypertensive med: below parameter for PRN hydralazine.Pt reported this morning increased bone pain especially lower back and shoulders, claritin 10mg tab po daily started. Pt stated that he has less pain in his hips now. Bs 113, 199, covered per sliding scale, pt also has carb coverage, good appetite. Intermittent nausea, on scheduled zofran 8 mg tab, pt had lorazepam 1 mg tab x1 with decrease in nausea.Up independent to bathroom, voiding good amount( see I/O). Tac drip is infusing 50 mcg/hr, bag needs to be changed at 7:30 pm.Wife and sister were here this morning. Continue with plan of care.  Addendum  Pt needs insulin carb coverage when he is done eating dinner and additional 1 unit of insulin for Bs 142. Pt reported wife will bring dinner.    Problem: Blood and Marrow Transplantation  Goal: Blood and Marrow Transplantation  Signs and symptoms of listed problems will be absent or manageable.     07/30/17 1616   Blood and Marrow Transplantation   Blood and Marrow Transplantation Assessed all   Blood and Marrow Transplantation Present metabolic imbalances;nausea/vomiting

## 2017-07-30 NOTE — PROGRESS NOTES
SPIRITUAL HEALTH SERVICES  SPIRITUAL ASSESSMENT Progress Note  [facility] ([place]) 5C BMT   ON-CALL VISIT    REFERRAL SOURCE: Follow-up for [place] service if sister is well.     Shared a brief reflective conversation with Norman for blessing service. He shared that his sister is not yet well enough to come to the hospital for the service so they would like to wait. I will refer to the unit/on call  for follow-up tomorrow. Offered prayer for the in between time.     PLAN: I will alert the unit  of our visit.      Mari Cox  Chaplain Resident  Pager [phone]

## 2017-07-30 NOTE — PROGRESS NOTES
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       Glucoses mostly low to mid 100s, higher midday b/c this was checked within 2 hours of last meal.     Plan  Continue glargine to 7 units q24h   Meal aspart 1unit/15g CHO with meals and snacks- we will determine fixed dose for meals at discharge (likely ~ 4 units/meal, as pt aims to eat ~60g CHO).  Continue aspart high correction qAC, HS.  BG monitor qAC, HS 0200.       Norman is ok with discharging on glargine and aspart- he has gone over insulin injection techniques with nursing staff, but I will place consult for diabetic education before discharge to review this again.      We will follow.       Outpatient diabetes follow up: PCP Davon  Plan discussed with patient, bedside RN           Interval History:   The last 24 hours progress and nursing notes reviewed.  Last steroid dose: dex 4mg on morning of 7/26.    Norman continues to feel well.  He had no complaints today.  Trying to increase activity.  Midday glucose was checked less than 2 hours after his morning meal, so somewhat elevated.      Recent Labs  Lab 07/30/17  1213 07/30/17  0800 07/30/17  0359 07/29/17  2131 07/29/17  1701 07/29/17  1222 07/29/17  0814 07/29/17  0320  07/28/17  0231  07/27/17  0350  07/26/17  0256  07/25/17  0338   GLC  --   --  140*  --   --   --   --  172*  --  137*  --  170*  --  195*  --  224*   * 113*  --  163* 143* 193* 124*  --   < >  --   < >  --   < >  --   < >  --    < > = values in this interval not displayed.            Review of Systems:   See interval hx          Medications:       Active Diet Order      Regular Diet Adult     Physical Exam:  Gen: Alert,up walking around room, in NAD.    HEENT: NC/AT, mucous membranes  "are moist  Resp: Unlabored  Neuro:oriented x3, communicating clearly  BP (!) 133/91 (BP Location: Left arm)  Pulse 88  Temp 96.7  F (35.9  C) (Axillary)  Resp 16  Ht 1.73 m (5' 8.11\")  Wt 90.9 kg (200 lb 6.4 oz)  SpO2 98%  BMI 30.37 kg/m2           Data:   No results found for: A1C         Recent Labs   Lab Test  07/30/17   0359  07/29/17   0320   NA  140  140   POTASSIUM  4.4  3.9   CHLORIDE  107  106   CO2  25  29   ANIONGAP  8  5   GLC  140*  172*   BUN  13  14   CR  0.72  0.70   SANDRA  8.5  7.8*     CBC RESULTS:   Recent Labs   Lab Test  07/30/17   0359   WBC  1.0*   RBC  3.75*   HGB  11.4*   HCT  34.0*   MCV  91   MCH  30.4   MCHC  33.5   RDW  15.5*   PLT  66*       Pamella Gonzales PA-C [phone]  Diabetes Management job code [id number]          "

## 2017-07-30 NOTE — PLAN OF CARE
Problem: Individualization  Goal: Patient Preferences  Outcome: No Change  Patient complained of hip/low back pain following GCSF infusion, states that he did have joint pain in the past with GCSF. Discussed trying claritin prior to infusion. Will have this addressed with MD today.   AVSS, no other complaints noted.   Continue with current POC    Problem: Blood and Marrow Transplantation  Goal: Blood and Marrow Transplantation  Signs and symptoms of listed problems will be absent or manageable.     07/29/17 1843   Blood and Marrow Transplantation   Blood and Marrow Transplantation Assessed all   Blood and Marrow Transplantation Present metabolic imbalances;nausea/vomiting

## 2017-07-31 NOTE — PROGRESS NOTES
SPIRITUAL HEALTH SERVICES  SPIRITUAL ASSESSMENT Progress Note  [facility] ([place]) 5C     REFERRAL SOURCE: Follow up per pt and on-call  request    Brief visit with Norman, wife Agapito and Mother-in-Law to facilitate BMT blessing service. Initially had intended to do the service with Norman's donor (sister) present but her health was such that she was unable to attend. Norman mentioned keeping the vial of anointing oil to anoint his sister with when she is well enough to visit. Norman expressed interest in continued follow-up visits.    PLAN: Continue to visit pt 1X/Wk as schedule allows.  remains available upon request.    Yoel Foy   Intern  Pager [phone]

## 2017-07-31 NOTE — PLAN OF CARE
Problem: Blood and Marrow Transplantation  Goal: Blood and Marrow Transplantation  Signs and symptoms of listed problems will be absent or manageable.     07/31/17 1836   Blood and Marrow Transplantation   Blood and Marrow Transplantation Assessed all   Blood and Marrow Transplantation Present nausea/vomiting   Patient vital signs stable, he offers no new complaints. Patient did have Ativan 1 mg po this afternoon for nausea. Patient up in room and eating well. Continue to monitor.

## 2017-07-31 NOTE — PLAN OF CARE
Problem: Individualization  Goal: Patient Preferences  Outcome: No Change  Slept well through the night, no complains of pain or nausea this shift  AVSS  Continue with current POC    Problem: Blood and Marrow Transplantation  Goal: Blood and Marrow Transplantation  Signs and symptoms of listed problems will be absent or manageable.   Outcome: No Change    07/30/17 1616 07/31/17 0632   Blood and Marrow Transplantation   Blood and Marrow Transplantation Assessed --  all   Blood and Marrow Transplantation Present metabolic imbalances;nausea/vomiting --

## 2017-07-31 NOTE — PROGRESS NOTES
Diabetes Consult Daily  Progress Note          Assessment/Plan:   Norman Real is a 56 year old man with well-controlled type 2 diabetes (metformin prior to admission), admitted NMA allo sib PBSCT to treat AML, experienced increased hyperglycemia related to steroids and continued insulin need in setting of withholding metformin.       Fasting glucose near in target/near target but post-prandial still intermittently 200s.     Plan  Continue glargine to 7 units q24h   Meal aspart increase to 1unit/12g CHO for breakfast, continue 1 per 15 g CHO with lunch, supper, bedtime and other snacks  Continue aspart high correction qAC, HS.  BG monitor qAC, HS 0200.  Diabetes educ consult in place (plan to discharge on insulin)           We will follow.       Outpatient diabetes follow up: PCP Davon  Plan discussed with patient, bedside RN           Interval History:   The last 24 hours progress and nursing notes reviewed.  Overall feeling okay.  Trouble with boredom.  Working on model car today.  Is trying to moderate his intake of carbs at bfast.  He will continue to have dessert--  Taste for simple carbs is the most true.  Other tastes are altered.  Walking on treadmill 30 minutes per day.  Has been practicing injections himself.  Accepting of insulin for short time after discharge.  Feels confident finding carb content of foods.      Recent Labs  Lab 07/31/17  1222 07/31/17  0840 07/31/17  0349 07/30/17  2119 07/30/17  1817 07/30/17  1213 07/30/17  0800 07/30/17  0359  07/29/17  0320  07/28/17  0231  07/27/17  0350  07/26/17  0256   GLC  --   --  142*  --   --   --   --  140*  --  172*  --  137*  --  170*  --  195*   * 134*  --  219* 142* 199* 113*  --   < >  --   < >  --   < >  --   < >  --    < > = values in this interval not displayed.            Review of Systems:   See interval hx          Medications:       Active Diet Order      Regular Diet Adult     Physical Exam:  Gen: Alert,up  "in room, in NAD.  Family at bedside  HEENT: NC/AT, mucous membranes are moist  Resp: Unlabored  Neuro:oriented x3, communicating clearly  /79 (BP Location: Left arm)  Pulse 88  Temp 97  F (36.1  C) (Axillary)  Resp 16  Ht 1.73 m (5' 8.11\")  Wt 90.9 kg (200 lb 6.4 oz)  SpO2 97%  BMI 30.37 kg/m2           Data:   No results found for: A1C         Recent Labs   Lab Test  07/31/17   0349  07/30/17   0359   NA  140  140   POTASSIUM  4.2  4.4   CHLORIDE  105  107   CO2  25  25   ANIONGAP  10  8   GLC  142*  140*   BUN  12  13   CR  0.75  0.72   SANDRA  8.4*  8.5     CBC RESULTS:   Recent Labs   Lab Test  07/31/17   0349   WBC  0.7*   RBC  3.80*   HGB  11.6*   HCT  34.8*   MCV  92   MCH  30.5   MCHC  33.3   RDW  15.6*   PLT  56*     Marisol Zaldivar APRN [facility] [phone]  Diabetes Management Team job code: [id number]              "

## 2017-07-31 NOTE — PROGRESS NOTES
BMT Daily Progress Note   07/31/2017    Patient ID:  Norman Real is a 56 year old male, currently day +5 of his HCT.     Diagnosis AMLU Acute myelogeneous leukemia, Unknown  HCT Type Allogeneic    Prep Regimen Cytoxan  Fludarabine  TBI   Donor Source Related PBSC    GVHD Prophylaxis   Mycophenolate  Primary BMT Provider Dr. Erna MD        INTERVAL  HISTORY     HPI: Mr. Real is feeling well. Continues to have back and shoulder pain, unsure if Claritin is helping but would like to continue. No n/v/d/c. No cough/SOB. No rash or bleeding. Eating and drinking.  Review of Systems: 10 point ROS negative except as noted above.    Scheduled Medications    loratadine  10 mg Oral Daily     diltiazem  360 mg Oral Daily     filgrastim (NEUPOGEN/GRANIX) intravenous  5 mcg/kg (Treatment Plan Recorded) Intravenous Daily at 8 pm     insulin glargine  7 Units Subcutaneous Q24H     insulin aspart   Subcutaneous TID AC     ondansetron  8 mg Oral Q8H     psyllium  1 packet Oral TID     losartan (COZAAR) half-tab 62.5 mg  62.5 mg Oral Daily     insulin aspart  1-10 Units Subcutaneous TID AC     insulin aspart  1-7 Units Subcutaneous At Bedtime     mycophenolate  1,500 mg Intravenous Q12H BMT     acyclovir  800 mg Oral 5x Daily     levofloxacin  250 mg Oral Daily at 10 am     voriconazole  200 mg Oral BID     [START ON 8/28/2017] sulfamethoxazole-trimethoprim  1 tablet Oral Q Mon Tues BID     heparin lock flush  5-10 mL Intracatheter Q24H     pantoprazole  40 mg Oral Daily     ursodiol  300 mg Oral TID     heparin lock flush  5 mL Intravenous Q24H         PHYSICAL EXAM     Weight In/Out     Wt Readings from Last 3 Encounters:   07/30/17 90.9 kg (200 lb 6.4 oz)   07/14/17 92.5 kg (204 lb)   06/30/17 90.7 kg (200 lb)      I/O last 3 completed shifts:  In: 4620 [P.O.:3570; I.V.:775; IV Piggyback:275]  Out: 5525 [Urine:5525]         BP (!) 129/92 (BP Location: Left arm)  Pulse 88  Temp 96.7  F (35.9  C) (Axillary)  Resp  "16  Ht 1.73 m (5' 8.11\")  Wt 90.9 kg (200 lb 6.4 oz)  SpO2 98%  BMI 30.37 kg/m2   General: NAD, interactive, appropriate   Eyes: SAMSON, sclera anicteric   Nose/Mouth/Throat: OP clear, buccal mucosa moist, no ulcerations   Lungs: CTA bilaterally  Cardiovascular: RRR, no M/R/G   Abdominal/Rectal: +BS, soft, NT, ND, No HSM   Lymphatics: No edema  Skin: no rashes or petechaie  Neuro: A&O   Additional Findings: Cooper site NT, no drainage.    LABS AND IMAGING - PAST 24 HOURS     Results for orders placed or performed during the hospital encounter of 07/18/17 (from the past 24 hour(s))   CBC with platelets differential   Result Value Ref Range    WBC 0.7 (LL) 4.0 - 11.0 10e9/L    RBC Count 3.80 (L) 4.4 - 5.9 10e12/L    Hemoglobin 11.6 (L) 13.3 - 17.7 g/dL    Hematocrit 34.8 (L) 40.0 - 53.0 %    MCV 92 78 - 100 fl    MCH 30.5 26.5 - 33.0 pg    MCHC 33.3 31.5 - 36.5 g/dL    RDW 15.6 (H) 10.0 - 15.0 %    Platelet Count 56 (L) 150 - 450 10e9/L    Diff Method Manual Differential     % Neutrophils 39.0 %    % Lymphocytes 38.0 %    % Monocytes 21.0 %    % Eosinophils 1.0 %    % Basophils 1.0 %    Absolute Neutrophil 0.3 (LL) 1.6 - 8.3 10e9/L    Absolute Lymphocytes 0.3 (L) 0.8 - 5.3 10e9/L    Absolute Monocytes 0.1 0.0 - 1.3 10e9/L    Absolute Eosinophils 0.0 0.0 - 0.7 10e9/L    Absolute Basophils 0.0 0.0 - 0.2 10e9/L    Anisocytosis Slight    INR   Result Value Ref Range    INR 0.96 0.86 - 1.14   Magnesium   Result Value Ref Range    Magnesium 1.7 1.6 - 2.3 mg/dL   Comprehensive metabolic panel   Result Value Ref Range    Sodium 140 133 - 144 mmol/L    Potassium 4.2 3.4 - 5.3 mmol/L    Chloride 105 94 - 109 mmol/L    Carbon Dioxide 25 20 - 32 mmol/L    Anion Gap 10 3 - 14 mmol/L    Glucose 142 (H) 70 - 99 mg/dL    Urea Nitrogen 12 7 - 30 mg/dL    Creatinine 0.75 0.66 - 1.25 mg/dL    GFR Estimate >90  Non  GFR Calc   >60 mL/min/1.7m2    GFR Estimate If Black >90   GFR Calc   >60 mL/min/1.7m2 "    Calcium 8.4 (L) 8.5 - 10.1 mg/dL    Bilirubin Total 0.7 0.2 - 1.3 mg/dL    Albumin 3.0 (L) 3.4 - 5.0 g/dL    Protein Total 6.1 (L) 6.8 - 8.8 g/dL    Alkaline Phosphatase 58 40 - 150 U/L    ALT 28 0 - 70 U/L    AST 10 0 - 45 U/L   Bilirubin direct   Result Value Ref Range    Bilirubin Direct 0.1 0.0 - 0.2 mg/dL         ASSESSMENT BY SYSTEMS     Norman Salazar Real is a 56 year old male with AML, currently day +5 for 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 He received additional stem cells from donor 7/27.      Prep detailed below: Initially held prep for a day to obtain bone marrow biopsy with intent to confirm remission status before proceeding.  Bone marrow biopsy completed 7/20 without complications. Morph and FLOW negative for AML, signed out 7/20 at 9:30 am. Restarted treatment plan with first dose of chemotherapy administered on 7/20.       1a. Prep for transplant briefly held 7/19 to obtain a BMBX & r/o active disease with falling platelet count. Flow/Morph reported as negative for malignancy 7/20. Released chemotherapy and dates changed in the plan. Updated [facility] team for new calendar and to reset the BMT date.  - D - 6 (7/20) cytoxan and fludarabine today   - D - 5 to Day - 2 (7/21-7/24) fludarabine  - D - 1 (7/25) TBI x 1  - D - 0 (7/26) Transplant today, no ABO mismatch. Flush 4 hours pre and 6 hours post  - D + 1 (7/27) Initial stem cell product only contained 2.33 CD34/kg, additional 0.66 CD34/kg on 7/27 for a total of 2.99.      1b.  BMT: Completed prep as above.  - GCSF started on 07/28 nd cont to until ANC>2500 x2 consecutive days.   - Re-stage per protocol.  - Continue claritin 10mg daily for bone pain.     2.  HEME: Keep Hgb>8 and plts>10K.   - No pre-meds.  - Neutropenic, pancytopenic secondary to chemotherapy.   - No transfusions today, 07/31.       3.  ID: No focal s/sx of infection at this time.   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   - Bactrim or appropriate PCP therapy to start d+28.                     4.  GI: No n/v.    - Hx constipation- loose stools so decreased metamucil   - Continue zofran TID with nausea  - Ativan and compazine available PRN break-through symptoms.   - Protonix for GI prophy.   - Ursodiol for VOD prophy, LFTs on 07/31 wnl.  - s/p left sided colectomy for recurrent diverticulitis     5.  GVH: No aGVH to date   - Cont MMF  -  7/28 Tac: 15.3, Tac gtt decreased 50mch/hr. Recheck level today, 7/31- results pending.      6.  FEN/Renal: Eating adequate amounts of a regular diet.   - Cr and lytes stable.      7. CV: Hx HTN + TAC exacerbated.   - HTN Management: Cont losartan. Increased diltiazem XL 7/31 360 (max dose).    - PRN Hydralazine 10-20 mg Q 4 hrs IV   - Hx tachycardia with arrhythmia, s/p 3 ablations  - hold crestor through transplant     8. Endo: Hx DM type II. BG's improved, tapered off of decadron.  - Endocrine consult 7/24, appreciate involvement & recs. Mgt per Endo team, see notes.    - Hold metformin 500mg/d on admission.     9. : Hx prostate cancer.   - Asymptomatic at this time.      10. Neuro: History of chronic insomnia.  - Insomnia: Inc ambien to 5-10 mg QHS PRN  - Leg pain s/p bmbx. Much improved. Oxycodone and flexeril prn     11. Pulm: Hx spiculated pulm nodule (concern for malignancy w/ hx tobacco use) and GGO (concerning for fungal PNA). F/u CT chest in 4-6 weeks (approx 8/22) per Dr. Ahn recs in workup.    - Note 6/30 BAL + for aspergillus galactomannan; culture negative final.     OVERALL PLAN     Anticipated hospital dismissal: Remain admitted through stem cell transplant and cell count recovery     Joan Oliver PA-C  [phone]      ATTENDING ADDENDUM:    I have independently seen and evaluated the patient on July 31, 2017 and reviewed clinical, laboratory, and radiographic findings. I have discussed the  plan with the team and agree with the attached note with the following edits:    Norman Real is a 56 year old year old male, with AML in CR1, Day +5 AP sib allo. Mild fatigue.      Ph/E: Vitals reviewed. No distress. Oropharynx clear. Neck supple. Heart RRR. Lungs clear. Abdomen soft. No peripheral edema. No rash. Neuro nonfocal.     Plan: G. Support through count recovery.        loratadine  10 mg Oral Daily     diltiazem  360 mg Oral Daily     filgrastim (NEUPOGEN/GRANIX) intravenous  5 mcg/kg (Treatment Plan Recorded) Intravenous Daily at 8 pm     insulin glargine  7 Units Subcutaneous Q24H     insulin aspart   Subcutaneous TID AC     ondansetron  8 mg Oral Q8H     psyllium  1 packet Oral TID     losartan (COZAAR) half-tab 62.5 mg  62.5 mg Oral Daily     insulin aspart  1-10 Units Subcutaneous TID AC     insulin aspart  1-7 Units Subcutaneous At Bedtime     mycophenolate  1,500 mg Intravenous Q12H BMT     acyclovir  800 mg Oral 5x Daily     levofloxacin  250 mg Oral Daily at 10 am     voriconazole  200 mg Oral BID     [START ON 8/28/2017] sulfamethoxazole-trimethoprim  1 tablet Oral Q Mon Tues BID     heparin lock flush  5-10 mL Intracatheter Q24H     pantoprazole  40 mg Oral Daily     ursodiol  300 mg Oral TID     heparin lock flush  5 mL Intravenous Q24H       Tito Perez

## 2017-08-01 NOTE — PROGRESS NOTES
CLINICAL NUTRITION SERVICES - REASSESSMENT NOTE     Nutrition Prescription    RECOMMENDATIONS FOR MDs/PROVIDERS TO ORDER  No recommendation at this time    Malnutrition Status:    Patient does not meet two of the above criteria necessary for diagnosing malnutrition but is at risk for malnutrition d/t treatment for BMT.    Recommendations already ordered by Registered Dietitian (RD):  -Supplement of patients choice to be orders with meals     Future/Additional Recommendations:  If appetite significantly decrease will order calorie counts to assess intake      EVALUATION OF THE PROGRESS TOWARD GOALS   Diet: Adult Regular Diet  Nutrition Support: N/A  Intake: Pt consistently eating % of three meals daily.     NEW FINDINGS    He is expeirencing taste changes and requested MNT supplements. Pt is concerned about consuming enough protein and plans to try the magic cup and Ensure plus with meals.     MALNUTRITION  % Intake: Decreased intake does not meet criteria  % Weight Loss: Weight loss does not meet criteria  Subcutaneous Fat Loss: None observed  Muscle Loss: None observed  Fluid Accumulation/Edema: None noted  Malnutrition Diagnosis: Patient does not meet two of the above criteria necessary for diagnosing malnutrition but is at risk for malnutrition    Previous Goals   Patient to consume % of nutritionally adequate meal trays TID, or the equivalent with supplements/snacks.  Evaluation: Met/Ongoing    Previous Nutrition Diagnosis  7/25:Predicted inadequate nutrient intake kcal/protein related to potential for decreased PO associated with side effects of chemo/post BMT and /or menu fatigue with prolonged hospitalization  as evidenced by LOS x 7    Evaluation: Ongoing    CURRENT NUTRITION DIAGNOSIS  Predicted inadequate nutrient intake kcal/protein related to potential for decreased PO associated with side effects of chemo/post BMT and /or menu fatigue with prolonged hospitalization  as evidenced by LOS x 14       INTERVENTIONS  Implementation  Nutrition education on tips for increasing intake nausea and vomiting.  Provided the supplement list and guided patient through the available choices     Goals  Patient to consume % of nutritionally adequate meal trays TID, or the equivalent with supplements/snacks.    Monitoring/Evaluation  Progress toward goals will be monitored and evaluated per protocol.    Gilda Foster RD     I agree with the above recommendations, goals, and interventions.     Devang Hernández RD, LD  5C/BMT Dietitian  Pager: [phone]

## 2017-08-01 NOTE — PLAN OF CARE
Problem: Blood and Marrow Transplantation  Goal: Blood and Marrow Transplantation  Signs and symptoms of listed problems will be absent or manageable.     08/01/17 1841   Blood and Marrow Transplantation   Blood and Marrow Transplantation Assessed all   Blood and Marrow Transplantation Present nausea/vomiting   Patient with no new complaints. Ativan x 2 today for c/o nausea. He continues to eat well, and  is up in room and active. Family at bedside most of day. Continue to monitor.

## 2017-08-01 NOTE — PROGRESS NOTES
Diabetes Consult Daily  Progress Note          Assessment/Plan:   Norman Real is a 56 year old man with well-controlled type 2 diabetes (metformin prior to admission), admitted NMA allo sib PBSCT to treat AML, experienced increased hyperglycemia related to steroids and continued insulin need in setting of withholding metformin.       Improved post-bfast glucose after aspart increase.     Plan  Continue glargine 7 units q24h   Meal aspart increase to 1unit/12g CHO for breakfast, and for supper, continue 1 per 15 g CHO with lunch, and added back 1 per 15 for PRN snacks  Continue aspart high correction qAC, HS.  BG monitor qAC, HS 0200.  Diabetes educ consult in place (plan to discharge on insulin)           We will follow.       Outpatient diabetes follow up: PCP Davon  Plan discussed with patient, bedside RN           Interval History:   The last 24 hours progress and nursing notes reviewed.  Reports he has noticed a drop off in intake compared to admission.  Select foods still taste good.  Bored with room service menu.  And some occasional nausea has been documented.  PTA was taking Boost to get extra protein.  Would like to try a supplement (passed this on to unit RD).      Recent Labs  Lab 08/01/17  1247 08/01/17  0920 08/01/17  0215 07/31/17  2236 07/31/17  1849 07/31/17  1222 07/31/17  0840 07/31/17  0349  07/30/17  0359  07/29/17  0320  07/28/17  0231  07/27/17  0350   GLC  --   --  132*  --   --   --   --  142*  --  140*  --  172*  --  137*  --  170*   * 137*  --  206* 135* 211* 134*  --   < >  --   < >  --   < >  --   < >  --    < > = values in this interval not displayed.            Review of Systems:   See interval hx          Medications:       Active Diet Order      Regular Diet Adult     Physical Exam:  Gen: Alert, resting in bed, in NAD.  Family at bedside  HEENT: NC/AT, mucous membranes are moist  Resp: Unlabored  Neuro:oriented x3, communicating clearly  BP  "132/84 (BP Location: Left arm)  Pulse 88  Temp 96.4  F (35.8  C) (Axillary)  Resp 18  Ht 1.73 m (5' 8.11\")  Wt 89.7 kg (197 lb 12 oz)  SpO2 98%  BMI 29.97 kg/m2           Data:   No results found for: A1C         Recent Labs   Lab Test  08/01/17   0215  07/31/17   0349   NA  141  140   POTASSIUM  4.3  4.2   CHLORIDE  106  105   CO2  25  25   ANIONGAP  10  10   GLC  132*  142*   BUN  12  12   CR  0.83  0.75   SANDRA  8.5  8.4*     CBC RESULTS:   Recent Labs   Lab Test  08/01/17   0215   WBC  1.0*   RBC  3.72*   HGB  11.4*   HCT  34.3*   MCV  92   MCH  30.6   MCHC  33.2   RDW  16.0*   PLT  46*   Marisol Zaldivar APRN [facility] [phone]      Diabetes Management Team job code: [id number]              "

## 2017-08-01 NOTE — PROGRESS NOTES
BMT Daily Progress Note   08/01/2017    Patient ID:  Norman Real is a 56 year old male, currently day +5 of his HCT.     Diagnosis AMLU Acute myelogeneous leukemia, Unknown  HCT Type Allogeneic    Prep Regimen Cytoxan  Fludarabine  TBI   Donor Source Related PBSC    GVHD Prophylaxis   Mycophenolate  Primary BMT Provider Dr. Erna MD        INTERVAL  HISTORY     HPI: Mr. Real is feeling well. Asked him about  back and shoulder pain,stable. Having some occasional nausea, no emesis. No cough or sob.  No diarrhea or constipation.  Eating and drinking.  Review of Systems: 10 point ROS negative except as noted above.    Scheduled Medications    insulin aspart   Subcutaneous Daily with lunch     insulin aspart   Subcutaneous Daily with supper     insulin aspart   Subcutaneous Daily with breakfast     loratadine  10 mg Oral Daily     diltiazem  360 mg Oral Daily     filgrastim (NEUPOGEN/GRANIX) intravenous  5 mcg/kg (Treatment Plan Recorded) Intravenous Daily at 8 pm     insulin glargine  7 Units Subcutaneous Q24H     ondansetron  8 mg Oral Q8H     psyllium  1 packet Oral TID     losartan (COZAAR) half-tab 62.5 mg  62.5 mg Oral Daily     insulin aspart  1-10 Units Subcutaneous TID AC     insulin aspart  1-7 Units Subcutaneous At Bedtime     mycophenolate  1,500 mg Intravenous Q12H BMT     acyclovir  800 mg Oral 5x Daily     levofloxacin  250 mg Oral Daily at 10 am     voriconazole  200 mg Oral BID     [START ON 8/28/2017] sulfamethoxazole-trimethoprim  1 tablet Oral Q Mon Tues BID     heparin lock flush  5-10 mL Intracatheter Q24H     pantoprazole  40 mg Oral Daily     ursodiol  300 mg Oral TID     heparin lock flush  5 mL Intravenous Q24H         PHYSICAL EXAM     Weight In/Out     Wt Readings from Last 3 Encounters:   08/01/17 89.7 kg (197 lb 12 oz)   07/14/17 92.5 kg (204 lb)   06/30/17 90.7 kg (200 lb)      I/O last 3 completed shifts:  In: 3387.5 [P.O.:2840; I.V.:547.5]  Out: 3275 [Urine:3275]  "        /89 (BP Location: Left arm)  Pulse 88  Temp 96.8  F (36  C) (Axillary)  Resp 18  Ht 1.73 m (5' 8.11\")  Wt 89.7 kg (197 lb 12 oz)  SpO2 97%  BMI 29.97 kg/m2   General: NAD, interactive, appropriate   Eyes: SAMSON, sclera anicteric   Nose/Mouth/Throat: OP clear, buccal mucosa moist, no ulcerations   Lungs: CTA bilaterally  Cardiovascular: RRR, no M/R/G   Abdominal/Rectal: +BS, soft, NT, ND, No HSM   Lymphatics: No edema  Skin: no rashes or petechaie  Neuro: A&O   Additional Findings: Cooper site NT, no drainage.    LABS AND IMAGING - PAST 24 HOURS     Results for orders placed or performed during the hospital encounter of 07/18/17 (from the past 24 hour(s))   Basic metabolic panel   Result Value Ref Range    Sodium 141 133 - 144 mmol/L    Potassium 4.3 3.4 - 5.3 mmol/L    Chloride 106 94 - 109 mmol/L    Carbon Dioxide 25 20 - 32 mmol/L    Anion Gap 10 3 - 14 mmol/L    Glucose 132 (H) 70 - 99 mg/dL    Urea Nitrogen 12 7 - 30 mg/dL    Creatinine 0.83 0.66 - 1.25 mg/dL    GFR Estimate >90  Non  GFR Calc   >60 mL/min/1.7m2    GFR Estimate If Black >90   GFR Calc   >60 mL/min/1.7m2    Calcium 8.5 8.5 - 10.1 mg/dL   CBC with platelets differential   Result Value Ref Range    WBC 1.0 (L) 4.0 - 11.0 10e9/L    RBC Count 3.72 (L) 4.4 - 5.9 10e12/L    Hemoglobin 11.4 (L) 13.3 - 17.7 g/dL    Hematocrit 34.3 (L) 40.0 - 53.0 %    MCV 92 78 - 100 fl    MCH 30.6 26.5 - 33.0 pg    MCHC 33.2 31.5 - 36.5 g/dL    RDW 16.0 (H) 10.0 - 15.0 %    Platelet Count 46 (LL) 150 - 450 10e9/L    Diff Method Manual Differential     % Neutrophils 22.8 %    % Lymphocytes 48.2 %    % Monocytes 28.1 %    % Eosinophils 0.0 %    % Basophils 0.0 %    % Metamyelocytes 0.9 %    Absolute Neutrophil 0.2 (LL) 1.6 - 8.3 10e9/L    Absolute Lymphocytes 0.5 (L) 0.8 - 5.3 10e9/L    Absolute Monocytes 0.3 0.0 - 1.3 10e9/L    Absolute Eosinophils 0.0 0.0 - 0.7 10e9/L    Absolute Basophils 0.0 0.0 - 0.2 10e9/L    " Absolute Metamyelocytes 0.0 0 10e9/L    RBC Morphology Normal     Platelet Estimate Confirming automated cell count    Magnesium   Result Value Ref Range    Magnesium 1.8 1.6 - 2.3 mg/dL         ASSESSMENT BY SYSTEMS     Norman Salazar Real is a 56 year old male with AML, currently day +6 for 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 He received additional stem cells from donor 7/27.      Prep detailed below: Initially held prep for a day to obtain bone marrow biopsy with intent to confirm remission status before proceeding.  Bone marrow biopsy completed 7/20 without complications. Morph and FLOW negative for AML, signed out 7/20 at 9:30 am. Restarted treatment plan with first dose of chemotherapy administered on 7/20.       1a. Prep for transplant briefly held 7/19 to obtain a BMBX and r/o active disease with falling platelet count. Flow/Morph reported as negative for malignancy 7/20. Released chemotherapy and dates changed in the plan.   - D - 6 (7/20) cytoxan and fludarabine  - D - 5 to Day - 2 (7/21-7/24) fludarabine  - D - 1 (7/25) TBI x 1  - D - 0 (7/26) Transplant no ABO mismatch.  - D + 1 (7/27) Initial stem cell product only contained 2.33 CD34/kg, additional 0.66 CD34/kg on 7/27 for a total of 2.99.      1b.  BMT: Completed prep as above.  - GCSF started on 07/28 nd cont to until ANC>2500 x2 consecutive days.   - Re-stage per protocol.  - Continue claritin daily for bone pain.     2.  HEME: Keep Hgb>8 and plts>10K.   - No pre-meds.  - Neutropenic, pancytopenic secondary to chemotherapy but WBC bumped to 1.0, ANC=0.2.   - No transfusions today, 07/31.       3.  ID:  Afebrile.   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   7/25 CMV=neg.  - Bactrim or appropriate PCP therapy to start d+28.                     4.  GI: No n/v.    - Hx constipation- loose stools so decreased  metamucil   - Continue zofran TID with nausea  - Ativan and compazine available PRN break-through symptoms.   - Protonix for GI prophy.   - Ursodiol for VOD prophy, LFTs on 07/31 wnl.  - s/p left sided colectomy for recurrent diverticulitis     5.  GVH: No aGVH to date   - Cont MMF  -  7/28 Tac: 15.3, Tac gtt decreased 50mch/hr. Recheck level  7/31=11.5. Change to po tac 8/1 evening.  Ordered level for Thursday, 8/3     6.  FEN/Renal:   - Cr and lytes stable.      7. CV:   - Hx HTN + TAC exacerbatedt: Cont losartan. Increased diltiazem XL 7/31 360 (max dose).   Continue PRN Hydralazine 10-20 mg Q 4 hrs IV   - Hx tachycardia with arrhythmia, s/p 3 ablations  - hold crestor through transplant     8. Endo: Hx DM type II. BG's improved, tapered off of decadron.  - Endocrine consult 7/24, appreciate involvement & recs. Mgt per Endo team, see notes.    - Hold metformin 500mg/d on admission.     9. : Hx prostate cancer.   - Asymptomatic at this time.      10. Neuro: History of chronic insomnia.  - Insomnia: Ambien to 5-10 mg QHS PRN  - Leg pain s/p bmbx.  Oxycodone and flexeril prn     11. Pulm: Hx spiculated pulm nodule (concern for malignancy w/ hx tobacco use) and GGO (concerning for fungal PNA). F/u CT chest in 4-6 weeks (approx 8/22) per Dr. Ahn recs in workup.    - Note 6/30 BAL + for aspergillus galactomannan; culture negative final.     OVERALL PLAN     Anticipated hospital dismissal: Remain admitted through stem cell transplant and cell count recovery. Change some meds to oral.     Joan Oliver PA-C  [phone]      ATTENDING ADDENDUM:    I have independently seen and evaluated the patient on July 31, 2017 and reviewed clinical, laboratory, and radiographic findings. I have discussed the plan with the team and agree with the attached note with the following edits:    Norman Real is a 56 year old year old male, with AML in CR1, Day +5 AP sib allo. Mild fatigue.      Ph/E: Vitals reviewed. No distress.  Oropharynx clear. Neck supple. Heart RRR. Lungs clear. Abdomen soft. No peripheral edema. No rash. Neuro nonfocal.     Plan: G. Support through count recovery.        insulin aspart   Subcutaneous Daily with lunch     insulin aspart   Subcutaneous Daily with supper     insulin aspart   Subcutaneous Daily with breakfast     loratadine  10 mg Oral Daily     diltiazem  360 mg Oral Daily     filgrastim (NEUPOGEN/GRANIX) intravenous  5 mcg/kg (Treatment Plan Recorded) Intravenous Daily at 8 pm     insulin glargine  7 Units Subcutaneous Q24H     ondansetron  8 mg Oral Q8H     psyllium  1 packet Oral TID     losartan (COZAAR) half-tab 62.5 mg  62.5 mg Oral Daily     insulin aspart  1-10 Units Subcutaneous TID AC     insulin aspart  1-7 Units Subcutaneous At Bedtime     mycophenolate  1,500 mg Intravenous Q12H BMT     acyclovir  800 mg Oral 5x Daily     levofloxacin  250 mg Oral Daily at 10 am     voriconazole  200 mg Oral BID     [START ON 8/28/2017] sulfamethoxazole-trimethoprim  1 tablet Oral Q Mon Tues BID     heparin lock flush  5-10 mL Intracatheter Q24H     pantoprazole  40 mg Oral Daily     ursodiol  300 mg Oral TID     heparin lock flush  5 mL Intravenous Q24H       Tito Perez

## 2017-08-01 NOTE — PLAN OF CARE
Problem: Goal Outcome Summary  Goal: Goal Outcome Summary  OT 5C: Recommend discharge to home with HEP. Pt completed 30 mins on Nustep. Educated pt on lab values and grading exercises with low platelets, pt continues to use treadmill and complete HEP independently. VSS.

## 2017-08-01 NOTE — PROCEDURES
Data   Patient Vitals for the past 72 hrs:   Temp Temp src Pulse Resp Heart Rate BP   07/29/17 1218 97.8  F (36.6  C) Axillary - 16 79 144/76   07/29/17 1656 97.3  F (36.3  C) Axillary - 16 77 (!) 156/94   07/29/17 1933 97.3  F (36.3  C) Axillary 88 16 88 (!) 155/96   07/30/17 0000 97.6  F (36.4  C) Axillary - 16 78 144/84   07/30/17 0400 97.5  F (36.4  C) - - 16 75 130/75   07/30/17 0753 96.4  F (35.8  C) Axillary - 16 80 136/84   07/30/17 1209 96.9  F (36.1  C) Axillary - 16 96 139/84   07/30/17 1537 96.7  F (35.9  C) Axillary - 16 82 (!) 133/91   07/30/17 1939 97.2  F (36.2  C) Axillary - 16 82 145/84   07/30/17 2330 97.4  F (36.3  C) Axillary - 16 76 142/82   07/31/17 0358 97.4  F (36.3  C) Axillary - 16 74 121/78   07/31/17 0842 96.7  F (35.9  C) Axillary - 16 82 (!) 129/92   07/31/17 1148 97  F (36.1  C) Axillary - 16 80 124/79   07/31/17 1546 98.2  F (36.8  C) Axillary - 16 74 131/77   07/31/17 1941 97.9  F (36.6  C) Axillary - 16 81 133/80   07/31/17 2229 98.6  F (37  C) Axillary - 18 74 135/82   08/01/17 0210 97.3  F (36.3  C) Axillary - 18 66 104/67   08/01/17 0749 96.8  F (36  C) Axillary - 18 76 140/89        BMT INFUSION DOCUMENTATION (last 24 hours)      BMT/Cellular Product Infusion     None            Post-Infusion Evaluation (to be completed by Physician):   Infusion Related Reaction: Grade 0 - none  Dyspnea: Grade 0 - none  Hypoxia: Grade 0 - not present  Fever: Grade 0 - afebrile  Chills: Grade 0 - none  Febrile Neutropenia: Grade 0 - not present  Sinus Bradycardia: Grade 0 - none  Hypertension: Grade 0 - none  Hypotension: Grade 0 - none  Chest Pain: Grade 0 - none  Bronchospasm: Grade 0 - none  Pain: Grade 0 - none  Rash: Grade 0 - None  Neurologic Specify: none    Was there a second cord blood product infused? no      Attending Assessment:   Diagnosis: AML  Indication for Infusion: reconstitution of hematopoiesis (transplant graft)  Reviewed and Confirmed for the Infusion: consent previously  obtained and was available in the facility during the infusion  Patient was Premedicated as Ordered: Yes    Tito Perez MD

## 2017-08-01 NOTE — PLAN OF CARE
Problem: Goal Outcome Summary  Goal: Goal Outcome Summary  Outcome: No Change  Pt denies pain this shift. Reported some mild nausea after supper, dose of ativan given with relief. No emesis. Requested Ambien, flexeril and tylenol prior to bed. VSS overnight. Up independently to bathroom, steady on feet. Tac gtt infusing as ordered. Magnesium replaced this am. Hourly rounding done. Will continue to monitor.     Problem: Blood and Marrow Transplantation  Goal: Blood and Marrow Transplantation  Signs and symptoms of listed problems will be absent or manageable.     08/01/17 0619   Blood and Marrow Transplantation   Blood and Marrow Transplantation Assessed all   Blood and Marrow Transplantation Present metabolic imbalances;nausea/vomiting

## 2017-08-02 NOTE — PROGRESS NOTES
BMT Daily Progress Note   08/02/2017    Patient ID:  Norman Real is a 56 year old male, currently day +7 of his HCT.     Diagnosis AMLU Acute myelogeneous leukemia, Unknown  HCT Type Allogeneic    Prep Regimen Cytoxan  Fludarabine  TBI   Donor Source Related PBSC    GVHD Prophylaxis   Mycophenolate  Primary BMT Provider Dr. Erna MD        INTERVAL  HISTORY     HPI: Mr. Real is feeling well. Asked him about  back and shoulder pain,not an issue. Having some occasional nausea, no emesis. Nursing reports eating and drinking well. No cough or sob.  No diarrhea or constipation.    Review of Systems: 10 point ROS negative except as noted above.    Scheduled Medications    insulin aspart   Subcutaneous Daily with lunch     insulin aspart   Subcutaneous Daily with supper     tacrolimus  2 mg Oral BID IS     mycophenolate  1,500 mg Oral BID IS     insulin aspart   Subcutaneous Daily with breakfast     loratadine  10 mg Oral Daily     diltiazem  360 mg Oral Daily     filgrastim (NEUPOGEN/GRANIX) intravenous  5 mcg/kg (Treatment Plan Recorded) Intravenous Daily at 8 pm     insulin glargine  7 Units Subcutaneous Q24H     ondansetron  8 mg Oral Q8H     psyllium  1 packet Oral TID     losartan (COZAAR) half-tab 62.5 mg  62.5 mg Oral Daily     insulin aspart  1-10 Units Subcutaneous TID AC     insulin aspart  1-7 Units Subcutaneous At Bedtime     acyclovir  800 mg Oral 5x Daily     levofloxacin  250 mg Oral Daily at 10 am     voriconazole  200 mg Oral BID     [START ON 8/28/2017] sulfamethoxazole-trimethoprim  1 tablet Oral Q Mon Tues BID     heparin lock flush  5-10 mL Intracatheter Q24H     pantoprazole  40 mg Oral Daily     ursodiol  300 mg Oral TID     heparin lock flush  5 mL Intravenous Q24H         PHYSICAL EXAM     Weight In/Out     Wt Readings from Last 3 Encounters:   08/02/17 90.2 kg (198 lb 12.8 oz)   07/14/17 92.5 kg (204 lb)   06/30/17 90.7 kg (200 lb)      I/O last 3 completed shifts:  In: 3010  "[P.O.:2930; I.V.:640]  Out: 3600 [Urine:3600]         /82 (BP Location: Left arm)  Pulse 88  Temp 97.4  F (36.3  C) (Axillary)  Resp 16  Ht 1.73 m (5' 8.11\")  Wt 90.2 kg (198 lb 12.8 oz)  SpO2 97%  BMI 30.13 kg/m2   General: NAD, interactive, appropriate   Eyes: SAMSON, sclera anicteric   Nose/Mouth/Throat: OP clear, buccal mucosa moist, no ulcerations   Lungs: CTA bilaterally  Cardiovascular: RRR, no M/R/G   Abdominal/Rectal: +BS, soft, NT, ND, No HSM   Lymphatics: No edema  Skin: no rashes or petechaie  Neuro: A&O   Additional Findings: Cooper site NT, no drainage.    LABS AND IMAGING - PAST 24 HOURS     Results for orders placed or performed during the hospital encounter of 07/18/17 (from the past 24 hour(s))   Basic metabolic panel   Result Value Ref Range    Sodium 142 133 - 144 mmol/L    Potassium 4.2 3.4 - 5.3 mmol/L    Chloride 106 94 - 109 mmol/L    Carbon Dioxide 28 20 - 32 mmol/L    Anion Gap 8 3 - 14 mmol/L    Glucose 107 (H) 70 - 99 mg/dL    Urea Nitrogen 12 7 - 30 mg/dL    Creatinine 0.88 0.66 - 1.25 mg/dL    GFR Estimate 89 >60 mL/min/1.7m2    GFR Estimate If Black >90   GFR Calc   >60 mL/min/1.7m2    Calcium 8.8 8.5 - 10.1 mg/dL   CBC with platelets differential   Result Value Ref Range    WBC 2.2 (L) 4.0 - 11.0 10e9/L    RBC Count 3.81 (L) 4.4 - 5.9 10e12/L    Hemoglobin 11.7 (L) 13.3 - 17.7 g/dL    Hematocrit 35.3 (L) 40.0 - 53.0 %    MCV 93 78 - 100 fl    MCH 30.7 26.5 - 33.0 pg    MCHC 33.1 31.5 - 36.5 g/dL    RDW 16.2 (H) 10.0 - 15.0 %    Platelet Count 36 (LL) 150 - 450 10e9/L    Diff Method Manual Differential     % Neutrophils 53.7 %    % Lymphocytes 31.8 %    % Monocytes 12.7 %    % Eosinophils 0.0 %    % Basophils 0.9 %    % Metamyelocytes 0.9 %    Absolute Neutrophil 1.2 (L) 1.6 - 8.3 10e9/L    Absolute Lymphocytes 0.7 (L) 0.8 - 5.3 10e9/L    Absolute Monocytes 0.3 0.0 - 1.3 10e9/L    Absolute Eosinophils 0.0 0.0 - 0.7 10e9/L    Absolute Basophils 0.0 0.0 - 0.2 " 10e9/L    Absolute Metamyelocytes 0.0 0 10e9/L    Anisocytosis Slight     Platelet Estimate Confirming automated cell count    Magnesium   Result Value Ref Range    Magnesium 1.9 1.6 - 2.3 mg/dL   ABO/Rh type and screen   Result Value Ref Range    ABO O     RH(D)  Pos     Antibody Screen Neg     Test Valid Only At       [facility]    Specimen Expires 08/05/2017    EKG 12-lead, complete   Result Value Ref Range    Interpretation ECG Click View Image link to view waveform and result          ASSESSMENT BY SYSTEMS     Norman Salazar Real is a 56 year old male with AML, currently day +7 for 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 He received additional stem cells from donor 7/27.      Prep detailed below: Initially held prep for a day to obtain bone marrow biopsy with intent to confirm remission status before proceeding.  Bone marrow biopsy completed 7/20 without complications. Morph and FLOW negative for AML, signed out 7/20 at 9:30 am. Restarted treatment plan with first dose of chemotherapy administered on 7/20.       1a. Prep for transplant briefly held 7/19 to obtain a BMBX and r/o active disease with falling platelet count. Flow/Morph reported as negative for malignancy 7/20. Released chemotherapy and dates changed in the plan.   - D - 6 (7/20) cytoxan and fludarabine  - D - 5 to Day - 2 (7/21-7/24) fludarabine  - D - 1 (7/25) TBI x 1  - D - 0 (7/26) Transplant no ABO mismatch.  - D + 1 (7/27) Initial stem cell product only contained 2.33 CD34/kg, additional 0.66 CD34/kg on 7/27 for a total of 2.99.      1b.  BMT: Completed prep as above.  - GCSF started on 07/28 nd cont to until ANC>2500 x2 consecutive days. WBC bumped to 2.2, ANC=1.2 today.  Auto engraftment versus donor, sibling?  - Re-stage per protocol.  - Continue claritin daily for bone pain.     2.  HEME: Keep Hgb>8 and plts>10K.   - No pre-meds.  - Neutropenic, pancytopenic secondary to  chemotherapy but WBC bumped to 2.2, ANC=1.2 today.   - No transfusions today, 07/31.       3.  ID:  Afebrile.   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   8/1 CMV=neg.  - Bactrim or appropriate PCP therapy to start d+28.                     4.  GI: No n/v.    - Hx constipation- loose stools so decreased metamucil and better   - Continue zofran TID with nausea  - Ativan and compazine available PRN break-through symptoms.   - Protonix for GI prophy.   - Ursodiol for VOD prophy, LFTs on 07/31 wnl.  - s/p left sided colectomy for recurrent diverticulitis     5.  GVH: No aGVH to date   - Cont MMF  -  7/28 Tac: 15.3, Tac gtt decreased 50mch/hr. Recheck level  7/31=11.5. Change to po tac 8/1 evening.  Ordered level for Thursday, 8/3.      6.  FEN/Renal:   - Cr and lytes stable.      7. CV:   - Hx HTN + TAC exacerbatedt: Cont losartan. Increased diltiazem XL 7/31 360 (max dose).   Continue PRN Hydralazine 10-20 mg Q 4 hrs IV   - Hx tachycardia with arrhythmia, s/p 3 ablations  - hold crestor through transplant     8. Endo: Hx DM type II. BG's improved, tapered off of decadron.  - Endocrine consult 7/24, appreciate involvement & recs. Mgt per Endo team, see notes.    - Hold metformin 500mg/d on admission.     9. : Hx prostate cancer.   - Asymptomatic at this time.      10. Neuro: History of chronic insomnia.  - Insomnia: Ambien to 5-10 mg QHS PRN  - Leg pain s/p bmbx.  Oxycodone and flexeril prn     11. Pulm: Hx spiculated pulm nodule (concern for malignancy w/ hx tobacco use) and GGO (concerning for fungal PNA). F/u CT chest in 4-6 weeks (approx 8/22) per Dr. Ahn recs in workup.    - Note 6/30 BAL + for aspergillus galactomannan; culture negative final.   12. Cards:  Repeat Yuc=[id number]) on scheduled sofran  13. Discharge: Possible discharge on Friday if counts continue to trend up.  Patient will be  staying at the [facility].  Notified endocrine who will discuss with diabetes educator, diabetic teaching for giving insulin per sliding scale.  Patient's glucose monitor is at home so likely will need monitor and supplies.  Insurance requires he get immunosupression drugs from [employer] mail order, so on 8/2 sent RX  to discharge pharmacy for 7 days worth of tac/MMF.  Sent RX to [facility] for the rest of cellcept and tacrolimus.  Heparin RX given to [place]    OVERALL PLAN     Anticipated hospital dismissal: Remain admitted through stem cell transplant and cell count recovery. Change some meds to oral.     Joan Oliver PA-C  [phone]      ATTENDING ADDENDUM:

## 2017-08-02 NOTE — PLAN OF CARE
Problem: Blood and Marrow Transplantation  Goal: Blood and Marrow Transplantation  Signs and symptoms of listed problems will be absent or manageable.     08/02/17 1858   Blood and Marrow Transplantation   Blood and Marrow Transplantation Assessed all   Blood and Marrow Transplantation Present none   Patient had a uneventful day, no new complaints, other than taste changes. No prn medications today. Patient ate well and has been up in room and hallways.Continue to monitor. Patient learning center appointment set up for Friday 9 am  For line cares transportation set up.

## 2017-08-02 NOTE — PLAN OF CARE
Problem: Goal Outcome Summary  Goal: Goal Outcome Summary  Outcome: No Change  Pt denies pain this shift. Complained of mild nausea after supper, resolved with soda and pt declined anti-emetics at that time. Able to unhook IV lines overnight after stopping tac gtt at 2100. Pt reports sleeping well after Ambien, flexeril, and tylenol given. Up independently. Replaced magnesium this am. Hourly rounding done. Will continue to monitor.     Problem: Blood and Marrow Transplantation  Goal: Blood and Marrow Transplantation  Signs and symptoms of listed problems will be absent or manageable.     08/02/17 0626   Blood and Marrow Transplantation   Blood and Marrow Transplantation Assessed all   Blood and Marrow Transplantation Present metabolic imbalances;nausea/vomiting

## 2017-08-02 NOTE — CONSULTS
Diabetes Education  Received consult request to see this 56 year old male for education on insulin administration.  Patient with history of type 2 diabetes, admitted for allo sib PNSCT for AML.  He is requiring insulin due to steroids.  Current insulin regimen:  Insulin glargine 7 units daily  Insulin aspart 1/12 grams breakfast and evening meal, 1/15 grams lunch and snacks.  High resistance correction scale pre-meal and at bedtime.    Met with patient; wife also present in room.  Patient was taking metformin prior to admission, and was monitoring blood glucose levels.  His monitor is at home ([employer] store brand monitor and test strips); wife states she will check with [employer] to see if they have in stock.  Patient has been practicing insulin administration with his nurses and feels confident in doing this.  Had questions about doses, which we discussed.  He was due for his daily Lantus dose.  Had him use the home pen needle, but he did not like this and requested needles with safety shield.  Checked with discharge pharmacy and they have one box of the Novofine Autocover pen needles in stock.    When discharged, please prescribe:  Novofine Autocover insulin pen needles      Elva Villanueva MS RN CDE CDTC  [phone]

## 2017-08-02 NOTE — TELEPHONE ENCOUNTER
Per pt's insurance, mycophenolate and tacrolimus are not covered at retail sites and must be filled at [facility] mail order pharmacy (ph. [phone]). Visited patient to call [facility] pharmacy for a one-time override to fill at [facility].     Override placed, [facility] pharmacy representative stated that this is a once-per-lifetime override. All future fills of mycophenolate and tacrolimus must be filled at [facility]. Gave pt phone number for [facility] for future refills.    Mari Dennis  Pharmacy Liaison  Cell: [phone] Page: [phone]

## 2017-08-02 NOTE — PROGRESS NOTES
Diabetes Consult Daily  Progress Note          Assessment/Plan:   Norman Real is a 56 year old man with well-controlled type 2 diabetes (metformin prior to admission), admitted NMA allo sib PBSCT to treat AML, experienced increased hyperglycemia related to steroids and continued insulin need in setting of withholding metformin.       Good glucose control yesterday. Glucose spike after breakfast may be related to Ensure.     Plan  Continue glargine 7 units q24h   Meal aspart 1unit/12g CHO for breakfast, and for supper, 1 per 15 g CHO with lunch, and  1 per 15 for PRN snacks.  If eating a usually well-tolerated meal, take aspart at start of eating.  (at discharge, plan to recommend a fixed meal dose aspart for pt consistent carb intake goal of 60 grams)  Continue aspart high correction qAC, HS.  BG monitor qAC, HS 0200.  Diabetes educ consult completed (plan to discharge on insulin).  Pt's wife checking if [facility] has pt's preferred glucometer in stock.           We will follow.       Outpatient diabetes follow up: PCP [facility], BMT for resumption of metformin timing  Plan discussed with patient, bedside RN           Interval History:   The last 24 hours progress and nursing notes reviewed.  Main change this morning was pt had Ensure w/ his breakfast.  Aspart after meal.  May be that 50+ grams in liquid form spiked glucose quickly, so delayed aspart didn't match as well.  He had done some walking in the halls before the 1300 glucose check, so was even more surprised to find glucose so high.  Pt feeling pretty well.  Team planning for potential discharge Friday.      Recent Labs  Lab 08/02/17  1256 08/02/17  0827 08/02/17  0220 08/01/17  2134 08/01/17  1826 08/01/17  1247 08/01/17  0920 08/01/17  0215  07/31/17  0349  07/30/17  0359  07/29/17  0320  07/28/17  0231   GLC  --   --  107*  --   --   --   --  132*  --  142*  --  140*  --  172*  --  137*   * 139*  --  153* 92 149* 137*  --    "< >  --   < >  --   < >  --   < >  --    < > = values in this interval not displayed.            Review of Systems:   See interval hx          Medications:       Active Diet Order      Regular Diet Adult     Physical Exam:  Gen: Alert, up in chair, in NAD.  Family at bedside  HEENT: NC/AT, mucous membranes are moist  Resp: Unlabored  Neuro:oriented x3, communicating clearly  /82 (BP Location: Left arm)  Pulse 88  Temp 97.4  F (36.3  C) (Axillary)  Resp 16  Ht 1.73 m (5' 8.11\")  Wt 90.2 kg (198 lb 12.8 oz)  SpO2 97%  BMI 30.13 kg/m2           Data:   No results found for: A1C         Recent Labs   Lab Test  08/02/17 0220 08/01/17   0215   NA  142  141   POTASSIUM  4.2  4.3   CHLORIDE  106  106   CO2  28  25   ANIONGAP  8  10   GLC  107*  132*   BUN  12  12   CR  0.88  0.83   SANDRA  8.8  8.5     CBC RESULTS:   Recent Labs   Lab Test  08/02/17 0220   WBC  2.2*   RBC  3.81*   HGB  11.7*   HCT  35.3*   MCV  93   MCH  30.7   MCHC  33.1   RDW  16.2*   PLT  36*       Marisol Zaldivar APRN -[id number]    Diabetes Management Team job code: [id number]              "

## 2017-08-03 NOTE — PROGRESS NOTES
BMT Daily Progress Note   08/03/2017    Patient ID:  Norman Real is a 56 year old male, currently day +8 of his HCT.     Diagnosis AMLU Acute myelogeneous leukemia, Unknown  HCT Type Allogeneic    Prep Regimen Cytoxan  Fludarabine  TBI   Donor Source Related PBSC    GVHD Prophylaxis   Mycophenolate  Primary BMT Provider Dr. Erna MD        INTERVAL  HISTORY     HPI: Mr. Real is feeling well. Looking forward to hospital d/c tomorrow. Reports walking the halls was a nice change yesterday. No issues n/v/d, no infectious concerns. Feeling well.    Review of Systems: 10 point ROS negative except as noted above.    Scheduled Medications    tacrolimus  1.5 mg Oral BID IS     insulin aspart   Subcutaneous Daily with lunch     insulin aspart   Subcutaneous Daily with supper     mycophenolate  1,500 mg Oral BID IS     insulin aspart   Subcutaneous Daily with breakfast     loratadine  10 mg Oral Daily     diltiazem  360 mg Oral Daily     filgrastim (NEUPOGEN/GRANIX) intravenous  5 mcg/kg (Treatment Plan Recorded) Intravenous Daily at 8 pm     insulin glargine  7 Units Subcutaneous Q24H     ondansetron  8 mg Oral Q8H     psyllium  1 packet Oral TID     losartan (COZAAR) half-tab 62.5 mg  62.5 mg Oral Daily     insulin aspart  1-10 Units Subcutaneous TID AC     insulin aspart  1-7 Units Subcutaneous At Bedtime     acyclovir  800 mg Oral 5x Daily     levofloxacin  250 mg Oral Daily at 10 am     voriconazole  200 mg Oral BID     [START ON 8/28/2017] sulfamethoxazole-trimethoprim  1 tablet Oral Q Mon Tues BID     heparin lock flush  5-10 mL Intracatheter Q24H     pantoprazole  40 mg Oral Daily     ursodiol  300 mg Oral TID     heparin lock flush  5 mL Intravenous Q24H         PHYSICAL EXAM     Weight In/Out     Wt Readings from Last 3 Encounters:   08/03/17 90.4 kg (199 lb 4.7 oz)   07/14/17 92.5 kg (204 lb)   06/30/17 90.7 kg (200 lb)      I/O last 3 completed shifts:  In: 2840 [P.O.:2840]  Out: 2875 [Urine:2875]  "        /81  Pulse 88  Temp 96.4  F (35.8  C) (Axillary)  Resp 18  Ht 1.73 m (5' 8.11\")  Wt 90.4 kg (199 lb 4.7 oz)  SpO2 96%  BMI 30.2 kg/m2   General: NAD, interactive, appropriate   Eyes: SAMSON, sclera anicteric   Nose/Mouth/Throat: OP clear, buccal mucosa moist, no ulcerations   Lungs: CTA bilaterally  Cardiovascular: RRR, no M/R/G   Abdominal/Rectal: +BS, soft, NT, ND, No HSM   Lymphatics: No edema  Skin: no rashes or petechaie  Neuro: A&O   Additional Findings: Cooper site NT, no drainage.    LABS AND IMAGING - PAST 24 HOURS     Results for orders placed or performed during the hospital encounter of 07/18/17 (from the past 24 hour(s))   CBC with platelets differential   Result Value Ref Range    WBC 3.7 (L) 4.0 - 11.0 10e9/L    RBC Count 3.70 (L) 4.4 - 5.9 10e12/L    Hemoglobin 11.4 (L) 13.3 - 17.7 g/dL    Hematocrit 34.4 (L) 40.0 - 53.0 %    MCV 93 78 - 100 fl    MCH 30.8 26.5 - 33.0 pg    MCHC 33.1 31.5 - 36.5 g/dL    RDW 16.4 (H) 10.0 - 15.0 %    Platelet Count 30 (LL) 150 - 450 10e9/L    Diff Method Automated Method     % Neutrophils 63.1 %    % Lymphocytes 24.2 %    % Monocytes 12.1 %    % Eosinophils 0.0 %    % Basophils 0.3 %    % Immature Granulocytes 0.3 %    Nucleated RBCs 0 0 /100    Absolute Neutrophil 2.4 1.6 - 8.3 10e9/L    Absolute Lymphocytes 0.9 0.8 - 5.3 10e9/L    Absolute Monocytes 0.5 0.0 - 1.3 10e9/L    Absolute Eosinophils 0.0 0.0 - 0.7 10e9/L    Absolute Basophils 0.0 0.0 - 0.2 10e9/L    Abs Immature Granulocytes 0.0 0 - 0.4 10e9/L    Absolute Nucleated RBC 0.0     Platelet Estimate Confirming automated cell count    Comprehensive metabolic panel   Result Value Ref Range    Sodium 139 133 - 144 mmol/L    Potassium 4.3 3.4 - 5.3 mmol/L    Chloride 108 94 - 109 mmol/L    Carbon Dioxide 25 20 - 32 mmol/L    Anion Gap 6 3 - 14 mmol/L    Glucose 112 (H) 70 - 99 mg/dL    Urea Nitrogen 14 7 - 30 mg/dL    Creatinine 0.93 0.66 - 1.25 mg/dL    GFR Estimate 84 >60 mL/min/1.7m2    GFR " Estimate If Black >90   GFR Calc   >60 mL/min/1.7m2    Calcium 8.4 (L) 8.5 - 10.1 mg/dL    Bilirubin Total 0.5 0.2 - 1.3 mg/dL    Albumin 3.2 (L) 3.4 - 5.0 g/dL    Protein Total 6.0 (L) 6.8 - 8.8 g/dL    Alkaline Phosphatase 65 40 - 150 U/L    ALT 23 0 - 70 U/L    AST 7 0 - 45 U/L   Magnesium   Result Value Ref Range    Magnesium 1.9 1.6 - 2.3 mg/dL         ASSESSMENT BY SYSTEMS     Norman Salazar Real is a 56 year old male with AML, currently day +8 for 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 He received additional stem cells from donor 7/27.      Prep detailed below: Initially held prep for a day to obtain bone marrow biopsy with intent to confirm remission status before proceeding.  Bone marrow biopsy completed 7/20 without complications. Morph and FLOW negative for AML, signed out 7/20 at 9:30 am. Restarted treatment plan with first dose of chemotherapy administered on 7/20.       1a. Prep for transplant briefly held 7/19 to obtain a BMBX and r/o active disease with falling platelet count. Flow/Morph reported as negative for malignancy 7/20. Released chemotherapy and dates changed in the plan.   - D - 6 (7/20) cytoxan and fludarabine  - D - 5 to Day - 2 (7/21-7/24) fludarabine  - D - 1 (7/25) TBI x 1  - D - 0 (7/26) Transplant no ABO mismatch.  - D + 1 (7/27) Initial stem cell product only contained 2.33 CD34/kg, additional 0.66 CD34/kg on 7/27 for a total of 2.99.      1b.  BMT: Completed prep as above.  - GCSF started on 07/28 nd cont to until ANC>2500 x2 consecutive days. WBC bumped to 3.7(2.2), ANC=2.4(1.2) today.  Auto engraftment versus donor, sibling?  - Re-stage per protocol.  - Continue claritin daily for bone pain.     2.  HEME: Keep Hgb>8 and plts>10K.   - No pre-meds.  - Neutropenic, pancytopenic secondary to chemotherapy but WBC bumped to 2.2, ANC=1.2 today.   - No transfusions today      3.  ID:  Afebrile.   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   8/1 CMV=neg.  - Bactrim or appropriate PCP therapy to start d+28.                     4.  GI: No n/v.    - Hx constipation- loose stools so decreased metamucil and better   - Continue zofran TID with nausea  - Ativan and compazine available PRN break-through symptoms.   - Protonix for GI prophy.   - Ursodiol for VOD prophy, LFTs on 07/31 wnl.  - s/p left sided colectomy for recurrent diverticulitis     5.  GVH: No aGVH to date   - Cont MMF  -  7/28 Tac: 15.3, Tac gtt decreased 50mch/hr. Recheck level  7/31=11.5. Change to po tac 8/1 evening.  8/2 level: 13.4 = decrease tac 1.5mg PO BID      6.  FEN/Renal:   - Cr and lytes stable.      7. CV:   - Hx HTN + TAC exacerbated: Cont losartan. Increased diltiazem XL 7/31 360 (max dose).   Continue PRN Hydralazine 10-20 mg Q 4 hrs IV   - Hx tachycardia with arrhythmia, s/p 3 ablations  - hold crestor through transplant     8. Endo: Hx DM type II. BG's improved, tapered off of decadron.  - Endocrine consult 7/24, appreciate involvement & recs. Mgt per Endo team, see notes.    - Hold metformin 500mg/d on admission.     9. : Hx prostate cancer.   - Asymptomatic at this time.      10. Neuro: History of chronic insomnia.  - Insomnia: Ambien to 5-10 mg QHS PRN  - Leg pain s/p bmbx.  Oxycodone and flexeril prn     11. Pulm: Hx spiculated pulm nodule (concern for malignancy w/ hx tobacco use) and GGO (concerning for fungal PNA). F/u CT chest in 4-6 weeks (approx 8/22) per Dr. Ahn recs in workup.    - Note 6/30 BAL + for aspergillus galactomannan; culture negative final.   12. Cards:  Repeat Pal=[phone](443) on scheduled sofran  13. Discharge: Possible discharge on Friday as counts continue to improve.  Patient will be staying at the [facility]. Had diabetic education yesterday, line teaching scheduled for tomorrow @9am.  Insurance requires he get immunosupression drugs  from [facility] mail order, so on 8/2 sent RX  to discharge pharmacy for 7 days worth of tac/MMF.  Sent RX to [facility] for the rest of cellcept and tacrolimus.  Heparin RX given to [facility]    OVERALL PLAN     Anticipated hospital dismissal: Remain admitted through stem cell transplant and cell count recovery. Change some meds to oral.     Lacy Patel PA-C  [id number]      ATTENDING ADDENDUM:

## 2017-08-03 NOTE — PROGRESS NOTES
BMT SOCIAL WORK DISCHARGE NOTE:    Focus: Discharge Plan    Data: Pt is a 56 year old male with AML, currently day +8, for NMA allo sib PBSCT, per medical record.    Interventions: Per Med Team, pt is anticipated to discharge tomorrow (8/4/2017). SW met with pt to assess coping, provide psychosocial support and provide discharge packet with post-transplant resources for patient and caregiver. Also present in the room pt's wife-Agapito. SW provided the discharge packet and explained the contents. Pt shared he and his wife will be staying at the [facility]; pt's wife is currently staying at [facility] now. Pt and pt's wife said they plan to stay at the [facility] for the first 30 days. However, they may want to get on the [facility] wait list after the 30 days at [facility]. Pt and pt's wife will contact SW if/when they want to get on the [facility] wait list. Pt said his insurance covers up to $10,000 for relocation expenses. Pt and pt's wife are happy at [facility] for now. SW provided empathetic listening, validation of concerns, and encouragement. SW encouraged pt to contact SW for support, questions and/or resources.     Assessment: Pt presented as pleasant, calm and willing to engage in conversation, pt was up walking around the room. Pt appears to be coping well. Pt continues to be supported by his wife-Agapito and other family/friends.    Plan: Pt to discharge to [facility] with wife-Agapito as primary caregiver on 8/4/2017. SW will continue to provide psychosocial support and assistance with resources as needed. SW will continue to collaborate with multidisciplinary team regarding pt's plan of care.     Zeynep REEVES, TAB  Phone: [phone]  Pager: [phone]

## 2017-08-03 NOTE — PLAN OF CARE
BMT Teaching Flowsheet    Norman Real is a 56 year old male  The primary encounter diagnosis was 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. Diagnoses of AML (acute myelogenous leukemia) (H), Stem cell transplant candidate, and Acute myeloid leukemia in remission (H) were also pertinent to this visit.    Teaching Topic: Allogeneic Stem Cell Transplant Discharge Teaching    Person(s) involved in teaching: Patient and Spouse  Motivation Level  Asks Questions: Yes  Eager to Learn: Yes  Cooperative: Yes  Receptive (willing/able to accept information): Yes  Any cultural factors/Faith beliefs that may influence understanding or compliance? No    Patient and Caregiver demonstrates understanding of the following:  - Reason for the appointment, diagnosis and treatment plan: Yes  - Knowledge of proper use of medications and conditions for which they are ordered (with special attention to potential side effects or drug interactions): No, explain: bedside nurse will discuss medications with patient and caregiver at time of discharge  - Which situations necessitate calling provider and whom to contact: Yes    Teaching concerns addressed: none    Proper use and care of (medical equipment, care aids, etc.) NA  Pain management techniques: Yes  Patient instructed on hand hygiene: Yes  How and/when to access community resources: Yes    Infection Control:  Patient and Caregiver demonstrates understanding of the following:  Surgical procedure site care taught Yes  Signs and symptoms of infection taught Yes  Wound care taught NA  Central venous catheter care taught No, explain: line care class 8/4 AM    Instructional Materials Used/Given: Discharge teaching completed with patient and family.  Written guidelines provided including clinic follow up, signs and symptoms to report, infection prevention, and contact list. Reviewed potential side effects s/p transplant and what to expect during recovery period. Reviewed symptoms  and treatment of GVHD. Discussed clinic routines. Discussed activities to avoid to prevent infections and mask guidelines.  [facility] for line care supplies. Pt and family verbalize understanding of material via teach back and all questions have been answered.    Time spent with patient: 60 minutes.    Specific Concerns: NA

## 2017-08-03 NOTE — PROGRESS NOTES
Line Company: [facility] Ph. [phone]  Referral made for line Care: Yes  PT recommended: No                         Referral made for PT:  No  D/c location:  [facility]  Has placement need been communicated to Social Work?  Yes  IV medications needed at discharge: None   Pharmacy concerns: PATIENT MUST FILL AT [facility] PH#:[phone] for MMF & TACRO  Teaching time arranged with family:  8/3 1300  Notify nurse to schedule line care class/ DM teaching, prior to d/c: Yes, scheduled for 8/4 AM

## 2017-08-03 NOTE — PROGRESS NOTES
Diabetes Consult Daily  Progress Note          Assessment/Plan:   Norman Real is a 56 year old man with well-controlled type 2 diabetes (metformin prior to admission), admitted NMA allo sib PBSCT to treat AML, experienced increased hyperglycemia related to steroids and continued insulin need in setting of withholding metformin.       Glucose trending better so far today.  Pt preparing for discharge tomorrow.     Plan  Continue glargine 7 units q24h   Meal aspart 1unit/12g CHO for breakfast, and for supper, 1 per 15 g CHO with lunch, and  1 per 15 for PRN snacks.  If eating a usually well-tolerated meal, take aspart at start of eating.  (at discharge, plan to recommend a fixed meal dose aspart for pt consistent carb intake goal of 60 grams)  Continue aspart high correction qAC, HS.  BG monitor qAC, HS 0200.  Diabetes educ consult completed (plan to discharge on insulin).  Pt's wife will  [employer] glucometer tomorrow (pt's preferred type)  **Per Elva Villanueva's note, pt wants the Novofine Autocover insulin pen needles at discharge        We will follow.       Outpatient diabetes follow up: PCP [facility], BMT for resumption of metformin timing  Plan discussed with patient, bedside RN           Interval History:   The last 24 hours progress and nursing notes reviewed.  Glucose spike yesterday before lunch may have been related to Ensure at breakfast.  Team planning for discharge Friday.  Pt on the phone with his insurance company trying to confirm his medications will be available.  Sample written insulin plan prepared for pt, but unable to review with him.  Wife Agapito confirmed [employer] has the meter pt likes (left his at home) and she'll get one tomorrow.      Recent Labs  Lab 08/03/17  1351 08/03/17  0848 08/03/17  0319 08/02/17  2148 08/02/17  1838 08/02/17  1256 08/02/17  0827 08/02/17  0220  08/01/17  0215  07/31/17  0349  07/30/17  0359  07/29/17  0320   GLC  --   --  112*  --   --   --   --  " 107*  --  132*  --  142*  --  140*  --  172*   * 151*  --  201* 129* 244* 139*  --   < >  --   < >  --   < >  --   < >  --    < > = values in this interval not displayed.            Review of Systems:   See interval hx          Medications:       Active Diet Order      Regular Diet Adult     Physical Exam:  Gen: Alert, up in chair, in NAD.  Wife at bedside  HEENT: NC/AT, mucous membranes are moist  Resp: Unlabored  Neuro:oriented x3, communicating clearly  /88 (BP Location: Left arm)  Pulse 81  Temp 97.4  F (36.3  C) (Axillary)  Resp 18  Ht 1.73 m (5' 8.11\")  Wt 90.4 kg (199 lb 4.7 oz)  SpO2 99%  BMI 30.2 kg/m2           Data:   No results found for: A1C         Recent Labs   Lab Test  08/03/17   0319  08/02/17   0220   NA  139  142   POTASSIUM  4.3  4.2   CHLORIDE  108  106   CO2  25  28   ANIONGAP  6  8   GLC  112*  107*   BUN  14  12   CR  0.93  0.88   SANDRA  8.4*  8.8     CBC RESULTS:   Recent Labs   Lab Test  08/03/17   0319   WBC  3.7*   RBC  3.70*   HGB  11.4*   HCT  34.4*   MCV  93   MCH  30.8   MCHC  33.1   RDW  16.4*   PLT  30*       Marisol Zen APRN [facility] [phone]    Diabetes Management Team job code: [email or web address]              "

## 2017-08-03 NOTE — PLAN OF CARE
Problem: Blood and Marrow Transplantation  Goal: Blood and Marrow Transplantation  Signs and symptoms of listed problems will be absent or manageable.   Outcome: No Change        D/I:  AVSS.  Voiding adequate amounts.  Up in halls walking prn.  No c/o pain.    A/P:  Patient doing well, possible discharge this Friday.  See flowsheet for specifics.  Continue to monitor.

## 2017-08-04 PROBLEM — Z94.84 HISTORY OF PERIPHERAL STEM CELL TRANSPLANT (H): Status: ACTIVE | Noted: 2017-01-01

## 2017-08-04 PROBLEM — C92.01 AML (ACUTE MYELOID LEUKEMIA) IN REMISSION (H): Status: ACTIVE | Noted: 2017-01-01

## 2017-08-04 NOTE — DISCHARGE SUMMARY
[facility] Discharge Summary   Norman Real MRN# [id number]   Age: 56 year old  YOB: 1960   Date of Admission: 7/18/2017  Date of Discharge:  8/4/2017  Admitting Physician: Charanjit Umanzor MD  Discharge Physician:  Dr. Marisela Hazel  Primary BMT Physician:  Dr. Charanjit Umanzor  Discharge Diagnoses:    1. 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 Pancytopenia/neutropenia secondary to chemotherapy  3. Nausea without emesis due to chemotherapy  4. Hypomagnesia due to tacrolimus  5. Type 2 Diabetes, worsened with steroids to prevent nausea and emesis  6. Hypertension exacerbated by tacrolimus  Discharge Medications:       Norman Real   Home Medication Instructions VANCE:[id number]    Printed on:08/04/17 5254   Medication Information                      acyclovir (ZOVIRAX) 800 MG tablet  Take 1 tablet (800 mg) by mouth 5 times daily  Antiviral medication             cholecalciferol (VITAMIN  -D) 1000 UNITS capsule  Take 1 capsule by mouth daily              cyclobenzaprine (FLEXERIL) 5 MG tablet  Take 1 tablet (5 mg) by mouth 3 times daily as needed for muscle spasms             diltiazem (CARDIZEM CD; CARTIA XT) 360 MG 24 hr CD capsule  Take 1 capsule (360 mg) by mouth daily  For blood pressure           Fluticasone Propionate (FLONASE NA)  Spray in nostril as needed              guaiFENesin (MUCINEX) 600 MG 12 hr tablet  Take 600 mg by mouth every 12 hours as needed              heparin lock flush 10 UNIT/ML SOLN injection  5 mLs by Intracatheter route daily In each lumen             insulin aspart (NOVOLOG FLEXPEN) 100 UNIT/ML injection  Give insulin based on High sliding scale  Also give prescribed amount before meals based on carbohydrate  coverage  See discharge med list for insulin sliding scale and directions from endocrinology             insulin glargine (LANTUS) 100 UNIT/ML injection  Inject 7 Units Subcutaneous daily around noon             insulin pen needle 30G X 8 MM  Use when delivering insulin as directed.             levofloxacin (LEVAQUIN) 250 MG tablet  Take 1 tablet (250 mg) by mouth daily  Antibacterial, take until day +21 and if off G-CSF and engrafted can stop             loratadine (CLARITIN) 10 MG tablet  Take 1 tablet (10 mg) by mouth daily until done with growth factor and then can stop             LORazepam (ATIVAN) 0.5 MG tablet  Take 1-2 tablets (0.5-1 mg) by mouth every 4 hours as needed for anxiety (nausea/vomiting/sleep)             losartan (COZAAR) 25 MG tablet  Taking total dose of 62.5 mg daily by mouth: so take 50 mg tablet and 1/2 of a  25 mg tablet daily             losartan (COZAAR) 50 MG tablet  Taking total dose of 62.5 mg daily by mouth: so take 50 mg tablet and 1/2 25 mg tablet daily             magnesium oxide (MAG-OX) 400 MG tablet  Take 1 tablet (400 mg) by mouth 2 times daily             Multiple Vitamins-Minerals (MULTI COMPLETE PO)Can take one multi vitamin daily               mycophenolate (CELLCEPT - GENERIC EQUIVALENT) 500 MG tablet  Take 3 tablets (1,500 mg) by mouth 2 times daily  Until day +30 or 7 days after engraftment which ever comes later                        ondansetron (ZOFRAN) 8 MG tablet  Take 1 tablet (8 mg) by mouth every 8 hours as needed for nausea             oxyCODONE (OXY-IR) 5 MG capsule  Take 5 mg by mouth every 4 hours as needed for moderate to severe pain             pantoprazole (PROTONIX) 40 MG EC tablet  Take 1 tablet (40 mg) by mouth daily  For GI prophy             psyllium (METAMUCIL/KONSYL) Packet  Take 1 packet by mouth 3 times daily for constipation             sulfamethoxazole-trimethoprim (BACTRIM DS/SEPTRA DS) 800-160 MG per tablet  Clinic will tell you when to  start 1 tablet twice daily by mouth on  and , start around day +28             tacrolimus (PROGRAF - GENERIC EQUIVALENT) 0.5 MG capsule  Take 2 capsules (1.0 mg) by mouth 2 times daily  immunosupression to prevent GVH                          ursodiol (ACTIGALL) 300 MG capsule  Take 1 capsule (300 mg) by mouth 3 times daily  To protect the liver             voriconazole (VFEND) 200 MG tablet  Take 1.5 tablets (300 mg) by mouth 2 times daily  Antifungal             zolpidem (AMBIEN) 5 MG tablet  Take 1-2 tablets (5-10 mg) by mouth nightly as needed for sleep               Medication to stop:     1. Stop Metformin because now on insulin  2. Stop micafungin because on voriconazole  3. Stop Lipitor( atorvastain) for now until instructed by BMT Physician  4. Stop cialis until platelets are higher      Diabetes Care:     Lantus 7 units daily around noon      Novolo units per meal, 2-3 units per snack, 4 units with Ensure      Novolog for correction based on the following sliding scales:      Sliding Scale for before meals:  Do Not give Correction Insulin if Pre-Meal BG less than 140.   For Pre-Meal  - 164 give 1 unit.   For Pre-Meal  - 189 give 2 units.   For Pre-Meal  - 214 give 3 units.   For Pre-Meal  - 239 give 4 units.   For Pre-Meal  - 264 give 5 units.   For Pre-Meal  - 289 give 6 units.   For Pre-Meal  - 314 give 7 units.   For Pre-Meal  - 339 give 8 units.   For Pre-Meal  - 364 give 9 units.   For Pre-Meal BG greater than or equal to 365 give 10 units      Sliding Scale for blood sugars checked before bedtime snack:  Do Not give Bedtime Correction Insulin if BG less than 200.   For  - 224 give 1 units.   For  - 249 give 2 units.   For  - 274 give 3 units.   For  - 299 give 4 units.   For  - 324 give 5 units.   For  - 349 give 6 units.   For BG greater than or equal to 350 give 7 units.       Check blood  sugars before meals and before bedtime snack      Call the diabetes management team with questions regarding your treatment plan after discharge                        Brief History of Illness:    **Adopted from H&P    Norman Real is a 57yo man who was found to have acute leukemia on random blood test.  He went for his annual physical and to check his hemoglobin A1c.  He was found to have abnormal blood counts; therefore, it was repeated the next day and was found to have elevated peripheral blood blast count.  He was referred to Dr. Pitts for  further evaluation.  He underwent a bone marrow biopsy which showed acute myelogenous leukemia without differentiation with 70% blasts in the bone marrow with a 60% cellular marrow.  By flow cytometry, the blast population was CD13, CD33, CD34, , HLA-DR and MPO positive.  Molecular tests for NPM1 and CEBPA1 were sent and were reportedly negative.  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   The patient underwent induction chemotherapy with 7+3 with idarubicin and cytarabine and then went on to consolidation with high dose cytarabine.  The recovery marrow obtained  showed no morphological evidence of acute myelogenous leukemia.  However, flow cytometry identified about 0.5% of blasts that have a similar phenotype as the original diagnostic materials, consistent with minimal residual disease.  Patient was referred to the [facility] for transplant. He was found to have pulmonary nodules on his workup chest CT. He was seen by ID prior to transplant. He will continue prophylaxis with micafungin through prep, then transition to an azole. He is asymptomatic.     He was  being admitted 7/18/2017 to begin preparative regimen for transplant under protocol MT 2015-32.    Physical Exam:  On the day of discharge   /77 (BP Location: Left arm)  Pulse 77  Temp 97.2  F (36.2  C) (Axillary)  Resp 18  Ht  "1.73 m (5' 8.11\")  Wt 90.6 kg (199 lb 11.2 oz)  SpO2 97%  BMI 30.27 kg/m2    General: NAD, interactive, appropriate   Eyes: SAMSON, sclera anicteric   Nose/Mouth/Throat: OP clear, buccal mucosa moist, no ulcerations   Lungs: CTA bilaterally  Cardiovascular: RRR, no M/R/G   Abdominal/Rectal: +BS, soft, NT, ND, No HSM   Lymphatics: No edema  Skin: no rashes or petechaie  Neuro: A&O   Additional Findings: Cooper site NT, no drainage.    LABS:  ROUTINE IP LABS (Last four results)  BMP  Recent Labs  Lab 08/04/17  0219 08/03/17  0319 08/02/17  0220 08/01/17  0215    139 142 141   POTASSIUM 4.2 4.3 4.2 4.3   CHLORIDE 107 108 106 106   SANDRA 8.4* 8.4* 8.8 8.5   CO2 24 25 28 25   BUN 12 14 12 12   CR 0.93 0.93 0.88 0.83   * 112* 107* 132*     CBC  Recent Labs  Lab 08/04/17  0219 08/03/17 0319 08/02/17  0220 08/01/17  0215   WBC 4.3 3.7* 2.2* 1.0*   RBC 3.60* 3.70* 3.81* 3.72*   HGB 11.0* 11.4* 11.7* 11.4*   HCT 33.2* 34.4* 35.3* 34.3*   MCV 92 93 93 92   MCH 30.6 30.8 30.7 30.6   MCHC 33.1 33.1 33.1 33.2   RDW 16.2* 16.4* 16.2* 16.0*   PLT 31* 30* 36* 46*     INR  Recent Labs  Lab 07/31/17  0349   INR 0.96     Lab Results   Component Value Date    AST 7 08/03/2017     Lab Results   Component Value Date    ALT 23 08/03/2017     No results found for: BILICONJ   Lab Results   Component Value Date    BILITOTAL 0.5 08/03/2017     Lab Results   Component Value Date    ALBUMIN 3.2 08/03/2017     Lab Results   Component Value Date    PROTTOTAL 6.0 08/03/2017      Lab Results   Component Value Date    ALKPHOS 65 08/03/2017       IMAGING :  No imaging done during admission    Bone Marrow Workup Results:  Blood Counts     Recent Labs   Lab Test  06/29/17   0920   WBC  5.4   ANEU  3.5   ALYM  0.7*   DEBRA  1.0   AEOS  0.0   HGB  8.5*   HCT  26.9*   PLT  223       Bone Marrow 6/29/17 Bone marrow, posterior iliac crest, left decalcified trephine biopsy and touch imprint; left particle crush, direct aspirate smear, and " concentrated aspirate smear; and peripheral blood smear:     -No morphologic or immunophenotypic evidence of leukemia     -Hypercellular bone marrow for age (approximately 70%) with trilineage hematopoiesis, and no increase in blasts     - Peripheral blood showing marked normochromic, normocytic anemia; increased erythrocyte regeneration, rare to occasional teardrop cells; lymphocytopenia     COMMENT:   Concurrent flow cytometry ([id number]) shows no increase in myeloid blasts and no abnormal myeloid blast population.    Blood Type     Recent Labs   Lab Test  06/26/17   1528   ABO  O       Chemistries     Recent Labs   Lab Test  06/26/17   1455   NA  138   POTASSIUM  3.8   CHLORIDE  106   CO2  25   BUN  10   CR  0.83       Liver Tests     Recent Labs   Lab Test  06/26/17   1455   BILITOTAL  0.5   ALKPHOS  82   AST  18   ALT  23       Chest CT 6/26/17  1. Centrilobular nodularity and tree-in-bud opacities within the right  middle and left lower lobes, respectively, which may represent  pneumonia and/or aspiration pneumonitis.  2. Indeterminant spiculated soft tissue pulmonary nodule within the  right lower lobe, measuring 1.1 x 0.7 cm. Three to six month follow-up  CT recommended in an immunosuppressed patient.   3. Cholelithiasis without CT evidence of cholecystitis.    PFTs FVC%      Recent Labs   Lab Test  06/28/17   1245   [id number]  107         FEV1%      Recent Labs   Lab Test  06/28/17   1245   [id number]  101         DLCO%      Recent Labs   Lab Test  06/28/17   1245   [id number]  125      ECHO or MUGA: MUGA 6/27/17  LVEF 53%, normal wall motion and chamber size   EKG Normal EKG 6/29/17   Serologies: CMV +, EBV +, HSV +, VZV+,     Vitamin D No results for input(s): VITDT in the last 53643 hours.   LP (6/29/17): Cytology: Cerebrospinal Fluid:   Negative for malignancy        Hospital Course:    Norman Real is a 56 year old man with AML who underwent a  NMA allo sib PBSCT without ATG under protocol 2015-32 on 7/25/2017.  He received additional stem cells from donor 7/27.           1. Prep for transplant briefly held 7/19 to obtain a BMBX and r/o active disease with falling platelet count. Flow/Morph reported as negative for malignancy 7/20. Released chemotherapy and dates changed in the plan.   - D - 6 (7/20) cytoxan and fludarabine  - D - 5 to Day - 2 (7/21-7/24) fludarabine  - D - 1 (7/25) TBI x 1  - D - 0 (7/26) Transplant no ABO mismatch.  - D + 1 (7/27) Initial stem cell product only contained 2.33 CD34/kg, additional 0.66 CD34/kg on 7/27 for a total of 2.99.       1.  BMT: Patient was admitted on 7/25/2017 for transplant chemo conditioning. Initially held prep for a day to obtain bone marrow biopsy with intent to confirm remission status before proceeding.  Bone marrow biopsy completed 7/20 without complications. Morph and FLOW negative for AML, signed out 7/20 at 9:30 am. Restarted treatment plan with first dose of chemotherapy administered on 7/20. Patient on Day - 6 (7/20)  Received one dose of cytoxan with mesna and flush and fludarabine. On  Day - 5 to Day - 2 (7/21-7/24) patient received fludarabine daily.On  Day - 1 (7/25) TBI x 1.  On Day 0 (7/26), Mr Real received his allogenic sibling transplant with no ABO mismatch.  Then on Day + 1 (7/27) Initial stem cell product only contained 2.33 CD34/kg, additional 0.66 CD34/kg were collected on 7/27 for a total cell dose of 2.99 x10^6 CD34/kg. Patient tolerated chemo with some nausea, no emesis and initially with some constipation. GCSF was started on 07/28 and the plan is to continue until ANC>2500 x2 consecutive days. WBC bumped to 4.3(2.7),  on discharge.  Could be auto engraftment versus donor cells? 8/5 may be the last day for G-CSF. Patient tolerated the neupogen with some bone/muscle pain.  Started claritin daily for bone pain. When stop G-CSF in the next few days, can stop claritin.      2.  HEME: Patient's transfusion parameters were to keep Hgb>8 and plts>10K.  Patient did not receive any RBC or platelets during this admission. Patient became pancytopenic/neutropenic from the chemotherapy.  His WBC started to recover around day +6 and has increased daily since.  WBC on discharge=4.3, hgb=11.0 and platelets=31 and ANC=2.7      3.  ID: Patient remained afebrile during admission.  He was started on preventative antibiotics including levaquin, and HD acyclovir HD ACV (CMV+, HSV+, EBV+).  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 He was changed from micafungin to voriconazole on day +1.  Norman was discharged on levaquin( until day +21), vfend  and HD ACV.   8/1 CMV=neg.  He was discharged with Bactrim for   PCP therapy to start d+28.    verifies no sulfa allergy. Do not start until around day +28/                  4.  GI:   Norman was started on Zofran and dexamethasone prior to chemo to prevent nausea and emesis. Dexamethasone stopped on day 0.  He did have some nausea so zofran scheduled was continued until it was changed to prn on discharge.  He was admitted with a history of constipation.  He was continued on metamucil. He had some loose stools so frequency of metamucil was decreased. On admit he was also started on Ativan and compazine  PRN for break-through symptoms. Patient was discharged with ativan and zofran prn for nausea. He was started on Protonix for GI prophy. He also was started on Ursodiol for VOD prophy, LFTs on 07/31 were in the normal range. He reported left sided colectomy for recurrent diverticulitis.  No issues with this during this admission.      5.  GVH: Mr Real was started on MMF and tac on  day-3 with tac level day -1.  On discharge MR Real had no aGVH to date.  We obtained frequent tac levels during admission.  His 7/28 Tac: 15.3, Tac gtt decreased 50mch/hr. Recheck level  7/31=11.5. Then changed to po tac 8/1 evening.  8/2  level: 13.4 = decreased tac 1.5mg PO BID.  On day of discharge, 8/4, tac level=18.4, discussed with pharm D, decrease dose 8/4 evening to 1mg by mouth twice daily.  So 2 tablets twice daily. Patient was still here so changed his med list to say 1 mg po bid and explained the dose change to him and his wife.  Will likely get level on Sunday or Monday.  Instructed patient if the clinic wants you to hold your tac dose for a level the next visit. Don't take the am dose the day of the next visit.  Bring the tac pills with you and take them after your blood is drawn.       6.  FEN/Renal: Monitor creat and lytes during admission. We repleted lytes as need per sliding scale.  We monitored weight, I+O  with IVF flush for cytoxan without issue.Weight is slightly lower then admit weight. He developed hypomagnesia due to tacrolimus:  Added Mg oxide 400 mg po bid on discharge. Will monitor magnesium level in clinic.  No issues with eating and drinking during admission. Creat=0.93 on discharge.  7. CV:   Mr Real admitted with history of hypertension which was exacerbated by tacrolimus: We increased losartan dose from 50mg t0 62.5.  Dose of losartan is 62.5 so will take a 50 mg pill and 1/2 of a 25 mg pill.  Discussed this with patient on discharge. Increased diltiazem XL 7/31 360 (max dose).  We used hydralazine prn but will discontinue on discharge.  Has history of tachycardia with arrhythmia, s/p 3 ablations  Has history of hyperlipidemia but held crestor through transplant.  Discussed with patient on discharge continuing to hold this med.   8/1 Repeat Dgr=742(443 on work up)Previously on scheduled zofran which can lengthen Qtc,  changed zofran to prn on discharge   8. Endo: Hx DM type II.  Admitted on metformin which was stopped on admission.  Blood glucoses worsened with decadron but improved when stopped..Endocrine consulted 7/24.  Management of BG  per Endo team.  Diabetes regimen on discharge:   Diabetes Care:      Lantus  7 units daily around noon      Novolo units per meal, 2-3 units per snack, 4 units with Ensure      Novolog for correction based on the following sliding scales:      Sliding Scale for before meals:  Do Not give Correction Insulin if Pre-Meal BG less than 140.   For Pre-Meal  - 164 give 1 unit.   For Pre-Meal  - 189 give 2 units.   For Pre-Meal  - 214 give 3 units.   For Pre-Meal  - 239 give 4 units.   For Pre-Meal  - 264 give 5 units.   For Pre-Meal  - 289 give 6 units.   For Pre-Meal  - 314 give 7 units.   For Pre-Meal  - 339 give 8 units.   For Pre-Meal  - 364 give 9 units.   For Pre-Meal BG greater than or equal to 365 give 10 units      Sliding Scale for blood sugars checked before bedtime snack:  Do Not give Bedtime Correction Insulin if BG less than 200.   For  - 224 give 1 units.   For  - 249 give 2 units.   For  - 274 give 3 units.   For  - 299 give 4 units.   For  - 324 give 5 units.   For  - 349 give 6 units.   For BG greater than or equal to 350 give 7 units.       Check blood sugars before meals and before bedtime snack      Call the diabetes management team with questions regarding your treatment plan after discharge           Continue to hold metformin on discharge      9. : Hx prostate cancer. Asymptomatic during admission.       10. Neuro: History of chronic insomnia.  Continued ambien to 5-10 mg QHS PRN  Leg pain s/p bmbx on admit.  Oxycodone and flexeril prn.  This has resolved on discharge.  Did not discharge with RX for oxycodone and or flexeril.      11. Pulm: Admitted with history of spiculated pulm nodule (concern for malignancy w/ hx tobacco use) and GGO (concerning for fungal PNA). F/u CT chest in 4-6 weeks (approx ) per Dr. Ahn recs in workup.  Note  BAL + for aspergillus galactomannan; culture negative final. Set up to follow up on Dr. Ahn:  8/10/2017at 3:30am.  Vfend then  micafungin then vfend again.  12. Discharge:Discharge 8/4/2017 as counts continue to improve.  Patient will be staying at the [facility]. Had diabetic education 8/3/2017, line teaching 8/4/2017.  Insurance requires he get immunosupression drugs from [facility] mail order, so on 8/2 sent RX  to discharge pharmacy for 7 days worth of tac/MMF.  Sent RX to [facility] for the rest of cellcept and tacrolimus.  Heparin RX given to [place] to send, heparin thru [facility].  Scheduled in clinic daily thru Monday.  Unlikely will need platelets or RBC tomorrow, see counts as above. Scheduled to see Dr. Umanzor on 8/16, next available.  Scheduled to review meds on Monday with pharmacist.     OVERALL PLAN      Discharge today.  Reviewed med list with him before discharge.  He verbalized understanding.  Will call with fevers 100.5 or more.  If clinic open, call [phone] and if after hours call [facility]  at [phone] and ask for BMT Physician on call.     CODE STATUS:   Full Code  Discharge Instructions and Follow-Up:    Discharge diet: Regular diet as tolerated  Discharge activity: Activity as tolerated   Discharge follow-up: Follow up with BMT Clinic as follows:  1.[facility]:  [phone]: BMT CLinic on the second floor: 8/5/2017 and 8/6/2017:  Check in at 09:00 am for labs and 09:30 am to see provider .  Neupogen( G-CSF) prn.     2.8/7/2017:  BMT Clinic: Check in at 10:00 for labs and see provider at 10:30 am and for pharmacy review of meds at 11:00     8/16/2017:  BMT Clinic:  Labs at 3:30 pm and see Dr. Umanzor, Primary BMT physician at 3:30 pm    Discharge Disposition:    Discharged to BMT clinic and [facility]    Joan Oliver PA-C  Pager: [phone]  Blood and Marrow Transplant  August 4, 2017

## 2017-08-04 NOTE — PLAN OF CARE
Problem: Goal Outcome Summary  Goal: Goal Outcome Summary  Occupational Therapy Discharge Summary     Reason for therapy discharge:    Discharged to [facility] with wife     Progress towards therapy goal(s). See goals on Care Plan in [facility] electronic health record for goal details.  Goals partially met.  Barriers to achieving goals:   discharge from facility.     Therapy recommendation(s):    Continue home exercise program.

## 2017-08-04 NOTE — PROGRESS NOTES
BMT Daily Progress Note   08/04/2017    Patient ID:  Norman Real is a 56 year old male, currently day +9 of his HCT.     Diagnosis AMLU Acute myelogeneous leukemia, Unknown  HCT Type Allogeneic    Prep Regimen Cytoxan  Fludarabine  TBI   Donor Source Related PBSC    GVHD Prophylaxis   Mycophenolate  Primary BMT Provider Dr. Erna MD        INTERVAL  HISTORY     HPI: Mr. Real is feeling well and is ready to go home. Counts continue to trend up. Some occasional nausea, no emesis.  Says ativan works best for the nausea. No diarrhea, and no constipation.  No bleeding.  No rash.  He is eating and drinking.   Review of Systems: 10 point ROS negative except as noted above.    Scheduled Medications    tacrolimus  1.5 mg Oral BID IS     insulin aspart   Subcutaneous Daily with lunch     insulin aspart   Subcutaneous Daily with supper     mycophenolate  1,500 mg Oral BID IS     insulin aspart   Subcutaneous Daily with breakfast     loratadine  10 mg Oral Daily     diltiazem  360 mg Oral Daily     filgrastim (NEUPOGEN/GRANIX) intravenous  5 mcg/kg (Treatment Plan Recorded) Intravenous Daily at 8 pm     insulin glargine  7 Units Subcutaneous Q24H     ondansetron  8 mg Oral Q8H     psyllium  1 packet Oral TID     losartan (COZAAR) half-tab 62.5 mg  62.5 mg Oral Daily     insulin aspart  1-10 Units Subcutaneous TID AC     insulin aspart  1-7 Units Subcutaneous At Bedtime     acyclovir  800 mg Oral 5x Daily     levofloxacin  250 mg Oral Daily at 10 am     voriconazole  200 mg Oral BID     [START ON 8/28/2017] sulfamethoxazole-trimethoprim  1 tablet Oral Q Mon Tues BID     heparin lock flush  5-10 mL Intracatheter Q24H     pantoprazole  40 mg Oral Daily     ursodiol  300 mg Oral TID     heparin lock flush  5 mL Intravenous Q24H         PHYSICAL EXAM     Weight In/Out     Wt Readings from Last 3 Encounters:   08/04/17 90.6 kg (199 lb 11.2 oz)   07/14/17 92.5 kg (204 lb)   06/30/17 90.7 kg (200 lb)      I/O last 3  "completed shifts:  In: 1680 [P.O.:1680]  Out: 3650 [Urine:3650]         /77 (BP Location: Left arm)  Pulse 77  Temp 97.2  F (36.2  C) (Axillary)  Resp 18  Ht 1.73 m (5' 8.11\")  Wt 90.6 kg (199 lb 11.2 oz)  SpO2 97%  BMI 30.27 kg/m2   General: NAD, interactive, appropriate   Eyes: SAMSON, sclera anicteric   Nose/Mouth/Throat: OP clear, buccal mucosa moist, no ulcerations   Lungs: CTA bilaterally  Cardiovascular: RRR, no M/R/G   Abdominal/Rectal: +BS, soft, NT, ND, No HSM   Lymphatics: No edema  Skin: no rashes or petechaie  Neuro: A&O   Additional Findings: Cooper site NT, no drainage.    LABS AND IMAGING - PAST 24 HOURS     Results for orders placed or performed during the hospital encounter of 07/18/17 (from the past 24 hour(s))   CBC with platelets differential   Result Value Ref Range    WBC 4.3 4.0 - 11.0 10e9/L    RBC Count 3.60 (L) 4.4 - 5.9 10e12/L    Hemoglobin 11.0 (L) 13.3 - 17.7 g/dL    Hematocrit 33.2 (L) 40.0 - 53.0 %    MCV 92 78 - 100 fl    MCH 30.6 26.5 - 33.0 pg    MCHC 33.1 31.5 - 36.5 g/dL    RDW 16.2 (H) 10.0 - 15.0 %    Platelet Count 31 (LL) 150 - 450 10e9/L    Diff Method Manual Differential     % Neutrophils 62.8 %    % Lymphocytes 23.0 %    % Monocytes 14.2 %    % Eosinophils 0.0 %    % Basophils 0.0 %    Nucleated RBCs 1 (H) 0 /100    Absolute Neutrophil 2.7 1.6 - 8.3 10e9/L    Absolute Lymphocytes 1.0 0.8 - 5.3 10e9/L    Absolute Monocytes 0.6 0.0 - 1.3 10e9/L    Absolute Eosinophils 0.0 0.0 - 0.7 10e9/L    Absolute Basophils 0.0 0.0 - 0.2 10e9/L    Absolute Nucleated RBC 0.0     Anisocytosis Slight     Poikilocytosis Slight     Ovalocytes Slight     Macrocytes Present    Basic metabolic panel   Result Value Ref Range    Sodium 140 133 - 144 mmol/L    Potassium 4.2 3.4 - 5.3 mmol/L    Chloride 107 94 - 109 mmol/L    Carbon Dioxide 24 20 - 32 mmol/L    Anion Gap 9 3 - 14 mmol/L    Glucose 144 (H) 70 - 99 mg/dL    Urea Nitrogen 12 7 - 30 mg/dL    Creatinine 0.93 0.66 - 1.25 mg/dL "    GFR Estimate 84 >60 mL/min/1.7m2    GFR Estimate If Black >90   GFR Calc   >60 mL/min/1.7m2    Calcium 8.4 (L) 8.5 - 10.1 mg/dL   Magnesium   Result Value Ref Range    Magnesium 1.8 1.6 - 2.3 mg/dL         ASSESSMENT BY SYSTEMS     Norman Salazar Real is a 56 year old male with AML, currently day +9 for 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 He received additional stem cells from donor 7/27.         1a. Prep for transplant briefly held 7/19 to obtain a BMBX and r/o active disease with falling platelet count. Flow/Morph reported as negative for malignancy 7/20. Released chemotherapy and dates changed in the plan.   - D - 6 (7/20) cytoxan and fludarabine  - D - 5 to Day - 2 (7/21-7/24) fludarabine  - D - 1 (7/25) TBI x 1  - D - 0 (7/26) Transplant no ABO mismatch.  - D + 1 (7/27) Initial stem cell product only contained 2.33 CD34/kg, additional 0.66 CD34/kg on 7/27 for a total of 2.99.      1b.  BMT: Completed prep as above.  - GCSF started on 07/28 nd cont to until ANC>2500 x2 consecutive days. WBC bumped to 4.3(2.7), today on discharge.Give G-CSF prior to discharge.  Tomorrow may be the last day for G-CSF.  - Re-stage per protocol.  - Continue claritin daily for bone pain. When stop G-CSF in the next few days, can stop claritin.     2.  HEME: Keep Hgb>8 and plts>10K.   - No pre-meds.  - Neutropenia is resolved, WBC bumped  to 4.3(2.7) today.   - No transfusions today      3.  ID:  Afebrile.   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   8/1 CMV=neg.  - Bactrim or appropriate PCP therapy to start d+28.   Discharged with RX for Bactrim, verifies no sulfa allergy. Do not start until  Day +28.                  4.  GI: No n/v.    - Hx constipation- loose stools so decreased metamucil and loose stools are resolved.   - Changed  zofran to TID prn for  nausea.  - Ativan and compazine available PRN break-through symptoms. Discharged with ativan and zofran prn for nausea.  - Protonix for GI prophy.   - Ursodiol for VOD prophy, LFTs on  wnl.  - s/p left sided colectomy for recurrent diverticulitis.  No issues with this during this admission.     5.  GVH: No aGVH to date   - Cont MMF  -   Tac: 15.3, Tac gtt decreased 50mch/hr. Recheck level  =11.5. Change to po tac  evening.   level: 13.4 = decrease tac 1.5mg PO BID. Addendum:  tac level=18.4, discussed with pharm D, decrease dose tonight to 1mg by mouth twice daily.  So 2 tablets twice daily. Patient still here so changed his med list to say 1 mg po bid and explained the dose change to him and his wife.  Will likely get level on  or Monday.  Instructed patient if the clinic wants you to hold your tac dose for a level the next visit. Don't take the am dose the day of the next visit.  Bring the tac pills with you and take them after your blood is drawn.      6.  FEN/Renal:   -hypomagnesia due to tacrolimus:  Added Mg oxide 400 mg po bid on discharge.  - Cr and lytes stable.      7. CV:   - Hx HTN + TAC exacerbated: Continue increased losartan.  Dose of losartan is 62.5 so will take a 50 mg pill and 1/2 of a 25 mg pill.  Discussed this with patient on discharge. Increased diltiazem XL  360 (max dose).   Will stop hydralazine prn on discharge.  - Hx tachycardia with arrhythmia, s/p 3 ablations  - hold crestor through transplant.  Discussed with patient on discharge continuing to hold this med.     8. Endo: Hx DM type II. BG's improved, tapered off of decadron.  - Endocrine consult , appreciate involvement and recs. Mgt per Endo team.  Diabetes regimen on discharge:   Diabetes Care:     Lantus 7 units daily around noon      Novolo units per meal, 2-3 units per snack, 4 units with Ensure      Novolog for correction based on the following sliding scales:      Sliding Scale for before  meals:  Do Not give Correction Insulin if Pre-Meal BG less than 140.   For Pre-Meal  - 164 give 1 unit.   For Pre-Meal  - 189 give 2 units.   For Pre-Meal  - 214 give 3 units.   For Pre-Meal  - 239 give 4 units.   For Pre-Meal  - 264 give 5 units.   For Pre-Meal  - 289 give 6 units.   For Pre-Meal  - 314 give 7 units.   For Pre-Meal  - 339 give 8 units.   For Pre-Meal  - 364 give 9 units.   For Pre-Meal BG greater than or equal to 365 give 10 units      Sliding Scale for blood sugars checked before bedtime snack:  Do Not give Bedtime Correction Insulin if BG less than 200.   For  - 224 give 1 units.   For  - 249 give 2 units.   For  - 274 give 3 units.   For  - 299 give 4 units.   For  - 324 give 5 units.   For  - 349 give 6 units.   For BG greater than or equal to 350 give 7 units.       Check blood sugars before meals and before bedtime snack      Call the diabetes management team with questions regarding your treatment plan after discharge            - Hold metformin 500mg/d on admission.     9. : Hx prostate cancer.   - Asymptomatic at this time.      10. Neuro: History of chronic insomnia.  - Insomnia: Ambien to 5-10 mg QHS PRN  - Leg pain s/p bmbx.  Oxycodone and flexeril prn.  This has resolved on discharge.  Did not discharge with RX for oxycodone and or flexeril.     11. Pulm: Hx spiculated pulm nodule (concern for malignancy w/ hx tobacco use) and GGO (concerning for fungal PNA). F/u CT chest in 4-6 weeks (approx 8/22) per Dr. Ahn recs in workup.    - Note 6/30 BAL + for aspergillus galactomannan; culture negative final. Set up to follow up on Dr. Ahn:  8/10/2017at 3:30am.  12. Cards:  8/1 Repeat Ysy=080(443) on scheduled zofran, today changed zofran to prn..  13. Discharge:Discharge today as counts continue to improve.  Patient will be staying at the [facility]. Had diabetic education yesterday, line teaching  today.  Insurance requires he get immunosupression drugs from [facility] mail order, so on 8/2 sent RX  to discharge pharmacy for 7 days worth of tac/MMF.  Sent RX to [facility] for the rest of cellcept and tacrolimus.  Heparin RX given to [facility] to send, heparin thru [facility].  Scheduled in clinic daily thru Monday.  Unlikely will need platelets or RBC tomorrow, see counts as above. Scheduled to see Dr. Umanzor on 8/16, next available.  Scheduled to review meds on Monday with pharmacist.    OVERALL PLAN     Discharge today.  Reviewed med list with him before discharge.  He verbalized understanding.    Joan Oliver PA-C  [phone]

## 2017-08-04 NOTE — PROGRESS NOTES
Diabetes Consult Daily  Progress Note          Assessment/Plan:   Norman Real is a 56 year old man with well-controlled type 2 diabetes (metformin prior to admission), admitted NMA allo sib PBSCT to treat AML, experienced increased hyperglycemia related to steroids and continued insulin need in setting of withholding metformin.       Fast BG down to 110 today, daytime glucoses low to mid 100s with inconsistent spikes to 200s (likely when he misses aspart for carbs).     Plan for hospital  Continue glargine 7 units q24h   Meal aspart 1unit/12g CHO for breakfast, and for supper, 1 per 15 g CHO with lunch, and  1 per 15 for PRN snacks.  If eating a usually well-tolerated meal, take aspart at start of eating.  (at discharge, plan to recommend a fixed meal dose aspart for pt consistent carb intake goal of 60 grams)  Continue aspart high correction qAC, HS.  BG monitor qAC, HS 0200.     Plan for home([facility])  Lantus 7 units daily around noon    Novolo units per meal, 2-3 units per snack, 4 units with Ensure    Novolog for correction based on the following sliding scales:    Sliding Scale for before meals:  Do Not give Correction Insulin if Pre-Meal BG less than 140.   For Pre-Meal  - 164 give 1 unit.   For Pre-Meal  - 189 give 2 units.   For Pre-Meal  - 214 give 3 units.   For Pre-Meal  - 239 give 4 units.   For Pre-Meal  - 264 give 5 units.   For Pre-Meal  - 289 give 6 units.   For Pre-Meal  - 314 give 7 units.   For Pre-Meal  - 339 give 8 units.   For Pre-Meal  - 364 give 9 units.   For Pre-Meal BG greater than or equal to 365 give 10 units    Sliding Scale for blood sugars checked before bedtime snack:  Do Not give Bedtime Correction Insulin if BG less than 200.   For  - 224 give 1 units.   For  - 249 give 2 units.   For  - 274 give 3 units.   For  - 299 give 4 units.   For  - 324 give 5 units.  "  For  - 349 give 6 units.   For BG greater than or equal to 350 give 7 units.     Check blood sugars before meals and before bedtime snack    Call the diabetes management team with questions regarding your treatment plan after discharge.    Pt's wife will  [employer] glucometer today (pt's preferred type)  Rx needed at discharge:    Novofine Autocover insulin pen needles  Lantus Solostar pens  Novolog flex pens         Outpatient diabetes follow up: PCP Davon, SHERI for resumption of metformin timing  Plan discussed with patient, bedside RN           Interval History:   The last 24 hours progress and nursing notes reviewed.  Norman is feeling well today and ready for discharge to [facility].  He anticipates that he will be eating less carbs per meal at home (aiming for ~60g CHO per meal), a variable bedtime snack, and he thinks his physical activity will be significantly higher.  He feels comfortable with insulin plan (reviewed with pt and his wife).      Recent Labs  Lab 08/04/17  0754 08/04/17  0219 08/03/17  2156 08/03/17  1724 08/03/17  1351 08/03/17  0848 08/03/17  0319 08/02/17  2148  08/02/17  0220  08/01/17  0215  07/31/17  0349  07/30/17  0359   GLC  --  144*  --   --   --   --  112*  --   --  107*  --  132*  --  142*  --  140*   *  --  117* 215* 158* 151*  --  201*  < >  --   < >  --   < >  --   < >  --    < > = values in this interval not displayed.            Review of Systems:   See interval hx          Medications:       Active Diet Order      Regular Diet Adult     Physical Exam:  Gen: Alert, up in chair, in NAD.  Wife is present  HEENT: NC/AT, mucous membranes are moist  Resp: Unlabored  Neuro:oriented x3, communicating clearly  /77 (BP Location: Left arm)  Pulse 77  Temp 97.2  F (36.2  C) (Axillary)  Resp 18  Ht 1.73 m (5' 8.11\")  Wt 90.6 kg (199 lb 11.2 oz)  SpO2 97%  BMI 30.27 kg/m2           Data:   No results found for: A1C         Recent Labs   Lab Test  08/04/17 0219 "  08/03/17   0319   NA  140  139   POTASSIUM  4.2  4.3   CHLORIDE  107  108   CO2  24  25   ANIONGAP  9  6   GLC  144*  112*   BUN  12  14   CR  0.93  0.93   SANDRA  8.4*  8.4*     CBC RESULTS:   Recent Labs   Lab Test  08/04/17   0219   WBC  4.3   RBC  3.60*   HGB  11.0*   HCT  33.2*   MCV  92   MCH  30.6   MCHC  33.1   RDW  16.2*   PLT  31*     Pamella Gonzales PA-C [phone]  Diabetes Management Team job code: [phone]

## 2017-08-04 NOTE — PLAN OF CARE
Problem: Goal Outcome Summary  Goal: Goal Outcome Summary  PT/5C: INitial eval orders received and appreciated. Per OT pt does not have skilled PT needs at this time. PT to defer

## 2017-08-04 NOTE — INTERIM SUMMARY
[facility] Summary of Care   & Survivorship Care Plan  Treatment Team:  Patient Care Team       Relationship Specialty Notifications Start End    No Ref-Primary, Physician PCP - General   7/18/17     Charanjit Umanzor MD BMT Physician Internal Medicine  5/17/17     Phone: [phone] Fax: [phone]          PHYSICIANS [address] [place] [address] [address] [facility] [address]    Jose Pitts MD Referring Physician   5/17/17     Phone: [phone] Fax: [phone]         [facility] CANCER  [address] [facility] [address]    Zeynep Meléndez, MSW   Admissions 7/6/17     Stefany Hilton LICSW   Admissions 7/6/17     Phone: [phone] Pager: [phone] Fax: [phone]       Lanny Farias MD MD Infectious Diseases  7/14/17     Phone: [phone] Fax: [phone]          PHYSICIANS [address] [place] [address] [address] [facility] [address]    Montse Breen MD Resident Student in organized health care education/training program  7/14/17     Phone: [phone] Fax: [phone]         [facility] [address] [facility] [address] [facility] [address]    Janie Robles, RN BMT Nurse Coordinator   7/17/17     Phone: [phone] Pager: [phone]            Discharge Diagnosis:   1. 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  He went for his annual physical and to check his hemoglobin A1c.  He was found to have abnormal blood counts; therefore, it was repeated the next day and was found to have elevated peripheral blood blast count.  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  By flow cytometry, the blast population was CD13, CD33, CD34, , HLA-DR and MPO positive.  Molecular tests for NPM1 and CEBPA1 were sent and are reportedly negative.  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  The source documents for those reports are still being obtained by our office.  The patient underwent induction chemotherapy with 7+3 with idarubicin and cytarabine and then went on to consolidation with high dose cytarabine.  The recovery marrow obtained recently shows no morphological evidence of acute myelogenous leukemia.  However, flow cytometry identifies about 0.5% of blasts that have a similar phenotype as the original diagnostic materials, consistent with minimal residual disease.       He had pulmonary nodules found on workup CT chest. He was seen by ID prior to transplant. He will continue prophylaxis with micafungin through prep, then transition to an azole. He is asymptomatic.          Treatment/Chemotherapy Number of Cycles Date Range Outcomes & Complications   7+3 1 5/17 None significant, achieved first remission      Bone Marrow Workup Results:  Blood Counts     Recent Labs   Lab Test  06/29/17   0920   WBC  5.4   ANEU  3.5   ALYM  0.7*   DEBRA  1.0   AEOS  0.0   HGB  8.5*   HCT  26.9*   PLT  223       Bone Marrow 6/29/17 Bone marrow, posterior iliac crest, left decalcified trephine biopsy and touch imprint; left particle crush, direct aspirate smear, and concentrated aspirate smear; and peripheral blood smear:      -No morphologic or immunophenotypic evidence of leukemia     -Hypercellular bone marrow for age (approximately 70%) with trilineage hematopoiesis, and no increase in blasts     - Peripheral blood showing marked normochromic, normocytic anemia; increased erythrocyte regeneration, rare to occasional teardrop cells; lymphocytopenia     COMMENT:   Concurrent flow cytometry ([id number]) shows no increase in myeloid blasts and no abnormal myeloid blast population.    Blood Type     Recent Labs   Lab Test  06/26/17   1528   ABO  O       Chemistries     Recent Labs   Lab Test  06/26/17   1455   NA  138   POTASSIUM  3.8   CHLORIDE  106   CO2  25   BUN  10   CR  0.83       Liver Tests     Recent Labs   Lab Test  06/26/17   1455   BILITOTAL  0.5   ALKPHOS  82   AST  18   ALT  23       Chest CT 6/26/17  1. Centrilobular nodularity and tree-in-bud opacities within the right  middle and left lower lobes, respectively, which may represent  pneumonia and/or aspiration pneumonitis.  2. Indeterminant spiculated soft tissue pulmonary nodule within the  right lower lobe, measuring 1.1 x 0.7 cm. Three to six month follow-up  CT recommended in an immunosuppressed patient.   3. Cholelithiasis without CT evidence of cholecystitis.    PFTs FVC%      Recent Labs   Lab Test  06/28/17   1245   [id number]  107         FEV1%      Recent Labs   Lab Test  06/28/17   1245   [id number]  101         DLCO%      Recent Labs   Lab Test  06/28/17   1245   [id number]  125      ECHO or MUGA: MUGA 6/27/17  LVEF 53%, normal wall motion and chamber size   EKG Normal EKG 6/29/17   Serologies: CMV +, EBV +, HSV +, VZV+,     Vitamin D No results for input(s): VITDT in the last 20251 hours.   LP (6/29/17): Cytology: Cerebrospinal Fluid:   Negative for malignancy            Hospital Discharge on 08/04/17  Discharge Location Discharge to [facility] and BMT CLinic   Activity at Discharge As tolerated   Nutrition Regular diet as tolerated     Blood Transfusion Parameters Transfuse  if Hemoglobin < or equal 8 mg/dL  Red Blood Cell Order: 2 units, irradiated and leukoreduced   Transfuse if Platelet count < or equal 10 uL  Platelet order: 10 adult dose, irradiated and leukoreduced  Transfusion Pre-meds:  No premeds   Blood Counts Recent Labs   Lab Test  08/04/17   0219   WBC  4.3   ANEU  2.7   ALYM  1.0   DEBRA  0.6   AEOS  0.0   HGB  11.0*   HCT  33.2*   PLT  31*      IV Medications Outpatient Pharmacy G-CSF to be given in clinic: (dose) 480 mcg sq daily until ANC >2500 for 2 days     Electrolyte Replacement  Parameters in Clinic Intravenous Electrolyte Replacement:  Potassium Chloride  Give only if serum creatinine <2. Reference range 3.4-5.3 mmol/L        3.0 - 3.3 mmol/L Oral: 40 mEq by mouth x 1  PIV/CVC on TPN: 30 mEq over 1 hour x 1 doses & 20 mEq by mouth  CVC NOT on TPN: 40 mEq IV x 1   2.5-2.9 mmol/L  Oral: 40 mEq by mouth every 2 hrs x 2  PIV/CVC on TPN: 30 mEq IV & 40 mEq by mouth   CVC NOT on TPN: 60 mEq IV        <2.5 PIV/CVC on TPN: 30 mEq IV & 40 mEq by mouth   CVC NOT on TPN: 60 mEq IV      Magnesium Sulfate  Reference range 1.6-2.3 mg/dl       1.2-1.5 mg/dl  Oral:400 mg by mouth twice daily x 2 days   IV: 2 gm over 1 hour        0.9-1.1 IV Only: 4 gm IV over 2 hours   < 0.9  Discretion of provider and pharmacist        Home Infusion  IV Medications through home infusion: None   Line Care Care: Cooper - Flush each line with 5mL of 10 unit/mL heparin every 24 hours  Supplies Through: [employer] [facility] Phone: [phone]   Physical Therapy & Support Services None     Laboratory Tests at Next Clinic Visit Hemogram (CBC) differential, platelet count  Basic Metabolic Panel  Magnesium     Restrictions Immediately Post-Transplant   Always wear your mask in public.    No driving until cleared by your physician    Continue dietary precautions as discussed at your pre-BMT teaching session   When to Call  (Refer to Discharge Teaching Materials)   Fever > 100.5 F    Bleeding that does  not stop after a few minutes    Severe nausea, vomiting, or diarrhea     New fall or injury    Change in designated caregiver    Medication question    Any other urgent concern   Follow Up Visits BMT Clinic Appointment Date/Time:   1. [facility] Center:  [phone]: BMT CLinic on the second floor: 8/5/2017 and 8/6/2017:  Check in at 09:00 am for labs and 09:30 am to see provider .  Neupogen( G-CSF) prn.    2.8/7/2017:  BMT Clinic: Check in at 10:00 for labs and see provider at 10:30 am and for pharmacy review of meds at 11:00    8/16/2017:  BMT Clinic:  Labs at 3:30 pm and see Dr. Umanzor at 3:30 pm      Survivorship Visits:     You will have a 60-minute provider visit dedicated to cancer survivorship one week before your scheduled anniversary visit on day + 28 for autologous recipients or day + 100 for allogenic recipients, 1 year, and 2 years.    Your labs will be drawn after the appointment to allow your provider to order additional tests as needed.     Hospital Discharge Medications:  Home Medication Instructions VANCE:[id number]     Printed on:08/04/17 2816   Medication Information                                                           acyclovir (ZOVIRAX) 800 MG tablet  Take 1 tablet (800 mg) by mouth 5 times daily  Antiviral medication                   blood glucose monitoring (FREESTYLE) lancets  1 time per day. To check BG for type 2 DM.                                        cholecalciferol (VITAMIN  -D) 1000 UNITS capsule  Take 1 capsule by mouth daily                    cyclobenzaprine (FLEXERIL) 5 MG tablet  Can take 5 mg by mouth 3 times daily as needed for muscle spasm                   diltiazem (CARDIZEM CD; CARTIA XT) 360 MG 24 hr CD capsule  Take 1 capsule (360 mg) by mouth daily  For blood pressure                   Fluticasone Propionate (FLONASE NA)  Spray in nostril as needed                   guaiFENesin (MUCINEX) 600 MG 12 hr tablet  Take 600 mg by mouth twice daily  as needed                   heparin lock flush 10 UNIT/ML SOLN injection  5 mLs by Intracatheter route daily In each lumen                   insulin aspart (NOVOLOG FLEXPEN) 100 UNIT/ML injection  Give insulin based on High sliding scale  Also give prescribed amount before meals based on carbohydrate coverage                   insulin glargine (LANTUS) 100 UNIT/ML injection  Inject 7 Units Subcutaneous daily                   insulin pen needle 30G X 8 MM  Use when delivering insulin as directed.                   levofloxacin (LEVAQUIN) 250 MG tablet  Take 1 tablet (250 mg) by mouth daily  Antibacterial, through day +21                   loratadine (CLARITIN) 10 MG tablet  Take 1 tablet (10 mg) by mouth daily until G-CSF is complete then can change to as needed                   LORazepam (ATIVAN) 0.5 MG tablet  Take 1-2 tablets (0.5-1 mg) by mouth every 4 hours as needed for anxiety (nausea/vomiting/sleep)                   losartan (COZAAR) 25 MG tablet  Taking total dose of 62.5 mg daily by mouth: so take 50 mg tablet and 1/2 of a  25 mg tablet daily  For blood pressure                   losartan (COZAAR) 50 MG tablet  Taking total dose of 62.5 mg daily by mouth: so take 50 mg tablet and 1/2 25 mg tablet daily  For blood pressure                   magnesium oxide (MAG-OX) 400 MG tablet  Take 1 tablet (400 mg) by mouth 2 times daily                   Multiple Vitamins-Minerals (MULTI COMPLETE PO) Can take one multivitamin daily                                          mycophenolate (CELLCEPT - GENERIC EQUIVALENT) 500 MG tablet  Take 3 tablets (1,500 mg) by mouth 2 times daily  Until around day +30 or 7 days after engraftment which ever is later                   ondansetron (ZOFRAN) 8 MG tablet  Take 1 tablet (8 mg) by mouth every 8 hours as needed for nausea                   oxyCODONE (OXY-IR) 5 MG capsule  Take 5 mg by mouth every 4 hours as needed for moderate to severe pain                   pantoprazole  (PROTONIX) 40 MG EC tablet  Take 1 tablet (40 mg) by mouth daily                   psyllium (METAMUCIL/KONSYL) Packet  Take 1 packet by mouth 3 times daily                   sulfamethoxazole-trimethoprim (BACTRIM DS/SEPTRA DS) 800-160 MG per tablet  Clinic will tell you when to start 1 tablet twice daily by mouth on  and , start around day +28                                       tacrolimus (PROGRAF - GENERIC EQUIVALENT) 0.5 MG capsule  Take 3 capsules (1.5 mg) by mouth 2 times daily                   ursodiol (ACTIGALL) 300 MG capsule  Take 1 capsule (300 mg) by mouth 3 times daily  To protect liver                   voriconazole (VFEND) 200 MG tablet  Take 1.5 tablets (300 mg) by mouth 2 times daily  antifungal                   zolpidem (AMBIEN) 5 MG tablet  Take 1-2 tablets (5-10 mg) by mouth nightly as needed for sleep                      Medication to stop:     1. Stop Metformin because now on insulin  2. Stop micafungin because on voriconazole  3. Stop Lipitor( atorvastain for now)  4. Stop cialis until platelets are higher      Diabetes Care:     Lantus 7 units daily around noon      Novolo units per meal, 2-3 units per snack, 4 units with Ensure      Novolog for correction based on the following sliding scales:      Sliding Scale for before meals:  Do Not give Correction Insulin if Pre-Meal BG less than 140.   For Pre-Meal  - 164 give 1 unit.   For Pre-Meal  - 189 give 2 units.   For Pre-Meal  - 214 give 3 units.   For Pre-Meal  - 239 give 4 units.   For Pre-Meal  - 264 give 5 units.   For Pre-Meal  - 289 give 6 units.   For Pre-Meal  - 314 give 7 units.   For Pre-Meal  - 339 give 8 units.   For Pre-Meal  - 364 give 9 units.   For Pre-Meal BG greater than or equal to 365 give 10 units      Sliding Scale for blood sugars checked before bedtime snack:  Do Not give Bedtime Correction Insulin if BG less than 200.   For  - 224 give  1 units.   For  - 249 give 2 units.   For  - 274 give 3 units.   For  - 299 give 4 units.   For  - 324 give 5 units.   For  - 349 give 6 units.   For BG greater than or equal to 350 give 7 units.       Check blood sugars before meals and before bedtime snack      Call the diabetes management team with questions regarding your treatment plan after discharge

## 2017-08-04 NOTE — PLAN OF CARE
Problem: Discharge Planning  Goal: Discharge Planning (Adult, OB, Behavioral, Peds)  08/04/17 1113     Inez Macias-Registered Nurse (Nursing)  Patient and wife attended Central line care class in [facility]. Wife RD correctly on model with all cares including cap change, bandage change and flushing. Received written material related to all content taught. Pt. States João will send supplies to [place]. Wife asked relevant questions. Answered all teach-back questions appropriately. States she understands all information presented.

## 2017-08-04 NOTE — PLAN OF CARE
Problem: Goal Outcome Summary  Goal: Goal Outcome Summary  Outcome: Improving  A&Ox4. B/P runs high, on antihypertensive med. Bs was 151, 158, 215 for today, covered per sliding scale and carb coverage was done too.Good appetite. Up independent to bathroom and in santacruz with mask. Denied pain.Pt will have PICC learning at 10 am.  Plan  to dc to [facility] with wife as primary caregiver tomorrow( 8/4).      Problem: Blood and Marrow Transplantation  Goal: Blood and Marrow Transplantation  Signs and symptoms of listed problems will be absent or manageable.     08/03/17 1955   Blood and Marrow Transplantation   Blood and Marrow Transplantation Assessed all   Blood and Marrow Transplantation Present metabolic imbalances;nausea/vomiting

## 2017-08-04 NOTE — INTERIM SUMMARY
Norman Real   Home Medication Instructions VANCE:[id number]    Printed on:08/04/17 2714   Medication Information                                 acyclovir (ZOVIRAX) 800 MG tablet  Take 1 tablet (800 mg) by mouth 5 times daily  Antiviral medication             blood glucose monitoring (FREESTYLE) lancets  1 time per day. To check BG for type 2 DM.                         cholecalciferol (VITAMIN  -D) 1000 UNITS capsule  Take 1 capsule by mouth daily              cyclobenzaprine (FLEXERIL) 5 MG tablet  Can take 5 mg by mouth 3 times daily as needed for muscle spasm             diltiazem (CARDIZEM CD; CARTIA XT) 360 MG 24 hr CD capsule  Take 1 capsule (360 mg) by mouth daily  For blood pressure             Fluticasone Propionate (FLONASE NA)  Spray in nostril as needed             guaiFENesin (MUCINEX) 600 MG 12 hr tablet  Take 600 mg by mouth twice daily as needed             heparin lock flush 10 UNIT/ML SOLN injection  5 mLs by Intracatheter route daily In each lumen             insulin aspart (NOVOLOG FLEXPEN) 100 UNIT/ML injection  Give insulin based on High sliding scale  Also give prescribed amount before meals based on carbohydrate coverage             insulin glargine (LANTUS) 100 UNIT/ML injection  Inject 7 Units Subcutaneous daily             insulin pen needle 30G X 8 MM  Use when delivering insulin as directed.             levofloxacin (LEVAQUIN) 250 MG tablet  Take 1 tablet (250 mg) by mouth daily  Antibacterial, through day +21             loratadine (CLARITIN) 10 MG tablet  Take 1 tablet (10 mg) by mouth daily until G-CSF is complete then can change to as needed             LORazepam (ATIVAN) 0.5 MG tablet  Take 1-2 tablets (0.5-1 mg) by mouth every 4 hours as needed for anxiety (nausea/vomiting/sleep)             losartan (COZAAR) 25 MG tablet  Taking total dose of 62.5 mg daily by mouth: so take 50 mg tablet and 1/2 of a  25 mg tablet daily  For blood pressure             losartan (COZAAR) 50  MG tablet  Taking total dose of 62.5 mg daily by mouth: so take 50 mg tablet and 1/2 25 mg tablet daily  For blood pressure             magnesium oxide (MAG-OX) 400 MG tablet  Take 1 tablet (400 mg) by mouth 2 times daily             Multiple Vitamins-Minerals (MULTI COMPLETE PO) Can take one multivitamin daily                            mycophenolate (CELLCEPT - GENERIC EQUIVALENT) 500 MG tablet  Take 3 tablets (1,500 mg) by mouth 2 times daily  Until around day +30 or 7 days after engraftment which ever is later             ondansetron (ZOFRAN) 8 MG tablet  Take 1 tablet (8 mg) by mouth every 8 hours as needed for nausea             oxyCODONE (OXY-IR) 5 MG capsule  Take 5 mg by mouth every 4 hours as needed for moderate to severe pain             pantoprazole (PROTONIX) 40 MG EC tablet  Take 1 tablet (40 mg) by mouth daily             psyllium (METAMUCIL/KONSYL) Packet  Take 1 packet by mouth 3 times daily             sulfamethoxazole-trimethoprim (BACTRIM DS/SEPTRA DS) 800-160 MG per tablet  Clinic will tell you when to start 1 tablet twice daily by mouth on  and , start around day +28                        tacrolimus (PROGRAF - GENERIC EQUIVALENT) 0.5 MG capsule  Take 3 capsules (1.5 mg) by mouth 2 times daily             ursodiol (ACTIGALL) 300 MG capsule  Take 1 capsule (300 mg) by mouth 3 times daily  To protect liver             voriconazole (VFEND) 200 MG tablet  Take 1.5 tablets (300 mg) by mouth 2 times daily  antifungal             zolpidem (AMBIEN) 5 MG tablet  Take 1-2 tablets (5-10 mg) by mouth nightly as needed for sleep               Medication to stop:    1. Stop Metformin because now on insulin  2. Stop micafungin because on voriconazole  3. Stop Lipitor( atorvastain for now)  4. Stop cialis until platelets are higher     Diabetes Care:    Lantus 7 units daily around noon     Novolo units per meal, 2-3 units per snack, 4 units with Ensure     Novolog for correction based on the  following sliding scales:     Sliding Scale for before meals:  Do Not give Correction Insulin if Pre-Meal BG less than 140.   For Pre-Meal  - 164 give 1 unit.   For Pre-Meal  - 189 give 2 units.   For Pre-Meal  - 214 give 3 units.   For Pre-Meal  - 239 give 4 units.   For Pre-Meal  - 264 give 5 units.   For Pre-Meal  - 289 give 6 units.   For Pre-Meal  - 314 give 7 units.   For Pre-Meal  - 339 give 8 units.   For Pre-Meal  - 364 give 9 units.   For Pre-Meal BG greater than or equal to 365 give 10 units     Sliding Scale for blood sugars checked before bedtime snack:  Do Not give Bedtime Correction Insulin if BG less than 200.   For  - 224 give 1 units.   For  - 249 give 2 units.   For  - 274 give 3 units.   For  - 299 give 4 units.   For  - 324 give 5 units.   For  - 349 give 6 units.   For BG greater than or equal to 350 give 7 units.      Check blood sugars before meals and before bedtime snack     Call the diabetes management team with questions regarding your treatment plan after discharge

## 2017-08-04 NOTE — PLAN OF CARE
Problem: Goal Outcome Summary  Goal: Goal Outcome Summary  OT 5C: Patient met goals for independence with aerobic and strengthening exercises; he verbalizes understanding of lab values and how they relate to exercise guidelines. Plan is for discharge to [facility] today and to continue independent HEP.

## 2017-08-04 NOTE — PLAN OF CARE
Problem: Goal Outcome Summary  Goal: Goal Outcome Summary  Outcome: No Change        D/I:  AVSS.  Bld counts adequate this am, no replacements needed.  Up to BR to void adequate amounts.    A/P:  Patient stable, plan for discharge today.  Central line teaching this am.  See flowsheet for specifics.  Continue to monitor.

## 2017-08-04 NOTE — PLAN OF CARE
Problem: Goal Outcome Summary  Goal: Goal Outcome Summary  Outcome: Adequate for Discharge Date Met:  08/04/17  Norman is to be discharged this afternoon in the care of his wife.  They will be returning to the [facility].  Discharge instructions and medications reviewed.  Pill box set-up together.  He is to begin his follow-up care tomorrow in the clinic    Problem: Blood and Marrow Transplantation  Goal: Blood and Marrow Transplantation  Signs and symptoms of listed problems will be absent or manageable.   Outcome: Adequate for Discharge Date Met:  08/04/17 08/04/17 1308   Blood and Marrow Transplantation   Blood and Marrow Transplantation Assessed all   Blood and Marrow Transplantation Present none

## 2017-08-04 NOTE — DISCHARGE INSTRUCTIONS
Insulin plan for home:    Lantus 7 units daily around noon    Novolo units per meal, 2-3 units per snack, 4 units with Ensure    Novolog for correction based on the following sliding scales:    Sliding Scale for before meals:  Do Not give Correction Insulin if Pre-Meal BG less than 140.   For Pre-Meal  - 164 give 1 unit.   For Pre-Meal  - 189 give 2 units.   For Pre-Meal  - 214 give 3 units.   For Pre-Meal  - 239 give 4 units.   For Pre-Meal  - 264 give 5 units.   For Pre-Meal  - 289 give 6 units.   For Pre-Meal  - 314 give 7 units.   For Pre-Meal  - 339 give 8 units.   For Pre-Meal  - 364 give 9 units.   For Pre-Meal BG greater than or equal to 365 give 10 units    Sliding Scale for blood sugars checked before bedtime snack:  Do Not give Bedtime Correction Insulin if BG less than 200.   For  - 224 give 1 units.   For  - 249 give 2 units.   For  - 274 give 3 units.   For  - 299 give 4 units.   For  - 324 give 5 units.   For  - 349 give 6 units.   For BG greater than or equal to 350 give 7 units.     Check blood sugars before meals and before bedtime snack    Call the diabetes management team with questions regarding your treatment plan after discharge.  Pamella Gonzales PA-C (available Friday through Monday) and Marisol LOVE (available Monday through Thursday) both available at [phone], Camille Davis NP (available Monday through Friday) [phone], or the on-call endocrinologist can be paged by the hospital  [phone].

## 2017-08-05 NOTE — PROGRESS NOTES
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  Presenting for his followup visit after recent discharge from the Inpatient Service.      HISTORY OF PRESENT ILLNESS/REVIEW OF SYSTEMS:  Mr. 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  He has been engrafting adequately, and was discharged home yesterday.  He has been feeling well with no recent fevers, chills, drenching night sweats.  He has been eating and drinking well, and was able to keep his medications down.  No nausea, vomiting or diarrhea.      He reports no chest pain, shortness of breath or dyspnea on exertion.  He is overall somewhat tired; however, no interval decline since discharge from the hospital.      There was no ABO mismatch with his transplant, and he received a total dose of 2.99 of CD34/kg.      He has been receiving growth factors, and his ANC was rapidly going up for the past couple of days.  He overall tolerated the infusion well with no significant muscle aches and pains.        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      The patient was discharged on Levaquin, planned to be continued up until day 21; Vfend; high-dose acyclovir.  His CMV so far was undetectable.      The rest of 12 points of ROS were reviewed and found to be negative, unless as mentioned above.      He continues to take MMF and tacrolimus, and his tacrolimus level so far has been adequate.  He denied any neurologic symptoms, and no tremor in his hands.      Pertinent points of his complex hospitalization history were reviewed and discussed with the patient  during this visit.      PHYSICAL EXAMINATION:   VITAL SIGNS:  Reviewed in Epic and found to be acceptable, except for a slightly elevated systolic blood pressure.   GENERAL:  Not in acute distress, alert and oriented, well-nourished and hydrated.   HEENT:  Moist mucous membranes with no ulcerations or thrush.  Pupils are equal, round and reactive to light.  No conjunctival erythema or jaundice.   PULMONARY:  Clear to auscultation bilaterally.   CARDIOVASCULAR:  Regular rate and rhythm, no murmurs.   ABDOMEN:  Soft, nontender and nondistended.  Audible bowel sounds, no palpable masses.   EXTREMITIES:  No lower extremity edema.   SKIN:  No rashes.   LINE:  Intact.      LABORATORY DATA:   WBC:  Up to 7.6 today with 5.5 of neutrophils.   Hemoglobin:  11.5.   Platelets:  38,000.   Electrolytes:  Within normal limits.  Normal serum creatinine.   Magnesium:  Slightly low at 1.5.      His recent tacrolimus level from yesterday was elevated at 18.4, and his tacrolimus was adjusted down to 1 mg twice a day, which the patient has been taking along with CellCept at 1500 mg twice a day.      ASSESSMENT AND PLAN:  This is a very pleasant 56-year-old gentleman, day 10 after a non-myeloablative allogeneic peripheral blood stem cell sibling donor stem cell transplantation for a history of AML with trisomy 3, who presents for his followup visit in the BMT Clinic following recent discharge from the hospital.   - AML/BMT:  Adequate engraftment to date.  We will await on chimerism studies, which were explained to the patient during this visit.  He will also undergo his restaging bone marrow biopsy with chimerism analysis within the next couple of weeks.  In the meantime, I recommended discontinuing G-CSF injection given adequate engraftment.   - Hematology:  See above.  No need for blood product transfusions today.   - Avvbz-wfiqka-cekm disease:  No evidence of acute cifpr-cgwicd-qipq disease to date.   - Infectious disease:  No recent  active issues.  The patient had a fairly uncomplicated stay with no neutropenic fever to date.  He will continue on his prophylactic antibiotics, and, even though his neutrophils improved over 2500 within the last couple of days, I recommended him to stay on Levaquin through next week.  No changes were made to his other prophylactic antibiotics.   - Immunosuppression:  His tacrolimus trough level was elevated in the 18 range, and the dose was adjusted down to 1 mg b.i.d.  We will plan on repeating his tacrolimus level on Monday.  The patient will hold his morning dose.      He will in the meantime continue on CellCept at the current dose for the first 30 days after transplant.      He is aware to inform us on any new development until his upcoming visit on Monday.  Multiple questions asked and answered during this encounter.      I spent altogether over 50% of this 20-25 minute face-to-face interaction in counseling and care coordination, reviewing his complex hospitalization history of stem cell transplantation, and formulating the ongoing plan of care as outlined above.      Adair White MD     Hematology, Oncology and Transplantation     [facility]

## 2017-08-05 NOTE — MR AVS SNAPSHOT
After Visit Summary   8/5/2017    Norman Real    MRN: [id number]           Patient Information     Date Of Birth          1960        Visit Information        Provider Department      8/5/2017 9:30 AM  BMT [facility] Blood and Marrow Transplant        Today's Diagnoses     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    -  1    History of peripheral stem cell transplant (H)        Acute myeloid leukemia in remission (H)        Aspergillosis (H)        S/P allogeneic bone marrow transplant (H)              [facility] ([facility])  [address] [place] [address]  Phone: [phone]  Clinic Hours:   Monday-Thursday:7am to 7pm   Friday: 7am to 5pm   Weekends and holidays:    8am to noon (in general)  If your fever is 100.5  or greater,   call the clinic.  After hours call the   hospital at [phone] or   [phone]. Ask for the BMT   fellow on-call            Follow-ups after your visit        Your next 10 appointments already scheduled     Aug 06, 2017  9:00 AM CDT   [facility] Lab Draw with  [facility] LAB DRAW   [facility] Mason[facility] Lab Draw ([facility])    [address] [place] [id number]   [phone]            Aug 06, 2017  9:30 AM CDT   Return with  BMT [facility] Blood and Marrow Transplant ([facility])    [address] [place] [id number]   [phone]            Aug 07, 2017 10:00 AM CDT   [facility]ic Lab Draw with  [facility] LAB DRAW   [facility] Mason[facility] Lab Draw ([facility])    [address] [place] [phone]   [phone]            Aug 07, 2017 10:30 AM CDT   Return with  BMT MIMA #3   [facility] Blood and Marrow Transplant ([facility])    [address] [place] [id number]   [phone]            Aug 07, 2017 11:00 AM CDT   PHARM D CONSULT WITH [facility]  with  Bmt Pharm D, RPH,  2 117 CONSULT RM   [facility] Blood and Marrow Transplant ([facility])    [address]  2nd [address]  [facility] [id number] [phone]            Aug 10, 2017  3:30 PM CDT   (Arrive by 3:15 PM)   Return Visit with Tim Ahn MD   [facility] ([facility])    [address]  [address]nd [address]  [place] [facility] [id number] [phone]            Aug 15, 2017  1:30 PM CDT   [facility] Lab Draw with  [facility] LAB DRAW   [facility]    [address]  [address] Floor  [facility] [id number] [phone]            Aug 15, 2017  2:00 PM CDT   Bone Marrow Biopsy with  BMT MIMA #4, UU BONE MARROW BIOPSY   [facility] Blood and Marrow Transplant ([facility])    [address]  2nd Floor  [facility] [id number] [phone]              Future tests that were ordered for you today     Open Future Orders        Priority Expected Expires Ordered    CBC with platelets differential Routine 8/7/2017 8/30/2017 8/5/2017    Comprehensive metabolic panel Routine 8/7/2017 8/30/2017 8/5/2017    Magnesium Routine 8/7/2017 8/30/2017 8/5/2017    Tacrolimus level Routine 8/7/2017 8/30/2017 8/5/2017    CMV DNA quantification Routine 8/7/2017 8/30/2017 8/5/2017            Who to contact     If you have questions or need follow up information about today's clinic visit or your schedule please contact [facility] BLOOD AND MARROW TRANSPLANT directly at [phone].  Normal or non-critical lab and imaging results will be communicated to you by MyChart, letter or phone within 4 business days after the clinic has received the results. If you do not hear from us within 7 days, please contact the clinic through MyChart or phone. If you have a critical or abnormal lab result, we will notify you by phone as soon as possible.  Submit refill requests through  "Zahrat or call your pharmacy and they will forward the refill request to us. Please allow 3 business days for your refill to be completed.          Additional Information About Your Visit        MyChart Information     [employer] lets you send messages to your doctor, view your test results, renew your prescriptions, schedule appointments and more. To sign up, go to [email or web address] . Click on \"Log in\" on the left side of the screen, which will take you to the Welcome page. Then click on \"Sign up Now\" on the right side of the page.     You will be asked to enter the access code listed below, as well as some personal information. Please follow the directions to create your username and password.     Your access code is: [email or web address]  Expires: 8/15/2017  6:30 AM     Your access code will  in 90 days. If you need help or a new code, please call your [facility] clinic or [phone].        Care EveryWhere ID     This is your Care EveryWhere ID. This could be used by other organizations to access your [facility] medical records  [id number]        Your Vitals Were     Pulse Temperature Pulse Oximetry BMI (Body Mass Index)          82 97.7  F (36.5  C) (Axillary) 97% 30.92 kg/m2         Blood Pressure from Last 3 Encounters:   17 145/79   17 129/77   17 (!) 179/93    Weight from Last 3 Encounters:   17 92.5 kg (204 lb)   17 90.6 kg (199 lb 11.2 oz)   17 92.5 kg (204 lb)              We Performed the Following     Basic metabolic panel     CBC with platelets differential     Magnesium          Today's Medication Changes          These changes are accurate as of: 17 10:29 AM.  If you have any questions, ask your nurse or doctor.               These medicines have changed or have updated prescriptions.        Dose/Directions    magnesium oxide 400 MG tablet   Commonly known as:  MAG-OX   This may have changed:  when to take this   Used for:  AML (acute myeloid leukemia) in " remission (H)        Dose:  400 mg   Take 1 tablet (400 mg) by mouth 3 times daily   Quantity:  60 tablet   Refills:  1                Recent Review Flowsheet Data     BMT Recent Results Latest Ref Rng & Units 8/3/2017 8/3/2017 8/3/2017 8/4/2017 8/4/2017 8/4/2017 8/5/2017    WBC 4.0 - 11.0 10e9/L - - - 4.3 - - 7.6    Hemoglobin 13.3 - 17.7 g/dL - - - 11.0(L) - - 11.5(L)    Platelet Count 150 - 450 10e9/L - - - 31(LL) - - 38(LL)    Neutrophils (Absolute) 1.6 - 8.3 10e9/L - - - 2.7 - - 5.5    INR 0.86 - 1.14 - - - - - - -    Sodium 133 - 144 mmol/L - - - 140 - - 137    Potassium 3.4 - 5.3 mmol/L - - - 4.2 - - 4.3    Chloride 94 - 109 mmol/L - - - 107 - - 104    Glucose 70 - 99 mg/dL 158(H) 215(H) 117(H) 144(H) 110(H) 191(H) 228(H)    Urea Nitrogen 7 - 30 mg/dL - - - 12 - - 13    Creatinine 0.66 - 1.25 mg/dL - - - 0.93 - - 1.05    Calcium (Total) 8.5 - 10.1 mg/dL - - - 8.4(L) - - 8.5    Protein (Total) 6.8 - 8.8 g/dL - - - - - - -    Albumin 3.4 - 5.0 g/dL - - - - - - -    Bilirubin (Direct) 0.0 - 0.2 mg/dL - - - - - - -    Alkaline Phosphatase 40 - 150 U/L - - - - - - -    AST 0 - 45 U/L - - - - - - -    ALT 0 - 70 U/L - - - - - - -    MCV 78 - 100 fl - - - 92 - - 95               Primary Care Provider    Physician No Ref-Primary       No address on file        Equal Access to Services     ASHLYN FLANAGAN AH: Greg Ratliff, joana manning, vishal thaosueal saavedraJoycelynmike ah. So [facility] [phone].    ATENCIÓN: Si habla español, tiene a gusman disposición servicios gratuitos de asistencia lingüística. Llame al [phone].    We comply with applicable federal civil rights laws and [place] laws. We do not discriminate on the basis of race, color, national origin, age, disability sex, sexual orientation or gender identity.            Thank you!     Thank you for choosing [facility] BLOOD AND MARROW TRANSPLANT  for your care. Our goal is always to provide you with excellent care.  Hearing back from our patients is one way we can continue to improve our services. Please take a few minutes to complete the written survey that you may receive in the mail after your visit with us. Thank you!             Your Updated Medication List - Protect others around you: Learn how to safely use, store and throw away your medicines at www.disposemymeds.org.          This list is accurate as of: 8/5/17 10:29 AM.  Always use your most recent med list.                   Brand Name Dispense Instructions for use Diagnosis    acyclovir 800 MG tablet    ZOVIRAX    150 tablet    Take 1 tablet (800 mg) by mouth 5 times daily    Stem cell transplant candidate       cholecalciferol 1000 UNITS capsule    vitamin  -D     Take 1 capsule by mouth daily        cyclobenzaprine 5 MG tablet    FLEXERIL     Take 1 tablet (5 mg) by mouth 3 times daily as needed for muscle spasms        diltiazem 360 MG 24 hr CD capsule    CARDIZEM CD; CARTIA XT    30 capsule    Take 1 capsule (360 mg) by mouth daily    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       FLONASE NA      Spray in nostril as needed        guaiFENesin 600 MG 12 hr tablet    MUCINEX     Take 600 mg by mouth        heparin lock flush 10 UNIT/ML Soln injection     30 vial    5 mLs by Intracatheter route daily In each lumen    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       insulin aspart 100 UNIT/ML injection    NovoLOG FLEXPEN    3 mL    Give insulin based on High sliding scale  Also give prescribed amount before meals based on carbohydrate coverage    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       insulin glargine 100 UNIT/ML injection    LANTUS    3 mL    Inject 7 Units Subcutaneous daily    Type 2 diabetes mellitus without complication, without long-term current use of insulin (H)       insulin pen needle 30G X 8 MM     100 each    Use when delivering insulin as directed.    Type 2 diabetes mellitus without complication, without long-term current use of  insulin (H)       levofloxacin 250 MG tablet    LEVAQUIN    14 tablet    Take 1 tablet (250 mg) by mouth daily    Stem cell transplant candidate       loratadine 10 MG tablet    CLARITIN    7 tablet    Take 1 tablet (10 mg) by mouth daily    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       LORazepam 0.5 MG tablet    ATIVAN    40 tablet    Take 1-2 tablets (0.5-1 mg) by mouth every 4 hours as needed for anxiety (nausea/vomiting/sleep)    Stem cell transplant candidate       * losartan 50 MG tablet    COZAAR    30 tablet    Taking total dose of 62.5 mg daily by mouth: so take 50 mg tablet and 1/2 25 mg tablet daily    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       * losartan 25 MG tablet    COZAAR    30 tablet    Taking total dose of 62.5 mg daily by mouth: so take 50 mg tablet and 1/2 of a  25 mg tablet daily    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       magnesium oxide 400 MG tablet    MAG-OX    60 tablet    Take 1 tablet (400 mg) by mouth 3 times daily    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       MULTI COMPLETE PO           * mycophenolate 500 MG tablet    CELLCEPT - GENERIC EQUIVALENT    84 tablet    Take 3 tablets (1,500 mg) by mouth 2 times daily    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       * mycophenolate 500 MG tablet    CELLCEPT - GENERIC EQUIVALENT     Take 3 tablets (1,500 mg) by mouth 2 times daily        ondansetron 8 MG tablet    ZOFRAN    30 tablet    Take 1 tablet (8 mg) by mouth every 8 hours as needed for nausea    Stem cell transplant candidate       oxyCODONE 5 MG capsule    OXY-IR     Take 5 mg by mouth every 4 hours as needed for moderate to severe pain        pantoprazole 40 MG EC tablet    PROTONIX    30 tablet    Take 1 tablet (40 mg) by mouth daily    Stem cell transplant candidate       psyllium Packet    METAMUCIL/KONSYL    90 packet    Take 1 packet by mouth 3 times daily    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        sulfamethoxazole-trimethoprim 800-160 MG per tablet    BACTRIM DS/SEPTRA DS    30 tablet    Clinic will tell you when to start 1 tablet twice daily by mouth on Mondays and Tuesdays, start around day +28    Stem cell transplant candidate       * tacrolimus 0.5 MG capsule    PROGRAF - GENERIC EQUIVALENT    180 capsule    Take 3 capsules (1.5 mg) by mouth 2 times daily    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       * tacrolimus 0.5 MG capsule    PROGRAF - GENERIC EQUIVALENT    56 capsule    Take 2 capsules (1 mg) by mouth 2 times daily    Acute myeloid leukemia in remission (H)       ursodiol 300 MG capsule    ACTIGALL    90 capsule    Take 1 capsule (300 mg) by mouth 3 times daily    Stem cell transplant candidate       voriconazole 200 MG tablet    VFEND    90 tablet    Take 1.5 tablets (300 mg) by mouth 2 times daily    Stem cell transplant candidate       zolpidem 5 MG tablet    AMBIEN    45 tablet    Take 1-2 tablets (5-10 mg) by mouth nightly as needed for sleep    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       * Notice:  This list has 6 medication(s) that are the same as other medications prescribed for you. Read the directions carefully, and ask your doctor or other care provider to review them with you.

## 2017-08-05 NOTE — NURSING NOTE
Chief Complaint   Patient presents with     Blood Draw     AML, labs collected via central line.

## 2017-08-05 NOTE — NURSING NOTE
"Oncology Rooming Note    August 5, 2017 10:45 AM   Norman Real is a 56 year old male who presents for:    Chief Complaint   Patient presents with     Blood Draw     AML, labs collected via central line.      RECHECK     Patient here to see provider and labs follow up post BMT for AML     Initial Vitals: /79  Pulse 82  Temp 97.7  F (36.5  C) (Axillary)  Wt 92.5 kg (204 lb)  SpO2 97%  BMI 30.92 kg/m2 Estimated body mass index is 30.92 kg/(m^2) as calculated from the following:    Height as of 7/18/17: 1.73 m (5' 8.11\").    Weight as of this encounter: 92.5 kg (204 lb). Body surface area is 2.11 meters squared.  Data Unavailable Comment: Data Unavailable   No LMP for male patient.  Allergies reviewed: Yes  Medications reviewed: Yes    Medications: Medication refills not needed today.  Pharmacy name entered into EPIC: Data Unavailable    Clinical concerns: Magnesium level 1.5, per provider will take one extra Magnesium table at home today. Plim ANC 4.1per provider no G-CSF today Dr. White was notified.  Reviewed labs with patient     10 minutes for nursing intake (face to face time)     Lisa Mg RN            "

## 2017-08-07 NOTE — MR AVS SNAPSHOT
After Visit Summary   8/7/2017    Norman Real    MRN: [id number]           Patient Information     Date Of Birth          1960        Visit Information        Provider Department      8/7/2017 11:00 AM D, Lena Bmt Pharm, RPH; LENA 2 117 CONSULT RM [facility] Blood and Marrow Transplant        Today's Diagnoses     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    -  1          [facility] ([facility])  [address] [place] [id number]  Phone: [phone]  Clinic Hours:   Monday-Thursday:7am to 7pm   Friday: 7am to 5pm   Weekends and holidays:    8am to noon (in general)  If your fever is 100.5  or greater,   call the clinic.  After hours call the   hospital at [phone] or   [phone]. Ask for the BMT   fellow on-call            Follow-ups after your visit        Your next 10 appointments already scheduled     Aug 09, 2017 10:30 AM CDT   [facility] Draw with  [email or web address] LAB DRAW   [facility]    [address] [place] [id number] [phone]            Aug 09, 2017 11:00 AM CDT   Return with  BMT MIMA #2   [facility] Blood and Marrow Transplant ([facility])    [address] [place] [id number] [phone]            Aug 10, 2017  3:30 PM CDT   (Arrive by 3:15 PM)   Return Visit with Tim Ahn MD   [facility] ([facility])    [address] [place] [id number] [phone]            Aug 15, 2017  1:30 PM CDT   [facility] Lab Draw with  [email or web address] LAB DRAW   [facility] ([facility])    [address] [place] [id number] [phone]            Aug 15, 2017  2:00 PM CDT   Bone Marrow Biopsy with  BMT MIMA #4, UU BONE MARROW BIOPSY   [facility] Marrow Trans[facility] ([facility])     [address] [address]   [phone]            Aug 16, 2017  3:30 PM CDT   [facility] Draw with [facility] DRAW   [facility] Draw ([facility])    [address] [place] [id number]   [phone]            Aug 16, 2017  4:00 PM CDT   Return with Charanjit Umanzor MD   [facility] Blood and Marrow Transplant ([facility])    [address] [place] [id number]   [phone]            Aug 25, 2017 11:30 AM CDT   [facility] Lab Draw with  [facility] LAB DRAW   [facility] ([facility])    [address] [place] [phone]   [phone]            Aug 25, 2017 12:00 PM CDT   (Arrive by 11:45 AM)   BMT 28 Day Anniversary Visit with Charanjit Umanzor MD   [facility] Marrow [facility] ([facility])    [address] [place] [phone]   [phone]              Future tests that were ordered for you today     Open Future Orders        Priority Expected Expires Ordered    Basic metabolic panel Routine 8/9/2017 2/3/2018 8/7/2017    Magnesium Routine 8/9/2017 2/3/2018 8/7/2017    CMV DNA quantification Routine 8/9/2017 2/3/2018 8/7/2017    CBC with platelets differential Routine 8/9/2017 2/3/2018 8/7/2017            Who to contact     If you have questions or need follow up information about today's clinic visit or your schedule please contact [facility] BLOOD AND MARROW TRANSPLANT directly at [phone].  Normal or non-critical lab and imaging results will be communicated to you by MyChart, letter or phone within 4 business days after the clinic has received the results. If you do not hear from us within 7 days, please contact the clinic through MyChart or phone. If you have a critical or abnormal lab result, we will notify you by phone as soon  "as possible.  Submit refill requests through [employer] or call your pharmacy and they will forward the refill request to us. Please allow 3 business days for your refill to be completed.          Additional Information About Your Visit        [employer] Information     [employer] lets you send messages to your doctor, view your test results, renew your prescriptions, schedule appointments and more. To sign up, go to [email or web address] . Click on \"Log in\" on the left side of the screen, which will take you to the Welcome page. Then click on \"Sign up Now\" on the right side of the page.     You will be asked to enter the access code listed below, as well as some personal information. Please follow the directions to create your username and password.     Your access code is: [email or web address]  Expires: 8/15/2017  6:30 AM     Your access code will  in 90 days. If you need help or a new code, please call your [facility] clinic or [phone].        Care EveryWhere ID     This is your Care EveryWhere ID. This could be used by other organizations to access your [facility] medical records  [id number]         Blood Pressure from Last 3 Encounters:   17 147/89   17 145/79   17 129/77    Weight from Last 3 Encounters:   17 93.4 kg (205 lb 14.4 oz)   17 92.5 kg (204 lb)   17 90.6 kg (199 lb 11.2 oz)              Today, you had the following     No orders found for display         Today's Medication Changes          These changes are accurate as of: 17 11:32 AM.  If you have any questions, ask your nurse or doctor.               These medicines have changed or have updated prescriptions.        Dose/Directions    insulin glargine 100 UNIT/ML injection   Commonly known as:  LANTUS   This may have changed:  how much to take   Used for:  Type 2 diabetes mellitus without complication, without long-term current use of insulin (H)        Dose:  10 Units   Inject 10 Units Subcutaneous daily "   Quantity:  3 mL   Refills:  1       magnesium oxide 400 MG tablet   Commonly known as:  MAG-OX   This may have changed:    - how much to take  - when to take this   Used for:  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        Dose:  800 mg   Take 2 tablets (800 mg) by mouth 2 times daily   Quantity:  60 tablet   Refills:  1         Stop taking these medicines if you haven't already. Please contact your care team if you have questions.     loratadine 10 MG tablet   Commonly known as:  CLARITIN                Where to get your medicines      These medications were sent to [facility]/pharmacy #[id number] - [place], [place] - [address]  [address] [address]     Phone:  [phone]     insulin glargine 100 UNIT/ML injection                Recent Review Flowsheet Data     BMT Recent Results Latest Ref Rng & Units 8/3/2017 8/3/2017 8/4/2017 8/4/2017 8/4/2017 8/5/2017 8/7/2017    WBC 4.0 - 11.0 10e9/L - - 4.3 - - 7.6 5.8    Hemoglobin 13.3 - 17.7 g/dL - - 11.0(L) - - 11.5(L) 11.6(L)    Platelet Count 150 - 450 10e9/L - - 31(LL) - - 38(LL) 75(L)    Neutrophils (Absolute) 1.6 - 8.3 10e9/L - - 2.7 - - 5.5 3.3    INR 0.86 - 1.14 - - - - - - -    Sodium 133 - 144 mmol/L - - 140 - - 137 136    Potassium 3.4 - 5.3 mmol/L - - 4.2 - - 4.3 4.2    Chloride 94 - 109 mmol/L - - 107 - - 104 103    Glucose 70 - 99 mg/dL 215(H) 117(H) 144(H) 110(H) 191(H) 228(H) 178(H)    Urea Nitrogen 7 - 30 mg/dL - - 12 - - 13 14    Creatinine 0.66 - 1.25 mg/dL - - 0.93 - - 1.05 0.99    Calcium (Total) 8.5 - 10.1 mg/dL - - 8.4(L) - - 8.5 8.6    Protein (Total) 6.8 - 8.8 g/dL - - - - - - 6.7(L)    Albumin 3.4 - 5.0 g/dL - - - - - - 3.4    Bilirubin (Direct) 0.0 - 0.2 mg/dL - - - - - - -    Alkaline Phosphatase 40 - 150 U/L - - - - - - 90    AST 0 - 45 U/L - - - - - - 20    ALT 0 - 70 U/L - - - - - - 24    MCV 78 - 100 fl - - 92 - - 95 94               Primary Care Provider    Physician No Ref-Primary       No address on file         Equal Access to Services     [facility] : Hadii aad ku hadsimonegeovanna Virginiebrenda, waomarda luqadaha, qajassta kacaryltimmy mchugh, vishal lópez. So [facility] [phone].    ATENCIÓN: Si habla español, tiene a gusman disposición servicios gratuitos de asistencia lingüística. Llame al [phone].    We comply with applicable federal civil rights laws and [place] laws. We do not discriminate on the basis of race, color, national origin, age, disability sex, sexual orientation or gender identity.            Thank you!     Thank you for choosing [facility] BLOOD AND MARROW TRANSPLANT  for your care. Our goal is always to provide you with excellent care. Hearing back from our patients is one way we can continue to improve our services. Please take a few minutes to complete the written survey that you may receive in the mail after your visit with us. Thank you!             Your Updated Medication List - Protect others around you: Learn how to safely use, store and throw away your medicines at www.disposemymeds.org.          This list is accurate as of: 8/7/17 11:32 AM.  Always use your most recent med list.                   Brand Name Dispense Instructions for use Diagnosis    acyclovir 800 MG tablet    ZOVIRAX    150 tablet    Take 1 tablet (800 mg) by mouth 5 times daily    Stem cell transplant candidate       cholecalciferol 1000 UNITS capsule    vitamin  -D     Take 1 capsule by mouth daily        cyclobenzaprine 5 MG tablet    FLEXERIL     Take 1 tablet (5 mg) by mouth 3 times daily as needed for muscle spasms        diltiazem 360 MG 24 hr CD capsule    CARDIZEM CD; CARTIA XT    30 capsule    Take 1 capsule (360 mg) by mouth daily    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       FLONASE NA      Spray in nostril as needed        guaiFENesin 600 MG 12 hr tablet    MUCINEX     Take 600 mg by mouth        heparin lock flush 10 UNIT/ML Soln injection     30 vial    5 mLs by Intracatheter route daily  In each lumen    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       insulin aspart 100 UNIT/ML injection    NovoLOG FLEXPEN    3 mL    Give insulin based on High sliding scale  Also give prescribed amount before meals based on carbohydrate coverage    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       insulin glargine 100 UNIT/ML injection    LANTUS    3 mL    Inject 10 Units Subcutaneous daily    Type 2 diabetes mellitus without complication, without long-term current use of insulin (H)       insulin pen needle 30G X 8 MM     100 each    Use when delivering insulin as directed.    Type 2 diabetes mellitus without complication, without long-term current use of insulin (H)       levofloxacin 250 MG tablet    LEVAQUIN    14 tablet    Take 1 tablet (250 mg) by mouth daily    Stem cell transplant candidate       LORazepam 0.5 MG tablet    ATIVAN    40 tablet    Take 1-2 tablets (0.5-1 mg) by mouth every 4 hours as needed for anxiety (nausea/vomiting/sleep)    Stem cell transplant candidate       * losartan 50 MG tablet    COZAAR    30 tablet    Taking total dose of 62.5 mg daily by mouth: so take 50 mg tablet and 1/2 25 mg tablet daily    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       * losartan 25 MG tablet    COZAAR    30 tablet    Taking total dose of 62.5 mg daily by mouth: so take 50 mg tablet and 1/2 of a  25 mg tablet daily    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       magnesium oxide 400 MG tablet    MAG-OX    60 tablet    Take 2 tablets (800 mg) by mouth 2 times daily    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       MULTI COMPLETE PO           * mycophenolate 500 MG tablet    CELLCEPT - GENERIC EQUIVALENT    84 tablet    Take 3 tablets (1,500 mg) by mouth 2 times daily    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       * mycophenolate 500 MG tablet    CELLCEPT - GENERIC EQUIVALENT     Take 3 tablets (1,500 mg) by mouth 2 times daily        ondansetron 8 MG tablet    ZOFRAN    30 tablet     Take 1 tablet (8 mg) by mouth every 8 hours as needed for nausea    Stem cell transplant candidate       oxyCODONE 5 MG capsule    OXY-IR     Take 5 mg by mouth every 4 hours as needed for moderate to severe pain        pantoprazole 40 MG EC tablet    PROTONIX    30 tablet    Take 1 tablet (40 mg) by mouth daily    Stem cell transplant candidate       psyllium Packet    METAMUCIL/KONSYL    90 packet    Take 1 packet by mouth 3 times daily    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       sulfamethoxazole-trimethoprim 800-160 MG per tablet    BACTRIM DS/SEPTRA DS    30 tablet    Clinic will tell you when to start 1 tablet twice daily by mouth on Mondays and Tuesdays, start around day +28    Stem cell transplant candidate       * tacrolimus 0.5 MG capsule    PROGRAF - GENERIC EQUIVALENT    180 capsule    Take 3 capsules (1.5 mg) by mouth 2 times daily    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       * tacrolimus 0.5 MG capsule    PROGRAF - GENERIC EQUIVALENT    56 capsule    Take 2 capsules (1 mg) by mouth 2 times daily    Acute myeloid leukemia in remission (H)       ursodiol 300 MG capsule    ACTIGALL    90 capsule    Take 1 capsule (300 mg) by mouth 3 times daily    Stem cell transplant candidate       voriconazole 200 MG tablet    VFEND    90 tablet    Take 1.5 tablets (300 mg) by mouth 2 times daily    Stem cell transplant candidate       zolpidem 5 MG tablet    AMBIEN    45 tablet    Take 1-2 tablets (5-10 mg) by mouth nightly as needed for sleep    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       * Notice:  This list has 6 medication(s) that are the same as other medications prescribed for you. Read the directions carefully, and ask your doctor or other care provider to review them with you.

## 2017-08-07 NOTE — PROGRESS NOTES
Discharge Pharmacy Consult    Met with Norman and his wife after meeitng with their provider in his exam room.  The following items were reviewed.  - Med reconciliation was done to ensure all necessary inpatient medications were continued upon discharge.  - Reviewed the changes made to medicatons today.  - Norman stated he had all necessary medications, fills through [employer] [facility].  - His wife will be filling his med box, she stated she felt confident in filling.  I advised to not wait until the box was entirely empty, but rather have a full day left, so can compare and make sure filling correctly.  She will plan to fill box on Wednesday and see how things go. She will check in with pharmacist if has questions.    Current Outpatient Prescriptions   Medication Sig Dispense Refill     insulin glargine (LANTUS) 100 UNIT/ML injection Inject 10 Units Subcutaneous daily 3 mL 1     magnesium oxide (MAG-OX) 400 MG tablet Take 1 tablet (400 mg) by mouth 3 times daily 60 tablet 1     acyclovir (ZOVIRAX) 800 MG tablet Take 1 tablet (800 mg) by mouth 5 times daily 150 tablet 1     diltiazem (CARDIZEM CD; CARTIA XT) 360 MG 24 hr CD capsule Take 1 capsule (360 mg) by mouth daily 30 capsule 1     levofloxacin (LEVAQUIN) 250 MG tablet Take 1 tablet (250 mg) by mouth daily 14 tablet 0     LORazepam (ATIVAN) 0.5 MG tablet Take 1-2 tablets (0.5-1 mg) by mouth every 4 hours as needed for anxiety (nausea/vomiting/sleep) 40 tablet 0     ondansetron (ZOFRAN) 8 MG tablet Take 1 tablet (8 mg) by mouth every 8 hours as needed for nausea 30 tablet 1     pantoprazole (PROTONIX) 40 MG EC tablet Take 1 tablet (40 mg) by mouth daily 30 tablet 1     psyllium (METAMUCIL/KONSYL) Packet Take 1 packet by mouth 3 times daily 90 packet 1     sulfamethoxazole-trimethoprim (BACTRIM DS/SEPTRA DS) 800-160 MG per tablet Clinic will tell you when to start 1 tablet twice daily by mouth on Mondays and Tuesdays, start around day +28 30 tablet 1     ursodiol  (ACTIGALL) 300 MG capsule Take 1 capsule (300 mg) by mouth 3 times daily 90 capsule 1     voriconazole (VFEND) 200 MG tablet Take 1.5 tablets (300 mg) by mouth 2 times daily 90 tablet 1     zolpidem (AMBIEN) 5 MG tablet Take 1-2 tablets (5-10 mg) by mouth nightly as needed for sleep 45 tablet 0     losartan (COZAAR) 50 MG tablet Taking total dose of 62.5 mg daily by mouth: so take 50 mg tablet and 1/2 25 mg tablet daily 30 tablet 1     losartan (COZAAR) 25 MG tablet Taking total dose of 62.5 mg daily by mouth: so take 50 mg tablet and 1/2 of a  25 mg tablet daily 30 tablet 1     insulin pen needle 30G X 8 MM Use when delivering insulin as directed. 100 each 1     insulin aspart (NOVOLOG FLEXPEN) 100 UNIT/ML injection Give insulin based on High sliding scale  Also give prescribed amount before meals based on carbohydrate coverage 3 mL 1     cyclobenzaprine (FLEXERIL) 5 MG tablet Take 1 tablet (5 mg) by mouth 3 times daily as needed for muscle spasms       mycophenolate (CELLCEPT - GENERIC EQUIVALENT) 500 MG tablet Take 3 tablets (1,500 mg) by mouth 2 times daily       tacrolimus (PROGRAF - GENERIC EQUIVALENT) 0.5 MG capsule Take 2 capsules (1 mg) by mouth 2 times daily 56 capsule 0     [DISCONTINUED] insulin glargine (LANTUS) 100 UNIT/ML injection Inject 7 Units Subcutaneous daily 3 mL 1     mycophenolate (CELLCEPT - GENERIC EQUIVALENT) 500 MG tablet Take 3 tablets (1,500 mg) by mouth 2 times daily 84 tablet 0     tacrolimus (PROGRAF - GENERIC EQUIVALENT) 0.5 MG capsule Take 3 capsules (1.5 mg) by mouth 2 times daily 180 capsule 1     heparin lock flush 10 UNIT/ML SOLN injection 5 mLs by Intracatheter route daily In each lumen 30 vial 3     guaiFENesin (MUCINEX) 600 MG 12 hr tablet Take 600 mg by mouth       oxyCODONE (OXY-IR) 5 MG capsule Take 5 mg by mouth every 4 hours as needed for moderate to severe pain       Fluticasone Propionate (FLONASE NA) Spray in nostril as needed        Multiple Vitamins-Minerals (MULTI  COMPLETE PO)        cholecalciferol (VITAMIN  -D) 1000 UNITS capsule Take 1 capsule by mouth daily           Changes made to scheduled medication list today include:  Added: None  Deleted: loratadine  Changed: magnesium oxide to TID    Time spent in this activity: 20 minutes     Carol Camacho, PharmD

## 2017-08-07 NOTE — PROGRESS NOTES
BMT Daily Progress Note   August 7, 2017       Patient ID:  Norman Real is a 56 year old male, currently day + 12  of his HCT.      Diagnosis AMLU Acute myelogeneous leukemia, Unknown  HCT Type Allogeneic    Prep Regimen Cytoxan  Fludarabine  TBI   Donor Source Related PBSC    GVHD Prophylaxis   Mycophenolate  Primary BMT Provider Dr. Erna MD          INTERVAL  HISTORY      HPI: Mr. Real was seen for a follow up visit in the BMT clinic today. States he has been feeling well. Mild occasional nausea without containing. No diarrhea. Passing normal bowel movements. Denied any new constitutional symptoms. Eating adequate amounts of a regular diet. Presented with his wife today. Discussed plan to follow up on Wednesday which he was agreeable with.     Review of Systems: 10 point ROS negative except as noted above.     Scheduled Medications    tacrolimus  1.5 mg Oral BID IS     insulin aspart    Subcutaneous Daily with lunch     insulin aspart    Subcutaneous Daily with supper     mycophenolate  1,500 mg Oral BID IS     insulin aspart    Subcutaneous Daily with breakfast     loratadine  10 mg Oral Daily     diltiazem  360 mg Oral Daily     filgrastim (NEUPOGEN/GRANIX) intravenous  5 mcg/kg (Treatment Plan Recorded) Intravenous Daily at 8 pm     insulin glargine  7 Units Subcutaneous Q24H     ondansetron  8 mg Oral Q8H     psyllium  1 packet Oral TID     losartan (COZAAR) half-tab 62.5 mg  62.5 mg Oral Daily     insulin aspart  1-10 Units Subcutaneous TID AC     insulin aspart  1-7 Units Subcutaneous At Bedtime     acyclovir  800 mg Oral 5x Daily     levofloxacin  250 mg Oral Daily at 10 am     voriconazole  200 mg Oral BID     [START ON 8/28/2017] sulfamethoxazole-trimethoprim  1 tablet Oral Q Mon Tues BID     heparin lock flush  5-10 mL Intracatheter Q24H     pantoprazole  40 mg Oral Daily     ursodiol  300 mg Oral TID     heparin lock flush  5 mL Intravenous Q24H            PHYSICAL EXAM   Vital  "signs:  Temp: 97.9  F (36.6  C)   BP: 147/89 Pulse: 84   Resp: 16 SpO2: 98 %       Weight: 93.4 kg (205 lb 14.4 oz)  Estimated body mass index is 31.21 kg/(m^2) as calculated from the following:    Height as of 7/18/17: 1.73 m (5' 8.11\").    Weight as of this encounter: 93.4 kg (205 lb 14.4 oz).  General: NAD, interactive, appropriate   Eyes: SAMSON, sclera anicteric   Nose/Mouth/Throat: OP clear, buccal mucosa moist, no ulcerations   Lungs: CTA bilaterally  Cardiovascular: RRR, no M/R/G   Abdominal/Rectal: +BS, soft, NT, ND, No HSM   Lymphatics: No edema  Skin: no rashes or petechaie  Neuro: A&O   Additional Findings: Cooper site NT, no drainage.     LABS AND IMAGING - PAST 24 HOURS      Lab Results   Component Value Date    WBC 7.6 08/05/2017    ANEU 5.5 08/05/2017    HGB 11.5 (L) 08/05/2017    HCT 35.6 (L) 08/05/2017    PLT 38 (LL) 08/05/2017     08/05/2017    POTASSIUM 4.3 08/05/2017    CHLORIDE 104 08/05/2017    CO2 23 08/05/2017     (H) 08/05/2017    BUN 13 08/05/2017    CR 1.05 08/05/2017    MAG 1.5 (L) 08/05/2017    INR 0.96 07/31/2017    BILITOTAL 0.5 08/03/2017    AST 7 08/03/2017    ALT 23 08/03/2017    ALKPHOS 65 08/03/2017    PROTTOTAL 6.0 (L) 08/03/2017    ALBUMIN 3.2 (L) 08/03/2017        ASSESSMENT BY SYSTEMS      Norman Salazar eRal is a 56 year old male with AML, currently day + 12 for 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 He received additional stem cells from donor 7/27.        1b.  BMT: Completed prep with cytoxan, fludarabine, & TBI. Transplant 7/27. Initial stem cell product only contained 2.33 CD34/kg, additional 0.66 CD34/kg on 7/27 for a total of 2.99.   - GCSF started on 07/28, with early auto recovery/engraftment and ANC>2500 x2 consecutive days this was discontinued on 8/5. WBC 7.6 with ANC 5.5 today. Will give GCSF PRN to maintain ANC > 1.0.   - Re-stage per protocol.      2.  HEME: Keep Hgb>8 and plts>10K. Engrafting, e/o trilineage hematopoesis with plts up " to 75K today.   - No pre-meds.  - No transfusions today      3.  ID:  Afebrile.   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   8/1 CMV=neg.  - Bactrim or appropriate PCP therapy to start d+28.   Discharged with RX for Bactrim, verifies no sulfa allergy. Do not start until  Day +28.                  4.  GI: No vomiting or diarrhea.    - Hx constipation- loose stools so decreased metamucil and loose stools are resolved.   - Mild nausea: Taking Zofran and ativan PRN, symptoms minimal   - Protonix for GI prophy.   - Ursodiol for VOD prophy, LFTs on 07/31 wnl.  - s/p left sided colectomy for recurrent diverticulitis.  No issues with this during this admission.      5.  GVH: No aGVH to date   - Cont MMF  -  Tacrolimus: Taking 1 mg BID. Last level 8/4 18.4. Level pending today.       6.  FEN/Renal: Cr improved to 0.99 8/7  - Tac induced Hypomag: Increased MagOx to 40 mg BID 8/7 with level 1.4. Recheck on Wednesday.   - Eating adequate amounts, no nutritional concerns at this time      7. CV: Mild HTN today 140/80's. Will recheck Wednesday and consider increasing   8/1 Repeat Itb=103(443) on scheduled zofran, today changed zofran to prn..  - Hx HTN + TAC exacerbated: Continue increased losartan  62.5 mg. Increased diltiazem XL 7/31 360 (max dose). If BP remains elevated will consider increasing losartan further next visit.   - Hx tachycardia with arrhythmia, s/p 3 ablations  - hold crestor through transplant.  Discussed with patient on discharge continuing to hold this med.      8. Endo: Hx DM type II. BG's improved, tapered off of decadron. Notes glucoses range 130-300's. Discussed Endo referral for follow up within the next month (order placed in Epic) and increased Lantus to 10 units per day 8/7.   - Current DM Regimen    Diabetes Care:      Lantus 7 units increased to 10 units 8/7  daily around noon      Novolo units per meal, 2-3 units per snack, 4 units with Ensure      Novolog for correction based on the following sliding scales:      Sliding Scale for before meals:  Do Not give Correction Insulin if Pre-Meal BG less than 140.   For Pre-Meal  - 164 give 1 unit.   For Pre-Meal  - 189 give 2 units.   For Pre-Meal  - 214 give 3 units.   For Pre-Meal  - 239 give 4 units.   For Pre-Meal  - 264 give 5 units.   For Pre-Meal  - 289 give 6 units.   For Pre-Meal  - 314 give 7 units.   For Pre-Meal  - 339 give 8 units.   For Pre-Meal  - 364 give 9 units.   For Pre-Meal BG greater than or equal to 365 give 10 units      Sliding Scale for blood sugars checked before bedtime snack:  Do Not give Bedtime Correction Insulin if BG less than 200.   For  - 224 give 1 units.   For  - 249 give 2 units.   For  - 274 give 3 units.   For  - 299 give 4 units.   For  - 324 give 5 units.   For  - 349 give 6 units.   For BG greater than or equal to 350 give 7 units.       Check blood sugars before meals and before bedtime snack      Call the diabetes management team with questions regarding your treatment plan after discharge            - Hold metformin 500mg/d on admission.      9. : Hx prostate cancer.   - Asymptomatic at this time.       10. Neuro: History of chronic insomnia.  - Insomnia: Ambien to 5-10 mg QHS PRN      11. Pulm: Hx spiculated pulm nodule (concern for malignancy w/ hx tobacco use) and GGO (concerning for fungal PNA). F/u CT chest in 4-6 weeks (approx ) per Dr. Ahn recs in workup.    - Follow up with Dr. Ahn scheduled this week Thursday 8/10  - Note  BAL + for aspergillus galactomannan; culture negative final. Set up to follow up on Dr. Ahn:  8/10/2017at 3:30am.     Note: Medications should be filled at [facility] pharmacy    Plan:  - RTC on Wednesday  - Engrafting, no longer on daily GCSF  - Tacro  level pending  - Increased lantus to 10 units daily, referral to endo for future management  - Increased MagOx to 400 mg BID     Haydee Black, MSN, APRN, ACNP-BC  Pager: [phone]

## 2017-08-07 NOTE — MR AVS SNAPSHOT
After Visit Summary   8/7/2017    Norman Real    MRN: [id number]           Patient Information     Date Of Birth          1960        Visit Information        Provider Department      8/7/2017 10:30 AM  BMT MIMA #3 M [facility] Blood and Marrow Transplant        Today's Diagnoses     S/P allogeneic bone marrow transplant (H)        Type 2 diabetes mellitus without complication, without long-term current use of insulin (H)        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              [facility] ([facility]  [address] [place] [id number]  Phone: [phone]  Clinic Hours:   Monday-Thursday:7am to 7pm   Friday: 7am to 5pm   Weekends and holidays:    8am to noon (in general)  If your fever is 100.5  or greater,   call the clinic.  After hours call the   hospital at [phone] or   [phone]. Ask for the BMT   fellow on-call           Care Instructions    - RTC on Wednesday for provider visit and labs  - Provider will call you with any changes to your Tacrolimus  - Increase your Lantus to 10 units per day  - Follow up with the endocrine/diabetes clinic within 1 month  - Call if you develop a fever  - Continue Levaquin, provider will tel you when to stop taking this medication   - You can stop taking daily Claritin     Haydee Black, MSN, APRN, [facility]-BC  Pager: [phone]            Follow-ups after your visit        Additional Services     Endocrinology Adult Referral       Your provider has referred you to: [facility]: Endocrinology and Diabetes Clinic - [place] [phone]   [email or web address]      Please be aware that coverage of these services is subject to the terms and limitations of your health insurance plan.  Call member services at your health plan with any benefit or coverage questions.      Please bring the following to your appointment:    >>   Any x-rays, CTs or MRIs which have been performed.   Contact the facility where they were done to arrange for  prior to your scheduled appointment.    >>   List of current medications   >>   This referral request   >>   Any documents/labs given to you for this referral                  Your next 10 appointments already scheduled     Aug 09, 2017 10:30 AM CDT   [facility] Lab Draw with [facility] LAB DRAW   [facility] Mason[facility] Lab Draw ([facility])    [address] [place] [address]   [phone]            Aug 09, 2017 11:00 AM CDT   Return with UC BMT MIMA #2   [facility] Blood and Marrow Transplant ([facility])    [address] [place] [phone]   [phone]            Aug 10, 2017  3:30 PM CDT   (Arrive by 3:15 PM)   Return Visit with Tim Ahn MD   [facility] ([facility])    [address] [place] [phone]   [phone]            Aug 15, 2017  1:30 PM CDT   M[facility]ic Lab Draw with  MAS[facility] LAB DRAW   [facility] Lab Draw ([facility])    [address] [place] [id number]   [phone]            Aug 15, 2017  2:00 PM CDT   Bone Marrow Biopsy with UC BMT MIMA #4, UU BONE MARROW BIOPSY   [facility] Blood and Marrow Transplant ([facility])    [address] [place] [phone]   [phone]            Aug 16, 2017  3:30 PM CDT   M[facility] Lab Draw with [facility] LAB DRAW   [facility] Lab Draw ([facility])    [address] [place] [address]   [phone]            Aug 16, 2017  4:00 PM CDT   Return with Charanjit Umanzor MD   [facility] Blood and Marrow Transplant ([facility])    [address] [place] [address]   [phone]            Aug 25, 2017 11:30 AM CDT  "  Masonic Lab Draw with  MASONIC LAB DRAW   [facility] Masonic Lab Draw ([facility])    [address]  [address] [facility] [id number] [phone]            Aug 25, 2017 12:00 PM CDT   (Arrive by 11:45 AM)   BMT 28 Day Anniversary Visit with Charanjit Umanzor MD   [facility] Blood and Marrow Transplant ([facility])    [address]  [address] [facility] [id number] [phone]              Future tests that were ordered for you today     Open Future Orders        Priority Expected Expires Ordered    Basic metabolic panel Routine 8/9/2017 2/3/2018 8/7/2017    Magnesium Routine 8/9/2017 2/3/2018 8/7/2017    CMV DNA quantification Routine 8/9/2017 2/3/2018 8/7/2017    CBC with platelets differential Routine 8/9/2017 2/3/2018 8/7/2017            Who to contact     If you have questions or need follow up information about today's clinic visit or your schedule please contact [facility] directly at [phone].  Normal or non-critical lab and imaging results will be communicated to you by [employer], letter or phone within 4 business days after the clinic has received the results. If you do not hear from us within 7 days, please contact the clinic through [employer] or phone. If you have a critical or abnormal lab result, we will notify you by phone as soon as possible.  Submit refill requests through [employer] or call your pharmacy and they will forward the refill request to us. Please allow 3 business days for your refill to be completed.          Additional Information About Your Visit        MyChart Information     [employer] lets you send messages to your doctor, view your test results, renew your prescriptions, schedule appointments and more. To sign up, go to [email or web address] . Click on \"Log in\" on the left side of the screen, which will take you to the Welcome page. Then click on \"Sign up Now\" on the right " side of the page.     You will be asked to enter the access code listed below, as well as some personal information. Please follow the directions to create your username and password.     Your access code is: [email or web address]  Expires: 8/15/2017  6:30 AM     Your access code will  in 90 days. If you need help or a new code, please call your [place] clinic or [phone].        Care EveryWhere ID     This is your Care EveryWhere ID. This could be used by other organizations to access your [place] medical records  [id number]        Your Vitals Were     Pulse Temperature Respirations Pulse Oximetry BMI (Body Mass Index)       84 97.9  F (36.6  C) 16 98% 31.21 kg/m2        Blood Pressure from Last 3 Encounters:   17 147/89   17 145/79   17 129/77    Weight from Last 3 Encounters:   17 93.4 kg (205 lb 14.4 oz)   17 92.5 kg (204 lb)   17 90.6 kg (199 lb 11.2 oz)              We Performed the Following     CBC with platelets differential     CMV DNA quantification     Comprehensive metabolic panel     Endocrinology Adult Referral     Magnesium     Tacrolimus level          Today's Medication Changes          These changes are accurate as of: 17 11:33 AM.  If you have any questions, ask your nurse or doctor.               These medicines have changed or have updated prescriptions.        Dose/Directions    insulin glargine 100 UNIT/ML injection   Commonly known as:  LANTUS   This may have changed:  how much to take   Used for:  Type 2 diabetes mellitus without complication, without long-term current use of insulin (H)        Dose:  10 Units   Inject 10 Units Subcutaneous daily   Quantity:  3 mL   Refills:  1       magnesium oxide 400 MG tablet   Commonly known as:  MAG-OX   This may have changed:    - how much to take  - when to take this   Used for:  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        Dose:  800 mg   Take 2 tablets (800 mg) by mouth 2 times daily   Quantity:   60 tablet   Refills:  1         Stop taking these medicines if you haven't already. Please contact your care team if you have questions.     loratadine 10 MG tablet   Commonly known as:  CLARITIN                Where to get your medicines      These medications were sent to [id number] - [place], [place] - [address]  [address] [address]     Phone:  [phone]     insulin glargine 100 UNIT/ML injection                Recent Review Flowsheet Data     BMT Recent Results Latest Ref Rng & Units 8/3/2017 8/3/2017 8/4/2017 8/4/2017 8/4/2017 8/5/2017 8/7/2017    WBC 4.0 - 11.0 10e9/L - - 4.3 - - 7.6 5.8    Hemoglobin 13.3 - 17.7 g/dL - - 11.0(L) - - 11.5(L) 11.6(L)    Platelet Count 150 - 450 10e9/L - - 31(LL) - - 38(LL) 75(L)    Neutrophils (Absolute) 1.6 - 8.3 10e9/L - - 2.7 - - 5.5 3.3    INR 0.86 - 1.14 - - - - - - -    Sodium 133 - 144 mmol/L - - 140 - - 137 136    Potassium 3.4 - 5.3 mmol/L - - 4.2 - - 4.3 4.2    Chloride 94 - 109 mmol/L - - 107 - - 104 103    Glucose 70 - 99 mg/dL 215(H) 117(H) 144(H) 110(H) 191(H) 228(H) 178(H)    Urea Nitrogen 7 - 30 mg/dL - - 12 - - 13 14    Creatinine 0.66 - 1.25 mg/dL - - 0.93 - - 1.05 0.99    Calcium (Total) 8.5 - 10.1 mg/dL - - 8.4(L) - - 8.5 8.6    Protein (Total) 6.8 - 8.8 g/dL - - - - - - 6.7(L)    Albumin 3.4 - 5.0 g/dL - - - - - - 3.4    Bilirubin (Direct) 0.0 - 0.2 mg/dL - - - - - - -    Alkaline Phosphatase 40 - 150 U/L - - - - - - 90    AST 0 - 45 U/L - - - - - - 20    ALT 0 - 70 U/L - - - - - - 24    MCV 78 - 100 fl - - 92 - - 95 94               Primary Care Provider    Physician No Ref-Primary       No address on file        Equal Access to Services     ASHLYN FLANAGAN : Hadii sapna Ratliff, joana manning, vishal thao. [facility] [phone].    ATENCIÓN: Si habla español, tiene a gusman disposición servicios gratuitos de asistencia lingüística. Llame al  [phone].    We comply with applicable federal civil rights laws and [place] laws. We do not discriminate on the basis of race, color, national origin, age, disability sex, sexual orientation or gender identity.            Thank you!     Thank you for choosing [facility] MARROW TRANSPL[facility]  for your care. Our goal is always to provide you with excellent care. Hearing back from our patients is one way we can continue to improve our services. Please take a few minutes to complete the written survey that you may receive in the mail after your visit with us. Thank you!             Your Updated Medication List - Protect others around you: Learn how to safely use, store and throw away your medicines at www.disposemymeds.org.          This list is accurate as of: 8/7/17 11:33 AM.  Always use your most recent med list.                   Brand Name Dispense Instructions for use Diagnosis    acyclovir 800 MG tablet    ZOVIRAX    150 tablet    Take 1 tablet (800 mg) by mouth 5 times daily    Stem cell transplant candidate       cholecalciferol 1000 UNITS capsule    vitamin  -D     Take 1 capsule by mouth daily        cyclobenzaprine 5 MG tablet    FLEXERIL     Take 1 tablet (5 mg) by mouth 3 times daily as needed for muscle spasms        diltiazem 360 MG 24 hr CD capsule    CARDIZEM CD; CARTIA XT    30 capsule    Take 1 capsule (360 mg) by mouth daily    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       FLONASE NA      Spray in nostril as needed        guaiFENesin 600 MG 12 hr tablet    MUCINEX     Take 600 mg by mouth        heparin lock flush 10 UNIT/ML Soln injection     30 vial    5 mLs by Intracatheter route daily In each lumen    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       insulin aspart 100 UNIT/ML injection    NovoLOG FLEXPEN    3 mL    Give insulin based on High sliding scale  Also give prescribed amount before meals based on carbohydrate coverage    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        insulin glargine 100 UNIT/ML injection    LANTUS    3 mL    Inject 10 Units Subcutaneous daily    Type 2 diabetes mellitus without complication, without long-term current use of insulin (H)       insulin pen needle 30G X 8 MM     100 each    Use when delivering insulin as directed.    Type 2 diabetes mellitus without complication, without long-term current use of insulin (H)       levofloxacin 250 MG tablet    LEVAQUIN    14 tablet    Take 1 tablet (250 mg) by mouth daily    Stem cell transplant candidate       LORazepam 0.5 MG tablet    ATIVAN    40 tablet    Take 1-2 tablets (0.5-1 mg) by mouth every 4 hours as needed for anxiety (nausea/vomiting/sleep)    Stem cell transplant candidate       * losartan 50 MG tablet    COZAAR    30 tablet    Taking total dose of 62.5 mg daily by mouth: so take 50 mg tablet and 1/2 25 mg tablet daily    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       * losartan 25 MG tablet    COZAAR    30 tablet    Taking total dose of 62.5 mg daily by mouth: so take 50 mg tablet and 1/2 of a  25 mg tablet daily    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       magnesium oxide 400 MG tablet    MAG-OX    60 tablet    Take 2 tablets (800 mg) by mouth 2 times daily    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       MULTI COMPLETE PO           * mycophenolate 500 MG tablet    CELLCEPT - GENERIC EQUIVALENT    84 tablet    Take 3 tablets (1,500 mg) by mouth 2 times daily    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       * mycophenolate 500 MG tablet    CELLCEPT - GENERIC EQUIVALENT     Take 3 tablets (1,500 mg) by mouth 2 times daily        ondansetron 8 MG tablet    ZOFRAN    30 tablet    Take 1 tablet (8 mg) by mouth every 8 hours as needed for nausea    Stem cell transplant candidate       oxyCODONE 5 MG capsule    OXY-IR     Take 5 mg by mouth every 4 hours as needed for moderate to severe pain        pantoprazole 40 MG EC tablet    PROTONIX    30 tablet    Take 1 tablet (40  mg) by mouth daily    Stem cell transplant candidate       psyllium Packet    METAMUCIL/KONSYL    90 packet    Take 1 packet by mouth 3 times daily    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       sulfamethoxazole-trimethoprim 800-160 MG per tablet    BACTRIM DS/SEPTRA DS    30 tablet    Clinic will tell you when to start 1 tablet twice daily by mouth on Mondays and Tuesdays, start around day +28    Stem cell transplant candidate       * tacrolimus 0.5 MG capsule    PROGRAF - GENERIC EQUIVALENT    180 capsule    Take 3 capsules (1.5 mg) by mouth 2 times daily    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       * tacrolimus 0.5 MG capsule    PROGRAF - GENERIC EQUIVALENT    56 capsule    Take 2 capsules (1 mg) by mouth 2 times daily    Acute myeloid leukemia in remission (H)       ursodiol 300 MG capsule    ACTIGALL    90 capsule    Take 1 capsule (300 mg) by mouth 3 times daily    Stem cell transplant candidate       voriconazole 200 MG tablet    VFEND    90 tablet    Take 1.5 tablets (300 mg) by mouth 2 times daily    Stem cell transplant candidate       zolpidem 5 MG tablet    AMBIEN    45 tablet    Take 1-2 tablets (5-10 mg) by mouth nightly as needed for sleep    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       * Notice:  This list has 6 medication(s) that are the same as other medications prescribed for you. Read the directions carefully, and ask your doctor or other care provider to review them with you.

## 2017-08-07 NOTE — PATIENT INSTRUCTIONS
- RTC on Wednesday for provider visit and labs  - Provider will call you with any changes to your Tacrolimus  - Increase your Lantus to 10 units per day  - Follow up with the endocrine/diabetes clinic within 1 month  - Call if you develop a fever  - Continue Levaquin, provider will tel you when to stop taking this medication   - You can stop taking daily Claritin     Haydee Black, MSN, APRN, ACNP-BC  Pager: [phone]      Print apt for pt

## 2017-08-09 NOTE — PROGRESS NOTES
BMT Daily Progress Note   August 9, 2017     Patient ID:  Norman Real is a 56 year old male, currently day + 14 of his HCT.      Diagnosis AMLU Acute myelogeneous leukemia, Unknown  HCT Type Allogeneic    Prep Regimen Cytoxan  Fludarabine  TBI   Donor Source Related PBSC    GVHD Prophylaxis   Mycophenolate  Primary BMT Provider Dr. Erna MD          INTERVAL  HISTORY      HPI: Presents for follow up with his wife. Doing well overall. Occasional nausea, but no emesis. Very manageable. Sometimes takes a zofran or ativan, but other times waits it out and sensation subsides without intervention. Normal stools. No fevers, cough or cold symptoms. No rash. No bleeding. Trace ankle edema, had this before when he was on vfend. Had low back, pelvic pain after playing bags game this weekend, but better today.    Review of Systems: 10 point ROS negative except as noted above.     Scheduled Medications    tacrolimus  1.5 mg Oral BID IS     insulin aspart    Subcutaneous Daily with lunch     insulin aspart    Subcutaneous Daily with supper     mycophenolate  1,500 mg Oral BID IS     insulin aspart    Subcutaneous Daily with breakfast     loratadine  10 mg Oral Daily     diltiazem  360 mg Oral Daily     filgrastim (NEUPOGEN/GRANIX) intravenous  5 mcg/kg (Treatment Plan Recorded) Intravenous Daily at 8 pm     insulin glargine  7 Units Subcutaneous Q24H     ondansetron  8 mg Oral Q8H     psyllium  1 packet Oral TID     losartan (COZAAR) half-tab 62.5 mg  62.5 mg Oral Daily     insulin aspart  1-10 Units Subcutaneous TID AC     insulin aspart  1-7 Units Subcutaneous At Bedtime     acyclovir  800 mg Oral 5x Daily     levofloxacin  250 mg Oral Daily at 10 am     voriconazole  200 mg Oral BID     [START ON 8/28/2017] sulfamethoxazole-trimethoprim  1 tablet Oral Q Mon Tues BID     heparin lock flush  5-10 mL Intracatheter Q24H     pantoprazole  40 mg Oral Daily     ursodiol  300 mg Oral TID     heparin lock flush  5 mL  "Intravenous Q24H            PHYSICAL EXAM   Vital signs:                         Estimated body mass index is 31.21 kg/(m^2) as calculated from the following:    Height as of 7/18/17: 1.73 m (5' 8.11\").    Weight as of 8/7/17: 93.4 kg (205 lb 14.4 oz).  General: NAD, interactive, appropriate   Eyes: SAMSON, sclera anicteric   Nose/Mouth/Throat: OP clear, buccal mucosa moist, no ulcerations   Lungs: CTA bilaterally  Cardiovascular: RRR, no M/R/G   Abdominal/Rectal: +BS, soft, NT, ND, No HSM   Lymphatics: trace ankle edema bilaterally  Skin: no rashes or petechaie  Neuro: A&O   Additional Findings: Cooper site NT, no drainage.     LABS AND IMAGING - PAST 24 HOURS      Lab Results   Component Value Date    WBC 5.8 08/07/2017    ANEU 3.3 08/07/2017    HGB 11.6 (L) 08/07/2017    HCT 35.2 (L) 08/07/2017    PLT 75 (L) 08/07/2017     08/07/2017    POTASSIUM 4.2 08/07/2017    CHLORIDE 103 08/07/2017    CO2 24 08/07/2017     (H) 08/07/2017    BUN 14 08/07/2017    CR 0.99 08/07/2017    MAG 1.4 (L) 08/07/2017    INR 0.96 07/31/2017    BILITOTAL 0.3 08/07/2017    AST 20 08/07/2017    ALT 24 08/07/2017    ALKPHOS 90 08/07/2017    PROTTOTAL 6.7 (L) 08/07/2017    ALBUMIN 3.4 08/07/2017        ASSESSMENT BY SYSTEMS      Norman Salazar Real is a 56 year old male with AML, currently day + 14 for 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 He received additional stem cells from donor 7/27.        1. BMT: Completed prep with cytoxan, fludarabine, & TBI. Transplant 7/27. Initial stem cell product only contained 2.33 CD34/kg, additional 0.66 CD34/kg on 7/27 for a total of 2.99.   - GCSF started on 07/28, with early auto recovery/engraftment and ANC>2500 x2 consecutive days this was discontinued on 8/5.    - Re-stage per protocol.      2.  HEME: Keep Hgb>8 and plts>10K.  - No pre-meds.    3.  ID:  Afebrile.   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    CMV=neg.  - Bactrim or appropriate PCP therapy to start d+28.   Discharged with RX for Bactrim, verifies no sulfa allergy. Do not start until  Day +28.                  4.  GI: No vomiting or diarrhea.    - Hx constipation- loose stools so decreased metamucil and loose stools are resolved.   - Mild nausea: Taking Zofran and ativan PRN, symptoms minimal   - Protonix for GI prophy.   - Ursodiol for VOD prophy  - s/p left sided colectomy for recurrent diverticulitis.  No issues with this during this admission.      5.  GVH: No aGVH to date   - Cont MMF through D  - Tacrolimus: Taking 1 mg BID. Last level  = 13.6    6.  FEN/Renal:   - Tac induced Hypoma.5. Increase MagOx 400mg from 4 to 5 tabs daily      7. CV:  - Hx HTN + TAC exacerbated: Continue increased losartan  62.5 mg. Increased diltiazem XL  360 (max dose).  - Hx tachycardia with arrhythmia, s/p 3 ablations  - hold crestor through transplant      8. Endo:   - Hx DM type II. Exacerbated by steroids. Notes glucoses range 130-300's. Discussed Endo referral for follow up within the next month (order placed in Epic) and increased Lantus to 10 units per day .   - Current DM Regimen: Lantus 10units qam, carb coverage (5 units per meal, 2-3 units per snack, 4 units with Ensure), sliding scale with meals and bedtime   - Hold metformin 500mg/d on admission.      9. : Hx prostate cancer.   - Asymptomatic at this time.       10. Neuro: History of chronic insomnia.  - Insomnia: Ambien to 5-10 mg QHS PRN      11. Pulm: Hx spiculated pulm nodule (concern for malignancy w/ hx tobacco use) and GGO (concerning for fungal PNA). F/u CT chest in 4-6 weeks (approx ) per Dr. Ahn recs in workup.    - Follow up with Dr. Anh scheduled this week Thursday 8/10 - asked  to schedule CT chest prior  - Note  BAL + for aspergillus galactomannan; culture negative final.     Note:  Medications should be filled at [facility] pharmacy    RTC  - pulm follow up with Dr Ahn on 8/10 with CT chest prior  - BMT provider, labs on Friday 8/11    Mari Gilbert PA-C  [phone]

## 2017-08-09 NOTE — NURSING NOTE
"Oncology Rooming Note    August 9, 2017 11:21 AM   Norman Real is a 56 year old male who presents for:    Chief Complaint   Patient presents with     Blood Draw     CVC draw     RECHECK     AML, post bmt txp here for provider visit.      Initial Vitals: /85  Pulse 89  Temp 98.2  F (36.8  C) (Tympanic)  Resp 18  Wt 93 kg (205 lb 1.6 oz)  SpO2 98%  BMI 31.08 kg/m2 Estimated body mass index is 31.08 kg/(m^2) as calculated from the following:    Height as of 7/18/17: 1.73 m (5' 8.11\").    Weight as of this encounter: 93 kg (205 lb 1.6 oz). Body surface area is 2.11 meters squared.  No Pain (0) Comment: Data Unavailable   No LMP for male patient.  Allergies reviewed: Yes  Medications reviewed: Yes    Medications: Medication refills not needed today.  Pharmacy name entered into [employer]: [id number] - [place], [place] - [address] [place] [address] AVE     Clinical concerns: None    5 minutes for nursing intake (face to face time)     Felipe Toscano RN              "

## 2017-08-09 NOTE — MR AVS SNAPSHOT
After Visit Summary   8/9/2017    Norman Real    MRN: [id number]           Patient Information     Date Of Birth          1960        Visit Information        Provider Department      8/9/2017 11:00 AM UC BMT MIMA #2 [facility] Blood and Marrow Transplant        Today's Diagnoses     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    -  1          [facility] ([facility])  [address] [place] [address]  Phone: [phone]  Clinic Hours:   Monday-Thursday:7am to 7pm   Friday: 7am to 5pm   Weekends and holidays:    8am to noon (in general)  If your fever is 100.5  or greater,   call the clinic.  After hours call the   hospital at [phone] or   [phone]. Ask for the BMT   fellow on-call            Follow-ups after your visit        Your next 10 appointments already scheduled     Aug 10, 2017  3:30 PM CDT   (Arrive by 3:15 PM)   Return Visit with Tim Ahn MD   [facility] ([facility])    [address] [place] [phone]   [phone]            Aug 11, 2017 10:30 AM CDT   [facility] Draw with [employer] [facility] DRAW   [facility] ([facility])    [address] [place] [phone]   [phone]            Aug 11, 2017 11:00 AM CDT   Return with  BMT MIMA #4   [facility] Blood and Marrow Transplant ([facility])    [address] [place] [id number]   [phone]            Aug 15, 2017  1:30 PM CDT   [facility] Lab Draw with [employer] [facility] DRAW   [facility] Lab Draw ([facility])    [address] [place] [phone]   [phone]            Aug 15, 2017  2:00 PM CDT   Bone Marrow Biopsy with  BMT MIMA #4, UU BONE MARROW BIOPSY   [facility] Blood and Marrow Transplant ([facility])    [address] [place] [address]  [facility] [phone]   [phone]            Aug 16, 2017  3:30 PM CDT   [facility] Lab Draw with  [facility] DRAW   [facility] Lab Draw ([facility])    [address] [place] [phone]   [phone]            Aug 16, 2017  4:00 PM CDT   Return with Charanjit Umanzor MD   [facility] Blood and Marrow Transplant ([facility])    [address] [place] [phone]   [phone]            Aug 25, 2017 11:30 AM CDT   [facility] Lab Draw with  [facility] DRAW   [facility] Draw ([facility])    [address] [place] [phone]   [phone]            Aug 25, 2017 12:00 PM CDT   (Arrive by 11:45 AM)   BMT 28 Day Anniversary Visit with Charanjit Umanzor MD   [facility] Blood and Marrow Transplant ([facility])    [address] [place] [phone]   [phone]              Future tests that were ordered for you today     Open Future Orders        Priority Expected Expires Ordered    CT Chest w/o contrast Routine 8/10/2017 8/9/2018 8/9/2017            Who to contact     If you have questions or need follow up information about today's clinic visit or your schedule please contact [facility]ARR[facility] directly at [phone].  Normal or non-critical lab and imaging results will be communicated to you by MyChart, letter or phone within 4 business days after the clinic has received the results. If you do not hear from us within 7 days, please contact the clinic through [employer] computerhart or phone. If you have a critical or abnormal lab result, we will notify you by phone as soon as possible.  Submit refill requests through [employer] or call your pharmacy and they will forward the refill request to us. Please allow 3 business days for your refill to be completed.          Additional  "Information About Your Visit        MyChart Information     [employer] lets you send messages to your doctor, view your test results, renew your prescriptions, schedule appointments and more. To sign up, go to [email or web address] . Click on \"Log in\" on the left side of the screen, which will take you to the Welcome page. Then click on \"Sign up Now\" on the right side of the page.     You will be asked to enter the access code listed below, as well as some personal information. Please follow the directions to create your username and password.     Your access code is: [email or web address]  Expires: 8/15/2017  6:30 AM     Your access code will  in 90 days. If you need help or a new code, please call your [facility] clinic or [phone].        Care EveryWhere ID     This is your Care EveryWhere ID. This could be used by other organizations to access your [facility] medical records  [id number]        Your Vitals Were     Pulse Temperature Respirations Pulse Oximetry BMI (Body Mass Index)       89 98.2  F (36.8  C) (Tympanic) 18 98% 31.08 kg/m2        Blood Pressure from Last 3 Encounters:   17 135/85   17 147/89   17 145/79    Weight from Last 3 Encounters:   17 93 kg (205 lb 1.6 oz)   17 93.4 kg (205 lb 14.4 oz)   17 92.5 kg (204 lb)               Recent Review Flowsheet Data     BMT Recent Results Latest Ref Rng & Units 8/3/2017 2017 2017 2017 2017 2017 2017    WBC 4.0 - 11.0 10e9/L - 4.3 - - 7.6 5.8 5.1    Hemoglobin 13.3 - 17.7 g/dL - 11.0(L) - - 11.5(L) 11.6(L) 12.0(L)    Platelet Count 150 - 450 10e9/L - 31(LL) - - 38(LL) 75(L) 117(L)    Neutrophils (Absolute) 1.6 - 8.3 10e9/L - 2.7 - - 5.5 3.3 3.0    INR 0.86 - 1.14 - - - - - - -    Sodium 133 - 144 mmol/L - 140 - - 137 136 137    Potassium 3.4 - 5.3 mmol/L - 4.2 - - 4.3 4.2 4.2    Chloride 94 - 109 mmol/L - 107 - - 104 103 105    Glucose 70 - 99 mg/dL 117(H) 144(H) 110(H) 191(H) 228(H) 178(H) 153(H)    " Urea Nitrogen 7 - 30 mg/dL - 12 - - 13 14 13    Creatinine 0.66 - 1.25 mg/dL - 0.93 - - 1.05 0.99 0.96    Calcium (Total) 8.5 - 10.1 mg/dL - 8.4(L) - - 8.5 8.6 8.7    Protein (Total) 6.8 - 8.8 g/dL - - - - - 6.7(L) -    Albumin 3.4 - 5.0 g/dL - - - - - 3.4 -    Bilirubin (Direct) 0.0 - 0.2 mg/dL - - - - - - -    Alkaline Phosphatase 40 - 150 U/L - - - - - 90 -    AST 0 - 45 U/L - - - - - 20 -    ALT 0 - 70 U/L - - - - - 24 -    MCV 78 - 100 fl - 92 - - 95 94 92               Primary Care Provider    Physician No Ref-Primary       No address on file        Equal Access to Services     ASHLYN FLANAGAN : Hadii sapna Ratliff, waaxda nigel, qaybta kaalmada lolita, vishal albright . So [facility] [phone].    ATENCIÓN: Si brandy mcintyre, tiene a gusman disposición servicios gratuitos de asistencia lingüística. Leyla al [phone].    We comply with applicable federal civil rights laws and [place] laws. We do not discriminate on the basis of race, color, national origin, age, disability sex, sexual orientation or gender identity.            Thank you!     Thank you for choosing [facility]  for your care. Our goal is always to provide you with excellent care. Hearing back from our patients is one way we can continue to improve our services. Please take a few minutes to complete the written survey that you may receive in the mail after your visit with us. Thank you!             Your Updated Medication List - Protect others around you: Learn how to safely use, store and throw away your medicines at www.disposemymeds.org.          This list is accurate as of: 8/9/17 11:49 AM.  Always use your most recent med list.                   Brand Name Dispense Instructions for use Diagnosis    acyclovir 800 MG tablet    ZOVIRAX    150 tablet    Take 1 tablet (800 mg) by mouth 5 times daily    Stem cell transplant candidate       cholecalciferol 1000 UNITS capsule    vitamin   -D     Take 1 capsule by mouth daily        cyclobenzaprine 5 MG tablet    FLEXERIL     Take 1 tablet (5 mg) by mouth 3 times daily as needed for muscle spasms        diltiazem 360 MG 24 hr CD capsule    CARDIZEM CD; CARTIA XT    30 capsule    Take 1 capsule (360 mg) by mouth daily    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       FLONASE NA      Spray in nostril as needed        guaiFENesin 600 MG 12 hr tablet    MUCINEX     Take 600 mg by mouth        heparin lock flush 10 UNIT/ML Soln injection     30 vial    5 mLs by Intracatheter route daily In each lumen    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       insulin aspart 100 UNIT/ML injection    NovoLOG FLEXPEN    3 mL    Give insulin based on High sliding scale  Also give prescribed amount before meals based on carbohydrate coverage    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       insulin glargine 100 UNIT/ML injection    LANTUS    3 mL    Inject 10 Units Subcutaneous daily    Type 2 diabetes mellitus without complication, without long-term current use of insulin (H)       insulin pen needle 30G X 8 MM     100 each    Use when delivering insulin as directed.    Type 2 diabetes mellitus without complication, without long-term current use of insulin (H)       levofloxacin 250 MG tablet    LEVAQUIN    14 tablet    Take 1 tablet (250 mg) by mouth daily    Stem cell transplant candidate       LORazepam 0.5 MG tablet    ATIVAN    40 tablet    Take 1-2 tablets (0.5-1 mg) by mouth every 4 hours as needed for anxiety (nausea/vomiting/sleep)    Stem cell transplant candidate       * losartan 50 MG tablet    COZAAR    30 tablet    Taking total dose of 62.5 mg daily by mouth: so take 50 mg tablet and 1/2 25 mg tablet daily    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       * losartan 25 MG tablet    COZAAR    30 tablet    Taking total dose of 62.5 mg daily by mouth: so take 50 mg tablet and 1/2 of a  25 mg tablet daily    AML (acute myeloid leukemia) in  remission (H)       magnesium oxide 400 MG tablet    MAG-OX    60 tablet    Take 2 tablets (800 mg) by mouth 2 times daily    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       MULTI COMPLETE PO           * mycophenolate 500 MG tablet    CELLCEPT - GENERIC EQUIVALENT    84 tablet    Take 3 tablets (1,500 mg) by mouth 2 times daily    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       * mycophenolate 500 MG tablet    CELLCEPT - GENERIC EQUIVALENT     Take 3 tablets (1,500 mg) by mouth 2 times daily        ondansetron 8 MG tablet    ZOFRAN    30 tablet    Take 1 tablet (8 mg) by mouth every 8 hours as needed for nausea    Stem cell transplant candidate       oxyCODONE 5 MG capsule    OXY-IR     Take 5 mg by mouth every 4 hours as needed for moderate to severe pain        pantoprazole 40 MG EC tablet    PROTONIX    30 tablet    Take 1 tablet (40 mg) by mouth daily    Stem cell transplant candidate       psyllium Packet    METAMUCIL/KONSYL    90 packet    Take 1 packet by mouth 3 times daily    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       sulfamethoxazole-trimethoprim 800-160 MG per tablet    BACTRIM DS/SEPTRA DS    30 tablet    Clinic will tell you when to start 1 tablet twice daily by mouth on Mondays and Tuesdays, start around day +28    Stem cell transplant candidate       * tacrolimus 0.5 MG capsule    PROGRAF - GENERIC EQUIVALENT    180 capsule    Take 3 capsules (1.5 mg) by mouth 2 times daily    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       * tacrolimus 0.5 MG capsule    PROGRAF - GENERIC EQUIVALENT    56 capsule    Take 2 capsules (1 mg) by mouth 2 times daily    Acute myeloid leukemia in remission (H)       ursodiol 300 MG capsule    ACTIGALL    90 capsule    Take 1 capsule (300 mg) by mouth 3 times daily    Stem cell transplant candidate       voriconazole 200 MG tablet    VFEND    90 tablet    Take 1.5 tablets (300 mg) by mouth 2 times daily    Stem cell transplant candidate       zolpidem 5  MG tablet    AMBIEN    45 tablet    Take 1-2 tablets (5-10 mg) by mouth nightly as needed for sleep    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       * Notice:  This list has 6 medication(s) that are the same as other medications prescribed for you. Read the directions carefully, and ask your doctor or other care provider to review them with you.

## 2017-08-09 NOTE — NURSING NOTE
Chief Complaint   Patient presents with     Blood Draw     Line draw     Labs drawn from CVC line. Blood return noted on both lumens. Lines flushed with NS and heparin locked. Patient tolerated well.   FRANNY Rae RN, BSN

## 2017-08-10 NOTE — PROGRESS NOTES
Reason for Visit  Norman Real is a 56 year old year old male who is being seen for RECHECK (pre BMT )    Pulmonary HPI  55 YO with history of AML referred to the Pulmonary BMT clinic by Dr. Umanzor for evaluation of an abnormal CT.  Underwent 7+3 induction chemotherapy without pulmonary complications.  States had URI symptoms followed by productive cough at start of admission for chemotherapy in 4-17, states symptoms lasted for 10 days then resolved.  Does not recall being on specific antibiotics for a respiratory infection at that time.  Does not recall any CT imaging, did have CXR but does not recall that he was told it was abnormal or that he had pneumonia.  Since discharge he has not had any respiratory symptoms, no cough or SOB.  Episode of pneumonia ~ 15 years ago, not hospitalized.  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 No tobacco since 2010, 20 pk year history. CT chest shows tree and bud opacities in RML and LLL, 1.1 x 0.7 cm nodule in RLL.    Last seen 5 weeks ago.  Underwent bronchoscopy on 6-30-17 with all cultures negative but Aspergillus galactomannan levels were significantly elevated (2.27, 4.05). Was treated with Vfend that was changed to micafungin during transplant and then discharged on Vfend.  Was admitted 7-18 to 8-4 for transplant, underwent transplant on 7-26.  Since discharge he has been feeling well. Denies any cough, SOB or sputum production.  Is compliant using mask.  Is maintained on full dose Vfend, tolerating the medication well.  CT today per my review looks to be resolved; await interpretation by Radiology.       The patient was seen and examined by Tim Ahn           Current Outpatient Prescriptions   Medication     magnesium oxide (MAG-OX) 400 MG tablet     insulin glargine (LANTUS) 100 UNIT/ML injection     acyclovir (ZOVIRAX) 800 MG tablet     diltiazem (CARDIZEM CD; CARTIA XT)  360 MG 24 hr CD capsule     levofloxacin (LEVAQUIN) 250 MG tablet     LORazepam (ATIVAN) 0.5 MG tablet     ondansetron (ZOFRAN) 8 MG tablet     pantoprazole (PROTONIX) 40 MG EC tablet     psyllium (METAMUCIL/KONSYL) Packet     sulfamethoxazole-trimethoprim (BACTRIM DS/SEPTRA DS) 800-160 MG per tablet     ursodiol (ACTIGALL) 300 MG capsule     voriconazole (VFEND) 200 MG tablet     zolpidem (AMBIEN) 5 MG tablet     losartan (COZAAR) 50 MG tablet     losartan (COZAAR) 25 MG tablet     insulin pen needle 30G X 8 MM     insulin aspart (NOVOLOG FLEXPEN) 100 UNIT/ML injection     cyclobenzaprine (FLEXERIL) 5 MG tablet     tacrolimus (PROGRAF - GENERIC EQUIVALENT) 0.5 MG capsule     [DISCONTINUED] mycophenolate (CELLCEPT - GENERIC EQUIVALENT) 500 MG tablet     mycophenolate (CELLCEPT - GENERIC EQUIVALENT) 500 MG tablet     heparin lock flush 10 UNIT/ML SOLN injection     guaiFENesin (MUCINEX) 600 MG 12 hr tablet     Fluticasone Propionate (FLONASE NA)     Multiple Vitamins-Minerals (MULTI COMPLETE PO)     cholecalciferol (VITAMIN  -D) 1000 UNITS capsule     No current facility-administered medications for this visit.      Facility-Administered Medications Ordered in Other Visits   Medication     filgrastim (NEUPOGEN) injection 480 mcg     heparin lock flush 10 UNIT/ML injection 5 mL     Allergies   Allergen Reactions     Ace Inhibitors Anaphylaxis, Cough and Hives     Terazosin Swelling     Other reaction(s): Sedation/Sleepy     Past Medical History:   Diagnosis Date     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 05/26/2017     Cholelithiasis 06/26/2017     Diabetes (H)      Hypertension      Prostate cancer (H) 2011    had radiatoin therapy     Tachycardia        Past Surgical History:   Procedure Laterality Date     CARDIAC SURGERY      ablation       Social History     Social History     Marital status:      Spouse name: N/A     Number of children: N/A     Years of education: N/A     Occupational History      Not on file.     Social History Main Topics     Smoking status: Former Smoker     Quit date: 5/31/2010     Smokeless tobacco: Not on file     Alcohol use No     Drug use: No     Sexual activity: Not on file     Other Topics Concern     Not on file     Social History Narrative    Lives with wife in [place], [place]. 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 [place]. Previously liked to garden and care for the lawn, but has been careful not to participate in those activities since he was diagnosed. Likes to be outdoors and hunt. No pets or animal exposures. Travelled to [place] within the last year, but otherwise no recent travel. No history of travel to the [facility]. Has two children that live in the area and two grandchildren (9 and 2 years of age) that he visits with.        ROS Pulmonary  A complete ROS was reviewed.  /86  Pulse 90  Temp 98.1  F (36.7  C) (Oral)  Resp 16  Wt 93.9 kg (207 lb)  SpO2 96%  BMI 31.37 kg/m2  Exam:   GENERAL APPEARANCE: Well developed, well nourished, alert, and in no apparent distress.  EYES: PERRL, EOMI  HENT: Nasal mucosa with no edema and no hyperemia. No nasal polyps.  EARS: Canals clear, TMs normal  MOUTH: Oral mucosa is moist, without any lesions, no tonsillar enlargement, no oropharyngeal exudate.  NECK: supple, no masses, no thyromegaly.  LYMPHATICS: No significant axillary, cervical, or supraclavicular nodes.  RESP:  Good air flow throughout.  No crackles. No rhonchi. No wheezes.  CV: Normal S1, S2, regular rhythm, normal rate. No murmur.  No rub. No gallop. No LE edema.   ABDOMEN:  Bowel sounds normal, soft, nontender, no HSM or masses.   MS: extremities normal. No clubbing. No cyanosis.  SKIN: no rash on limited exam  NEURO: Mentation intact, speech normal, normal strength and tone, normal gait and stance  PSYCH: mentation appears normal. and affect  normal/bright  Results:  Recent Results (from the past 168 hour(s))   Glucose by meter    Collection Time: 08/03/17  5:24 PM   Result Value Ref Range    Glucose 215 (H) 70 - 99 mg/dL   Glucose by meter    Collection Time: 08/03/17  9:56 PM   Result Value Ref Range    Glucose 117 (H) 70 - 99 mg/dL   CBC with platelets differential    Collection Time: 08/04/17  2:19 AM   Result Value Ref Range    WBC 4.3 4.0 - 11.0 10e9/L    RBC Count 3.60 (L) 4.4 - 5.9 10e12/L    Hemoglobin 11.0 (L) 13.3 - 17.7 g/dL    Hematocrit 33.2 (L) 40.0 - 53.0 %    MCV 92 78 - 100 fl    MCH 30.6 26.5 - 33.0 pg    MCHC 33.1 31.5 - 36.5 g/dL    RDW 16.2 (H) 10.0 - 15.0 %    Platelet Count 31 (LL) 150 - 450 10e9/L    Diff Method Manual Differential     % Neutrophils 62.8 %    % Lymphocytes 23.0 %    % Monocytes 14.2 %    % Eosinophils 0.0 %    % Basophils 0.0 %    Nucleated RBCs 1 (H) 0 /100    Absolute Neutrophil 2.7 1.6 - 8.3 10e9/L    Absolute Lymphocytes 1.0 0.8 - 5.3 10e9/L    Absolute Monocytes 0.6 0.0 - 1.3 10e9/L    Absolute Eosinophils 0.0 0.0 - 0.7 10e9/L    Absolute Basophils 0.0 0.0 - 0.2 10e9/L    Absolute Nucleated RBC 0.0     Anisocytosis Slight     Poikilocytosis Slight     Ovalocytes Slight     Macrocytes Present    Basic metabolic panel    Collection Time: 08/04/17  2:19 AM   Result Value Ref Range    Sodium 140 133 - 144 mmol/L    Potassium 4.2 3.4 - 5.3 mmol/L    Chloride 107 94 - 109 mmol/L    Carbon Dioxide 24 20 - 32 mmol/L    Anion Gap 9 3 - 14 mmol/L    Glucose 144 (H) 70 - 99 mg/dL    Urea Nitrogen 12 7 - 30 mg/dL    Creatinine 0.93 0.66 - 1.25 mg/dL    GFR Estimate 84 >60 mL/min/1.7m2    GFR Estimate If Black >90   GFR Calc   >60 mL/min/1.7m2    Calcium 8.4 (L) 8.5 - 10.1 mg/dL   Magnesium    Collection Time: 08/04/17  2:19 AM   Result Value Ref Range    Magnesium 1.8 1.6 - 2.3 mg/dL   Tacrolimus level    Collection Time: 08/04/17  7:47 AM   Result Value Ref Range    Tacrolimus Last Dose Not Provided      Tacrolimus Level 18.4 (H) 5.0 - 15.0 ug/L   Glucose by meter    Collection Time: 08/04/17  7:54 AM   Result Value Ref Range    Glucose 110 (H) 70 - 99 mg/dL   Glucose by meter    Collection Time: 08/04/17 12:06 PM   Result Value Ref Range    Glucose 191 (H) 70 - 99 mg/dL   CBC with platelets differential    Collection Time: 08/05/17  9:15 AM   Result Value Ref Range    WBC 7.6 4.0 - 11.0 10e9/L    RBC Count 3.74 (L) 4.4 - 5.9 10e12/L    Hemoglobin 11.5 (L) 13.3 - 17.7 g/dL    Hematocrit 35.6 (L) 40.0 - 53.0 %    MCV 95 78 - 100 fl    MCH 30.7 26.5 - 33.0 pg    MCHC 32.3 31.5 - 36.5 g/dL    RDW 15.9 (H) 10.0 - 15.0 %    Platelet Count 38 (LL) 150 - 450 10e9/L    Diff Method Manual Differential     % Neutrophils 73.0 %    % Lymphocytes 13.0 %    % Monocytes 11.0 %    % Eosinophils 0.0 %    % Basophils 1.0 %    % Metamyelocytes 2.0 %    Absolute Neutrophil 5.5 1.6 - 8.3 10e9/L    Absolute Lymphocytes 1.0 0.8 - 5.3 10e9/L    Absolute Monocytes 0.8 0.0 - 1.3 10e9/L    Absolute Eosinophils 0.0 0.0 - 0.7 10e9/L    Absolute Basophils 0.1 0.0 - 0.2 10e9/L    Absolute Metamyelocytes 0.2 (H) 0 10e9/L    Anisocytosis Slight     Teardrop Cells Slight    Basic metabolic panel    Collection Time: 08/05/17  9:15 AM   Result Value Ref Range    Sodium 137 133 - 144 mmol/L    Potassium 4.3 3.4 - 5.3 mmol/L    Chloride 104 94 - 109 mmol/L    Carbon Dioxide 23 20 - 32 mmol/L    Anion Gap 10 3 - 14 mmol/L    Glucose 228 (H) 70 - 99 mg/dL    Urea Nitrogen 13 7 - 30 mg/dL    Creatinine 1.05 0.66 - 1.25 mg/dL    GFR Estimate 73 >60 mL/min/1.7m2    GFR Estimate If Black 88 >60 mL/min/1.7m2    Calcium 8.5 8.5 - 10.1 mg/dL   Magnesium    Collection Time: 08/05/17  9:15 AM   Result Value Ref Range    Magnesium 1.5 (L) 1.6 - 2.3 mg/dL   CBC with platelets differential    Collection Time: 08/07/17 10:30 AM   Result Value Ref Range    WBC 5.8 4.0 - 11.0 10e9/L    RBC Count 3.76 (L) 4.4 - 5.9 10e12/L    Hemoglobin 11.6 (L) 13.3 - 17.7 g/dL     Hematocrit 35.2 (L) 40.0 - 53.0 %    MCV 94 78 - 100 fl    MCH 30.9 26.5 - 33.0 pg    MCHC 33.0 31.5 - 36.5 g/dL    RDW 15.8 (H) 10.0 - 15.0 %    Platelet Count 75 (L) 150 - 450 10e9/L    Diff Method Manual Differential     % Neutrophils 56.9 %    % Lymphocytes 21.1 %    % Monocytes 18.4 %    % Eosinophils 0.0 %    % Basophils 0.9 %    % Metamyelocytes 1.8 %    % Myelocytes 0.9 %    Nucleated RBCs 2 (H) 0 /100    Absolute Neutrophil 3.3 1.6 - 8.3 10e9/L    Absolute Lymphocytes 1.2 0.8 - 5.3 10e9/L    Absolute Monocytes 1.1 0.0 - 1.3 10e9/L    Absolute Eosinophils 0.0 0.0 - 0.7 10e9/L    Absolute Basophils 0.1 0.0 - 0.2 10e9/L    Absolute Metamyelocytes 0.1 (H) 0 10e9/L    Absolute Myelocytes 0.1 (H) 0 10e9/L    Absolute Nucleated RBC 0.1     Anisocytosis Slight     Poikilocytosis Slight    Comprehensive metabolic panel    Collection Time: 08/07/17 10:30 AM   Result Value Ref Range    Sodium 136 133 - 144 mmol/L    Potassium 4.2 3.4 - 5.3 mmol/L    Chloride 103 94 - 109 mmol/L    Carbon Dioxide 24 20 - 32 mmol/L    Anion Gap 9 3 - 14 mmol/L    Glucose 178 (H) 70 - 99 mg/dL    Urea Nitrogen 14 7 - 30 mg/dL    Creatinine 0.99 0.66 - 1.25 mg/dL    GFR Estimate 78 >60 mL/min/1.7m2    GFR Estimate If Black >90   GFR Calc   >60 mL/min/1.7m2    Calcium 8.6 8.5 - 10.1 mg/dL    Bilirubin Total 0.3 0.2 - 1.3 mg/dL    Albumin 3.4 3.4 - 5.0 g/dL    Protein Total 6.7 (L) 6.8 - 8.8 g/dL    Alkaline Phosphatase 90 40 - 150 U/L    ALT 24 0 - 70 U/L    AST 20 0 - 45 U/L   Magnesium    Collection Time: 08/07/17 10:30 AM   Result Value Ref Range    Magnesium 1.4 (L) 1.6 - 2.3 mg/dL   Tacrolimus level    Collection Time: 08/07/17 10:30 AM   Result Value Ref Range    Tacrolimus Last Dose 08/06/17 2200     Tacrolimus Level 13.6 5.0 - 15.0 ug/L   CMV DNA quantification    Collection Time: 08/07/17 10:31 AM   Result Value Ref Range    CMV DNA Quantitation Specimen Plasma, EDTA anticoagulant     CMV Quant IU/mL  CMVND [IU]/mL      CMV DNA Not Detected   Mutations within the highly conserved regions of the viral genome covered by   the MARKEL AmpliPrep/MARKEL TaqMan CMV Test primers and/or probes have been   identified and may result in under-quantitation of or failure to detect the   virus.  Supplemental testing methods should be used for testing when this is   suspected.   The MARKEL AmpliPrep/MARKEL TaqMan CMV Test is an FDA-approved in vitro nucleic   acid amplification test for the quantitation of cytomegalovirus DNA in human   plasma (EDTA plasma) using the MARKEL AmpliPrep Instrument for automated viral   nucleic acid extraction and the MARKEL TaqMan Analyzer or [employer] TaqMan for   automated Real Time amplification and detection of the viral nucleic acid   target.   Titer results are reported in International Units/mL (IU/mL using 1st WHO   International standard for Human Cytomegalovirus for Nucleic Acid Amplification   based assays. The conversion factor between CMV DNA copis/mL (as defined by the   Roche MARKEL  TaqMan CMV test) and International Units is the CMV DNA   concentration in IU/mL x 1.1 copies/IU = CMV DNA in copies/mL.   This assay has received FDA approval for the testing of human plasma only. The   Infectious Disease Diagnostic Laboratory at the [facility], [place], has validated the performance characteristics of the Roche   CMV assay for plasma, bronchial alveolar lavage/wash and urine.      Log IU/mL of CMVQNT Not Calculated <2.1 [Log_IU]/mL   CBC with platelets differential    Collection Time: 08/09/17 10:41 AM   Result Value Ref Range    WBC 5.1 4.0 - 11.0 10e9/L    RBC Count 3.81 (L) 4.4 - 5.9 10e12/L    Hemoglobin 12.0 (L) 13.3 - 17.7 g/dL    Hematocrit 35.2 (L) 40.0 - 53.0 %    MCV 92 78 - 100 fl    MCH 31.5 26.5 - 33.0 pg    MCHC 34.1 31.5 - 36.5 g/dL    RDW 15.8 (H) 10.0 - 15.0 %    Platelet Count 117 (L) 150 - 450 10e9/L    Diff Method Manual Differential     % Neutrophils 59.1 %     % Lymphocytes 17.4 %    % Monocytes 19.1 %    % Eosinophils 0.0 %    % Basophils 0.0 %    % Metamyelocytes 0.9 %    % Myelocytes 2.6 %    % Promyelocytes 0.9 %    Absolute Neutrophil 3.0 1.6 - 8.3 10e9/L    Absolute Lymphocytes 0.9 0.8 - 5.3 10e9/L    Absolute Monocytes 1.0 0.0 - 1.3 10e9/L    Absolute Eosinophils 0.0 0.0 - 0.7 10e9/L    Absolute Basophils 0.0 0.0 - 0.2 10e9/L    Absolute Metamyelocytes 0.0 0 10e9/L    Absolute Myelocytes 0.1 (H) 0 10e9/L    Absolute Promyeloctyes 0.0 0 10e9/L    Anisocytosis Slight     Polychromasia Slight    Basic metabolic panel    Collection Time: 08/09/17 10:41 AM   Result Value Ref Range    Sodium 137 133 - 144 mmol/L    Potassium 4.2 3.4 - 5.3 mmol/L    Chloride 105 94 - 109 mmol/L    Carbon Dioxide 25 20 - 32 mmol/L    Anion Gap 7 3 - 14 mmol/L    Glucose 153 (H) 70 - 99 mg/dL    Urea Nitrogen 13 7 - 30 mg/dL    Creatinine 0.96 0.66 - 1.25 mg/dL    GFR Estimate 81 >60 mL/min/1.7m2    GFR Estimate If Black >90   GFR Calc   >60 mL/min/1.7m2    Calcium 8.7 8.5 - 10.1 mg/dL   Magnesium    Collection Time: 08/09/17 10:41 AM   Result Value Ref Range    Magnesium 1.5 (L) 1.6 - 2.3 mg/dL   CMV DNA quantification    Collection Time: 08/09/17 10:41 AM   Result Value Ref Range    CMV DNA Quantitation Specimen Plasma, EDTA anticoagulant     CMV Quant IU/mL  CMVND [IU]/mL     CMV DNA Not Detected   Mutations within the highly conserved regions of the viral genome covered by   the MARKEL AmpliPrep/MARKEL TaqMan CMV Test primers and/or probes have been   identified and may result in under-quantitation of or failure to detect the   virus.  Supplemental testing methods should be used for testing when this is   suspected.   The MARKEL AmpliPrep/MARKEL TaqMan CMV Test is an FDA-approved in vitro nucleic   acid amplification test for the quantitation of cytomegalovirus DNA in human   plasma (EDTA plasma) using the MARKEL AmpliPrep Instrument for automated viral   nucleic acid  extraction and the [employer] TaqMan Analyzer or [place] [employer] for   automated Real Time amplification and detection of the viral nucleic acid   target.   Titer results are reported in International Units/mL (IU/mL using 1st WHO   International standard for Human Cytomegalovirus for Nucleic Acid Amplification   based assays. The conversion factor between CMV DNA copis/mL (as defined by the   Roche MARKEL  TaqMan CMV test) and International Units is the CMV DNA   concentration in IU/mL x 1.1 copies/IU = CMV DNA in copies/mL.   This assay has received FDA approval for the testing of human plasma only. The   Infectious Disease Diagnostic Laboratory at the [facility], [place], has validated the performance characteristics of the Roche   CMV assay for plasma, bronchial alveolar lavage/wash and urine.      Log IU/mL of CMVQNT Not Calculated <2.1 [Log_IU]/mL       Assessment and plan:   56 YO with AML, no current pulmonary symptoms but had an abnormal CT worrisome for an atypical infection- Fungal, Nocardia, Actinomyces.  No prior history of pulmonary disease.  Underwent bronchoscopy with all cultures negative but elevated Aspergillus galactomannan levels.  Has been maintained on Vfend for presumptive treatment of fungal pneumonia.  CT appears to have resolved per my interpretation.    1. If Radiology read consistent with resolution of infiltrate, ok to decrease Vfend to prophylactic dose.  If CT is still abnormal, would continue Vfend for an additional 1 month with repeat CT at that time.    2. Discussed with patient importance of avoiding potential sources of respiratory pathogens with use of mask.  Patient is around stock animals, farm dust.        RTC in 1 month if CT not resolved otherwise can return prn.  Patient to call with any questions or concerns

## 2017-08-10 NOTE — MR AVS SNAPSHOT
After Visit Summary   8/10/2017    Norman Real    MRN: [id number]           Patient Information     Date Of Birth          1960        Visit Information        Provider Department      8/10/2017 3:00 PM Tim Ahn MD [facility]        Today's Diagnoses     Fungal pneumonia    -  1       Follow-ups after your visit        Follow-up notes from your care team     Return if symptoms worsen or fail to improve.      Your next 10 appointments already scheduled     Aug 16, 2017  3:30 PM CDT   [facility] Draw with  [facility]   [facility] Draw ([facility])    [address] [place] [phone]   [phone]            Aug 16, 2017  4:00 PM CDT   Return with Charanjit Umanzor MD   [facility] Blood and Marrow Transplant ([facility])    [address] [place] [address]   [phone]            Aug 25, 2017 11:30 AM CDT   [facility] Draw with  [facility]   [facility] Draw ([facility])    [address] [place] [address]   [phone]            Aug 25, 2017 12:00 PM CDT   (Arrive by 11:45 AM)   BMT 28 Day Anniversary Visit with Charanjit Umanzor MD   [facility] Blood and Marrow Transplant ([facility])    [address] [place] [phone]   [phone]            Sep 05, 2017 10:30 AM CDT   [facility] Draw with [facility]   [facility] Draw ([facility])    [address] [place] [phone]   [phone]            Sep 05, 2017 11:00 AM CDT   BMT Anniversary Visit with  BMT MIMA #1   [facility] Blood and Marrow Transplant ([facility])    [address] [place] [address]   [phone]            Sep 12, 2017  10:30 AM CDT   [facility] Lab Draw with  [facility]   [facility] Lab Draw ([facility])    [address] [address]   [phone]            Sep 12, 2017 11:00 AM CDT   BMT Anniversary Visit with  BMT MIMA #1   [facility] Blood and Marrow Transplant ([facility])    [address] [address] [phone]            Sep 22, 2017 10:00 AM CDT   [facility] Lab Draw with  [facility]   [facility] Lab Draw ([facility])    [address] [address] [phone]            Sep 22, 2017 10:30 AM CDT   BMT Anniversary Visit with Charanjit Umanzor MD   [facility] Blood and Marrow Transplant ([facility])    [address] [id number] [phone]              Future tests that were ordered for you today     Open Future Orders        Priority Expected Expires Ordered    Basic metabolic panel Routine 8/16/2017 9/1/2017 8/15/2017    Magnesium Routine 8/16/2017 9/1/2017 8/15/2017    CBC with platelets differential Routine 8/16/2017 9/1/2017 8/15/2017            Who to contact     If you have questions or need follow up information about today's clinic visit or your schedule please contact [facility] directly at [phone].  Normal or non-critical lab and imaging results will be communicated to you by MyChart, letter or phone within 4 business days after the clinic has received the results. If you do not hear from us within 7 days, please contact the clinic through [email or web address] or phone. If you have a critical or abnormal lab result, we will notify you by phone as soon as possible.  Submit refill requests through [facility] or call your pharmacy and they will forward the refill request to us. Please allow 3 business days for your refill to be completed.        "   Additional Information About Your Visit        MyChart Information     [employer] lets you send messages to your doctor, view your test results, renew your prescriptions, schedule appointments and more. To sign up, go to [email or web address] . Click on \"Log in\" on the left side of the screen, which will take you to the Welcome page. Then click on \"Sign up Now\" on the right side of the page.     You will be asked to enter the access code listed below, as well as some personal information. Please follow the directions to create your username and password.     Your access code is: [email or web address]  Expires: 2017  3:48 PM     Your access code will  in 90 days. If you need help or a new code, please call your [place] clinic or [phone].        Care EveryWhere ID     This is your Care EveryWhere ID. This could be used by other organizations to access your [place] medical records  [id number]        Your Vitals Were     Pulse Temperature Respirations Pulse Oximetry BMI (Body Mass Index)       90 98.1  F (36.7  C) (Oral) 16 96% 31.37 kg/m2        Blood Pressure from Last 3 Encounters:   08/15/17 (!) 138/9   17 132/81   08/10/17 134/86    Weight from Last 3 Encounters:   08/15/17 93.4 kg (205 lb 14.6 oz)   17 92.6 kg (204 lb 1.6 oz)   08/10/17 93.9 kg (207 lb)              Today, you had the following     No orders found for display         Today's Medication Changes          These changes are accurate as of: 8/10/17 11:59 PM.  If you have any questions, ask your nurse or doctor.               These medicines have changed or have updated prescriptions.        Dose/Directions    tacrolimus 0.5 MG capsule   Commonly known as:  GENERIC EQUIVALENT   This may have changed:  Another medication with the same name was removed. Continue taking this medication, and follow the directions you see here.   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 So [facility] [place] [facility] [phone].    ATENCIÓN: Si habla español, tiene a gusman disposición servicios gratuitos de asistencia lingüística. Llame al [phone].    We comply with applicable federal civil rights laws and [place] laws. We do not discriminate on the basis of race, color, national origin, age, disability sex, sexual orientation or gender identity.            Thank you!     Thank you for choosing [facility]  for your care. Our goal is always to provide you with excellent care. Hearing back from our patients is one way we can continue to improve our services. Please take a few minutes to complete the written survey that you may receive in the mail after your visit with us. Thank you!             Your Updated Medication List - Protect others around you: Learn how to safely use, store and throw away your medicines at www.disposemymeds.org.          This list is accurate as of: 8/10/17 11:59 PM.  Always use your most recent med list.                   Brand Name Dispense Instructions for use Diagnosis    acyclovir 800 MG tablet    ZOVIRAX    150 tablet    Take 1 tablet (800 mg) by mouth 5 times daily    Stem cell transplant candidate       cholecalciferol 1000 UNITS capsule    vitamin  -D     Take 1 capsule by mouth daily        cyclobenzaprine 5 MG tablet    FLEXERIL     Take 1 tablet (5 mg) by mouth 3 times daily as needed for muscle spasms        diltiazem 360 MG 24 hr CD capsule    CARDIZEM CD; CARTIA XT    30 capsule    Take 1 capsule (360 mg) by mouth daily    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       FLONASE NA      Spray in nostril as needed        guaiFENesin 600 MG 12 hr  tablet    MUCINEX     Take 600 mg by mouth        heparin lock flush 10 UNIT/ML Soln injection     30 vial    5 mLs by Intracatheter route daily In each lumen    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       insulin aspart 100 UNIT/ML injection    NovoLOG FLEXPEN    3 mL    Give insulin based on High sliding scale  Also give prescribed amount before meals based on carbohydrate coverage    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       insulin glargine 100 UNIT/ML injection    LANTUS    3 mL    Inject 10 Units Subcutaneous daily    Type 2 diabetes mellitus without complication, without long-term current use of insulin (H)       levofloxacin 250 MG tablet    LEVAQUIN    14 tablet    Take 1 tablet (250 mg) by mouth daily    Stem cell transplant candidate       LORazepam 0.5 MG tablet    ATIVAN    40 tablet    Take 1-2 tablets (0.5-1 mg) by mouth every 4 hours as needed for anxiety (nausea/vomiting/sleep)    Stem cell transplant candidate       magnesium oxide 400 MG tablet    MAG-OX    60 tablet    Increase from 4 tabs daily to 5 tabs daily    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       MULTI COMPLETE PO           mycophenolate 500 MG tablet    GENERIC EQUIVALENT    84 tablet    Take 3 tablets (1,500 mg) by mouth 2 times daily    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       ondansetron 8 MG tablet    ZOFRAN    30 tablet    Take 1 tablet (8 mg) by mouth every 8 hours as needed for nausea    Stem cell transplant candidate       pantoprazole 40 MG EC tablet    PROTONIX    30 tablet    Take 1 tablet (40 mg) by mouth daily    Stem cell transplant candidate       psyllium Packet    METAMUCIL/KONSYL    90 packet    Take 1 packet by mouth 3 times daily    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       sulfamethoxazole-trimethoprim 800-160 MG per tablet    BACTRIM DS/SEPTRA DS    30 tablet    Clinic will tell you when to start 1 tablet twice daily by mouth on Mondays and Tuesdays, start around day  +28    Stem cell transplant candidate       tacrolimus 0.5 MG capsule    GENERIC EQUIVALENT    56 capsule    Take 2 capsules (1 mg) by mouth 2 times daily    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

## 2017-08-10 NOTE — NURSING NOTE
"Oncology Rooming Note    August 10, 2017 2:50 PM   Norman Real is a 56 year old male who presents for:    Chief Complaint   Patient presents with     RECHECK     pre BMT      Initial Vitals: /86  Pulse 90  Temp 98.1  F (36.7  C) (Oral)  Resp 16  Wt 93.9 kg (207 lb)  SpO2 96%  BMI 31.37 kg/m2 Estimated body mass index is 31.37 kg/(m^2) as calculated from the following:    Height as of 7/18/17: 1.73 m (5' 8.11\").    Weight as of this encounter: 93.9 kg (207 lb). Body surface area is 2.12 meters squared.  No Pain (0) Comment: Data Unavailable   No LMP for male patient.  Allergies reviewed: Yes  Medications reviewed: Yes    Medications: Medication refills not needed today.  Pharmacy name entered into [facility]: [id number] - [place], [place] - [address]     Clinical concerns: n/a     8 minutes for nursing intake (face to face time)     SATISH WOODARD CMA              "

## 2017-08-10 NOTE — LETTER
8/10/2017       RE: Norman Real  PO   [facility] [id number]     Dear Colleague,    Thank you for referring your patient, Norman Real, to the [facility] at [facility]. Please see a copy of my visit note below.    Reason for Visit  Norman Real is a 56 year old year old male who is being seen for RECHECK (pre BMT )    Pulmonary HPI  55 YO with history of AML referred to the Pulmonary BMT clinic by Dr. Umanzor for evaluation of an abnormal CT.  Underwent 7+3 induction chemotherapy without pulmonary complications.  States had URI symptoms followed by productive cough at start of admission for chemotherapy in 4-17, states symptoms lasted for 10 days then resolved.  Does not recall being on specific antibiotics for a respiratory infection at that time.  Does not recall any CT imaging, did have CXR but does not recall that he was told it was abnormal or that he had pneumonia.  Since discharge he has not had any respiratory symptoms, no cough or SOB.  Episode of pneumonia ~ 15 years ago, not hospitalized.  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 No tobacco since 2010, 20 pk year history. CT chest shows tree and bud opacities in RML and LLL, 1.1 x 0.7 cm nodule in RLL.    Last seen 5 weeks ago.  Underwent bronchoscopy on 6-30-17 with all cultures negative but Aspergillus galactomannan levels were significantly elevated (2.27, 4.05). Was treated with Vfend that was changed to micafungin during transplant and then discharged on Vfend.  Was admitted 7-18 to 8-4 for transplant, underwent transplant on 7-26.  Since discharge he has been feeling well. Denies any cough, SOB or sputum production.  Is compliant using mask.  Is maintained on full dose Vfend, tolerating the medication well.  CT today per my review looks to be resolved; await interpretation by Radiology.        The patient was seen and examined by Tim Ahn           Current Outpatient Prescriptions   Medication     magnesium oxide (MAG-OX) 400 MG tablet     insulin glargine (LANTUS) 100 UNIT/ML injection     acyclovir (ZOVIRAX) 800 MG tablet     diltiazem (CARDIZEM CD; CARTIA XT) 360 MG 24 hr CD capsule     levofloxacin (LEVAQUIN) 250 MG tablet     LORazepam (ATIVAN) 0.5 MG tablet     ondansetron (ZOFRAN) 8 MG tablet     pantoprazole (PROTONIX) 40 MG EC tablet     psyllium (METAMUCIL/KONSYL) Packet     sulfamethoxazole-trimethoprim (BACTRIM DS/SEPTRA DS) 800-160 MG per tablet     ursodiol (ACTIGALL) 300 MG capsule     voriconazole (VFEND) 200 MG tablet     zolpidem (AMBIEN) 5 MG tablet     losartan (COZAAR) 50 MG tablet     losartan (COZAAR) 25 MG tablet     insulin pen needle 30G X 8 MM     insulin aspart (NOVOLOG FLEXPEN) 100 UNIT/ML injection     cyclobenzaprine (FLEXERIL) 5 MG tablet     tacrolimus (PROGRAF - GENERIC EQUIVALENT) 0.5 MG capsule     [DISCONTINUED] mycophenolate (CELLCEPT - GENERIC EQUIVALENT) 500 MG tablet     mycophenolate (CELLCEPT - GENERIC EQUIVALENT) 500 MG tablet     heparin lock flush 10 UNIT/ML SOLN injection     guaiFENesin (MUCINEX) 600 MG 12 hr tablet     Fluticasone Propionate (FLONASE NA)     Multiple Vitamins-Minerals (MULTI COMPLETE PO)     cholecalciferol (VITAMIN  -D) 1000 UNITS capsule     No current facility-administered medications for this visit.      Facility-Administered Medications Ordered in Other Visits   Medication     filgrastim (NEUPOGEN) injection 480 mcg     heparin lock flush 10 UNIT/ML injection 5 mL     Allergies   Allergen Reactions     Ace Inhibitors Anaphylaxis, Cough and Hives     Terazosin Swelling     Other reaction(s): Sedation/Sleepy     Past Medical History:   Diagnosis Date     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 05/26/2017     Cholelithiasis 06/26/2017     Diabetes (H)      Hypertension      Prostate cancer (H) 2011    had radiatoin  therapy     Tachycardia        Past Surgical History:   Procedure Laterality Date     CARDIAC SURGERY      ablation       Social History     Social History     Marital status:      Spouse name: N/A     Number of children: N/A     Years of education: N/A     Occupational History     Not on file.     Social History Main Topics     Smoking status: Former Smoker     Quit date: 5/31/2010     Smokeless tobacco: Not on file     Alcohol use No     Drug use: No     Sexual activity: Not on file     Other Topics Concern     Not on file     Social History Narrative    Lives with wife in [place], [place]. 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 [place]. Previously liked to garden and care for the lawn, but has been careful not to participate in those activities since he was diagnosed. Likes to be outdoors and hunt. No pets or animal exposures. Travelled to [place] within the last year, but otherwise no recent travel. No history of travel to the [facility]. Has two children that live in the area and two grandchildren (9 and 2 years of age) that he visits with.        ROS Pulmonary  A complete ROS was reviewed.  /86  Pulse 90  Temp 98.1  F (36.7  C) (Oral)  Resp 16  Wt 93.9 kg (207 lb)  SpO2 96%  BMI 31.37 kg/m2  Exam:   GENERAL APPEARANCE: Well developed, well nourished, alert, and in no apparent distress.  EYES: PERRL, EOMI  HENT: Nasal mucosa with no edema and no hyperemia. No nasal polyps.  EARS: Canals clear, TMs normal  MOUTH: Oral mucosa is moist, without any lesions, no tonsillar enlargement, no oropharyngeal exudate.  NECK: supple, no masses, no thyromegaly.  LYMPHATICS: No significant axillary, cervical, or supraclavicular nodes.  RESP:  Good air flow throughout.  No crackles. No rhonchi. No wheezes.  CV: Normal S1, S2, regular rhythm, normal rate. No murmur.  No rub. No gallop. No LE  edema.   ABDOMEN:  Bowel sounds normal, soft, nontender, no HSM or masses.   MS: extremities normal. No clubbing. No cyanosis.  SKIN: no rash on limited exam  NEURO: Mentation intact, speech normal, normal strength and tone, normal gait and stance  PSYCH: mentation appears normal. and affect normal/bright  Results:  Recent Results (from the past 168 hour(s))   Glucose by meter    Collection Time: 08/03/17  5:24 PM   Result Value Ref Range    Glucose 215 (H) 70 - 99 mg/dL   Glucose by meter    Collection Time: 08/03/17  9:56 PM   Result Value Ref Range    Glucose 117 (H) 70 - 99 mg/dL   CBC with platelets differential    Collection Time: 08/04/17  2:19 AM   Result Value Ref Range    WBC 4.3 4.0 - 11.0 10e9/L    RBC Count 3.60 (L) 4.4 - 5.9 10e12/L    Hemoglobin 11.0 (L) 13.3 - 17.7 g/dL    Hematocrit 33.2 (L) 40.0 - 53.0 %    MCV 92 78 - 100 fl    MCH 30.6 26.5 - 33.0 pg    MCHC 33.1 31.5 - 36.5 g/dL    RDW 16.2 (H) 10.0 - 15.0 %    Platelet Count 31 (LL) 150 - 450 10e9/L    Diff Method Manual Differential     % Neutrophils 62.8 %    % Lymphocytes 23.0 %    % Monocytes 14.2 %    % Eosinophils 0.0 %    % Basophils 0.0 %    Nucleated RBCs 1 (H) 0 /100    Absolute Neutrophil 2.7 1.6 - 8.3 10e9/L    Absolute Lymphocytes 1.0 0.8 - 5.3 10e9/L    Absolute Monocytes 0.6 0.0 - 1.3 10e9/L    Absolute Eosinophils 0.0 0.0 - 0.7 10e9/L    Absolute Basophils 0.0 0.0 - 0.2 10e9/L    Absolute Nucleated RBC 0.0     Anisocytosis Slight     Poikilocytosis Slight     Ovalocytes Slight     Macrocytes Present    Basic metabolic panel    Collection Time: 08/04/17  2:19 AM   Result Value Ref Range    Sodium 140 133 - 144 mmol/L    Potassium 4.2 3.4 - 5.3 mmol/L    Chloride 107 94 - 109 mmol/L    Carbon Dioxide 24 20 - 32 mmol/L    Anion Gap 9 3 - 14 mmol/L    Glucose 144 (H) 70 - 99 mg/dL    Urea Nitrogen 12 7 - 30 mg/dL    Creatinine 0.93 0.66 - 1.25 mg/dL    GFR Estimate 84 >60 mL/min/1.7m2    GFR Estimate If Black >90    GFR Calc   >60 mL/min/1.7m2    Calcium 8.4 (L) 8.5 - 10.1 mg/dL   Magnesium    Collection Time: 08/04/17  2:19 AM   Result Value Ref Range    Magnesium 1.8 1.6 - 2.3 mg/dL   Tacrolimus level    Collection Time: 08/04/17  7:47 AM   Result Value Ref Range    Tacrolimus Last Dose Not Provided     Tacrolimus Level 18.4 (H) 5.0 - 15.0 ug/L   Glucose by meter    Collection Time: 08/04/17  7:54 AM   Result Value Ref Range    Glucose 110 (H) 70 - 99 mg/dL   Glucose by meter    Collection Time: 08/04/17 12:06 PM   Result Value Ref Range    Glucose 191 (H) 70 - 99 mg/dL   CBC with platelets differential    Collection Time: 08/05/17  9:15 AM   Result Value Ref Range    WBC 7.6 4.0 - 11.0 10e9/L    RBC Count 3.74 (L) 4.4 - 5.9 10e12/L    Hemoglobin 11.5 (L) 13.3 - 17.7 g/dL    Hematocrit 35.6 (L) 40.0 - 53.0 %    MCV 95 78 - 100 fl    MCH 30.7 26.5 - 33.0 pg    MCHC 32.3 31.5 - 36.5 g/dL    RDW 15.9 (H) 10.0 - 15.0 %    Platelet Count 38 (LL) 150 - 450 10e9/L    Diff Method Manual Differential     % Neutrophils 73.0 %    % Lymphocytes 13.0 %    % Monocytes 11.0 %    % Eosinophils 0.0 %    % Basophils 1.0 %    % Metamyelocytes 2.0 %    Absolute Neutrophil 5.5 1.6 - 8.3 10e9/L    Absolute Lymphocytes 1.0 0.8 - 5.3 10e9/L    Absolute Monocytes 0.8 0.0 - 1.3 10e9/L    Absolute Eosinophils 0.0 0.0 - 0.7 10e9/L    Absolute Basophils 0.1 0.0 - 0.2 10e9/L    Absolute Metamyelocytes 0.2 (H) 0 10e9/L    Anisocytosis Slight     Teardrop Cells Slight    Basic metabolic panel    Collection Time: 08/05/17  9:15 AM   Result Value Ref Range    Sodium 137 133 - 144 mmol/L    Potassium 4.3 3.4 - 5.3 mmol/L    Chloride 104 94 - 109 mmol/L    Carbon Dioxide 23 20 - 32 mmol/L    Anion Gap 10 3 - 14 mmol/L    Glucose 228 (H) 70 - 99 mg/dL    Urea Nitrogen 13 7 - 30 mg/dL    Creatinine 1.05 0.66 - 1.25 mg/dL    GFR Estimate 73 >60 mL/min/1.7m2    GFR Estimate If Black 88 >60 mL/min/1.7m2    Calcium 8.5 8.5 - 10.1 mg/dL   Magnesium    Collection Time:  08/05/17  9:15 AM   Result Value Ref Range    Magnesium 1.5 (L) 1.6 - 2.3 mg/dL   CBC with platelets differential    Collection Time: 08/07/17 10:30 AM   Result Value Ref Range    WBC 5.8 4.0 - 11.0 10e9/L    RBC Count 3.76 (L) 4.4 - 5.9 10e12/L    Hemoglobin 11.6 (L) 13.3 - 17.7 g/dL    Hematocrit 35.2 (L) 40.0 - 53.0 %    MCV 94 78 - 100 fl    MCH 30.9 26.5 - 33.0 pg    MCHC 33.0 31.5 - 36.5 g/dL    RDW 15.8 (H) 10.0 - 15.0 %    Platelet Count 75 (L) 150 - 450 10e9/L    Diff Method Manual Differential     % Neutrophils 56.9 %    % Lymphocytes 21.1 %    % Monocytes 18.4 %    % Eosinophils 0.0 %    % Basophils 0.9 %    % Metamyelocytes 1.8 %    % Myelocytes 0.9 %    Nucleated RBCs 2 (H) 0 /100    Absolute Neutrophil 3.3 1.6 - 8.3 10e9/L    Absolute Lymphocytes 1.2 0.8 - 5.3 10e9/L    Absolute Monocytes 1.1 0.0 - 1.3 10e9/L    Absolute Eosinophils 0.0 0.0 - 0.7 10e9/L    Absolute Basophils 0.1 0.0 - 0.2 10e9/L    Absolute Metamyelocytes 0.1 (H) 0 10e9/L    Absolute Myelocytes 0.1 (H) 0 10e9/L    Absolute Nucleated RBC 0.1     Anisocytosis Slight     Poikilocytosis Slight    Comprehensive metabolic panel    Collection Time: 08/07/17 10:30 AM   Result Value Ref Range    Sodium 136 133 - 144 mmol/L    Potassium 4.2 3.4 - 5.3 mmol/L    Chloride 103 94 - 109 mmol/L    Carbon Dioxide 24 20 - 32 mmol/L    Anion Gap 9 3 - 14 mmol/L    Glucose 178 (H) 70 - 99 mg/dL    Urea Nitrogen 14 7 - 30 mg/dL    Creatinine 0.99 0.66 - 1.25 mg/dL    GFR Estimate 78 >60 mL/min/1.7m2    GFR Estimate If Black >90   GFR Calc   >60 mL/min/1.7m2    Calcium 8.6 8.5 - 10.1 mg/dL    Bilirubin Total 0.3 0.2 - 1.3 mg/dL    Albumin 3.4 3.4 - 5.0 g/dL    Protein Total 6.7 (L) 6.8 - 8.8 g/dL    Alkaline Phosphatase 90 40 - 150 U/L    ALT 24 0 - 70 U/L    AST 20 0 - 45 U/L   Magnesium    Collection Time: 08/07/17 10:30 AM   Result Value Ref Range    Magnesium 1.4 (L) 1.6 - 2.3 mg/dL   Tacrolimus level    Collection Time: 08/07/17 10:30  AM   Result Value Ref Range    Tacrolimus Last Dose 08/06/17 2200     Tacrolimus Level 13.6 5.0 - 15.0 ug/L   CMV DNA quantification    Collection Time: 08/07/17 10:31 AM   Result Value Ref Range    CMV DNA Quantitation Specimen Plasma, EDTA anticoagulant     CMV Quant IU/mL  CMVND [IU]/mL     CMV DNA Not Detected   Mutations within the highly conserved regions of the viral genome covered by   the MARKEL AmpliPrep/MARKEL TaqMan CMV Test primers and/or probes have been   identified and may result in under-quantitation of or failure to detect the   virus.  Supplemental testing methods should be used for testing when this is   suspected.   The MARKEL AmpliPrep/MARKEL TaqMan CMV Test is an FDA-approved in vitro nucleic   acid amplification test for the quantitation of cytomegalovirus DNA in human   plasma (EDTA plasma) using the WatchPartyiPrep Instrument for automated viral   nucleic acid extraction and the Bvents TaqMan Analyzer or Bvents TaqMan for   automated Real Time amplification and detection of the viral nucleic acid   target.   Titer results are reported in International Units/mL (IU/mL using 1st WHO   International standard for Human Cytomegalovirus for Nucleic Acid Amplification   based assays. The conversion factor between CMV DNA copis/mL (as defined by the   Roche MARKEL  TaqMan CMV test) and International Units is the CMV DNA   concentration in IU/mL x 1.1 copies/IU = CMV DNA in copies/mL.   This assay has received FDA approval for the testing of human plasma only. The   Infectious Disease Diagnostic Laboratory at the [facility], [place], has validated the performance characteristics of the Roche   CMV assay for plasma, bronchial alveolar lavage/wash and urine.      Log IU/mL of CMVQNT Not Calculated <2.1 [Log_IU]/mL   CBC with platelets differential    Collection Time: 08/09/17 10:41 AM   Result Value Ref Range    WBC 5.1 4.0 - 11.0 10e9/L    RBC Count 3.81 (L) 4.4 - 5.9 10e12/L     Hemoglobin 12.0 (L) 13.3 - 17.7 g/dL    Hematocrit 35.2 (L) 40.0 - 53.0 %    MCV 92 78 - 100 fl    MCH 31.5 26.5 - 33.0 pg    MCHC 34.1 31.5 - 36.5 g/dL    RDW 15.8 (H) 10.0 - 15.0 %    Platelet Count 117 (L) 150 - 450 10e9/L    Diff Method Manual Differential     % Neutrophils 59.1 %    % Lymphocytes 17.4 %    % Monocytes 19.1 %    % Eosinophils 0.0 %    % Basophils 0.0 %    % Metamyelocytes 0.9 %    % Myelocytes 2.6 %    % Promyelocytes 0.9 %    Absolute Neutrophil 3.0 1.6 - 8.3 10e9/L    Absolute Lymphocytes 0.9 0.8 - 5.3 10e9/L    Absolute Monocytes 1.0 0.0 - 1.3 10e9/L    Absolute Eosinophils 0.0 0.0 - 0.7 10e9/L    Absolute Basophils 0.0 0.0 - 0.2 10e9/L    Absolute Metamyelocytes 0.0 0 10e9/L    Absolute Myelocytes 0.1 (H) 0 10e9/L    Absolute Promyeloctyes 0.0 0 10e9/L    Anisocytosis Slight     Polychromasia Slight    Basic metabolic panel    Collection Time: 08/09/17 10:41 AM   Result Value Ref Range    Sodium 137 133 - 144 mmol/L    Potassium 4.2 3.4 - 5.3 mmol/L    Chloride 105 94 - 109 mmol/L    Carbon Dioxide 25 20 - 32 mmol/L    Anion Gap 7 3 - 14 mmol/L    Glucose 153 (H) 70 - 99 mg/dL    Urea Nitrogen 13 7 - 30 mg/dL    Creatinine 0.96 0.66 - 1.25 mg/dL    GFR Estimate 81 >60 mL/min/1.7m2    GFR Estimate If Black >90   GFR Calc   >60 mL/min/1.7m2    Calcium 8.7 8.5 - 10.1 mg/dL   Magnesium    Collection Time: 08/09/17 10:41 AM   Result Value Ref Range    Magnesium 1.5 (L) 1.6 - 2.3 mg/dL   CMV DNA quantification    Collection Time: 08/09/17 10:41 AM   Result Value Ref Range    CMV DNA Quantitation Specimen Plasma, EDTA anticoagulant     CMV Quant IU/mL  CMVND [IU]/mL     CMV DNA Not Detected   Mutations within the highly conserved regions of the viral genome covered by   the MARKEL AmpliPrep/MARKEL TaqMan CMV Test primers and/or probes have been   identified and may result in under-quantitation of or failure to detect the   virus.  Supplemental testing methods should be used for  testing when this is   suspected.   The MARKEL AmpliPrep/MARKEL TaqMan CMV Test is an FDA-approved in vitro nucleic   acid amplification test for the quantitation of cytomegalovirus DNA in human   plasma (EDTA plasma) using the MARKEL AmpliPrep Instrument for automated viral   nucleic acid extraction and the MARKEL TaqMan Analyzer or MARKEL TaqMan for   automated Real Time amplification and detection of the viral nucleic acid   target.   Titer results are reported in International Units/mL (IU/mL using 1st WHO   International standard for Human Cytomegalovirus for Nucleic Acid Amplification   based assays. The conversion factor between CMV DNA copis/mL (as defined by the   Roche MARKEL  TaqMan CMV test) and International Units is the CMV DNA   concentration in IU/mL x 1.1 copies/IU = CMV DNA in copies/mL.   This assay has received FDA approval for the testing of human plasma only. The   Infectious Disease Diagnostic Laboratory at the [facility], [place], has validated the performance characteristics of the Roche   CMV assay for plasma, bronchial alveolar lavage/wash and urine.      Log IU/mL of CMVQNT Not Calculated <2.1 [Log_IU]/mL       Assessment and plan:   58 YO with AML, no current pulmonary symptoms but had an abnormal CT worrisome for an atypical infection- Fungal, Nocardia, Actinomyces.  No prior history of pulmonary disease.  Underwent bronchoscopy with all cultures negative but elevated Aspergillus galactomannan levels.  Has been maintained on Vfend for presumptive treatment of fungal pneumonia.  CT appears to have resolved per my interpretation.    1. If Radiology read consistent with resolution of infiltrate, ok to decrease Vfend to prophylactic dose.  If CT is still abnormal, would continue Vfend for an additional 1 month with repeat CT at that time.    2. Discussed with patient importance of avoiding potential sources of respiratory pathogens with use of mask.  Patient is  around stock animals, farm dust.        RTC in 1 month if CT not resolved otherwise can return prn.  Patient to call with any questions or concerns       Again, thank you for allowing me to participate in the care of your patient.      Sincerely,    Tim Ahn MD

## 2017-08-11 NOTE — NURSING NOTE
Chief Complaint   Patient presents with     Blood Draw     Labs drawn from CVC. end caps changed. vs taken. pt checked in for appt     Dressing Change     Labs collected from CVC by RN, line flushed with saline and heparin. Dressing and end caps changed.  Vitals taken. Pt checked in for appointment(s).    Rin Winchester RN

## 2017-08-11 NOTE — MR AVS SNAPSHOT
After Visit Summary   8/11/2017    Norman Real    MRN: [id number]           Patient Information     Date Of Birth          1960        Visit Information        Provider Department      8/11/2017 11:00 AM  BMT MIMA #4 [facility] Blood and Marrow Transplant        Today's Diagnoses     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        Stem cell transplant candidate        Type 2 diabetes mellitus without complication, without long-term current use of insulin (H)              [facility] ([facility] [place]  [address] [place] [address]  Phone: [phone]  Clinic Hours:   Monday-Thursday:7am to 7pm   Friday: 7am to 5pm   Weekends and holidays:    8am to noon (in general)  If your fever is 100.5  or greater,   call the clinic.  After hours call the   hospital at [phone] or   [phone]. Ask for the BMT   fellow on-call            Follow-ups after your visit        Your next 10 appointments already scheduled     Aug 15, 2017  1:30 PM CDT   Masonic Lab Draw with  MASONIC LAB DRAW   [facility] Masonic Lab Draw ([facility])    [address] [place] [id number] [phone]            Aug 15, 2017  2:00 PM CDT   Bone Marrow Biopsy with  BMT MIMA #4, UU BONE MARROW BIOPSY   [facility] Blood and Marrow Transplant ([facility])    [address] [place] [id number] [phone]            Aug 15, 2017  3:30 PM CDT   Return with  BMT MIMA #4   [facility] Blood and Marrow Transplant ([facility])    [address] [place] [id number] [phone]            Aug 16, 2017  3:30 PM CDT   Masonic Lab Draw with  MASONIC LAB DRAW   [facility] Masonic Lab Draw ([facility])    [address] [place] [id number] [phone]            Aug 16, 2017  4:00 PM CDT   Return with Charanjit  Sudarshan Umanzor MD   [facility] Blood and Marrow Transplant ([facility])    [address] [address]   [phone]            Aug 25, 2017 11:30 AM CDT   Masonic Lab Draw with  MASONIC LAB DRAW   [facility] Masonic Lab Draw ([facility])    [address] [address]   [phone]            Aug 25, 2017 12:00 PM CDT   (Arrive by 11:45 AM)   BMT 28 Day Anniversary Visit with Charanjit Umanzor MD   [facility] Blood and Marrow Transplant ([facility])    [address] [place] [id number]   [phone]            Sep 05, 2017 10:30 AM CDT   Masonic Lab Draw with  MASONIC LAB DRAW   [facility] Masonic Lab Draw ([facility])    [address] [place] [phone]   [phone]            Sep 05, 2017 11:00 AM CDT   BMT Anniversary Visit with  BMT MIMA #1   [facility] Blood and Marrow Transplant ([facility])    [address] [place] [phone]   [phone]              Who to contact     If you have questions or need follow up information about today's clinic visit or your schedule please contact [facility] BLOOD AND MARROW TRANSPLANT directly at [phone].  Normal or non-critical lab and imaging results will be communicated to you by MyChart, letter or phone within 4 business days after the clinic has received the results. If you do not hear from us within 7 days, please contact the clinic through [employer] or phone. If you have a critical or abnormal lab result, we will notify you by phone as soon as possible.  Submit refill requests through [employer] or call your pharmacy and they will forward the refill request to us. Please allow 3 business days for your refill to be completed.          Additional Information About Your Visit        MyChart Information  "    datapine lets you send messages to your doctor, view your test results, renew your prescriptions, schedule appointments and more. To sign up, go to [email or web address] . Click on \"Log in\" on the left side of the screen, which will take you to the Welcome page. Then click on \"Sign up Now\" on the right side of the page.     You will be asked to enter the access code listed below, as well as some personal information. Please follow the directions to create your username and password.     Your access code is: [email or web address]  Expires: 8/15/2017  6:30 AM     Your access code will  in 90 days. If you need help or a new code, please call your [place] clinic or [phone].        Care EveryWhere ID     This is your Care EveryWhere ID. This could be used by other organizations to access your [place] medical records  [id number]        Your Vitals Were     Pulse Temperature Respirations Height Pulse Oximetry BMI (Body Mass Index)    89 97.3  F (36.3  C) (Oral) 16 1.73 m (5' 8.11\") 98% 30.93 kg/m2       Blood Pressure from Last 3 Encounters:   17 132/81   08/10/17 134/86   17 135/85    Weight from Last 3 Encounters:   17 92.6 kg (204 lb 1.6 oz)   08/10/17 93.9 kg (207 lb)   17 93 kg (205 lb 1.6 oz)              We Performed the Following     Basic metabolic panel     CBC with platelets differential     Magnesium          Today's Medication Changes          These changes are accurate as of: 17 12:04 PM.  If you have any questions, ask your nurse or doctor.               These medicines have changed or have updated prescriptions.        Dose/Directions    * losartan 25 MG tablet   Commonly known as:  COZAAR   This may have changed:  additional instructions   Used for:  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        Taking total dose of 62.5 mg daily by mouth: so take 50 mg tablet and 1/2 of a  25 mg tablet daily HOLD  on   Quantity:  30 tablet   Refills:  1       * losartan " 50 MG tablet   Commonly known as:  COZAAR   This may have changed:  You were already taking a medication with the same name, and this prescription was added. Make sure you understand how and when to take each.   Used for:  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        Dose:  50 mg   Take 1 tablet (50 mg) by mouth daily   Quantity:  30 tablet   Refills:  1       voriconazole 200 MG tablet   Commonly known as:  VFEND   Indication:  Fungal Infection Prophylaxis   This may have changed:  how much to take   Used for:  Stem cell transplant candidate        Dose:  200 mg   Take 1 tablet (200 mg) by mouth 2 times daily   Quantity:  60 tablet   Refills:  1       * Notice:  This list has 2 medication(s) that are the same as other medications prescribed for you. Read the directions carefully, and ask your doctor or other care provider to review them with you.         Where to get your medicines      These medications were sent to [facility]/pharmacy #[id number] - [place], [place] - [address]  [address], [facility] [address]     Phone:  [phone]     insulin pen needle 30G X 8 MM    losartan 50 MG tablet         Some of these will need a paper prescription and others can be bought over the counter.  Ask your nurse if you have questions.     Bring a paper prescription for each of these medications     losartan 25 MG tablet                Recent Review Flowsheet Data     BMT Recent Results Latest Ref Rng & Units 8/4/2017 8/4/2017 8/4/2017 8/5/2017 8/7/2017 8/9/2017 8/11/2017    WBC 4.0 - 11.0 10e9/L 4.3 - - 7.6 5.8 5.1 4.1    Hemoglobin 13.3 - 17.7 g/dL 11.0(L) - - 11.5(L) 11.6(L) 12.0(L) 10.9(L)    Platelet Count 150 - 450 10e9/L 31(LL) - - 38(LL) 75(L) 117(L) 156    Neutrophils (Absolute) 1.6 - 8.3 10e9/L 2.7 - - 5.5 3.3 3.0 2.4    INR 0.86 - 1.14 - - - - - - -    Sodium 133 - 144 mmol/L 140 - - 137 136 137 138    Potassium 3.4 - 5.3 mmol/L 4.2 - - 4.3 4.2 4.2 3.9    Chloride 94 - 109 mmol/L 107 - - 104 103  105 104    Glucose 70 - 99 mg/dL 144(H) 110(H) 191(H) 228(H) 178(H) 153(H) 194(H)    Urea Nitrogen 7 - 30 mg/dL 12 - - 13 14 13 12    Creatinine 0.66 - 1.25 mg/dL 0.93 - - 1.05 0.99 0.96 1.03    Calcium (Total) 8.5 - 10.1 mg/dL 8.4(L) - - 8.5 8.6 8.7 8.6    Protein (Total) 6.8 - 8.8 g/dL - - - - 6.7(L) - -    Albumin 3.4 - 5.0 g/dL - - - - 3.4 - -    Bilirubin (Direct) 0.0 - 0.2 mg/dL - - - - - - -    Alkaline Phosphatase 40 - 150 U/L - - - - 90 - -    AST 0 - 45 U/L - - - - 20 - -    ALT 0 - 70 U/L - - - - 24 - -    MCV 78 - 100 fl 92 - - 95 94 92 93               Primary Care Provider    Physician No Ref-Primary       No address on file        Equal Access to Services     VAL FLANAGAN : Greg Ratliff, washiela manning, qaybvishal bear. [facility] [phone].    ATENCIÓN: Si habla español, tiene a gusman disposición servicios gratuitos de asistencia lingüística. [facility] [phone].    We comply with applicable federal civil rights laws and [place] laws. We do not discriminate on the basis of race, color, national origin, age, disability sex, sexual orientation or gender identity.            Thank you!     Thank you for choosing [facility] BLOOD AND MARROW TRANSPLANT  for your care. Our goal is always to provide you with excellent care. Hearing back from our patients is one way we can continue to improve our services. Please take a few minutes to complete the written survey that you may receive in the mail after your visit with us. Thank you!             Your Updated Medication List - Protect others around you: Learn how to safely use, store and throw away your medicines at www.disposemymeds.org.          This list is accurate as of: 8/11/17 12:04 PM.  Always use your most recent med list.                   Brand Name Dispense Instructions for use Diagnosis    acyclovir 800 MG tablet    ZOVIRAX    150 tablet    Take 1 tablet (800 mg) by mouth 5 times  daily    Stem cell transplant candidate       cholecalciferol 1000 UNITS capsule    vitamin  -D     Take 1 capsule by mouth daily        cyclobenzaprine 5 MG tablet    FLEXERIL     Take 1 tablet (5 mg) by mouth 3 times daily as needed for muscle spasms        diltiazem 360 MG 24 hr CD capsule    CARDIZEM CD; CARTIA XT    30 capsule    Take 1 capsule (360 mg) by mouth daily    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       FLONASE NA      Spray in nostril as needed        guaiFENesin 600 MG 12 hr tablet    MUCINEX     Take 600 mg by mouth        heparin lock flush 10 UNIT/ML Soln injection     30 vial    5 mLs by Intracatheter route daily In each lumen    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       insulin aspart 100 UNIT/ML injection    NovoLOG FLEXPEN    3 mL    Give insulin based on High sliding scale  Also give prescribed amount before meals based on carbohydrate coverage    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       insulin glargine 100 UNIT/ML injection    LANTUS    3 mL    Inject 10 Units Subcutaneous daily    Type 2 diabetes mellitus without complication, without long-term current use of insulin (H)       insulin pen needle 30G X 8 MM     100 each    Use when delivering insulin as directed.    Type 2 diabetes mellitus without complication, without long-term current use of insulin (H)       levofloxacin 250 MG tablet    LEVAQUIN    14 tablet    Take 1 tablet (250 mg) by mouth daily    Stem cell transplant candidate       LORazepam 0.5 MG tablet    ATIVAN    40 tablet    Take 1-2 tablets (0.5-1 mg) by mouth every 4 hours as needed for anxiety (nausea/vomiting/sleep)    Stem cell transplant candidate       * losartan 25 MG tablet    COZAAR    30 tablet    Taking total dose of 62.5 mg daily by mouth: so take 50 mg tablet and 1/2 of a  25 mg tablet daily HOLD 8/11 on    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       * losartan 50 MG tablet    COZAAR    30 tablet    Take 1 tablet (50 mg)  by mouth daily    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       magnesium oxide 400 MG tablet    MAG-OX    60 tablet    Increase from 4 tabs daily to 5 tabs daily    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       MULTI COMPLETE PO           mycophenolate 500 MG tablet    GENERIC EQUIVALENT    84 tablet    Take 3 tablets (1,500 mg) by mouth 2 times daily    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       ondansetron 8 MG tablet    ZOFRAN    30 tablet    Take 1 tablet (8 mg) by mouth every 8 hours as needed for nausea    Stem cell transplant candidate       pantoprazole 40 MG EC tablet    PROTONIX    30 tablet    Take 1 tablet (40 mg) by mouth daily    Stem cell transplant candidate       psyllium Packet    METAMUCIL/KONSYL    90 packet    Take 1 packet by mouth 3 times daily    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       sulfamethoxazole-trimethoprim 800-160 MG per tablet    BACTRIM DS/SEPTRA DS    30 tablet    Clinic will tell you when to start 1 tablet twice daily by mouth on Mondays and Tuesdays, start around day +28    Stem cell transplant candidate       tacrolimus 0.5 MG capsule    GENERIC EQUIVALENT    56 capsule    Take 2 capsules (1 mg) by mouth 2 times daily    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       * Notice:  This list has 2 medication(s) that are the same as other medications prescribed for you. Read the directions carefully, and ask your doctor or other care provider to review them with you.

## 2017-08-11 NOTE — PROGRESS NOTES
"BMT Daily Progress Note   August 11, 2017     Patient ID:  Norman Real is a 56 year old male, currently day + 16 of his HCT.      Diagnosis AMLU Acute myelogeneous leukemia, Unknown  HCT Type Allogeneic    Prep Regimen Cytoxan  Fludarabine  TBI   Donor Source Related PBSC    GVHD Prophylaxis   Mycophenolate  Primary BMT Provider Dr. Erna MD          INTERVAL  HISTORY      HPI: Presents for follow up with his wife. Feels a little tired. Denies any nosebleeds, hemorrhoid bleeding or bruises. Less nausea but this can still come and go without taking antiemetics. No fevers, chills cough or cold symptoms. Eating okay. Diarrhea. No rash. No bleeding. Trace edema about the same. Happy to go down on vfend and that his chest CT improved. BG well controlled lowest 90 in am, highest 180s.     Review of Systems: 10 point ROS negative except as noted above.     PHYSICAL EXAM   /81 (BP Location: Right arm, Cuff Size: Adult Regular)  Pulse 89  Temp 97.3  F (36.3  C) (Oral)  Resp 16  Ht 1.73 m (5' 8.11\")  Wt 92.6 kg (204 lb 1.6 oz)  SpO2 98%  BMI 30.93 kg/m2  General: NAD, interactive, appropriate   Eyes: SAMSON, sclera anicteric   Nose/Mouth/Throat: OP clear, buccal mucosa moist, no ulcerations   Lungs: CTA bilaterally  Cardiovascular: RRR, no M/R/G   Abdominal/Rectal: +BS, soft, NT, ND, No HSM   Lymphatics: trace ankle edema bilaterally  Skin: no rashes or petechaie  Neuro: A&O   Additional Findings: Cooper site NT, no drainage.     LABS AND IMAGING - PAST 24 HOURS      Lab Results   Component Value Date    WBC 4.1 08/11/2017    ANEU 2.4 08/11/2017    HGB 10.9 (L) 08/11/2017    HCT 33.0 (L) 08/11/2017     08/11/2017     08/11/2017    POTASSIUM 3.9 08/11/2017    CHLORIDE 104 08/11/2017    CO2 23 08/11/2017     (H) 08/11/2017    BUN 12 08/11/2017    CR 1.03 08/11/2017    MAG 1.5 (L) 08/11/2017    INR 0.96 07/31/2017    BILITOTAL 0.3 08/07/2017    AST 20 08/07/2017    ALT 24 " 2017    ALKPHOS 90 2017    PROTTOTAL 6.7 (L) 2017    ALBUMIN 3.4 2017        ASSESSMENT BY SYSTEMS      Norman Salazar Real is a 56 year old male with AML, currently day + 16 for NMA allo sib PBSCT without ATG under protocol . He received additional stem cells from donor .        1. BMT: Completed prep with cytoxan, fludarabine, & TBI. Transplant . Initial stem cell product only contained 2.33 CD34/kg, additional 0.66 CD34/kg on  for a total of 2.99.   - GCSF started on , with early auto recovery/engraftment and ANC>2500 x2 consecutive days this was discontinued on .    - Re-stage per protocol.      2.  HEME: Keep Hgb>8 and plts>10K. Hgb slightly down today. No reports of bleeding.  - No pre-meds.    3.  ID:  Afebrile.   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    CMV=neg.  - Bactrim or appropriate PCP therapy to start d+28. No allergy to bactrim.      4.  GI: No vomiting or diarrhea.    - Hx constipation- loose stools so decreased metamucil and loose stools are resolved.   - Mild nausea: Taking Zofran and ativan PRN, symptoms minimal   - Protonix for GI prophy.   - Ursodiol for VOD prophy  - s/p left sided colectomy for recurrent diverticulitis.  No issues with this during this admission.      5.  GVH: No aGVH to date   - Cont MMF through D+30  - Tacrolimus: Taking 1 mg BID. Last level  = 13.6    6.  FEN/Renal: Cr stable  - Tac induced Hypoma.5.  increase MagOx 400mg from 4 to 5 tabs daily, will increase to 6 tabs daily  as still at 1.5. No diarrhea      7. CV:  - Hx HTN + TAC exacerbated: Continue increased losartan  62.5 mg. Increased diltiazem XL  360 (max dose).  - Hx tachycardia with arrhythmia, s/p 3 ablations  - hold crestor through  transplant      8. Endo:   - Hx DM type II. Exacerbated by steroids. Notes glucoses range 130-300's. Discussed Endo referral for follow up within the next month (order placed in Epic) and increased Lantus to 10 units per day 8/7. Well controlled (Bg )  - Current DM Regimen: Lantus 10units qam, carb coverage (5 units per meal, 2-3 units per snack, 4 units with Ensure), sliding scale with meals and bedtime   - Hold metformin 500mg/d on admission.      9. : Hx prostate cancer.   - Asymptomatic at this time.       10. Neuro: History of chronic insomnia.  - Insomnia: Ambien to 5-10 mg QHS PRN      11. Pulm: Hx spiculated pulm nodule (concern for malignancy w/ hx tobacco use) and GGO (concerning for fungal PNA). F/u CT chest in 4-6 weeks (approx 8/22) per Dr. Ahn recs in workup.    - Per Dr Ahn and with improvement seen on CT yesterday, ok to decrease Vfend to prophy (200mg bid) discussed with pt 8/11  - Note 6/30 BAL + for aspergillus galactomannan; culture negative final.     Note: Medications should be filled at [facility] pharmacy    Plan: decrease vfend to 200mg bid, increase magnesium to 6 tablets/day  RTC 8/15 for day 21 bmbx, provider visit, labs, tac level. Call sooner with any shortness of breath, shortness of breath or bleeding.    Ayde Shafer PAC  [phone]

## 2017-08-11 NOTE — NURSING NOTE
"Oncology Rooming Note    August 11, 2017 11:39 AM   Norman Real is a 56 year old male who presents for:    Chief Complaint   Patient presents with     Blood Draw     Labs drawn from CVC. end caps changed. vs taken. pt checked in for appt     Dressing Change     Initial Vitals: /81 (BP Location: Right arm, Cuff Size: Adult Regular)  Pulse 89  Temp 97.3  F (36.3  C) (Oral)  Resp 16  Ht 1.73 m (5' 8.11\")  Wt 92.6 kg (204 lb 1.6 oz)  SpO2 98%  BMI 30.93 kg/m2 Estimated body mass index is 30.93 kg/(m^2) as calculated from the following:    Height as of this encounter: 1.73 m (5' 8.11\").    Weight as of this encounter: 92.6 kg (204 lb 1.6 oz). Body surface area is 2.11 meters squared.  No Pain (0) Comment: Data Unavailable   No LMP for male patient.  Allergies reviewed: Yes  Medications reviewed: Yes    Medications: Medication refills not needed today.  Pharmacy name entered into [employer]: [id number] - [place], [place] - [address] [place] [address]    Clinical concerns:  No new concerns  Provider was notified.    5 minutes for nursing intake (face to face time)     Radha Lantigua MA              "

## 2017-08-12 NOTE — TELEPHONE ENCOUNTER
"\"I took my 2:00 PM pills.\" Patient states he took Acyclovir and Actigall in error.  Denies any symptoms at this time.     Writer unable to contact Poison Control. Dr. PETTY Humphreys, BMT Clinic was paged to [phone] @ 10:00 hrs.  Patient advised to call back if he does not hear from Provider within 20 minutes.      Reason for Disposition    All OTHER POTENTIALLY HARMFUL SUBSTANCES (e.g., nearly all chemicals, plants, more than a double dose of a drug)    Additional Information    Negative: Severe difficulty breathing (e.g., struggling for each breath, speaks in single words)    Negative: Bluish lips, tongue, or face now    Negative: Seizure    Negative: Difficult to awaken or acting confused  (e.g., disoriented, slurred speech)    Negative: Shock suspected (e.g., cold/pale/clammy skin, too weak to stand, low BP, rapid pulse)    Negative: [1] Intentional overdose AND [2] suicidal thoughts or ideas    Negative: Suicide attempt, known or suspected    Negative: Sounds like a life-threatening emergency to the triager    Negative: Inhalation of smoke or fumes    Negative: Carbon monoxide exposure, known or suspected    Negative: Chemical in the eye    Negative: Chemical on the skin    Negative: Swallowed a (non-poisonous) foreign body    Negative: [1] HARMFUL SUBSTANCE or ACID or ALKALI ingestion (e.g., toilet , drain , lye, Clinitest tablets, ammonia, bleaches) AND [2] any symptoms (e.g., mouth pain, sore throat, breathing difficulty)    Negative: [1] PETROLEUM PRODUCT ingestion (e.g., kerosene, gasoline, benzene, furniture polish, lighter fluid) AND [2] any symptoms (e.g., breathing difficulty, coughing, vomiting)    Negative: [1] Poison Center advised caller to go to ED AND [2] caller seeking second opinion    Negative: Patient sounds very sick or weak to the triager    Negative: [1] HARMFUL SUBSTANCE or ACID or ALKALI ingestion (e.g., toilet , drain , lye, Clinitest tablets, ammonia, " bleaches) AND [2] NO symptoms    Negative: [1] PETROLEUM PRODUCT ingestion (e.g., kerosene, gasoline, benzene, furniture polish, lighter fluid) AND [2] NO symptoms    Negative: [1] DOUBLE DOSE (an extra dose or lesser amount) of over-the-counter (OTC) drug AND [2] any symptoms (e.g., dizziness, nausea, pain, sleepiness)    Negative: [1] DOUBLE DOSE (an extra dose or lesser amount) of prescription drug AND [2] any symptoms (e.g., dizziness, nausea, pain, sleepiness)    Negative: Mercury spill (e.g., broken glass thermometer, broken spiral CFL lightbulb)    Negative: Patient or caller provides unclear information about type or amount of substance    Protocols used: POISONING-ADULT-AH

## 2017-08-15 NOTE — PROGRESS NOTES
BMT ONC Adult Bone Marrow Biopsy Procedure Note  August 15, 2017  BP (!) 138/9  Pulse 77  Temp 97.8  F (36.6  C) (Oral)  Resp 18  Wt 93.4 kg (205 lb 14.6 oz)  SpO2 97%  BMI 31.21 kg/m2     Learning needs assessment complete within 12 months? YES    DIAGNOSIS: AML    PROCEDURE: Unilateral Bone Marrow Biopsy and Unilateral Aspirate    LOCATION: [facility] [place] 2nd Floor    Patient s identification was positively verified by verbal identification and invasive procedure safety checklist was completed. Informed consent was obtained. Following the administration of Midazolam as pre-medication, patient was placed in the prone position and prepped and draped in a sterile manner. Approximately 10 cc of 1% Lidocaine was used over the left posterior iliac spine. Following this a 3 mm incision was made. Trephine bone marrow core(s) was (were) obtained from the IC. Bone marrow aspirates were obtained from the IC. Aspirates were sent for morphology, immunophenotyping, cytogenetics and molecular diagnostics RFLP. A total of approximately 20 ml of marrow was aspirated. Following this procedure a sterile dressing was applied to the bone marrow biopsy site(s). The patient was placed in the supine position to maintain pressure on the biopsy site. Post-procedure wound care instructions were given.     Complications: NO    Pre-procedural pain: 0 out of 10 on the numeric pain rating scale.     Procedural pain: 4 out of 10 on the numeric pain rating scale.     Post-procedural pain assessment: 0 out of 10 on the numeric pain rating scale.     Interventions: NO    Length of procedure:20 minutes or less      Procedure performed by: Ayde MCDONALD

## 2017-08-15 NOTE — MR AVS SNAPSHOT
After Visit Summary   8/15/2017    Norman Real    MRN: [id number]           Patient Information     Date Of Birth          1960        Visit Information        Provider Department      8/15/2017 3:30 PM UC BMT MIMA #4 [facility] Blood and Marrow Transplant        Today's Diagnoses     Acute myeloid leukemia in remission (H)    -  1          [facility] ([facility])  [address] [place] [id number]  Phone: [phone]  Clinic Hours:   Monday-Thursday:7am to 7pm   Friday: 7am to 5pm   Weekends and holidays:    8am to noon (in general)  If your fever is 100.5  or greater,   call the clinic.  After hours call the   hospital at [phone] or   [phone]. Ask for the BMT   fellow on-call            Follow-ups after your visit        Your next 10 appointments already scheduled     Aug 16, 2017  3:30 PM CDT   [facility] Draw with  [facility] DRAW   [facility] Draw ([facility])    [address] [place] [id number]   [phone]            Aug 16, 2017  4:00 PM CDT   Return with Charanjit Umanzor MD   [facility] Blood and Marrow Transplant ([facility])    [address] [place] [id number]   [phone]            Aug 25, 2017 11:30 AM CDT   [facility] Draw with  [facility] DRAW   [facility] Draw ([facility])    [address] [place] [phone]   [phone]            Aug 25, 2017 12:00 PM CDT   (Arrive by 11:45 AM)   BMT 28 Day Anniversary Visit with Charanjit Umanzor MD   [facility] Blood and Marrow Transplant ([facility])    [address] [place] [phone]   [phone]            Sep 05, 2017 10:30 AM CDT   [facility] Draw with  [facility] DRAW   [facility] Draw ([facility])    [address] [place] [address]  [address] [address]   [phone]            Sep 05, 2017 11:00 AM CDT   BMT Anniversary Visit with  BMT MIMA #1   [facility] Blood and Marrow Transplant ([facility])    [address] [address]   [phone]            Sep 12, 2017 10:30 AM CDT   [facility] Lab Draw with  [facility] DRAW   [facility] Lab Draw ([facility])    [address] [address]   [phone]            Sep 12, 2017 11:00 AM CDT   BMT Anniversary Visit with  BMT MIMA #1   [facility] Blood and Marrow Transplant ([facility])    [address] [place] [address]   [phone]            Sep 22, 2017 10:00 AM CDT   [facility] Lab Draw with  [facility] LAB DRAW   [facility] Draw ([facility])    [address] [place] [address]   [phone]            Sep 22, 2017 10:30 AM CDT   BMT Anniversary Visit with Charanjit Umanzor MD   [facility] Blood and Marrow Transplant ([facility])    [address] [place] [address]   [phone]              Who to contact     If you have questions or need follow up information about today's clinic visit or your schedule please contact [facility] BLOOD AND MARROW TRANSPLANT directly at [phone].  Normal or non-critical lab and imaging results will be communicated to you by MyChart, letter or phone within 4 business days after the clinic has received the results. If you do not hear from us within 7 days, please contact the clinic through MyChart or phone. If you have a critical or abnormal lab result, we will notify you by phone as soon as possible.  Submit refill requests through [employer] or call your pharmacy and they will forward the refill request to us. Please allow 3 business days for your refill  "to be completed.          Additional Information About Your Visit        [email or web address] Trevor Information     [employer] lets you send messages to your doctor, view your test results, renew your prescriptions, schedule appointments and more. To sign up, go to [email or web address] . Click on \"Log in\" on the left side of the screen, which will take you to the Welcome page. Then click on \"Sign up Now\" on the right side of the page.     You will be asked to enter the access code listed below, as well as some personal information. Please follow the directions to create your username and password.     Your access code is: [email or web address]  Expires: 2017  3:48 PM     Your access code will  in 90 days. If you need help or a new code, please call your [facility] clinic or [phone].        Care EveryWhere ID     This is your Care EveryWhere ID. This could be used by other organizations to access your [facility] medical records  [id number]         Blood Pressure from Last 3 Encounters:   08/15/17 (!) 138/9   17 132/81   08/10/17 134/86    Weight from Last 3 Encounters:   08/15/17 93.4 kg (205 lb 14.6 oz)   17 92.6 kg (204 lb 1.6 oz)   08/10/17 93.9 kg (207 lb)              Today, you had the following     No orders found for display       Recent Review Flowsheet Data     BMT Recent Results Latest Ref Rng & Units 2017 2017 2017 2017 2017 2017 8/15/2017    WBC 4.0 - 11.0 10e9/L - - 7.6 5.8 5.1 4.1 2.4(L)    Hemoglobin 13.3 - 17.7 g/dL - - 11.5(L) 11.6(L) 12.0(L) 10.9(L) 11.2(L)    Platelet Count 150 - 450 10e9/L - - 38(LL) 75(L) 117(L) 156 152    Neutrophils (Absolute) 1.6 - 8.3 10e9/L - - 5.5 3.3 3.0 2.4 1.0(L)    INR 0.86 - 1.14 - - - - - - -    Sodium 133 - 144 mmol/L - - 137 136 137 138 137    Potassium 3.4 - 5.3 mmol/L - - 4.3 4.2 4.2 3.9 4.2    Chloride 94 - 109 mmol/L - - 104 103 105 104 104    Glucose 70 - 99 mg/dL 110(H) 191(H) 228(H) 178(H) 153(H) 194(H) 186(H)    Urea Nitrogen 7 - 30 " mg/dL - - 13 14 13 12 14    Creatinine 0.66 - 1.25 mg/dL - - 1.05 0.99 0.96 1.03 0.96    Calcium (Total) 8.5 - 10.1 mg/dL - - 8.5 8.6 8.7 8.6 8.7    Protein (Total) 6.8 - 8.8 g/dL - - - 6.7(L) - - 7.2    Albumin 3.4 - 5.0 g/dL - - - 3.4 - - 3.4    Bilirubin (Direct) 0.0 - 0.2 mg/dL - - - - - - -    Alkaline Phosphatase 40 - 150 U/L - - - 90 - - 76    AST 0 - 45 U/L - - - 20 - - 20    ALT 0 - 70 U/L - - - 24 - - 21    MCV 78 - 100 fl - - 95 94 92 93 92               Primary Care Provider    Physician No Ref-Primary       No address on file        Equal Access to Services     ASHLYN FLANAGAN : Hadii sapna Ratliff, waaxda nigel, qaybta kaalmada lolita, vishal albright . So [facility] [phone].    ATENCIÓN: Si haiderla francisco javier, tiene a gusman disposición servicios gratuitos de asistencia lingüística. Leyla al [phone].    We comply with applicable federal civil rights laws and [place] laws. We do not discriminate on the basis of race, color, national origin, age, disability sex, sexual orientation or gender identity.            Thank you!     Thank you for choosing [facility] BLOOD AND MARROW TRANSPLANT  for your care. Our goal is always to provide you with excellent care. Hearing back from our patients is one way we can continue to improve our services. Please take a few minutes to complete the written survey that you may receive in the mail after your visit with us. Thank you!             Your Updated Medication List - Protect others around you: Learn how to safely use, store and throw away your medicines at www.disposemymeds.org.          This list is accurate as of: 8/15/17  3:48 PM.  Always use your most recent med list.                   Brand Name Dispense Instructions for use Diagnosis    acyclovir 800 MG tablet    ZOVIRAX    150 tablet    Take 1 tablet (800 mg) by mouth 5 times daily    Stem cell transplant candidate       cholecalciferol 1000 UNITS capsule    vitamin  -D      Take 1 capsule by mouth daily        cyclobenzaprine 5 MG tablet    FLEXERIL     Take 1 tablet (5 mg) by mouth 3 times daily as needed for muscle spasms        diltiazem 360 MG 24 hr CD capsule    CARDIZEM CD; CARTIA XT    30 capsule    Take 1 capsule (360 mg) by mouth daily    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       FLONASE NA      Spray in nostril as needed        guaiFENesin 600 MG 12 hr tablet    MUCINEX     Take 600 mg by mouth        heparin lock flush 10 UNIT/ML Soln injection     30 vial    5 mLs by Intracatheter route daily In each lumen    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       insulin aspart 100 UNIT/ML injection    NovoLOG FLEXPEN    3 mL    Give insulin based on High sliding scale  Also give prescribed amount before meals based on carbohydrate coverage    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       insulin glargine 100 UNIT/ML injection    LANTUS    3 mL    Inject 10 Units Subcutaneous daily    Type 2 diabetes mellitus without complication, without long-term current use of insulin (H)       insulin pen needle 30G X 8 MM     100 each    Use when delivering insulin as directed.    Type 2 diabetes mellitus without complication, without long-term current use of insulin (H)       levofloxacin 250 MG tablet    LEVAQUIN    14 tablet    Take 1 tablet (250 mg) by mouth daily    Stem cell transplant candidate       LORazepam 0.5 MG tablet    ATIVAN    40 tablet    Take 1-2 tablets (0.5-1 mg) by mouth every 4 hours as needed for anxiety (nausea/vomiting/sleep)    Stem cell transplant candidate       * losartan 25 MG tablet    COZAAR    30 tablet    Taking total dose of 62.5 mg daily by mouth: so take 50 mg tablet and 1/2 of a  25 mg tablet daily HOLD 8/11 on    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       * losartan 50 MG tablet    COZAAR    30 tablet    Take 1 tablet (50 mg) by mouth daily    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       magnesium oxide 400 MG tablet     MAG-OX    60 tablet    Increase from 4 tabs daily to 5 tabs daily    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       MULTI COMPLETE PO           mycophenolate 500 MG tablet    GENERIC EQUIVALENT    84 tablet    Take 3 tablets (1,500 mg) by mouth 2 times daily    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       ondansetron 8 MG tablet    ZOFRAN    30 tablet    Take 1 tablet (8 mg) by mouth every 8 hours as needed for nausea    Stem cell transplant candidate       pantoprazole 40 MG EC tablet    PROTONIX    30 tablet    Take 1 tablet (40 mg) by mouth daily    Stem cell transplant candidate       psyllium Packet    METAMUCIL/KONSYL    90 packet    Take 1 packet by mouth 3 times daily    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       sulfamethoxazole-trimethoprim 800-160 MG per tablet    BACTRIM DS/SEPTRA DS    30 tablet    Clinic will tell you when to start 1 tablet twice daily by mouth on Mondays and Tuesdays, start around day +28    Stem cell transplant candidate       tacrolimus 0.5 MG capsule    GENERIC EQUIVALENT    56 capsule    Take 2 capsules (1 mg) by mouth 2 times daily    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       * Notice:  This list has 2 medication(s) that are the same as other medications prescribed for you. Read the directions carefully, and ask your doctor or other care provider to review them with you.

## 2017-08-15 NOTE — NURSING NOTE
"Oncology Rooming Note    August 15, 2017 2:07 PM   Norman Real is a 56 year old male who presents for:    Chief Complaint   Patient presents with     RECHECK     provider visit, labs, BM bx s/p bmt txp for AML     Initial Vitals: /90  Pulse 86  Temp 97.9  F (36.6  C) (Oral)  Resp 18  Wt 93.4 kg (205 lb 14.6 oz)  SpO2 97%  BMI 31.21 kg/m2 Estimated body mass index is 31.21 kg/(m^2) as calculated from the following:    Height as of 8/11/17: 1.73 m (5' 8.11\").    Weight as of this encounter: 93.4 kg (205 lb 14.6 oz). Body surface area is 2.12 meters squared.  No Pain (0) Comment: Data Unavailable   No LMP for male patient.  Allergies reviewed: Yes  Medications reviewed: Yes    Medications: Medication refills not needed today.  Pharmacy name entered into [employer]: [id number] - [address]    Clinical concerns: Patient reports eye dryness, right greater than left.  Schimmer's test after 2 minutes was 31 in right eye and 32 in left eye     Labs were drawn from Kenneth JASSO RN            BMT Teaching Flowsheet    Norman Real is a 56 year old male  The encounter diagnosis was 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.    Teaching Topic: bone marrow biopsy    Person(s) involved in teaching: Patient and Spouse  Motivation Level:  High  Asks Questions: Yes  Eager to Learn: Yes  Cooperative: Yes  Receptive (willing/able to accept information): Yes  Any cultural factors/Mormonism beliefs that may influence understanding or compliance? No    Patient and Family demonstrate understanding of the following:  - Reason for the appointment, diagnosis and treatment plan: Yes  - Knowledge of proper use of medications and conditions for which they are ordered (with special attention to potential side effects or drug interactions): Yes  - Which situations necessitate calling provider and whom to contact: Yes    Teaching concerns addressed: after bone marrow " biopsy      Pain management techniques: Yes    Infection Control:  Patient and Family demonstrate understanding of the following:  Bone marrow biopsy procedure site care taught Yes  Signs and symptoms of infection taught Yes    Instructional Materials Used/Given: Your Bone Marrow Biopsy    Time spent with patient: 15 minutes.

## 2017-08-15 NOTE — NURSING NOTE
Chief Complaint   Patient presents with     RECHECK     provider visit, labs, BM bx s/p bmt txp for AML

## 2017-08-15 NOTE — NURSING NOTE
Patient remained supine for 30 minutes post bone marrow biopsy.  His vital signs were stable, dressing remained clean/dry/intact and he reports mild discomfort at biopsy site.  He was alert and stable and discharged in the care of his wife.

## 2017-08-15 NOTE — MR AVS SNAPSHOT
After Visit Summary   8/15/2017    Norman Real    MRN: [id number]           Patient Information     Date Of Birth          1960        Visit Information        Provider Department      8/15/2017 2:00 PM UU BONE MARROW BIOPSY;  BMT MIMA #4 [facility] Blood and Marrow Transplant        Today's Diagnoses     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    -  1    Stem cell transplant candidate              [facility] ([facility])  [address] [place] [id number]  Phone: [phone]  Clinic Hours:   Monday-Thursday:7am to 7pm   Friday: 7am to 5pm   Weekends and holidays:    8am to noon (in general)  If your fever is 100.5  or greater,   call the clinic.  After hours call the   hospital at [phone] or   [phone]. Ask for the BMT   fellow on-call            Follow-ups after your visit        Your next 10 appointments already scheduled     Aug 16, 2017  3:30 PM CDT   [facility] Lab Draw with  [facility] LAB DRAW   [facility] Mason[facility] Lab Draw ([facility])    [address] [place] [id number] [phone]            Aug 16, 2017  4:00 PM CDT   Return with Charanjit Umanzor MD   [facility] Blood and Marrow Transplant ([facility])    [address] [place] [address]   [phone]            Aug 25, 2017 11:30 AM CDT   Masonic Lab Draw with  MASONIC LAB DRAW   [facility] Lab Draw ([facility])    [address] [place] [address]   [phone]            Aug 25, 2017 12:00 PM CDT   (Arrive by 11:45 AM)   BMT 28 Day Anniversary Visit with Charanjit Umanzor MD   [facility] Blood and Marrow Transplant ([facility])    [address] [place] [id number]   [phone]            Sep 05, 2017 10:30 AM CDT   Masonic Lab Draw with  MASONIC LAB DRAW   [facility]  [facility] Lab Draw ([facility])    [address] [address]   [phone]            Sep 05, 2017 11:00 AM CDT   BMT Anniversary Visit with  BMT MIMA #1   [facility] Blood and Marrow Transplant ([facility])    [address] [address]   [phone]            Sep 12, 2017 10:30 AM CDT   Masonic Lab Draw with  MASONIC LAB DRAW   [facility] Masonic Lab Draw ([facility])    [address] [address]   [phone]            Sep 12, 2017 11:00 AM CDT   BMT Anniversary Visit with  BMT MIMA #1   [facility] Blood and Marrow Transplant ([facility])    [address] [address]   [phone]            Sep 22, 2017 10:00 AM CDT   Masonic Lab Draw with  MASONIC LAB DRAW   [facility] Masonic Lab Draw ([facility])    [address] [address]   [phone]            Sep 22, 2017 10:30 AM CDT   BMT Anniversary Visit with Charanjit Umanzor MD   [facility] Blood and Marrow Transplant ([facility])    [address] [place] [address]   [phone]              Who to contact     If you have questions or need follow up information about today's clinic visit or your schedule please contact [facility] BLOOD AND MARROW TRANSPLANT directly at [phone].  Normal or non-critical lab and imaging results will be communicated to you by MyChart, letter or phone within 4 business days after the clinic has received the results. If you do not hear from us within 7 days, please contact the clinic through MyChart or phone. If you have a critical or abnormal lab result, we will notify you by phone as soon as possible.  Submit refill requests through [employer] or call your pharmacy and they will forward  "the refill request to us. Please allow 3 business days for your refill to be completed.          Additional Information About Your Visit        iubendaharRigel Information     [employer] lets you send messages to your doctor, view your test results, renew your prescriptions, schedule appointments and more. To sign up, go to [email or web address] . Click on \"Log in\" on the left side of the screen, which will take you to the Welcome page. Then click on \"Sign up Now\" on the right side of the page.     You will be asked to enter the access code listed below, as well as some personal information. Please follow the directions to create your username and password.     Your access code is: [email or web address]  Expires: 2017  3:48 PM     Your access code will  in 90 days. If you need help or a new code, please call your [place] clinic or [phone].        Care EveryWhere ID     This is your Care EveryWhere ID. This could be used by other organizations to access your [place] medical records  [id number]        Your Vitals Were     Pulse Temperature Respirations Pulse Oximetry BMI (Body Mass Index)       77 97.8  F (36.6  C) (Oral) 18 97% 31.21 kg/m2        Blood Pressure from Last 3 Encounters:   08/15/17 (!) 138/9   17 132/81   08/10/17 134/86    Weight from Last 3 Encounters:   08/15/17 93.4 kg (205 lb 14.6 oz)   17 92.6 kg (204 lb 1.6 oz)   08/10/17 93.9 kg (207 lb)              We Performed the Following     Bone Marrow Aspirate (Charge)     Bone Marrow Biopsy (Charge)     Bone marrow biopsy     CBC with platelets differential     CHROMOSOME BONE MARROW With Professional Interpretation     Comprehensive metabolic panel     DNA Marker Post Bmt Engraftment Bone Marrow     FISH With Professional Interpretation     Leukemia Lymphoma Evaluation     Magnesium     Tacrolimus level        Recent Review Flowsheet Data     BMT Recent Results Latest Ref Rng & Units 2017 " 8/15/2017    WBC 4.0 - 11.0 10e9/L - - 7.6 5.8 5.1 4.1 2.4(L)    Hemoglobin 13.3 - 17.7 g/dL - - 11.5(L) 11.6(L) 12.0(L) 10.9(L) 11.2(L)    Platelet Count 150 - 450 10e9/L - - 38(LL) 75(L) 117(L) 156 152    Neutrophils (Absolute) 1.6 - 8.3 10e9/L - - 5.5 3.3 3.0 2.4 1.0(L)    INR 0.86 - 1.14 - - - - - - -    Sodium 133 - 144 mmol/L - - 137 136 137 138 137    Potassium 3.4 - 5.3 mmol/L - - 4.3 4.2 4.2 3.9 4.2    Chloride 94 - 109 mmol/L - - 104 103 105 104 104    Glucose 70 - 99 mg/dL 110(H) 191(H) 228(H) 178(H) 153(H) 194(H) 186(H)    Urea Nitrogen 7 - 30 mg/dL - - 13 14 13 12 14    Creatinine 0.66 - 1.25 mg/dL - - 1.05 0.99 0.96 1.03 0.96    Calcium (Total) 8.5 - 10.1 mg/dL - - 8.5 8.6 8.7 8.6 8.7    Protein (Total) 6.8 - 8.8 g/dL - - - 6.7(L) - - 7.2    Albumin 3.4 - 5.0 g/dL - - - 3.4 - - 3.4    Bilirubin (Direct) 0.0 - 0.2 mg/dL - - - - - - -    Alkaline Phosphatase 40 - 150 U/L - - - 90 - - 76    AST 0 - 45 U/L - - - 20 - - 20    ALT 0 - 70 U/L - - - 24 - - 21    MCV 78 - 100 fl - - 95 94 92 93 92               Primary Care Provider    Physician No Ref-Primary       No address on file        Equal Access to Services     [facility]: Greg Ratliff, joana manning, qaybta kaalmada adeegyavishal warner'aanorah ah. So [facility] [phone].    ATENCIÓN: Si habla español, tiene a gusman disposición servicios gratuitos de asistencia lingüística. Llame al [phone].    We comply with applicable federal civil rights laws and [place] laws. We do not discriminate on the basis of race, color, national origin, age, disability sex, sexual orientation or gender identity.            Thank you!     Thank you for choosing [facility] BLOOD AND MARROW TRANSPLANT  for your care. Our goal is always to provide you with excellent care. Hearing back from our patients is one way we can continue to improve our services. Please take a few minutes to complete the written survey that you may receive in  the mail after your visit with us. Thank you!             Your Updated Medication List - Protect others around you: Learn how to safely use, store and throw away your medicines at www.disposemymeds.org.          This list is accurate as of: 8/15/17  4:22 PM.  Always use your most recent med list.                   Brand Name Dispense Instructions for use Diagnosis    acyclovir 800 MG tablet    ZOVIRAX    150 tablet    Take 1 tablet (800 mg) by mouth 5 times daily    Stem cell transplant candidate       cholecalciferol 1000 UNITS capsule    vitamin  -D     Take 1 capsule by mouth daily        cyclobenzaprine 5 MG tablet    FLEXERIL     Take 1 tablet (5 mg) by mouth 3 times daily as needed for muscle spasms        diltiazem 360 MG 24 hr CD capsule    CARDIZEM CD; CARTIA XT    30 capsule    Take 1 capsule (360 mg) by mouth daily    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       FLONASE NA      Spray in nostril as needed        guaiFENesin 600 MG 12 hr tablet    MUCINEX     Take 600 mg by mouth        heparin lock flush 10 UNIT/ML Soln injection     30 vial    5 mLs by Intracatheter route daily In each lumen    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       insulin aspart 100 UNIT/ML injection    NovoLOG FLEXPEN    3 mL    Give insulin based on High sliding scale  Also give prescribed amount before meals based on carbohydrate coverage    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       insulin glargine 100 UNIT/ML injection    LANTUS    3 mL    Inject 10 Units Subcutaneous daily    Type 2 diabetes mellitus without complication, without long-term current use of insulin (H)       insulin pen needle 30G X 8 MM     100 each    Use when delivering insulin as directed.    Type 2 diabetes mellitus without complication, without long-term current use of insulin (H)       levofloxacin 250 MG tablet    LEVAQUIN    14 tablet    Take 1 tablet (250 mg) by mouth daily    Stem cell transplant candidate       LORazepam 0.5  MG tablet    ATIVAN    40 tablet    Take 1-2 tablets (0.5-1 mg) by mouth every 4 hours as needed for anxiety (nausea/vomiting/sleep)    Stem cell transplant candidate       * losartan 25 MG tablet    COZAAR    30 tablet    Taking total dose of 62.5 mg daily by mouth: so take 50 mg tablet and 1/2 of a  25 mg tablet daily HOLD 8/11 on    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       * losartan 50 MG tablet    COZAAR    30 tablet    Take 1 tablet (50 mg) by mouth daily    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       magnesium oxide 400 MG tablet    MAG-OX    60 tablet    Increase from 4 tabs daily to 5 tabs daily    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       MULTI COMPLETE PO           mycophenolate 500 MG tablet    GENERIC EQUIVALENT    84 tablet    Take 3 tablets (1,500 mg) by mouth 2 times daily    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       ondansetron 8 MG tablet    ZOFRAN    30 tablet    Take 1 tablet (8 mg) by mouth every 8 hours as needed for nausea    Stem cell transplant candidate       pantoprazole 40 MG EC tablet    PROTONIX    30 tablet    Take 1 tablet (40 mg) by mouth daily    Stem cell transplant candidate       psyllium Packet    METAMUCIL/KONSYL    90 packet    Take 1 packet by mouth 3 times daily    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       sulfamethoxazole-trimethoprim 800-160 MG per tablet    BACTRIM DS/SEPTRA DS    30 tablet    Clinic will tell you when to start 1 tablet twice daily by mouth on Mondays and Tuesdays, start around day +28    Stem cell transplant candidate       tacrolimus 0.5 MG capsule    GENERIC EQUIVALENT    56 capsule    Take 2 capsules (1 mg) by mouth 2 times daily    Acute myeloid leukemia in remission (H)       ursodiol 300 MG capsule    ACTIGALL    90 capsule    Take 1 capsule (300 mg) by mouth 3 times daily    Stem cell transplant candidate       voriconazole 200 MG tablet    VFEND    60 tablet    Take 1 tablet (200 mg) by mouth 2  times daily    Stem cell transplant candidate       zolpidem 5 MG tablet    AMBIEN    45 tablet    Take 1-2 tablets (5-10 mg) by mouth nightly as needed for sleep    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       * Notice:  This list has 2 medication(s) that are the same as other medications prescribed for you. Read the directions carefully, and ask your doctor or other care provider to review them with you.

## 2017-08-16 NOTE — NURSING NOTE
Chief Complaint   Patient presents with     Blood Draw     VS done, labs collected via CVC.  Both lumens hep locked.  Hx AML.

## 2017-08-16 NOTE — NURSING NOTE
"Oncology Rooming Note    August 16, 2017 3:45 PM   Norman Real is a 56 year old male who presents for:    Chief Complaint   Patient presents with     Blood Draw     VS done, labs collected via CVC.  Both lumens hep locked.  Hx AML.     RECHECK     provider appt, labs, med review, AML     Initial Vitals: BP (!) 169/96  Pulse 100  Temp 97.8  F (36.6  C) (Oral)  Wt 92.9 kg (204 lb 11.2 oz)  SpO2 99%  BMI 31.02 kg/m2 Estimated body mass index is 31.02 kg/(m^2) as calculated from the following:    Height as of 8/11/17: 1.73 m (5' 8.11\").    Weight as of this encounter: 92.9 kg (204 lb 11.2 oz). Body surface area is 2.11 meters squared.  No Pain (0) Comment: Data Unavailable   No LMP for male patient.  Allergies reviewed: Yes  Medications reviewed: Yes    Medications: Medication refills not needed today.  Pharmacy name entered into [employer]: [id number] - [place], [place] - [address] [place], [place] [address]     Clinical concerns: High BP today Dr. Umanzor was notified.    5 minutes for nursing intake (face to face time)     CLARK JOHNSON RN              "

## 2017-08-16 NOTE — PROGRESS NOTES
BMT Daily Progress Note   August 15, 2017     Patient ID:  Norman Real is a 56 year old male, currently day + 21 of his HCT.      Diagnosis AMLU Acute myelogeneous leukemia, Unknown  HCT Type Allogeneic    Prep Regimen Cytoxan  Fludarabine  TBI   Donor Source Related PBSC    GVHD Prophylaxis   Mycophenolate  Primary BMT Provider Dr. Erna MD          INTERVAL  HISTORY      HPI: Norman returns feeling overall well. Had marrow biopsy yesterday. some fatigue but not bad. Eating well. Not dizzy or lightheaded but knew to eat something when glucose got <90. He denies any nausea vomiting or diarrhea. Eating normal diet. Tolerating 6 magnesium tablets a day. No fevers, cough or cold symptoms. No dysuria. Notes a little swelling in his ankles that resolves overnight. No rashes.     Review of Systems: 10 point ROS negative except as noted above.     PHYSICAL EXAM   BP (!) 169/96  Pulse 100  Temp 97.8  F (36.6  C) (Oral)  Wt 92.9 kg (204 lb 11.2 oz)  SpO2 99%  BMI 31.02 kg/m2  Recheck manual 142/96    General: NAD, interactive, appropriate   Eyes: SAMSON, sclera anicteric   Nose/Mouth/Throat: OP clear, buccal mucosa moist, no ulcerations   Lungs: CTA bilaterally  Cardiovascular: RRR, no M/R/G   Abdominal/Rectal: +BS, soft, NT, ND, No HSM   Lymphatics: trace ankle edema bilaterally  Skin: no rashes or petechaie  Neuro: A&O   Additional Findings: Cooper site NT, no drainage.     LABS AND IMAGING - PAST 24 HOURS      Lab Results   Component Value Date    WBC 3.4 (L) 08/16/2017    ANEU 2.3 08/16/2017    HGB 11.1 (L) 08/16/2017    HCT 32.6 (L) 08/16/2017     (L) 08/16/2017     08/16/2017    POTASSIUM 4.0 08/16/2017    CHLORIDE 104 08/16/2017    CO2 26 08/16/2017     (H) 08/16/2017    BUN 12 08/16/2017    CR 0.94 08/16/2017    MAG 1.6 08/16/2017    INR 0.96 07/31/2017    BILITOTAL 0.3 08/15/2017    AST 20 08/15/2017    ALT 21 08/15/2017    ALKPHOS 76 08/15/2017    PROTTOTAL 7.2 08/15/2017     ALBUMIN 3.4 08/15/2017        ASSESSMENT BY SYSTEMS      Norman Salazar Real is a 56 year old male with AML, currently day + 21 for 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 He received additional stem cells from donor 7/27.        1. BMT: Completed prep with cytoxan, fludarabine, & TBI. Transplant 7/27. Initial stem cell product only contained 2.33 CD34/kg, additional 0.66 CD34/kg on 7/27 for a total of 2.99.     2. AML: day +21 BMBx shows no evidence leukemia, chimerism pending.     3.  HEME: Keep Hgb>8 and plts>10K. Good counts today and no need GCSF.   - No pre-meds.    4.  ID:  Afebrile. 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   8/7 CMV=neg.  - Bactrim or appropriate PCP therapy to start d+28. No allergy to bactrim.      5.  GI: No vomiting or diarrhea.    - Hx constipation, resolved (on high doses of oral mag)  - Mild nausea: Taking Zofran and ativan PRN, symptoms minimal   - Protonix for GI prophy.   - Ursodiol for VOD prophy  - s/p left sided colectomy for recurrent diverticulitis.  No issues with this during this admission.      6.  GVH: No aGVH to date. Tac level was 11 on 8/15   - Cont MMF through D+30  - Tacrolimus: Taking 1 mg BID.     7.  FEN/Renal: Cr stable and normal Mg today.   On oral mag to 800mg QID. (tac upper range normal) No diarrhea      8. CV: BP up. He was confused with pills. Now on losartan 50 mg and diltiazem  mg.  - Hx tachycardia with arrhythmia, s/p 3 ablations  - hold crestor through transplant      9. Endo:   - Hx DM type II. Exacerbated by steroids. Notes glucoses range 130-300's. Discussed Endo referral for follow up within the next month (order placed in Epic) and increased Lantus to 10 units per day 8/7. Well controlled (Bg )  - Current DM Regimen: Lantus 10 units  qam, carb coverage (5 units per meal, 2-3 units per snack, 4 units with Ensure), sliding scale with meals and bedtime. Lowest glucose 89 in the afternoon, will decrease lantus to 8units.  - Hold metformin 500mg/d on admission.      9. : Hx prostate cancer.   - Asymptomatic at this time.       10. Neuro: History of chronic insomnia.  - Insomnia: Ambien to 5-10 mg QHS PRN      11. Pulm: Hx spiculated pulm nodule (concern for malignancy w/ hx tobacco use) and GGO (concerning for fungal PNA). F/u CT chest in 4-6 weeks (approx 8/22) per Dr. Ahn recs in workup.    - Per Dr Ahn and with improvement seen on CT 8/14, on Vfend to prophy (200mg bid)     Note: Medications should be filled at [facility] pharmacy    RTC Dr Umanzor 8/18 with labs  Losartan 50 mg daily + diltiazem 360 mg dialy

## 2017-08-16 NOTE — MR AVS SNAPSHOT
After Visit Summary   8/16/2017    Norman Real    MRN: [id number]           Patient Information     Date Of Birth          1960        Visit Information        Provider Department      8/16/2017 4:00 PM Charanjit Umanzor MD [facility] Blood and Marrow Transplant        Today's Diagnoses     Acute myeloid leukemia in remission (H)    -  1    History of peripheral stem cell transplant (H)        Aspergillosis (H)              [facility] [place] [place]  [address] [place] [id number]  Phone: [phone]  Clinic Hours:   Monday-Thursday:7am to 7pm   Friday: 7am to 5pm   Weekends and holidays:    8am to noon (in general)  If your fever is 100.5  or greater,   call the clinic.  After hours call the   hospital at [phone] or   [phone]. Ask for the BMT   fellow on-call            Follow-ups after your visit        Your next 10 appointments already scheduled     Aug 18, 2017  2:00 PM CDT   [facility] Draw with  [facility] DRAW   [facility] Draw ([facility])    [address] [place] [id number] [phone]            Aug 18, 2017  2:30 PM CDT   Return with Charanjit Umanzor MD   [facility] Blood and Marrow Transplant ([facility])    [address] [place] [address]   [phone]            Aug 25, 2017 11:30 AM CDT   [facility] Lab Draw with  [facility] DRAW   [facility] Draw ([facility])    [address] [place] [address]   [phone]            Aug 25, 2017 12:00 PM CDT   (Arrive by 11:45 AM)   BMT 28 Day Anniversary Visit with Charanjit Umanzor MD   [facility] Blood and Marrow Transplant ([facility])    [address] [place] [id number]   [phone]            Sep 05, 2017 10:30 AM CDT   [facility] Lab Draw with   [facility] DRAW   [facility] Draw ([facility])    [address]  2nd Floor  [facility] [address]   [phone]            Sep 05, 2017 11:00 AM CDT   BMT Anniversary Visit with  BMT MIMA #1   [facility] Blood and Marrow Transplant ([facility])    [address]  2nd Floor  [facility] [address]   [phone]            Sep 12, 2017 10:30 AM CDT   [facility] Lab Draw with  [facility] LAB DRAW   [facility] Draw ([facility])    [address]  [address] [facility] [address]   [phone]            Sep 12, 2017 11:00 AM CDT   BMT Anniversary Visit with  BMT MIMA #1   [facility] Blood and Marrow Transplant ([facility])    [address]  [address] [facility] [address]   [phone]            Sep 22, 2017 10:00 AM CDT   [facility] Lab Draw with  [facility] LAB DRAW   [facility] Draw ([facility])    [address] [address]   [phone]            Sep 22, 2017 10:30 AM CDT   BMT Anniversary Visit with Charanjit Umanzor MD   [facility] Blood and Marrow Transplant ([facility])    [address] [address]   [phone]              Future tests that were ordered for you today     Open Future Orders        Priority Expected Expires Ordered    CMV DNA quantification Routine 8/18/2017 8/18/2017 8/16/2017    CBC with platelets differential Routine 8/18/2017 8/18/2017 8/16/2017    Magnesium Routine 8/18/2017 8/18/2017 8/16/2017    Basic metabolic panel Routine 8/18/2017 8/18/2017 8/16/2017            Who to contact     If you have questions or need follow up information about today's clinic visit or your schedule please contact [facility] BLOOD AND MARROW TRANSPLANT directly at [phone].  Normal or non-critical lab and imaging results  "will be communicated to you by MyChart, letter or phone within 4 business days after the clinic has received the results. If you do not hear from us within 7 days, please contact the clinic through [employer] or phone. If you have a critical or abnormal lab result, we will notify you by phone as soon as possible.  Submit refill requests through [employer] or call your pharmacy and they will forward the refill request to us. Please allow 3 business days for your refill to be completed.          Additional Information About Your Visit        [employer] Information     [employer] lets you send messages to your doctor, view your test results, renew your prescriptions, schedule appointments and more. To sign up, go to [email or web address] . Click on \"Log in\" on the left side of the screen, which will take you to the Welcome page. Then click on \"Sign up Now\" on the right side of the page.     You will be asked to enter the access code listed below, as well as some personal information. Please follow the directions to create your username and password.     Your access code is: [email or web address]  Expires: 2017  3:48 PM     Your access code will  in 90 days. If you need help or a new code, please call your [place] clinic or [phone].        Care EveryWhere ID     This is your Care EveryWhere ID. This could be used by other organizations to access your [place] medical records  [id number]        Your Vitals Were     Pulse Temperature Pulse Oximetry BMI (Body Mass Index)          100 97.8  F (36.6  C) (Oral) 99% 31.02 kg/m2         Blood Pressure from Last 3 Encounters:   17 (!) 169/96   08/15/17 (!) 138/9   17 132/81    Weight from Last 3 Encounters:   17 92.9 kg (204 lb 11.2 oz)   08/15/17 93.4 kg (205 lb 14.6 oz)   17 92.6 kg (204 lb 1.6 oz)              We Performed the Following     Basic metabolic panel     CBC with platelets differential     Magnesium          Today's Medication Changes    "       These changes are accurate as of: 8/16/17  4:27 PM.  If you have any questions, ask your nurse or doctor.               These medicines have changed or have updated prescriptions.        Dose/Directions    losartan 50 MG tablet   Commonly known as:  COZAAR   This may have changed:  Another medication with the same name was removed. Continue taking this medication, and follow the directions you see here.   Used for:  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        Dose:  50 mg   Take 1 tablet (50 mg) by mouth daily   Quantity:  30 tablet   Refills:  1                Recent Review Flowsheet Data     BMT Recent Results Latest Ref Rng & Units 8/4/2017 8/5/2017 8/7/2017 8/9/2017 8/11/2017 8/15/2017 8/16/2017    WBC 4.0 - 11.0 10e9/L - 7.6 5.8 5.1 4.1 2.4(L) 3.4(L)    Hemoglobin 13.3 - 17.7 g/dL - 11.5(L) 11.6(L) 12.0(L) 10.9(L) 11.2(L) 11.1(L)    Platelet Count 150 - 450 10e9/L - 38(LL) 75(L) 117(L) 156 152 144(L)    Neutrophils (Absolute) 1.6 - 8.3 10e9/L - 5.5 3.3 3.0 2.4 1.0(L) 2.3    INR 0.86 - 1.14 - - - - - - -    Sodium 133 - 144 mmol/L - 137 136 137 138 137 137    Potassium 3.4 - 5.3 mmol/L - 4.3 4.2 4.2 3.9 4.2 4.0    Chloride 94 - 109 mmol/L - 104 103 105 104 104 104    Glucose 70 - 99 mg/dL 191(H) 228(H) 178(H) 153(H) 194(H) 186(H) 160(H)    Urea Nitrogen 7 - 30 mg/dL - 13 14 13 12 14 12    Creatinine 0.66 - 1.25 mg/dL - 1.05 0.99 0.96 1.03 0.96 0.94    Calcium (Total) 8.5 - 10.1 mg/dL - 8.5 8.6 8.7 8.6 8.7 8.8    Protein (Total) 6.8 - 8.8 g/dL - - 6.7(L) - - 7.2 -    Albumin 3.4 - 5.0 g/dL - - 3.4 - - 3.4 -    Bilirubin (Direct) 0.0 - 0.2 mg/dL - - - - - - -    Alkaline Phosphatase 40 - 150 U/L - - 90 - - 76 -    AST 0 - 45 U/L - - 20 - - 20 -    ALT 0 - 70 U/L - - 24 - - 21 -    MCV 78 - 100 fl - 95 94 92 93 92 90               Primary Care Provider    Physician No Ref-Primary       No address on file        Equal Access to Services     ASHLYN FLANAGAN AH: joana Cedeno  rainerhayley raven mchugh, vishal lópez. So [facility] [phone].    ATENCIÓN: Si brandy mcintyre, tiene a gusman disposición servicios gratuitos de asistencia lingüística. Destinyame al [phone].    We comply with applicable federal civil rights laws and [place] laws. We do not discriminate on the basis of race, color, national origin, age, disability sex, sexual orientation or gender identity.            Thank you!     Thank you for choosing [facility] BLOOD AND MARROW TRANSPLANT  for your care. Our goal is always to provide you with excellent care. Hearing back from our patients is one way we can continue to improve our services. Please take a few minutes to complete the written survey that you may receive in the mail after your visit with us. Thank you!             Your Updated Medication List - Protect others around you: Learn how to safely use, store and throw away your medicines at www.disposemymeds.org.          This list is accurate as of: 8/16/17  4:27 PM.  Always use your most recent med list.                   Brand Name Dispense Instructions for use Diagnosis    acyclovir 800 MG tablet    ZOVIRAX    150 tablet    Take 1 tablet (800 mg) by mouth 5 times daily    Stem cell transplant candidate       cholecalciferol 1000 UNITS capsule    vitamin  -D     Take 1 capsule by mouth daily        cyclobenzaprine 5 MG tablet    FLEXERIL     Take 1 tablet (5 mg) by mouth 3 times daily as needed for muscle spasms        diltiazem 360 MG 24 hr CD capsule    CARDIZEM CD; CARTIA XT    30 capsule    Take 1 capsule (360 mg) by mouth daily    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       FLONASE NA      Spray in nostril as needed        guaiFENesin 600 MG 12 hr tablet    MUCINEX     Take 600 mg by mouth        heparin lock flush 10 UNIT/ML Soln injection     30 vial    5 mLs by Intracatheter route daily In each lumen    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       insulin aspart  100 UNIT/ML injection    NovoLOG FLEXPEN    3 mL    Give insulin based on High sliding scale  Also give prescribed amount before meals based on carbohydrate coverage    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       insulin glargine 100 UNIT/ML injection    LANTUS    3 mL    Inject 10 Units Subcutaneous daily    Type 2 diabetes mellitus without complication, without long-term current use of insulin (H)       insulin pen needle 30G X 8 MM     100 each    Use when delivering insulin as directed.    Type 2 diabetes mellitus without complication, without long-term current use of insulin (H)       levofloxacin 250 MG tablet    LEVAQUIN    14 tablet    Take 1 tablet (250 mg) by mouth daily    Stem cell transplant candidate       LORazepam 0.5 MG tablet    ATIVAN    40 tablet    Take 1-2 tablets (0.5-1 mg) by mouth every 4 hours as needed for anxiety (nausea/vomiting/sleep)    Stem cell transplant candidate       losartan 50 MG tablet    COZAAR    30 tablet    Take 1 tablet (50 mg) by mouth daily    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       magnesium oxide 400 MG tablet    MAG-OX    60 tablet    Increase from 4 tabs daily to 5 tabs daily    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       MULTI COMPLETE PO           mycophenolate 500 MG tablet    GENERIC EQUIVALENT    84 tablet    Take 3 tablets (1,500 mg) by mouth 2 times daily    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       ondansetron 8 MG tablet    ZOFRAN    30 tablet    Take 1 tablet (8 mg) by mouth every 8 hours as needed for nausea    Stem cell transplant candidate       pantoprazole 40 MG EC tablet    PROTONIX    30 tablet    Take 1 tablet (40 mg) by mouth daily    Stem cell transplant candidate       psyllium Packet    METAMUCIL/KONSYL    90 packet    Take 1 packet by mouth 3 times daily    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       sulfamethoxazole-trimethoprim 800-160 MG per tablet    BACTRIM DS/SEPTRA DS    30 tablet    Clinic  will tell you when to start 1 tablet twice daily by mouth on Mondays and Tuesdays, start around day +28    Stem cell transplant candidate       tacrolimus 0.5 MG capsule    GENERIC EQUIVALENT    56 capsule    Take 2 capsules (1 mg) by mouth 2 times daily    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

## 2017-08-16 NOTE — LETTER
8/16/2017      RE: Norman Real  PO   [facility] [address]       BMT Daily Progress Note   August 15, 2017     Patient ID:  Norman Real is a 56 year old male, currently day + 21 of his HCT.      Diagnosis AMLU Acute myelogeneous leukemia, Unknown  HCT Type Allogeneic    Prep Regimen Cytoxan  Fludarabine  TBI   Donor Source Related PBSC    GVHD Prophylaxis   Mycophenolate  Primary BMT Provider Dr. Erna MD          INTERVAL  HISTORY      HPI: Norman returns feeling overall well. Had marrow biopsy yesterday. some fatigue but not bad. Eating well. Not dizzy or lightheaded but knew to eat something when glucose got <90. He denies any nausea vomiting or diarrhea. Eating normal diet. Tolerating 6 magnesium tablets a day. No fevers, cough or cold symptoms. No dysuria. Notes a little swelling in his ankles that resolves overnight. No rashes.     Review of Systems: 10 point ROS negative except as noted above.     PHYSICAL EXAM   BP (!) 169/96  Pulse 100  Temp 97.8  F (36.6  C) (Oral)  Wt 92.9 kg (204 lb 11.2 oz)  SpO2 99%  BMI 31.02 kg/m2  Recheck manual 142/96    General: NAD, interactive, appropriate   Eyes: SAMSON, sclera anicteric   Nose/Mouth/Throat: OP clear, buccal mucosa moist, no ulcerations   Lungs: CTA bilaterally  Cardiovascular: RRR, no M/R/G   Abdominal/Rectal: +BS, soft, NT, ND, No HSM   Lymphatics: trace ankle edema bilaterally  Skin: no rashes or petechaie  Neuro: A&O   Additional Findings: Cooper site NT, no drainage.     LABS AND IMAGING - PAST 24 HOURS      Lab Results   Component Value Date    WBC 3.4 (L) 08/16/2017    ANEU 2.3 08/16/2017    HGB 11.1 (L) 08/16/2017    HCT 32.6 (L) 08/16/2017     (L) 08/16/2017     08/16/2017    POTASSIUM 4.0 08/16/2017    CHLORIDE 104 08/16/2017    CO2 26 08/16/2017     (H) 08/16/2017    BUN 12 08/16/2017    CR 0.94 08/16/2017    MAG 1.6 08/16/2017    INR 0.96 07/31/2017    BILITOTAL 0.3 08/15/2017    AST 20 08/15/2017    ALT  21 08/15/2017    ALKPHOS 76 08/15/2017    PROTTOTAL 7.2 08/15/2017    ALBUMIN 3.4 08/15/2017        ASSESSMENT BY SYSTEMS      Norman Salazar Real is a 56 year old male with AML, currently day + 21 for 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 He received additional stem cells from donor 7/27.        1. BMT: Completed prep with cytoxan, fludarabine, & TBI. Transplant 7/27. Initial stem cell product only contained 2.33 CD34/kg, additional 0.66 CD34/kg on 7/27 for a total of 2.99.     2. AML: day +21 BMBx shows no evidence leukemia, chimerism pending.     3.  HEME: Keep Hgb>8 and plts>10K. Good counts today and no need GCSF.   - No pre-meds.    4.  ID:  Afebrile.  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   8/7 CMV=neg.  - Bactrim or appropriate PCP therapy to start d+28. No allergy to bactrim.      5.  GI: No vomiting or diarrhea.    - Hx constipation, resolved (on high doses of oral mag)  - Mild nausea: Taking Zofran and ativan PRN, symptoms minimal   - Protonix for GI prophy.   - Ursodiol for VOD prophy  - s/p left sided colectomy for recurrent diverticulitis.  No issues with this during this admission.      6.  GVH: No aGVH to date. Tac level was 11 on 8/15   - Cont MMF through D+30  - Tacrolimus: Taking 1 mg BID.     7.  FEN/Renal: Cr stable and normal Mg today.   On oral mag to 800mg QID. (tac upper range normal) No diarrhea      8. CV: BP up. He was confused with pills. Now on losartan 50 mg and diltiazem  mg.  - Hx tachycardia with arrhythmia, s/p 3 ablations  - hold crestor through transplant      9. Endo:   - Hx DM type II. Exacerbated by steroids. Notes glucoses range 130-300's. Discussed Endo referral for follow up within the next month (order placed in Epic) and increased Lantus to 10 units per day  8/7. Well controlled (Bg )  - Current DM Regimen: Lantus 10 units qam, carb coverage (5 units per meal, 2-3 units per snack, 4 units with Ensure), sliding scale with meals and bedtime. Lowest glucose 89 in the afternoon, will decrease lantus to 8units.  - Hold metformin 500mg/d on admission.      9. : Hx prostate cancer.   - Asymptomatic at this time.       10. Neuro: History of chronic insomnia.  - Insomnia: Ambien to 5-10 mg QHS PRN      11. Pulm: Hx spiculated pulm nodule (concern for malignancy w/ hx tobacco use) and GGO (concerning for fungal PNA). F/u CT chest in 4-6 weeks (approx 8/22) per Dr. Ahn recs in workup.    - Per Dr Ahn and with improvement seen on CT 8/14, on Vfend to prophy (200mg bid)     Note: Medications should be filled at [facility] pharmacy    RTC Dr Umanzor 8/18 with labs  Losartan 50 mg daily + diltiazem 360 mg ree Umanzor MD

## 2017-08-18 NOTE — MR AVS SNAPSHOT
After Visit Summary   8/18/2017    Norman Real    MRN: [id number]           Patient Information     Date Of Birth          1960        Visit Information        Provider Department      8/18/2017 2:30 PM Charanjit Umanzor MD [facility] Blood and Marrow Transplant        Today's Diagnoses     Acute myeloid leukemia in remission (H)    -  1    History of peripheral stem cell transplant (H)        Aspergillosis (H)        Stem cell transplant candidate              [facility] ([facility] [place])  [address] [place] [address]  Phone: [phone]  Clinic Hours:   Monday-Thursday:7am to 7pm   Friday: 7am to 5pm   Weekends and holidays:    8am to noon (in general)  If your fever is 100.5  or greater,   call the clinic.  After hours call the   hospital at [phone] or   [phone]. Ask for the BMT   fellow on-call           Care Instructions    Dr Umanzor 8/25 with labs  RTC BMT DOM/NP/PA on 8/22 with labs  Hold tacrolimus for level measurement on 8/22  Continue Losartan 50 mg daily + diltiazem 360 mg dialy            Follow-ups after your visit        Your next 10 appointments already scheduled     Aug 22, 2017 10:00 AM CDT   [facility] Draw with  [facility]   [facility] ([facility])    [address] [place] [id number] [phone]            Aug 22, 2017 10:30 AM CDT   Return with  BMT MIMA #1   [facility] Blood and Marrow Transplant ([facility])    [address] [place] [address]   [phone]            Aug 25, 2017 11:30 AM CDT   [facility] Draw with  [facility] DRAW   [facility] ([facility])    [address] [place] [address]   [phone]            Aug 25, 2017 12:00 PM CDT   (Arrive by 11:45 AM)   BMT 28 Day Anniversary Visit with Charanjit Umanzor MD    [facility] Blood and Marrow Transplant ([facility])    [address] [address]   [phone]            Sep 05, 2017 10:30 AM CDT   [facility] Lab Draw with  [facility] DRAW   [facility] Draw ([facility])    [address] [address]   [phone]            Sep 05, 2017 11:00 AM CDT   BMT Anniversary Visit with  BMT MIMA #1   [facility] Blood and Marrow Transplant ([facility])    [address] [address]   [phone]            Sep 12, 2017 10:30 AM CDT   [facility] Draw with  [facility] LAB DRAW   [facility] Draw ([facility])    [address] [address]   [phone]            Sep 12, 2017 11:00 AM CDT   BMT Anniversary Visit with  BMT MIMA #1   [facility] Blood and Marrow Transplant ([facility])    [address] [address]   [phone]            Sep 22, 2017 10:00 AM CDT   [facility] Draw with  [facility] DRAW   [facility] Draw ([facility])    [address] [address]   [phone]            Sep 22, 2017 10:30 AM CDT   BMT Anniversary Visit with Charanjit Umanzor MD   [facility] Blood and Marrow Transplant ([facility])    [address] [place] [address]   [phone]              Future tests that were ordered for you today     Open Future Orders        Priority Expected Expires Ordered    Basic metabolic panel Routine 8/25/2017 8/25/2017 8/18/2017    Magnesium Routine 8/25/2017 8/25/2017 8/18/2017    CBC with platelets differential Routine 8/25/2017 8/25/2017 8/18/2017    CBC with platelets differential Routine 8/22/2017 8/22/2017 8/18/2017    Comprehensive  "metabolic panel Routine 2017    Magnesium Routine 2017    CMV DNA quantification Routine 2017    Tacrolimus level Routine 2017            Who to contact     If you have questions or need follow up information about today's clinic visit or your schedule please contact [facility] BLOOD AND MARROW TRANSPLANT directly at [phone].  Normal or non-critical lab and imaging results will be communicated to you by NewRiverhart, letter or phone within 4 business days after the clinic has received the results. If you do not hear from us within 7 days, please contact the clinic through IceWEBt or phone. If you have a critical or abnormal lab result, we will notify you by phone as soon as possible.  Submit refill requests through Ymagis or call your pharmacy and they will forward the refill request to us. Please allow 3 business days for your refill to be completed.          Additional Information About Your Visit        NewRiverhart Information     Ymagis lets you send messages to your doctor, view your test results, renew your prescriptions, schedule appointments and more. To sign up, go to [email or web address] . Click on \"Log in\" on the left side of the screen, which will take you to the Welcome page. Then click on \"Sign up Now\" on the right side of the page.     You will be asked to enter the access code listed below, as well as some personal information. Please follow the directions to create your username and password.     Your access code is: [email or web address]  Expires: 2017  3:48 PM     Your access code will  in 90 days. If you need help or a new code, please call your [place] clinic or [phone].        Care EveryWhere ID     This is your Care EveryWhere ID. This could be used by other organizations to access your [place] medical records  [id number]        Your Vitals Were     Pulse Temperature Respirations " "Height Pulse Oximetry BMI (Body Mass Index)    95 98  F (36.7  C) 16 1.73 m (5' 8.11\") 98% 31.22 kg/m2       Blood Pressure from Last 3 Encounters:   08/18/17 143/83   08/16/17 (!) 169/96   08/15/17 (!) 138/9    Weight from Last 3 Encounters:   08/18/17 93.4 kg (206 lb)   08/16/17 92.9 kg (204 lb 11.2 oz)   08/15/17 93.4 kg (205 lb 14.6 oz)              We Performed the Following     Basic metabolic panel     CBC with platelets differential     CMV DNA quantification     Magnesium          Where to get your medicines      These medications were sent to [facility]/pharmacy #[id number] - [place], [place]  [address] [facility]  [address] [facility] [address]     Phone:  [phone]     acyclovir 800 MG tablet          Recent Review Flowsheet Data     BMT Recent Results Latest Ref Rng & Units 8/5/2017 8/7/2017 8/9/2017 8/11/2017 8/15/2017 8/16/2017 8/18/2017    WBC 4.0 - 11.0 10e9/L 7.6 5.8 5.1 4.1 2.4(L) 3.4(L) 3.3(L)    Hemoglobin 13.3 - 17.7 g/dL 11.5(L) 11.6(L) 12.0(L) 10.9(L) 11.2(L) 11.1(L) 11.3(L)    Platelet Count 150 - 450 10e9/L 38(LL) 75(L) 117(L) 156 152 144(L) 135(L)    Neutrophils (Absolute) 1.6 - 8.3 10e9/L 5.5 3.3 3.0 2.4 1.0(L) 2.3 2.0    INR 0.86 - 1.14 - - - - - - -    Sodium 133 - 144 mmol/L 137 136 137 138 137 137 137    Potassium 3.4 - 5.3 mmol/L 4.3 4.2 4.2 3.9 4.2 4.0 3.9    Chloride 94 - 109 mmol/L 104 103 105 104 104 104 105    Glucose 70 - 99 mg/dL 228(H) 178(H) 153(H) 194(H) 186(H) 160(H) 154(H)    Urea Nitrogen 7 - 30 mg/dL 13 14 13 12 14 12 12    Creatinine 0.66 - 1.25 mg/dL 1.05 0.99 0.96 1.03 0.96 0.94 1.00    Calcium (Total) 8.5 - 10.1 mg/dL 8.5 8.6 8.7 8.6 8.7 8.8 9.0    Protein (Total) 6.8 - 8.8 g/dL - 6.7(L) - - 7.2 - -    Albumin 3.4 - 5.0 g/dL - 3.4 - - 3.4 - -    Bilirubin (Direct) 0.0 - 0.2 mg/dL - - - - - - -    Alkaline Phosphatase 40 - 150 U/L - 90 - - 76 - -    AST 0 - 45 U/L - 20 - - 20 - -    ALT 0 - 70 U/L - 24 - - 21 - -    MCV 78 - 100 fl 95 94 92 93 92 90 91            "    Primary Care Provider    Physician No Ref-Primary       No address on file        Equal Access to Services     ASHLYN FLANAGAN : Hadii aad ku keyon Ratliff, washiela ryanmansi, raven jain camiodettetimmy, vishal valenzuelasueal lópez. So [facility] [phone].    ATENCIÓN: Si habla español, tiene a gusman disposición servicios gratuitos de asistencia lingüística. Llame al [phone].    We comply with applicable federal civil rights laws and [place] laws. We do not discriminate on the basis of race, color, national origin, age, disability sex, sexual orientation or gender identity.            Thank you!     Thank you for choosing [facility] B[facility]OD [facility] MARROW TRANSPLANT  for your care. Our goal is always to provide you with excellent care. Hearing back from our patients is one way we can continue to improve our services. Please take a few minutes to complete the written survey that you may receive in the mail after your visit with us. Thank you!             Your Updated Medication List - Protect others around you: Learn how to safely use, store and throw away your medicines at www.disposemymeds.org.          This list is accurate as of: 8/18/17  3:52 PM.  Always use your most recent med list.                   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), History of peripheral stem cell transplant (H), Aspergillosis (H)       cholecalciferol 1000 UNITS capsule    vitamin  -D     Take 1 capsule by mouth daily        cyclobenzaprine 5 MG tablet    FLEXERIL     Take 1 tablet (5 mg) by mouth 3 times daily as needed for muscle spasms        diltiazem 360 MG 24 hr CD capsule    CARDIZEM CD; CARTIA XT    30 capsule    Take 1 capsule (360 mg) by mouth daily    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       FLONASE NA      [place] in  nostril as needed        guaiFENesin 600 MG 12 hr tablet    MUCINEX     Take 600 mg by mouth        heparin lock flush 10 UNIT/ML Soln injection     30 vial    5 mLs by Intracatheter route daily In each lumen    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       insulin aspart 100 UNIT/ML injection    NovoLOG FLEXPEN    3 mL    Give insulin based on High sliding scale  Also give prescribed amount before meals based on carbohydrate coverage    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       insulin glargine 100 UNIT/ML injection    LANTUS    3 mL    Inject 10 Units Subcutaneous daily    Type 2 diabetes mellitus without complication, without long-term current use of insulin (H)       insulin pen needle 30G X 8 MM     100 each    Use when delivering insulin as directed.    Type 2 diabetes mellitus without complication, without long-term current use of insulin (H)       levofloxacin 250 MG tablet    LEVAQUIN    14 tablet    Take 1 tablet (250 mg) by mouth daily    Stem cell transplant candidate       LORazepam 0.5 MG tablet    ATIVAN    40 tablet    Take 1-2 tablets (0.5-1 mg) by mouth every 4 hours as needed for anxiety (nausea/vomiting/sleep)    Stem cell transplant candidate       losartan 50 MG tablet    COZAAR    30 tablet    Take 1 tablet (50 mg) by mouth daily    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       magnesium oxide 400 MG tablet    MAG-OX    60 tablet    Increase from 4 tabs daily to 5 tabs daily    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       MULTI COMPLETE PO           mycophenolate 500 MG tablet    GENERIC EQUIVALENT    84 tablet    Take 3 tablets (1,500 mg) by mouth 2 times daily    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       ondansetron 8 MG tablet    ZOFRAN    30 tablet    Take 1 tablet (8 mg) by mouth every 8 hours as needed for nausea    Stem cell transplant candidate       pantoprazole 40 MG EC tablet    PROTONIX    30 tablet    Take 1 tablet (40 mg) by mouth daily     Stem cell transplant candidate       psyllium Packet    METAMUCIL/KONSYL    90 packet    Take 1 packet by mouth 3 times daily    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       sulfamethoxazole-trimethoprim 800-160 MG per tablet    BACTRIM DS/SEPTRA DS    30 tablet    Clinic will tell you when to start 1 tablet twice daily by mouth on Mondays and Tuesdays, start around day +28    Stem cell transplant candidate       tacrolimus 0.5 MG capsule    GENERIC EQUIVALENT    56 capsule    Take 2 capsules (1 mg) by mouth 2 times daily    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

## 2017-08-18 NOTE — NURSING NOTE
Dressing change completed using sterile technique. Date was written on new dressing change. Site WDL. Patient tolerated dressing change well. See Dock Flowsheet.     Radah Lantigua MA

## 2017-08-18 NOTE — NURSING NOTE
Chief Complaint   Patient presents with     Labs Only     drawn form CVC, heparin locked and caps chnaged, vitals checked     Needs drsg change

## 2017-08-18 NOTE — PATIENT INSTRUCTIONS
Dr Umanzor 8/25 with labs  RTC BMT DOM/NP/PA on 8/22 with labs  Hold tacrolimus for level measurement on 8/22  Continue Losartan 50 mg daily + diltiazem 360 mg dialy

## 2017-08-18 NOTE — NURSING NOTE
"Oncology Rooming Note    August 18, 2017 3:04 PM   Norman Real is a 56 year old male who presents for:    Chief Complaint   Patient presents with     Labs Only     drawn form CVC, heparin locked and caps chnaged, vitals checked     Initial Vitals: /83  Pulse 95  Temp 98  F (36.7  C)  Resp 16  Ht 1.73 m (5' 8.11\")  Wt 93.4 kg (206 lb)  SpO2 98%  BMI 31.22 kg/m2 Estimated body mass index is 31.22 kg/(m^2) as calculated from the following:    Height as of this encounter: 1.73 m (5' 8.11\").    Weight as of this encounter: 93.4 kg (206 lb). Body surface area is 2.12 meters squared.  No Pain (0) Comment: tender in buttox from injection given on tuesday   No LMP for male patient.  Allergies reviewed: Yes  Medications reviewed: Yes    Medications: Medication refills not needed today.  Pharmacy name entered into Neurodyn: [id number] - [place], [place] - [address] [place] [address] AVE     Clinical concerns:  No new concerns  Provider was notified.    5 minutes for nursing intake (face to face time)     Radha Lantigua MA              "

## 2017-08-18 NOTE — PROGRESS NOTES
"BMT Daily Progress Note   August 15, 2017     Patient ID:  Norman Real is a 56 year old male, currently day + 23 of his HCT.      Diagnosis AMLU Acute myelogeneous leukemia, Unknown  HCT Type Allogeneic    Prep Regimen Cytoxan  Fludarabine  TBI   Donor Source Related PBSC    GVHD Prophylaxis   Mycophenolate  Primary BMT Provider Dr. Erna MD          INTERVAL  HISTORY      HPI: Norman returns feeling overall well. fatigue but not bad. Eating well. Not dizzy or lightheaded. He denies any nausea vomiting or diarrhea. Eating normal diet. No rashes.Tolerating 6 magnesium tablets a day. No fevers, cough or cold symptoms. No dysuria. Notes a little swelling in his ankles that resolves overnight.     Review of Systems: 10 point ROS negative except as noted above.     PHYSICAL EXAM   /83  Pulse 95  Temp 98  F (36.7  C)  Resp 16  Ht 1.73 m (5' 8.11\")  Wt 93.4 kg (206 lb)  SpO2 98%  BMI 31.22 kg/m2  Recheck manual 142/96    General: NAD, interactive, appropriate   Eyes: SAMSON, sclera anicteric   Nose/Mouth/Throat: OP clear, buccal mucosa moist, no ulcerations   Lungs: CTA bilaterally  Cardiovascular: RRR, no M/R/G   Abdominal/Rectal: +BS, soft, NT, ND, No HSM   Lymphatics: trace ankle edema bilaterally  Skin: no rashes or petechaie  Neuro: A&O   Additional Findings: Cooper site NT, no drainage.     LABS AND IMAGING - PAST 24 HOURS      Lab Results   Component Value Date    WBC 3.3 (L) 08/18/2017    ANEU 2.0 08/18/2017    HGB 11.3 (L) 08/18/2017    HCT 33.0 (L) 08/18/2017     (L) 08/18/2017     08/18/2017    POTASSIUM 3.9 08/18/2017    CHLORIDE 105 08/18/2017    CO2 24 08/18/2017     (H) 08/18/2017    BUN 12 08/18/2017    CR 1.00 08/18/2017    MAG 1.6 08/18/2017    INR 0.96 07/31/2017    BILITOTAL 0.3 08/15/2017    AST 20 08/15/2017    ALT 21 08/15/2017    ALKPHOS 76 08/15/2017    PROTTOTAL 7.2 08/15/2017    ALBUMIN 3.4 08/15/2017        ASSESSMENT BY KALPESH Lincoln " Farhan is a 56 year old male with AML, currently day + 23 for 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 He received additional stem cells from donor 7/27.        1. BMT: Completed prep with cytoxan, fludarabine, & TBI. Transplant 7/27. Initial stem cell product only contained 2.33 CD34/kg, additional 0.66 CD34/kg on 7/27 for a total of 2.99. Chimerism still pending.     2. AML: day +21 BMBx shows no evidence leukemia, chimerism pending.     3.  HEME: Keep Hgb>8 and plts>10K. Good counts today and no need GCSF.   - No pre-meds.    4.  ID:  Afebrile. 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   8/7 CMV=neg.  - Bactrim or appropriate PCP therapy to start d+28. No allergy to bactrim.      5.  GI: No vomiting or diarrhea.    - Hx constipation, resolved (on high doses of oral mag)  - Mild nausea: Taking Zofran and ativan PRN, symptoms minimal   - Protonix for GI prophy.   - Ursodiol for VOD prophy  - s/p left sided colectomy for recurrent diverticulitis.  No issues with this during this admission.      6.  GVH: No aGVH to date. Tac level repeat next week.   - Cont MMF through D+30  - Tacrolimus: Taking 1 mg BID.     7.  FEN/Renal: Cr stable and normal Mg today.   On oral mag to 800mg QID. (tac upper range normal) No diarrhea      8. CV: BP is much better controlled today. He is on losartan 50 mg and diltiazem  mg.  - Hx tachycardia with arrhythmia, s/p 3 ablations  - hold crestor through transplant      9. Endo:   - Hx DM type II. Exacerbated by steroids. Notes glucoses range 130-300's. Discussed Endo referral for follow up within the next month (order placed in Epic) and increased Lantus to 10 units per day 8/7. Well controlled (Bg )  - Current DM Regimen: Lantus 10 units qam, carb coverage (5 units per meal,  2-3 units per snack, 4 units with Ensure), sliding scale with meals and bedtime. Lowest glucose 89 in the afternoon, will decrease lantus to 8units.  - Hold metformin 500mg/d on admission.      9. : Hx prostate cancer.   - Asymptomatic at this time.       10. Neuro: History of chronic insomnia.  - Insomnia: Ambien to 5-10 mg QHS PRN      11. Pulm: Hx spiculated pulm nodule (concern for malignancy w/ hx tobacco use) and GGO (concerning for fungal PNA). F/u CT chest in 4-6 weeks (approx 8/22) per Dr. Ahn recs in workup.    - Per Dr Ahn and with improvement seen on CT 8/14, on Vfend to prophy (200mg bid)     Note: Medications should be filled at [facility] pharmacy    RTC   Dr Umanzor 8/25 with labs  RTC BMT DOM/NP/PA on 8/22 with labs  Hold tacrolimus for level measurement on 8/22  Continue Losartan 50 mg daily + diltiazem 360 mg dialy

## 2017-08-18 NOTE — PROGRESS NOTES
"BMT Daily Progress Note   August 15, 2017     Patient ID:  Norman Real is a 56 year old male, currently day + 21 of his HCT.      Diagnosis AMLU Acute myelogeneous leukemia, Unknown  HCT Type Allogeneic    Prep Regimen Cytoxan  Fludarabine  TBI   Donor Source Related PBSC    GVHD Prophylaxis   Mycophenolate  Primary BMT Provider Dr. Erna MD          INTERVAL  HISTORY      HPI: [place] returns feeling overall well. Had marrow biopsy yesterday. some fatigue but not bad. Eating well. Not dizzy or lightheaded but knew to eat something when glucose got <90. He denies any nausea vomiting or diarrhea. Eating normal diet. Tolerating 6 magnesium tablets a day. No fevers, cough or cold symptoms. No dysuria. Notes a little swelling in his ankles that resolves overnight. No rashes.     Review of Systems: 10 point ROS negative except as noted above.     PHYSICAL EXAM   /83  Pulse 95  Temp 98  F (36.7  C)  Resp 16  Ht 1.73 m (5' 8.11\")  Wt 93.4 kg (206 lb)  SpO2 98%  BMI 31.22 kg/m2  Recheck manual 142/96    General: NAD, interactive, appropriate   Eyes: SAMSON, sclera anicteric   Nose/Mouth/Throat: OP clear, buccal mucosa moist, no ulcerations   Lungs: CTA bilaterally  Cardiovascular: RRR, no M/R/G   Abdominal/Rectal: +BS, soft, NT, ND, No HSM   Lymphatics: trace ankle edema bilaterally  Skin: no rashes or petechaie  Neuro: A&O   Additional Findings: Cooper site NT, no drainage.     LABS AND IMAGING - PAST 24 HOURS      Lab Results   Component Value Date    WBC 3.3 (L) 08/18/2017    ANEU 2.0 08/18/2017    HGB 11.3 (L) 08/18/2017    HCT 33.0 (L) 08/18/2017     (L) 08/18/2017     08/18/2017    POTASSIUM 3.9 08/18/2017    CHLORIDE 105 08/18/2017    CO2 24 08/18/2017     (H) 08/18/2017    BUN 12 08/18/2017    CR 1.00 08/18/2017    MAG 1.6 08/18/2017    INR 0.96 07/31/2017    BILITOTAL 0.3 08/15/2017    AST 20 08/15/2017    ALT 21 08/15/2017    ALKPHOS 76 08/15/2017    PROTTOTAL 7.2 " 08/15/2017    ALBUMIN 3.4 08/15/2017        ASSESSMENT BY SYSTEMS      Norman Salazar Real is a 56 year old male with AML, currently day + 21 for 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 He received additional stem cells from donor 7/27.        1. BMT: Completed prep with cytoxan, fludarabine, & TBI. Transplant 7/27. Initial stem cell product only contained 2.33 CD34/kg, additional 0.66 CD34/kg on 7/27 for a total of 2.99.     2. AML: day +21 BMBx shows no evidence leukemia, chimerism pending.     3.  HEME: Keep Hgb>8 and plts>10K. Good counts today and no need GCSF.   - No pre-meds.    4.  ID:  Afebrile. 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   8/7 CMV=neg.  - Bactrim or appropriate PCP therapy to start d+28. No allergy to bactrim.      5.  GI: No vomiting or diarrhea.    - Hx constipation, resolved (on high doses of oral mag)  - Mild nausea: Taking Zofran and ativan PRN, symptoms minimal   - Protonix for GI prophy.   - Ursodiol for VOD prophy  - s/p left sided colectomy for recurrent diverticulitis.  No issues with this during this admission.      6.  GVH: No aGVH to date. Tac level was 11 on 8/15   - Cont MMF through D+30  - Tacrolimus: Taking 1 mg BID.     7.  FEN/Renal: Cr stable and normal Mg today.   On oral mag to 800mg QID. (tac upper range normal) No diarrhea      8. CV: BP up. He was confused with pills. Now on losartan 50 mg and diltiazem  mg.  - Hx tachycardia with arrhythmia, s/p 3 ablations  - hold crestor through transplant      9. Endo:   - Hx DM type II. Exacerbated by steroids. Notes glucoses range 130-300's. Discussed Endo referral for follow up within the next month (order placed in Epic) and increased Lantus to 10 units per day 8/7. Well controlled (Bg )  - Current DM Regimen:  Lantus 10 units qam, carb coverage (5 units per meal, 2-3 units per snack, 4 units with Ensure), sliding scale with meals and bedtime. Lowest glucose 89 in the afternoon, will decrease lantus to 8units.  - Hold metformin 500mg/d on admission.      9. : Hx prostate cancer.   - Asymptomatic at this time.       10. Neuro: History of chronic insomnia.  - Insomnia: Ambien to 5-10 mg QHS PRN      11. Pulm: Hx spiculated pulm nodule (concern for malignancy w/ hx tobacco use) and GGO (concerning for fungal PNA). F/u CT chest in 4-6 weeks (approx 8/22) per Dr. Ahn recs in workup.    - Per Dr Ahn and with improvement seen on CT 8/14, on Vfend to prophy (200mg bid)     Note: Medications should be filled at [facility] pharmacy    RTC   Dr Umanzor 8/25 with labs  RTC BMT DOM/NP/PA on 8/22 with labs  Hold tacrolimus for level measurement on 8/22  Continue Losartan 50 mg daily + diltiazem 360 mg dialy

## 2017-08-21 NOTE — PROGRESS NOTES
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 Erna MD          INTERVAL  HISTORY      HPI: Norman is feeling good.  Has one small oral sore, but eating with only occasional nausea.  No emesis or diarrhea.  Afebrile with no cough, congestion or dyspnea.  No bleeding or rash.  Ankle swelling unchanged.    Review of Systems: 10 point ROS negative except as noted above.     PHYSICAL EXAM   /81  Pulse 94  Temp 97.7  F (36.5  C) (Oral)  Resp 16  Ht 1.73 m (5' 8.11\")  Wt 92.5 kg (203 lb 14.4 oz)  SpO2 98%  BMI 30.9 kg/m2      General: NAD, interactive, appropriate   Eyes: SAMSON, sclera anicteric   Nose/Mouth/Throat: Small oral ulcer to L buccal mucosa along the bite line   Lungs: CTA bilaterally  Cardiovascular: RRR, no M/R/G   Abdominal/Rectal: +BS, soft, NT, ND, No HSM   Lymphatics: trace ankle edema bilaterally  Skin: no rashes or petechaie.  Small skin tags to palmar surfaces  Neuro: A&O   Additional Findings: Cooper site NT, no drainage.     LABS AND IMAGING - PAST 24 HOURS      Lab Results   Component Value Date    WBC 2.8 (L) 08/22/2017    ANEU 1.4 (L) 08/22/2017    HGB 11.4 (L) 08/22/2017    HCT 33.9 (L) 08/22/2017     (L) 08/22/2017     08/22/2017    POTASSIUM 3.7 08/22/2017    CHLORIDE 105 08/22/2017    CO2 23 08/22/2017     (H) 08/22/2017    BUN 11 08/22/2017    CR 0.98 08/22/2017    MAG 1.5 (L) 08/22/2017    INR 0.96 07/31/2017    BILITOTAL 0.6 08/22/2017    AST 20 08/22/2017    ALT 24 08/22/2017    ALKPHOS 72 08/22/2017    PROTTOTAL 6.9 08/22/2017    ALBUMIN 3.5 08/22/2017        ASSESSMENT BY SYSTEMS      Norman Salazar Real is a 56 year old male with AML, currently day + 27 for NMA " allo sib PBSCT without ATG under protocol 2015-32. He received additional stem cells from donor 7/27.        1. BMT: Completed prep with cytoxan, fludarabine, & TBI. Transplant 7/27. Initial stem cell product only contained 2.33 CD34/kg, additional 0.66 CD34/kg on 7/27 for a total of 2.99.   - preliminary BMBX results show no increase in blasts.  Flow, cyto & chimerism still pending.     2. HEME: Keep Hgb>8 and plts>10K. No pre-meds.  - White count trending down since stopping GCSF.  No need for a dose today    4.  ID:  Afebrile. No active infections.  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   8/18 CMV=neg.  -  Hold Bactrim due to dropping WBC & plan to give a dose of Pentamadine on 8/25.  No allergy to bactrim.      5.  GI: No vomiting or diarrhea.    - Mild nausea: Taking Zofran and ativan PRN, symptoms minimal   - Protonix for GI prophy.   - Ursodiol for VOD prophy  - s/p left sided colectomy for recurrent diverticulitis.        6.  GVH: No aGVH   - Cont MMF through D+30  - On Tacrolimus: Taking 1 mg BID, level 11 on 8/15.     7.  FEN/Renal: Cr stable and normal Mg today.   - On oral mag to 800mg QID.       8. CV: Adequat BP control on losartan 50 mg and diltiazem  mg.  - Hx tachycardia with arrhythmia, s/p 3 ablations  - hold crestor through transplant      9. Endo:   - Hx DM type II.  Current DM Regimen: Lantus 10 units qam, carb coverage (5 units per meal, 2-3 units per snack, 4 units with Ensure), sliding scale with meals and bedtime. .  - Hold metformin 500mg/d      9. : Hx prostate cancer.     10. Neuro: History of chronic insomnia. Ambien to 5-10 mg QHS PRN      11. Pulm: Hx spiculated pulm nodule (concern for malignancy w/ hx tobacco use) and GGO (concerning for fungal PNA). F/u CT 8/14 improved.  Cont Vfend 200mg bid     RTC  :  Dr Umanzor 8/25 with labs    Vy Black

## 2017-08-22 NOTE — NURSING NOTE
Chief Complaint   Patient presents with     Blood Draw     labs collected from CVC line, vitals taken      Afshan Bauer RN

## 2017-08-22 NOTE — NURSING NOTE
"Oncology Rooming Note    August 22, 2017 10:18 AM   Norman Real is a 56 year old male who presents for:    Chief Complaint   Patient presents with     Blood Draw     labs collected from CVC line, vitals taken      RECHECK     AML (acute myeloid leukemia)      Initial Vitals: /81  Pulse 94  Temp 97.7  F (36.5  C) (Oral)  Resp 16  Ht 1.73 m (5' 8.11\")  Wt 92.5 kg (203 lb 14.4 oz)  SpO2 98%  BMI 30.9 kg/m2 Estimated body mass index is 30.9 kg/(m^2) as calculated from the following:    Height as of this encounter: 1.73 m (5' 8.11\").    Weight as of this encounter: 92.5 kg (203 lb 14.4 oz). Body surface area is 2.11 meters squared.  No Pain (0) Comment: Data Unavailable   No LMP for male patient.  Allergies reviewed: Yes  Medications reviewed: Yes    Medications: MEDICATION REFILLS NEEDED TODAY. Provider was notified.  Pharmacy name entered into [email or web address]: [id number] - [place], [place] - [facility] [address]    Clinical concerns: Pt would like to request refills for Magnesium, Acyclovir, Diltiazem, Ativan, Pantoprazole, and Ursodiol  Provider was notified.    5 minutes for nursing intake (face to face time)     Radha Lantigua MA              "

## 2017-08-25 NOTE — LETTER
8/25/2017      [facility]: Norman [facility] [address]       BMT Clinic Note      Patient ID:  Norman Real is a 56 year old male, currently day + 30 s/p NMA allo sib PBSCT for AML       Diagnosis AMLU Acute myelogeneous leukemia, Unknown  HCT Type Allogeneic    Prep Regimen Cytoxan  Fludarabine  TBI   Donor Source Related PBSC    GVHD Prophylaxis   Mycophenolate  Primary BMT Provider Dr. Erna MD          INTERVAL  HISTORY      HPI: Norman is feeling good.  Resolving small oral sore. Eating well with only occasional nausea.  No emesis or diarrhea.  Afebrile with no cough, congestion or dyspnea.  No bleeding or rash.  Mild swelling unchanged.    Review of Systems: 10 point ROS negative except as noted above.     PHYSICAL EXAM   /89  Pulse 89  Temp 97.4  F (36.3  C) (Oral)  Wt 92.3 kg (203 lb 8 oz)  SpO2 98%  BMI 30.84 kg/m2    General: NAD, interactive, appropriate   Eyes: SAMSON, sclera anicteric   Nose/Mouth/Throat: Small oral ulcer to L buccal mucosa along the bite line   Lungs: CTA bilaterally  Cardiovascular: RRR, no M/R/G   Abdominal/Rectal: +BS, soft, NT, ND, No HSM   Lymphatics: trace ankle edema bilaterally  Skin: no rashes or petechaie.  Small skin tags to palmar surfaces  Neuro: A&O   Additional Findings: Cooper site NT, no drainage.     LABS AND IMAGING - PAST 24 HOURS      Lab Results   Component Value Date    WBC 3.0 (L) 08/25/2017    ANEU 1.4 (L) 08/22/2017    HGB 11.2 (L) 08/25/2017    HCT 32.5 (L) 08/25/2017     (L) 08/25/2017     08/22/2017    POTASSIUM 3.7 08/22/2017    CHLORIDE 105 08/22/2017    CO2 23 08/22/2017     (H) 08/22/2017    BUN 11 08/22/2017    CR 0.98 08/22/2017    MAG 1.5 (L) 08/22/2017    INR 0.96 07/31/2017    BILITOTAL 0.6 08/22/2017    AST 20 08/22/2017    ALT 24 08/22/2017    ALKPHOS 72 08/22/2017    PROTTOTAL 6.9 08/22/2017    ALBUMIN 3.5 08/22/2017        ASSESSMENT BY KALPESH Austin Salazar Real is a 56 year old male  with AML, currently day + 30 for 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 He received additional stem cells from donor 7/27.        1. BMT: Completed prep with cytoxan, fludarabine, & TBI. Transplant 7/27. Initial stem cell product only contained 2.33 CD34/kg, additional 0.66 CD34/kg on 7/27 for a total of 2.99.   - preliminary BMBX results show no increase in blasts.  Flow, cyto & chimerism still pending.     2. AML: in CR with 98% donor (8/15/17)    3. HEME: Keep Hgb>8 and plts>10K. No pre-meds.  - White count trending down since stopping GCSF.  No need for a dose today    4.  ID:  Afebrile. No active infections.  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   8/18 CMV=neg.  -  Hold Bactrim due to dropping WBC & plan to give a dose of Pentamadine on 8/25.  No allergy to bactrim.      5.  GI: No vomiting or diarrhea.    - Mild nausea: Taking Zofran and ativan PRN, symptoms minimal   - Protonix for GI prophy.   - Ursodiol for VOD prophy  - s/p left sided colectomy for recurrent diverticulitis.        6.  GVH: No aGVH. stop MMF today 8/25 per protocol  - Cont MMF through D+30  - On Tacrolimus: Taking 1 mg BID, level 11 on 8/15.     7.  FEN/Renal: Cr stable and normal Mg today.   - On oral mag to 800mg QID.       8. CV: Adequat BP control on losartan 50 mg and diltiazem  mg.  - Hx tachycardia with arrhythmia, s/p 3 ablations  - hold crestor through transplant      9. Endo:   - Hx DM type II.  Current DM Regimen: Lantus 10 units qam, carb coverage (5 units per meal, 2-3 units per snack, 4 units with Ensure), sliding scale with meals and bedtime. .  - Hold metformin 500mg/d      10. : Hx prostate cancer.     11. Neuro: History of chronic insomnia. Ambien to 5-10 mg QHS PRN      12. Pulm: Hx spiculated pulm nodule (concern for  malignancy w/ hx tobacco use) and GGO (concerning for fungal PNA). F/u CT 8/14 improved.  Cont Vfend 200mg bid     Plan:  - Discontinue MMF  - Do not start bactrim for 4 weeks; pentamidine today 8/25  - Monitor, nausea, vomiting, diarrhea, skin rashes and jaundice  - Considering for ZQU245 study; however lives 4h away  - RTC Erna on 9/1 with labs  - RTC BMT DOM/NP/PA on 8/29 with labs  - hold tacrolimus and bring pill to clinic with you on 8/29                                Charanjit Umanzor MD

## 2017-08-25 NOTE — MR AVS SNAPSHOT
After Visit Summary   8/25/2017    Norman Real    MRN: [id number]           Patient Information     Date Of Birth          1960        Visit Information        Provider Department      8/25/2017 12:00 PM Charanjit Umanzor MD [facility] Blood and Marrow Transplant        Today's Diagnoses     Stem cell transplant candidate    -  1    Acute myeloid leukemia in remission (H)        History of peripheral stem cell transplant (H)        Aspergillosis (H)              [facility] ([facility]  [address] [place] [address]  Phone: [phone]  Clinic Hours:   Monday-Thursday:7am to 7pm   Friday: 7am to 5pm   Weekends and holidays:    8am to noon (in general)  If your fever is 100.5  or greater,   call the clinic.  After hours call the   hospital at [phone] or   [phone]. Ask for the BMT   fellow on-call           Care Instructions    - Discontinue MMF  - Do not start bactrim for 4 weeks; pentamidine today 8/25  - Monitor, nausea, vomiting, diarrhea, skin rashes and jaundice  - Considering for TDB136 study; however lives 4h away  - RTC Erna on 9/1 with labs  - RTC BMT DOM/NP/PA on 8/29 with labs  - hold tacrolimus and bring pill to clinic with you on 8/29            Follow-ups after your visit        Your next 10 appointments already scheduled     Aug 29, 2017  9:45 AM CDT   [facility] Lab Draw with  [facility] DRAW   [facility] Lab Draw ([facility])    [address] [place] [id number] [phone]            Aug 29, 2017 10:30 AM CDT   Return with  BMT MIMA #2   [facility] Blood and Marrow Transplant ([facility])    [address] [place] [id number] [phone]            Sep 01, 2017 11:30 AM CDT   [facility] Lab Draw with  [facility] LAB DRAW   [facility] Draw ([facility])    [address] [place] [address]  [facility] [id number]   [phone]            Sep 01, 2017 12:00 PM CDT   Return with Charanjit Umanzor MD   [facility] Blood and Marrow Transplant ([facility])    [address] [address]   [phone]            Sep 05, 2017 10:30 AM CDT   [facility] Lab Draw with [facility] DRAW   [facility] Lab Draw ([facility])    [address] [address]   [phone]            Sep 05, 2017 11:00 AM CDT   BMT Anniversary Visit with  BMT MIMA #1   [facility] Blood and Marrow Transplant ([facility])    [address] [place] [address]   [phone]            Sep 12, 2017 10:30 AM CDT   [facility] Lab Draw with [facility] DRAW   [facility] Lab Draw ([facility])    [address] [place] [phone]            Sep 12, 2017 11:00 AM CDT   BMT Anniversary Visit with  BMT MIMA #1   [facility] Blood and Marrow Transplant ([facility])    [address] [place] [id number] [phone]            Sep 22, 2017 10:00 AM CDT   [facility] Lab Draw with [facility] DRAW   [facility] Lab Draw ([facility])    [address] [place] [id number] [phone]            Sep 22, 2017 10:30 AM CDT   BMT Anniversary Visit with Charanjit Umanzor MD   [facility] Blood and Marrow Transplant ([facility])    [address] [place] [id number] [phone]              Who to contact     If you have questions or need follow up information about today's clinic visit or your schedule please contact [facility]ARR[facility] TRANSPL[facility] directly at [phone].  Normal or non-critical lab and imaging results will be communicated to  "you by MyChart, letter or phone within 4 business days after the clinic has received the results. If you do not hear from us within 7 days, please contact the clinic through [employer] or phone. If you have a critical or abnormal lab result, we will notify you by phone as soon as possible.  Submit refill requests through [employer] or call your pharmacy and they will forward the refill request to us. Please allow 3 business days for your refill to be completed.          Additional Information About Your Visit        ScreenheroharBimbasket Information     [employer] lets you send messages to your doctor, view your test results, renew your prescriptions, schedule appointments and more. To sign up, go to [email or web address] . Click on \"Log in\" on the left side of the screen, which will take you to the Welcome page. Then click on \"Sign up Now\" on the right side of the page.     You will be asked to enter the access code listed below, as well as some personal information. Please follow the directions to create your username and password.     Your access code is: [email or web address]  Expires: 2017  3:48 PM     Your access code will  in 90 days. If you need help or a new code, please call your [facility] clinic or [phone].        Care EveryWhere ID     This is your Care EveryWhere ID. This could be used by other organizations to access your [facility] medical records  [id number]        Your Vitals Were     Pulse Temperature Pulse Oximetry BMI (Body Mass Index)          89 97.4  F (36.3  C) (Oral) 98% 30.84 kg/m2         Blood Pressure from Last 3 Encounters:   17 146/89   17 151/81   17 143/83    Weight from Last 3 Encounters:   17 92.3 kg (203 lb 8 oz)   17 92.5 kg (203 lb 14.4 oz)   17 93.4 kg (206 lb)              We Performed the Following     Basic metabolic panel     CBC with platelets differential     Dna Marker Post Bmt Engraftment Blood     Magnesium          Today's Medication Changes    "       These changes are accurate as of: 8/25/17  1:01 PM.  If you have any questions, ask your nurse or doctor.               These medicines have changed or have updated prescriptions.        Dose/Directions    magnesium oxide 400 MG tablet   Commonly known as:  MAG-OX   This may have changed:    - how much to take  - additional instructions   Used for:  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        8 tabs daily   Quantity:  100 tablet   Refills:  1         Stop taking these medicines if you haven't already. Please contact your care team if you have questions.     mycophenolate 500 MG tablet   Commonly known as:  GENERIC EQUIVALENT   Stopped by:  Charanjit Umanzor MD                    Recent Review Flowsheet Data     BMT Recent Results Latest Ref Rng & Units 8/9/2017 8/11/2017 8/15/2017 8/16/2017 8/18/2017 8/22/2017 8/25/2017    WBC 4.0 - 11.0 10e9/L 5.1 4.1 2.4(L) 3.4(L) 3.3(L) 2.8(L) 3.0(L)    Hemoglobin 13.3 - 17.7 g/dL 12.0(L) 10.9(L) 11.2(L) 11.1(L) 11.3(L) 11.4(L) 11.2(L)    Platelet Count 150 - 450 10e9/L 117(L) 156 152 144(L) 135(L) 134(L) 142(L)    Neutrophils (Absolute) 1.6 - 8.3 10e9/L 3.0 2.4 1.0(L) 2.3 2.0 1.4(L) 1.4(L)    INR 0.86 - 1.14 - - - - - - -    Sodium 133 - 144 mmol/L 137 138 137 137 137 139 138    Potassium 3.4 - 5.3 mmol/L 4.2 3.9 4.2 4.0 3.9 3.7 4.1    Chloride 94 - 109 mmol/L 105 104 104 104 105 105 104    Glucose 70 - 99 mg/dL 153(H) 194(H) 186(H) 160(H) 154(H) 203(H) 160(H)    Urea Nitrogen 7 - 30 mg/dL 13 12 14 12 12 11 11    Creatinine 0.66 - 1.25 mg/dL 0.96 1.03 0.96 0.94 1.00 0.98 1.02    Calcium (Total) 8.5 - 10.1 mg/dL 8.7 8.6 8.7 8.8 9.0 8.8 8.7    Protein (Total) 6.8 - 8.8 g/dL - - 7.2 - - 6.9 -    Albumin 3.4 - 5.0 g/dL - - 3.4 - - 3.5 -    Bilirubin (Direct) 0.0 - 0.2 mg/dL - - - - - - -    Alkaline Phosphatase 40 - 150 U/L - - 76 - - 72 -    AST 0 - 45 U/L - - 20 - - 20 -    ALT 0 - 70 U/L - - 21 - - 24 -    MCV 78 - 100 fl 92 93 92 90 91 91 90                Primary Care Provider    Physician No Ref-Primary       No address on file        Equal Access to Services     ASHLYN FLANAGAN : Hadii aad ku keyon Ratliff, washiela ryanmansi, raven jain camiodettetimmy, visahl valenzuelasueal lópez. So [facility] [phone].    ATENCIÓN: Si habla español, tiene a gusman disposición servicios gratuitos de asistencia lingüística. Llame al [phone].    We comply with applicable federal civil rights laws and [place] laws. We do not discriminate on the basis of race, color, national origin, age, disability sex, sexual orientation or gender identity.            Thank you!     Thank you for choosing [facility]ARROW TRANSPL[facility]  for your care. Our goal is always to provide you with excellent care. Hearing back from our patients is one way we can continue to improve our services. Please take a few minutes to complete the written survey that you may receive in the mail after your visit with us. Thank you!             Your Updated Medication List - Protect others around you: Learn how to safely use, store and throw away your medicines at www.disposemymeds.org.          This list is accurate as of: 8/25/17  1:01 PM.  Always use your most recent med list.                   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), History of peripheral stem cell transplant (H), Aspergillosis (H)       cholecalciferol 1000 UNITS capsule    vitamin  -D     Take 1 capsule by mouth daily        cyclobenzaprine 5 MG tablet    FLEXERIL     Take 1 tablet (5 mg) by mouth 3 times daily as needed for muscle spasms        diltiazem 360 MG 24 hr CD capsule    CARDIZEM CD; CARTIA XT    90 capsule    Take 1 capsule (360 mg) by mouth daily    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       FLONASE NA      Spray in nostril  as needed        guaiFENesin 600 MG 12 hr tablet    MUCINEX     Take 600 mg by mouth        heparin lock flush 10 UNIT/ML Soln injection     30 vial    5 mLs by Intracatheter route daily In each lumen    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       insulin aspart 100 UNIT/ML injection    NovoLOG FLEXPEN    3 mL    Give insulin based on High sliding scale  Also give prescribed amount before meals based on carbohydrate coverage    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       insulin glargine 100 UNIT/ML injection    LANTUS    3 mL    Inject 10 Units Subcutaneous daily    Type 2 diabetes mellitus without complication, without long-term current use of insulin (H)       insulin pen needle 30G X 8 MM     100 each    Use when delivering insulin as directed.    Type 2 diabetes mellitus without complication, without long-term current use of insulin (H)       LORazepam 0.5 MG tablet    ATIVAN    40 tablet    Take 1-2 tablets (0.5-1 mg) by mouth every 4 hours as needed for anxiety (nausea/vomiting/sleep)    Stem cell transplant candidate       losartan 50 MG tablet    COZAAR    30 tablet    Take 1 tablet (50 mg) by mouth daily    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       magnesium oxide 400 MG tablet    MAG-OX    100 tablet    8 tabs daily    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       MULTI COMPLETE PO           ondansetron 8 MG tablet    ZOFRAN    30 tablet    Take 1 tablet (8 mg) by mouth every 8 hours as needed for nausea    Stem cell transplant candidate       pantoprazole 40 MG EC tablet    PROTONIX    90 tablet    Take 1 tablet (40 mg) by mouth daily    Stem cell transplant candidate       psyllium Packet    METAMUCIL/KONSYL    90 packet    Take 1 packet by mouth 3 times daily    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       sulfamethoxazole-trimethoprim 800-160 MG per tablet    BACTRIM DS/SEPTRA DS    30 tablet    Clinic will tell you when to start 1 tablet twice daily by mouth on  Mondays and Tuesdays, start around day +28    Stem cell transplant candidate       tacrolimus 0.5 MG capsule    GENERIC EQUIVALENT    56 capsule    Take 2 capsules (1 mg) by mouth 2 times daily    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

## 2017-08-25 NOTE — NURSING NOTE
Chief Complaint   Patient presents with     Blood Draw     DRESSING CHANGE NEEDED caps changed and NS /heparin flushes done-vitals taken and pt checked in for next appt.      Alla Shannon

## 2017-08-25 NOTE — NURSING NOTE
"Oncology Rooming Note    August 25, 2017 11:48 AM   Norman Real is a 56 year old male who presents for:    Chief Complaint   Patient presents with     Blood Draw     DRESSING CHANGE NEEDED caps changed and NS /heparin flushes done-vitals taken and pt checked in for next appt.      RECHECK     AML- day 21 & 28 review     Initial Vitals: /89  Pulse 89  Temp 97.4  F (36.3  C) (Oral)  Wt 92.3 kg (203 lb 8 oz)  SpO2 98%  BMI 30.84 kg/m2 Estimated body mass index is 30.84 kg/(m^2) as calculated from the following:    Height as of 8/22/17: 1.73 m (5' 8.11\").    Weight as of this encounter: 92.3 kg (203 lb 8 oz). Body surface area is 2.11 meters squared.  No Pain (0) Comment: Data Unavailable   No LMP for male patient.  Allergies reviewed: Yes  Medications reviewed: Yes    Medications: Medication refills not needed today.  Pharm[facility] entered into [facility]: [id number] [address]     Clinical concerns: Dressing changed today, see flow sheet. Administered Pentamidine 300mcg, two puffs Albuterol prior, see MAR.    6 minutes for nursing intake (face to face time)     SATISH WOODARD CMA              "

## 2017-08-25 NOTE — PROGRESS NOTES
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 Erna MD          INTERVAL  HISTORY      HPI: Norman is feeling good.  Resolving small oral sore. Eating well with only occasional nausea.  No emesis or diarrhea.  Afebrile with no cough, congestion or dyspnea.  No bleeding or rash.  Mild swelling unchanged.    Review of Systems: 10 point ROS negative except as noted above.     PHYSICAL EXAM   /89  Pulse 89  Temp 97.4  F (36.3  C) (Oral)  Wt 92.3 kg (203 lb 8 oz)  SpO2 98%  BMI 30.84 kg/m2    General: NAD, interactive, appropriate   Eyes: SAMSON, sclera anicteric   Nose/Mouth/Throat: Small oral ulcer to L buccal mucosa along the bite line   Lungs: CTA bilaterally  Cardiovascular: RRR, no M/R/G   Abdominal/Rectal: +BS, soft, NT, ND, No HSM   Lymphatics: trace ankle edema bilaterally  Skin: no rashes or petechaie.  Small skin tags to palmar surfaces  Neuro: A&O   Additional Findings: Cooper site NT, no drainage.     LABS AND IMAGING - PAST 24 HOURS      Lab Results   Component Value Date    WBC 3.0 (L) 08/25/2017    ANEU 1.4 (L) 08/22/2017    HGB 11.2 (L) 08/25/2017    HCT 32.5 (L) 08/25/2017     (L) 08/25/2017     08/22/2017    POTASSIUM 3.7 08/22/2017    CHLORIDE 105 08/22/2017    CO2 23 08/22/2017     (H) 08/22/2017    BUN 11 08/22/2017    CR 0.98 08/22/2017    MAG 1.5 (L) 08/22/2017    INR 0.96 07/31/2017    BILITOTAL 0.6 08/22/2017    AST 20 08/22/2017    ALT 24 08/22/2017    ALKPHOS 72 08/22/2017    PROTTOTAL 6.9 08/22/2017    ALBUMIN 3.5 08/22/2017        ASSESSMENT BY SYSTEMS      Norman Real is a 56 year old male with AML, currently day + 30 for NMA allo sib PBSCT without ATG under  protocol 2015-32. He received additional stem cells from donor 7/27.        1. BMT: Completed prep with cytoxan, fludarabine, & TBI. Transplant 7/27. Initial stem cell product only contained 2.33 CD34/kg, additional 0.66 CD34/kg on 7/27 for a total of 2.99.   - preliminary BMBX results show no increase in blasts.  Flow, cyto & chimerism still pending.     2. AML: in CR with 98% donor (8/15/17)    3. HEME: Keep Hgb>8 and plts>10K. No pre-meds.  - White count trending down since stopping GCSF.  No need for a dose today    4.  ID:  Afebrile. No active infections.  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   8/18 CMV=neg.  -  Hold Bactrim due to dropping WBC & plan to give a dose of Pentamadine on 8/25.  No allergy to bactrim.      5.  GI: No vomiting or diarrhea.    - Mild nausea: Taking Zofran and ativan PRN, symptoms minimal   - Protonix for GI prophy.   - Ursodiol for VOD prophy  - s/p left sided colectomy for recurrent diverticulitis.        6.  GVH: No aGVH. stop MMF today 8/25 per protocol  - Cont MMF through D+30  - On Tacrolimus: Taking 1 mg BID, level 11 on 8/15.     7.  FEN/Renal: Cr stable and normal Mg today.   - On oral mag to 800mg QID.       8. CV: Adequat BP control on losartan 50 mg and diltiazem  mg.  - Hx tachycardia with arrhythmia, s/p 3 ablations  - hold crestor through transplant      9. Endo:   - Hx DM type II.  Current DM Regimen: Lantus 10 units qam, carb coverage (5 units per meal, 2-3 units per snack, 4 units with Ensure), sliding scale with meals and bedtime. .  - Hold metformin 500mg/d      10. : Hx prostate cancer.     11. Neuro: History of chronic insomnia. Ambien to 5-10 mg QHS PRN      12. Pulm: Hx spiculated pulm nodule (concern for malignancy w/ hx tobacco use) and GGO (concerning for fungal PNA). F/u CT  8/14 improved.  Cont Vfend 200mg bid     Plan:  - Discontinue MMF  - Do not start bactrim for 4 weeks; pentamidine today 8/25  - Monitor, nausea, vomiting, diarrhea, skin rashes and jaundice  - Considering for ZLO127 study; however lives 4h away  - RTC Erna on 9/1 with labs  - RTC BMT DOM/NP/PA on 8/29 with labs  - hold tacrolimus and bring pill to clinic with you on 8/29

## 2017-08-25 NOTE — PATIENT INSTRUCTIONS
- Discontinue MMF  - Do not start bactrim for 4 weeks; pentamidine today 8/25  - Monitor, nausea, vomiting, diarrhea, skin rashes and jaundice  - Considering for ZRS806 study; however lives 4h away  - RTC Erna on 9/1 with labs  - RTC BMT DOM/NP/PA on 8/29 with labs  - hold tacrolimus and bring pill to clinic with you on 8/29

## 2017-08-29 NOTE — MR AVS SNAPSHOT
After Visit Summary   8/29/2017    Norman Real    MRN: [id number]           Patient Information     Date Of Birth          1960        Visit Information        Provider Department      8/29/2017 10:30 AM  BMT MIMA #2 [facility] Blood and Marrow Transplant        Today's Diagnoses     Acute myeloid leukemia in remission (H)    -  1    History of peripheral stem cell transplant (H)        Aspergillosis (H)              [facility] ([facility])  [address] [place] [id number]  Phone: [phone]  Clinic Hours:   Monday-Thursday:7am to 7pm   Friday: 7am to 5pm   Weekends and holidays:    8am to noon (in general)  If your fever is 100.5  or greater,   call the clinic.  After hours call the   hospital at [phone] or   [phone]. Ask for the BMT   fellow on-call            Follow-ups after your visit        Your next 10 appointments already scheduled     Sep 01, 2017 11:30 AM CDT   [facility] Draw with  [facility]RAW   [facility] Draw ([facility])    [address] [place] [id number]   [phone]            Sep 01, 2017 12:00 PM CDT   Return with Charanjit Umanzor MD   [facility] Blood and Marrow Transplant ([facility])    [address] [place] [address]   [phone]            Sep 05, 2017 10:30 AM CDT   [facility] Lab Draw with  [facility] DRAW   [facility] Draw ([facility])    [address] [place] [address]   [phone]            Sep 05, 2017 11:00 AM CDT   BMT Anniversary Visit with  BMT MIMA #1   [facility] and Marrow Transplant ([facility])    [address] [place] [address]   [phone]            Sep 12, 2017 10:30 AM CDT   [facility] Lab Draw with  [facility] LAB DRAW   [facility] Draw ([facility])    [address] [place] [address]   [phone]            Sep 12, 2017 11:00 AM CDT   BMT Anniversary Visit with  BMT MIMA #1   [facility] Blood and Marrow Transplant ([facility])    [address] [place] [phone]   [phone]            Sep 22, 2017 10:00 AM CDT   [facility] Lab Draw with  [facility] LAB DRAW   [facility] Draw ([facility])    [address] [place] [address]   [phone]            Sep 22, 2017 10:30 AM CDT   BMT Anniversary Visit with Charanjit Umanzor MD   [facility] Blood and Marrow Transplant ([facility])    [address] [place] [phone]   [phone]            Sep 26, 2017 10:30 AM CDT   [facility] Lab Draw with  [facility] LAB DRAW   [facility] Lab Draw ([facility])    [address] [place] [address]   [phone]            Sep 26, 2017 11:00 AM CDT   BMT Anniversary Visit with  BMT MIMA #1   [facility] Blood and Marrow Transplant ([facility])    [address] [place] [address]   [phone]              Who to contact     If you have questions or need follow up information about today's clinic visit or your schedule please contact [facility] BLOOD AND MARROW TRANSPLANT directly at [phone].  Normal or non-critical lab and imaging results will be communicated to you by MyChart, letter or phone within 4 business days after the clinic has received the results. If you do not hear from us within 7 days, please contact the clinic through MyChart or phone. If you have a critical or abnormal lab result, we will notify you by phone as soon as possible.  Submit refill requests through [employer] or call your pharmacy and they will forward the refill request to us. Please  "allow 3 business days for your refill to be completed.          Additional Information About Your Visit        MyChart Information     [employer] lets you send messages to your doctor, view your test results, renew your prescriptions, schedule appointments and more. To sign up, go to [email or web address] . Click on \"Log in\" on the left side of the screen, which will take you to the Welcome page. Then click on \"Sign up Now\" on the right side of the page.     You will be asked to enter the access code listed below, as well as some personal information. Please follow the directions to create your username and password.     Your access code is: [email or web address]  Expires: 2017  3:48 PM     Your access code will  in 90 days. If you need help or a new code, please call your [facility] clinic or [phone].        Care EveryWhere ID     This is your Care EveryWhere ID. This could be used by other organizations to access your [facility] medical records  [id number]        Your Vitals Were     Pulse Temperature Respirations Pulse Oximetry BMI (Body Mass Index)       79 97.8  F (36.6  C) 18 97% 30.81 kg/m2        Blood Pressure from Last 3 Encounters:   17 133/85   17 146/89   17 151/81    Weight from Last 3 Encounters:   17 92.2 kg (203 lb 4.8 oz)   17 92.3 kg (203 lb 8 oz)   17 92.5 kg (203 lb 14.4 oz)              We Performed the Following     Basic metabolic panel     CBC with platelets differential     Magnesium     Tacrolimus level          Today's Medication Changes          These changes are accurate as of: 17 11:11 AM.  If you have any questions, ask your nurse or doctor.               These medicines have changed or have updated prescriptions.        Dose/Directions    magnesium oxide 400 MG tablet   Commonly known as:  MAG-OX   This may have changed:    - how much to take  - additional instructions   Used for:  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        8 " tabs daily   Quantity:  100 tablet   Refills:  1                Recent Review Flowsheet Data     BMT Recent Results Latest Ref Rng & Units 8/11/2017 8/15/2017 8/16/2017 8/18/2017 8/22/2017 8/25/2017 8/29/2017    WBC 4.0 - 11.0 10e9/L 4.1 2.4(L) 3.4(L) 3.3(L) 2.8(L) 3.0(L) 3.4(L)    Hemoglobin 13.3 - 17.7 g/dL 10.9(L) 11.2(L) 11.1(L) 11.3(L) 11.4(L) 11.2(L) 10.8(L)    Platelet Count 150 - 450 10e9/L 156 152 144(L) 135(L) 134(L) 142(L) 132(L)    Neutrophils (Absolute) 1.6 - 8.3 10e9/L 2.4 1.0(L) 2.3 2.0 1.4(L) 1.4(L) 1.9    INR 0.86 - 1.14 - - - - - - -    Sodium 133 - 144 mmol/L 138 137 137 137 139 138 138    Potassium 3.4 - 5.3 mmol/L 3.9 4.2 4.0 3.9 3.7 4.1 4.0    Chloride 94 - 109 mmol/L 104 104 104 105 105 104 104    Glucose 70 - 99 mg/dL 194(H) 186(H) 160(H) 154(H) 203(H) 160(H) 152(H)    Urea Nitrogen 7 - 30 mg/dL 12 14 12 12 11 11 11    Creatinine 0.66 - 1.25 mg/dL 1.03 0.96 0.94 1.00 0.98 1.02 1.08    Calcium (Total) 8.5 - 10.1 mg/dL 8.6 8.7 8.8 9.0 8.8 8.7 8.8    Protein (Total) 6.8 - 8.8 g/dL - 7.2 - - 6.9 - -    Albumin 3.4 - 5.0 g/dL - 3.4 - - 3.5 - -    Bilirubin (Direct) 0.0 - 0.2 mg/dL - - - - - - -    Alkaline Phosphatase 40 - 150 U/L - 76 - - 72 - -    AST 0 - 45 U/L - 20 - - 20 - -    ALT 0 - 70 U/L - 21 - - 24 - -    MCV 78 - 100 fl 93 92 90 91 91 90 90               Primary Care Provider    Physician No Ref-Primary       No address on file        Equal Access to Services     ASHLYN FLANAGAN : Greg Ratliff, waaxda luqadaha, qaybta kaalmatimmy mchugh, vishal lópez. So [facility] [phone].    ATENCIÓN: Si habla español, tiene a gusman disposición servicios gratuitos de asistencia lingüística. Llame al [phone].    We comply with applicable federal civil rights laws and [place] laws. We do not discriminate on the basis of race, color, national origin, age, disability sex, sexual orientation or gender identity.            Thank you!     Thank you for choosing  [facility] BLOOD AND MARROW TRANSPLANT  for your care. Our goal is always to provide you with excellent care. Hearing back from our patients is one way we can continue to improve our services. Please take a few minutes to complete the written survey that you may receive in the mail after your visit with us. Thank you!             Your Updated Medication List - Protect others around you: Learn how to safely use, store and throw away your medicines at www.disposemymeds.org.          This list is accurate as of: 8/29/17 11:11 AM.  Always use your most recent med list.                   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), History of peripheral stem cell transplant (H), Aspergillosis (H)       cholecalciferol 1000 UNITS capsule    vitamin  -D     Take 1 capsule by mouth daily        cyclobenzaprine 5 MG tablet    FLEXERIL     Take 1 tablet (5 mg) by mouth 3 times daily as needed for muscle spasms        diltiazem 360 MG 24 hr CD capsule    CARDIZEM CD; CARTIA XT    90 capsule    Take 1 capsule (360 mg) by mouth daily    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       FLONASE NA      Spray in nostril as needed        guaiFENesin 600 MG 12 hr tablet    MUCINEX     Take 600 mg by mouth        heparin lock flush 10 UNIT/ML Soln injection     30 vial    5 mLs by Intracatheter route daily In each lumen    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       insulin aspart 100 UNIT/ML injection    NovoLOG FLEXPEN    3 mL    Give insulin based on High sliding scale  Also give prescribed amount before meals based on carbohydrate coverage    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       insulin glargine 100 UNIT/ML injection    LANTUS    3 mL    Inject 10 Units Subcutaneous daily    Type 2 diabetes mellitus without complication,  without long-term current use of insulin (H)       insulin pen needle 30G X 8 MM     100 each    Use when delivering insulin as directed.    Type 2 diabetes mellitus without complication, without long-term current use of insulin (H)       LORazepam 0.5 MG tablet    ATIVAN    40 tablet    Take 1-2 tablets (0.5-1 mg) by mouth every 4 hours as needed for anxiety (nausea/vomiting/sleep)    Stem cell transplant candidate       losartan 50 MG tablet    COZAAR    30 tablet    Take 1 tablet (50 mg) by mouth daily    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       magnesium oxide 400 MG tablet    MAG-OX    100 tablet    8 tabs daily    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       MULTI COMPLETE PO           ondansetron 8 MG tablet    ZOFRAN    30 tablet    Take 1 tablet (8 mg) by mouth every 8 hours as needed for nausea    Stem cell transplant candidate       pantoprazole 40 MG EC tablet    PROTONIX    90 tablet    Take 1 tablet (40 mg) by mouth daily    Stem cell transplant candidate       psyllium Packet    METAMUCIL/KONSYL    90 packet    Take 1 packet by mouth 3 times daily    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       sulfamethoxazole-trimethoprim 800-160 MG per tablet    BACTRIM DS/SEPTRA DS    30 tablet    Clinic will tell you when to start 1 tablet twice daily by mouth on Mondays and Tuesdays, start around day +28    Stem cell transplant candidate       tacrolimus 0.5 MG capsule    GENERIC EQUIVALENT    56 capsule    Take 2 capsules (1 mg) by mouth 2 times daily    Acute myeloid leukemia in remission (H)       ursodiol 300 MG capsule    ACTIGALL    90 capsule    Take 1 capsule (300 mg) by mouth 3 times daily    Stem cell transplant candidate       voriconazole 200 MG tablet    VFEND    60 tablet    Take 1 tablet (200 mg) by mouth 2 times daily    Stem cell transplant candidate       zolpidem 5 MG tablet    AMBIEN    45 tablet    Take 1-2 tablets (5-10 mg) by mouth nightly as needed for sleep     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

## 2017-08-29 NOTE — NURSING NOTE
"Oncology Rooming Note    August 29, 2017 10:47 AM   Norman Real is a 56 year old male who presents for:    Chief Complaint   Patient presents with     Blood Draw     VS done, labs collected via CVC.  Both lumens heparin locked with + blood return, hx AML post transplant.     RECHECK     Pt is S/P bMT for AML--here for labs and to see NP     Initial Vitals: /85 (BP Location: Right arm, Patient Position: Chair, Cuff Size: Adult Regular)  Pulse 79  Temp 97.8  F (36.6  C)  Resp 18  Wt 92.2 kg (203 lb 4.8 oz)  SpO2 97%  BMI 30.81 kg/m2 Estimated body mass index is 30.81 kg/(m^2) as calculated from the following:    Height as of 8/22/17: 1.73 m (5' 8.11\").    Weight as of this encounter: 92.2 kg (203 lb 4.8 oz). Body surface area is 2.1 meters squared.  No Pain (0) Comment: Data Unavailable   No LMP for male patient.  Allergies reviewed: Yes  Medications reviewed: Yes    Medications: Medication refills not needed today.  Pharmacy name entered into [employer]: [id number] - [place], [place] - [address] [place] [address]    Clinical concerns: none     6 minutes for nursing intake (face to face time)     Madina Caldwell MA              "

## 2017-08-29 NOTE — NURSING NOTE
Chief Complaint   Patient presents with     Blood Draw     VS done, labs collected via CVC.  Both lumens heparin locked with + blood return, hx AML post transplant.

## 2017-08-29 NOTE — PROGRESS NOTES
BMT Clinic Note      Patient ID:  Norman Real is a 56 year old male, currently day + 34 s/p NMA allo sib PBSCT for AML       Diagnosis AMLU Acute myelogeneous leukemia, Unknown  HCT Type Allogeneic    Prep Regimen Cytoxan  Fludarabine  TBI   Donor Source Related PBSC    GVHD Prophylaxis   Mycophenolate  Primary BMT Provider Dr. Erna MD          INTERVAL  HISTORY      HPI: Norman is feeling good.  No fever.  Intermittent, mild nausea after taking his AM meds.  No vomiting.  No diarrhea.  Afebrile with no cough, congestion or dyspnea.  No bleeding or rash.    Review of Systems: 10 point ROS negative except as noted above.     PHYSICAL EXAM   /85 (BP Location: Right arm, Patient Position: Chair, Cuff Size: Adult Regular)  Pulse 79  Temp 97.8  F (36.6  C)  Resp 18  Wt 92.2 kg (203 lb 4.8 oz)  SpO2 97%  BMI 30.81 kg/m2    General: NAD, interactive, appropriate   Eyes: SAMSON, sclera anicteric   Nose/Mouth/Throat: no oral lesions.  MMM  Lungs: CTA bilaterally  Cardiovascular: RRR, no M/R/G   Abdominal/Rectal: +BS, soft, NT, ND, No HSM   Lymphatics: trace ankle edema bilaterally  Skin: no rashes or petechaie.  Small skin tags to palmar surfaces  Neuro: A&O   Additional Findings: Cooper site NT, no drainage.     LABS AND IMAGING - PAST 24 HOURS      Lab Results   Component Value Date    WBC 3.0 (L) 08/25/2017    ANEU 1.4 (L) 08/25/2017    HGB 11.2 (L) 08/25/2017    HCT 32.5 (L) 08/25/2017     (L) 08/25/2017     08/25/2017    POTASSIUM 4.1 08/25/2017    CHLORIDE 104 08/25/2017    CO2 25 08/25/2017     (H) 08/25/2017    BUN 11 08/25/2017    CR 1.02 08/25/2017    MAG 1.5 (L) 08/25/2017    INR 0.96 07/31/2017    BILITOTAL 0.6 08/22/2017    AST 20 08/22/2017    ALT 24 08/22/2017    ALKPHOS 72 08/22/2017    PROTTOTAL 6.9 08/22/2017    ALBUMIN 3.5 08/22/2017        ASSESSMENT BY SYSTEMS      Norman Real is a 56 year old male with AML, currently day + 34 for NMA allo sib PBSCT  without ATG under protocol 2015-32. He received additional stem cells from donor 7/27.        1. BMT: Completed prep with cytoxan, fludarabine, & TBI. Transplant 7/27. Initial stem cell product only contained 2.33 CD34/kg, additional 0.66 CD34/kg on 7/27 for a total of 2.99.   - preliminary BMBX results show no increase in blasts.  Flow neg, chimerism 98% donor.  Cytogenetics pending.    2. AML: in CR with 98% donor (8/15/17)  - 8/25 chimerism:  100% donor (CD33/66b+(Myeloid) FRACTION), 80% CD3 fraction.    3. HEME: Keep Hgb>8 and plts>10K. No pre-meds.  - White count trending down since stopping GCSF.  No need for a dose today    4.  ID:  Afebrile. No active infections.  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   8/22 CMV=neg.  - Hold Bactrim due to dropping WBC & gave Pentamidine on 8/25.  No allergy to bactrim.      5.  GI: No vomiting or diarrhea.    - Mild nausea: Taking Zofran and ativan PRN, symptoms minimal   - Protonix for GI prophy.   - Ursodiol for VOD prophy  - s/p left sided colectomy for recurrent diverticulitis.        6.  GVH: No aGVH. stop MMF today 8/25 per protocol  - D/eddie MMF at Day +30  - On Tacrolimus: Taking 1 mg BID, level 11 on 8/15; pending from today    7.  FEN/Renal: Cr stable and normal Mg today.   - On oral mag to 800mg QID.       8. CV: Adequate BP control on losartan 50 mg and diltiazem  mg.  - Hx tachycardia with arrhythmia, s/p 3 ablations  - hold crestor through transplant      9. Endo:   - Hx DM type II.  Current DM Regimen: Lantus 10 units qam, carb coverage (5 units per meal, 2-3 units per snack, 4 units with Ensure), sliding scale with meals and bedtime. .  - Hold metformin 500mg/d      10. : Hx prostate cancer.     11. Neuro: History of chronic insomnia. Ambien to 5-10 mg QHS PRN      12. Pulm: Hx  spiculated pulm nodule (concern for malignancy w/ hx tobacco use) and GGO (concerning for fungal PNA). F/u CT 8/14 improved.  Cont Vfend 200mg bid     Plan:  - Considering for LAZ516 study; however lives 4h away  - RTC Erna on 9/1 with labs    Irma Templeton pa-c  [phone]

## 2017-09-01 NOTE — NURSING NOTE
Chief Complaint   Patient presents with     Blood Draw     labs drawn      /84 (BP Location: Right arm, Patient Position: Chair, Cuff Size: Adult Regular)  Pulse 85  Temp 97.6  F (36.4  C) (Oral)  Wt 92.2 kg (203 lb 4.8 oz)  SpO2 98%  BMI 30.81 kg/m2    Vitals taken. Lines accessed by RN. Labs collected through red lumen and sent. Both caps changed, lines flushed with NS & Heparin. Pt tolerated well. Pt checked in for next appointment.    Soledad Villafuerte RN

## 2017-09-01 NOTE — MR AVS SNAPSHOT
After Visit Summary   9/1/2017    Norman Real    MRN: [id number]           Patient Information     Date Of Birth          1960        Visit Information        Provider Department      9/1/2017 12:00 PM Charanjit Umanzor MD [facility] Blood and Marrow Transplant        Today's Diagnoses     Acute myeloid leukemia in remission (H)              [facility] ([facility]  [address] [place] [id number]  Phone: [phone]  Clinic Hours:   Monday-Thursday:7am to 7pm   Friday: 7am to 5pm   Weekends and holidays:    8am to noon (in general)  If your fever is 100.5  or greater,   call the clinic.  After hours call the   hospital at [phone] or   [phone]. Ask for the BMT   fellow on-call            Follow-ups after your visit        Your next 10 appointments already scheduled     Sep 05, 2017 10:30 AM CDT   [facility] Lab Draw with  [facility] DRAW   [facility] Lab Draw ([facility])    [address] [place] [id number]   [phone]            Sep 05, 2017 11:00 AM CDT   BMT Anniversary Visit with  BMT MIMA #1   [facility] Blood and Marrow Transplant ([facility])    [address] [place] [id number]   [phone]            Sep 08, 2017 10:30 AM CDT   [facility] Lab Draw with [facility] DRAW   [facility] Draw ([facility])    [address] [place] [address]   [phone]            Sep 08, 2017 11:00 AM CDT   Return with Charanjit Umanzor MD   [facility] Blood and Marrow Transplant ([facility])    [address] [place] [id number]   [phone]            Sep 12, 2017 10:30 AM CDT   [facility] Lab Draw with [facility] DRAW   [facility] Draw ([facility])    [address] [place] [address]  [address]   [phone]            Sep 12, 2017 11:00 AM CDT   BMT Anniversary Visit with [facility] BMT MIMA #1   [facility] Blood and Marrow Transplant ([facility])    [address] [address]   [phone]            Sep 22, 2017 10:00 AM CDT   [facility] Lab Draw with  [facility] LAB DRAW   [facility] Lab Draw ([facility])    [address] [address]   [phone]            Sep 22, 2017 10:30 AM CDT   BMT Anniversary Visit with Charanjit Umanzor MD   [facility] Blood and Marrow Transplant ([facility])    [address] [address]   [phone]            Sep 26, 2017 10:30 AM CDT   [facility] Lab Draw with  [facility] LAB DRAW   [facility] Lab Draw ([facility])    [address] [place] [address]   [phone]            Sep 26, 2017 11:00 AM CDT   BMT Anniversary Visit with  BMT MIMA #1   [facility] Blood and Marrow Transplant ([facility])    [address] [place] [address]   [phone]              Future tests that were ordered for you today     Open Future Orders        Priority Expected Expires Ordered    CBC with platelets differential Routine 9/8/2017 9/8/2017 9/1/2017    Magnesium Routine 9/8/2017 9/8/2017 9/1/2017    Basic metabolic panel Routine 9/8/2017 9/8/2017 9/1/2017    CMV DNA quantification Routine 9/5/2017 9/5/2017 9/1/2017    CBC with platelets differential Routine 9/5/2017 9/5/2017 9/1/2017    Magnesium Routine 9/5/2017 9/5/2017 9/1/2017    Comprehensive metabolic panel Routine 9/5/2017 9/5/2017 9/1/2017    Tacrolimus level Routine 9/5/2017 9/5/2017 9/1/2017            Who to contact     If you have questions or need follow up information about today's clinic visit or your schedule please contact [facility]  "AND MARROW TRANSPLANT directly at [phone].  Normal or non-critical lab and imaging results will be communicated to you by [employer], letter or phone within 4 business days after the clinic has received the results. If you do not hear from us within 7 days, please contact the clinic through [employer] or phone. If you have a critical or abnormal lab result, we will notify you by phone as soon as possible.  Submit refill requests through [employer] or call your pharmacy and they will forward the refill request to us. Please allow 3 business days for your refill to be completed.          Additional Information About Your Visit        [employer] Information     [employer] lets you send messages to your doctor, view your test results, renew your prescriptions, schedule appointments and more. To sign up, go to [email or web address] . Click on \"Log in\" on the left side of the screen, which will take you to the Welcome page. Then click on \"Sign up Now\" on the right side of the page.     You will be asked to enter the access code listed below, as well as some personal information. Please follow the directions to create your username and password.     Your access code is: [email or web address]  Expires: 2017  3:48 PM     Your access code will  in 90 days. If you need help or a new code, please call your [facility] clinic or [phone].        Care EveryWhere ID     This is your Care EveryWhere ID. This could be used by other organizations to access your [facility] medical records  [id number]        Your Vitals Were     Pulse Temperature Pulse Oximetry BMI (Body Mass Index)          85 97.6  F (36.4  C) (Oral) 98% 30.81 kg/m2         Blood Pressure from Last 3 Encounters:   17 152/84   17 133/85   17 146/89    Weight from Last 3 Encounters:   17 92.2 kg (203 lb 4.8 oz)   17 92.2 kg (203 lb 4.8 oz)   17 92.3 kg (203 lb 8 oz)              We Performed the Following     CBC with platelets " differential     Comprehensive metabolic panel     Magnesium          Today's Medication Changes          These changes are accurate as of: 9/1/17  1:13 PM.  If you have any questions, ask your nurse or doctor.               These medicines have changed or have updated prescriptions.        Dose/Directions    magnesium oxide 400 MG tablet   Commonly known as:  MAG-OX   This may have changed:    - how much to take  - additional instructions   Used for:  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        8 tabs daily   Quantity:  100 tablet   Refills:  1                Recent Review Flowsheet Data     BMT Recent Results Latest Ref Rng & Units 8/15/2017 8/16/2017 8/18/2017 8/22/2017 8/25/2017 8/29/2017 9/1/2017    WBC 4.0 - 11.0 10e9/L 2.4(L) 3.4(L) 3.3(L) 2.8(L) 3.0(L) 3.4(L) 3.1(L)    Hemoglobin 13.3 - 17.7 g/dL 11.2(L) 11.1(L) 11.3(L) 11.4(L) 11.2(L) 10.8(L) 10.4(L)    Platelet Count 150 - 450 10e9/L 152 144(L) 135(L) 134(L) 142(L) 132(L) 135(L)    Neutrophils (Absolute) 1.6 - 8.3 10e9/L 1.0(L) 2.3 2.0 1.4(L) 1.4(L) 1.9 1.7    INR 0.86 - 1.14 - - - - - - -    Sodium 133 - 144 mmol/L 137 137 137 139 138 138 138    Potassium 3.4 - 5.3 mmol/L 4.2 4.0 3.9 3.7 4.1 4.0 3.9    Chloride 94 - 109 mmol/L 104 104 105 105 104 104 104    Glucose 70 - 99 mg/dL 186(H) 160(H) 154(H) 203(H) 160(H) 152(H) 168(H)    Urea Nitrogen 7 - 30 mg/dL 14 12 12 11 11 11 12    Creatinine 0.66 - 1.25 mg/dL 0.96 0.94 1.00 0.98 1.02 1.08 1.16    Calcium (Total) 8.5 - 10.1 mg/dL 8.7 8.8 9.0 8.8 8.7 8.8 8.6    Protein (Total) 6.8 - 8.8 g/dL 7.2 - - 6.9 - - 6.8    Albumin 3.4 - 5.0 g/dL 3.4 - - 3.5 - - 3.5    Bilirubin (Direct) 0.0 - 0.2 mg/dL - - - - - - -    Alkaline Phosphatase 40 - 150 U/L 76 - - 72 - - 75    AST 0 - 45 U/L 20 - - 20 - - 26    ALT 0 - 70 U/L 21 - - 24 - - 32    MCV 78 - 100 fl 92 90 91 91 90 90 90               Primary Care Provider    Physician No Ref-Primary       No address on file        Equal Access to Services     ASHLYN  GAAR : Hadii aad ku keyon Ratliff, waaxda luqadaha, qaybta kacarylda lolita, vishal mark katnorah tena yovanaal albright . So [facility] [phone].    ATENCIÓN: Si habla francisco javier, tiene a gusman disposición servicios gratuitos de asistencia lingüística. Llame al [phone].    We comply with applicable federal civil rights laws and [place] laws. We do not discriminate on the basis of race, color, national origin, age, disability sex, sexual orientation or gender identity.            Thank you!     Thank you for choosing [facility] BLOOD [facility] MARROW TRANSPLANT  for your care. Our goal is always to provide you with excellent care. Hearing back from our patients is one way we can continue to improve our services. Please take a few minutes to complete the written survey that you may receive in the mail after your visit with us. Thank you!             Your Updated Medication List - Protect others around you: Learn how to safely use, store and throw away your medicines at www.disposemymeds.org.          This list is accurate as of: 9/1/17  1:13 PM.  Always use your most recent med list.                   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), History of peripheral stem cell transplant (H), Aspergillosis (H)       cholecalciferol 1000 UNITS capsule    vitamin  -D     Take 1 capsule by mouth daily        cyclobenzaprine 5 MG tablet    FLEXERIL     Take 1 tablet (5 mg) by mouth 3 times daily as needed for muscle spasms        diltiazem 360 MG 24 hr CD capsule    CARDIZEM CD; CARTIA XT    90 capsule    Take 1 capsule (360 mg) by mouth daily    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       FLONASE NA      Spray in nostril as needed        guaiFENesin 600 MG 12 hr tablet    MUCINEX     Take 600 mg by mouth        heparin lock flush 10  UNIT/ML Soln injection     30 vial    5 mLs by Intracatheter route daily In each lumen    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       insulin aspart 100 UNIT/ML injection    NovoLOG FLEXPEN    3 mL    Give insulin based on High sliding scale  Also give prescribed amount before meals based on carbohydrate coverage    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       insulin glargine 100 UNIT/ML injection    LANTUS    3 mL    Inject 10 Units Subcutaneous daily    Type 2 diabetes mellitus without complication, without long-term current use of insulin (H)       insulin pen needle 30G X 8 MM     100 each    Use when delivering insulin as directed.    Type 2 diabetes mellitus without complication, without long-term current use of insulin (H)       LORazepam 0.5 MG tablet    ATIVAN    40 tablet    Take 1-2 tablets (0.5-1 mg) by mouth every 4 hours as needed for anxiety (nausea/vomiting/sleep)    Stem cell transplant candidate       losartan 50 MG tablet    COZAAR    30 tablet    Take 1 tablet (50 mg) by mouth daily    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       magnesium oxide 400 MG tablet    MAG-OX    100 tablet    8 tabs daily    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       MULTI COMPLETE PO           ondansetron 8 MG tablet    ZOFRAN    30 tablet    Take 1 tablet (8 mg) by mouth every 8 hours as needed for nausea    Stem cell transplant candidate       pantoprazole 40 MG EC tablet    PROTONIX    90 tablet    Take 1 tablet (40 mg) by mouth daily    Stem cell transplant candidate       psyllium Packet    METAMUCIL/KONSYL    90 packet    Take 1 packet by mouth 3 times daily    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       sulfamethoxazole-trimethoprim 800-160 MG per tablet    BACTRIM DS/SEPTRA DS    30 tablet    Clinic will tell you when to start 1 tablet twice daily by mouth on Mondays and Tuesdays, start around day +28    Stem cell transplant candidate       tacrolimus 0.5 MG capsule     GENERIC EQUIVALENT    56 capsule    Take 2 capsules (1 mg) by mouth 2 times daily    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

## 2017-09-01 NOTE — NURSING NOTE
"Oncology Rooming Note    September 1, 2017 12:08 PM   Norman Real is a 56 year old male who presents for:    Chief Complaint   Patient presents with     Blood Draw     labs drawn      RECHECK     AML     Initial Vitals: /84 (BP Location: Right arm, Patient Position: Chair, Cuff Size: Adult Regular)  Pulse 85  Temp 97.6  F (36.4  C) (Oral)  Wt 92.2 kg (203 lb 4.8 oz)  SpO2 98%  BMI 30.81 kg/m2 Estimated body mass index is 30.81 kg/(m^2) as calculated from the following:    Height as of 8/22/17: 1.73 m (5' 8.11\").    Weight as of this encounter: 92.2 kg (203 lb 4.8 oz). Body surface area is 2.1 meters squared.  No Pain (0) Comment: Data Unavailable   No LMP for male patient.  Allergies reviewed: Yes  Medications reviewed: Yes    Medications: Medication refills not needed today.  Pharmacy name entered into [employer]: [id number] - [place], [place] - [address] [place] [address] AVE     Clinical concerns: Dressing changed today, see flow sheet.    5 minutes for nursing intake (face to face time)     SATISH WOODARD CMA              "

## 2017-09-01 NOTE — PROGRESS NOTES
BMT Clinic Note      Patient ID:  Norman Real is a 56 year old male, currently day + 37 s/p NMA allo sib PBSCT for AML       Diagnosis AMLU Acute myelogeneous leukemia, Unknown  HCT Type Allogeneic    Prep Regimen Cytoxan  Fludarabine  TBI   Donor Source Related PBSC    GVHD Prophylaxis   Mycophenolate  Primary BMT Provider Dr. Erna MD          INTERVAL  HISTORY      HPI: Norman is feeling good.  No fever.  No nausea.  No vomiting.  No diarrhea.  Afebrile with no cough, congestion or dyspnea.  No bleeding or rash.    Review of Systems: 10 point ROS negative except as noted above.     PHYSICAL EXAM   /84 (BP Location: Right arm, Patient Position: Chair, Cuff Size: Adult Regular)  Pulse 85  Temp 97.6  F (36.4  C) (Oral)  Wt 92.2 kg (203 lb 4.8 oz)  SpO2 98%  BMI 30.81 kg/m2    General: NAD, interactive, appropriate   Eyes: SAMSON, sclera anicteric   Nose/Mouth/Throat: no oral lesions.  MMM  Lungs: CTA bilaterally  Cardiovascular: RRR, no M/R/G   Abdominal/Rectal: +BS, soft, NT, ND, No HSM   Lymphatics: trace ankle edema bilaterally  Skin: no rashes or petechaie.  Small skin tags to palmar surfaces  Neuro: A&O   Additional Findings: Cooper site NT, no drainage.     LABS AND IMAGING - PAST 24 HOURS      Lab Results   Component Value Date    WBC 3.1 (L) 09/01/2017    ANEU 1.7 09/01/2017    HGB 10.4 (L) 09/01/2017    HCT 30.7 (L) 09/01/2017     (L) 09/01/2017     09/01/2017    POTASSIUM 3.9 09/01/2017    CHLORIDE 104 09/01/2017    CO2 26 09/01/2017     (H) 09/01/2017    BUN 12 09/01/2017    CR 1.16 09/01/2017    MAG 1.5 (L) 09/01/2017    INR 0.96 07/31/2017    BILITOTAL 0.6 09/01/2017    AST 26 09/01/2017    ALT 32 09/01/2017    ALKPHOS 75 09/01/2017    PROTTOTAL 6.8 09/01/2017    ALBUMIN 3.5 09/01/2017        ASSESSMENT BY SYSTEMS      Norman Real is a 56 year old male with AML, currently day + 37 for 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 He received  additional stem cells from donor 7/27.        1. BMT: Completed prep with cytoxan, fludarabine, & TBI. Transplant 7/27. Initial stem cell product only contained 2.33 CD34/kg, additional 0.66 CD34/kg on 7/27 for a total of 2.99.   - preliminary BMBX results show no increase in blasts.  Flow neg, chimerism 98% donor.     2. AML: in CR with 98% donor BM (8/15/17); 80% donor CD3; 100% donor CD15  - 8/25 chimerism:  100% donor (CD33/66b+(Myeloid) FRACTION), 80% CD3 fraction.    3. HEME: Keep Hgb>8 and plts>10K. No pre-meds.    4.  ID:  Afebrile. No active infections.  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   8/22 CMV=neg.  - Hold Bactrim due to dropping WBC & gave Pentamidine on 8/25.  No allergy to bactrim.      5.  GI: No vomiting or diarrhea.    - Mild nausea: Taking Zofran and ativan PRN, symptoms minimal   - Protonix for GI prophy.   - Ursodiol for VOD prophy  - s/p left sided colectomy for recurrent diverticulitis.        6.  GVH: No aGVH. stopped MMF 8/25. No signs of GVHD.  - D/eddie MMF at Day +30  - On Tacrolimus: Taking 1 mg BID, level 11.9 on 8/29    7.  FEN/Renal: Cr stable and normal Mg today.   - On oral mag to 800mg QID.       8. CV: Adequate BP control on losartan 50 mg and diltiazem  mg.  - Hx tachycardia with arrhythmia, s/p 3 ablations  - hold crestor through transplant      9. Endo:   - Hx DM type II.  Current DM Regimen: Lantus 10 units qam, carb coverage (5 units per meal, 2-3 units per snack, 4 units with Ensure), sliding scale with meals and bedtime. .  - Hold metformin 500mg/d      10. : Hx prostate cancer.     11. Neuro: History of chronic insomnia. Ambien to 5-10 mg QHS PRN      12. Pulm: Hx spiculated pulm nodule (concern for malignancy w/ hx tobacco use) and GGO (concerning for fungal PNA). F/u CT 8/14 improved.   Cont Vfend 200mg bid     Plan:  Considering for [id number] study; sent message to study team  RTC Erna on 9/8 with labs  RTC BMT DOM/NP/PA on 9/5 with labs

## 2017-09-01 NOTE — LETTER
9/1/2017      RE: Norman Real  [facility] [address]       BMT Clinic Note      Patient ID:  Norman Real is a 56 year old male, currently day + 37 s/p NMA allo sib PBSCT for AML       Diagnosis AMLU Acute myelogeneous leukemia, Unknown  HCT Type Allogeneic    Prep Regimen Cytoxan  Fludarabine  TBI   Donor Source Related PBSC    GVHD Prophylaxis   Mycophenolate  Primary BMT Provider Dr. Erna MD          INTERVAL  HISTORY      HPI: Norman is feeling good.  No fever.  No nausea.  No vomiting.  No diarrhea.  Afebrile with no cough, congestion or dyspnea.  No bleeding or rash.    Review of Systems: 10 point ROS negative except as noted above.     PHYSICAL EXAM   /84 (BP Location: Right arm, Patient Position: Chair, Cuff Size: Adult Regular)  Pulse 85  Temp 97.6  F (36.4  C) (Oral)  Wt 92.2 kg (203 lb 4.8 oz)  SpO2 98%  BMI 30.81 kg/m2    General: NAD, interactive, appropriate   Eyes: SAMSON, sclera anicteric   Nose/Mouth/Throat: no oral lesions.  MMM  Lungs: CTA bilaterally  Cardiovascular: RRR, no M/R/G   Abdominal/Rectal: +BS, soft, NT, ND, No HSM   Lymphatics: trace ankle edema bilaterally  Skin: no rashes or petechaie.  Small skin tags to palmar surfaces  Neuro: A&O   Additional Findings: Cooper site NT, no drainage.     LABS AND IMAGING - PAST 24 HOURS      Lab Results   Component Value Date    WBC 3.1 (L) 09/01/2017    ANEU 1.7 09/01/2017    HGB 10.4 (L) 09/01/2017    HCT 30.7 (L) 09/01/2017     (L) 09/01/2017     09/01/2017    POTASSIUM 3.9 09/01/2017    CHLORIDE 104 09/01/2017    CO2 26 09/01/2017     (H) 09/01/2017    BUN 12 09/01/2017    CR 1.16 09/01/2017    MAG 1.5 (L) 09/01/2017    INR 0.96 07/31/2017    BILITOTAL 0.6 09/01/2017    AST 26 09/01/2017    ALT 32 09/01/2017    ALKPHOS 75 09/01/2017    PROTTOTAL 6.8 09/01/2017    ALBUMIN 3.5 09/01/2017        ASSESSMENT BY SYSTEMS      Norman Salazar Real is a 56 year old male with AML, currently day + 37  for 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 He received additional stem cells from donor 7/27.        1. BMT: Completed prep with cytoxan, fludarabine, & TBI. Transplant 7/27. Initial stem cell product only contained 2.33 CD34/kg, additional 0.66 CD34/kg on 7/27 for a total of 2.99.   - preliminary BMBX results show no increase in blasts.  Flow neg, chimerism 98% donor.     2. AML: in CR with 98% donor BM (8/15/17); 80% donor CD3; 100% donor CD15  - 8/25 chimerism:  100% donor (CD33/66b+(Myeloid) FRACTION), 80% CD3 fraction.    3. HEME: Keep Hgb>8 and plts>10K. No pre-meds.    4.  ID:  Afebrile. No active infections.  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   8/22 CMV=neg.  - Hold Bactrim due to dropping WBC & gave Pentamidine on 8/25.  No allergy to bactrim.      5.  GI: No vomiting or diarrhea.    - Mild nausea: Taking Zofran and ativan PRN, symptoms minimal   - Protonix for GI prophy.   - Ursodiol for VOD prophy  - s/p left sided colectomy for recurrent diverticulitis.        6.  GVH: No aGVH. stopped MMF 8/25. No signs of GVHD.  - D/eddie MMF at Day +30  - On Tacrolimus: Taking 1 mg BID, level 11.9 on 8/29    7.  FEN/Renal: Cr stable and normal Mg today.   - On oral mag to 800mg QID.       8. CV: Adequate BP control on losartan 50 mg and diltiazem  mg.  - Hx tachycardia with arrhythmia, s/p 3 ablations  - hold crestor through transplant      9. Endo:   - Hx DM type II.  Current DM Regimen: Lantus 10 units qam, carb coverage (5 units per meal, 2-3 units per snack, 4 units with Ensure), sliding scale with meals and bedtime. .  - Hold metformin 500mg/d      10. : Hx prostate cancer.     11. Neuro: History of chronic insomnia. Ambien to 5-10 mg QHS PRN      12. Pulm: Hx spiculated pulm nodule (concern for malignancy w/ hx  tobacco use) and GGO (concerning for fungal PNA). F/u CT 8/14 improved.  Cont Vfend 200mg bid     Plan:  Considering for EFL056 study; sent message to study team  RTREN Umanzor on 9/8 with labs  RTC BMT DOM/NP/PA on 9/5 with labs        Charanjit Umanzor MD

## 2017-09-05 NOTE — PROGRESS NOTES
BMT Clinic Note      Patient ID:  Norman Real is a 56 year old male, currently day +41 s/p NMA allo sib PBSCT for AML       Diagnosis AMLU Acute myelogeneous leukemia, Unknown  HCT Type Allogeneic    Prep Regimen Cytoxan  Fludarabine  TBI   Donor Source Related PBSC    GVHD Prophylaxis   Mycophenolate  Primary BMT Provider Dr. Erna MD       INTERVAL  HISTORY      HPI: Doing well. No issues.   Review of Systems: 10 point ROS negative except as noted above.     PHYSICAL EXAM   /79 (BP Location: Right arm, Patient Position: Sitting, Cuff Size: Adult Large)  Pulse 78  Temp 97.5  F (36.4  C) (Oral)  Resp 16  Wt 92.9 kg (204 lb 14.4 oz)  SpO2 96%  BMI 31.05 kg/m2    General: NAD, interactive, appropriate   Eyes: SAMSON, sclera anicteric   Nose/Mouth/Throat: no oral lesions.  MMM  Lungs: CTA bilaterally  Cardiovascular: RRR, no M/R/G   Lymphatics: trace ankle edema bilaterally  Skin: no rashes or petechaie.  Small skin tags to palmar surfaces  Neuro: A&O   Additional Findings: Cooper site NT, no drainage.     LABS AND IMAGING - PAST 24 HOURS      Lab Results   Component Value Date    WBC 3.1 (L) 09/05/2017    ANEU 1.6 09/05/2017    HGB 11.0 (L) 09/05/2017    HCT 31.9 (L) 09/05/2017     (L) 09/05/2017     09/01/2017    POTASSIUM 3.9 09/01/2017    CHLORIDE 104 09/01/2017    CO2 26 09/01/2017     (H) 09/01/2017    BUN 12 09/01/2017    CR 1.16 09/01/2017    MAG 1.5 (L) 09/01/2017    INR 0.96 07/31/2017    BILITOTAL 0.6 09/01/2017    AST 26 09/01/2017    ALT 32 09/01/2017    ALKPHOS 75 09/01/2017    PROTTOTAL 6.8 09/01/2017    ALBUMIN 3.5 09/01/2017        ASSESSMENT BY SYSTEMS      Norman Real is a 56 year old male with AML, currently day + 41 for 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 He received additional stem cells from donor 7/27. 1. BMT/AML: Completed prep with cytoxan, fludarabine, & TBI. Transplant 7/27. Initial stem cell product only  contained 2.33 CD34/kg, additional 0.66 CD34/kg on 7/27 for a total of 2.99.    - CR with 98% donor BM (8/15/17); 80% donor CD3; 100% donor CD15  - 8/25 chimerism:  100% donor (CD33/66b+(Myeloid) FRACTION), 80% CD3 fraction.  - Yovana dove RN discussed with patient and wife [id number] trial today and given consents. Can't enroll till earliest day +60. Dr. Umanzor will see him later this week.     3. HEME: Keep Hgb>8 and plts>10K. No pre-meds.    4.  ID:  Afebrile. No active infections.  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 CMV pending.  - Holding Bactrim due to dropping WBC & gave Pentamidine on 8/25.  No allergy to bactrim.      5.  GI: No vomiting or diarrhea.    - Mild nausea: Taking Zofran and ativan PRN, symptoms minimal   - Protonix for GI prophy.   - Ursodiol for VOD prophy  - s/p left sided colectomy for recurrent diverticulitis.        6.  GVH: No aGVH. stopped MMF 8/25. No signs of GVHD.  - D/eddie MMF at Day +30  - On Tacrolimus: Taking 1 mg BID, level pending.    7.  FEN/Renal: Cr and Mg stable.   - On oral mag to 800mg QID.       8. CV: Adequate BP control on losartan 50 mg and diltiazem  mg.  - Hx tachycardia with arrhythmia, s/p 3 ablations  - hold crestor through transplant      9. Endo:   - Hx DM type II.  Current DM Regimen: Lantus 10 units qam. Not using SSI or carb coverage. BG has been WNL. No low BG.  - Hold metformin 500mg/d      10. : Hx prostate cancer.     11. Neuro: History of chronic insomnia. Ambien to 5-10 mg QHS PRN      12. Pulm: Hx spiculated pulm nodule (concern for malignancy w/ hx tobacco use) and GGO (concerning for fungal PNA). F/u CT 8/14 improved.  Cont Vfend 200mg bid     RTC:   As scheduled 9/8 with Dr. Umanzor.     NIKOLAI MosqueraC  #[id number]

## 2017-09-05 NOTE — NURSING NOTE
Chief Complaint   Patient presents with     Blood Draw     labs drawn from cvc by rn.  vs taken.     CVC accessed by RN, labs drawn.  Both lines flushed with saline and heparin.    vs taken.  Pt checked in to next appointment.  Heather Argueta RN

## 2017-09-05 NOTE — MR AVS SNAPSHOT
After Visit Summary   9/5/2017    Norman Real    MRN: [id number]           Patient Information     Date Of Birth          1960        Visit Information        Provider Department      9/5/2017 11:00 AM  BMT MIMA #1 [facility] Blood and Marrow Transplant        Today's Diagnoses     Acute myeloid leukemia in remission (H)              [facility] ([facility])  [address] [place] [id number]  Phone: [phone]  Clinic Hours:   Monday-Thursday:7am to 7pm   Friday: 7am to 5pm   Weekends and holidays:    8am to noon (in general)  If your fever is 100.5  or greater,   call the clinic.  After hours call the   hospital at [phone] or   [phone]. Ask for the BMT   fellow on-call            Follow-ups after your visit        Your next 10 appointments already scheduled     Sep 08, 2017 10:30 AM CDT   [facility] Draw with  [facility]RAW   [facility] Draw ([facility])    [address] [place] [id number] [phone]            Sep 08, 2017 11:00 AM CDT   Return with Charanjit Umanzor MD   [facility] Blood and Marrow Transplant ([facility])    [address] [place] [id number] [phone]            Sep 12, 2017 10:30 AM CDT   [facility] Lab Draw with  [facility] DRAW   [facility] Draw ([facility])    [address] [place] [id number]   [phone]            Sep 12, 2017 11:00 AM CDT   BMT Anniversary Visit with  BMT MIMA #1   [facility] Blood and Marrow Transplant ([facility])    [address] [place] [phone]   [phone]            Sep 22, 2017 10:00 AM CDT   [facility] Draw with [facility]RAW   [facility] Draw ([facility])    [address] [place] [phone]    [phone]            Sep 22, 2017 10:30 AM CDT   BMT Anniversary Visit with Charanjit Umanzor MD   [facility] Blood and Marrow Transplant ([facility])    [address] [facility]  [address] [facility] [address]   [phone]            Sep 26, 2017 10:30 AM CDT   [facility] Lab Draw with  [facility] DRAW   [facility] Lab Draw ([facility])    [address] [id number] [phone]            Sep 26, 2017 11:00 AM CDT   BMT Anniversary Visit with  BMT MIMA #1   [facility] Blood and Marrow Transplant ([facility])    [address] [place] [id number] [phone]            Sep 28, 2017 10:40 AM CDT   (Arrive by 10:25 AM)   NEW DIABETES with Gerald Weiss MD   [facility] Endocrinology ([facility])    [address] [place] [id number] [phone]            Oct 03, 2017 11:00 AM CDT   BMT Anniversary Visit with  BMT MIMA #1   [facility] Blood and Marrow Transplant ([facility])    [address] [place] [id number] [phone]              Who to contact     If you have questions or need follow up information about today's clinic visit or your schedule please contact [facility] BLOOD AND MARROW TRANSPLANT directly at [phone].  Normal or non-critical lab and imaging results will be communicated to you by MyChart, letter or phone within 4 business days after the clinic has received the results. If you do not hear from us within 7 days, please contact the clinic through MyChart or phone. If you have a critical or abnormal lab result, we will notify you by phone as soon as possible.  Submit refill requests through [employer] or call your pharmacy and they will forward the refill request to us. Please allow 3 business days for your refill to be completed.          Additional  "Information About Your Visit        MyChart Information     [employer] lets you send messages to your doctor, view your test results, renew your prescriptions, schedule appointments and more. To sign up, go to [email or web address] . Click on \"Log in\" on the left side of the screen, which will take you to the Welcome page. Then click on \"Sign up Now\" on the right side of the page.     You will be asked to enter the access code listed below, as well as some personal information. Please follow the directions to create your username and password.     Your access code is: [email or web address]  Expires: 2017  3:48 PM     Your access code will  in 90 days. If you need help or a new code, please call your [place] clinic or [phone].        Care EveryWhere ID     This is your Care EveryWhere ID. This could be used by other organizations to access your [facility] medical records  [id number]        Your Vitals Were     Pulse Temperature Respirations Pulse Oximetry BMI (Body Mass Index)       78 97.5  F (36.4  C) (Oral) 16 96% 31.05 kg/m2        Blood Pressure from Last 3 Encounters:   17 137/79   17 152/84   17 133/85    Weight from Last 3 Encounters:   17 92.9 kg (204 lb 14.4 oz)   17 92.2 kg (203 lb 4.8 oz)   17 92.2 kg (203 lb 4.8 oz)              We Performed the Following     CBC with platelets differential     CMV DNA quantification     Comprehensive metabolic panel     Magnesium     Tacrolimus level          Today's Medication Changes          These changes are accurate as of: 17 12:04 PM.  If you have any questions, ask your nurse or doctor.               These medicines have changed or have updated prescriptions.        Dose/Directions    magnesium oxide 400 MG tablet   Commonly known as:  MAG-OX   This may have changed:    - how much to take  - additional instructions   Used for:  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        8 tabs daily   Quantity:  100 tablet "   Refills:  1                Recent Review Flowsheet Data     BMT Recent Results Latest Ref Rng & Units 8/16/2017 8/18/2017 8/22/2017 8/25/2017 8/29/2017 9/1/2017 9/5/2017    WBC 4.0 - 11.0 10e9/L 3.4(L) 3.3(L) 2.8(L) 3.0(L) 3.4(L) 3.1(L) 3.1(L)    Hemoglobin 13.3 - 17.7 g/dL 11.1(L) 11.3(L) 11.4(L) 11.2(L) 10.8(L) 10.4(L) 11.0(L)    Platelet Count 150 - 450 10e9/L 144(L) 135(L) 134(L) 142(L) 132(L) 135(L) 133(L)    Neutrophils (Absolute) 1.6 - 8.3 10e9/L 2.3 2.0 1.4(L) 1.4(L) 1.9 1.7 1.6    INR 0.86 - 1.14 - - - - - - -    Sodium 133 - 144 mmol/L 137 137 139 138 138 138 136    Potassium 3.4 - 5.3 mmol/L 4.0 3.9 3.7 4.1 4.0 3.9 4.0    Chloride 94 - 109 mmol/L 104 105 105 104 104 104 104    Glucose 70 - 99 mg/dL 160(H) 154(H) 203(H) 160(H) 152(H) 168(H) 177(H)    Urea Nitrogen 7 - 30 mg/dL 12 12 11 11 11 12 13    Creatinine 0.66 - 1.25 mg/dL 0.94 1.00 0.98 1.02 1.08 1.16 1.09    Calcium (Total) 8.5 - 10.1 mg/dL 8.8 9.0 8.8 8.7 8.8 8.6 8.3(L)    Protein (Total) 6.8 - 8.8 g/dL - - 6.9 - - 6.8 6.8    Albumin 3.4 - 5.0 g/dL - - 3.5 - - 3.5 3.4    Bilirubin (Direct) 0.0 - 0.2 mg/dL - - - - - - -    Alkaline Phosphatase 40 - 150 U/L - - 72 - - 75 80    AST 0 - 45 U/L - - 20 - - 26 22    ALT 0 - 70 U/L - - 24 - - 32 28    MCV 78 - 100 fl 90 91 91 90 90 90 90               Primary Care Provider    Physician No Ref-Primary       No address on file        Equal Access to Services     ASHLYN FLANAGAN : Hadii sapna Ratliff, waaxda nigel, qaybta kaalmada lolita, vishal albright . So [facility] [phone].    ATENCIÓN: Si habla español, tiene a gusman disposición servicios gratuitos de asistencia lingüística. Leyla al [phone].    We comply with applicable federal civil rights laws and [place] laws. We do not discriminate on the basis of race, color, national origin, age, disability sex, sexual orientation or gender identity.            Thank you!     Thank you for choosing [facility] AND  [facility] TRANSPLANT  for your care. Our goal is always to provide you with excellent care. Hearing back from our patients is one way we can continue to improve our services. Please take a few minutes to complete the written survey that you may receive in the mail after your visit with us. Thank you!             Your Updated Medication List - Protect others around you: Learn how to safely use, store and throw away your medicines at www.disposemymeds.org.          This list is accurate as of: 9/5/17 12:04 PM.  Always use your most recent med list.                   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), History of peripheral stem cell transplant (H), Aspergillosis (H)       cholecalciferol 1000 UNITS capsule    vitamin  -D     Take 1 capsule by mouth daily        cyclobenzaprine 5 MG tablet    FLEXERIL     Take 1 tablet (5 mg) by mouth 3 times daily as needed for muscle spasms        diltiazem 360 MG 24 hr CD capsule    CARDIZEM CD; CARTIA XT    90 capsule    Take 1 capsule (360 mg) by mouth daily    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       FLONASE NA      Spray in nostril as needed        guaiFENesin 600 MG 12 hr tablet    MUCINEX     Take 600 mg by mouth        heparin lock flush 10 UNIT/ML Soln injection     30 vial    5 mLs by Intracatheter route daily In each lumen    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       insulin aspart 100 UNIT/ML injection    NovoLOG FLEXPEN    3 mL    Give insulin based on High sliding scale  Also give prescribed amount before meals based on carbohydrate coverage    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       insulin glargine 100 UNIT/ML injection    LANTUS    3 mL    Inject 10 Units Subcutaneous daily    Type 2 diabetes mellitus without complication, without long-term current  use of insulin (H)       insulin pen needle 30G X 8 MM     100 each    Use when delivering insulin as directed.    Type 2 diabetes mellitus without complication, without long-term current use of insulin (H)       LORazepam 0.5 MG tablet    ATIVAN    40 tablet    Take 1-2 tablets (0.5-1 mg) by mouth every 4 hours as needed for anxiety (nausea/vomiting/sleep)    Stem cell transplant candidate       losartan 50 MG tablet    COZAAR    30 tablet    Take 1 tablet (50 mg) by mouth daily    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       magnesium oxide 400 MG tablet    MAG-OX    100 tablet    8 tabs daily    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       MULTI COMPLETE PO           ondansetron 8 MG tablet    ZOFRAN    30 tablet    Take 1 tablet (8 mg) by mouth every 8 hours as needed for nausea    Stem cell transplant candidate       pantoprazole 40 MG EC tablet    PROTONIX    90 tablet    Take 1 tablet (40 mg) by mouth daily    Stem cell transplant candidate       psyllium Packet    METAMUCIL/KONSYL    90 packet    Take 1 packet by mouth 3 times daily    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       sulfamethoxazole-trimethoprim 800-160 MG per tablet    BACTRIM DS/SEPTRA DS    30 tablet    Clinic will tell you when to start 1 tablet twice daily by mouth on Mondays and Tuesdays, start around day +28    Stem cell transplant candidate       tacrolimus 0.5 MG capsule    GENERIC EQUIVALENT    56 capsule    Take 2 capsules (1 mg) by mouth 2 times daily    Acute myeloid leukemia in remission (H)       ursodiol 300 MG capsule    ACTIGALL    90 capsule    Take 1 capsule (300 mg) by mouth 3 times daily    Stem cell transplant candidate       voriconazole 200 MG tablet    VFEND    60 tablet    Take 1 tablet (200 mg) by mouth 2 times daily    Stem cell transplant candidate       zolpidem 5 MG tablet    AMBIEN    45 tablet    Take 1-2 tablets (5-10 mg) by mouth nightly as needed for sleep    AML (acute myeloid leukemia)  in remission (H)

## 2017-09-05 NOTE — NURSING NOTE
"Oncology Rooming Note    September 5, 2017 11:10 AM   Norman Real is a 56 year old male who presents for:    Chief Complaint   Patient presents with     Blood Draw     labs drawn from cvc by rn.  vs taken.     RECHECK     Post BMTfor AML here for labs and provider     Initial Vitals: /79 (BP Location: Right arm, Patient Position: Sitting, Cuff Size: Adult Large)  Pulse 78  Temp 97.5  F (36.4  C) (Oral)  Resp 16  Wt 92.9 kg (204 lb 14.4 oz)  SpO2 96%  BMI 31.05 kg/m2 Estimated body mass index is 31.05 kg/(m^2) as calculated from the following:    Height as of 8/22/17: 1.73 m (5' 8.11\").    Weight as of this encounter: 92.9 kg (204 lb 14.4 oz). Body surface area is 2.11 meters squared.  No Pain (0) Comment: Data Unavailable   No LMP for male patient.  Allergies reviewed: Yes  Medications reviewed: Yes    Medications: Medication refills not needed today.  Pharmacy name entered into [employer]: [id number] - [place], [place] - [address]    Clinical concerns: NA NA was NOT notified.    6 minutes for nursing intake (face to face time)     Marsha De CMA              "

## 2017-09-08 NOTE — PATIENT INSTRUCTIONS
LINDSEY Umanzor on 9/13, 9/20 and 9/27 with labs; tacrolimus level each visit  Cancel visit 9/12 and 9/22  Schedule CT chest w/o contrast prior to next visit

## 2017-09-08 NOTE — NURSING NOTE
Chief Complaint   Patient presents with     Blood Draw     labs drawn     /84 (BP Location: Right arm, Patient Position: Chair, Cuff Size: Adult Regular)  Pulse 74  Temp 97.9  F (36.6  C) (Oral)  Wt 92.1 kg (203 lb)  SpO2 98%  BMI 30.77 kg/m2    Vitals taken. Lines accessed by RN. Labs collected through red lumen and sent. Both lines flushed with NS & Heparin. Pt tolerated well. Pt checked in for next appointment.    Soledad Villafuerte RN

## 2017-09-08 NOTE — MR AVS SNAPSHOT
After Visit Summary   9/8/2017    Norman Real    MRN: [id number]           Patient Information     Date Of Birth          1960        Visit Information        Provider Department      9/8/2017 11:00 AM Charanjit Umanzor MD [facility]        Today's Diagnoses     Acute myeloid leukemia in remission (H)              [facility]  [address] [place] [address]  Phone: [phone]  Clinic Hours:   Monday-Thursday:7am to 7pm   Friday: 7am to 5pm   Weekends and holidays:    8am to noon (in general)  If your fever is 100.5  or greater,   call the clinic.  After hours call the   hospital at [phone] or   [phone]. Ask for the BMT   fellow on-call           Care Instructions    RTC Erna on 9/13, 9/20 and 9/27 with labs; tacrolimus level each visit  Cancel visit 9/12 and 9/22  Schedule CT chest w/o contrast prior to next visit          Follow-ups after your visit        Your next 10 appointments already scheduled     Sep 13, 2017  7:20 AM CDT   (Arrive by 7:05 AM)   CT CHEST W/O CONTRAST with UCCT2   [facility] ([facility])    [address] [address] [phone]           Please bring any scans or X-rays taken at other hospitals, if similar tests were done. Also bring a list of your medicines, including vitamins, minerals and over-the-counter drugs. It is safest to leave personal items at home.  Be sure to tell your doctor:   If you have any allergies.   If there s any chance you are pregnant.   If you are breastfeeding.   If you have any special needs.  You do not need to do anything special to prepare.  Please wear loose clothing, such as a sweat suit or jogging clothes. Avoid snaps, zippers and other metal. We may ask you to undress and put on a hospital gown.            Sep 13, 2017  8:30 AM CDT   [facility] Draw with  [facility]  DRAW   [facility] ([facility])    [address] [facility]  [address] [facility] [address]   [phone]            Sep 13, 2017  9:00 AM CDT   BMT Anniversary Visit with Charanjit Umanzor MD   [facility] ([facility])    [address] [facility]  [address] [facility] [address]   [phone]            Sep 20, 2017 11:00 AM CDT   [facility] with [facility] D[facility]   [facility] ([facility])    [address] [facility]  [address] [facility] [address]   [phone]            Sep 20, 2017 11:30 AM CDT   BMT Anniversary Visit with Charanjit Umanzor MD   [facility] ([facility])    [address] [facility]  [address] [facility] [address]   [phone]            Sep 27, 2017 10:00 AM CDT   [facility] Draw with [facility]   [facility] ([facility])    [address] [facility]  [address] [facility] [address]   [phone]            Sep 27, 2017 10:30 AM CDT   BMT Anniversary Visit with Charanjit Umanzor MD   [facility] ([facility])    [address] [facility]  [address] [facility] [address]   [phone]            Sep 28, 2017 10:40 AM CDT   (Arrive by 10:25 AM)   NEW DIABETES with Gerald Weiss MD   [facility] ([facility])    [address] [place] [address]  [address] [facility] [address]   [phone]            Oct 03, 2017 10:30 AM CDT   [facility] Draw with [facility]   [facility] ([facility])    [address] [facility]  [address] [facility] [address]   [phone]            Oct 03, 2017 11:00 AM CDT   BMT Anniversary Visit with  BMT MIMA #1   [facility] ([facility]  "[facility])    [address] [facility]  [address] [facility] [id number] [phone]              Future tests that were ordered for you today     Open Standing Orders        Priority Remaining Interval Expires Ordered    CMV DNA quantification Routine 4/4 9/27/2017 9/8/2017    EBV DNA PCR Quantitative Whole Blood Routine 4/4 9/27/2017 9/8/2017    Tacrolimus level Routine 4/4 9/27/2017 9/8/2017    CBC with platelets differential Routine 4/4 9/27/2017 9/8/2017    Magnesium Routine 3/4  9/27/2017 9/8/2017    Comprehensive metabolic panel Routine 4/4 9/27/2017 9/8/2017            Who to contact     If you have questions or need follow up information about today's clinic visit or your schedule please contact [facility] directly at [phone].  Normal or non-critical lab and imaging results will be communicated to you by MyChart, letter or phone within 4 business days after the clinic has received the results. If you do not hear from us within 7 days, please contact the clinic through [employer] or phone. If you have a critical or abnormal lab result, we will notify you by phone as soon as possible.  Submit refill requests through [employer] or call your pharmacy and they will forward the refill request to us. Please allow 3 business days for your refill to be completed.          Additional Information About Your Visit        MyChart Information     [employer] lets you send messages to your doctor, view your test results, renew your prescriptions, schedule appointments and more. To sign up, go to [email or web address] . Click on \"Log in\" on the left side of the screen, which will take you to the Welcome page. Then click on \"Sign up Now\" on the right side of the page.     You will be asked to enter the access code listed below, as well as some personal information. Please follow the directions to create your username and password.     Your access code is: [email or web address]  Expires: 11/13/2017 "  3:48 PM     Your access code will  in 90 days. If you need help or a new code, please call your [facility] clinic or [phone].        Care EveryWhere ID     This is your Care EveryWhere ID. This could be used by other organizations to access your [facility] medical records  [id number]        Your Vitals Were     Pulse Temperature Pulse Oximetry BMI (Body Mass Index)          74 97.9  F (36.6  C) (Oral) 98% 30.77 kg/m2         Blood Pressure from Last 3 Encounters:   17 130/84   17 137/79   17 152/84    Weight from Last 3 Encounters:   17 92.1 kg (203 lb)   17 92.9 kg (204 lb 14.4 oz)   17 92.2 kg (203 lb 4.8 oz)              We Performed the Following     Basic metabolic panel     CBC with platelets differential     CT Chest w/o contrast     Magnesium          Today's Medication Changes          These changes are accurate as of: 17 12:19 PM.  If you have any questions, ask your nurse or doctor.               These medicines have changed or have updated prescriptions.        Dose/Directions    magnesium oxide 400 MG tablet   Commonly known as:  MAG-OX   This may have changed:    - how much to take  - additional instructions   Used for:  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        8 tabs daily   Quantity:  100 tablet   Refills:  1                Recent Review Flowsheet Data     BMT Recent Results Latest Ref Rng & Units 2017    WBC 4.0 - 11.0 10e9/L 3.3(L) 2.8(L) 3.0(L) 3.4(L) 3.1(L) 3.1(L) 3.2(L)    Hemoglobin 13.3 - 17.7 g/dL 11.3(L) 11.4(L) 11.2(L) 10.8(L) 10.4(L) 11.0(L) 11.0(L)    Platelet Count 150 - 450 10e9/L 135(L) 134(L) 142(L) 132(L) 135(L) 133(L) 138(L)    Neutrophils (Absolute) 1.6 - 8.3 10e9/L 2.0 1.4(L) 1.4(L) 1.9 1.7 1.6 1.6    INR 0.86 - 1.14 - - - - - - -    Sodium 133 - 144 mmol/L 137 139 138 138 138 136 -    Potassium 3.4 - 5.3 mmol/L 3.9 3.7 4.1 4.0 3.9 4.0 -    Chloride 94 - 109  mmol/L 105 105 104 104 104 104 -    Glucose 70 - 99 mg/dL 154(H) 203(H) 160(H) 152(H) 168(H) 177(H) -    Urea Nitrogen 7 - 30 mg/dL 12 11 11 11 12 13 -    Creatinine 0.66 - 1.25 mg/dL 1.00 0.98 1.02 1.08 1.16 1.09 -    Calcium (Total) 8.5 - 10.1 mg/dL 9.0 8.8 8.7 8.8 8.6 8.3(L) -    Protein (Total) 6.8 - 8.8 g/dL - 6.9 - - 6.8 6.8 -    Albumin 3.4 - 5.0 g/dL - 3.5 - - 3.5 3.4 -    Bilirubin (Direct) 0.0 - 0.2 mg/dL - - - - - - -    Alkaline Phosphatase 40 - 150 U/L - 72 - - 75 80 -    AST 0 - 45 U/L - 20 - - 26 22 -    ALT 0 - 70 U/L - 24 - - 32 28 -    MCV 78 - 100 fl 91 91 90 90 90 90 91               Primary Care Provider    Physician No Ref-Primary       No address on file        Equal Access to Services     ASHLYN FLANAGAN : Hadii sapna Ratliff, joana manning, qasarah mchugh, vishal albright . So [facility] [phone].    ATENCIÓN: Si habla español, tiene a gusman disposición servicios gratuitos de asistencia lingüística. [facility] [phone].    We comply with applicable federal civil rights laws and [place] laws. We do not discriminate on the basis of race, color, national origin, age, disability sex, sexual orientation or gender identity.            Thank you!     Thank you for choosing [facility]  for your care. Our goal is always to provide you with excellent care. Hearing back from our patients is one way we can continue to improve our services. Please take a few minutes to complete the written survey that you may receive in the mail after your visit with us. Thank you!             Your Updated Medication List - Protect others around you: Learn how to safely use, store and throw away your medicines at www.disposemymeds.org.          This list is accurate as of: 9/8/17 12:19 PM.  Always use your most recent med list.                   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), History of peripheral stem cell transplant (H), Aspergillosis (H)       cholecalciferol 1000 UNITS capsule    vitamin  -D     Take 1 capsule by mouth daily        cyclobenzaprine 5 MG tablet    FLEXERIL     Take 1 tablet (5 mg) by mouth 3 times daily as needed for muscle spasms        diltiazem 360 MG 24 hr CD capsule    CARDIZEM CD; CARTIA XT    90 capsule    Take 1 capsule (360 mg) by mouth daily    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       FLONASE NA      Spray in nostril as needed        guaiFENesin 600 MG 12 hr tablet    MUCINEX     Take 600 mg by mouth        heparin lock flush 10 UNIT/ML Soln injection     30 vial    5 mLs by Intracatheter route daily In each lumen    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       insulin aspart 100 UNIT/ML injection    NovoLOG FLEXPEN    3 mL    Give insulin based on High sliding scale  Also give prescribed amount before meals based on carbohydrate coverage    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       insulin glargine 100 UNIT/ML injection    LANTUS    3 mL    Inject 10 Units Subcutaneous daily    Type 2 diabetes mellitus without complication, without long-term current use of insulin (H)       insulin pen needle 30G X 8 MM     100 each    Use when delivering insulin as directed.    Type 2 diabetes mellitus without complication, without long-term current use of insulin (H)       LORazepam 0.5 MG tablet    ATIVAN    40 tablet    Take 1-2 tablets (0.5-1 mg) by mouth every 4 hours as needed for anxiety (nausea/vomiting/sleep)    Stem cell transplant candidate       losartan 50 MG tablet    COZAAR    30 tablet    Take 1 tablet (50 mg) by mouth daily    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       magnesium oxide 400 MG tablet    MAG-OX    100 tablet    8 tabs daily    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       MULTI  COMPLETE PO           ondansetron 8 MG tablet    ZOFRAN    30 tablet    Take 1 tablet (8 mg) by mouth every 8 hours as needed for nausea    Stem cell transplant candidate       pantoprazole 40 MG EC tablet    PROTONIX    90 tablet    Take 1 tablet (40 mg) by mouth daily    Stem cell transplant candidate       psyllium Packet    METAMUCIL/KONSYL    90 packet    Take 1 packet by mouth 3 times daily    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       sulfamethoxazole-trimethoprim 800-160 MG per tablet    BACTRIM DS/SEPTRA DS    30 tablet    Clinic will tell you when to start 1 tablet twice daily by mouth on Mondays and Tuesdays, start around day +28    Stem cell transplant candidate       tacrolimus 0.5 MG capsule    GENERIC EQUIVALENT    56 capsule    Take 2 capsules (1 mg) by mouth 2 times daily    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

## 2017-09-08 NOTE — NURSING NOTE
"Oncology Rooming Note    September 8, 2017 11:07 AM   Norman Real is a 56 year old male who presents for:    Chief Complaint   Patient presents with     Blood Draw     labs drawn     RECHECK     Pt is here S/P bMT for AML--here for labs and to see MD     Initial Vitals: /84 (BP Location: Right arm, Patient Position: Chair, Cuff Size: Adult Regular)  Pulse 74  Temp 97.9  F (36.6  C) (Oral)  Wt 92.1 kg (203 lb)  SpO2 98%  BMI 30.77 kg/m2 Estimated body mass index is 30.77 kg/(m^2) as calculated from the following:    Height as of 8/22/17: 1.73 m (5' 8.11\").    Weight as of this encounter: 92.1 kg (203 lb). Body surface area is 2.1 meters squared.  No Pain (0) Comment: Data Unavailable   No LMP for male patient.  Allergies reviewed: Yes  Medications reviewed: Yes    Medications: Medication refills not needed today.  Pharmacy name entered into [employer]: [id number] - [place], [place] - [address]     Clinical concerns: none     6 minutes for nursing intake (face to face time)     Madina Caldwell MA              "

## 2017-09-08 NOTE — PROGRESS NOTES
BMT Clinic Note      Patient ID:  Norman Real is a 56 year old male, currently day +44 s/p NMA allo sib PBSCT for AML       Diagnosis AMLU Acute myelogeneous leukemia, Unknown  HCT Type Allogeneic    Prep Regimen Cytoxan  Fludarabine  TBI   Donor Source Related PBSC    GVHD Prophylaxis   Mycophenolate  Primary BMT Provider Dr. Erna MD          INTERVAL  HISTORY      HPI: Norman is feeling good.  No fever.  No nausea.  No vomiting.  No diarrhea.  Afebrile with no cough, congestion or dyspnea.  No bleeding or rash.    Review of Systems: 10 point ROS negative except as noted above.     PHYSICAL EXAM   /84 (BP Location: Right arm, Patient Position: Chair, Cuff Size: Adult Regular)  Pulse 74  Temp 97.9  F (36.6  C) (Oral)  Wt 92.1 kg (203 lb)  SpO2 98%  BMI 30.77 kg/m2    General: NAD, interactive, appropriate, mild face erythema  Eyes: SAMSON, sclera anicteric   Nose/Mouth/Throat: no oral lesions.  MMM  Lungs: CTA bilaterally  Cardiovascular: RRR, no M/R/G   Abdominal/Rectal: +BS, soft, NT, ND, No HSM   Lymphatics: trace ankle edema bilaterally  Skin: no rashes or petechaie.  Small skin tags to palmar surfaces  Neuro: A&O   Additional Findings: Cooper site NT, no drainage.     LABS AND IMAGING - PAST 24 HOURS      Lab Results   Component Value Date    WBC 3.2 (L) 09/08/2017    ANEU 1.6 09/08/2017    HGB 11.0 (L) 09/08/2017    HCT 31.6 (L) 09/08/2017     (L) 09/08/2017     09/08/2017    POTASSIUM 3.9 09/08/2017    CHLORIDE 106 09/08/2017    CO2 28 09/08/2017     (H) 09/08/2017    BUN 13 09/08/2017    CR 1.17 09/08/2017    MAG 1.7 09/08/2017    INR 0.96 07/31/2017    BILITOTAL 0.5 09/05/2017    AST 22 09/05/2017    ALT 28 09/05/2017    ALKPHOS 80 09/05/2017    PROTTOTAL 6.8 09/05/2017    ALBUMIN 3.4 09/05/2017        ASSESSMENT BY SYSTEMS      Norman Real is a 56 year old male with AML, currently day + 37 for 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 He  received additional stem cells from donor 7/27.        1. BMT: Completed prep with cytoxan, fludarabine, & TBI. Transplant 7/27. Initial stem cell product only contained 2.33 CD34/kg, additional 0.66 CD34/kg on 7/27 for a total of 2.99.   - preliminary BMBX results show no increase in blasts.  Flow neg, chimerism 98% donor.     2. AML: in CR with 98% donor BM (8/15/17); 80% donor CD3; 100% donor CD15. Interested on [id number] study    3. HEME: Keep Hgb>8 and plts>10K. No pre-meds.    4.  ID:  Afebrile. No active infections.  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   8/22 CMV=neg.  - Hold Bactrim due to dropping WBC & gave Pentamidine on 8/25.  No allergy to bactrim.      5.  GI: No vomiting or diarrhea.    - Mild nausea: Taking Zofran and ativan PRN, symptoms minimal   - Protonix for GI prophy.   - Ursodiol for VOD prophy  - s/p left sided colectomy for recurrent diverticulitis.        6.  GVH: No aGVH. stopped MMF 8/25. No signs of GVHD.  - On Tacrolimus: Taking 1 mg BID, level 11.4 on 9/4    7.  FEN/Renal: Cr stable and normal Mg today.   - On oral mag to 1000 mg QID (8tbs per day) checked by pharmacy.       8. CV: Adequate BP control on losartan 50 mg and diltiazem  mg.  - Hx tachycardia with arrhythmia, s/p 3 ablations  - hold crestor through transplant      9. Endo:   - Hx DM type II.  Current DM Regimen: Lantus 10 units qam, carb coverage (5 units per meal, 2-3 units per snack, 4 units with Ensure), sliding scale with meals and bedtime. .  - Hold metformin 500mg/d      10. : Hx prostate cancer.     11. Neuro: History of chronic insomnia. Ambien to 5-10 mg QHS PRN      12. Pulm: Hx spiculated pulm nodule (concern for malignancy w/ hx tobacco use) and GGO (concerning for fungal PNA). F/u CT 8/14 improved.  Cont Vfend 200mg bid      Plan:  Strongly considering for [id number] study  RT Erna on 9/13, 9/20 and 9/27 with labs; tacrolimus level each visit  Cancel visit 9/12 and 9/22  Schedule CT chest w/o contrast prior to next visit

## 2017-09-13 NOTE — PROGRESS NOTES
BMT Clinic Note      Patient ID:  Norman Real is a 56 year old male, currently day +49 s/p NMA allo sib PBSCT for AML       Diagnosis AMLU Acute myelogeneous leukemia, Unknown  HCT Type Allogeneic    Prep Regimen Cytoxan  Fludarabine  TBI   Donor Source Related PBSC    GVHD Prophylaxis   Mycophenolate  Primary BMT Provider Dr. Erna MD          INTERVAL  HISTORY      HPI: Norman is feeling good.  No fever.  No nausea.  No vomiting.  Had loose stools a few times on Sunday but it is already resolved. Would like to twins game today and MOA on Saturday. Afebrile with no cough, congestion or dyspnea.  No bleeding or rash.    Review of Systems: 10 point ROS negative except as noted above.     PHYSICAL EXAM   /71 (BP Location: Right arm, Cuff Size: Adult Regular)  Pulse 73  Temp 97.6  F (36.4  C) (Oral)  Resp 16  Wt 92.3 kg (203 lb 6.4 oz)  SpO2 98%  BMI 30.83 kg/m2    General: NAD, interactive, appropriate, mild face erythema  Eyes: SAMSON, sclera anicteric   Nose/Mouth/Throat: no oral lesions.  MMM  Lungs: CTA bilaterally  Cardiovascular: RRR, no M/R/G   Abdominal/Rectal: +BS, soft, NT, ND, No HSM   Lymphatics: trace ankle edema bilaterally  Skin: no rashes or petechaie.  Small skin tags to palmar surfaces  Neuro: A&O   Additional Findings: Cooper site NT, no drainage.     LABS AND IMAGING - PAST 24 HOURS      Lab Results   Component Value Date    WBC 3.3 (L) 09/13/2017    ANEU 1.4 (L) 09/13/2017    HGB 10.5 (L) 09/13/2017    HCT 30.7 (L) 09/13/2017     09/13/2017     09/13/2017    POTASSIUM 3.5 09/13/2017    CHLORIDE 103 09/13/2017    CO2 27 09/13/2017     (H) 09/13/2017    BUN 15 09/13/2017    CR 1.39 (H) 09/13/2017    MAG 1.8 09/13/2017    INR 0.96 07/31/2017    BILITOTAL 0.9 09/13/2017    AST 22 09/13/2017    ALT 28 09/13/2017    ALKPHOS 75 09/13/2017    PROTTOTAL 6.8 09/13/2017    ALBUMIN 3.6 09/13/2017     Tac level pending     ASSESSMENT BY KALPESH Lincoln  Farhan is a 56 year old male with AML, 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 He received additional stem cells from donor 7/27.        1. BMT: Completed prep with cytoxan, fludarabine, & TBI. Transplant 7/27. Initial stem cell product only contained 2.33 CD34/kg, additional 0.66 CD34/kg on 7/27 for a total of 2.99.   - preliminary BMBX results show no increase in blasts.  Flow neg, chimerism 98% donor.     2. AML: in CR with 98% donor BM (8/15/17); 80% donor CD3; 100% donor CD15. Consent for [id number] study next week.    3. HEME: Keep Hgb>8 and plts>10K. No pre-meds.    4.  ID:  Afebrile. No active infections. Improved CT with post-infectious changes. 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   8/22 CMV=neg.  - Hold Bactrim due to dropping WBC & gave Pentamidine on 8/25.  No allergy to bactrim.      5.  GI: No vomiting. Diarrhea for 1 day resolved despite Mg dose; monitor for now.     - Mild nausea: Taking Zofran and ativan PRN, symptoms minimal   - Protonix for GI prophy.   - Ursodiol for VOD prophy  - s/p left sided colectomy for recurrent diverticulitis.        6.  GVH: No aGVH. stopped MMF 8/25. No signs of GVHD.  - On Tacrolimus: Taking 1 mg BID, level 11.4 on 9/4    7.  FEN/Renal: Cr mild elevation; increase oral fluid intake. Mg today no change in dose.   - On oral mag to 1000 mg QID (8tbs per day) checked by pharmacy.       8. CV: Adequate BP control on losartan 50 mg and diltiazem  mg.  - Hx tachycardia with arrhythmia, s/p 3 ablations  - hold crestor through transplant      9. Endo:   - Hx DM type II.  Current DM Regimen: Lantus 10 units qam, carb coverage (5 units per meal, 2-3 units per snack, 4 units with Ensure),  sliding scale with meals and bedtime. .  - Hold metformin 500mg/d      10. : Hx prostate cancer.     11. Neuro: History of chronic insomnia. Ambien to 5-10 mg QHS PRN      12. Pulm: Hx spiculated pulm nodule (concern for malignancy w/ hx tobacco use) and GGO (concerning for fungal PNA). F/u CT 8/14 improved.  Cont Vfend 200mg bid     Plan:  Consent for [id number] study next week 9/20  Flu shot around day +60  RTC Erna on 9/20 and 9/27 with labs; tacrolimus level each visit  Refill vfend for 1 month then discontinue; then start fluconazole 100 mg daily; adjust Tacrolimus

## 2017-09-13 NOTE — NURSING NOTE
Chief Complaint   Patient presents with     Blood Draw     Labs drawn from [facility] by RN. Vitals taken. Pt checked in for appt     Dressing Change     end caps changed     Labs collected from [facility] by RN, line flushed with saline and heparin. Dressing and end caps changed. Vitals taken. Pt checked in for appointment(s).    Rin Winchester RN

## 2017-09-13 NOTE — PATIENT INSTRUCTIONS
Consent for [id number] study next week 9/20  Flu shot around day +60  RTC Erna on 9/20 and 9/27 with labs; tacrolimus level each visit  Refill vfend for 1 month then discontinue; then start fluconazole 100 mg daily; adjust Tacrolimus    Print avs to ptJuan Manuel

## 2017-09-13 NOTE — LETTER
9/13/2017      RE: Norman Real  [facility] [address]       BMT Clinic Note      Patient ID:  Norman Real is a 56 year old male, currently day +49 s/p NMA allo sib PBSCT for AML       Diagnosis AMLU Acute myelogeneous leukemia, Unknown  HCT Type Allogeneic    Prep Regimen Cytoxan  Fludarabine  TBI   Donor Source Related PBSC    GVHD Prophylaxis   Mycophenolate  Primary BMT Provider Dr. Erna MD          INTERVAL  HISTORY      HPI: Norman is feeling good.  No fever.  No nausea.  No vomiting.  Had loose stools a few times on Sunday but it is already resolved. Would like to twins game today and MOA on Saturday. Afebrile with no cough, congestion or dyspnea.  No bleeding or rash.    Review of Systems: 10 point ROS negative except as noted above.     PHYSICAL EXAM   /71 (BP Location: Right arm, Cuff Size: Adult Regular)  Pulse 73  Temp 97.6  F (36.4  C) (Oral)  Resp 16  Wt 92.3 kg (203 lb 6.4 oz)  SpO2 98%  BMI 30.83 kg/m2    General: NAD, interactive, appropriate, mild face erythema  Eyes: SAMSON, sclera anicteric   Nose/Mouth/Throat: no oral lesions.  MMM  Lungs: CTA bilaterally  Cardiovascular: RRR, no M/R/G   Abdominal/Rectal: +BS, soft, NT, ND, No HSM   Lymphatics: trace ankle edema bilaterally  Skin: no rashes or petechaie.  Small skin tags to palmar surfaces  Neuro: A&O   Additional Findings: Cooper site NT, no drainage.     LABS AND IMAGING - PAST 24 HOURS      Lab Results   Component Value Date    WBC 3.3 (L) 09/13/2017    ANEU 1.4 (L) 09/13/2017    HGB 10.5 (L) 09/13/2017    HCT 30.7 (L) 09/13/2017     09/13/2017     09/13/2017    POTASSIUM 3.5 09/13/2017    CHLORIDE 103 09/13/2017    CO2 27 09/13/2017     (H) 09/13/2017    BUN 15 09/13/2017    CR 1.39 (H) 09/13/2017    MAG 1.8 09/13/2017    INR 0.96 07/31/2017    BILITOTAL 0.9 09/13/2017    AST 22 09/13/2017    ALT 28 09/13/2017    ALKPHOS 75 09/13/2017    PROTTOTAL 6.8 09/13/2017    ALBUMIN 3.6  09/13/2017     Tac level pending     ASSESSMENT BY SYSTEMS      Norman Salazar Real is a 56 year old male with AML, 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 He received additional stem cells from donor 7/27.        1. BMT: Completed prep with cytoxan, fludarabine, & TBI. Transplant 7/27. Initial stem cell product only contained 2.33 CD34/kg, additional 0.66 CD34/kg on 7/27 for a total of 2.99.   - preliminary BMBX results show no increase in blasts.  Flow neg, chimerism 98% donor.     2. AML: in CR with 98% donor BM (8/15/17); 80% donor CD3; 100% donor CD15. Consent for [id number] study next week.    3. HEME: Keep Hgb>8 and plts>10K. No pre-meds.    4.  ID:  Afebrile. No active infections. Improved CT with post-infectious changes. 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   8/22 CMV=neg.  - Hold Bactrim due to dropping WBC & gave Pentamidine on 8/25.  No allergy to bactrim.      5.  GI: No vomiting. Diarrhea for 1 day resolved despite Mg dose; monitor for now.     - Mild nausea: Taking Zofran and ativan PRN, symptoms minimal   - Protonix for GI prophy.   - Ursodiol for VOD prophy  - s/p left sided colectomy for recurrent diverticulitis.        6.  GVH: No aGVH. stopped MMF 8/25. No signs of GVHD.  - On Tacrolimus: Taking 1 mg BID, level 11.4 on 9/4    7.  FEN/Renal: Cr mild elevation; increase oral fluid intake. Mg today no change in dose.   - On oral mag to 1000 mg QID (8tbs per day) checked by pharmacy.       8. CV: Adequate BP control on losartan 50 mg and diltiazem  mg.  - Hx tachycardia with arrhythmia, s/p 3 ablations  - hold crestor through transplant      9. Endo:   - Hx DM type II.  Current DM Regimen: Lantus 10 units qam, carb  coverage (5 units per meal, 2-3 units per snack, 4 units with Ensure), sliding scale with meals and bedtime. .  - Hold metformin 500mg/d      10. : Hx prostate cancer.     11. Neuro: History of chronic insomnia. Ambien to 5-10 mg QHS PRN      12. Pulm: Hx spiculated pulm nodule (concern for malignancy w/ hx tobacco use) and GGO (concerning for fungal PNA). F/u CT 8/14 improved.  Cont Vfend 200mg bid     Plan:  Consent for KFE198 study next week 9/20  Flu shot around day +60  RTC Erna on 9/20 and 9/27 with labs; tacrolimus level each visit  Refill vfend for 1 month then discontinue; then start fluconazole 100 mg daily; adjust Tacrolimus    Charanjit Umanzor MD

## 2017-09-13 NOTE — NURSING NOTE
"Oncology Rooming Note    September 13, 2017 8:55 AM   Norman Real is a 56 year old male who presents for:    Chief Complaint   Patient presents with     Blood Draw     Labs drawn from CVC by RN. Vitals taken. Pt checked in for appt     Dressing Change     end caps changed     RECHECK     AML     Initial Vitals: /71 (BP Location: Right arm, Cuff Size: Adult Regular)  Pulse 73  Temp 97.6  F (36.4  C) (Oral)  Resp 16  Wt 92.3 kg (203 lb 6.4 oz)  SpO2 98%  BMI 30.83 kg/m2 Estimated body mass index is 30.83 kg/(m^2) as calculated from the following:    Height as of 8/22/17: 1.73 m (5' 8.11\").    Weight as of this encounter: 92.3 kg (203 lb 6.4 oz). Body surface area is 2.11 meters squared.  No Pain (0) Comment: Data Unavailable   No LMP for male patient.  Allergies reviewed: Yes  Medications reviewed: Yes    Medications: Medication refills not needed today.  Pharmacy name entered into [employer]: [id number] - [place], [place] - [address]    Clinical concerns: n/a     5 minutes for nursing intake (face to face time)     SATISH WOODARD CMA              "

## 2017-09-13 NOTE — MR AVS SNAPSHOT
After Visit Summary   9/13/2017    Norman Real    MRN: [id number]           Patient Information     Date Of Birth          1960        Visit Information        Provider Department      9/13/2017 9:00 AM Charanjit Umanzor MD [facility] Blood and Marrow Transplant        Today's Diagnoses     Stem cell transplant candidate    -  1    Acute myeloid leukemia in remission (H)              [facility] [place] [facility]  [address] [place] [address]  Phone: [phone]  Clinic Hours:   Monday-Thursday:7am to 7pm   Friday: 7am to 5pm   Weekends and holidays:    8am to noon (in general)  If your fever is 100.5  or greater,   call the clinic.  After hours call the   hospital at [phone] or   [phone]. Ask for the BMT   fellow on-call           Care Instructions    Consent for AEY450 study next week 9/20  Flu shot around day +60  RTC Erna on 9/20 and 9/27 with labs; tacrolimus level each visit  Refill vfend for 1 month then discontinue; then start fluconazole 100 mg daily; adjust Tacrolimus          Follow-ups after your visit        Your next 10 appointments already scheduled     Sep 20, 2017 11:00 AM CDT   [facility] Draw with  [facility]   [facility] ([facility])    [address] [place] [id number] [phone]            Sep 20, 2017 11:30 AM CDT   BMT Anniversary Visit with Charanjit Umanzor MD   [facility] Blood and Marrow Transplant ([facility])    [address] [place] [address]   [phone]            Sep 27, 2017 10:00 AM CDT   [facility] Draw with  [facility]   [facility] Draw ([facility])    [address] [place] [address]   [phone]            Sep 27, 2017 10:30 AM CDT   BMT Anniversary Visit with Charanjit Umanzor MD   [facility]  Blood and Marrow Transplant ([facility])    [address]  [address] [facility] [address]   [phone]            Sep 28, 2017 10:40 AM CDT   (Arrive by 10:25 AM)   NEW DIABETES with Gerald Weiss MD   [facility] Endocrinology ([facility])    [address]  3rd Floor  [facility] [address]   [phone]            Oct 03, 2017 10:30 AM CDT   [facility] Lab Draw with  [facility] DRAW   [facility] Draw ([facility])    [address]  [address] [facility] [address]   [phone]            Oct 03, 2017 11:00 AM CDT   BMT Anniversary Visit with [employer] BMT MIMA #1   [facility] Blood and Marrow Transplant ([facility])    [address] [address]   [phone]            Oct 10, 2017 10:30 AM CDT   [facility] Lab Draw with  [facility] DRAW   [facility] Draw ([facility])    [address] [address]   [phone]            Oct 10, 2017 11:00 AM CDT   BMT Anniversary Visit with [employer] BMT MIMA #1   [facility] Blood and Marrow Transplant ([facility])    [address] [address]   [phone]            Oct 17, 2017 11:00 AM CDT   BMT Anniversary Visit with [employer] BMT MIMA #1   [facility] Blood and Marrow Transplant ([facility])    [address] [place] [address]   [phone]              Future tests that were ordered for you today     Open Future Orders        Priority Expected Expires Ordered    CMV DNA quantification Routine 9/20/2017 9/20/2017 9/13/2017    EBV DNA PCR Quantitative Whole Blood Routine 9/20/2017 9/20/2017 9/13/2017    Tacrolimus level Routine 9/20/2017 9/20/2017 9/13/2017    Magnesium Routine 9/20/2017 9/20/2017 9/13/2017    Comprehensive metabolic panel Routine  "2017    CBC with platelets differential Routine 2017            Who to contact     If you have questions or need follow up information about today's clinic visit or your schedule please contact [facility] directly at [phone].  Normal or non-critical lab and imaging results will be communicated to you by MyChart, letter or phone within 4 business days after the clinic has received the results. If you do not hear from us within 7 days, please contact the clinic through [employer] or phone. If you have a critical or abnormal lab result, we will notify you by phone as soon as possible.  Submit refill requests through [employer] or call your pharmacy and they will forward the refill request to us. Please allow 3 business days for your refill to be completed.          Additional Information About Your Visit        [employer] Information     [employer] lets you send messages to your doctor, view your test results, renew your prescriptions, schedule appointments and more. To sign up, go to [email or web address] . Click on \"Log in\" on the left side of the screen, which will take you to the Welcome page. Then click on \"Sign up Now\" on the right side of the page.     You will be asked to enter the access code listed below, as well as some personal information. Please follow the directions to create your username and password.     Your access code is: [email or web address]  Expires: 2017  3:48 PM     Your access code will  in 90 days. If you need help or a new code, please call your [facility] clinic or [phone].        Care EveryWhere ID     This is your Care EveryWhere ID. This could be used by other organizations to access your [facility] medical records  [id number]        Your Vitals Were     Pulse Temperature Respirations Pulse Oximetry BMI (Body Mass Index)       73 97.6  F (36.4  C) (Oral) 16 98% 30.83 kg/m2        Blood Pressure from Last 3 " Encounters:   09/13/17 134/71   09/08/17 130/84   09/05/17 137/79    Weight from Last 3 Encounters:   09/13/17 92.3 kg (203 lb 6.4 oz)   09/08/17 92.1 kg (203 lb)   09/05/17 92.9 kg (204 lb 14.4 oz)              We Performed the Following     CBC with platelets differential     CMV DNA quantification     Comprehensive metabolic panel     EBV DNA PCR Quantitative Whole Blood     Magnesium     Tacrolimus level          Today's Medication Changes          These changes are accurate as of: 9/13/17  9:20 AM.  If you have any questions, ask your nurse or doctor.               These medicines have changed or have updated prescriptions.        Dose/Directions    magnesium oxide 400 MG tablet   Commonly known as:  MAG-OX   This may have changed:    - how much to take  - additional instructions   Used for:  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        8 tabs daily   Quantity:  100 tablet   Refills:  1                Recent Review Flowsheet Data     BMT Recent Results Latest Ref Rng & Units 8/22/2017 8/25/2017 8/29/2017 9/1/2017 9/5/2017 9/8/2017 9/13/2017    WBC 4.0 - 11.0 10e9/L 2.8(L) 3.0(L) 3.4(L) 3.1(L) 3.1(L) 3.2(L) 3.3(L)    Hemoglobin 13.3 - 17.7 g/dL 11.4(L) 11.2(L) 10.8(L) 10.4(L) 11.0(L) 11.0(L) 10.5(L)    Platelet Count 150 - 450 10e9/L 134(L) 142(L) 132(L) 135(L) 133(L) 138(L) 158    Neutrophils (Absolute) 1.6 - 8.3 10e9/L 1.4(L) 1.4(L) 1.9 1.7 1.6 1.6 1.4(L)    INR 0.86 - 1.14 - - - - - - -    Sodium 133 - 144 mmol/L 139 138 138 138 136 139 137    Potassium 3.4 - 5.3 mmol/L 3.7 4.1 4.0 3.9 4.0 3.9 3.5    Chloride 94 - 109 mmol/L 105 104 104 104 104 106 103    Glucose 70 - 99 mg/dL 203(H) 160(H) 152(H) 168(H) 177(H) 122(H) 158(H)    Urea Nitrogen 7 - 30 mg/dL 11 11 11 12 13 13 15    Creatinine 0.66 - 1.25 mg/dL 0.98 1.02 1.08 1.16 1.09 1.17 1.39(H)    Calcium (Total) 8.5 - 10.1 mg/dL 8.8 8.7 8.8 8.6 8.3(L) 8.7 8.6    Protein (Total) 6.8 - 8.8 g/dL 6.9 - - 6.8 6.8 - 6.8    Albumin 3.4 - 5.0 g/dL 3.5 - - 3.5  3.4 - 3.6    Bilirubin (Direct) 0.0 - 0.2 mg/dL - - - - - - -    Alkaline Phosphatase 40 - 150 U/L 72 - - 75 80 - 75    AST 0 - 45 U/L 20 - - 26 22 - 22    ALT 0 - 70 U/L 24 - - 32 28 - 28    MCV 78 - 100 fl 91 90 90 90 90 91 91               Primary Care Provider    Physician No Ref-Primary       No address on file        Equal Access to Services     JOSHUAVAL MASHA : Hadii aad ku hadsimoneo Soomaali, waaxda luqadaha, qaybta kaalmada adeodetteda, vishal crooks adejohanna lovenorah . So [facility] [phone].    ATENCIÓN: Si brandy mcintyre, tiene a gusman disposición servicios gratuitos de asistencia lingüística. Llame al [phone].    We comply with applicable federal civil rights laws and [place] laws. We do not discriminate on the basis of race, color, national origin, age, disability sex, sexual orientation or gender identity.            Thank you!     Thank you for choosing [facility]  for your care. Our goal is always to provide you with excellent care. Hearing back from our patients is one way we can continue to improve our services. Please take a few minutes to complete the written survey that you may receive in the mail after your visit with us. Thank you!             Your Updated Medication List - Protect others around you: Learn how to safely use, store and throw away your medicines at www.disposemymeds.org.          This list is accurate as of: 9/13/17  9:20 AM.  Always use your most recent med list.                   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), History of peripheral stem cell transplant (H), Aspergillosis (H)       cholecalciferol 1000 UNITS capsule    vitamin  -D     Take 1 capsule by mouth daily        cyclobenzaprine 5 MG tablet    FLEXERIL     Take 1 tablet (5 mg) by mouth 3 times  daily as needed for muscle spasms        diltiazem 360 MG 24 hr CD capsule    CARDIZEM CD; CARTIA XT    90 capsule    Take 1 capsule (360 mg) by mouth daily    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       FLONASE NA      Spray in nostril as needed        guaiFENesin 600 MG 12 hr tablet    MUCINEX     Take 600 mg by mouth        heparin lock flush 10 UNIT/ML Soln injection     30 vial    5 mLs by Intracatheter route daily In each lumen    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       insulin aspart 100 UNIT/ML injection    NovoLOG FLEXPEN    3 mL    Give insulin based on High sliding scale  Also give prescribed amount before meals based on carbohydrate coverage    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       insulin glargine 100 UNIT/ML injection    LANTUS    3 mL    Inject 10 Units Subcutaneous daily    Type 2 diabetes mellitus without complication, without long-term current use of insulin (H)       insulin pen needle 30G X 8 MM     100 each    Use when delivering insulin as directed.    Type 2 diabetes mellitus without complication, without long-term current use of insulin (H)       LORazepam 0.5 MG tablet    ATIVAN    40 tablet    Take 1-2 tablets (0.5-1 mg) by mouth every 4 hours as needed for anxiety (nausea/vomiting/sleep)    Stem cell transplant candidate       losartan 50 MG tablet    COZAAR    30 tablet    Take 1 tablet (50 mg) by mouth daily    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       magnesium oxide 400 MG tablet    MAG-OX    100 tablet    8 tabs daily    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       MULTI COMPLETE PO           ondansetron 8 MG tablet    ZOFRAN    30 tablet    Take 1 tablet (8 mg) by mouth every 8 hours as needed for nausea    Stem cell transplant candidate       pantoprazole 40 MG EC tablet    PROTONIX    90 tablet    Take 1 tablet (40 mg) by mouth daily    Stem cell transplant candidate       psyllium Packet    METAMUCIL/KONSYL    90 packet    Take 1  packet by mouth 3 times daily    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       sulfamethoxazole-trimethoprim 800-160 MG per tablet    BACTRIM DS/SEPTRA DS    30 tablet    Clinic will tell you when to start 1 tablet twice daily by mouth on Mondays and Tuesdays, start around day +28    Stem cell transplant candidate       tacrolimus 0.5 MG capsule    GENERIC EQUIVALENT    56 capsule    Take 2 capsules (1 mg) by mouth 2 times daily    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

## 2017-09-20 NOTE — NURSING NOTE
"Oncology Rooming Note    September 20, 2017 11:54 AM   Norman Real is a 56 year old male who presents for:    Chief Complaint   Patient presents with     Blood Draw     VS done, labs collected via CVC.  Both lumens with + blood return and heparin locked.  Caps changed, dressing to be done in clinic.  Hx AML post transplant.     RECHECK     Pt is here for labs and to see MD for S/P bmt for AML     Initial Vitals: BP (!) 150/91  Pulse 81  Temp 98  F (36.7  C)  Resp 18  Wt 92.1 kg (203 lb)  SpO2 98%  BMI 30.77 kg/m2 Estimated body mass index is 30.77 kg/(m^2) as calculated from the following:    Height as of 8/22/17: 1.73 m (5' 8.11\").    Weight as of this encounter: 92.1 kg (203 lb). Body surface area is 2.1 meters squared.  No Pain (0) Comment: Data Unavailable   No LMP for male patient.  Allergies reviewed: Yes  Medications reviewed: Yes    Medications: Medication refills not needed today.  Pharmacy name entered into [employer]: [id number] - [place], [place] - [address] [place] [address]    Clinical concerns: Pt said he is a little anxious so maybe this is why his b/p is high     8 minutes for nursing intake (face to face time)     Madina Caldwell MA              "

## 2017-09-20 NOTE — PATIENT INSTRUCTIONS
Per pt: he is coming back on 9/27 so he does not need to schedule any other future appts. Printed his AVS.

## 2017-09-20 NOTE — MR AVS SNAPSHOT
After Visit Summary   9/20/2017    Norman Real    MRN: [id number]           Patient Information     Date Of Birth          1960        Visit Information        Provider Department      9/20/2017 11:30 AM Charanjit Umanzor MD [facility] Blood and Marrow Transplant        Today's Diagnoses     Stem cell transplant candidate    -  1    History of peripheral stem cell transplant (H)        Acute myeloid leukemia in remission (H)        Aspergillosis (H)              [facility] ([facility] [place]  [address] [place] [id number]  Phone: [phone]  Clinic Hours:   Monday-Thursday:7am to 7pm   Friday: 7am to 5pm   Weekends and holidays:    8am to noon (in general)  If your fever is 100.5  or greater,   call the clinic.  After hours call the   hospital at [phone] or   [phone]. Ask for the BMT   fellow on-call            Follow-ups after your visit        Your next 10 appointments already scheduled     Sep 27, 2017 10:00 AM CDT   [facility] Draw with  [facility]   [facility] Draw ([facility])    [address] [place] [id number]   [phone]            Sep 27, 2017 10:30 AM CDT   BMT Anniversary Visit with Charanjit Umanzor MD   [facility] Blood and Marrow Transplant ([facility])    [address] [place] [id number]   [phone]            Sep 28, 2017 10:40 AM CDT   (Arrive by 10:25 AM)   NEW DIABETES with Gerald Weiss MD   [facility] Endocrinology ([facility])    [address] [place] [address]  3rd [facility] [address]   [phone]            Oct 03, 2017 10:30 AM CDT   [facility] Draw with  [facility] DRAW   [facility] Draw ([facility])    [address] [place] [address]   [phone]            Oct 03, 2017 11:00 AM CDT   BMT Anniversary  Visit with  BMT MIMA #1   [facility] Blood and Marrow Transplant ([facility])    [address] [address]   [phone]            Oct 10, 2017 10:30 AM CDT   [facility] Draw with  [facility] DRAW   [facility] Draw ([facility])    [address] [address]   [phone]            Oct 10, 2017 11:00 AM CDT   BMT Anniversary Visit with  BMT MIMA #1   [facility] Blood and Marrow Transplant ([facility])    [address] [address]   [phone]            Oct 17, 2017 10:30 AM CDT   [facility] Draw with  [facility] DRAW   [facility] Draw ([facility])    [address] [address]   [phone]            Oct 17, 2017 11:00 AM CDT   BMT Anniversary Visit with  BMT MIMA #1   [facility] Blood and Marrow Transplant ([facility])    [address] [address]   [phone]            Oct 24, 2017 11:00 AM CDT   BMT Anniversary Visit with  BMT MIMA #1   [facility] Blood and Marrow Transplant ([facility])    [address] [address]   [phone]              Who to contact     If you have questions or need follow up information about today's clinic visit or your schedule please contact [facility] BLOOD [facility] MARROW TRANSPLANT directly at [phone].  Normal or non-critical lab and imaging results will be communicated to you by MyChart, letter or phone within 4 business days after the clinic has received the results. If you do not hear from us within 7 days, please contact the clinic through MyChart or phone. If you have a critical or abnormal lab result, we will notify you by phone as soon as possible.  Submit refill requests through [employer] or call your  "pharmacy and they will forward the refill request to us. Please allow 3 business days for your refill to be completed.          Additional Information About Your Visit        MyChart Information     [employer] lets you send messages to your doctor, view your test results, renew your prescriptions, schedule appointments and more. To sign up, go to [email or web address] . Click on \"Log in\" on the left side of the screen, which will take you to the Welcome page. Then click on \"Sign up Now\" on the right side of the page.     You will be asked to enter the access code listed below, as well as some personal information. Please follow the directions to create your username and password.     Your access code is: [email or web address]  Expires: 2017  3:48 PM     Your access code will  in 90 days. If you need help or a new code, please call your [place] clinic or [phone].        Care EveryWhere ID     This is your Care EveryWhere ID. This could be used by other organizations to access your [place] medical records  [id number]        Your Vitals Were     Pulse Temperature Respirations Pulse Oximetry BMI (Body Mass Index)       81 98  F (36.7  C) 18 98% 30.77 kg/m2        Blood Pressure from Last 3 Encounters:   17 (!) 150/91   17 134/71   17 130/84    Weight from Last 3 Encounters:   17 92.1 kg (203 lb)   17 92.3 kg (203 lb 6.4 oz)   17 92.1 kg (203 lb)              We Performed the Following     CBC with platelets differential     CMV DNA quantification     Comprehensive metabolic panel     Dna Marker Post Bmt Engraftment Blood     EBV DNA PCR Quantitative Whole Blood     Magnesium     Tacrolimus level          Today's Medication Changes          These changes are accurate as of: 17 12:39 PM.  If you have any questions, ask your nurse or doctor.               These medicines have changed or have updated prescriptions.        Dose/Directions    magnesium oxide 400 MG tablet "   Commonly known as:  MAG-OX   This may have changed:    - how much to take  - additional instructions   Used for:  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        8 tabs daily   Quantity:  100 tablet   Refills:  1            Where to get your medicines      Some of these will need a paper prescription and others can be bought over the counter.  Ask your nurse if you have questions.     Bring a paper prescription for each of these medications     LORazepam 0.5 MG tablet                Recent Review Flowsheet Data     BMT Recent Results Latest Ref Rng & Units 8/25/2017 8/29/2017 9/1/2017 9/5/2017 9/8/2017 9/13/2017 9/20/2017    WBC 4.0 - 11.0 10e9/L 3.0(L) 3.4(L) 3.1(L) 3.1(L) 3.2(L) 3.3(L) 3.2(L)    Hemoglobin 13.3 - 17.7 g/dL 11.2(L) 10.8(L) 10.4(L) 11.0(L) 11.0(L) 10.5(L) 11.1(L)    Platelet Count 150 - 450 10e9/L 142(L) 132(L) 135(L) 133(L) 138(L) 158 157    Neutrophils (Absolute) 1.6 - 8.3 10e9/L 1.4(L) 1.9 1.7 1.6 1.6 1.4(L) 1.4(L)    INR 0.86 - 1.14 - - - - - - -    Sodium 133 - 144 mmol/L 138 138 138 136 139 137 139    Potassium 3.4 - 5.3 mmol/L 4.1 4.0 3.9 4.0 3.9 3.5 4.0    Chloride 94 - 109 mmol/L 104 104 104 104 106 103 105    Glucose 70 - 99 mg/dL 160(H) 152(H) 168(H) 177(H) 122(H) 158(H) 150(H)    Urea Nitrogen 7 - 30 mg/dL 11 11 12 13 13 15 12    Creatinine 0.66 - 1.25 mg/dL 1.02 1.08 1.16 1.09 1.17 1.39(H) 1.22    Calcium (Total) 8.5 - 10.1 mg/dL 8.7 8.8 8.6 8.3(L) 8.7 8.6 8.8    Protein (Total) 6.8 - 8.8 g/dL - - 6.8 6.8 - 6.8 6.9    Albumin 3.4 - 5.0 g/dL - - 3.5 3.4 - 3.6 3.7    Bilirubin (Direct) 0.0 - 0.2 mg/dL - - - - - - -    Alkaline Phosphatase 40 - 150 U/L - - 75 80 - 75 76    AST 0 - 45 U/L - - 26 22 - 22 21    ALT 0 - 70 U/L - - 32 28 - 28 25    MCV 78 - 100 fl 90 90 90 90 91 91 92               Primary Care Provider    Physician No Ref-Primary       No address on file        Equal Access to Services     ASHLYN FLANAGAN : Greg Ratliff, washiela [facility], [facility] jennyferSentara [facility]  vishal mchughjohanna albright ahJanet Rachel [facility] [phone].    ATENCIÓN: Si haiderla francisco javier, tiene a gusman disposición servicios gratuitos de asistencia lingüística. Leyla al [phone].    We comply with applicable federal civil rights laws and [place] laws. We do not discriminate on the basis of race, color, national origin, age, disability sex, sexual orientation or gender identity.            Thank you!     Thank you for choosing [facility]  for your care. Our goal is always to provide you with excellent care. Hearing back from our patients is one way we can continue to improve our services. Please take a few minutes to complete the written survey that you may receive in the mail after your visit with us. Thank you!             Your Updated Medication List - Protect others around you: Learn how to safely use, store and throw away your medicines at www.disposemymeds.org.          This list is accurate as of: 9/20/17 12:39 PM.  Always use your most recent med list.                   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), History of peripheral stem cell transplant (H), Aspergillosis (H)       cholecalciferol 1000 UNITS capsule    vitamin  -D     Take 1 capsule by mouth daily        cyclobenzaprine 5 MG tablet    FLEXERIL     Take 1 tablet (5 mg) by mouth 3 times daily as needed for muscle spasms        diltiazem 360 MG 24 hr CD capsule    CARDIZEM CD; CARTIA XT    90 capsule    Take 1 capsule (360 mg) by mouth daily    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       FLONASE NA      Spray in nostril as needed        guaiFENesin 600 MG 12 hr tablet    MUCINEX     Take 600 mg by mouth        heparin lock flush 10 UNIT/ML Soln injection     30 vial    5 mLs by Intracatheter route daily In  each lumen    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       insulin aspart 100 UNIT/ML injection    NovoLOG FLEXPEN    3 mL    Give insulin based on High sliding scale  Also give prescribed amount before meals based on carbohydrate coverage    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       insulin glargine 100 UNIT/ML injection    LANTUS    3 mL    Inject 10 Units Subcutaneous daily    Type 2 diabetes mellitus without complication, without long-term current use of insulin (H)       insulin pen needle 30G X 8 MM     100 each    Use when delivering insulin as directed.    Type 2 diabetes mellitus without complication, without long-term current use of insulin (H)       LORazepam 0.5 MG tablet    ATIVAN    40 tablet    Take 1-2 tablets (0.5-1 mg) by mouth every 4 hours as needed for anxiety (nausea/vomiting/sleep)    Stem cell transplant candidate       losartan 50 MG tablet    COZAAR    30 tablet    Take 1 tablet (50 mg) by mouth daily    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       magnesium oxide 400 MG tablet    MAG-OX    100 tablet    8 tabs daily    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       MULTI COMPLETE PO           ondansetron 8 MG tablet    ZOFRAN    30 tablet    Take 1 tablet (8 mg) by mouth every 8 hours as needed for nausea    Stem cell transplant candidate       pantoprazole 40 MG EC tablet    PROTONIX    90 tablet    Take 1 tablet (40 mg) by mouth daily    Stem cell transplant candidate       psyllium Packet    METAMUCIL/KONSYL    90 packet    Take 1 packet by mouth 3 times daily    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       sulfamethoxazole-trimethoprim 800-160 MG per tablet    BACTRIM DS/SEPTRA DS    30 tablet    Clinic will tell you when to start 1 tablet twice daily by mouth on Mondays and Tuesdays, start around day +28    Stem cell transplant candidate       tacrolimus 0.5 MG capsule    GENERIC EQUIVALENT    56 capsule    Take 2 capsules (1 mg) by mouth 2 times  daily    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

## 2017-09-20 NOTE — LETTER
9/20/2017      [facility] [address]       BMT Clinic Note      Patient ID:  Norman Real is a 56 year old male, currently day +56 s/p NMA allo sib PBSCT for AML       Diagnosis AMLU Acute myelogeneous leukemia, Unknown  HCT Type Allogeneic    Prep Regimen Cytoxan  Fludarabine  TBI   Donor Source Related PBSC    GVHD Prophylaxis   Mycophenolate  Primary BMT Provider Dr. Erna MD          INTERVAL  HISTORY      HPI: Norman is feeling good.  No fever.  Mild occasional nausea.  Occasional hand shakiness. No vomiting.  No more loose stools. Afebrile with no cough, congestion or dyspnea.  No bleeding or rash.  Eating well.   Review of Systems: 10 point ROS negative except as noted above.     PHYSICAL EXAM   BP (!) 150/91  Pulse 81  Temp 98  F (36.7  C)  Resp 18  Wt 92.1 kg (203 lb)  SpO2 98%  BMI 30.77 kg/m2    General: NAD, interactive, appropriate, mild face erythema  Eyes: SAMSON, sclera anicteric   Nose/Mouth/Throat: no oral lesions.  MMM  Lungs: CTA bilaterally  Cardiovascular: RRR, no M/R/G   Abdominal/Rectal: +BS, soft, NT, ND, No HSM   Lymphatics: trace ankle edema bilaterally  Skin: no rashes or petechaie.  Small skin tags to palmar surfaces  Neuro: A&O   Additional Findings: Cooper site NT, no drainage.     LABS AND IMAGING - PAST 24 HOURS      Lab Results   Component Value Date    WBC 3.2 (L) 09/20/2017    ANEU 1.4 (L) 09/20/2017    HGB 11.1 (L) 09/20/2017    HCT 32.2 (L) 09/20/2017     09/20/2017     09/20/2017    POTASSIUM 4.0 09/20/2017    CHLORIDE 105 09/20/2017    CO2 28 09/20/2017     (H) 09/20/2017    BUN 12 09/20/2017    CR 1.22 09/20/2017    MAG 1.7 09/20/2017    INR 0.96 07/31/2017    BILITOTAL 0.5 09/20/2017    AST 21 09/20/2017    ALT 25 09/20/2017    ALKPHOS 76 09/20/2017    PROTTOTAL 6.9 09/20/2017    ALBUMIN 3.7 09/20/2017     Tac level pending     ASSESSMENT BY SYSTEMS      Norman Salazar Real is a 56 year old male with AML, NMA  allo sib PBSCT without ATG under protocol 2015-32. He received additional stem cells from donor 7/27.        1. BMT: Completed prep with cytoxan, fludarabine, & TBI. Transplant 7/27. Initial stem cell product only contained 2.33 CD34/kg, additional 0.66 CD34/kg on 7/27 for a total of 2.99. Blood chimerism sent today  - preliminary BMBX results show no increase in blasts.  98% donor BM (8/15/17); 80% donor CD3; 100% donor CD15.     2. AML: Patient continues to have no evidence of recurrent leukemia. Today we had a discussion about the  maintenance therapy study. We discussed the pros and cons of maintenance therapy after donor transplant patient, talked about possible side effects and risks of this type of immune modulatory therapy. In particular, we talked about the risk of graft versus host disease and flulike symptoms. We also talked about the frequently observed abdominal rash related to the injection sites. I shared with the patient what is known from the floor patient that are already on this study so far. The patient and his wife review the consent form that was provided to them last week. After all the above the patient felt confident that he is interested and will interpret dissipate in this study. We signed a consent for and the research nurse will communicate with the patient to possibly start protocol related treatments next week, or the week after. The next plan bone marrow biopsy is for day 100.     3. HEME: Keep Hgb>8 and plts>10K. No pre-meds.    4.  ID:  Afebrile. No active infections. Improved CT with post-infectious changes. 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   8/22 CMV=neg.  - Hold Bactrim due to dropping WBC & gave  Pentamidine on 8/25.  No allergy to bactrim.      5.  GI: No vomiting.   - Mild nausea: Taking Zofran and ativan PRN, symptoms minimal   - Protonix for GI prophy.   - Ursodiol for VOD prophy  - s/p left sided colectomy for recurrent diverticulitis.        6.  GVH: No aGVH. stopped MMF 8/25. No signs of GVHD.  - On Tacrolimus: Taking 1 mg BID, level 11.4 on 9/4    7.  FEN/Renal: Cr mild elevation; increase oral fluid intake. Mg level has been good and there was no change in dose today.   - On oral mag to 1000 mg QID (8tbs per day) checked by pharmacy.       8. CV: Adequate BP control on losartan 50 mg and diltiazem  mg.  - Hx tachycardia with arrhythmia, s/p 3 ablations  - hold crestor through transplant      9. Endo:   - Hx DM type II.  Current DM Regimen: carb coverage (5 units per meal, 2-3 units per snack, 4 units with Ensure), sliding scale with meals and bedtime.   - Hold metformin 500mg/d      10. : Hx prostate cancer.     11. Neuro: History of chronic insomnia. Ambien to 5-10 mg QHS PRN      12. Pulm: Hx spiculated pulm nodule (concern for malignancy w/ hx tobacco use) and GGO (concerning for fungal PNA). F/u CT 8/14 improved.  Cont Vfend 200mg bid     Plan:  Flu shot around day +60  C Erna on 9/27 with labs; tacrolimus level each visit  Refill vfend for 1 month then discontinue; then start fluconazole 100 mg daily; adjust Tacrolimus as needed    Charanjit Umanzor MD

## 2017-09-20 NOTE — NURSING NOTE
Chief Complaint   Patient presents with     Blood Draw     VS done, labs collected via CVC.  Both lumens with + blood return and heparin locked.  Caps changed, dressing to be done in clinic.  Hx AML post transplant.

## 2017-09-20 NOTE — PROGRESS NOTES
BMT Clinic Note      Patient ID:  Norman Real is a 56 year old male, currently day +56 s/p NMA allo sib PBSCT for AML       Diagnosis AMLU Acute myelogeneous leukemia, Unknown  HCT Type Allogeneic    Prep Regimen Cytoxan  Fludarabine  TBI   Donor Source Related PBSC    GVHD Prophylaxis   Mycophenolate  Primary BMT Provider Dr. Erna MD          INTERVAL  HISTORY      HPI: Norman is feeling good.  No fever.  Mild occasional nausea.  Occasional hand shakiness. No vomiting.  No more loose stools. Afebrile with no cough, congestion or dyspnea.  No bleeding or rash.  Eating well.   Review of Systems: 10 point ROS negative except as noted above.     PHYSICAL EXAM   BP (!) 150/91  Pulse 81  Temp 98  F (36.7  C)  Resp 18  Wt 92.1 kg (203 lb)  SpO2 98%  BMI 30.77 kg/m2    General: NAD, interactive, appropriate, mild face erythema  Eyes: SAMSON, sclera anicteric   Nose/Mouth/Throat: no oral lesions.  MMM  Lungs: CTA bilaterally  Cardiovascular: RRR, no M/R/G   Abdominal/Rectal: +BS, soft, NT, ND, No HSM   Lymphatics: trace ankle edema bilaterally  Skin: no rashes or petechaie.  Small skin tags to palmar surfaces  Neuro: A&O   Additional Findings: Cooper site NT, no drainage.     LABS AND IMAGING - PAST 24 HOURS      Lab Results   Component Value Date    WBC 3.2 (L) 09/20/2017    ANEU 1.4 (L) 09/20/2017    HGB 11.1 (L) 09/20/2017    HCT 32.2 (L) 09/20/2017     09/20/2017     09/20/2017    POTASSIUM 4.0 09/20/2017    CHLORIDE 105 09/20/2017    CO2 28 09/20/2017     (H) 09/20/2017    BUN 12 09/20/2017    CR 1.22 09/20/2017    MAG 1.7 09/20/2017    INR 0.96 07/31/2017    BILITOTAL 0.5 09/20/2017    AST 21 09/20/2017    ALT 25 09/20/2017    ALKPHOS 76 09/20/2017    PROTTOTAL 6.9 09/20/2017    ALBUMIN 3.7 09/20/2017     Tac level pending     ASSESSMENT BY SYSTEMS      Norman Real is a 56 year old male with AML, 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 He received  additional stem cells from donor 7/27.        1. BMT: Completed prep with cytoxan, fludarabine, & TBI. Transplant 7/27. Initial stem cell product only contained 2.33 CD34/kg, additional 0.66 CD34/kg on 7/27 for a total of 2.99. Blood chimerism sent today  - preliminary BMBX results show no increase in blasts.  98% donor BM (8/15/17); 80% donor CD3; 100% donor CD15.     2. AML: Patient continues to have no evidence of recurrent leukemia. Today we had a discussion about the  maintenance therapy study. We discussed the pros and cons of maintenance therapy after donor transplant patient, talked about possible side effects and risks of this type of immune modulatory therapy. In particular, we talked about the risk of graft versus host disease and flulike symptoms. We also talked about the frequently observed abdominal rash related to the injection sites. I shared with the patient what is known from the floor patient that are already on this study so far. The patient and his wife review the consent form that was provided to them last week. After all the above the patient felt confident that he is interested and will interpret dissipate in this study. We signed a consent for and the research nurse will communicate with the patient to possibly start protocol related treatments next week, or the week after. The next plan bone marrow biopsy is for day 100.     3. HEME: Keep Hgb>8 and plts>10K. No pre-meds.    4.  ID:  Afebrile. No active infections. Improved CT with post-infectious changes. 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   8/22 CMV=neg.  - Hold Bactrim due to dropping WBC & gave Pentamidine on 8/25.  No allergy to bactrim.      5.  GI: No  vomiting.   - Mild nausea: Taking Zofran and ativan PRN, symptoms minimal   - Protonix for GI prophy.   - Ursodiol for VOD prophy  - s/p left sided colectomy for recurrent diverticulitis.        6.  GVH: No aGVH. stopped MMF 8/25. No signs of GVHD.  - On Tacrolimus: Taking 1 mg BID, level 11.4 on 9/4    7.  FEN/Renal: Cr mild elevation; increase oral fluid intake. Mg level has been good and there was no change in dose today.   - On oral mag to 1000 mg QID (8tbs per day) checked by pharmacy.       8. CV: Adequate BP control on losartan 50 mg and diltiazem  mg.  - Hx tachycardia with arrhythmia, s/p 3 ablations  - hold crestor through transplant      9. Endo:   - Hx DM type II.  Current DM Regimen: carb coverage (5 units per meal, 2-3 units per snack, 4 units with Ensure), sliding scale with meals and bedtime.   - Hold metformin 500mg/d      10. : Hx prostate cancer.     11. Neuro: History of chronic insomnia. Ambien to 5-10 mg QHS PRN      12. Pulm: Hx spiculated pulm nodule (concern for malignancy w/ hx tobacco use) and GGO (concerning for fungal PNA). F/u CT 8/14 improved.  Cont Vfend 200mg bid     Plan:  Flu shot around day +60  RTC Erna on 9/27 with labs; tacrolimus level each visit  Refill vfend for 1 month then discontinue; then start fluconazole 100 mg daily; adjust Tacrolimus as needed

## 2017-09-23 NOTE — TELEPHONE ENCOUNTER
"Telephone encounter, paged as on-call heme/onc fellow    Norman Real is 60 days s/p BMT for AML. On recent visit with Dr. Umanzor on 9/20 he has no evidence of leukemia and doing well post-transplant.     He calls with a \"gritty feeling\" in his throat for the past two days. No pain or soreness, eating and drinking fine. No fever, cough/cold, or other symptoms. In previous years he sometimes gets this feeling with allergies.     He otherwise feels fine. I am not concerned about infectious pharyngitis based on his mild symptoms. He can try Flonase or an antihistamine for allergies.     If he develops any new symptoms I encouraged him to call back.     Lionel Montgomery MD  Heme/Onc Fellow  Pager: [phone]    "

## 2017-09-23 NOTE — TELEPHONE ENCOUNTER
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  Per care advice, writer paged on-call Oncologist Marlon Montgomery at 9:42pm via Smart Web to pt directly at [phone].  Advised pt to contact the nurse line again if he does not hear from Dr. Montgomery within 20 minutes.     Reason for Disposition    Diabetes mellitus or weak immune system (e.g., HIV positive, cancer chemo, splenectomy, organ transplant)    Additional Information    Negative: Severe difficulty breathing (e.g., struggling for each breath, speaks in single words, stridor)    Negative: Sounds like a life-threatening emergency to the triager    Negative: [1] Diagnosed strep throat AND [2] taking antibiotic AND [3] symptoms continue    Negative: Throat culture results, call about    Negative: Productive cough is main symptom    Negative: Non-productive cough is main symptom    Negative: Hoarseness is main symptom    Negative: Runny nose is main symptom    Negative: [1] Drooling or spitting out saliva (because can't swallow) AND [2] normal breathing    Negative: Unable to open mouth completely    Negative: [1] Difficulty breathing AND [2] not severe    Negative: Fever > 104 F (40 C)    Negative: [1] Refuses to drink anything AND [2] for > 12 hours    Negative: [1] Drinking very little AND [2] dehydration suspected (e.g., no urine > 12 hours, very dry mouth, very lightheaded)    Negative: Patient sounds very sick or weak to the triager    Negative: SEVERE (e.g., excruciating) throat pain    Negative: [1] Pus on tonsils (back of throat) AND [2]  fever AND [3] swollen neck lymph nodes (\"glands\")    Negative: [1] Rash AND [2] widespread (especially chest and abdomen)    Negative: Earache also present    Negative: Fever present > 3 days (72 hours)    Protocols used: SORE THROAT-ADULT-    "

## 2017-09-25 NOTE — TELEPHONE ENCOUNTER
"Pt called a second time to report some persistent symptoms after zyrtec for runny nose and \"gritty eyes\" and sneezing. Some symptoms improved. Tried flonase with some improvement. No fevers, chills. No sore throat today. Pt assured this does not sound infectious. Will be evaluated Wednesday as planned. Pt to call with any new or worsening symptoms. Ayde Shafer  "

## 2017-09-27 NOTE — MR AVS SNAPSHOT
After Visit Summary   9/27/2017    Norman Real    MRN: [id number]           Patient Information     Date Of Birth          1960        Visit Information        Provider Department      9/27/2017 11:00 AM UC 4 ATC; UC BMT INFUSION [facility] Blood and Marrow Transplant        Today's Diagnoses     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    -  1          [facility] [place] [place]  [address] [place] [address]  Phone: [phone]  Clinic Hours:   Monday-Thursday:7am to 7pm   Friday: 7am to 5pm   Weekends and holidays:    8am to noon (in general)  If your fever is 100.5  or greater,   call the clinic.  After hours call the   hospital at [phone] or   [phone]. Ask for the BMT   fellow on-call            Follow-ups after your visit        Your next 10 appointments already scheduled     Sep 28, 2017 10:40 AM CDT   (Arrive by 10:25 AM)   NEW DIABETES with Gerald Weiss MD   [facility] Endocrinology ([facility])    [address] [place] [address]  3rd [facility] [address]   [phone]            Sep 29, 2017 11:00 AM CDT   [facility] Draw with UC [facility] DRAW   [facility] Draw ([facility])    [address] [place] [address]   [phone]            Sep 29, 2017 11:30 AM CDT   Return with UC BMT MIMA #2   [facility] Blood and Marrow Transplant ([facility])    [address] [place] [address]   [phone]            Oct 04, 2017 11:30 AM CDT   [facility] Draw with UC [facility] DRAW   [facility] Draw ([facility])    [address] [place] [address]   [phone]            Oct 04, 2017 12:00 PM CDT   Infusion 30 with UC BMT INFUSION, UC 1 ATC   [facility] Blood and Marrow Transplant ([facility])    [address] [place] [address]  [facility] [address]   [phone]            Oct 04, 2017 12:30 PM CDT   BMT Anniversary Visit with Charanjit Umanzor MD   [facility] Blood and Marrow Transplant ([facility])    [address]  [address] [facility] [address]   [phone]            Oct 11, 2017 10:30 AM CDT   [facility] Draw with UC [facility] DRAW   [facility] Draw ([facility])    [address]  [address] [facility] [address]   [phone]            Oct 11, 2017 11:00 AM CDT   BMT Anniversary Visit with UC BMT MIMA #4   [facility] Blood and Marrow Transplant ([facility])    [address] [address]   [phone]            Oct 11, 2017 11:30 AM CDT   Infusion 30 with UC BMT INFUSION   [facility] Blood and Marrow Transplant ([facility])    [address] [address]   [phone]              Future tests that were ordered for you today     Open Future Orders        Priority Expected Expires Ordered    Basic metabolic panel Routine 9/29/2017 9/29/2017 9/27/2017    CBC with platelets differential Routine 9/29/2017 9/29/2017 9/27/2017    Tacrolimus level Routine 10/4/2017 10/27/2017 9/27/2017    Magnesium Routine 10/4/2017 10/27/2017 9/27/2017    Comprehensive metabolic panel Routine 10/4/2017 10/27/2017 9/27/2017    CBC with platelets differential Routine 10/4/2017 10/27/2017 9/27/2017    CMV DNA quantification Routine 10/4/2017 10/27/2017 9/27/2017            Who to contact     If you have questions or need follow up information about today's clinic visit or your schedule please contact [facility] directly at [phone].  Normal or non-critical lab and imaging results will be communicated to you by MyChart, letter or phone within 4 business days after the clinic has received the results. If you do not  "hear from us within 7 days, please contact the clinic through [employer] or phone. If you have a critical or abnormal lab result, we will notify you by phone as soon as possible.  Submit refill requests through [employer] or call your pharmacy and they will forward the refill request to us. Please allow 3 business days for your refill to be completed.          Additional Information About Your Visit        [employer] Information     [employer] lets you send messages to your doctor, view your test results, renew your prescriptions, schedule appointments and more. To sign up, go to [email or web address] . Click on \"Log in\" on the left side of the screen, which will take you to the Welcome page. Then click on \"Sign up Now\" on the right side of the page.     You will be asked to enter the access code listed below, as well as some personal information. Please follow the directions to create your username and password.     Your access code is: [email or web address]  Expires: 2017  3:48 PM     Your access code will  in 90 days. If you need help or a new code, please call your [facility] clinic or [phone].        Care EveryWhere ID     This is your Care EveryWhere ID. This could be used by other organizations to access your [facility] medical records  [id number]        Your Vitals Were     Pulse Temperature Respirations Pulse Oximetry          66 98  F (36.7  C) (Oral) 16 98%         Blood Pressure from Last 3 Encounters:   17 154/81   17 149/83   17 (!) 150/91    Weight from Last 3 Encounters:   17 89 kg (196 lb 3.2 oz)   17 92.1 kg (203 lb)   17 92.3 kg (203 lb 6.4 oz)              Today, you had the following     No orders found for display         Today's Medication Changes          These changes are accurate as of: 17  3:31 PM.  If you have any questions, ask your nurse or doctor.               These medicines have changed or have updated prescriptions.        Dose/Directions    " magnesium oxide 400 MG tablet   Commonly known as:  MAG-OX   This may have changed:    - how much to take  - additional instructions   Used for:  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        8 tabs daily   Quantity:  100 tablet   Refills:  1                Recent Review Flowsheet Data     BMT Recent Results Latest Ref Rng & Units 8/29/2017 9/1/2017 9/5/2017 9/8/2017 9/13/2017 9/20/2017 9/27/2017    WBC 4.0 - 11.0 10e9/L 3.4(L) 3.1(L) 3.1(L) 3.2(L) 3.3(L) 3.2(L) 3.4(L)    Hemoglobin 13.3 - 17.7 g/dL 10.8(L) 10.4(L) 11.0(L) 11.0(L) 10.5(L) 11.1(L) 11.0(L)    Platelet Count 150 - 450 10e9/L 132(L) 135(L) 133(L) 138(L) 158 157 145(L)    Neutrophils (Absolute) 1.6 - 8.3 10e9/L 1.9 1.7 1.6 1.6 1.4(L) 1.4(L) 1.8    INR 0.86 - 1.14 - - - - - - -    Sodium 133 - 144 mmol/L 138 138 136 139 137 139 139    Potassium 3.4 - 5.3 mmol/L 4.0 3.9 4.0 3.9 3.5 4.0 4.0    Chloride 94 - 109 mmol/L 104 104 104 106 103 105 105    Glucose 70 - 99 mg/dL 152(H) 168(H) 177(H) 122(H) 158(H) 150(H) 152(H)    Urea Nitrogen 7 - 30 mg/dL 11 12 13 13 15 12 14    Creatinine 0.66 - 1.25 mg/dL 1.08 1.16 1.09 1.17 1.39(H) 1.22 1.16    Calcium (Total) 8.5 - 10.1 mg/dL 8.8 8.6 8.3(L) 8.7 8.6 8.8 8.6    Protein (Total) 6.8 - 8.8 g/dL - 6.8 6.8 - 6.8 6.9 7.1    Albumin 3.4 - 5.0 g/dL - 3.5 3.4 - 3.6 3.7 3.7    Bilirubin (Direct) 0.0 - 0.2 mg/dL - - - - - - -    Alkaline Phosphatase 40 - 150 U/L - 75 80 - 75 76 80    AST 0 - 45 U/L - 26 22 - 22 21 15    ALT 0 - 70 U/L - 32 28 - 28 25 22    MCV 78 - 100 fl 90 90 90 91 91 92 91               Primary Care Provider    Physician No Ref-Primary       No address on file        Equal Access to Services     ASHLYN FLANAGAN : Greg Ratliff, joana manning, raven mchugh, vishal lópez. [facility] [phone].    ATENCIÓN: Si habla español, tiene a gusman disposición servicios gratuitos de asistencia lingüística. Leyla al [phone].    We comply with applicable  federal civil rights laws and [place] laws. We do not discriminate on the basis of race, color, national origin, age, disability sex, sexual orientation or gender identity.            Thank you!     Thank you for choosing [facility]  for your care. Our goal is always to provide you with excellent care. Hearing back from our patients is one way we can continue to improve our services. Please take a few minutes to complete the written survey that you may receive in the mail after your visit with us. Thank you!             Your Updated Medication List - Protect others around you: Learn how to safely use, store and throw away your medicines at www.disposemymeds.org.          This list is accurate as of: 9/27/17  3:31 PM.  Always use your most recent med list.                   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), History of peripheral stem cell transplant (H), Aspergillosis (H)       cholecalciferol 1000 UNITS capsule    vitamin  -D     Take 1 capsule by mouth daily        cyclobenzaprine 5 MG tablet    FLEXERIL     Take 1 tablet (5 mg) by mouth 3 times daily as needed for muscle spasms        diltiazem 360 MG 24 hr CD capsule    CARDIZEM CD; CARTIA XT    90 capsule    Take 1 capsule (360 mg) by mouth daily    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       FLONASE NA      Spray in nostril as needed        guaiFENesin 600 MG 12 hr tablet    MUCINEX     Take 600 mg by mouth        heparin lock flush 10 UNIT/ML Soln injection     30 vial    5 mLs by Intracatheter route daily In each lumen    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       insulin aspart 100 UNIT/ML injection    NovoLOG FLEXPEN    3 mL    Give insulin based on High sliding scale  Also give prescribed amount before meals based on  carbohydrate coverage    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       insulin glargine 100 UNIT/ML injection    LANTUS    3 mL    Inject 10 Units Subcutaneous daily    Type 2 diabetes mellitus without complication, without long-term current use of insulin (H)       insulin pen needle 30G X 8 MM     100 each    Use when delivering insulin as directed.    Type 2 diabetes mellitus without complication, without long-term current use of insulin (H)       LORazepam 0.5 MG tablet    ATIVAN    40 tablet    Take 1-2 tablets (0.5-1 mg) by mouth every 4 hours as needed for anxiety (nausea/vomiting/sleep)    Stem cell transplant candidate       losartan 50 MG tablet    COZAAR    30 tablet    Take 1 tablet (50 mg) by mouth daily    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       magnesium oxide 400 MG tablet    MAG-OX    100 tablet    8 tabs daily    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       MULTI COMPLETE PO           ondansetron 8 MG tablet    ZOFRAN    30 tablet    Take 1 tablet (8 mg) by mouth every 8 hours as needed for nausea    Stem cell transplant candidate       pantoprazole 40 MG EC tablet    PROTONIX    90 tablet    Take 1 tablet (40 mg) by mouth daily    Stem cell transplant candidate       psyllium Packet    METAMUCIL/KONSYL    90 packet    Take 1 packet by mouth 3 times daily    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       sulfamethoxazole-trimethoprim 800-160 MG per tablet    BACTRIM DS/SEPTRA DS    30 tablet    Clinic will tell you when to start 1 tablet twice daily by mouth on Mondays and Tuesdays, start around day +28    Stem cell transplant candidate       tacrolimus 0.5 MG capsule    GENERIC EQUIVALENT    56 capsule    Take 2 capsules (1 mg) by mouth 2 times daily    Acute myeloid leukemia in remission (H)       ursodiol 300 MG capsule    ACTIGALL    270 capsule    Take 1 capsule (300 mg) by mouth 3 times daily    Stem cell transplant candidate       voriconazole 200 MG tablet     VFEND    60 tablet    Take 1 tablet (200 mg) by mouth 2 times daily    Stem cell transplant candidate       zolpidem 5 MG tablet    AMBIEN    45 tablet    Take 1-2 tablets (5-10 mg) by mouth nightly as needed for sleep    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

## 2017-09-27 NOTE — MR AVS SNAPSHOT
After Visit Summary   9/27/2017    Norman Real    MRN: [id number]           Patient Information     Date Of Birth          1960        Visit Information        Provider Department      9/27/2017 9:30 AM 1,  Bmt Nurse [facility] Blood and Marrow Transplant        Today's Diagnoses     Acute myeloid leukemia in remission (H)              [facility] ([facility])  [address] [place] [address]  Phone: [phone]  Clinic Hours:   Monday-Thursday:7am to 7pm   Friday: 7am to 5pm   Weekends and holidays:    8am to noon (in general)  If your fever is 100.5  or greater,   call the clinic.  After hours call the   hospital at [phone] or   [phone]. Ask for the BMT   fellow on-call            Follow-ups after your visit        Your next 10 appointments already scheduled     Sep 27, 2017 10:00 AM CDT   [facility] Draw with  [facility] DRAW   [facility] Draw ([facility])    [address] [place] [address]  [address]nd [facility] [address]   [phone]            Sep 27, 2017 10:30 AM CDT   BMT Anniversary Visit with Charanjit Umanzor MD   [facility] Blood and Marrow Transplant ([facility])    [address] [place] [address]  [address] [facility] [address]   [phone]            Sep 27, 2017 11:00 AM CDT   Infusion 30 with UC BMT INFUSION, UC 4 ATC   [facility] Blood and Marrow Transplant ([facility])    [address] [place] [address]   [phone]            Sep 28, 2017 10:40 AM CDT   (Arrive by 10:25 AM)   NEW DIABETES with Gerald Weiss MD   [facility] Endocrinology ([facility])    [address] [place] [address]  3rd [facility] [address]   [phone]            Oct 02, 2017 11:00 AM CDT   [facility] Draw with  [facility] DRAW   [facility] Draw ([facility])    [address] [place] [address]  [facility] [address]   [phone]            Oct 02, 2017 11:30 AM CDT   Return with UC BMT MIMA #3   [facility] Blood and Marrow Transplant ([facility])    [address] [facility]  2nd [facility] [address]   [phone]            Oct 04, 2017 10:30 AM CDT   [facility] Lab Draw with  [facility] LAB DRAW   [facility] Lab Draw ([facility])    [address] [address]   [phone]            Oct 04, 2017 11:00 AM CDT   BMT Anniversary Visit with UC BMT MIMA #3   [facility] Blood and Marrow Transplant ([facility])    [address] [address]   [phone]            Oct 04, 2017 11:30 AM CDT   Infusion 30 with UC BMT INFUSION   [facility] Blood and Marrow Transplant ([facility])    [address] [address]   [phone]              Future tests that were ordered for you today     Open Future Orders        Priority Expected Expires Ordered    CMV DNA quantification Routine 9/27/2017 9/27/2018 9/27/2017    EBV DNA PCR Quantitative Whole Blood Routine 9/27/2017 9/27/2018 9/27/2017            Who to contact     If you have questions or need follow up information about today's clinic visit or your schedule please contact [facility] BLOOD AND MARROW TRANSPLANT directly at [phone].  Normal or non-critical lab and imaging results will be communicated to you by MyChart, letter or phone within 4 business days after the clinic has received the results. If you do not hear from us within 7 days, please contact the clinic through [employer] or phone. If you have a critical or abnormal lab result, we will notify you by phone as soon as possible.  Submit refill requests through [employer] or call your pharmacy and they will forward the refill request to us. Please allow 3 business days for your refill to be  "completed.          Additional Information About Your Visit        [employer] Information     [employer] lets you send messages to your doctor, view your test results, renew your prescriptions, schedule appointments and more. To sign up, go to [email or web address] . Click on \"Log in\" on the left side of the screen, which will take you to the Welcome page. Then click on \"Sign up Now\" on the right side of the page.     You will be asked to enter the access code listed below, as well as some personal information. Please follow the directions to create your username and password.     Your access code is: [email or web address]  Expires: 2017  3:48 PM     Your access code will  in 90 days. If you need help or a new code, please call your [facility] clinic or [phone].        Care EveryWhere ID     This is your Care EveryWhere ID. This could be used by other organizations to access your [facility] medical records  [id number]         Blood Pressure from Last 3 Encounters:   17 (!) 150/91   17 134/71   17 130/84    Weight from Last 3 Encounters:   17 92.1 kg (203 lb)   17 92.3 kg (203 lb 6.4 oz)   17 92.1 kg (203 lb)              We Performed the Following     EKG 12-lead complete w/read - Clinics          Today's Medication Changes          These changes are accurate as of: 17  9:50 AM.  If you have any questions, ask your nurse or doctor.               These medicines have changed or have updated prescriptions.        Dose/Directions    magnesium oxide 400 MG tablet   Commonly known as:  MAG-OX   This may have changed:    - how much to take  - additional instructions   Used for:  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        8 tabs daily   Quantity:  100 tablet   Refills:  1                Recent Review Flowsheet Data     BMT Recent Results Latest Ref Rng & Units 2017    WBC 4.0 - 11.0 10e9/L 3.0(L) 3.4(L) 3.1(L) " 3.1(L) 3.2(L) 3.3(L) 3.2(L)    Hemoglobin 13.3 - 17.7 g/dL 11.2(L) 10.8(L) 10.4(L) 11.0(L) 11.0(L) 10.5(L) 11.1(L)    Platelet Count 150 - 450 10e9/L 142(L) 132(L) 135(L) 133(L) 138(L) 158 157    Neutrophils (Absolute) 1.6 - 8.3 10e9/L 1.4(L) 1.9 1.7 1.6 1.6 1.4(L) 1.4(L)    INR 0.86 - 1.14 - - - - - - -    Sodium 133 - 144 mmol/L 138 138 138 136 139 137 139    Potassium 3.4 - 5.3 mmol/L 4.1 4.0 3.9 4.0 3.9 3.5 4.0    Chloride 94 - 109 mmol/L 104 104 104 104 106 103 105    Glucose 70 - 99 mg/dL 160(H) 152(H) 168(H) 177(H) 122(H) 158(H) 150(H)    Urea Nitrogen 7 - 30 mg/dL 11 11 12 13 13 15 12    Creatinine 0.66 - 1.25 mg/dL 1.02 1.08 1.16 1.09 1.17 1.39(H) 1.22    Calcium (Total) 8.5 - 10.1 mg/dL 8.7 8.8 8.6 8.3(L) 8.7 8.6 8.8    Protein (Total) 6.8 - 8.8 g/dL - - 6.8 6.8 - 6.8 6.9    Albumin 3.4 - 5.0 g/dL - - 3.5 3.4 - 3.6 3.7    Bilirubin (Direct) 0.0 - 0.2 mg/dL - - - - - - -    Alkaline Phosphatase 40 - 150 U/L - - 75 80 - 75 76    AST 0 - 45 U/L - - 26 22 - 22 21    ALT 0 - 70 U/L - - 32 28 - 28 25    MCV 78 - 100 fl 90 90 90 90 91 91 92               Primary Care Provider    Physician No Ref-Primary       No address on file        Equal Access to Services     ASHLYN FLANAGAN AH: Hadii aad ku hadsimonegeovanna Sobrenda, waaxda luqadaha, qaybta kaalmada lolita, vishal lópez. So [facility] [phone].    ATENCIÓN: Si habla español, tiene a gusman disposición servicios gratuitos de asistencia lingüística. Llame al [phone].    We comply with applicable federal civil rights laws and [place] laws. We do not discriminate on the basis of race, color, national origin, age, disability sex, sexual orientation or gender identity.            Thank you!     Thank you for choosing [facility] BLOOD AND MARROW TRANSPLANT  for your care. Our goal is always to provide you with excellent care. Hearing back from our patients is one way we can continue to improve our services. Please take a few minutes to complete the  written survey that you may receive in the mail after your visit with us. Thank you!             Your Updated Medication List - Protect others around you: Learn how to safely use, store and throw away your medicines at www.disposemymeds.org.          This list is accurate as of: 9/27/17  9:50 AM.  Always use your most recent med list.                   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), History of peripheral stem cell transplant (H), Aspergillosis (H)       cholecalciferol 1000 UNITS capsule    vitamin  -D     Take 1 capsule by mouth daily        cyclobenzaprine 5 MG tablet    FLEXERIL     Take 1 tablet (5 mg) by mouth 3 times daily as needed for muscle spasms        diltiazem 360 MG 24 hr CD capsule    CARDIZEM CD; CARTIA XT    90 capsule    Take 1 capsule (360 mg) by mouth daily    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       FLONASE NA      Spray in nostril as needed        guaiFENesin 600 MG 12 hr tablet    MUCINEX     Take 600 mg by mouth        heparin lock flush 10 UNIT/ML Soln injection     30 vial    5 mLs by Intracatheter route daily In each lumen    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       insulin aspart 100 UNIT/ML injection    NovoLOG FLEXPEN    3 mL    Give insulin based on High sliding scale  Also give prescribed amount before meals based on carbohydrate coverage    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       insulin glargine 100 UNIT/ML injection    LANTUS    3 mL    Inject 10 Units Subcutaneous daily    Type 2 diabetes mellitus without complication, without long-term current use of insulin (H)       insulin pen needle 30G X 8 MM     100 each    Use when delivering insulin as directed.    Type 2 diabetes mellitus without complication, without long-term current use of insulin (H)        LORazepam 0.5 MG tablet    ATIVAN    40 tablet    Take 1-2 tablets (0.5-1 mg) by mouth every 4 hours as needed for anxiety (nausea/vomiting/sleep)    Stem cell transplant candidate       losartan 50 MG tablet    COZAAR    30 tablet    Take 1 tablet (50 mg) by mouth daily    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       magnesium oxide 400 MG tablet    MAG-OX    100 tablet    8 tabs daily    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       MULTI COMPLETE PO           ondansetron 8 MG tablet    ZOFRAN    30 tablet    Take 1 tablet (8 mg) by mouth every 8 hours as needed for nausea    Stem cell transplant candidate       pantoprazole 40 MG EC tablet    PROTONIX    90 tablet    Take 1 tablet (40 mg) by mouth daily    Stem cell transplant candidate       psyllium Packet    METAMUCIL/KONSYL    90 packet    Take 1 packet by mouth 3 times daily    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       sulfamethoxazole-trimethoprim 800-160 MG per tablet    BACTRIM DS/SEPTRA DS    30 tablet    Clinic will tell you when to start 1 tablet twice daily by mouth on Mondays and Tuesdays, start around day +28    Stem cell transplant candidate       tacrolimus 0.5 MG capsule    GENERIC EQUIVALENT    56 capsule    Take 2 capsules (1 mg) by mouth 2 times daily    Acute myeloid leukemia in remission (H)       ursodiol 300 MG capsule    ACTIGALL    270 capsule    Take 1 capsule (300 mg) by mouth 3 times daily    Stem cell transplant candidate       voriconazole 200 MG tablet    VFEND    60 tablet    Take 1 tablet (200 mg) by mouth 2 times daily    Stem cell transplant candidate       zolpidem 5 MG tablet    AMBIEN    45 tablet    Take 1-2 tablets (5-10 mg) by mouth nightly as needed for sleep    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

## 2017-09-27 NOTE — NURSING NOTE
"Oncology Rooming Note    September 27, 2017 10:36 AM   Norman Real is a 56 year old male who presents for:    Chief Complaint   Patient presents with     Blood Draw     labs drawn     RECHECK     AML     Initial Vitals: /83 (BP Location: Right arm, Patient Position: Chair, Cuff Size: Adult Regular)  Pulse 71  Temp 98.2  F (36.8  C) (Oral)  Ht 1.727 m (5' 8\")  Wt 89 kg (196 lb 3.2 oz)  SpO2 97%  BMI 29.83 kg/m2 Estimated body mass index is 29.83 kg/(m^2) as calculated from the following:    Height as of this encounter: 1.727 m (5' 8\").    Weight as of this encounter: 89 kg (196 lb 3.2 oz). Body surface area is 2.07 meters squared.  No Pain (0) Comment: Data Unavailable   No LMP for male patient.  Allergies reviewed: Yes  Medications reviewed: Yes    Medications: Medication refills not needed today.  Pharmacy name entered into [employer]: [id number] - [place], [place] - [address] [place] [address] AVE     Clinical concerns: Dressing changed today, see flow sheet.     0 minutes for nursing intake (face to face time)     SATISH WOODARD CMA              "

## 2017-09-27 NOTE — PROGRESS NOTES
"BMT Clinic Note      Patient ID:  Norman Real is a 56 year old male, currently day +63 s/p NMA allo sib PBSCT for AML       Diagnosis AMLU Acute myelogeneous leukemia, Unknown  HCT Type Allogeneic    Prep Regimen Cytoxan  Fludarabine  TBI   Donor Source Related PBSC    GVHD Prophylaxis   Mycophenolate  Primary BMT Provider Dr. Erna MD          INTERVAL  HISTORY      HPI: Norman is feeling good.  Called earlier this week with scratchy throat and runny nose that resolved with allergy medications. No fever.  Mild occasional nausea.  No complain of hand shakiness. No vomiting.  No more loose stools. Afebrile with no cough, no congestion or dyspnea.  No bleeding or rash.  Eating well.   Review of Systems: 10 point ROS negative except as noted above.     PHYSICAL EXAM   /83 (BP Location: Right arm, Patient Position: Chair, Cuff Size: Adult Regular)  Pulse 71  Temp 98.2  F (36.8  C) (Oral)  Ht 1.727 m (5' 8\")  Wt 89 kg (196 lb 3.2 oz)  SpO2 97%  BMI 29.83 kg/m2    General: NAD, interactive, appropriate, mild face erythema  Eyes: SAMSON, sclera anicteric   Nose/Mouth/Throat: no oral lesions.  MMM  Lungs: CTA bilaterally  Cardiovascular: RRR, no M/R/G   Abdominal/Rectal: +BS, soft, NT, ND, No HSM   Lymphatics: trace ankle edema bilaterally  Skin: no rashes or petechaie.  Small skin tags to palmar surfaces  Neuro: A&O   Additional Findings: Cooper site NT, no drainage.     LABS AND IMAGING - PAST 24 HOURS      Lab Results   Component Value Date    WBC 3.4 (L) 09/27/2017    ANEU 1.8 09/27/2017    HGB 11.0 (L) 09/27/2017    HCT 31.4 (L) 09/27/2017     (L) 09/27/2017     09/27/2017    POTASSIUM 4.0 09/27/2017    CHLORIDE 105 09/27/2017    CO2 28 09/27/2017     (H) 09/27/2017    BUN 14 09/27/2017    CR 1.16 09/27/2017    MAG 1.7 09/20/2017    INR 0.96 07/31/2017    BILITOTAL 0.6 09/27/2017    AST 15 09/27/2017    ALT 22 09/27/2017    ALKPHOS 80 09/27/2017    PROTTOTAL 7.1 09/27/2017 "    ALBUMIN 3.7 09/27/2017     Tac level pending     ASSESSMENT BY SYSTEMS      Norman Salazar Real is a 56 year old male with AML, 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 He received additional stem cells from donor 7/27.        1. BMT: Completed prep with cytoxan, fludarabine, & TBI. Transplant 7/27. Initial stem cell product only contained 2.33 CD34/kg, additional 0.66 CD34/kg on 7/27 for a total of 2.99. Blood chimerism sent today  - preliminary BMBX results show no increase in blasts.  98% donor BM (8/15/17); 90% donor CD3; 100% donor CD15.     2. AML: Patient continues to have no evidence of recurrent leukemia. Plan to start NVT099 maintenance treatment today, C1D1. Reviewed precautions and side effects again. He is ready to go.     3. HEME: Keep Hgb>8 and plts>10K. No pre-meds.    4.  ID:  Afebrile. No active infections. Improved CT with post-infectious changes. 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   8/22 CMV=neg.  - Hold Bactrim due to dropping WBC & gave Pentamidine on 8/25.  No allergy to bactrim.      5.  GI: No vomiting.   - Mild nausea: Taking Zofran and ativan PRN, symptoms minimal   - Protonix for GI prophy.   - Ursodiol for VOD prophy  - s/p left sided colectomy for recurrent diverticulitis.        6.  GVH: No aGVH. stopped MMF 8/25. No signs of GVHD.  - On Tacrolimus: Taking 1 mg BID, level 11.4 on 9/4    7.  FEN/Renal: Cr is good today; continue good oral fluid intake. Mg level has been good and there was no change in dose today.   - On oral mag to 1000 mg QID (8tbs per day) checked by pharmacy.       8. CV: Adequate BP control on losartan 50 mg and diltiazem  mg.  - Hx tachycardia with arrhythmia, s/p 3  ablations  - hold crestor through transplant      9. Endo:   - Hx DM type II.  Current DM Regimen: carb coverage (5 units per meal, 2-3 units per snack, 4 units with Ensure), sliding scale with meals and bedtime.   - Hold metformin 500mg/d      10. : Hx prostate cancer.     11. Neuro: History of chronic insomnia. Ambien to 5-10 mg QHS PRN      12. Pulm: Hx spiculated pulm nodule (concern for malignancy w/ hx tobacco use) and GGO (concerning for fungal PNA). F/u CT 8/14 improved.  Cont Vfend 200mg bid     Plan:  Flu shot early NOV 2017  RTC Erna on 10/04 with labs; tacrolimus level each visit  RTC BMT DOM/NP/PA on 9/29 with labs   Refill vfend for 1 month then discontinue; then start fluconazole 100 mg daily; adjust Tacrolimus as needed  Eligibility verified again. Start  study today.

## 2017-09-27 NOTE — NURSING NOTE
"Chief Complaint   Patient presents with     Blood Draw     labs drawn     /83 (BP Location: Right arm, Patient Position: Chair, Cuff Size: Adult Regular)  Pulse 71  Temp 98.2  F (36.8  C) (Oral)  Ht 1.727 m (5' 8\")  Wt 89 kg (196 lb 3.2 oz)  SpO2 97%  BMI 29.83 kg/m2    Vitals taken. Lines accessed by RN. Labs collected through RED lumen and sent. Both caps changed, lines flushed with NS & Heparin. Pt tolerated well. Pt checked in for next appointment.    Soledad Villafuerte, RN    "

## 2017-09-27 NOTE — MR AVS SNAPSHOT
After Visit Summary   9/27/2017    Norman Real    MRN: [id number]           Patient Information     Date Of Birth          1960        Visit Information        Provider Department      9/27/2017 10:30 AM Charanjit Umanzor MD [facility] Blood and Marrow Transplant        Today's Diagnoses     Stem cell transplant candidate    -  1    Acute myeloid leukemia in remission (H)              [facility] ([facility])  [address] [place] [address]  Phone: [phone]  Clinic Hours:   Monday-Thursday:7am to 7pm   Friday: 7am to 5pm   Weekends and holidays:    8am to noon (in general)  If your fever is 100.5  or greater,   call the clinic.  After hours call the   hospital at [phone] or   [phone]. Ask for the BMT   fellow on-call           Care Instructions    Flu shot early NOV 2017  RTC Erna on 10/04 with labs; tacrolimus level each visit  RTC BMT DOM/NP/PA on 9/29 with labs   Refill vfend for 1 month then discontinue; then start fluconazole 100 mg daily; adjust Tacrolimus as needed  Eligibility verified again. Start  study today.       Sent msg to  to switch 10/4 to MD.    [place] @ BMT          Follow-ups after your visit        Your next 10 appointments already scheduled     Sep 28, 2017 10:40 AM CDT   (Arrive by 10:25 AM)   NEW DIABETES with Gerald Weiss MD   [facility] Endocrinology ([facility])    [address] [place] [address]  [address] [facility] [id number] [phone]            Sep 29, 2017 11:00 AM CDT   [facility] Lab Draw with  [facility] DRAW   [facility] Lab Draw ([facility])    [address] [place] [id number] [phone]            Sep 29, 2017 11:30 AM CDT   Return with  BMT MIMA #2   [facility] Blood and Marrow Transplant ([facility])    [address] [place] [id number] [phone]             Oct 04, 2017 10:30 AM CDT   [facility] Lab Draw with [facility] DRAW   [facility] Lab Draw ([facility])    [address]  2nd Floor  [facility] [address]   [phone]            Oct 04, 2017 11:00 AM CDT   BMT Anniversary Visit with UC BMT MIMA #3   [facility] Blood and Marrow Transplant ([facility])    [address]  2nd Floor  [facility] [address]   [phone]            Oct 04, 2017 11:30 AM CDT   Infusion 30 with UC BMT INFUSION, UC 1 ATC   [facility] Blood and Marrow Transplant ([facility])    [address]  2nd Floor  [facility] [address]   [phone]            Oct 11, 2017 10:30 AM CDT   [facility] Lab Draw with UC [facility] LAB DRAW   [facility] Lab Draw ([facility])    [address]  2nd [facility] [address]   [phone]            Oct 11, 2017 11:00 AM CDT   BMT Anniversary Visit with UC BMT MIMA #4   [facility] Blood and Marrow Transplant ([facility])    [address]  [address]nd [facility] [address]   [phone]            Oct 11, 2017 11:30 AM CDT   Infusion 30 with UC BMT INFUSION   [facility] Blood and Marrow Transplant ([facility])    [address]  2nd [facility] [address]   [phone]              Future tests that were ordered for you today     Open Future Orders        Priority Expected Expires Ordered    Basic metabolic panel Routine 9/29/2017 9/29/2017 9/27/2017    CBC with platelets differential Routine 9/29/2017 9/29/2017 9/27/2017    Tacrolimus level Routine 10/4/2017 10/27/2017 9/27/2017    Magnesium Routine 10/4/2017 10/27/2017 9/27/2017    Comprehensive metabolic panel Routine 10/4/2017 10/27/2017 9/27/2017    CBC with platelets differential Routine 10/4/2017 10/27/2017 9/27/2017    CMV DNA quantification Routine 10/4/2017 10/27/2017 9/27/2017  "           Who to contact     If you have questions or need follow up information about today's clinic visit or your schedule please contact [facility] BLOOD AND MARROW TRANSPLANT directly at [phone].  Normal or non-critical lab and imaging results will be communicated to you by MyChart, letter or phone within 4 business days after the clinic has received the results. If you do not hear from us within 7 days, please contact the clinic through MyChart or phone. If you have a critical or abnormal lab result, we will notify you by phone as soon as possible.  Submit refill requests through IDx or call your pharmacy and they will forward the refill request to us. Please allow 3 business days for your refill to be completed.          Additional Information About Your Visit        IDx Information     IDx lets you send messages to your doctor, view your test results, renew your prescriptions, schedule appointments and more. To sign up, go to [email or web address] . Click on \"Log in\" on the left side of the screen, which will take you to the Welcome page. Then click on \"Sign up Now\" on the right side of the page.     You will be asked to enter the access code listed below, as well as some personal information. Please follow the directions to create your username and password.     Your access code is: [email or web address]  Expires: 2017  3:48 PM     Your access code will  in 90 days. If you need help or a new code, please call your [facility] clinic or [phone].        Care EveryWhere ID     This is your Care EveryWhere ID. This could be used by other organizations to access your [facility] medical records  [id number]        Your Vitals Were     Pulse Temperature Height Pulse Oximetry BMI (Body Mass Index)       71 98.2  F (36.8  C) (Oral) 1.727 m (5' 8\") 97% 29.83 kg/m2        Blood Pressure from Last 3 Encounters:   17 149/83   17 (!) 150/91   17 134/71    Weight from Last 3 " Encounters:   09/27/17 89 kg (196 lb 3.2 oz)   09/20/17 92.1 kg (203 lb)   09/13/17 92.3 kg (203 lb 6.4 oz)              We Performed the Following     CBC with platelets differential     CMV DNA quantification     Comprehensive metabolic panel     EBV DNA PCR Quantitative Whole Blood     Tacrolimus level          Today's Medication Changes          These changes are accurate as of: 9/27/17 11:27 AM.  If you have any questions, ask your nurse or doctor.               These medicines have changed or have updated prescriptions.        Dose/Directions    magnesium oxide 400 MG tablet   Commonly known as:  MAG-OX   This may have changed:    - how much to take  - additional instructions   Used for:  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        8 tabs daily   Quantity:  100 tablet   Refills:  1                Recent Review Flowsheet Data     BMT Recent Results Latest Ref Rng & Units 8/29/2017 9/1/2017 9/5/2017 9/8/2017 9/13/2017 9/20/2017 9/27/2017    WBC 4.0 - 11.0 10e9/L 3.4(L) 3.1(L) 3.1(L) 3.2(L) 3.3(L) 3.2(L) 3.4(L)    Hemoglobin 13.3 - 17.7 g/dL 10.8(L) 10.4(L) 11.0(L) 11.0(L) 10.5(L) 11.1(L) 11.0(L)    Platelet Count 150 - 450 10e9/L 132(L) 135(L) 133(L) 138(L) 158 157 145(L)    Neutrophils (Absolute) 1.6 - 8.3 10e9/L 1.9 1.7 1.6 1.6 1.4(L) 1.4(L) 1.8    INR 0.86 - 1.14 - - - - - - -    Sodium 133 - 144 mmol/L 138 138 136 139 137 139 139    Potassium 3.4 - 5.3 mmol/L 4.0 3.9 4.0 3.9 3.5 4.0 4.0    Chloride 94 - 109 mmol/L 104 104 104 106 103 105 105    Glucose 70 - 99 mg/dL 152(H) 168(H) 177(H) 122(H) 158(H) 150(H) 152(H)    Urea Nitrogen 7 - 30 mg/dL 11 12 13 13 15 12 14    Creatinine 0.66 - 1.25 mg/dL 1.08 1.16 1.09 1.17 1.39(H) 1.22 1.16    Calcium (Total) 8.5 - 10.1 mg/dL 8.8 8.6 8.3(L) 8.7 8.6 8.8 8.6    Protein (Total) 6.8 - 8.8 g/dL - 6.8 6.8 - 6.8 6.9 7.1    Albumin 3.4 - 5.0 g/dL - 3.5 3.4 - 3.6 3.7 3.7    Bilirubin (Direct) 0.0 - 0.2 mg/dL - - - - - - -    Alkaline Phosphatase 40 - 150 U/L - 75 80  - 75 76 80    AST 0 - 45 U/L - 26 22 - 22 21 15    ALT 0 - 70 U/L - 32 28 - 28 25 22    MCV 78 - 100 fl 90 90 90 91 91 92 91               Primary Care Provider    Physician No Ref-Primary       No address on file        Equal Access to Services     ASHLYN MASHA : Greg ta Sodannyali, waaxda luqadaha, qaybta kaalmada adejohannayatimmy, vishal lópez. So [facility] [phone].    ATENCIÓN: Si habla español, tiene a gusman disposición servicios gratuitos de asistencia lingüística. Llame al [phone].    We comply with applicable federal civil rights laws and [place] laws. We do not discriminate on the basis of race, color, national origin, age, disability sex, sexual orientation or gender identity.            Thank you!     Thank you for choosing [facility] BLOOD AND MARROW TRANSPLANT  for your care. Our goal is always to provide you with excellent care. Hearing back from our patients is one way we can continue to improve our services. Please take a few minutes to complete the written survey that you may receive in the mail after your visit with us. Thank you!             Your Updated Medication List - Protect others around you: Learn how to safely use, store and throw away your medicines at www.disposemymeds.org.          This list is accurate as of: 9/27/17 11:27 AM.  Always use your most recent med list.                   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), History of peripheral stem cell transplant (H), Aspergillosis (H)       cholecalciferol 1000 UNITS capsule    vitamin  -D     Take 1 capsule by mouth daily        cyclobenzaprine 5 MG tablet    FLEXERIL     Take 1 tablet (5 mg) by mouth 3 times daily as needed for muscle spasms        diltiazem 360 MG 24 hr CD capsule    CARDIZEM CD; CARTIA XT    90  capsule    Take 1 capsule (360 mg) by mouth daily    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       FLONASE NA      Spray in nostril as needed        guaiFENesin 600 MG 12 hr tablet    MUCINEX     Take 600 mg by mouth        heparin lock flush 10 UNIT/ML Soln injection     30 vial    5 mLs by Intracatheter route daily In each lumen    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       insulin aspart 100 UNIT/ML injection    NovoLOG FLEXPEN    3 mL    Give insulin based on High sliding scale  Also give prescribed amount before meals based on carbohydrate coverage    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       insulin glargine 100 UNIT/ML injection    LANTUS    3 mL    Inject 10 Units Subcutaneous daily    Type 2 diabetes mellitus without complication, without long-term current use of insulin (H)       insulin pen needle 30G X 8 MM     100 each    Use when delivering insulin as directed.    Type 2 diabetes mellitus without complication, without long-term current use of insulin (H)       LORazepam 0.5 MG tablet    ATIVAN    40 tablet    Take 1-2 tablets (0.5-1 mg) by mouth every 4 hours as needed for anxiety (nausea/vomiting/sleep)    Stem cell transplant candidate       losartan 50 MG tablet    COZAAR    30 tablet    Take 1 tablet (50 mg) by mouth daily    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       magnesium oxide 400 MG tablet    MAG-OX    100 tablet    8 tabs daily    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       MULTI COMPLETE PO           ondansetron 8 MG tablet    ZOFRAN    30 tablet    Take 1 tablet (8 mg) by mouth every 8 hours as needed for nausea    Stem cell transplant candidate       pantoprazole 40 MG EC tablet    PROTONIX    90 tablet    Take 1 tablet (40 mg) by mouth daily    Stem cell transplant candidate       psyllium Packet    METAMUCIL/KONSYL    90 packet    Take 1 packet by mouth 3 times daily    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        sulfamethoxazole-trimethoprim 800-160 MG per tablet    BACTRIM DS/SEPTRA DS    30 tablet    Clinic will tell you when to start 1 tablet twice daily by mouth on Mondays and Tuesdays, start around day +28    Stem cell transplant candidate       tacrolimus 0.5 MG capsule    GENERIC EQUIVALENT    56 capsule    Take 2 capsules (1 mg) by mouth 2 times daily    Acute myeloid leukemia in remission (H)       ursodiol 300 MG capsule    ACTIGALL    270 capsule    Take 1 capsule (300 mg) by mouth 3 times daily    Stem cell transplant candidate       voriconazole 200 MG tablet    VFEND    60 tablet    Take 1 tablet (200 mg) by mouth 2 times daily    Stem cell transplant candidate       zolpidem 5 MG tablet    AMBIEN    45 tablet    Take 1-2 tablets (5-10 mg) by mouth nightly as needed for sleep    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

## 2017-09-27 NOTE — NURSING NOTE
EKG performed on patient without incident.  SATISH WOODARD CMA ([facility])

## 2017-09-27 NOTE — PATIENT INSTRUCTIONS
Flu shot early NOV 2017  RTC Erna on 10/04 with labs; tacrolimus level each visit  RTC BMT DOM/NP/PA on 9/29 with labs   Refill vfend for 1 month then discontinue; then start fluconazole 100 mg daily; adjust Tacrolimus as needed  Eligibility verified again. Start  study today.       Sent msg to  to switch 10/4 to MD. [place] @ BMT

## 2017-09-27 NOTE — LETTER
"9/27/2017      RE: Norman Real  PO   [facility] [address]       BMT Clinic Note      Patient ID:  Norman Real is a 56 year old male, currently day +63 s/p NMA allo sib PBSCT for AML       Diagnosis AMLU Acute myelogeneous leukemia, Unknown  HCT Type Allogeneic    Prep Regimen Cytoxan  Fludarabine  TBI   Donor Source Related PBSC    GVHD Prophylaxis   Mycophenolate  Primary BMT Provider Dr. Erna MD          INTERVAL  HISTORY      HPI: Norman is feeling good.  Called earlier this week with scratchy throat and runny nose that resolved with allergy medications. No fever.  Mild occasional nausea.  No complain of hand shakiness. No vomiting.  No more loose stools. Afebrile with no cough, no congestion or dyspnea.  No bleeding or rash.  Eating well.   Review of Systems: 10 point ROS negative except as noted above.     PHYSICAL EXAM   /83 (BP Location: Right arm, Patient Position: Chair, Cuff Size: Adult Regular)  Pulse 71  Temp 98.2  F (36.8  C) (Oral)  Ht 1.727 m (5' 8\")  Wt 89 kg (196 lb 3.2 oz)  SpO2 97%  BMI 29.83 kg/m2    General: NAD, interactive, appropriate, mild face erythema  Eyes: SAMSON, sclera anicteric   Nose/Mouth/Throat: no oral lesions.  MMM  Lungs: CTA bilaterally  Cardiovascular: RRR, no M/R/G   Abdominal/Rectal: +BS, soft, NT, ND, No HSM   Lymphatics: trace ankle edema bilaterally  Skin: no rashes or petechaie.  Small skin tags to palmar surfaces  Neuro: A&O   Additional Findings: Cooepr site NT, no drainage.     LABS AND IMAGING - PAST 24 HOURS      Lab Results   Component Value Date    WBC 3.4 (L) 09/27/2017    ANEU 1.8 09/27/2017    HGB 11.0 (L) 09/27/2017    HCT 31.4 (L) 09/27/2017     (L) 09/27/2017     09/27/2017    POTASSIUM 4.0 09/27/2017    CHLORIDE 105 09/27/2017    CO2 28 09/27/2017     (H) 09/27/2017    BUN 14 09/27/2017    CR 1.16 09/27/2017    MAG 1.7 09/20/2017    INR 0.96 07/31/2017    BILITOTAL 0.6 09/27/2017    AST 15 09/27/2017    " ALT 22 09/27/2017    ALKPHOS 80 09/27/2017    PROTTOTAL 7.1 09/27/2017    ALBUMIN 3.7 09/27/2017     Tac level pending     ASSESSMENT BY SYSTEMS      Norman Salazar Real is a 56 year old male with AML, 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 He received additional stem cells from donor 7/27.        1. BMT: Completed prep with cytoxan, fludarabine, & TBI. Transplant 7/27. Initial stem cell product only contained 2.33 CD34/kg, additional 0.66 CD34/kg on 7/27 for a total of 2.99. Blood chimerism sent today  - preliminary BMBX results show no increase in blasts.  98% donor BM (8/15/17); 90% donor CD3; 100% donor CD15.     2. AML: Patient continues to have no evidence of recurrent leukemia. Plan to start [id number] maintenance treatment today, C1D1. Reviewed precautions and side effects again. He is ready to go.     3. HEME: Keep Hgb>8 and plts>10K. No pre-meds.    4.  ID:  Afebrile. No active infections. Improved CT with post-infectious changes. 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   8/22 CMV=neg.  - Hold Bactrim due to dropping WBC & gave Pentamidine on 8/25.  No allergy to bactrim.      5.  GI: No vomiting.   - Mild nausea: Taking Zofran and ativan PRN, symptoms minimal   - Protonix for GI prophy.   - Ursodiol for VOD prophy  - s/p left sided colectomy for recurrent diverticulitis.        6.  GVH: No aGVH. stopped MMF 8/25. No signs of GVHD.  - On Tacrolimus: Taking 1 mg BID, level 11.4 on 9/4    7.  FEN/Renal: Cr is good today; continue good oral fluid intake. Mg level has been good and there was no change in dose today.   - On oral mag to 1000 mg QID (8tbs per day) checked by pharmacy.       8. CV: Adequate BP control on losartan 50 mg  and diltiazem  mg.  - Hx tachycardia with arrhythmia, s/p 3 ablations  - hold crestor through transplant      9. Endo:   - Hx DM type II.  Current DM Regimen: carb coverage (5 units per meal, 2-3 units per snack, 4 units with Ensure), sliding scale with meals and bedtime.   - Hold metformin 500mg/d      10. : Hx prostate cancer.     11. Neuro: History of chronic insomnia. Ambien to 5-10 mg QHS PRN      12. Pulm: Hx spiculated pulm nodule (concern for malignancy w/ hx tobacco use) and GGO (concerning for fungal PNA). F/u CT 8/14 improved.  Cont Vfend 200mg bid     Plan:  Flu shot early NOV 2017  RTC Erna on 10/04 with labs; tacrolimus level each visit  RTC BMT DOM/NP/PA on 9/29 with labs   Refill vfend for 1 month then discontinue; then start fluconazole 100 mg daily; adjust Tacrolimus as needed  Eligibility verified again. Start  study today.       Charanjit Umanzor MD

## 2017-09-27 NOTE — PROGRESS NOTES
Infusion Nursing Note:  Norman Real presents today for ALT-803.    Patient seen by provider today: Yes: Dr. Roland.   present during visit today: Not Applicable.    Note: Pt given ALT-803 SQ.  Pre-medicated with po tylenol and po benadryl.  Tolerated without adverse reaction.  VS done per research.    Intravenous Access:  No Intravenous access/labs at this visit.    Treatment Conditions:  Not Applicable.      Post Infusion Assessment:  Patient tolerated injection without incident.    Discharge Plan:   Discharge instructions reviewed with: Patient and Family.  Patient and/or family verbalized understanding of discharge instructions and all questions answered.    Yeimy Arauz, RN

## 2017-09-27 NOTE — NURSING NOTE
Performed pre and post dose vital signs on pt in clinic for research. Please see flowsheet for results. Beatriz Jarrett, CMA

## 2017-09-28 NOTE — NURSING NOTE
Chief Complaint   Patient presents with     Consult     NEW PATIENT- TYPE II DM     Eliane Shelton, [facility]  Endocrinology & Diabetes 3G

## 2017-09-28 NOTE — PATIENT INSTRUCTIONS
1. We will contact your physician Dr. Reinoso to make sure it is ok to start metformin and atorvastatin again.  2. I would like to see you again in 1 month. Bring your meter to the visit.  3. If your fasting sugars are >130-140, during the day >160 - please contact us.

## 2017-09-28 NOTE — PROGRESS NOTES
Diabetes Clinic Initial Visit  Date of Service: September 28, 2017    Consulting provider: KAVYA Irby CNP  [address] [facility]  [place] [address]    Reason for consultation: Diabetes    Assessment:    Type II Diabetes Mellitus - well controlled with A1c of 5.7 though this may be slightly inaccurate with recent marrow transplant. Nonethless, at this point he is only on very low dose Lantus 7 units daily. He can come off from our perspective and restart metformin assuming it is ok with Dr. Umanzor. We will send him a message. Also, he should be on a statin given his age and diabetes dx which he was on prior to hospitalization. We will ask that he bring his meter in 1 month from now to review. In the meantime, if given the ok, we will start metformin 1000 mg daily and atorvastatin 20 mg at bedtime.     Plan:   1) stop all insulin  2) start metformin 500 mg daily x1 week then 1000 mg daily thereafter (pending ok for oncology)  3) start atorvastatin 20 mg at bedtime (pending ok from oncology)  4) f/u in 1 month with meter data    Subjective:   Norman Real is a 56 year old male with a past medical history of AML s/p transplant and now in remission, PVD, and HTN who presents for initial evaluation of type II diabetes. He will be returning to his home up [place] in [facility] in 36 days (has to be here for monitoring for 100 days after bone marrow transplant). He was started on insulin during his bone marrow transplant 2 months ago due to hyperglycemia. He was on steroids at that time. Prior to admission, he was controlled only with metformin. Currently he is taking 7 units Lantus daily. His BS's range from  fasting and throughout the day. Denies hypoglycemic episodes. A1c is 5.7% but this may be slightly inaccurate with recent bone marrow transplant. Denies any diabetes sequela   Complications:  Retinopathy - last eye exam 2017, no retinopathy  Neuropathy - monofilament testing  normal  Nephropathy - no history of kidney disease  Statin - stopped during hospitalization  Aspirin - none  Skin - no skin abnormalities      Current Problem List:   Patient Active Problem List   Diagnosis     AML (acute myelogenous leukemia) (H)     Aspergillosis (H)     Stem cell transplant candidate     Allergic rhinitis     Anomalous atrioventricular excitation     Type 2 diabetes mellitus without complication, without long-term current use of insulin (H)     Diverticulitis of intestine     Dyslipidemia     Impotence of organic origin     History of malignant neoplasm of prostate     History of urethral stricture     Hypertension     Paroxysmal supraventricular tachycardia (H)     Peripheral vascular disease (H)     Seborrheic eczema     Sleep disorder     Tobacco dependence in remission     AML (acute myeloid leukemia) (H)     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     History of peripheral stem cell transplant (H)       Past Medical and Past Surgical History:  Past Medical History:   Diagnosis Date     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 05/26/2017     Cholelithiasis 06/26/2017     Diabetes (H)      Hypertension      Prostate cancer (H) 2011    had radiatoin therapy     Tachycardia        Past Surgical History:   Procedure Laterality Date     CARDIAC SURGERY      ablation       Medications:   Current Outpatient Prescriptions   Medication Sig Dispense Refill     LORazepam (ATIVAN) 0.5 MG tablet Take 1-2 tablets (0.5-1 mg) by mouth every 4 hours as needed for anxiety (nausea/vomiting/sleep) 40 tablet 0     ursodiol (ACTIGALL) 300 MG capsule Take 1 capsule (300 mg) by mouth 3 times daily 270 capsule 3     acyclovir (ZOVIRAX) 800 MG tablet Take 1 tablet (800 mg) by mouth 5 times daily 150 tablet 11     diltiazem (CARDIZEM CD; CARTIA XT) 360 MG 24 hr CD capsule Take 1 capsule (360 mg) by mouth daily 90 capsule 1     magnesium oxide (MAG-OX) 400 MG tablet 8 tabs daily (Patient taking differently:  500 mg 8 tabs daily) 100 tablet 1     pantoprazole (PROTONIX) 40 MG EC tablet Take 1 tablet (40 mg) by mouth daily 90 tablet 1     voriconazole (VFEND) 200 MG tablet Take 1 tablet (200 mg) by mouth 2 times daily 60 tablet 1     losartan (COZAAR) 50 MG tablet Take 1 tablet (50 mg) by mouth daily 30 tablet 1     insulin pen needle 30G X 8 MM Use when delivering insulin as directed. 100 each 1     insulin glargine (LANTUS) 100 UNIT/ML injection Inject 10 Units Subcutaneous daily 3 mL 1     ondansetron (ZOFRAN) 8 MG tablet Take 1 tablet (8 mg) by mouth every 8 hours as needed for nausea 30 tablet 1     psyllium (METAMUCIL/KONSYL) Packet Take 1 packet by mouth 3 times daily 90 packet 1     sulfamethoxazole-trimethoprim (BACTRIM DS/SEPTRA DS) 800-160 MG per tablet Clinic will tell you when to start 1 tablet twice daily by mouth on Mondays and Tuesdays, start around day +28 30 tablet 1     zolpidem (AMBIEN) 5 MG tablet Take 1-2 tablets (5-10 mg) by mouth nightly as needed for sleep 45 tablet 0     insulin aspart (NOVOLOG FLEXPEN) 100 UNIT/ML injection Give insulin based on High sliding scale  Also give prescribed amount before meals based on carbohydrate coverage 3 mL 1     cyclobenzaprine (FLEXERIL) 5 MG tablet Take 1 tablet (5 mg) by mouth 3 times daily as needed for muscle spasms       tacrolimus (PROGRAF - GENERIC EQUIVALENT) 0.5 MG capsule Take 2 capsules (1 mg) by mouth 2 times daily 56 capsule 0     heparin lock flush 10 UNIT/ML SOLN injection 5 mLs by Intracatheter route daily In each lumen 30 vial 3     guaiFENesin (MUCINEX) 600 MG 12 hr tablet Take 600 mg by mouth       Fluticasone Propionate (FLONASE NA) Spray in nostril as needed        Multiple Vitamins-Minerals (MULTI COMPLETE PO)        cholecalciferol (VITAMIN  -D) 1000 UNITS capsule Take 1 capsule by mouth daily          Allergies:   Allergies   Allergen Reactions     Ace Inhibitors Anaphylaxis, Cough and Hives     Terazosin Swelling     Other reaction(s):  "Sedation/Sleepy       Social History:  Social History   Substance Use Topics     Smoking status: Former Smoker     Quit date: 5/31/2010     Smokeless tobacco: Not on file     Alcohol use No       Family History:  Family History   Problem Relation Age of Onset     Chronic Obstructive Pulmonary Disease Mother        Review of Systems:  A 10-point ROS is otherwise negative except as noted in HPI.     Physical Examination:  Blood pressure (P) 138/80, pulse (P) 78, height 1.727 m (5' 8\"), weight 91.2 kg (201 lb).  Body mass index is 30.56 kg/(m^2).  Wt Readings from Last 4 Encounters:   09/28/17 91.2 kg (201 lb)   09/27/17 89 kg (196 lb 3.2 oz)   09/20/17 92.1 kg (203 lb)   09/13/17 92.3 kg (203 lb 6.4 oz)     GEN: Alert, no distress.  HEENT: EOMI.  NECK: Thyroid normal to palpation, non-tender. No cervical/clavicular LAD.   CV: RRR with no murmur.   RESP: CTA bilaterally.   EXT: No peripheral edema.    SKIN: Normal skin temperature and turgor with no lesions. Monofilament testing normal, normal vibratory sense  MSK: Normal muscle bulk, no abnormal appearing joints.   NEURO: Normoactive reflexes. No tremor.     Labs and Studies:   Results for orders placed or performed in visit on 09/27/17   CBC with platelets differential   Result Value Ref Range    WBC 3.4 (L) 4.0 - 11.0 10e9/L    RBC Count 3.46 (L) 4.4 - 5.9 10e12/L    Hemoglobin 11.0 (L) 13.3 - 17.7 g/dL    Hematocrit 31.4 (L) 40.0 - 53.0 %    MCV 91 78 - 100 fl    MCH 31.8 26.5 - 33.0 pg    MCHC 35.0 31.5 - 36.5 g/dL    RDW 14.6 10.0 - 15.0 %    Platelet Count 145 (L) 150 - 450 10e9/L    Diff Method Automated Method     % Neutrophils 51.6 %    % Lymphocytes 33.9 %    % Monocytes 12.1 %    % Eosinophils 1.5 %    % Basophils 0.3 %    % Immature Granulocytes 0.6 %    Nucleated RBCs 0 0 /100    Absolute Neutrophil 1.8 1.6 - 8.3 10e9/L    Absolute Lymphocytes 1.2 0.8 - 5.3 10e9/L    Absolute Monocytes 0.4 0.0 - 1.3 10e9/L    Absolute Eosinophils 0.1 0.0 - 0.7 10e9/L    " Absolute Basophils 0.0 0.0 - 0.2 10e9/L    Abs Immature Granulocytes 0.0 0 - 0.4 10e9/L    Absolute Nucleated RBC 0.0    Comprehensive metabolic panel   Result Value Ref Range    Sodium 139 133 - 144 mmol/L    Potassium 4.0 3.4 - 5.3 mmol/L    Chloride 105 94 - 109 mmol/L    Carbon Dioxide 28 20 - 32 mmol/L    Anion Gap 6 3 - 14 mmol/L    Glucose 152 (H) 70 - 99 mg/dL    Urea Nitrogen 14 7 - 30 mg/dL    Creatinine 1.16 0.66 - 1.25 mg/dL    GFR Estimate 65 >60 mL/min/1.7m2    GFR Estimate If Black 79 >60 mL/min/1.7m2    Calcium 8.6 8.5 - 10.1 mg/dL    Bilirubin Total 0.6 0.2 - 1.3 mg/dL    Albumin 3.7 3.4 - 5.0 g/dL    Protein Total 7.1 6.8 - 8.8 g/dL    Alkaline Phosphatase 80 40 - 150 U/L    ALT 22 0 - 70 U/L    AST 15 0 - 45 U/L   Tacrolimus level   Result Value Ref Range    Tacrolimus Last Dose [id number]     Tacrolimus Level 12.7 5.0 - 15.0 ug/L   CMV DNA quantification   Result Value Ref Range    CMV DNA Quantitation Specimen EDTA PLASMA     CMV Quant IU/mL CMV DNA Not Detected CMVND^CMV DNA Not Detected [IU]/mL    Log IU/mL of CMVQNT Not Calculated <2.1 [Log_IU]/mL       Gerald Weiss MD (PGY-4)  Diabetes and Endocrinology Fellow  [facility]  Pager: [phone]      I have seen and examined the patient, reviewed and edited the fellow's note, and agree with the plan of care.  PERI Michael

## 2017-09-28 NOTE — PROGRESS NOTES
"BMT Clinic Note      Patient ID:  Norman Real is a 56 year old male, currently day +65 s/p NMA allo sib PBSCT for AML       Diagnosis AMLU Acute myelogeneous leukemia, Unknown  HCT Type Allogeneic    Prep Regimen Cytoxan  Fludarabine  TBI   Donor Source Related PBSC    GVHD Prophylaxis   Mycophenolate  Primary BMT Provider Dr. Erna MD          INTERVAL  HISTORY      HPI: Norman is feeling good.  Called earlier this week with scratchy throat and runny nose that has resolved. No fever.  Eating well.  Mild occasional nausea. No vomiting or diarrhea.    No bleeding. Injection site reaction minimal, but reports skin sensitivity to his sides & back, but with no rash or lesions.   Review of Systems: 10 point ROS negative except as noted above.     PHYSICAL EXAM   /79 (BP Location: Right arm, Patient Position: Right side, Cuff Size: Adult Regular)  Pulse 78  Temp 98  F (36.7  C) (Oral)  Resp 16  Ht 1.727 m (5' 7.99\")  Wt 90.9 kg (200 lb 4.8 oz)  SpO2 100%  BMI 30.46 kg/m2    General: NAD, interactive, appropriate, mild facial erythema  Eyes: SAMSON, sclera anicteric   Nose/Mouth/Throat: op clear, buccal mucosa moist  Lungs: CTA bilaterally  Cardiovascular: RRR, no M/R/G   Abdominal/Rectal: +BS, soft, NT, ND, No HSM   Lymphatics: trace ankle edema bilaterally  Skin: no rashes or petechaie.  4x2 cm area of erythema to L lower abd from injection.  No other rashes.   Neuro: A&O   Additional Findings: Cooper site NT, no drainage.     LABS AND IMAGING - PAST 24 HOURS      Lab Results   Component Value Date    WBC 2.5 (L) 09/29/2017    ANEU 1.2 (L) 09/29/2017    HGB 11.0 (L) 09/29/2017    HCT 31.4 (L) 09/29/2017     (L) 09/29/2017     09/29/2017    POTASSIUM 4.0 09/29/2017    CHLORIDE 106 09/29/2017    CO2 26 09/29/2017     (H) 09/29/2017    BUN 12 09/29/2017    CR 1.25 09/29/2017    MAG 1.7 09/20/2017    INR 0.96 07/31/2017    BILITOTAL 0.6 09/27/2017    AST 15 09/27/2017    ALT 22 " 09/27/2017    ALKPHOS 80 09/27/2017    PROTTOTAL 7.1 09/27/2017    ALBUMIN 3.7 09/27/2017     Tac level pending     ASSESSMENT BY SYSTEMS      Norman Salazar Real is a 55 yo male, day +65 s/p NMA allo sib PBSCT for  AML       1. BMT/AML: Transplant 7/27. Initial stem cell product only contained 2.33 CD34/kg, additional 0.66 CD34/kg on 7/27 for a total of 2.99.   -  BMBX showed no increase in blasts.  98% donor BM (8/15/17); 90% donor CD3; 100% donor CD15.   - Patient continues to have no evidence of recurrent leukemia. Started QCT084 maintenance treatment on 9/27.  White count down a bit today, possibly due to ALTOR    2. HEME: Keep Hgb>8 and plts>10K. No pre-meds.    3.  ID:  Afebrile. No active infections.  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  Improved CT 8/10.  Refilled vfend for 1 month then discontinue; then start fluconazole 100 mg daily; adjust Tacrolimus  - proph HD ACV (CMV+, HSV+, EBV+).   CMV negative 9/27.  - Cont to hold Bactrm due to lowish WBC count.  Pentamadine today.  No allergy to bactrim.  - EBV negative 9/20      4.  GI:  - Mild nausea: Taking Zofran and ativan PRN, symptoms minimal   - Protonix for GI prophy.   - Ursodiol for VOD prophy  - s/p left sided colectomy for recurrent diverticulitis.        5.  GVH: No aGVH. stopped MMF 8/25.   - On Tacrolimus: Taking 1 mg BID, level 12.7 on 9/27    6.  FEN/Renal: Creat stable.   - Hx of hypoMg.  On oral mag to 1000 mg QID (8tbs per day).  Check Mg next visit.        7. CV: Adequate BP control on losartan 50 mg and diltiazem  mg.  - Hx tachycardia with arrhythmia, s/p 3 ablations  - hold crestor through transplant      8. Endo:  Hx DM type II.   Blood sugar has been well controlled & ANC down to 5.7 today.  Per endocrine recs, DC Lantus & begin metformin 500mg QD with plans to increase to 1000mg QD in 1 week.        9. : Hx prostate cancer.     10. Neuro: History of chronic insomnia.  Ambien to 5-10 mg QHS PRN      Flu shot early NOV 2017  RTC Erna on 10/04 with labs;     Vy Black

## 2017-09-28 NOTE — LETTER
9/28/2017       RE: Norman Real  [address]   [facility] [address]     Dear Colleague,    Thank you for referring your patient, Norman Real, to the [facility] ENDOCRINOLOGY at [facility]. Please see a copy of my visit note below.    Diabetes Clinic Initial Visit  Date of Service: September 28, 2017    Consulting provider: KAVYA Irby CNP  [address] [facility] [address]  [place] [address]    Reason for consultation: Diabetes    Assessment:    Type II Diabetes Mellitus - well controlled with A1c of 5.7 though this may be slightly inaccurate with recent marrow transplant. Nonethless, at this point he is only on very low dose Lantus 7 units daily. He can come off from our perspective and restart metformin assuming it is ok with Dr. Umanzor. We will send him a message. Also, he should be on a statin given his age and diabetes dx which he was on prior to hospitalization. We will ask that he bring his meter in 1 month from now to review. In the meantime, if given the ok, we will start metformin 1000 mg daily and atorvastatin 20 mg at bedtime.     Plan:   1) stop all insulin  2) start metformin 500 mg daily x1 week then 1000 mg daily thereafter (pending ok for oncology)  3) start atorvastatin 20 mg at bedtime (pending ok from oncology)  4) f/u in 1 month with meter data    Subjective:   Norman Real is a 56 year old male with a past medical history of AML s/p transplant and now in remission, PVD, and HTN who presents for initial evaluation of type II diabetes. He will be returning to his home [place] in [facility] in 36 days (has to be here for monitoring for 100 days after bone marrow transplant). He was started on insulin during his bone marrow transplant 2 months ago due to hyperglycemia. He was on steroids at that time. Prior to admission, he was controlled only with metformin. Currently he is taking 7 units Lantus daily. His BS's range from  fasting  and throughout the day. Denies hypoglycemic episodes. A1c is 5.7% but this may be slightly inaccurate with recent bone marrow transplant. Denies any diabetes sequela   Complications:  Retinopathy - last eye exam 2017, no retinopathy  Neuropathy - monofilament testing normal  Nephropathy - no history of kidney disease  Statin - stopped during hospitalization  Aspirin - none  Skin - no skin abnormalities      Current Problem List:   Patient Active Problem List   Diagnosis     AML (acute myelogenous leukemia) (H)     Aspergillosis (H)     Stem cell transplant candidate     Allergic rhinitis     Anomalous atrioventricular excitation     Type 2 diabetes mellitus without complication, without long-term current use of insulin (H)     Diverticulitis of intestine     Dyslipidemia     Impotence of organic origin     History of malignant neoplasm of prostate     History of urethral stricture     Hypertension     Paroxysmal supraventricular tachycardia (H)     Peripheral vascular disease (H)     Seborrheic eczema     Sleep disorder     Tobacco dependence in remission     AML (acute myeloid leukemia) (H)     AML (acute myeloid leukemia) in remission (H)     History of peripheral stem cell transplant (H)       Past Medical and Past Surgical History:  Past Medical History:   Diagnosis Date     AML (acute myeloid leukemia) in remission (H) 05/26/2017     Cholelithiasis 06/26/2017     Diabetes (H)      Hypertension      Prostate cancer (H) 2011    had radiatoin therapy     Tachycardia        Past Surgical History:   Procedure Laterality Date     CARDIAC SURGERY      ablation       Medications:   Current Outpatient Prescriptions   Medication Sig Dispense Refill     LORazepam (ATIVAN) 0.5 MG tablet Take 1-2 tablets (0.5-1 mg) by mouth every 4 hours as needed for anxiety (nausea/vomiting/sleep) 40 tablet 0     ursodiol (ACTIGALL) 300 MG capsule Take 1 capsule (300 mg) by mouth 3 times daily 270 capsule 3     acyclovir (ZOVIRAX) 800 MG  tablet Take 1 tablet (800 mg) by mouth 5 times daily 150 tablet 11     diltiazem (CARDIZEM CD; CARTIA XT) 360 MG 24 hr CD capsule Take 1 capsule (360 mg) by mouth daily 90 capsule 1     magnesium oxide (MAG-OX) 400 MG tablet 8 tabs daily (Patient taking differently: 500 mg 8 tabs daily) 100 tablet 1     pantoprazole (PROTONIX) 40 MG EC tablet Take 1 tablet (40 mg) by mouth daily 90 tablet 1     voriconazole (VFEND) 200 MG tablet Take 1 tablet (200 mg) by mouth 2 times daily 60 tablet 1     losartan (COZAAR) 50 MG tablet Take 1 tablet (50 mg) by mouth daily 30 tablet 1     insulin pen needle 30G X 8 MM Use when delivering insulin as directed. 100 each 1     insulin glargine (LANTUS) 100 UNIT/ML injection Inject 10 Units Subcutaneous daily 3 mL 1     ondansetron (ZOFRAN) 8 MG tablet Take 1 tablet (8 mg) by mouth every 8 hours as needed for nausea 30 tablet 1     psyllium (METAMUCIL/KONSYL) Packet Take 1 packet by mouth 3 times daily 90 packet 1     sulfamethoxazole-trimethoprim (BACTRIM DS/SEPTRA DS) 800-160 MG per tablet Clinic will tell you when to start 1 tablet twice daily by mouth on Mondays and Tuesdays, start around day +28 30 tablet 1     zolpidem (AMBIEN) 5 MG tablet Take 1-2 tablets (5-10 mg) by mouth nightly as needed for sleep 45 tablet 0     insulin aspart (NOVOLOG FLEXPEN) 100 UNIT/ML injection Give insulin based on High sliding scale  Also give prescribed amount before meals based on carbohydrate coverage 3 mL 1     cyclobenzaprine (FLEXERIL) 5 MG tablet Take 1 tablet (5 mg) by mouth 3 times daily as needed for muscle spasms       tacrolimus (PROGRAF - GENERIC EQUIVALENT) 0.5 MG capsule Take 2 capsules (1 mg) by mouth 2 times daily 56 capsule 0     heparin lock flush 10 UNIT/ML SOLN injection 5 mLs by Intracatheter route daily In each lumen 30 vial 3     guaiFENesin (MUCINEX) 600 MG 12 hr tablet Take 600 mg by mouth       Fluticasone Propionate (FLONASE NA) Spray in nostril as needed        Multiple  "Vitamins-Minerals (MULTI COMPLETE PO)        cholecalciferol (VITAMIN  -D) 1000 UNITS capsule Take 1 capsule by mouth daily          Allergies:   Allergies   Allergen Reactions     Ace Inhibitors Anaphylaxis, Cough and Hives     Terazosin Swelling     Other reaction(s): Sedation/Sleepy       Social History:  Social History   Substance Use Topics     Smoking status: Former Smoker     Quit date: 5/31/2010     Smokeless tobacco: Not on file     Alcohol use No       Family History:  Family History   Problem Relation Age of Onset     Chronic Obstructive Pulmonary Disease Mother        Review of Systems:  A 10-point ROS is otherwise negative except as noted in HPI.     Physical Examination:  Blood pressure (P) 138/80, pulse (P) 78, height 1.727 m (5' 8\"), weight 91.2 kg (201 lb).  Body mass index is 30.56 kg/(m^2).  Wt Readings from Last 4 Encounters:   09/28/17 91.2 kg (201 lb)   09/27/17 89 kg (196 lb 3.2 oz)   09/20/17 92.1 kg (203 lb)   09/13/17 92.3 kg (203 lb 6.4 oz)     GEN: Alert, no distress.  HEENT: EOMI.  NECK: Thyroid normal to palpation, non-tender. No cervical/clavicular LAD.   CV: RRR with no murmur.   RESP: CTA bilaterally.   EXT: No peripheral edema.    SKIN: Normal skin temperature and turgor with no lesions. Monofilament testing normal, normal vibratory sense  MSK: Normal muscle bulk, no abnormal appearing joints.   NEURO: Normoactive reflexes. No tremor.     Labs and Studies:   Results for orders placed or performed in visit on 09/27/17   CBC with platelets differential   Result Value Ref Range    WBC 3.4 (L) 4.0 - 11.0 10e9/L    RBC Count 3.46 (L) 4.4 - 5.9 10e12/L    Hemoglobin 11.0 (L) 13.3 - 17.7 g/dL    Hematocrit 31.4 (L) 40.0 - 53.0 %    MCV 91 78 - 100 fl    MCH 31.8 26.5 - 33.0 pg    MCHC 35.0 31.5 - 36.5 g/dL    RDW 14.6 10.0 - 15.0 %    Platelet Count 145 (L) 150 - 450 10e9/L    Diff Method Automated Method     % Neutrophils 51.6 %    % Lymphocytes 33.9 %    % Monocytes 12.1 %    % " Eosinophils 1.5 %    % Basophils 0.3 %    % Immature Granulocytes 0.6 %    Nucleated RBCs 0 0 /100    Absolute Neutrophil 1.8 1.6 - 8.3 10e9/L    Absolute Lymphocytes 1.2 0.8 - 5.3 10e9/L    Absolute Monocytes 0.4 0.0 - 1.3 10e9/L    Absolute Eosinophils 0.1 0.0 - 0.7 10e9/L    Absolute Basophils 0.0 0.0 - 0.2 10e9/L    Abs Immature Granulocytes 0.0 0 - 0.4 10e9/L    Absolute Nucleated RBC 0.0    Comprehensive metabolic panel   Result Value Ref Range    Sodium 139 133 - 144 mmol/L    Potassium 4.0 3.4 - 5.3 mmol/L    Chloride 105 94 - 109 mmol/L    Carbon Dioxide 28 20 - 32 mmol/L    Anion Gap 6 3 - 14 mmol/L    Glucose 152 (H) 70 - 99 mg/dL    Urea Nitrogen 14 7 - 30 mg/dL    Creatinine 1.16 0.66 - 1.25 mg/dL    GFR Estimate 65 >60 mL/min/1.7m2    GFR Estimate If Black 79 >60 mL/min/1.7m2    Calcium 8.6 8.5 - 10.1 mg/dL    Bilirubin Total 0.6 0.2 - 1.3 mg/dL    Albumin 3.7 3.4 - 5.0 g/dL    Protein Total 7.1 6.8 - 8.8 g/dL    Alkaline Phosphatase 80 40 - 150 U/L    ALT 22 0 - 70 U/L    AST 15 0 - 45 U/L   Tacrolimus level   Result Value Ref Range    Tacrolimus Last Dose [id number]     Tacrolimus Level 12.7 5.0 - 15.0 ug/L   CMV DNA quantification   Result Value Ref Range    CMV DNA Quantitation Specimen EDTA PLASMA     CMV Quant IU/mL CMV DNA Not Detected CMVND^CMV DNA Not Detected [IU]/mL    Log IU/mL of CMVQNT Not Calculated <2.1 [Log_IU]/mL       Gerald Weiss MD (PGY-4)  Diabetes and Endocrinology Fellow  [facility]  Pager: [phone]      I have seen and examined the patient, reviewed and edited the fellow's note, and agree with the plan of care.  PERI Michael

## 2017-09-28 NOTE — MR AVS SNAPSHOT
After Visit Summary   9/28/2017    Norman Real    MRN: [id number]           Patient Information     Date Of Birth          1960        Visit Information        Provider Department      9/28/2017 10:40 AM Gerald Weiss MD [facility] Endocrinology        Care Instructions    1. We will contact your physician Dr. Reinoso to make sure it is ok to start metformin and atorvastatin again.  2. I would like to see you again in 1 month. Bring your meter to the visit.  3. If your fasting sugars are >130-140, during the day >160 - please contact us.           Follow-ups after your visit        Follow-up notes from your care team     Return in about 1 month (around 10/28/2017).      Your next 10 appointments already scheduled     Sep 29, 2017 11:00 AM CDT   Masonic Lab Draw with UC MASONIC LAB DRAW   [facility] Masonic Lab Draw ([facility])    [address] [place] [address]   [phone]            Sep 29, 2017 11:30 AM CDT   Return with UC BMT MIMA #2   [facility] Blood and Marrow Transplant ([facility])    [address] [place] [address]   [phone]            Oct 04, 2017 11:30 AM CDT   Masonic Lab Draw with UC MASONIC LAB DRAW   [facility] Masonic Lab Draw ([facility])    [address] [place] [address]   [phone]            Oct 04, 2017 12:00 PM CDT   Infusion 30 with UC BMT INFUSION, UC 1 ATC   [facility] Blood and Marrow Transplant ([facility])    [address] [place] [address]   [phone]            Oct 04, 2017 12:30 PM CDT   BMT Anniversary Visit with Charanjit Umanzor MD   [facility] Blood and Marrow Transplant ([facility])    [address] [place] [address]   [phone]            Oct 11, 2017 10:30 AM CDT   Masonic Lab Draw  with UC MASONIC LAB DRAW   [facility] Masonic Lab Draw ([facility])    [address]  [address]nd Floor  [facility] [address] [phone]            Oct 11, 2017 11:00 AM CDT   BMT Anniversary Visit with UC BMT MIMA #4   [facility] Blood and Marrow Transplant ([facility])    [address]  [address] Floor  [facility] [address] [phone]            Oct 11, 2017 11:30 AM CDT   Infusion 30 with UC BMT INFUSION, UC 3 ATC   [facility] Blood and Marrow Transplant ([facility])    [address]  [address] Floor  [facility] [address] [phone]              Future tests that were ordered for you today     Open Future Orders        Priority Expected Expires Ordered    Basic metabolic panel Routine 9/29/2017 9/29/2017 9/27/2017    CBC with platelets differential Routine 9/29/2017 9/29/2017 9/27/2017    Tacrolimus level Routine 10/4/2017 10/27/2017 9/27/2017    Magnesium Routine 10/4/2017 10/27/2017 9/27/2017    Comprehensive metabolic panel Routine 10/4/2017 10/27/2017 9/27/2017    CBC with platelets differential Routine 10/4/2017 10/27/2017 9/27/2017    CMV DNA quantification Routine 10/4/2017 10/27/2017 9/27/2017            Who to contact     Please call your clinic at [phone] to:    Ask questions about your health    Make or cancel appointments    Discuss your medicines    Learn about your test results    Speak to your doctor   If you have compliments or concerns about an experience at your clinic, or if you wish to file a complaint, please contact [facility] Physicians Patient Relations at [phone] or email us at [email or web address]         Additional Information About Your Visit        [email or web address] Information     "[employer] is an electronic gateway that provides easy, online access to your medical records. With "[employer] you can request a clinic appointment, read your test results, renew a  "prescription or communicate with your care team.     To sign up for Bindot visit the website at [email or web address]   You will be asked to enter the access code listed below, as well as some personal information. Please follow the directions to create your username and password.     Your access code is: [email or web address]  Expires: 2017  3:48 PM     Your access code will  in 90 days. If you need help or a new code, please contact your [facility] or call [phone] for assistance.        Care EveryWhere ID     This is your Care EveryWhere ID. This could be used by other organizations to access your [place] medical records  [id number]        Your Vitals Were     Height BMI (Body Mass Index)                1.727 m (5' 8\") 30.56 kg/m2           Blood Pressure from Last 3 Encounters:   17 (P) 138/80   17 154/81   17 149/83    Weight from Last 3 Encounters:   17 91.2 kg (201 lb)   17 89 kg (196 lb 3.2 oz)   17 92.1 kg (203 lb)              Today, you had the following     No orders found for display         Today's Medication Changes          These changes are accurate as of: 17 11:18 AM.  If you have any questions, ask your nurse or doctor.               These medicines have changed or have updated prescriptions.        Dose/Directions    magnesium oxide 400 MG tablet   Commonly known as:  MAG-OX   This may have changed:    - how much to take  - additional instructions   Used for:  AML (acute myeloid leukemia) in remission (H)        8 tabs daily   Quantity:  100 tablet   Refills:  1                Primary Care Provider Fax #    Physician No Ref-Primary [phone]       No address on file        Equal Access to Services     ASHLYN FLANAGAN : Gerg Ratliff, joana manning, vishal thao. So [facility] [phone].    ATENCIÓN: Si habla español, tiene a gusman disposición " servicios gratuitos de asistencia lingüística. Leyla pepe [phone].    We comply with applicable federal civil rights laws and [place] laws. We do not discriminate on the basis of race, color, national origin, age, disability sex, sexual orientation or gender identity.            Thank you!     Thank you for choosing [facility]  for your care. Our goal is always to provide you with excellent care. Hearing back from our patients is one way we can continue to improve our services. Please take a few minutes to complete the written survey that you may receive in the mail after your visit with us. Thank you!             Your Updated Medication List - Protect others around you: Learn how to safely use, store and throw away your medicines at www.disposemymeds.org.          This list is accurate as of: 9/28/17 11:18 AM.  Always use your most recent med list.                   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), History of peripheral stem cell transplant (H), Aspergillosis (H)       cholecalciferol 1000 UNITS capsule    vitamin  -D     Take 1 capsule by mouth daily        cyclobenzaprine 5 MG tablet    FLEXERIL     Take 1 tablet (5 mg) by mouth 3 times daily as needed for muscle spasms        diltiazem 360 MG 24 hr CD capsule    CARDIZEM CD; CARTIA XT    90 capsule    Take 1 capsule (360 mg) by mouth daily    AML (acute myeloid leukemia) in remission (H)       FLONASE NA      Spray in nostril as needed        guaiFENesin 600 MG 12 hr tablet    MUCINEX     Take 600 mg by mouth        heparin lock flush 10 UNIT/ML Soln injection     30 vial    5 mLs by Intracatheter route daily In each lumen    AML (acute myeloid leukemia) in remission (H)       insulin aspart 100 UNIT/ML injection    NovoLOG FLEXPEN    3 mL    Give  insulin based on High sliding scale  Also give prescribed amount before meals based on carbohydrate coverage    AML (acute myeloid leukemia) in remission (H)       insulin glargine 100 UNIT/ML injection    LANTUS    3 mL    Inject 10 Units Subcutaneous daily    Type 2 diabetes mellitus without complication, without long-term current use of insulin (H)       insulin pen needle 30G X 8 MM     100 each    Use when delivering insulin as directed.    Type 2 diabetes mellitus without complication, without long-term current use of insulin (H)       LORazepam 0.5 MG tablet    ATIVAN    40 tablet    Take 1-2 tablets (0.5-1 mg) by mouth every 4 hours as needed for anxiety (nausea/vomiting/sleep)    Stem cell transplant candidate       losartan 50 MG tablet    COZAAR    30 tablet    Take 1 tablet (50 mg) by mouth daily    AML (acute myeloid leukemia) in remission (H)       magnesium oxide 400 MG tablet    MAG-OX    100 tablet    8 tabs daily    AML (acute myeloid leukemia) in remission (H)       MULTI COMPLETE PO           ondansetron 8 MG tablet    ZOFRAN    30 tablet    Take 1 tablet (8 mg) by mouth every 8 hours as needed for nausea    Stem cell transplant candidate       pantoprazole 40 MG EC tablet    PROTONIX    90 tablet    Take 1 tablet (40 mg) by mouth daily    Stem cell transplant candidate       psyllium Packet    METAMUCIL/KONSYL    90 packet    Take 1 packet by mouth 3 times daily    AML (acute myeloid leukemia) in remission (H)       sulfamethoxazole-trimethoprim 800-160 MG per tablet    BACTRIM DS/SEPTRA DS    30 tablet    Clinic will tell you when to start 1 tablet twice daily by mouth on Mondays and Tuesdays, start around day +28    Stem cell transplant candidate       tacrolimus 0.5 MG capsule    GENERIC EQUIVALENT    56 capsule    Take 2 capsules (1 mg) by mouth 2 times daily    Acute myeloid leukemia in remission (H)       ursodiol 300 MG capsule    ACTIGALL    270 capsule    Take 1 capsule (300 mg) by  mouth 3 times daily    Stem cell transplant candidate       voriconazole 200 MG tablet    VFEND    60 tablet    Take 1 tablet (200 mg) by mouth 2 times daily    Stem cell transplant candidate       zolpidem 5 MG tablet    AMBIEN    45 tablet    Take 1-2 tablets (5-10 mg) by mouth nightly as needed for sleep    AML (acute myeloid leukemia) in remission (H)

## 2017-09-29 NOTE — NURSING NOTE
Chief Complaint   Patient presents with     Blood Draw     Vitals done by MA and labs drawn via his seymour by RN     Madina Caldwell MA

## 2017-09-29 NOTE — MR AVS SNAPSHOT
After Visit Summary   9/29/2017    Norman Real    MRN: [id number]           Patient Information     Date Of Birth          1960        Visit Information        Provider Department      9/29/2017 11:30 AM UC BMT MIMA #2 [facility] Blood and Marrow Transplant        Today's Diagnoses     Stem cell transplant candidate    -  1    Acute myeloid leukemia in remission (H)        AML (acute myeloid leukemia) in remission (H)        History of peripheral stem cell transplant (H)        Aspergillosis (H)              [facility]  [address] [place] [address]  Phone: [phone]  Clinic Hours:   Monday-Thursday:7am to 7pm   Friday: 7am to 5pm   Weekends and holidays:    8am to noon (in general)  If your fever is 100.5  or greater,   call the clinic.  After hours call the   hospital at [phone] or   [phone]. Ask for the BMT   fellow on-call            Follow-ups after your visit        Your next 10 appointments already scheduled     Oct 04, 2017 11:30 AM CDT   Masonic Lab Draw with UC MASONIC LAB DRAW   [facility] Masonic Lab Draw ([facility])    [address] [place] [address]   [phone]            Oct 04, 2017 12:00 PM CDT   Infusion 30 with UC BMT INFUSION, UC 1 ATC   [facility] Blood and Marrow Transplant ([facility])    [address] [place] [address]   [phone]            Oct 04, 2017 12:30 PM CDT   BMT Anniversary Visit with Charanjit Umanzor MD   [facility] Blood and Marrow Transplant ([facility])    [address] [place] [address]   [phone]            Oct 11, 2017 10:30 AM CDT   Masonic Lab Draw with UC MASONIC LAB DRAW   [facility] Masonic Lab Draw ([facility])    [address] [place] [address]   [phone]            Oct 11,  "2017 11:00 AM CDT   BMT Anniversary Visit with  BMT MIMA #4   [facility] Blood and Marrow Transplant ([facility])    [address]  [address]nd [facility] [address] [phone]            Oct 11, 2017 11:30 AM CDT   Infusion 30 with UC BMT INFUSION, UC 3 ATC   [facility] Blood and Marrow Transplant ([facility])    [address] [address] [phone]            Oct 18, 2017 10:30 AM CDT   Masonic Lab Draw with  MASONIC LAB DRAW   [facility] Masonic Lab Draw ([facility])    [address] [address] [phone]            Oct 18, 2017 11:00 AM CDT   BMT Anniversary Visit with  BMT MIMA #3   [facility] Blood and Marrow Transplant ([facility])    [address] [address] [phone]              Who to contact     If you have questions or need follow up information about today's clinic visit or your schedule please contact [facility] BLOOD AND MARROW TRANSPLANT directly at [phone].  Normal or non-critical lab and imaging results will be communicated to you by AdTonikhart, letter or phone within 4 business days after the clinic has received the results. If you do not hear from us within 7 days, please contact the clinic through AdTonikhart or phone. If you have a critical or abnormal lab result, we will notify you by phone as soon as possible.  Submit refill requests through Nitro or call your pharmacy and they will forward the refill request to us. Please allow 3 business days for your refill to be completed.          Additional Information About Your Visit        [employer] Information     Nitro lets you send messages to your doctor, view your test results, renew your prescriptions, schedule appointments and more. To sign up, go to [email or web address] . Click on \"Log in\" on the left side of the " "screen, which will take you to the Welcome page. Then click on \"Sign up Now\" on the right side of the page.     You will be asked to enter the access code listed below, as well as some personal information. Please follow the directions to create your username and password.     Your access code is: [email or web address]  Expires: 2017  3:48 PM     Your access code will  in 90 days. If you need help or a new code, please call your [facility] or [phone].        Care EveryWhere ID     This is your Care EveryWhere ID. This could be used by other organizations to access your [employer] medical records  [id number]        Your Vitals Were     Pulse Temperature Respirations Height Pulse Oximetry BMI (Body Mass Index)    78 98  F (36.7  C) (Oral) 16 1.727 m (5' 7.99\") 100% 30.46 kg/m2       Blood Pressure from Last 3 Encounters:   17 137/79   17 (P) 138/80   17 154/81    Weight from Last 3 Encounters:   17 90.9 kg (200 lb 4.8 oz)   17 91.2 kg (201 lb)   17 89 kg (196 lb 3.2 oz)              We Performed the Following     Basic metabolic panel     CBC with platelets differential     CMV DNA quantification     EBV DNA PCR Quantitative Whole Blood          Today's Medication Changes          These changes are accurate as of: 17 12:53 PM.  If you have any questions, ask your nurse or doctor.               Start taking these medicines.        Dose/Directions    metFORMIN 500 MG 24 hr tablet   Commonly known as:  GLUCOPHAGE-XR   Used for:  Acute myeloid leukemia in remission (H)        500mg QD x1 week then increase to 1000mg QD thereafter   Quantity:  60 tablet   Refills:  3         These medicines have changed or have updated prescriptions.        Dose/Directions    magnesium oxide 400 MG tablet   Commonly known as:  MAG-OX   This may have changed:    - how much to take  - additional instructions   Used for:  AML (acute myeloid leukemia) in remission (H)        8 tabs daily "   Quantity:  100 tablet   Refills:  1         Stop taking these medicines if you haven't already. Please contact your care team if you have questions.     insulin glargine 100 UNIT/ML injection   Commonly known as:  LANTUS           insulin pen needle 30G X 8 MM                Where to get your medicines      These medications were sent to [id number] - [place], [place] - [address]  [address], [facility] [place] [address]     Phone:  [phone]     metFORMIN 500 MG 24 hr tablet                Recent Review Flowsheet Data     BMT Recent Results Latest Ref Rng & Units 9/1/2017 9/5/2017 9/8/2017 9/13/2017 9/20/2017 9/27/2017 9/29/2017    WBC 4.0 - 11.0 10e9/L 3.1(L) 3.1(L) 3.2(L) 3.3(L) 3.2(L) 3.4(L) 2.5(L)    Hemoglobin 13.3 - 17.7 g/dL 10.4(L) 11.0(L) 11.0(L) 10.5(L) 11.1(L) 11.0(L) 11.0(L)    Platelet Count 150 - 450 10e9/L 135(L) 133(L) 138(L) 158 157 145(L) 137(L)    Neutrophils (Absolute) 1.6 - 8.3 10e9/L 1.7 1.6 1.6 1.4(L) 1.4(L) 1.8 1.2(L)    INR 0.86 - 1.14 - - - - - - -    Sodium 133 - 144 mmol/L 138 136 139 137 139 139 138    Potassium 3.4 - 5.3 mmol/L 3.9 4.0 3.9 3.5 4.0 4.0 4.0    Chloride 94 - 109 mmol/L 104 104 106 103 105 105 106    Glucose 70 - 99 mg/dL 168(H) 177(H) 122(H) 158(H) 150(H) 152(H) 160(H)    Urea Nitrogen 7 - 30 mg/dL 12 13 13 15 12 14 12    Creatinine 0.66 - 1.25 mg/dL 1.16 1.09 1.17 1.39(H) 1.22 1.16 1.25    Calcium (Total) 8.5 - 10.1 mg/dL 8.6 8.3(L) 8.7 8.6 8.8 8.6 8.7    Protein (Total) 6.8 - 8.8 g/dL 6.8 6.8 - 6.8 6.9 7.1 -    Albumin 3.4 - 5.0 g/dL 3.5 3.4 - 3.6 3.7 3.7 -    Bilirubin (Direct) 0.0 - 0.2 mg/dL - - - - - - -    Alkaline Phosphatase 40 - 150 U/L 75 80 - 75 76 80 -    AST 0 - 45 U/L 26 22 - 22 21 15 -    ALT 0 - 70 U/L 32 28 - 28 25 22 -    MCV 78 - 100 fl 90 90 91 91 92 91 92               Primary Care Provider Fax #    Physician No Ref-Primary [phone]       No address on file        Equal Access to Services     ASHLYN SPARKS: Greg cabrera  keyon Ratliff, rupada luqadaha, qaybta kakecia mchugh, vishal crooks camijohanna nicolasjonny lamarbellanorah rene. So [facility] [phone].    ATENCIÓN: Si brandy mcintyre, tiene a gusman disposición servicios gratuitos de asistencia lingüística. Leyla al [phone].    We comply with applicable federal civil rights laws and [place] laws. We do not discriminate on the basis of race, color, national origin, age, disability, sex, sexual orientation, or gender identity.            Thank you!     Thank you for choosing [facility] BLOOD AND MARROW TRANSPLANT  for your care. Our goal is always to provide you with excellent care. Hearing back from our patients is one way we can continue to improve our services. Please take a few minutes to complete the written survey that you may receive in the mail after your visit with us. Thank you!             Your Updated Medication List - Protect others around you: Learn how to safely use, store and throw away your medicines at www.disposemymeds.org.          This list is accurate as of: 9/29/17 12:53 PM.  Always use your most recent med list.                   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), History of peripheral stem cell transplant (H), Aspergillosis (H)       cholecalciferol 1000 UNITS capsule    vitamin  -D     Take 1 capsule by mouth daily        cyclobenzaprine 5 MG tablet    FLEXERIL     Take 1 tablet (5 mg) by mouth 3 times daily as needed for muscle spasms        diltiazem 360 MG 24 hr CD capsule    CARDIZEM CD; CARTIA XT    90 capsule    Take 1 capsule (360 mg) by mouth daily    AML (acute myeloid leukemia) in remission (H)       FLONASE NA      Spray in nostril as needed        guaiFENesin 600 MG 12 hr tablet    MUCINEX     Take 600 mg by mouth        heparin lock flush 10 UNIT/ML Soln injection      30 vial    5 mLs by Intracatheter route daily In each lumen    AML (acute myeloid leukemia) in remission (H)       insulin aspart 100 UNIT/ML injection    NovoLOG FLEXPEN    3 mL    Give insulin based on High sliding scale  Also give prescribed amount before meals based on carbohydrate coverage    AML (acute myeloid leukemia) in remission (H)       LORazepam 0.5 MG tablet    ATIVAN    40 tablet    Take 1-2 tablets (0.5-1 mg) by mouth every 4 hours as needed for anxiety (nausea/vomiting/sleep)    Stem cell transplant candidate       losartan 50 MG tablet    COZAAR    30 tablet    Take 1 tablet (50 mg) by mouth daily    AML (acute myeloid leukemia) in remission (H)       magnesium oxide 400 MG tablet    MAG-OX    100 tablet    8 tabs daily    AML (acute myeloid leukemia) in remission (H)       metFORMIN 500 MG 24 hr tablet    GLUCOPHAGE-XR    60 tablet    500mg QD x1 week then increase to 1000mg QD thereafter    Acute myeloid leukemia in remission (H)       MULTI COMPLETE PO           ondansetron 8 MG tablet    ZOFRAN    30 tablet    Take 1 tablet (8 mg) by mouth every 8 hours as needed for nausea    Stem cell transplant candidate       pantoprazole 40 MG EC tablet    PROTONIX    90 tablet    Take 1 tablet (40 mg) by mouth daily    Stem cell transplant candidate       psyllium Packet    METAMUCIL/KONSYL    90 packet    Take 1 packet by mouth 3 times daily    AML (acute myeloid leukemia) in remission (H)       sulfamethoxazole-trimethoprim 800-160 MG per tablet    BACTRIM DS/SEPTRA DS    30 tablet    Clinic will tell you when to start 1 tablet twice daily by mouth on Mondays and Tuesdays, start around day +28    Stem cell transplant candidate       tacrolimus 0.5 MG capsule    GENERIC EQUIVALENT    56 capsule    Take 2 capsules (1 mg) by mouth 2 times daily    Acute myeloid leukemia in remission (H)       ursodiol 300 MG capsule    ACTIGALL    270 capsule    Take 1 capsule (300 mg) by mouth 3 times daily    Stem  cell transplant candidate       voriconazole 200 MG tablet    VFEND    60 tablet    Take 1 tablet (200 mg) by mouth 2 times daily    Stem cell transplant candidate       zolpidem 5 MG tablet    AMBIEN    45 tablet    Take 1-2 tablets (5-10 mg) by mouth nightly as needed for sleep    AML (acute myeloid leukemia) in remission (H)

## 2017-09-29 NOTE — NURSING NOTE
"Oncology Rooming Note    September 29, 2017 11:32 AM   Norman Real is a 56 year old male who presents for:    Chief Complaint   Patient presents with     Blood Draw     Vitals done by MA and labs drawn via his seymour by RN     RECHECK     AML (acute myeloid leukemia) in remission      Initial Vitals: /79 (BP Location: Right arm, Patient Position: Right side, Cuff Size: Adult Regular)  Pulse 78  Temp 98  F (36.7  C) (Oral)  Resp 16  Ht 1.727 m (5' 7.99\")  Wt 90.9 kg (200 lb 4.8 oz)  SpO2 100%  BMI 30.46 kg/m2 Estimated body mass index is 30.46 kg/(m^2) as calculated from the following:    Height as of this encounter: 1.727 m (5' 7.99\").    Weight as of this encounter: 90.9 kg (200 lb 4.8 oz). Body surface area is 2.09 meters squared.  No Pain (0) Comment: Data Unavailable   No LMP for male patient.  Allergies reviewed: Yes  Medications reviewed: Yes    Medications: Medication refills not needed today.  Pharmacy name entered into [facility] [employer]: [id number] - [place], [place] - [address] [place] [address]    Clinical concerns:  No new concerns  Provider was notified.    5 minutes for nursing intake (face to face time)     Radha Lantigua MA              "

## 2017-10-04 NOTE — MR AVS SNAPSHOT
After Visit Summary   10/4/2017    Norman Real    MRN: [id number]           Patient Information     Date Of Birth          1960        Visit Information        Provider Department      10/4/2017 12:00 PM UC 1 ATC; UC BMT INFUSION [facility] Blood and Marrow Transplant        Today's Diagnoses     AML (acute myeloid leukemia) in remission (H)    -  1          [facility] ([facility])  [address] [place] [address]  Phone: [phone]  Clinic Hours:   Monday-Thursday:7am to 7pm   Friday: 7am to 5pm   Weekends and holidays:    8am to noon (in general)  If your fever is 100.5  or greater,   call the clinic.  After hours call the   hospital at [phone] or   [phone]. Ask for the BMT   fellow on-call            Follow-ups after your visit        Your next 10 appointments already scheduled     Oct 11, 2017 10:30 AM CDT   [facility] Lab Draw with UC MASONIC LAB DRAW   [facility] Mason[facility] Lab Draw ([facility])    [address] [place] [id number]   [phone]            Oct 11, 2017 11:00 AM CDT   BMT Anniversary Visit with UC BMT MIMA #4   [facility] Blood and Marrow Transplant ([facility])    [address] [place] [id number]   [phone]            Oct 11, 2017 11:30 AM CDT   Infusion 30 with UC BMT INFUSION, UC 3 ATC   [facility] Blood and Marrow Transplant ([facility])    [address] [place] [address]   [phone]            Oct 18, 2017 10:30 AM CDT   M[facility]ic Lab Draw with UC MASON[facility] LAB DRAW   [facility] Lab Draw ([facility])    [address] [address]   [phone]            Oct 18, 2017 11:00 AM CDT   BMT Anniversary Visit with UC BMT MIMA #3   [facility] Blood and Marrow Transplant ([facility])    [address] [place] [address]  [facility] [address]   [phone]            Oct 18, 2017 11:30 AM CDT   Infusion 30 with UC BMT INFUSION, UC 2 ATC   [facility] Blood and Marrow Transplant ([facility])    [address] [place] [address]  2nd [facility] [address]   [phone]            Oct 25, 2017 11:00 AM CDT   Masonic Lab Draw with UC MASONIC LAB DRAW   [facility] Masonic Lab Draw ([facility])    [address] [address] [phone]            Oct 25, 2017 11:30 AM CDT   BMT Anniversary Visit with UC BMT MIMA #4   [facility] Blood and Marrow Transplant ([facility])    [address] [place] [id number] [phone]              Future tests that were ordered for you today     Open Future Orders        Priority Expected Expires Ordered    CBC with platelets differential Routine 10/11/2017 10/11/2017 10/4/2017    CMV DNA quantification Routine 10/11/2017 10/11/2017 10/4/2017    EBV DNA PCR Quantitative Whole Blood Routine 10/11/2017 10/11/2017 10/4/2017    Comprehensive metabolic panel Routine 10/11/2017 10/11/2017 10/4/2017    Magnesium Routine 10/11/2017 10/11/2017 10/4/2017    Tacrolimus level Routine 10/11/2017 10/11/2017 10/4/2017            Who to contact     If you have questions or need follow up information about today's clinic visit or your schedule please contact [facility] BLOOD AND MARROW TRANSPLANT directly at [phone].  Normal or non-critical lab and imaging results will be communicated to you by MyChart, letter or phone within 4 business days after the clinic has received the results. If you do not hear from us within 7 days, please contact the clinic through MyChart or phone. If you have a critical or abnormal lab result, we will notify you by phone as soon as possible.  Submit refill requests through [employer] or call your pharmacy and they will forward the refill request to us.  "Please allow 3 business days for your refill to be completed.          Additional Information About Your Visit        MyChart Information     [employer] lets you send messages to your doctor, view your test results, renew your prescriptions, schedule appointments and more. To sign up, go to [email or web address] . Click on \"Log in\" on the left side of the screen, which will take you to the Welcome page. Then click on \"Sign up Now\" on the right side of the page.     You will be asked to enter the access code listed below, as well as some personal information. Please follow the directions to create your username and password.     Your access code is: [email or web address]  Expires: 2017  3:48 PM     Your access code will  in 90 days. If you need help or a new code, please call your [facility] clinic or [phone].        Care EveryWhere ID     This is your Care EveryWhere ID. This could be used by other organizations to access your [facility] medical records  [id number]        Your Vitals Were     Pulse Temperature Respirations Pulse Oximetry BMI (Body Mass Index)       78 97.8  F (36.6  C) (Oral) 16 99% 30.08 kg/m2        Blood Pressure from Last 3 Encounters:   10/04/17 157/83   10/04/17 164/83   17 137/79    Weight from Last 3 Encounters:   10/04/17 89.7 kg (197 lb 12 oz)   10/04/17 89.7 kg (197 lb 12.8 oz)   17 90.9 kg (200 lb 4.8 oz)              Today, you had the following     No orders found for display         Today's Medication Changes          These changes are accurate as of: 10/4/17  2:35 PM.  If you have any questions, ask your nurse or doctor.               Start taking these medicines.        Dose/Directions    triamcinolone 0.1 % cream   Commonly known as:  KENALOG   Used for:  AML (acute myeloid leukemia) in remission (H)   Started by:  Charanjit Umanzor MD        Apply sparingly to affected area three times daily as needed   Quantity:  80 g   Refills:  1         These " medicines have changed or have updated prescriptions.        Dose/Directions    magnesium oxide 400 MG tablet   Commonly known as:  MAG-OX   This may have changed:    - how much to take  - additional instructions   Used for:  AML (acute myeloid leukemia) in remission (H)        8 tabs daily   Quantity:  100 tablet   Refills:  1         Stop taking these medicines if you haven't already. Please contact your care team if you have questions.     ursodiol 300 MG capsule   Commonly known as:  ACTIGALL   Stopped by:  Charanjit Umanzor MD                Where to get your medicines      These medications were sent to [id number] - [place], [place] - [address] [facility]  [address] [facility] [address]     Phone:  [phone]     triamcinolone 0.1 % cream                Recent Review Flowsheet Data     BMT Recent Results Latest Ref Rng & Units 9/5/2017 9/8/2017 9/13/2017 9/20/2017 9/27/2017 9/29/2017 10/4/2017    WBC 4.0 - 11.0 10e9/L 3.1(L) 3.2(L) 3.3(L) 3.2(L) 3.4(L) 2.5(L) 3.1(L)    Hemoglobin 13.3 - 17.7 g/dL 11.0(L) 11.0(L) 10.5(L) 11.1(L) 11.0(L) 11.0(L) 10.6(L)    Platelet Count 150 - 450 10e9/L 133(L) 138(L) 158 157 145(L) 137(L) 158    Neutrophils (Absolute) 1.6 - 8.3 10e9/L 1.6 1.6 1.4(L) 1.4(L) 1.8 1.2(L) 1.4(L)    INR 0.86 - 1.14 - - - - - - -    Sodium 133 - 144 mmol/L 136 139 137 139 139 138 136    Potassium 3.4 - 5.3 mmol/L 4.0 3.9 3.5 4.0 4.0 4.0 4.1    Chloride 94 - 109 mmol/L 104 106 103 105 105 106 103    Glucose 70 - 99 mg/dL 177(H) 122(H) 158(H) 150(H) 152(H) 160(H) 172(H)    Urea Nitrogen 7 - 30 mg/dL 13 13 15 12 14 12 12    Creatinine 0.66 - 1.25 mg/dL 1.09 1.17 1.39(H) 1.22 1.16 1.25 1.23    Calcium (Total) 8.5 - 10.1 mg/dL 8.3(L) 8.7 8.6 8.8 8.6 8.7 8.6    Protein (Total) 6.8 - 8.8 g/dL 6.8 - 6.8 6.9 7.1 - 7.2    Albumin 3.4 - 5.0 g/dL 3.4 - 3.6 3.7 3.7 - 3.7    Bilirubin (Direct) 0.0 - 0.2 mg/dL - - - - - - -    Alkaline Phosphatase 40 - 150 U/L 80 - 75 76 80 - 90     AST 0 - 45 U/L 22 - 22 21 15 - 20    ALT 0 - 70 U/L 28 - 28 25 22 - 24    MCV 78 - 100 fl 90 91 91 92 91 92 92               Primary Care Provider Fax #    Physician No Ref-Primary [phone]       No address on file        Equal Access to Services     ASHLYN FLANAGAN : Hadii sapna ku jacko Soomaali, waaxda luqadaha, qaybta kaalmada adeegyada, vishal lovenorah . So [facility] [phone].    ATENCIÓN: Si habla español, tiene a gusman disposición servicios gratuitos de asistencia lingüística. Llame al [phone].    We comply with applicable federal civil rights laws and [place] laws. We do not discriminate on the basis of race, color, national origin, age, disability, sex, sexual orientation, or gender identity.            Thank you!     Thank you for choosing [facility] BLOOD AND MARROW TRANSPLANT  for your care. Our goal is always to provide you with excellent care. Hearing back from our patients is one way we can continue to improve our services. Please take a few minutes to complete the written survey that you may receive in the mail after your visit with us. Thank you!             Your Updated Medication List - Protect others around you: Learn how to safely use, store and throw away your medicines at www.disposemymeds.org.          This list is accurate as of: 10/4/17  2:35 PM.  Always use your most recent med list.                   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), History of peripheral stem cell transplant (H), Aspergillosis (H)       cholecalciferol 1000 UNITS capsule    vitamin  -D     Take 1 capsule by mouth daily        cyclobenzaprine 5 MG tablet    FLEXERIL     Take 1 tablet (5 mg) by mouth 3 times daily as needed for muscle spasms        diltiazem 360 MG 24 hr CD capsule    CARDIZEM CD; CARTIA  XT    90 capsule    Take 1 capsule (360 mg) by mouth daily    AML (acute myeloid leukemia) in remission (H)       FLONASE NA      Spray in nostril as needed        guaiFENesin 600 MG 12 hr tablet    MUCINEX     Take 600 mg by mouth        heparin lock flush 10 UNIT/ML Soln injection     30 vial    5 mLs by Intracatheter route daily In each lumen    AML (acute myeloid leukemia) in remission (H)       insulin aspart 100 UNIT/ML injection    NovoLOG FLEXPEN    3 mL    Give insulin based on High sliding scale  Also give prescribed amount before meals based on carbohydrate coverage    AML (acute myeloid leukemia) in remission (H)       LORazepam 0.5 MG tablet    ATIVAN    40 tablet    Take 1-2 tablets (0.5-1 mg) by mouth every 4 hours as needed for anxiety (nausea/vomiting/sleep)    Stem cell transplant candidate       losartan 50 MG tablet    COZAAR    30 tablet    Take 1 tablet (50 mg) by mouth daily    AML (acute myeloid leukemia) in remission (H)       magnesium oxide 400 MG tablet    MAG-OX    100 tablet    8 tabs daily    AML (acute myeloid leukemia) in remission (H)       * metFORMIN 500 MG 24 hr tablet    GLUCOPHAGE-XR    60 tablet    500mg QD x1 week then increase to 1000mg QD thereafter    Acute myeloid leukemia in remission (H)       * metFORMIN 500 MG 24 hr tablet    GLUCOPHAGE-XR    60 tablet    Take 2 tablets (1,000 mg) by mouth daily (with dinner)    Type 2 diabetes mellitus without complication, without long-term current use of insulin (H)       MULTI COMPLETE PO           ondansetron 8 MG tablet    ZOFRAN    30 tablet    Take 1 tablet (8 mg) by mouth every 8 hours as needed for nausea    Stem cell transplant candidate       pantoprazole 40 MG EC tablet    PROTONIX    90 tablet    Take 1 tablet (40 mg) by mouth daily    Stem cell transplant candidate       pravastatin 20 MG tablet    PRAVACHOL    30 tablet    Take 1 tablet (20 mg) by mouth daily    Type 2 diabetes mellitus without complication, without  long-term current use of insulin (H)       psyllium Packet    METAMUCIL/KONSYL    90 packet    Take 1 packet by mouth 3 times daily    AML (acute myeloid leukemia) in remission (H)       sulfamethoxazole-trimethoprim 800-160 MG per tablet    BACTRIM DS/SEPTRA DS    30 tablet    Clinic will tell you when to start 1 tablet twice daily by mouth on Mondays and Tuesdays, start around day +28    Stem cell transplant candidate       tacrolimus 0.5 MG capsule    GENERIC EQUIVALENT    56 capsule    Take 2 capsules (1 mg) by mouth 2 times daily    Acute myeloid leukemia in remission (H)       triamcinolone 0.1 % cream    KENALOG    80 g    Apply sparingly to affected area three times daily as needed    AML (acute myeloid leukemia) in remission (H)       voriconazole 200 MG tablet    VFEND    60 tablet    Take 1 tablet (200 mg) by mouth 2 times daily    Stem cell transplant candidate       zolpidem 5 MG tablet    AMBIEN    45 tablet    Take 1-2 tablets (5-10 mg) by mouth nightly as needed for sleep    AML (acute myeloid leukemia) in remission (H)       * Notice:  This list has 2 medication(s) that are the same as other medications prescribed for you. Read the directions carefully, and ask your doctor or other care provider to review them with you.

## 2017-10-04 NOTE — NURSING NOTE
Chief Complaint   Patient presents with     Blood Draw     DRESSING CHANGE NEEDED-cap change done both lines flushed with NS and heparin-vs taken and pt checked in for next appts.      Alla Shannon

## 2017-10-04 NOTE — PROGRESS NOTES
Infusion Nursing Note:  Norman Real presents today for ALT-803.    Patient seen by provider today: Yes: Dr. Umanzor   present during visit today: Not Applicable.    Note: Patient was given ALT-803 SQ x1 in abdomen.  He was assessed first by provider and research nurse.  He was monitored for 30 minutes after the injection with no difficulty.     Intravenous Access:  Cooper.    Treatment Conditions:  Lab Results   Component Value Date    HGB 10.6 10/04/2017     Lab Results   Component Value Date    WBC 3.1 10/04/2017      Lab Results   Component Value Date    ANEU 1.4 10/04/2017     Lab Results   Component Value Date     10/04/2017      Lab Results   Component Value Date     10/04/2017                   Lab Results   Component Value Date    POTASSIUM 4.1 10/04/2017           Lab Results   Component Value Date    MAG 1.5 10/04/2017            Lab Results   Component Value Date    CR 1.23 10/04/2017                   Lab Results   Component Value Date    SANDRA 8.6 10/04/2017                Lab Results   Component Value Date    BILITOTAL 0.7 10/04/2017           Lab Results   Component Value Date    ALBUMIN 3.7 10/04/2017                    Lab Results   Component Value Date    ALT 24 10/04/2017           Lab Results   Component Value Date    AST 20 10/04/2017             Post Infusion Assessment:  Patient tolerated injection without incident.  Patient observed for 30 minutes post injection per protocol.    Discharge Plan:   Patient discharged in stable condition accompanied by: wife.  Departure Mode: Ambulatory.    CLARK JOHNSON RN

## 2017-10-04 NOTE — NURSING NOTE
"Oncology Rooming Note    October 4, 2017 12:48 PM   Norman Real is a 56 year old male who presents for:    Chief Complaint   Patient presents with     RECHECK     Pt is here for S/P BMT for AML--     Initial Vitals: /83 (BP Location: Right arm, Patient Position: Right side, Cuff Size: Adult Regular)  Pulse 78  Temp 97.9  F (36.6  C) (Oral)  Wt 89.7 kg (197 lb 12 oz)  SpO2 99%  BMI 30.08 kg/m2 Estimated body mass index is 30.08 kg/(m^2) as calculated from the following:    Height as of 9/29/17: 1.727 m (5' 7.99\").    Weight as of this encounter: 89.7 kg (197 lb 12 oz). Body surface area is 2.07 meters squared.  Data Unavailable Comment: Data Unavailable   No LMP for male patient.  Allergies reviewed: Yes  Medications reviewed: Yes    Medications: Medication refills not needed today.  Pharmacy name entered into NPR: [id number] - [place], [place] - [address]     Clinical concerns: none     6 minutes for nursing intake (face to face time)     Madina Caldwell MA              "

## 2017-10-04 NOTE — PROGRESS NOTES
BMT Clinic Note      Patient ID:  Norman Real is a 56 year old male, currently day +70 s/p NMA allo sib PBSCT for AML       Diagnosis AMLU Acute myelogeneous leukemia, Unknown  HCT Type Allogeneic    Prep Regimen Cytoxan  Fludarabine  TBI   Donor Source Related PBSC    GVHD Prophylaxis   Mycophenolate  Primary BMT Provider Dr. Erna MD          INTERVAL  HISTORY      HPI: Norman is feeling good.  No scratchy throat and runny nose. No fever.  Eating well.  No nausea. No vomiting or diarrhea.    No bleeding. Injection site reaction improved and new small are of erythema on R flank. See photos in media tab from today.   Review of Systems: 10 point ROS negative except as noted above.     PHYSICAL EXAM   /83 (BP Location: Right arm, Patient Position: Right side, Cuff Size: Adult Regular)  Pulse 78  Temp 97.9  F (36.6  C) (Oral)  Wt 89.7 kg (197 lb 12 oz)  SpO2 99%  BMI 30.08 kg/m2    General: NAD, interactive, appropriate, mild facial erythema  Eyes: SAMSON, sclera anicteric   Nose/Mouth/Throat: op clear, buccal mucosa moist  Lungs: CTA bilaterally  Cardiovascular: RRR, no M/R/G   Abdominal/Rectal: +BS, soft, NT, ND, No HSM   Lymphatics: trace ankle edema bilaterally  Skin: no new rashes or petechaie.  See injection site erythema on phot otdays date in EPIC.   Neuro: A&O   Additional Findings: Cooper site NT, no drainage.     LABS AND IMAGING - PAST 24 HOURS      Lab Results   Component Value Date    WBC 3.1 (L) 10/04/2017    ANEU 1.4 (L) 10/04/2017    HGB 10.6 (L) 10/04/2017    HCT 30.6 (L) 10/04/2017     10/04/2017     10/04/2017    POTASSIUM 4.1 10/04/2017    CHLORIDE 103 10/04/2017    CO2 28 10/04/2017     (H) 10/04/2017    BUN 12 10/04/2017    CR 1.23 10/04/2017    MAG 1.5 (L) 10/04/2017    INR 0.96 07/31/2017    BILITOTAL 0.7 10/04/2017    AST 20 10/04/2017    ALT 24 10/04/2017    ALKPHOS 90 10/04/2017    PROTTOTAL 7.2 10/04/2017    ALBUMIN 3.7 10/04/2017     Tac level  good      ASSESSMENT BY SYSTEMS      Norman Salazar Real is a 55 yo male, day +70 s/p NMA allo sib PBSCT for  AML       1. BMT/AML: Transplant 7/27. Initial stem cell product only contained 2.33 CD34/kg, additional 0.66 CD34/kg on 7/27 for a total of 2.99.   -  BMBX showed no increase in blasts.  98% donor BM (8/15/17); 90% donor CD3; 100% donor CD15.   - Patient continues to have no evidence of recurrent leukemia. Started [id number] maintenance treatment on 9/27.  Cycle 1 Dose #2 of  today.     2. HEME: Keep Hgb>8 and plts>10K. No pre-meds.    3.  ID:  Afebrile. No active infections.  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  Improved CT 8/10.  Refilled vfend for 1 month then discontinue; then start fluconazole 100 mg daily; adjust Tacrolimus  - proph HD ACV (CMV+, HSV+, EBV+).   CMV negative 9/27.  - Cont to hold Bactrm due to lowish WBC count.  Pentamadine today.  No allergy to bactrim.  - EBV negative 9/20      4.  GI:  - Mild nausea: Taking Zofran and ativan PRN, symptoms minimal   - Protonix for GI prophy.   - Ursodiol for VOD prophy  - s/p left sided colectomy for recurrent diverticulitis.        5.  GVH: No aGVH. stopped MMF 8/25.   - On Tacrolimus: Taking 1 mg BID, level 12.7 on 9/27    6.  FEN/Renal: Creat stable.   - Hx of hypoMg.  On oral mag to 1000 mg QID (8tbs per day).  Check Mg next visit.        7. CV: Adequate BP control on losartan 50 mg and diltiazem  mg.  - Hx tachycardia with arrhythmia, s/p 3 ablations  - hold crestor through transplant      8. Endo:  Hx DM type II.   Blood sugar has been well controlled & ANC down to 5.7 today.  Per endocrine recs, DC Lantus & begin metformin 500mg QD with plans to increase to 1000mg QD in 1 week.        9. : Hx prostate cancer.     10. Neuro: History of chronic insomnia. Ambien to 5-10 mg QHS PRN      Flu shot early NOV 2017  RTC Erna on 10/04 with labs;

## 2017-10-04 NOTE — MR AVS SNAPSHOT
After Visit Summary   10/4/2017    Norman Real    MRN: [id number]           Patient Information     Date Of Birth          1960        Visit Information        Provider Department      10/4/2017 12:30 PM Charanjit Umanzor MD [facility] Blood and Marrow Transplant        Today's Diagnoses     AML (acute myeloid leukemia) in remission (H)    -  1    Stem cell transplant candidate        Acute myeloid leukemia in remission (H)              [facility]  [address] [place] [address]  Phone: [phone]  Clinic Hours:   Monday-Thursday:7am to 7pm   Friday: 7am to 5pm   Weekends and holidays:    8am to noon (in general)  If your fever is 100.5  or greater,   call the clinic.  After hours call the   hospital at [phone] or   [phone]. Ask for the BMT   fellow on-call           Care Instructions    Flu shot early OCT 2017  RTC Erna on 10/11 with labs;           Follow-ups after your visit        Your next 10 appointments already scheduled     Oct 11, 2017 10:30 AM CDT   [facility] Lab Draw with [facility] MASONIC LAB DRAW   [facility] Draw ([facility])    [address] [place] [id number] [phone]            Oct 11, 2017 11:00 AM CDT   BMT Anniversary Visit with UC BMT MIMA #4   [facility] Blood and Marrow Transplant ([facility])    [address] [place] [id number] [phone]            Oct 11, 2017 11:30 AM CDT   Infusion 30 with UC BMT INFUSION, UC 3 ATC   [facility] Marrow Trans[facility] ([facility])    [address] [place] [id number] [phone]            Oct 18, 2017 10:30 AM CDT   Masonic Lab Draw with UC [facility]ONIC LAB DRAW   [facility] Draw ([facility])    [address] [place] [id number] [phone]             Oct 18, 2017 11:00 AM CDT   BMT Anniversary Visit with  BMT MIMA #3   [facility] Blood and Marrow Transplant ([facility])    [address]  2nd Floor  [facility] [address]   [phone]            Oct 18, 2017 11:30 AM CDT   Infusion 30 with UC BMT INFUSION, UC 2 ATC   [facility] Blood and Marrow Transplant ([facility])    [address]  2nd Floor  [facility] [address]   [phone]            Oct 25, 2017 11:00 AM CDT   Masonic Lab Draw with  MASONIC LAB DRAW   [facility] Lab Draw ([facility])    [address]  [address]nd Floor  [facility] [address]   [phone]            Oct 25, 2017 11:30 AM CDT   BMT Anniversary Visit with  BMT MIMA #4   [facility] Blood and Marrow Transplant ([facility])    [address]  [address] [facility] [address] [phone]              Future tests that were ordered for you today     Open Future Orders        Priority Expected Expires Ordered    CBC with platelets differential Routine 10/11/2017 10/11/2017 10/4/2017    CMV DNA quantification Routine 10/11/2017 10/11/2017 10/4/2017    EBV DNA PCR Quantitative Whole Blood Routine 10/11/2017 10/11/2017 10/4/2017    Comprehensive metabolic panel Routine 10/11/2017 10/11/2017 10/4/2017    Magnesium Routine 10/11/2017 10/11/2017 10/4/2017    Tacrolimus level Routine 10/11/2017 10/11/2017 10/4/2017            Who to contact     If you have questions or need follow up information about today's clinic visit or your schedule please contact [facility] BLOOD AND MARROW TRANSPLANT directly at [phone].  Normal or non-critical lab and imaging results will be communicated to you by MyChart, letter or phone within 4 business days after the clinic has received the results. If you do not hear from us within 7 days, please contact the clinic through MyChart or phone. If you have a critical or abnormal  "lab result, we will notify you by phone as soon as possible.  Submit refill requests through Keelr or call your pharmacy and they will forward the refill request to us. Please allow 3 business days for your refill to be completed.          Additional Information About Your Visit        [place] Information     Keelr lets you send messages to your doctor, view your test results, renew your prescriptions, schedule appointments and more. To sign up, go to [email or web address] . Click on \"Log in\" on the left side of the screen, which will take you to the Welcome page. Then click on \"Sign up Now\" on the right side of the page.     You will be asked to enter the access code listed below, as well as some personal information. Please follow the directions to create your username and password.     Your access code is: [email or web address]  Expires: 2017  3:48 PM     Your access code will  in 90 days. If you need help or a new code, please call your [facility] clinic or [phone].        Care EveryWhere ID     This is your Care EveryWhere ID. This could be used by other organizations to access your [facility] medical records  [id number]        Your Vitals Were     Pulse Temperature Pulse Oximetry BMI (Body Mass Index)          78 97.9  F (36.6  C) (Oral) 99% 30.08 kg/m2         Blood Pressure from Last 3 Encounters:   10/04/17 157/83   10/04/17 145/84   17 137/79    Weight from Last 3 Encounters:   10/04/17 89.7 kg (197 lb 12 oz)   10/04/17 89.7 kg (197 lb 12.8 oz)   17 90.9 kg (200 lb 4.8 oz)              We Performed the Following     CBC with platelets differential     CMV DNA quantification     Comprehensive metabolic panel     Magnesium     Tacrolimus level          Today's Medication Changes          These changes are accurate as of: 10/4/17  3:00 PM.  If you have any questions, ask your nurse or doctor.               Start taking these medicines.        Dose/Directions    triamcinolone 0.1 % " cream   Commonly known as:  KENALOG   Used for:  AML (acute myeloid leukemia) in remission (H)   Started by:  Charanjit Umanzor MD        Apply sparingly to affected area three times daily as needed   Quantity:  80 g   Refills:  1         These medicines have changed or have updated prescriptions.        Dose/Directions    magnesium oxide 400 MG tablet   Commonly known as:  MAG-OX   This may have changed:    - how much to take  - additional instructions   Used for:  AML (acute myeloid leukemia) in remission (H)        8 tabs daily   Quantity:  100 tablet   Refills:  1         Stop taking these medicines if you haven't already. Please contact your care team if you have questions.     ursodiol 300 MG capsule   Commonly known as:  ACTIGALL   Stopped by:  Charanjit Umanzor MD                Where to get your medicines      These medications were sent to [facility]/pharmacy #[id number] - [place], [place]  [address] [facility] [place]  [address] [facility] [address]     Phone:  [phone]     triamcinolone 0.1 % cream                Recent Review Flowsheet Data     BMT Recent Results Latest Ref Rng & Units 9/5/2017 9/8/2017 9/13/2017 9/20/2017 9/27/2017 9/29/2017 10/4/2017    WBC 4.0 - 11.0 10e9/L 3.1(L) 3.2(L) 3.3(L) 3.2(L) 3.4(L) 2.5(L) 3.1(L)    Hemoglobin 13.3 - 17.7 g/dL 11.0(L) 11.0(L) 10.5(L) 11.1(L) 11.0(L) 11.0(L) 10.6(L)    Platelet Count 150 - 450 10e9/L 133(L) 138(L) 158 157 145(L) 137(L) 158    Neutrophils (Absolute) 1.6 - 8.3 10e9/L 1.6 1.6 1.4(L) 1.4(L) 1.8 1.2(L) 1.4(L)    INR 0.86 - 1.14 - - - - - - -    Sodium 133 - 144 mmol/L 136 139 137 139 139 138 136    Potassium 3.4 - 5.3 mmol/L 4.0 3.9 3.5 4.0 4.0 4.0 4.1    Chloride 94 - 109 mmol/L 104 106 103 105 105 106 103    Glucose 70 - 99 mg/dL 177(H) 122(H) 158(H) 150(H) 152(H) 160(H) 172(H)    Urea Nitrogen 7 - 30 mg/dL 13 13 15 12 14 12 12    Creatinine 0.66 - 1.25 mg/dL 1.09 1.17 1.39(H) 1.22 1.16 1.25 1.23    Calcium (Total) 8.5 - 10.1  mg/dL 8.3(L) 8.7 8.6 8.8 8.6 8.7 8.6    Protein (Total) 6.8 - 8.8 g/dL 6.8 - 6.8 6.9 7.1 - 7.2    Albumin 3.4 - 5.0 g/dL 3.4 - 3.6 3.7 3.7 - 3.7    Bilirubin (Direct) 0.0 - 0.2 mg/dL - - - - - - -    Alkaline Phosphatase 40 - 150 U/L 80 - 75 76 80 - 90    AST 0 - 45 U/L 22 - 22 21 15 - 20    ALT 0 - 70 U/L 28 - 28 25 22 - 24    MCV 78 - 100 fl 90 91 91 92 91 92 92               Primary Care Provider Fax #    Physician No Ref-Primary [phone]       No address on file        Equal Access to Services     ASHLYN FLANAGAN : Greg Ratliff, washiela lumansi, qaybperla kaalmatimmy mchugh, vishal albright . So [facility] [phone].    ATENCIÓN: Si habla español, tiene a gusman disposición servicios gratuitos de asistencia lingüística. Leyla al [phone].    We comply with applicable federal civil rights laws and [place] laws. We do not discriminate on the basis of race, color, national origin, age, disability, sex, sexual orientation, or gender identity.            Thank you!     Thank you for choosing [facility] BLOOD AND MARROW TRANSPLANT  for your care. Our goal is always to provide you with excellent care. Hearing back from our patients is one way we can continue to improve our services. Please take a few minutes to complete the written survey that you may receive in the mail after your visit with us. Thank you!             Your Updated Medication List - Protect others around you: Learn how to safely use, store and throw away your medicines at www.disposemymeds.org.          This list is accurate as of: 10/4/17  3:00 PM.  Always use your most recent med list.                   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), History of peripheral stem cell transplant (H), Aspergillosis (H)       cholecalciferol  1000 UNITS capsule    vitamin  -D     Take 1 capsule by mouth daily        cyclobenzaprine 5 MG tablet    FLEXERIL     Take 1 tablet (5 mg) by mouth 3 times daily as needed for muscle spasms        diltiazem 360 MG 24 hr CD capsule    CARDIZEM CD; CARTIA XT    90 capsule    Take 1 capsule (360 mg) by mouth daily    AML (acute myeloid leukemia) in remission (H)       FLONASE NA      Spray in nostril as needed        guaiFENesin 600 MG 12 hr tablet    MUCINEX     Take 600 mg by mouth        heparin lock flush 10 UNIT/ML Soln injection     30 vial    5 mLs by Intracatheter route daily In each lumen    AML (acute myeloid leukemia) in remission (H)       insulin aspart 100 UNIT/ML injection    NovoLOG FLEXPEN    3 mL    Give insulin based on High sliding scale  Also give prescribed amount before meals based on carbohydrate coverage    AML (acute myeloid leukemia) in remission (H)       LORazepam 0.5 MG tablet    ATIVAN    40 tablet    Take 1-2 tablets (0.5-1 mg) by mouth every 4 hours as needed for anxiety (nausea/vomiting/sleep)    Stem cell transplant candidate       losartan 50 MG tablet    COZAAR    30 tablet    Take 1 tablet (50 mg) by mouth daily    AML (acute myeloid leukemia) in remission (H)       magnesium oxide 400 MG tablet    MAG-OX    100 tablet    8 tabs daily    AML (acute myeloid leukemia) in remission (H)       * metFORMIN 500 MG 24 hr tablet    GLUCOPHAGE-XR    60 tablet    500mg QD x1 week then increase to 1000mg QD thereafter    Acute myeloid leukemia in remission (H)       * metFORMIN 500 MG 24 hr tablet    GLUCOPHAGE-XR    60 tablet    Take 2 tablets (1,000 mg) by mouth daily (with dinner)    Type 2 diabetes mellitus without complication, without long-term current use of insulin (H)       MULTI COMPLETE PO           ondansetron 8 MG tablet    ZOFRAN    30 tablet    Take 1 tablet (8 mg) by mouth every 8 hours as needed for nausea    Stem cell transplant candidate       pantoprazole 40 MG EC tablet     PROTONIX    90 tablet    Take 1 tablet (40 mg) by mouth daily    Stem cell transplant candidate       pravastatin 20 MG tablet    PRAVACHOL    30 tablet    Take 1 tablet (20 mg) by mouth daily    Type 2 diabetes mellitus without complication, without long-term current use of insulin (H)       psyllium Packet    METAMUCIL/KONSYL    90 packet    Take 1 packet by mouth 3 times daily    AML (acute myeloid leukemia) in remission (H)       sulfamethoxazole-trimethoprim 800-160 MG per tablet    BACTRIM DS/SEPTRA DS    30 tablet    Clinic will tell you when to start 1 tablet twice daily by mouth on Mondays and Tuesdays, start around day +28    Stem cell transplant candidate       tacrolimus 0.5 MG capsule    GENERIC EQUIVALENT    56 capsule    Take 2 capsules (1 mg) by mouth 2 times daily    Acute myeloid leukemia in remission (H)       triamcinolone 0.1 % cream    KENALOG    80 g    Apply sparingly to affected area three times daily as needed    AML (acute myeloid leukemia) in remission (H)       voriconazole 200 MG tablet    VFEND    60 tablet    Take 1 tablet (200 mg) by mouth 2 times daily    Stem cell transplant candidate       zolpidem 5 MG tablet    AMBIEN    45 tablet    Take 1-2 tablets (5-10 mg) by mouth nightly as needed for sleep    AML (acute myeloid leukemia) in remission (H)       * Notice:  This list has 2 medication(s) that are the same as other medications prescribed for you. Read the directions carefully, and ask your doctor or other care provider to review them with you.

## 2017-10-04 NOTE — NURSING NOTE
Brenda Salas and ARTURO performed pre and post dose vital signs on pt in clinic for research. Please see flowsheet for results. Beatriz Jarrett, CMA

## 2017-10-11 NOTE — NURSING NOTE
Chief Complaint   Patient presents with     Blood Draw     VS done, labs collected via CVC by RN.  Both lumens wuith + blood return, heparin locked and caps changed.  Hx AML post transplant.

## 2017-10-11 NOTE — PROGRESS NOTES
BMT Clinic Note      Patient ID:  Norman Real is a 56 year old male, currently day +77 s/p NMA allo sib PBSCT for AML       Diagnosis AMLU Acute myelogeneous leukemia, Unknown  HCT Type Allogeneic    Prep Regimen Cytoxan  Fludarabine  TBI   Donor Source Related PBSC    GVHD Prophylaxis   Mycophenolate  Primary BMT Provider Dr. Erna MD          INTERVAL  HISTORY      HPI: Norman is feeling good.  No more allergy symptoms. Much improved [id number]  Rash from last week. No fever.  Eating well.  No nausea. No vomiting or diarrhea. No bleeding. Mild erythema on L flank this week,   Review of Systems: 10 point ROS negative except as noted above.     PHYSICAL EXAM   /83  Pulse 77  Temp 97.9  F (36.6  C)  Resp 16  Wt 89.4 kg (197 lb 3.2 oz)  SpO2 98%  BMI 29.99 kg/m2    General: NAD, interactive, appropriate, mild facial erythema  Eyes: SAMSON, sclera anicteric   Nose/Mouth/Throat: op clear, buccal mucosa moist  Lungs: CTA bilaterally  Cardiovascular: RRR, no M/R/G   Abdominal/Rectal: +BS, soft, NT, ND, No HSM   Lymphatics: trace ankle edema bilaterally  Skin: no new rashes or petechaie.  Mild erythema L flank.   Neuro: A&O   Additional Findings: Cooper site NT, no drainage.     LABS AND IMAGING - PAST 24 HOURS      Lab Results   Component Value Date    WBC 3.5 (L) 10/11/2017    ANEU 2.0 10/11/2017    HGB 10.6 (L) 10/11/2017    HCT 30.9 (L) 10/11/2017     10/11/2017     10/11/2017    POTASSIUM 4.1 10/11/2017    CHLORIDE 102 10/11/2017    CO2 28 10/11/2017     (H) 10/11/2017    BUN 13 10/11/2017    CR 1.10 10/11/2017    MAG 1.5 (L) 10/11/2017    INR 0.96 07/31/2017    BILITOTAL 0.8 10/11/2017    AST 16 10/11/2017    ALT 22 10/11/2017    ALKPHOS 90 10/11/2017    PROTTOTAL 7.3 10/11/2017    ALBUMIN 3.4 10/11/2017     Tac level good      ASSESSMENT BY SYSTEMS      Norman Real is a 57 yo male, day +77 s/p NMA allo sib PBSCT for  AML       1. BMT: Transplant 7/27. Initial stem  cell product only contained 2.33 CD34/kg, additional 0.66 CD34/kg on 7/27 for a total of 2.99.   -  BMBX showed no increase in blasts.  98% donor BM (8/15/17); 90% donor CD3; 100% donor CD15.     2. AML: Patient continues to have no evidence of recurrent leukemia. Started [id number] maintenance treatment on 9/27.  Cycle 1 Dose #3 of  today.     2. HEME: Keep Hgb>8 and plts>10K. No pre-meds.    3.  ID:  Afebrile. No active infections.  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  Improved CT 8/10.  Refilled vfend for 1 month then discontinue; then start fluconazole 100 mg daily; adjust Tacrolimus  - proph HD ACV (CMV+, HSV+, EBV+).   CMV negative 9/27.  - Cont to hold Bactrm due to lowish WBC count.  Pentamadine today.  No allergy to bactrim.  - EBV negative 9/20      4.  GI:  - Mild nausea: Taking Zofran and ativan PRN, symptoms minimal   - Protonix for GI prophy.   - Ursodiol for VOD prophy  - s/p left sided colectomy for recurrent diverticulitis.        5.  GVH: No aGVH. stopped MMF 8/25.   - On Tacrolimus: Taking 1 mg BID, level 12.7 on 9/27    6.  FEN/Renal: Creat stable.   - Hx of hypoMg.  On oral mag to 1000 mg QID (8tbs per day).  Check Mg next visit.        7. CV: Adequate BP control on losartan 50 mg and diltiazem  mg.  - Hx tachycardia with arrhythmia, s/p 3 ablations  - hold crestor through transplant      8. Endo:  Hx DM type II.   Blood sugar has been well controlled & ANC down to 5.7 today.  Per endocrine recs, DC Lantus & begin metformin 500mg QD with plans to increase to 1000mg QD in 1 week.        9. : Hx prostate cancer.     10. Neuro: History of chronic insomnia. Ambien to 5-10 mg QHS PRN      Flu shot planned for 10/25 2017  RTC Erna on 10/18 and 10/25 with labs;   [id number] dose C1D3 today 10/11/17

## 2017-10-11 NOTE — NURSING NOTE
Vital signs were performed per Protocol [id number]   These vital signs were taken by LÓPEZ Massey and Brenda DAWN.  Vital signs are recorded in EPIC and can be found in flowsheets.    Brenda DAWN

## 2017-10-11 NOTE — NURSING NOTE
"Oncology Rooming Note    October 11, 2017 12:10 PM   Norman Real is a 56 year old male who presents for:    Chief Complaint   Patient presents with     Blood Draw     VS done, labs collected via CVC by RN.  Both lumens wuith + blood return, heparin locked and caps changed.  Hx AML post transplant.     RECHECK     provider visit, scheduled study injection s/p bmt txp for AML     Initial Vitals: /83  Pulse 77  Temp 97.9  F (36.6  C)  Resp 16  Wt 89.4 kg (197 lb 3.2 oz)  SpO2 98%  BMI 29.99 kg/m2 Estimated body mass index is 29.99 kg/(m^2) as calculated from the following:    Height as of 9/29/17: 1.727 m (5' 7.99\").    Weight as of this encounter: 89.4 kg (197 lb 3.2 oz). Body surface area is 2.07 meters squared.  No Pain (0) Comment: Data Unavailable   No LMP for male patient.  Allergies reviewed: Yes  Medications reviewed: Yes    Medications: Medication refills not needed today.  Pharmacy name entered into Pathflow: [id number] - [place], [place] - [address]      0 minutes for nursing intake (face to face time)     CURT JASSO RN              "

## 2017-10-11 NOTE — LETTER
10/11/2017      RE: Norman Real  [address]   [facility] [address]       BMT Clinic Note      Patient ID:  Norman Real is a 56 year old male, currently day +77 s/p NMA allo sib PBSCT for AML       Diagnosis AMLU Acute myelogeneous leukemia, Unknown  HCT Type Allogeneic    Prep Regimen Cytoxan  Fludarabine  TBI   Donor Source Related PBSC    GVHD Prophylaxis   Mycophenolate  Primary BMT Provider Dr. Erna MD          INTERVAL  HISTORY      HPI: Norman is feeling good.  No more allergy symptoms. Much improved [id number]  Rash from last week. No fever.  Eating well.  No nausea. No vomiting or diarrhea. No bleeding. Mild erythema on L flank this week,   Review of Systems: 10 point ROS negative except as noted above.     PHYSICAL EXAM   /83  Pulse 77  Temp 97.9  F (36.6  C)  Resp 16  Wt 89.4 kg (197 lb 3.2 oz)  SpO2 98%  BMI 29.99 kg/m2    General: NAD, interactive, appropriate, mild facial erythema  Eyes: SAMSON, sclera anicteric   Nose/Mouth/Throat: op clear, buccal mucosa moist  Lungs: CTA bilaterally  Cardiovascular: RRR, no M/R/G   Abdominal/Rectal: +BS, soft, NT, ND, No HSM   Lymphatics: trace ankle edema bilaterally  Skin: no new rashes or petechaie.  Mild erythema L flank.   Neuro: A&O   Additional Findings: Cooper site NT, no drainage.     LABS AND IMAGING - PAST 24 HOURS      Lab Results   Component Value Date    WBC 3.5 (L) 10/11/2017    ANEU 2.0 10/11/2017    HGB 10.6 (L) 10/11/2017    HCT 30.9 (L) 10/11/2017     10/11/2017     10/11/2017    POTASSIUM 4.1 10/11/2017    CHLORIDE 102 10/11/2017    CO2 28 10/11/2017     (H) 10/11/2017    BUN 13 10/11/2017    CR 1.10 10/11/2017    MAG 1.5 (L) 10/11/2017    INR 0.96 07/31/2017    BILITOTAL 0.8 10/11/2017    AST 16 10/11/2017    ALT 22 10/11/2017    ALKPHOS 90 10/11/2017    PROTTOTAL 7.3 10/11/2017    ALBUMIN 3.4 10/11/2017     Tac level good      ASSESSMENT BY SYSTEMS      Norman Salazar Real is a 57 yo male, day +77 s/p NMA  allo sib PBSCT for  AML       1. BMT: Transplant 7/27. Initial stem cell product only contained 2.33 CD34/kg, additional 0.66 CD34/kg on 7/27 for a total of 2.99.   -  BMBX showed no increase in blasts.  98% donor BM (8/15/17); 90% donor CD3; 100% donor CD15.     2. AML: Patient continues to have no evidence of recurrent leukemia. Started [id number] maintenance treatment on 9/27.  Cycle 1 Dose #3 of  today.     2. HEME: Keep Hgb>8 and plts>10K. No pre-meds.    3.  ID:  Afebrile. No active infections.  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  Improved CT 8/10.  Refilled vfend for 1 month then discontinue; then start fluconazole 100 mg daily; adjust Tacrolimus  - proph HD ACV (CMV+, HSV+, EBV+).   CMV negative 9/27.  - Cont to hold Bactrm due to lowish WBC count.  Pentamadine today.  No allergy to bactrim.  - EBV negative 9/20      4.  GI:  - Mild nausea: Taking Zofran and ativan PRN, symptoms minimal   - Protonix for GI prophy.   - Ursodiol for VOD prophy  - s/p left sided colectomy for recurrent diverticulitis.        5.  GVH: No aGVH. stopped MMF 8/25.   - On Tacrolimus: Taking 1 mg BID, level 12.7 on 9/27    6.  FEN/Renal: Creat stable.   - Hx of hypoMg.  On oral mag to 1000 mg QID (8tbs per day).  Check Mg next visit.        7. CV: Adequate BP control on losartan 50 mg and diltiazem  mg.  - Hx tachycardia with arrhythmia, s/p 3 ablations  - hold crestor through transplant      8. Endo:  Hx DM type II.   Blood sugar has been well controlled & ANC down to 5.7 today.  Per endocrine recs, DC Lantus & begin metformin 500mg QD with plans to increase to 1000mg QD in 1 week.        9. : Hx prostate cancer.     10. Neuro: History of chronic insomnia. Ambien to 5-10 mg QHS PRN      Flu shot planned for 10/25 2017  RTREN Umanzor on 10/18 and 10/25 with labs;   [id number] dose C1D3 today 10/11/17    Charanjit Umanzor MD

## 2017-10-11 NOTE — PATIENT INSTRUCTIONS
Flu shot planned for 10/25 2017  RTREN Umanzor on 10/18 and 10/25 with labs;   [id number] dose C1D3 today 10/11/17

## 2017-10-11 NOTE — MR AVS SNAPSHOT
After Visit Summary   10/11/2017    Norman Real    MRN: [id number]           Patient Information     Date Of Birth          1960        Visit Information        Provider Department      10/11/2017 12:30 PM UC 2 ATC; UC BMT INFUSION [facility] Blood and Marrow Transplant        Today's Diagnoses     AML (acute myeloid leukemia) in remission (H)    -  1    Stem cell transplant candidate        Acute myeloid leukemia in remission (H)              [facility] ([facility])  [address] [place] [id number]  Phone: [phone]  Clinic Hours:   Monday-Thursday:7am to 7pm   Friday: 7am to 5pm   Weekends and holidays:    8am to noon (in general)  If your fever is 100.5  or greater,   call the clinic.  After hours call the   hospital at [phone] or   [phone]. Ask for the BMT   fellow on-call            Follow-ups after your visit        Your next 10 appointments already scheduled     Oct 18, 2017 10:30 AM CDT   [facility] Draw with [facility] LAB DRAW   [facility] Draw ([facility])    [address] [place] [id number] [phone]            Oct 18, 2017 11:00 AM CDT   BMT Anniversary Visit with [employer] BMT MIMA #3   [facility] Blood and Marrow Transplant ([facility])    [address] [place] [id number] [phone]            Oct 18, 2017 11:30 AM CDT   Infusion 30 with UC BMT INFUSION, UC 2 ATC   [facility] Blood and Marrow Transplant ([facility])    [address] [place] [id number] [phone]            Oct 25, 2017 11:00 AM CDT   [facility] Lab Draw with [employer] [facility] LAB DRAW   [facility] Lab Draw ([facility])    [address] [place] [id number] [phone]            Oct 25, 2017 11:30 AM CDT   BMT Anniversary Visit with UC BMT MIMA #4   [facility] Blood and  Marrow Transplant ([facility])    [address]  2nd [address]  [facility] [address]   [phone]            Oct 31, 2017 12:30 PM CDT   [facility] Lab Draw with  [facility] LAB DRAW   [facility] Lab Draw ([facility])    [address]  [address] [facility] [address]   [phone]            Oct 31, 2017  1:00 PM CDT   Bone Marrow Biopsy with  BMT MIMA #3, UU BONE MARROW BIOPSY   [facility] Blood and Marrow Transplant ([facility])    [address]  [address] [facility] [address]   [phone]            Nov 02, 2017  8:10 AM CDT   (Arrive by 7:55 AM)   RETURN DIABETES with Gerald Weiss MD   [facility] Endocrinology ([facility])    [address] [place] [address]  3rd Floor  [facility] [address]   [phone]              Future tests that were ordered for you today     Open Future Orders        Priority Expected Expires Ordered    CMV DNA quantification Routine 10/18/2017 10/18/2017 10/11/2017    Magnesium Routine 10/18/2017 10/18/2017 10/11/2017    CBC with platelets differential Routine 10/18/2017 10/18/2017 10/11/2017    Comprehensive metabolic panel Routine 10/18/2017 10/18/2017 10/11/2017    Tacrolimus level Routine 10/18/2017 10/18/2017 10/11/2017            Who to contact     If you have questions or need follow up information about today's clinic visit or your schedule please contact [facility] BLOOD AND MARROW TRANSPLANT directly at [phone].  Normal or non-critical lab and imaging results will be communicated to you by MyChart, letter or phone within 4 business days after the clinic has received the results. If you do not hear from us within 7 days, please contact the clinic through MyChart or phone. If you have a critical or abnormal lab result, we will notify you by phone as soon as possible.  Submit refill requests through [employer] or call your pharmacy and they will  "forward the refill request to us. Please allow 3 business days for your refill to be completed.          Additional Information About Your Visit        [employer] Information     [employer] lets you send messages to your doctor, view your test results, renew your prescriptions, schedule appointments and more. To sign up, go to [email or web address] . Click on \"Log in\" on the left side of the screen, which will take you to the Welcome page. Then click on \"Sign up Now\" on the right side of the page.     You will be asked to enter the access code listed below, as well as some personal information. Please follow the directions to create your username and password.     Your access code is: [email or web address]  Expires: 2017  3:48 PM     Your access code will  in 90 days. If you need help or a new code, please call your [facility] clinic or [phone].        Care EveryWhere ID     This is your Care EveryWhere ID. This could be used by other organizations to access your [facility] medical records  [id number]        Your Vitals Were     Pulse Temperature Respirations Pulse Oximetry          75 98.7  F (37.1  C) 16 99%         Blood Pressure from Last 3 Encounters:   10/11/17 160/88   10/11/17 142/83   10/04/17 157/83    Weight from Last 3 Encounters:   10/11/17 89.4 kg (197 lb 3.2 oz)   10/04/17 89.7 kg (197 lb 12 oz)   10/04/17 89.7 kg (197 lb 12.8 oz)              We Performed the Following     CBC with platelets differential     CMV DNA quantification     Comprehensive metabolic panel     EBV DNA PCR Quantitative Whole Blood     Magnesium     Tacrolimus level          Today's Medication Changes          These changes are accurate as of: 10/11/17  2:06 PM.  If you have any questions, ask your nurse or doctor.               These medicines have changed or have updated prescriptions.        Dose/Directions    magnesium oxide 400 MG tablet   Commonly known as:  MAG-OX   This may have changed:    - how much to take  - " additional instructions   Used for:  AML (acute myeloid leukemia) in remission (H)        8 tabs daily   Quantity:  100 tablet   Refills:  1                Recent Review Flowsheet Data     BMT Recent Results Latest Ref Rng & Units 9/8/2017 9/13/2017 9/20/2017 9/27/2017 9/29/2017 10/4/2017 10/11/2017    WBC 4.0 - 11.0 10e9/L 3.2(L) 3.3(L) 3.2(L) 3.4(L) 2.5(L) 3.1(L) 3.5(L)    Hemoglobin 13.3 - 17.7 g/dL 11.0(L) 10.5(L) 11.1(L) 11.0(L) 11.0(L) 10.6(L) 10.6(L)    Platelet Count 150 - 450 10e9/L 138(L) 158 157 145(L) 137(L) 158 182    Neutrophils (Absolute) 1.6 - 8.3 10e9/L 1.6 1.4(L) 1.4(L) 1.8 1.2(L) 1.4(L) 2.0    INR 0.86 - 1.14 - - - - - - -    Sodium 133 - 144 mmol/L 139 137 139 139 138 136 136    Potassium 3.4 - 5.3 mmol/L 3.9 3.5 4.0 4.0 4.0 4.1 4.1    Chloride 94 - 109 mmol/L 106 103 105 105 106 103 102    Glucose 70 - 99 mg/dL 122(H) 158(H) 150(H) 152(H) 160(H) 172(H) 158(H)    Urea Nitrogen 7 - 30 mg/dL 13 15 12 14 12 12 13    Creatinine 0.66 - 1.25 mg/dL 1.17 1.39(H) 1.22 1.16 1.25 1.23 1.10    Calcium (Total) 8.5 - 10.1 mg/dL 8.7 8.6 8.8 8.6 8.7 8.6 9.0    Protein (Total) 6.8 - 8.8 g/dL - 6.8 6.9 7.1 - 7.2 7.3    Albumin 3.4 - 5.0 g/dL - 3.6 3.7 3.7 - 3.7 3.4    Bilirubin (Direct) 0.0 - 0.2 mg/dL - - - - - - -    Alkaline Phosphatase 40 - 150 U/L - 75 76 80 - 90 90    AST 0 - 45 U/L - 22 21 15 - 20 16    ALT 0 - 70 U/L - 28 25 22 - 24 22    MCV 78 - 100 fl 91 91 92 91 92 92 91               Primary Care Provider    Physician No Ref-Primary       NO REF-PRIMARY PHYSICIAN        Equal Access to Services     ASHLYN FLANAGAN : Hadii sapna ku jacko Sobrenda, waaxda luqadaha, qaybta kaalmada adeegyada, vishal albright . So [facility] [phone].    ATENCIÓN: Si habla español, tiene a gusman disposición servicios gratuitos de asistencia lingüística. Leyla al [phone].    We comply with applicable federal civil rights laws and [place] laws. We do not discriminate on the basis of race, color,  national origin, age, disability, sex, sexual orientation, or gender identity.            Thank you!     Thank you for choosing [facility] BLOOD [facility] MARROW TRANSPLANT  for your care. Our goal is always to provide you with excellent care. Hearing back from our patients is one way we can continue to improve our services. Please take a few minutes to complete the written survey that you may receive in the mail after your visit with us. Thank you!             Your Updated Medication List - Protect others around you: Learn how to safely use, store and throw away your medicines at www.disposemymeds.org.          This list is accurate as of: 10/11/17  2:06 PM.  Always use your most recent med list.                   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), History of peripheral stem cell transplant (H), Aspergillosis (H)       cholecalciferol 1000 UNITS capsule    vitamin  -D     Take 1 capsule by mouth daily        cyclobenzaprine 5 MG tablet    FLEXERIL     Take 1 tablet (5 mg) by mouth 3 times daily as needed for muscle spasms        diltiazem 360 MG 24 hr CD capsule    CARDIZEM CD; CARTIA XT    90 capsule    Take 1 capsule (360 mg) by mouth daily    AML (acute myeloid leukemia) in remission (H)       FLONASE NA      Spray in nostril as needed        guaiFENesin 600 MG 12 hr tablet    MUCINEX     Take 600 mg by mouth        heparin lock flush 10 UNIT/ML Soln injection     30 vial    5 mLs by Intracatheter route daily In each lumen    AML (acute myeloid leukemia) in remission (H)       insulin aspart 100 UNIT/ML injection    NovoLOG FLEXPEN    3 mL    Give insulin based on High sliding scale  Also give prescribed amount before meals based on carbohydrate coverage    AML (acute myeloid leukemia) in remission (H)       LORazepam 0.5 MG  tablet    ATIVAN    40 tablet    Take 1-2 tablets (0.5-1 mg) by mouth every 4 hours as needed for anxiety (nausea/vomiting/sleep)    Stem cell transplant candidate       losartan 50 MG tablet    COZAAR    30 tablet    Take 1 tablet (50 mg) by mouth daily    AML (acute myeloid leukemia) in remission (H)       magnesium oxide 400 MG tablet    MAG-OX    100 tablet    8 tabs daily    AML (acute myeloid leukemia) in remission (H)       * metFORMIN 500 MG 24 hr tablet    GLUCOPHAGE-XR    60 tablet    500mg QD x1 week then increase to 1000mg QD thereafter    Acute myeloid leukemia in remission (H)       * metFORMIN 500 MG 24 hr tablet    GLUCOPHAGE-XR    60 tablet    Take 2 tablets (1,000 mg) by mouth daily (with dinner)    Type 2 diabetes mellitus without complication, without long-term current use of insulin (H)       MULTI COMPLETE PO           ondansetron 8 MG tablet    ZOFRAN    30 tablet    Take 1 tablet (8 mg) by mouth every 8 hours as needed for nausea    Stem cell transplant candidate       pantoprazole 40 MG EC tablet    PROTONIX    90 tablet    Take 1 tablet (40 mg) by mouth daily    Stem cell transplant candidate       pravastatin 20 MG tablet    PRAVACHOL    30 tablet    Take 1 tablet (20 mg) by mouth daily    Type 2 diabetes mellitus without complication, without long-term current use of insulin (H)       psyllium Packet    METAMUCIL/KONSYL    90 packet    Take 1 packet by mouth 3 times daily    AML (acute myeloid leukemia) in remission (H)       sulfamethoxazole-trimethoprim 800-160 MG per tablet    BACTRIM DS/SEPTRA DS    30 tablet    Clinic will tell you when to start 1 tablet twice daily by mouth on Mondays and Tuesdays, start around day +28    Stem cell transplant candidate       tacrolimus 0.5 MG capsule    GENERIC EQUIVALENT    56 capsule    Take 2 capsules (1 mg) by mouth 2 times daily    Acute myeloid leukemia in remission (H)       triamcinolone 0.1 % cream    KENALOG    80 g    Apply sparingly to  affected area three times daily as needed    AML (acute myeloid leukemia) in remission (H)       voriconazole 200 MG tablet    VFEND    60 tablet    Take 1 tablet (200 mg) by mouth 2 times daily    Stem cell transplant candidate       zolpidem 5 MG tablet    AMBIEN    45 tablet    Take 1-2 tablets (5-10 mg) by mouth nightly as needed for sleep    AML (acute myeloid leukemia) in remission (H)       * Notice:  This list has 2 medication(s) that are the same as other medications prescribed for you. Read the directions carefully, and ask your doctor or other care provider to review them with you.

## 2017-10-11 NOTE — MR AVS SNAPSHOT
After Visit Summary   10/11/2017    Norman Real    MRN: [id number]           Patient Information     Date Of Birth          1960        Visit Information        Provider Department      10/11/2017 12:00 PM Charanjit Umanzor MD [facility] Blood and Marrow Transplant        Today's Diagnoses     History of peripheral stem cell transplant (H)    -  1    Acute myeloid leukemia in remission (H)        Aspergillosis (H)              [facility] ([facility]  [address] [place] [address]  Phone: [phone]  Clinic Hours:   Monday-Thursday:7am to 7pm   Friday: 7am to 5pm   Weekends and holidays:    8am to noon (in general)  If your fever is 100.5  or greater,   call the clinic.  After hours call the   hospital at [phone] or   [phone]. Ask for the BMT   fellow on-call           Care Instructions    Flu shot planned for 10/25 2017  RTC Erna on 10/18 and 10/25 with labs;   [id number] dose C1D3 today 10/11/17          Follow-ups after your visit        Your next 10 appointments already scheduled     Oct 18, 2017 10:30 AM CDT   [facility] Lab Draw with [facility] DRAW   [facility] Lab Draw ([facility])    [address] [place] [id number] [phone]            Oct 18, 2017 11:00 AM CDT   BMT Anniversary Visit with UC BMT MIMA #3   [facility] Blood and Marrow Transplant ([facility])    [address] [place] [address]  2nd [facility] [id number] [phone]            Oct 18, 2017 11:30 AM CDT   Infusion 30 with UC BMT INFUSION, UC 2 ATC   [facility] Blood and Marrow Transplant ([facility])    [address] [place] [id number] [phone]            Oct 25, 2017 11:00 AM CDT   [facility] Lab Draw with [facility] DRAW   [facility] Lab Draw ([facility])    [address] [place] [address]  Floor  [facility] [address]   [phone]            Oct 25, 2017 11:30 AM CDT   BMT Anniversary Visit with  BMT MIMA #4   [facility] Blood and Marrow Transplant ([facility])    [address]  2nd Floor  [facility] [address]   [phone]            Oct 31, 2017 12:30 PM CDT   [facility] Lab Draw with  [facility] LAB DRAW   [facility] Lab Draw ([facility])    [address]  2nd Floor  [facility] [address]   [phone]            Oct 31, 2017  1:00 PM CDT   Bone Marrow Biopsy with  BMT MIMA #3, UU BONE MARROW BIOPSY   [facility] Blood and Marrow Transplant ([facility])    [address]  2nd [facility] [address]   [phone]            Nov 02, 2017  8:10 AM CDT   (Arrive by 7:55 AM)   RETURN DIABETES with Gerald Weiss MD   [facility] Endocrinology ([facility])    [address] [place] [address]  3rd Floor  [facility] [address]   [phone]              Future tests that were ordered for you today     Open Future Orders        Priority Expected Expires Ordered    CMV DNA quantification Routine 10/18/2017 10/18/2017 10/11/2017    Magnesium Routine 10/18/2017 10/18/2017 10/11/2017    CBC with platelets differential Routine 10/18/2017 10/18/2017 10/11/2017    Comprehensive metabolic panel Routine 10/18/2017 10/18/2017 10/11/2017    Tacrolimus level Routine 10/18/2017 10/18/2017 10/11/2017            Who to contact     If you have questions or need follow up information about today's clinic visit or your schedule please contact [facility] BLOOD AND MARROW TRANSPLANT directly at [phone].  Normal or non-critical lab and imaging results will be communicated to you by MyChart, letter or phone within 4 business days after the clinic has received the results. If you do not hear from us within 7 days, please contact the clinic through MyChart or phone. If you have a critical or  "abnormal lab result, we will notify you by phone as soon as possible.  Submit refill requests through [email or web address] or call your pharmacy and they will forward the refill request to us. Please allow 3 business days for your refill to be completed.          Additional Information About Your Visit        paOndehart Information     [email or web address] lets you send messages to your doctor, view your test results, renew your prescriptions, schedule appointments and more. To sign up, go to [email or web address] . Click on \"Log in\" on the left side of the screen, which will take you to the Welcome page. Then click on \"Sign up Now\" on the right side of the page.     You will be asked to enter the access code listed below, as well as some personal information. Please follow the directions to create your username and password.     Your access code is: [email or web address]  Expires: 2017  3:48 PM     Your access code will  in 90 days. If you need help or a new code, please call your [place] clinic or [phone].        Care EveryWhere ID     This is your Care EveryWhere ID. This could be used by other organizations to access your [facility] medical records  [id number]        Your Vitals Were     Pulse Temperature Respirations Pulse Oximetry BMI (Body Mass Index)       77 97.9  F (36.6  C) 16 98% 29.99 kg/m2        Blood Pressure from Last 3 Encounters:   10/11/17 160/88   10/11/17 142/83   10/04/17 157/83    Weight from Last 3 Encounters:   10/11/17 89.4 kg (197 lb 3.2 oz)   10/04/17 89.7 kg (197 lb 12 oz)   10/04/17 89.7 kg (197 lb 12.8 oz)                 Today's Medication Changes          These changes are accurate as of: 10/11/17  2:11 PM.  If you have any questions, ask your nurse or doctor.               These medicines have changed or have updated prescriptions.        Dose/Directions    magnesium oxide 400 MG tablet   Commonly known as:  MAG-OX   This may have changed:    - how much to take  - additional instructions   Used for: "  AML (acute myeloid leukemia) in remission (H)        8 tabs daily   Quantity:  100 tablet   Refills:  1                Recent Review Flowsheet Data     BMT Recent Results Latest Ref Rng & Units 9/8/2017 9/13/2017 9/20/2017 9/27/2017 9/29/2017 10/4/2017 10/11/2017    WBC 4.0 - 11.0 10e9/L 3.2(L) 3.3(L) 3.2(L) 3.4(L) 2.5(L) 3.1(L) 3.5(L)    Hemoglobin 13.3 - 17.7 g/dL 11.0(L) 10.5(L) 11.1(L) 11.0(L) 11.0(L) 10.6(L) 10.6(L)    Platelet Count 150 - 450 10e9/L 138(L) 158 157 145(L) 137(L) 158 182    Neutrophils (Absolute) 1.6 - 8.3 10e9/L 1.6 1.4(L) 1.4(L) 1.8 1.2(L) 1.4(L) 2.0    INR 0.86 - 1.14 - - - - - - -    Sodium 133 - 144 mmol/L 139 137 139 139 138 136 136    Potassium 3.4 - 5.3 mmol/L 3.9 3.5 4.0 4.0 4.0 4.1 4.1    Chloride 94 - 109 mmol/L 106 103 105 105 106 103 102    Glucose 70 - 99 mg/dL 122(H) 158(H) 150(H) 152(H) 160(H) 172(H) 158(H)    Urea Nitrogen 7 - 30 mg/dL 13 15 12 14 12 12 13    Creatinine 0.66 - 1.25 mg/dL 1.17 1.39(H) 1.22 1.16 1.25 1.23 1.10    Calcium (Total) 8.5 - 10.1 mg/dL 8.7 8.6 8.8 8.6 8.7 8.6 9.0    Protein (Total) 6.8 - 8.8 g/dL - 6.8 6.9 7.1 - 7.2 7.3    Albumin 3.4 - 5.0 g/dL - 3.6 3.7 3.7 - 3.7 3.4    Bilirubin (Direct) 0.0 - 0.2 mg/dL - - - - - - -    Alkaline Phosphatase 40 - 150 U/L - 75 76 80 - 90 90    AST 0 - 45 U/L - 22 21 15 - 20 16    ALT 0 - 70 U/L - 28 25 22 - 24 22    MCV 78 - 100 fl 91 91 92 91 92 92 91               Primary Care Provider    Physician No Ref-Primary       NO REF-PRIMARY PHYSICIAN        Equal Access to Services     ASHLYN FLANAGAN : Hadii aad ku hadasho Sobrenda, waaxda luqadaha, qaybta kaalmada adeegyada, vishal lópez. So [facility] [phone].    ATENCIÓN: Si habla español, tiene a gusman disposición servicios gratuitos de asistencia lingüística. Destinyame al [phone].    We comply with applicable federal civil rights laws and [place] laws. We do not discriminate on the basis of race, color, national origin, age, disability, sex,  sexual orientation, or gender identity.            Thank you!     Thank you for choosing [facility] BLOOD AND MARROW TRANSPLANT  for your care. Our goal is always to provide you with excellent care. Hearing back from our patients is one way we can continue to improve our services. Please take a few minutes to complete the written survey that you may receive in the mail after your visit with us. Thank you!             Your Updated Medication List - Protect others around you: Learn how to safely use, store and throw away your medicines at www.disposemymeds.org.          This list is accurate as of: 10/11/17  2:11 PM.  Always use your most recent med list.                   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), History of peripheral stem cell transplant (H), Aspergillosis (H)       cholecalciferol 1000 UNITS capsule    vitamin  -D     Take 1 capsule by mouth daily        cyclobenzaprine 5 MG tablet    FLEXERIL     Take 1 tablet (5 mg) by mouth 3 times daily as needed for muscle spasms        diltiazem 360 MG 24 hr CD capsule    CARDIZEM CD; CARTIA XT    90 capsule    Take 1 capsule (360 mg) by mouth daily    AML (acute myeloid leukemia) in remission (H)       FLONASE NA      Spray in nostril as needed        guaiFENesin 600 MG 12 hr tablet    MUCINEX     Take 600 mg by mouth        heparin lock flush 10 UNIT/ML Soln injection     30 vial    5 mLs by Intracatheter route daily In each lumen    AML (acute myeloid leukemia) in remission (H)       insulin aspart 100 UNIT/ML injection    NovoLOG FLEXPEN    3 mL    Give insulin based on High sliding scale  Also give prescribed amount before meals based on carbohydrate coverage    AML (acute myeloid leukemia) in remission (H)       LORazepam 0.5 MG tablet    ATIVAN    40 tablet    Take 1-2  tablets (0.5-1 mg) by mouth every 4 hours as needed for anxiety (nausea/vomiting/sleep)    Stem cell transplant candidate       losartan 50 MG tablet    COZAAR    30 tablet    Take 1 tablet (50 mg) by mouth daily    AML (acute myeloid leukemia) in remission (H)       magnesium oxide 400 MG tablet    MAG-OX    100 tablet    8 tabs daily    AML (acute myeloid leukemia) in remission (H)       * metFORMIN 500 MG 24 hr tablet    GLUCOPHAGE-XR    60 tablet    500mg QD x1 week then increase to 1000mg QD thereafter    Acute myeloid leukemia in remission (H)       * metFORMIN 500 MG 24 hr tablet    GLUCOPHAGE-XR    60 tablet    Take 2 tablets (1,000 mg) by mouth daily (with dinner)    Type 2 diabetes mellitus without complication, without long-term current use of insulin (H)       MULTI COMPLETE PO           ondansetron 8 MG tablet    ZOFRAN    30 tablet    Take 1 tablet (8 mg) by mouth every 8 hours as needed for nausea    Stem cell transplant candidate       pantoprazole 40 MG EC tablet    PROTONIX    90 tablet    Take 1 tablet (40 mg) by mouth daily    Stem cell transplant candidate       pravastatin 20 MG tablet    PRAVACHOL    30 tablet    Take 1 tablet (20 mg) by mouth daily    Type 2 diabetes mellitus without complication, without long-term current use of insulin (H)       psyllium Packet    METAMUCIL/KONSYL    90 packet    Take 1 packet by mouth 3 times daily    AML (acute myeloid leukemia) in remission (H)       sulfamethoxazole-trimethoprim 800-160 MG per tablet    BACTRIM DS/SEPTRA DS    30 tablet    Clinic will tell you when to start 1 tablet twice daily by mouth on Mondays and Tuesdays, start around day +28    Stem cell transplant candidate       tacrolimus 0.5 MG capsule    GENERIC EQUIVALENT    56 capsule    Take 2 capsules (1 mg) by mouth 2 times daily    Acute myeloid leukemia in remission (H)       triamcinolone 0.1 % cream    KENALOG    80 g    Apply sparingly to affected area three times daily as needed     AML (acute myeloid leukemia) in remission (H)       voriconazole 200 MG tablet    VFEND    60 tablet    Take 1 tablet (200 mg) by mouth 2 times daily    Stem cell transplant candidate       zolpidem 5 MG tablet    AMBIEN    45 tablet    Take 1-2 tablets (5-10 mg) by mouth nightly as needed for sleep    AML (acute myeloid leukemia) in remission (H)       * Notice:  This list has 2 medication(s) that are the same as other medications prescribed for you. Read the directions carefully, and ask your doctor or other care provider to review them with you.

## 2017-10-11 NOTE — PROGRESS NOTES
,Infusion Nursing Note:  Norman Real presents today for scheduled study injection.    Patient seen by provider today: Yes: Dr. Umanzor   present during visit today: Not Applicable.    Note: Labs were monitored.  Patient assessment was completed by Provider and Study RN Yovana Flowers.  Verbal order was given to administer study injection.      Treatment Conditions:  Patient received 650 mg oral Tylenol and 25 mg oral Benadryl 30 minutes prior to receiving study injection in his upper left abdomen.  Study MAs did vital signs per protocol.      Post Infusion Assessment:  Patient tolerated injection without incident.    Discharge Plan:   Patient discharged in stable condition accompanied by: wife.    CURT JASSO RN

## 2017-10-12 NOTE — MR AVS SNAPSHOT
After Visit Summary   10/12/2017    Norman Real    MRN: [id number]           Patient Information     Date Of Birth          1960        Visit Information        Provider Department      10/12/2017 10:00 AM 1, Pete Bmt Nurse [facility]        Today's Diagnoses     Acute myeloid leukemia in remission (H)    -  1          [facility] ([facility]  [address] [place] [address]  Phone: [phone]  Clinic Hours:   Monday-Thursday:7am to 7pm   Friday: 7am to 5pm   Weekends and holidays:    8am to noon (in general)  If your fever is 100.5  or greater,   call the clinic.  After hours call the   hospital at [phone] or   [phone]. Ask for the BMT   fellow on-call            Follow-ups after your visit        Your next 10 appointments already scheduled     Oct 18, 2017 11:30 AM CDT   [facility] Draw with  [facility] DRAW   [facility] ([facility])    [address] [place] [id number] [phone]            Oct 18, 2017 12:00 PM CDT   BMT Anniversary Visit with Charanjit Umanzor MD   [facility] ([facility])    [address] [place] [id number]   [phone]            Oct 18, 2017 12:30 PM CDT   Infusion 30 with UC BMT INFUSION, UC 6 ATC   [facility] ([facility])    [address] [place] [id number]   [phone]            Oct 25, 2017 11:00 AM CDT   [facility] Draw with UC [facility] DRAW   [facility] ([facility])    [address] [place] [id number]   [phone]            Oct 25, 2017 11:30 AM CDT   BMT Anniversary Visit with Charanjit Umanzor MD   [facility] ([facility])    [address] [place] [address]  [facility]  2nd Floor  [facility] [address]   [phone]            Oct 31, 2017 12:30 PM CDT   [facility] Lab Draw with  [facility] DRAW   [facility] Draw ([facility])    [address] [facility]  2nd [facility] [address]   [phone]            Oct 31, 2017  1:00 PM CDT   Bone Marrow Biopsy with  BMT MIAM #3, UU BONE MARROW BIOPSY   [facility] Marrow Trans[facility] ([facility])    [address] [facility]  2nd [facility] [address]   [phone]            Nov 01, 2017  9:30 AM CDT   (Arrive by 9:15 AM)   IR CVC TUNNEL REMOVAL LEFT with UCUS4   [facility] US ([facility])    [address] [place] [address]  1st Floor  [facility] [address]   [phone]           1. Your doctor will need to do a history and physical within 7 days before this procedure. 2. Your doctor will which medications should not be taken the morning of the exam. 3. Laboratory tests are to be obtained by your doctor prior to the exam (Basic Metabolic Panel, CBCP, PTT and INR) (No labs needed if you are having a tunneled catheter exchange or removal) 4. If you have allergies to x-ray contrast or iodine, contact your doctor or a Radiology nurse prior to the exam day for instructions. 5. Someone will need to drive you to and from the hospital. 6. If you are or may be pregnant, contact your doctor or a Radiology nurse prior to the day of the exam. 7. If you have diabetes, check with your doctor or a Radiology nurse to see if your insulin needs to be adjusted for the exam. 8. If you are taking a medication called Glucophage or Glucovance; these medications need to be held the day of the exam and for approximately 48 hours following. A blood sample must be drawn so your creatinine level can be checked before resuming this medication. 9. If you are taking Coumadin (to thin you blood) please contact your doctor or a Radiology nurse  at least 3 days before the exam for special instructions. 10. You should not have received contrast within 48 hours of this exam. 11. The day before your exam you may eat your regular diet and are encouraged to drink at least 2 quarts of clear liquids. Drink no alcoholic beverages for 24 hours prior to the exam. 12. If you have a colostomy you will need to irrigate it with tap water at 8PM the evening before and again at 6AM the morning of the exam. 13. Do not smoke for 24 hours prior to the procedure. 14. Birth to 4 years: - Breast feeding must be stopped 4 hours prior to exam - Solid food or formula must be stopped 6 hours prior to exam - Tube feedings must be stopped 6 hours prior to exam 15. 4-10 years old: - Nothing to eat or drink 6 hours prior to exam 16. 10+ years old: - Nothing to eat or drink 8 hours prior to exam 17. The morning of the exam you may brush your teeth and take medications as directed with a sip of water. 18. When discharged, you cannot drive until morning, and an adult must be with you until then. You should stay in the [facility] overnight. 19. Bring a list of all drugs you are taking; include supplements and over-the-counter medications. Wear comfortable clothes and leave your valuables at home.              Future tests that were ordered for you today     Open Future Orders        Priority Expected Expires Ordered    INR Routine  10/12/2018 10/12/2017    INT Line Removal - Tunnel Line Removal Routine 11/1/2017 10/11/2018 10/11/2017    CMV DNA quantification Routine 10/18/2017 10/18/2017 10/11/2017    Magnesium Routine 10/18/2017 10/18/2017 10/11/2017    CBC with platelets differential Routine 10/18/2017 10/18/2017 10/11/2017    Comprehensive metabolic panel Routine 10/18/2017 10/18/2017 10/11/2017    Tacrolimus level Routine 10/18/2017 10/18/2017 10/11/2017            Who to contact     If you have questions or need follow up information about today's clinic visit or your schedule  "please contact [facility] directly at [phone].  Normal or non-critical lab and imaging results will be communicated to you by MyChart, letter or phone within 4 business days after the clinic has received the results. If you do not hear from us within 7 days, please contact the clinic through [employer] or phone. If you have a critical or abnormal lab result, we will notify you by phone as soon as possible.  Submit refill requests through [employer] or call your pharmacy and they will forward the refill request to us. Please allow 3 business days for your refill to be completed.          Additional Information About Your Visit        [employer] Information     [employer] lets you send messages to your doctor, view your test results, renew your prescriptions, schedule appointments and more. To sign up, go to [email or web address] . Click on \"Log in\" on the left side of the screen, which will take you to the Welcome page. Then click on \"Sign up Now\" on the right side of the page.     You will be asked to enter the access code listed below, as well as some personal information. Please follow the directions to create your username and password.     Your access code is: [email or web address]  Expires: 2017  3:48 PM     Your access code will  in 90 days. If you need help or a new code, please call your [facility] clinic or [phone].        Care EveryWhere ID     This is your Care EveryWhere ID. This could be used by other organizations to access your [facility] medical records  [id number]         Blood Pressure from Last 3 Encounters:   10/11/17 160/88   10/11/17 142/83   10/04/17 157/83    Weight from Last 3 Encounters:   10/11/17 89.4 kg (197 lb 3.2 oz)   10/04/17 89.7 kg (197 lb 12 oz)   10/04/17 89.7 kg (197 lb 12.8 oz)              Today, you had the following     No orders found for display         Today's Medication Changes          These changes are accurate as of: 10/12/17 10:15 AM.  If " you have any questions, ask your nurse or doctor.               These medicines have changed or have updated prescriptions.        Dose/Directions    magnesium oxide 400 MG tablet   Commonly known as:  MAG-OX   This may have changed:    - how much to take  - additional instructions   Used for:  AML (acute myeloid leukemia) in remission (H)        8 tabs daily   Quantity:  100 tablet   Refills:  1                Recent Review Flowsheet Data     BMT Recent Results Latest Ref Rng & Units 9/8/2017 9/13/2017 9/20/2017 9/27/2017 9/29/2017 10/4/2017 10/11/2017    WBC 4.0 - 11.0 10e9/L 3.2(L) 3.3(L) 3.2(L) 3.4(L) 2.5(L) 3.1(L) 3.5(L)    Hemoglobin 13.3 - 17.7 g/dL 11.0(L) 10.5(L) 11.1(L) 11.0(L) 11.0(L) 10.6(L) 10.6(L)    Platelet Count 150 - 450 10e9/L 138(L) 158 157 145(L) 137(L) 158 182    Neutrophils (Absolute) 1.6 - 8.3 10e9/L 1.6 1.4(L) 1.4(L) 1.8 1.2(L) 1.4(L) 2.0    INR 0.86 - 1.14 - - - - - - -    Sodium 133 - 144 mmol/L 139 137 139 139 138 136 136    Potassium 3.4 - 5.3 mmol/L 3.9 3.5 4.0 4.0 4.0 4.1 4.1    Chloride 94 - 109 mmol/L 106 103 105 105 106 103 102    Glucose 70 - 99 mg/dL 122(H) 158(H) 150(H) 152(H) 160(H) 172(H) 158(H)    Urea Nitrogen 7 - 30 mg/dL 13 15 12 14 12 12 13    Creatinine 0.66 - 1.25 mg/dL 1.17 1.39(H) 1.22 1.16 1.25 1.23 1.10    Calcium (Total) 8.5 - 10.1 mg/dL 8.7 8.6 8.8 8.6 8.7 8.6 9.0    Protein (Total) 6.8 - 8.8 g/dL - 6.8 6.9 7.1 - 7.2 7.3    Albumin 3.4 - 5.0 g/dL - 3.6 3.7 3.7 - 3.7 3.4    Bilirubin (Direct) 0.0 - 0.2 mg/dL - - - - - - -    Alkaline Phosphatase 40 - 150 U/L - 75 76 80 - 90 90    AST 0 - 45 U/L - 22 21 15 - 20 16    ALT 0 - 70 U/L - 28 25 22 - 24 22    MCV 78 - 100 fl 91 91 92 91 92 92 91               Primary Care Provider    Physician No Ref-Primary       NO REF-PRIMARY PHYSICIAN        Equal Access to Services     ASHLYN FLANAGAN : Greg Ratliff, joana manning, vishal thao. [facility]  [phone].    ATENCIÓN: Si brandy mcintyre, tiene a gusman disposición servicios gratuitos de asistencia lingüística. Leyla pepe [phone].    We comply with applicable federal civil rights laws and [place] laws. We do not discriminate on the basis of race, color, national origin, age, disability, sex, sexual orientation, or gender identity.            Thank you!     Thank you for choosing [facility]  for your care. Our goal is always to provide you with excellent care. Hearing back from our patients is one way we can continue to improve our services. Please take a few minutes to complete the written survey that you may receive in the mail after your visit with us. Thank you!             Your Updated Medication List - Protect others around you: Learn how to safely use, store and throw away your medicines at www.disposemymeds.org.          This list is accurate as of: 10/12/17 10:15 AM.  Always use your most recent med list.                   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), History of peripheral stem cell transplant (H), Aspergillosis (H)       cholecalciferol 1000 UNITS capsule    vitamin  -D     Take 1 capsule by mouth daily        cyclobenzaprine 5 MG tablet    FLEXERIL     Take 1 tablet (5 mg) by mouth 3 times daily as needed for muscle spasms        diltiazem 360 MG 24 hr CD capsule    CARDIZEM CD; CARTIA XT    90 capsule    Take 1 capsule (360 mg) by mouth daily    AML (acute myeloid leukemia) in remission (H)       FLONASE NA      Spray in nostril as needed        guaiFENesin 600 MG 12 hr tablet    MUCINEX     Take 600 mg by mouth        heparin lock flush 10 UNIT/ML Soln injection     30 vial    5 mLs by Intracatheter route daily In each lumen    AML (acute myeloid leukemia) in remission (H)        insulin aspart 100 UNIT/ML injection    NovoLOG FLEXPEN    3 mL    Give insulin based on High sliding scale  Also give prescribed amount before meals based on carbohydrate coverage    AML (acute myeloid leukemia) in remission (H)       LORazepam 0.5 MG tablet    ATIVAN    40 tablet    Take 1-2 tablets (0.5-1 mg) by mouth every 4 hours as needed for anxiety (nausea/vomiting/sleep)    Stem cell transplant candidate       losartan 50 MG tablet    COZAAR    30 tablet    Take 1 tablet (50 mg) by mouth daily    AML (acute myeloid leukemia) in remission (H)       magnesium oxide 400 MG tablet    MAG-OX    100 tablet    8 tabs daily    AML (acute myeloid leukemia) in remission (H)       * metFORMIN 500 MG 24 hr tablet    GLUCOPHAGE-XR    60 tablet    500mg QD x1 week then increase to 1000mg QD thereafter    Acute myeloid leukemia in remission (H)       * metFORMIN 500 MG 24 hr tablet    GLUCOPHAGE-XR    60 tablet    Take 2 tablets (1,000 mg) by mouth daily (with dinner)    Type 2 diabetes mellitus without complication, without long-term current use of insulin (H)       MULTI COMPLETE PO           ondansetron 8 MG tablet    ZOFRAN    30 tablet    Take 1 tablet (8 mg) by mouth every 8 hours as needed for nausea    Stem cell transplant candidate       pantoprazole 40 MG EC tablet    PROTONIX    90 tablet    Take 1 tablet (40 mg) by mouth daily    Stem cell transplant candidate       pravastatin 20 MG tablet    PRAVACHOL    30 tablet    Take 1 tablet (20 mg) by mouth daily    Type 2 diabetes mellitus without complication, without long-term current use of insulin (H)       psyllium Packet    METAMUCIL/KONSYL    90 packet    Take 1 packet by mouth 3 times daily    AML (acute myeloid leukemia) in remission (H)       sulfamethoxazole-trimethoprim 800-160 MG per tablet    BACTRIM DS/SEPTRA DS    30 tablet    Clinic will tell you when to start 1 tablet twice daily by mouth on Mondays and Tuesdays, start around day +28    Stem  cell transplant candidate       tacrolimus 0.5 MG capsule    GENERIC EQUIVALENT    56 capsule    Take 2 capsules (1 mg) by mouth 2 times daily    Acute myeloid leukemia in remission (H)       triamcinolone 0.1 % cream    KENALOG    80 g    Apply sparingly to affected area three times daily as needed    AML (acute myeloid leukemia) in remission (H)       voriconazole 200 MG tablet    VFEND    60 tablet    Take 1 tablet (200 mg) by mouth 2 times daily    Stem cell transplant candidate       zolpidem 5 MG tablet    AMBIEN    45 tablet    Take 1-2 tablets (5-10 mg) by mouth nightly as needed for sleep    AML (acute myeloid leukemia) in remission (H)       * Notice:  This list has 2 medication(s) that are the same as other medications prescribed for you. Read the directions carefully, and ask your doctor or other care provider to review them with you.

## 2017-10-18 NOTE — NURSING NOTE
Chief Complaint   Patient presents with     Blood Draw     Labs drawn from CVC by RN. Line flushed with saline and heparin. Vs taken and pt checked in for appt(s)     Labs collected from CVC by RN, line flushed with saline and heparin.  Vitals taken. Pt checked in for appointment(s).    Rin Winchester RN

## 2017-10-18 NOTE — LETTER
10/18/2017      RE: Norman Real  PO   [facility] [address]       BMT Clinic Note      Patient ID:  Norman Real is a 56 year old male, currently day +84 s/p NMA allo sib PBSCT for AML       Diagnosis AMLU Acute myelogeneous leukemia, Unknown  HCT Type Allogeneic    Prep Regimen Cytoxan  Fludarabine  TBI   Donor Source Related PBSC    GVHD Prophylaxis   Mycophenolate  Primary BMT Provider Dr. Erna MD          INTERVAL  HISTORY      HPI: Norman is feeling good.  Had zyrtec again for itchy eyes. No significant rash with last dose of OLL644. No fever.  Eating a little less this last week. No nausea. No vomiting or diarrhea. No bleeding. Occasional dry eyes.   Review of Systems: 10 point ROS negative except as noted above.     PHYSICAL EXAM   /83 (BP Location: Right arm, Cuff Size: Adult Regular)  Pulse 80  Temp 97.8  F (36.6  C) (Oral)  Resp 16  Wt 89.5 kg (197 lb 4.8 oz)  SpO2 96%  BMI 30.01 kg/m2    General: NAD, interactive, appropriate, mild facial erythema  Eyes: SAMSON, sclera anicteric   Nose/Mouth/Throat: op clear, buccal mucosa moist  Lungs: CTA bilaterally  Cardiovascular: RRR, no M/R/G   Abdominal/Rectal: +BS, soft, NT, ND, No HSM   Lymphatics: trace ankle edema bilaterally  Skin: no rashes or petechaie.    Neuro: A&O   Additional Findings: Cooper site NT, no drainage.     LABS AND IMAGING - PAST 24 HOURS      Lab Results   Component Value Date    WBC 3.3 (L) 10/18/2017    ANEU 1.5 (L) 10/18/2017    HGB 11.1 (L) 10/18/2017    HCT 32.7 (L) 10/18/2017     10/18/2017     10/18/2017    POTASSIUM 4.2 10/18/2017    CHLORIDE 102 10/18/2017    CO2 25 10/18/2017     (H) 10/18/2017    BUN 14 10/18/2017    CR 1.26 (H) 10/18/2017    MAG 1.7 10/18/2017    INR 0.96 07/31/2017    BILITOTAL 0.6 10/18/2017    AST 22 10/18/2017    ALT 27 10/18/2017    ALKPHOS 100 10/18/2017    PROTTOTAL 7.4 10/18/2017    ALBUMIN 3.8 10/18/2017     Tac level good      ASSESSMENT BY SYSTEMS       Norman Real is a 57 yo male, day +84 s/p NMA allo sib PBSCT for  AML       1. BMT: Transplant 7/27. Initial stem cell product only contained 2.33 CD34/kg, additional 0.66 CD34/kg on 7/27 for a total of 2.99.   -  BMBX showed no increase in blasts.  98% donor BM (8/15/17); 90% donor CD3; 100% donor CD15.     2. AML: Patient continues to have no evidence of recurrent leukemia. Started UIT819 maintenance treatment on 9/27.  Cycle 1 Dose #4 of  today.     3. HEME: Keep Hgb>8 and plts>10K. No pre-meds.    4.  ID:  Afebrile. No active infections. He will finish vfend next week and we will change to fluconazole.  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  Improved CT 8/10.  Refilled vfend for 1 month then discontinue; then start fluconazole 100 mg daily; adjust Tacrolimus  - proph HD ACV (CMV+, HSV+, EBV+).   CMV negative 9/27.  - Cont to hold Bactrm due to lowish WBC count.  Pentamadine today.  No allergy to bactrim.  - EBV negative 9/20      5.  GI:  - Mild nausea: Taking Zofran and ativan PRN, symptoms minimal   - Protonix for GI prophy.   - Ursodiol for VOD prophy  - s/p left sided colectomy for recurrent diverticulitis.        6.  GVH: No aGVH. stopped MMF 8/25. Adjust tacrolimus next week when we stop vfend.   - On Tacrolimus: Taking 1 mg BID, level 12.7 on 9/27    7.  FEN/Renal: Creat stable.   - Hx of hypoMg.  On oral mag to 1000 mg QID (8tbs per day).  Check Mg next visit.        8. CV: Adequate BP control on losartan 50 mg and diltiazem  mg.  - Hx tachycardia with arrhythmia, s/p 3 ablations  - hold crestor through transplant      9. Endo:  Hx DM type II.   Blood sugar has been well controlled & ANC down to 5.7 today.  Per endocrine recs, DC Lantus & begin metformin 500mg QD with plans to increase to 1000mg QD in 1 week.        10. : Hx prostate cancer.     10. Neuro: History of chronic insomnia. Ambien to 5-10 mg QHS PRN      Flu  shot planned for 10/25 2017  LINDSEY Umanzor on 10/25 with labs;   OTD458 dose C1D4 today 10/18/17  Staging per protocol week on 10/31/17  Next week need to adjust tacrolimus as he will be stopping the vfend      Charanjit Umanzor MD

## 2017-10-18 NOTE — NURSING NOTE
Vital signs were performed per Protocol [id number]   These vital signs were taken by Brenda DAWN.  Vital signs are recorded in EPIC and can be found in flowsheets.    Brenda DAWN

## 2017-10-18 NOTE — MR AVS SNAPSHOT
After Visit Summary   10/18/2017    Norman Real    MRN: [id number]           Patient Information     Date Of Birth          1960        Visit Information        Provider Department      10/18/2017 2:08 PM Brenda Salas [facility], [place],  Clinical Research        Today's Diagnoses     AML (acute myeloid leukemia) in remission (H)    -  1       Follow-ups after your visit        Your next 10 appointments already scheduled     Oct 25, 2017 11:00 AM CDT   [facility] Lab Draw with  [facility] DRAW   [facility] Mason[facility] Lab Draw ([facility])    [address] [place] [address]   [phone]            Oct 25, 2017 11:30 AM CDT   BMT Anniversary Visit with Charanjit Umanzor MD   [facility] Blood and Marrow Transplant ([facility])    [address] [place] [address]   [phone]            Oct 31, 2017 12:30 PM CDT   [facility] Lab Draw with  [facility] DRAW   [facility] Lab Draw ([facility])    [address] [place] [id number]   [phone]            Oct 31, 2017  1:00 PM CDT   Bone Marrow Biopsy with  BMT MIMA #3, UU BONE MARROW BIOPSY   [facility] Blood and Marrow Transplant ([facility])    [address] [place] [id number]   [phone]            Nov 01, 2017  9:30 AM CDT   (Arrive by 9:15 AM)   IR CVC TUNNEL REMOVAL LEFT with UCUS4,  IMAGING NURSE, [facility] IMAG[facility] PA   [facility] US ([facility])    [address] [place] [id number]   [phone]           1. Your doctor will need to do a history and physical within 7 days before this procedure. 2. Your doctor will which medications should not be taken the morning of the exam. 3. Laboratory tests are to be obtained by your doctor prior to the exam (Basic Metabolic Panel,  CBCP, PTT and INR) (No labs needed if you are having a tunneled catheter exchange or removal) 4. If you have allergies to x-ray contrast or iodine, contact your doctor or a Radiology nurse prior to the exam day for instructions. 5. Someone will need to drive you to and from the hospital. 6. If you are or may be pregnant, contact your doctor or a Radiology nurse prior to the day of the exam. 7. If you have diabetes, check with your doctor or a Radiology nurse to see if your insulin needs to be adjusted for the exam. 8. If you are taking a medication called Glucophage or Glucovance; these medications need to be held the day of the exam and for approximately 48 hours following. A blood sample must be drawn so your creatinine level can be checked before resuming this medication. 9. If you are taking Coumadin (to thin you blood) please contact your doctor or a Radiology nurse at least 3 days before the exam for special instructions. 10. You should not have received contrast within 48 hours of this exam. 11. The day before your exam you may eat your regular diet and are encouraged to drink at least 2 quarts of clear liquids. Drink no alcoholic beverages for 24 hours prior to the exam. 12. If you have a colostomy you will need to irrigate it with tap water at 8PM the evening before and again at 6AM the morning of the exam. 13. Do not smoke for 24 hours prior to the procedure. 14. Birth to 4 years: - Breast feeding must be stopped 4 hours prior to exam - Solid food or formula must be stopped 6 hours prior to exam - Tube feedings must be stopped 6 hours prior to exam 15. 4-10 years old: - Nothing to eat or drink 6 hours prior to exam 16. 10+ years old: - Nothing to eat or drink 8 hours prior to exam 17. The morning of the exam you may brush your teeth and take medications as directed with a sip of water. 18. When discharged, you cannot drive until morning, and an adult must be with you until then. You should stay in the [facility] overnight. 19. Bring a list of all drugs you are taking; include supplements and over-the-counter medications. Wear comfortable clothes and leave your valuables at home.            Nov 02, 2017  8:10 AM CDT   (Arrive by 7:55 AM)   RETURN DIABETES with Gerald Weiss MD   [facility] Endocrinology ([facility])    [address]  3[address]  [facility] [address]   [phone]            Nov 03, 2017 10:30 AM CDT   BMT Anniversary Visit with Charanjit Umanzor MD   [facility] Blood and Marrow Transplant ([facility])    [address]  [address]  [facility] [address]   [phone]              Future tests that were ordered for you today     Open Future Orders        Priority Expected Expires Ordered    Comprehensive metabolic panel Routine 10/25/2017 10/25/2017 10/18/2017    CBC with platelets differential Routine 10/25/2017 10/25/2017 10/18/2017    Magnesium Routine 10/25/2017 10/25/2017 10/18/2017    CMV DNA quantification Routine 10/25/2017 10/25/2017 10/18/2017    EBV DNA PCR Quantitative Whole Blood Routine 10/25/2017 10/25/2017 10/18/2017    Tacrolimus level Routine 10/25/2017 10/25/2017 10/18/2017            Who to contact     If you have questions or need follow up information about today's clinic visit or your schedule please contact [facility],  CL[facility] directly at [phone].  Normal or non-critical lab and imaging results will be communicated to you by MyChart, letter or phone within 4 business days after the clinic has received the results. If you do not hear from us within 7 days, please contact the clinic through MyChart or phone. If you have a critical or abnormal lab result, we will notify you by phone as soon as possible.  Submit refill requests through [employer] or call your pharmacy and they will forward the refill request to us. Please allow 3 business days for your refill to be completed.           "Additional Information About Your Visit        MyChart Information     [employer] lets you send messages to your doctor, view your test results, renew your prescriptions, schedule appointments and more. To sign up, go to [email or web address] . Click on \"Log in\" on the left side of the screen, which will take you to the Welcome page. Then click on \"Sign up Now\" on the right side of the page.     You will be asked to enter the access code listed below, as well as some personal information. Please follow the directions to create your username and password.     Your access code is: [email or web address]  Expires: 2017  3:48 PM     Your access code will  in 90 days. If you need help or a new code, please call your [facility] clinic or [phone].        Care EveryWhere ID     This is your Care EveryWhere ID. This could be used by other organizations to access your [facility] medical records  [id number]        Your Vitals Were     Pulse Temperature Respirations Pulse Oximetry          70 98.4  F (36.9  C) (Oral) 16 98%         Blood Pressure from Last 3 Encounters:   10/18/17 137/80   10/18/17 138/83   10/11/17 160/88    Weight from Last 3 Encounters:   10/18/17 89.5 kg (197 lb 4.8 oz)   10/11/17 89.4 kg (197 lb 3.2 oz)   10/04/17 89.7 kg (197 lb 12 oz)              Today, you had the following     No orders found for display         Today's Medication Changes          These changes are accurate as of: 10/18/17  2:46 PM.  If you have any questions, ask your nurse or doctor.               These medicines have changed or have updated prescriptions.        Dose/Directions    magnesium oxide 400 MG tablet   Commonly known as:  MAG-OX   This may have changed:    - how much to take  - additional instructions   Used for:  AML (acute myeloid leukemia) in remission (H)        8 tabs daily   Quantity:  100 tablet   Refills:  1                Primary Care Provider    Physician No Ref-Primary       NO REF-PRIMARY PHYSICIAN      "   Equal Access to Services     [facility] MASHA : Hadii aad ku hadsimonegeovanna Deniseali, waomarda luqadaha, qaybta kacarylvishal vidal. So [facility] [phone].    ATENCIÓN: Si habla español, tiene a gusman disposición servicios gratuitos de asistencia lingüística. Llame al [phone].    We comply with applicable federal civil rights laws and [place] laws. We do not discriminate on the basis of race, color, national origin, age, disability, sex, sexual orientation, or gender identity.            Thank you!     Thank you for choosing [facility],  [facility]  for your care. Our goal is always to provide you with excellent care. Hearing back from our patients is one way we can continue to improve our services. Please take a few minutes to complete the written survey that you may receive in the mail after your visit with us. Thank you!             Your Updated Medication List - Protect others around you: Learn how to safely use, store and throw away your medicines at www.disposemymeds.org.          This list is accurate as of: 10/18/17  2:46 PM.  Always use your most recent med list.                   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), History of peripheral stem cell transplant (H), Aspergillosis (H)       cholecalciferol 1000 UNITS capsule    vitamin  -D     Take 1 capsule by mouth daily        cyclobenzaprine 5 MG tablet    FLEXERIL     Take 1 tablet (5 mg) by mouth 3 times daily as needed for muscle spasms        diltiazem 360 MG 24 hr CD capsule    CARDIZEM CD; CARTIA XT    90 capsule    Take 1 capsule (360 mg) by mouth daily    AML (acute myeloid leukemia) in remission (H)       FLONASE NA      Spray in nostril as needed        guaiFENesin 600 MG 12 hr tablet    MUCINEX     Take 600 mg by  mouth        heparin lock flush 10 UNIT/ML Soln injection     30 vial    5 mLs by Intracatheter route daily In each lumen    AML (acute myeloid leukemia) in remission (H)       LORazepam 0.5 MG tablet    ATIVAN    40 tablet    Take 1-2 tablets (0.5-1 mg) by mouth every 4 hours as needed for anxiety (nausea/vomiting/sleep)    Stem cell transplant candidate       losartan 50 MG tablet    COZAAR    30 tablet    Take 1 tablet (50 mg) by mouth daily    AML (acute myeloid leukemia) in remission (H)       magnesium oxide 400 MG tablet    MAG-OX    100 tablet    8 tabs daily    AML (acute myeloid leukemia) in remission (H)       metFORMIN 500 MG 24 hr tablet    GLUCOPHAGE-XR    60 tablet    Take 2 tablets (1,000 mg) by mouth daily (with dinner)    Type 2 diabetes mellitus without complication, without long-term current use of insulin (H)       MULTI COMPLETE PO           ondansetron 8 MG tablet    ZOFRAN    30 tablet    Take 1 tablet (8 mg) by mouth every 8 hours as needed for nausea    Stem cell transplant candidate       pantoprazole 40 MG EC tablet    PROTONIX    90 tablet    Take 1 tablet (40 mg) by mouth daily    Stem cell transplant candidate       pravastatin 20 MG tablet    PRAVACHOL    30 tablet    Take 1 tablet (20 mg) by mouth daily    Type 2 diabetes mellitus without complication, without long-term current use of insulin (H)       psyllium Packet    METAMUCIL/KONSYL    90 packet    Take 1 packet by mouth 3 times daily    AML (acute myeloid leukemia) in remission (H)       sulfamethoxazole-trimethoprim 800-160 MG per tablet    BACTRIM DS/SEPTRA DS    30 tablet    Clinic will tell you when to start 1 tablet twice daily by mouth on Mondays and Tuesdays, start around day +28    Stem cell transplant candidate       tacrolimus 0.5 MG capsule    GENERIC EQUIVALENT    56 capsule    Take 2 capsules (1 mg) by mouth 2 times daily    Acute myeloid leukemia in remission (H)       triamcinolone 0.1 % cream    KENALOG    80 g     Apply sparingly to affected area three times daily as needed    AML (acute myeloid leukemia) in remission (H)       voriconazole 200 MG tablet    VFEND    60 tablet    Take 1 tablet (200 mg) by mouth 2 times daily    Stem cell transplant candidate       zolpidem 5 MG tablet    AMBIEN    45 tablet    Take 1-2 tablets (5-10 mg) by mouth nightly as needed for sleep    AML (acute myeloid leukemia) in remission (H)

## 2017-10-18 NOTE — PATIENT INSTRUCTIONS
Flu shot planned for 10/25 2017  RTC [facility] on 10/25 with labs;   [id number] dose C1D4 today 10/18/17  Staging per protocol week on 10/31/17  Next week need to adjust tacrolimus as he will be stopping the vfend

## 2017-10-18 NOTE — MR AVS SNAPSHOT
After Visit Summary   10/18/2017    Norman Real    MRN: [id number]           Patient Information     Date Of Birth          1960        Visit Information        Provider Department      10/18/2017 12:30 PM UC 6 ATC; UC BMT INFUSION [facility] Blood and Marrow Transplant        Today's Diagnoses     AML (acute myeloid leukemia) in remission (H)    -  1          [facility] [place] [place]  [address] [place] [address]  Phone: [phone]  Clinic Hours:   Monday-Thursday:7am to 7pm   Friday: 7am to 5pm   Weekends and holidays:    8am to noon (in general)  If your fever is 100.5  or greater,   call the clinic.  After hours call the   hospital at [phone] or   [phone]. Ask for the BMT   fellow on-call            Follow-ups after your visit        Your next 10 appointments already scheduled     Oct 25, 2017 11:00 AM CDT   [facility] Lab Draw with  [facility] LAB DRAW   [facility] Lab Draw ([facility])    [address] [place] [id number] [phone]            Oct 25, 2017 11:30 AM CDT   BMT Anniversary Visit with Charanjit Umanzor MD   [facility] Blood and Marrow Transplant ([facility])    [address] [place] [id number]   [phone]            Oct 31, 2017 12:30 PM CDT   [facility] Lab Draw with  MASONIC LAB DRAW   [facility] Lab Draw ([facility])    [address] [place] [id number]   [phone]            Oct 31, 2017  1:00 PM CDT   Bone Marrow Biopsy with  BMT MIMA #3, UU BONE MARROW BIOPSY   [facility] Blood and Marrow Transplant ([facility])    [address] [place] [address]   [phone]            Nov 01, 2017  9:30 AM CDT   (Arrive by 9:15 AM)   IR CVC TUNNEL REMOVAL LEFT with UCUS4, UC IMAGING NURSE, UC IMAGING PA   [facility] Imaging  [place] ([facility])    [address]  1st Floor  [facility] [address] [phone]           1. Your doctor will need to do a history and physical within 7 days before this procedure. 2. Your doctor will which medications should not be taken the morning of the exam. 3. Laboratory tests are to be obtained by your doctor prior to the exam (Basic Metabolic Panel, CBCP, PTT and INR) (No labs needed if you are having a tunneled catheter exchange or removal) 4. If you have allergies to x-ray contrast or iodine, contact your doctor or a Radiology nurse prior to the exam day for instructions. 5. Someone will need to drive you to and from the hospital. 6. If you are or may be pregnant, contact your doctor or a Radiology nurse prior to the day of the exam. 7. If you have diabetes, check with your doctor or a Radiology nurse to see if your insulin needs to be adjusted for the exam. 8. If you are taking a medication called Glucophage or Glucovance; these medications need to be held the day of the exam and for approximately 48 hours following. A blood sample must be drawn so your creatinine level can be checked before resuming this medication. 9. If you are taking Coumadin (to thin you blood) please contact your doctor or a Radiology nurse at least 3 days before the exam for special instructions. 10. You should not have received contrast within 48 hours of this exam. 11. The day before your exam you may eat your regular diet and are encouraged to drink at least 2 quarts of clear liquids. Drink no alcoholic beverages for 24 hours prior to the exam. 12. If you have a colostomy you will need to irrigate it with tap water at 8PM the evening before and again at 6AM the morning of the exam. 13. Do not smoke for 24 hours prior to the procedure. 14. Birth to 4 years: - Breast feeding must be stopped 4 hours prior to exam - Solid food or formula must be stopped 6 hours prior to exam - Tube  feedings must be stopped 6 hours prior to exam 15. 4-10 years old: - Nothing to eat or drink 6 hours prior to exam 16. 10+ years old: - Nothing to eat or drink 8 hours prior to exam 17. The morning of the exam you may brush your teeth and take medications as directed with a sip of water. 18. When discharged, you cannot drive until morning, and an adult must be with you until then. You should stay in the [facility] overnight. 19. Bring a list of all drugs you are taking; include supplements and over-the-counter medications. Wear comfortable clothes and leave your valuables at home.            Nov 02, 2017  8:10 AM CDT   (Arrive by 7:55 AM)   RETURN DIABETES with Gerald Weiss MD   [facility] ([facility])    [address]  3rd [facility] [id number] [phone]            Nov 03, 2017 10:30 AM CDT   BMT Anniversary Visit with Charanjit Umanzor MD   [facility] Blood and Marrow Transplant ([facility])    [address] [place] [address]  2nd [facility] [address] [phone]              Future tests that were ordered for you today     Open Future Orders        Priority Expected Expires Ordered    Comprehensive metabolic panel Routine 10/25/2017 10/25/2017 10/18/2017    CBC with platelets differential Routine 10/25/2017 10/25/2017 10/18/2017    Magnesium Routine 10/25/2017 10/25/2017 10/18/2017    CMV DNA quantification Routine 10/25/2017 10/25/2017 10/18/2017    EBV DNA PCR Quantitative Whole Blood Routine 10/25/2017 10/25/2017 10/18/2017    Tacrolimus level Routine 10/25/2017 10/25/2017 10/18/2017            Who to contact     If you have questions or need follow up information about today's clinic visit or your schedule please contact [facility]ARR[facility]PL[facility] directly at [phone].  Normal or non-critical lab and imaging results will be communicated to you by MyChart, letter or phone within 4 business days  "after the clinic has received the results. If you do not hear from us within 7 days, please contact the clinic through [employer] or phone. If you have a critical or abnormal lab result, we will notify you by phone as soon as possible.  Submit refill requests through [employer] or call your pharmacy and they will forward the refill request to us. Please allow 3 business days for your refill to be completed.          Additional Information About Your Visit        [employer] Information     [employer] lets you send messages to your doctor, view your test results, renew your prescriptions, schedule appointments and more. To sign up, go to [email or web address] . Click on \"Log in\" on the left side of the screen, which will take you to the Welcome page. Then click on \"Sign up Now\" on the right side of the page.     You will be asked to enter the access code listed below, as well as some personal information. Please follow the directions to create your username and password.     Your access code is: [email or web address]  Expires: 2017  3:48 PM     Your access code will  in 90 days. If you need help or a new code, please call your [place] clinic or [phone].        Care EveryWhere ID     This is your Care EveryWhere ID. This could be used by other organizations to access your [place] medical records  [id number]         Blood Pressure from Last 3 Encounters:   10/18/17 137/80   10/18/17 138/83   10/11/17 160/88    Weight from Last 3 Encounters:   10/18/17 89.5 kg (197 lb 4.8 oz)   10/11/17 89.4 kg (197 lb 3.2 oz)   10/04/17 89.7 kg (197 lb 12 oz)              Today, you had the following     No orders found for display         Today's Medication Changes          These changes are accurate as of: 10/18/17  3:48 PM.  If you have any questions, ask your nurse or doctor.               These medicines have changed or have updated prescriptions.        Dose/Directions    magnesium oxide 400 MG tablet   Commonly known as:  " MAG-OX   This may have changed:    - how much to take  - additional instructions   Used for:  AML (acute myeloid leukemia) in remission (H)        8 tabs daily   Quantity:  100 tablet   Refills:  1                Recent Review Flowsheet Data     BMT Recent Results Latest Ref Rng & Units 9/13/2017 9/20/2017 9/27/2017 9/29/2017 10/4/2017 10/11/2017 10/18/2017    WBC 4.0 - 11.0 10e9/L 3.3(L) 3.2(L) 3.4(L) 2.5(L) 3.1(L) 3.5(L) 3.3(L)    Hemoglobin 13.3 - 17.7 g/dL 10.5(L) 11.1(L) 11.0(L) 11.0(L) 10.6(L) 10.6(L) 11.1(L)    Platelet Count 150 - 450 10e9/L 158 157 145(L) 137(L) 158 182 193    Neutrophils (Absolute) 1.6 - 8.3 10e9/L 1.4(L) 1.4(L) 1.8 1.2(L) 1.4(L) 2.0 1.5(L)    INR 0.86 - 1.14 - - - - - - -    Sodium 133 - 144 mmol/L 137 139 139 138 136 136 135    Potassium 3.4 - 5.3 mmol/L 3.5 4.0 4.0 4.0 4.1 4.1 4.2    Chloride 94 - 109 mmol/L 103 105 105 106 103 102 102    Glucose 70 - 99 mg/dL 158(H) 150(H) 152(H) 160(H) 172(H) 158(H) 159(H)    Urea Nitrogen 7 - 30 mg/dL 15 12 14 12 12 13 14    Creatinine 0.66 - 1.25 mg/dL 1.39(H) 1.22 1.16 1.25 1.23 1.10 1.26(H)    Calcium (Total) 8.5 - 10.1 mg/dL 8.6 8.8 8.6 8.7 8.6 9.0 9.0    Protein (Total) 6.8 - 8.8 g/dL 6.8 6.9 7.1 - 7.2 7.3 7.4    Albumin 3.4 - 5.0 g/dL 3.6 3.7 3.7 - 3.7 3.4 3.8    Bilirubin (Direct) 0.0 - 0.2 mg/dL - - - - - - -    Alkaline Phosphatase 40 - 150 U/L 75 76 80 - 90 90 100    AST 0 - 45 U/L 22 21 15 - 20 16 22    ALT 0 - 70 U/L 28 25 22 - 24 22 27    MCV 78 - 100 fl 91 92 91 92 92 91 92               Primary Care Provider    Physician No Ref-Primary       NO REF-PRIMARY PHYSICIAN        Equal Access to Services     ASHLYN FLANAGAN : Hadii sapna Ratliff, washiela manning, qaybperla kaallouann mchugh, vishal lópez. So [facility] [phone].    ATENCIÓN: Si habla español, tiene a gusman disposición servicios gratuitos de asistencia lingüística. Leyla al [phone].    We comply with applicable federal civil rights laws and  [place] laws. We do not discriminate on the basis of race, color, national origin, age, disability, sex, sexual orientation, or gender identity.            Thank you!     Thank you for choosing [facility] B[facility]OD [facility]ARROW TRANSPL[facility]  for your care. Our goal is always to provide you with excellent care. Hearing back from our patients is one way we can continue to improve our services. Please take a few minutes to complete the written survey that you may receive in the mail after your visit with us. Thank you!             Your Updated Medication List - Protect others around you: Learn how to safely use, store and throw away your medicines at www.disposemymeds.org.          This list is accurate as of: 10/18/17  3:48 PM.  Always use your most recent med list.                   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), History of peripheral stem cell transplant (H), Aspergillosis (H)       cholecalciferol 1000 UNITS capsule    vitamin  -D     Take 1 capsule by mouth daily        cyclobenzaprine 5 MG tablet    FLEXERIL     Take 1 tablet (5 mg) by mouth 3 times daily as needed for muscle spasms        diltiazem 360 MG 24 hr CD capsule    CARDIZEM CD; CARTIA XT    90 capsule    Take 1 capsule (360 mg) by mouth daily    AML (acute myeloid leukemia) in remission (H)       FLONASE NA      Spray in nostril as needed        guaiFENesin 600 MG 12 hr tablet    MUCINEX     Take 600 mg by mouth        heparin lock flush 10 UNIT/ML Soln injection     30 vial    5 mLs by Intracatheter route daily In each lumen    AML (acute myeloid leukemia) in remission (H)       LORazepam 0.5 MG tablet    ATIVAN    40 tablet    Take 1-2 tablets (0.5-1 mg) by mouth every 4 hours as needed for anxiety (nausea/vomiting/sleep)    Stem cell transplant candidate        losartan 50 MG tablet    COZAAR    30 tablet    Take 1 tablet (50 mg) by mouth daily    AML (acute myeloid leukemia) in remission (H)       magnesium oxide 400 MG tablet    MAG-OX    100 tablet    8 tabs daily    AML (acute myeloid leukemia) in remission (H)       metFORMIN 500 MG 24 hr tablet    GLUCOPHAGE-XR    60 tablet    Take 2 tablets (1,000 mg) by mouth daily (with dinner)    Type 2 diabetes mellitus without complication, without long-term current use of insulin (H)       MULTI COMPLETE PO           ondansetron 8 MG tablet    ZOFRAN    30 tablet    Take 1 tablet (8 mg) by mouth every 8 hours as needed for nausea    Stem cell transplant candidate       pantoprazole 40 MG EC tablet    PROTONIX    90 tablet    Take 1 tablet (40 mg) by mouth daily    Stem cell transplant candidate       pravastatin 20 MG tablet    PRAVACHOL    30 tablet    Take 1 tablet (20 mg) by mouth daily    Type 2 diabetes mellitus without complication, without long-term current use of insulin (H)       psyllium Packet    METAMUCIL/KONSYL    90 packet    Take 1 packet by mouth 3 times daily    AML (acute myeloid leukemia) in remission (H)       sulfamethoxazole-trimethoprim 800-160 MG per tablet    BACTRIM DS/SEPTRA DS    30 tablet    Clinic will tell you when to start 1 tablet twice daily by mouth on Mondays and Tuesdays, start around day +28    Stem cell transplant candidate       tacrolimus 0.5 MG capsule    GENERIC EQUIVALENT    56 capsule    Take 2 capsules (1 mg) by mouth 2 times daily    Acute myeloid leukemia in remission (H)       triamcinolone 0.1 % cream    KENALOG    80 g    Apply sparingly to affected area three times daily as needed    AML (acute myeloid leukemia) in remission (H)       voriconazole 200 MG tablet    VFEND    60 tablet    Take 1 tablet (200 mg) by mouth 2 times daily    Stem cell transplant candidate       zolpidem 5 MG tablet    AMBIEN    45 tablet    Take 1-2 tablets (5-10 mg) by mouth nightly as needed for  sleep    AML (acute myeloid leukemia) in remission (H)

## 2017-10-18 NOTE — MR AVS SNAPSHOT
After Visit Summary   10/18/2017    Norman Real    MRN: [id number]           Patient Information     Date Of Birth          1960        Visit Information        Provider Department      10/18/2017 12:00 PM Charanjit Umanzor MD [facility] Blood and Marrow Transplant        Today's Diagnoses     Stem cell transplant candidate    -  1    Acute myeloid leukemia in remission (H)        History of peripheral stem cell transplant (H)        Aspergillosis (H)        AML (acute myeloid leukemia) in remission (H)              [facility] [place]  [address] [place] [address]  Phone: [phone]  Clinic Hours:   Monday-Thursday:7am to 7pm   Friday: 7am to 5pm   Weekends and holidays:    8am to noon (in general)  If your fever is 100.5  or greater,   call the clinic.  After hours call the   hospital at [phone] or   [phone]. Ask for the BMT   fellow on-call           Care Instructions    Flu shot planned for 10/25 2017  RTC Erna on 10/25 with labs;   [id number] dose C1D4 today 10/18/17  Staging per protocol week on 10/31/17  Next week need to adjust tacrolimus as he will be stopping the vfend          Follow-ups after your visit        Your next 10 appointments already scheduled     Oct 25, 2017 11:00 AM CDT   [facility] Draw with  [facility] DRAW   [facility] Draw ([facility])    [address] [place] [id number] [phone]            Oct 25, 2017 11:30 AM CDT   BMT Anniversary Visit with Charanjit Umanzor MD   [facility] Blood and Marrow Transplant ([facility])    [address] [place] [id number] [phone]            Oct 31, 2017 12:30 PM CDT   [facility] Lab Draw with  [facility] LAB DRAW   [facility] Draw ([facility])    [address] [place] [id number] [phone]             Oct 31, 2017  1:00 PM CDT   Bone Marrow Biopsy with  BMT MIMA #3, UU BONE MARROW BIOPSY   [facility] Blood and Marrow Transplant ([facility])    [address]  2nd Floor  [facility] [id number]   [phone]            Nov 01, 2017  9:30 AM CDT   (Arrive by 9:15 AM)   IR CVC TUNNEL REMOVAL LEFT with LENAUS4,  IMAGING NURSE, [facility]   [facility] Imaging [place] ([facility])    [address]  1st Floor  [facility] [id number] [phone]           1. Your doctor will need to do a history and physical within 7 days before this procedure. 2. Your doctor will which medications should not be taken the morning of the exam. 3. Laboratory tests are to be obtained by your doctor prior to the exam (Basic Metabolic Panel, CBCP, PTT and INR) (No labs needed if you are having a tunneled catheter exchange or removal) 4. If you have allergies to x-ray contrast or iodine, contact your doctor or a Radiology nurse prior to the exam day for instructions. 5. Someone will need to drive you to and from the hospital. 6. If you are or may be pregnant, contact your doctor or a Radiology nurse prior to the day of the exam. 7. If you have diabetes, check with your doctor or a Radiology nurse to see if your insulin needs to be adjusted for the exam. 8. If you are taking a medication called Glucophage or Glucovance; these medications need to be held the day of the exam and for approximately 48 hours following. A blood sample must be drawn so your creatinine level can be checked before resuming this medication. 9. If you are taking Coumadin (to thin you blood) please contact your doctor or a Radiology nurse at least 3 days before the exam for special instructions. 10. You should not have received contrast within 48 hours of this exam. 11. The day before your exam you may eat your regular diet and are encouraged to drink at least 2 quarts of clear liquids.  Drink no alcoholic beverages for 24 hours prior to the exam. 12. If you have a colostomy you will need to irrigate it with tap water at 8PM the evening before and again at 6AM the morning of the exam. 13. Do not smoke for 24 hours prior to the procedure. 14. Birth to 4 years: - Breast feeding must be stopped 4 hours prior to exam - Solid food or formula must be stopped 6 hours prior to exam - Tube feedings must be stopped 6 hours prior to exam 15. 4-10 years old: - Nothing to eat or drink 6 hours prior to exam 16. 10+ years old: - Nothing to eat or drink 8 hours prior to exam 17. The morning of the exam you may brush your teeth and take medications as directed with a sip of water. 18. When discharged, you cannot drive until morning, and an adult must be with you until then. You should stay in the [facility] overnight. 19. Bring a list of all drugs you are taking; include supplements and over-the-counter medications. Wear comfortable clothes and leave your valuables at home.            Nov 02, 2017  8:10 AM CDT   (Arrive by 7:55 AM)   RETURN DIABETES with Gerald Weiss MD   [facility] ([facility])    [address] [place] [phone] [phone]            Nov 03, 2017 10:30 AM CDT   BMT Anniversary Visit with Charanjit Umanzor MD   [facility] Blood and Marrow Transplant ([facility])    [address] [place] [address] [phone] [phone]              Future tests that were ordered for you today     Open Standing Orders        Priority Remaining Interval Expires Ordered    Notify Physician Routine [id number] PRN  10/18/2017          Open Future Orders        Priority Expected Expires Ordered    Comprehensive metabolic panel Routine 10/25/2017 10/25/2017 10/18/2017    CBC with platelets differential Routine 10/25/2017 10/25/2017 10/18/2017    Magnesium Routine 10/25/2017 10/25/2017 10/18/2017     "CMV DNA quantification Routine 10/25/2017 10/25/2017 10/18/2017    EBV DNA PCR Quantitative Whole Blood Routine 10/25/2017 10/25/2017 10/18/2017    Tacrolimus level Routine 10/25/2017 10/25/2017 10/18/2017            Who to contact     If you have questions or need follow up information about today's clinic visit or your schedule please contact [facility] [place] [facility] B[facility]OD [facility]OW [facility]ANSPL[facility] directly at [phone].  Normal or non-critical lab and imaging results will be communicated to you by AlchemyAPIhart, letter or phone within 4 business days after the clinic has received the results. If you do not hear from us within 7 days, please contact the clinic through AlchemyAPIhart or phone. If you have a critical or abnormal lab result, we will notify you by phone as soon as possible.  Submit refill requests through Dime or call your pharmacy and they will forward the refill request to us. Please allow 3 business days for your refill to be completed.          Additional Information About Your Visit        Dime Information     Dime lets you send messages to your doctor, view your test results, renew your prescriptions, schedule appointments and more. To sign up, go to [email or web address] . Click on \"Log in\" on the left side of the screen, which will take you to the Welcome page. Then click on \"Sign up Now\" on the right side of the page.     You will be asked to enter the access code listed below, as well as some personal information. Please follow the directions to create your username and password.     Your access code is: [email or web address]  Expires: 2017  3:48 PM     Your access code will  in 90 days. If you need help or a new code, please call your [facility] clinic or [phone].        Care EveryWhere ID     This is your Care EveryWhere ID. This could be used by other organizations to access your [facility] medical records  [id number]        Your Vitals Were     Pulse Temperature Respirations Pulse Oximetry BMI (Body " Mass Index)       80 97.8  F (36.6  C) (Oral) 16 96% 30.01 kg/m2        Blood Pressure from Last 3 Encounters:   10/18/17 138/83   10/11/17 160/88   10/11/17 142/83    Weight from Last 3 Encounters:   10/18/17 89.5 kg (197 lb 4.8 oz)   10/11/17 89.4 kg (197 lb 3.2 oz)   10/04/17 89.7 kg (197 lb 12 oz)              We Performed the Following     CBC with platelets differential     CMV DNA quantification     Comprehensive metabolic panel     Magnesium     Tacrolimus level          Today's Medication Changes          These changes are accurate as of: 10/18/17 12:39 PM.  If you have any questions, ask your nurse or doctor.               These medicines have changed or have updated prescriptions.        Dose/Directions    magnesium oxide 400 MG tablet   Commonly known as:  MAG-OX   This may have changed:    - how much to take  - additional instructions   Used for:  AML (acute myeloid leukemia) in remission (H)        8 tabs daily   Quantity:  100 tablet   Refills:  1                Recent Review Flowsheet Data     BMT Recent Results Latest Ref Rng & Units 9/13/2017 9/20/2017 9/27/2017 9/29/2017 10/4/2017 10/11/2017 10/18/2017    WBC 4.0 - 11.0 10e9/L 3.3(L) 3.2(L) 3.4(L) 2.5(L) 3.1(L) 3.5(L) 3.3(L)    Hemoglobin 13.3 - 17.7 g/dL 10.5(L) 11.1(L) 11.0(L) 11.0(L) 10.6(L) 10.6(L) 11.1(L)    Platelet Count 150 - 450 10e9/L 158 157 145(L) 137(L) 158 182 193    Neutrophils (Absolute) 1.6 - 8.3 10e9/L 1.4(L) 1.4(L) 1.8 1.2(L) 1.4(L) 2.0 1.5(L)    INR 0.86 - 1.14 - - - - - - -    Sodium 133 - 144 mmol/L 137 139 139 138 136 136 135    Potassium 3.4 - 5.3 mmol/L 3.5 4.0 4.0 4.0 4.1 4.1 4.2    Chloride 94 - 109 mmol/L 103 105 105 106 103 102 102    Glucose 70 - 99 mg/dL 158(H) 150(H) 152(H) 160(H) 172(H) 158(H) 159(H)    Urea Nitrogen 7 - 30 mg/dL 15 12 14 12 12 13 14    Creatinine 0.66 - 1.25 mg/dL 1.39(H) 1.22 1.16 1.25 1.23 1.10 1.26(H)    Calcium (Total) 8.5 - 10.1 mg/dL 8.6 8.8 8.6 8.7 8.6 9.0 9.0    Protein (Total) 6.8 - 8.8  g/dL 6.8 6.9 7.1 - 7.2 7.3 7.4    Albumin 3.4 - 5.0 g/dL 3.6 3.7 3.7 - 3.7 3.4 3.8    Bilirubin (Direct) 0.0 - 0.2 mg/dL - - - - - - -    Alkaline Phosphatase 40 - 150 U/L 75 76 80 - 90 90 100    AST 0 - 45 U/L 22 21 15 - 20 16 22    ALT 0 - 70 U/L 28 25 22 - 24 22 27    MCV 78 - 100 fl 91 92 91 92 92 91 92               Primary Care Provider    Physician No Ref-Primary       NO REF-PRIMARY PHYSICIAN        Equal Access to Services     [facility] : Hadii sapna Ratliff, waaxda nigel, qaybta kaalmada lolita, vishal albright . So [facility] [phone].    ATENCIÓN: Si habla español, tiene a gusman disposición servicios gratuitos de asistencia lingüística. Llame al [phone].    We comply with applicable federal civil rights laws and [place] laws. We do not discriminate on the basis of race, color, national origin, age, disability, sex, sexual orientation, or gender identity.            Thank you!     Thank you for choosing [facility] BLOOD AND MARROW TRANSPLANT  for your care. Our goal is always to provide you with excellent care. Hearing back from our patients is one way we can continue to improve our services. Please take a few minutes to complete the written survey that you may receive in the mail after your visit with us. Thank you!             Your Updated Medication List - Protect others around you: Learn how to safely use, store and throw away your medicines at www.disposemymeds.org.          This list is accurate as of: 10/18/17 12:40 PM.  Always use your most recent med list.                   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), History of peripheral stem cell transplant (H), Aspergillosis (H)       cholecalciferol 1000 UNITS capsule    vitamin  -D     Take 1 capsule by mouth daily         cyclobenzaprine 5 MG tablet    FLEXERIL     Take 1 tablet (5 mg) by mouth 3 times daily as needed for muscle spasms        diltiazem 360 MG 24 hr CD capsule    CARDIZEM CD; CARTIA XT    90 capsule    Take 1 capsule (360 mg) by mouth daily    AML (acute myeloid leukemia) in remission (H)       FLONASE NA      Spray in nostril as needed        guaiFENesin 600 MG 12 hr tablet    MUCINEX     Take 600 mg by mouth        heparin lock flush 10 UNIT/ML Soln injection     30 vial    5 mLs by Intracatheter route daily In each lumen    AML (acute myeloid leukemia) in remission (H)       LORazepam 0.5 MG tablet    ATIVAN    40 tablet    Take 1-2 tablets (0.5-1 mg) by mouth every 4 hours as needed for anxiety (nausea/vomiting/sleep)    Stem cell transplant candidate       losartan 50 MG tablet    COZAAR    30 tablet    Take 1 tablet (50 mg) by mouth daily    AML (acute myeloid leukemia) in remission (H)       magnesium oxide 400 MG tablet    MAG-OX    100 tablet    8 tabs daily    AML (acute myeloid leukemia) in remission (H)       metFORMIN 500 MG 24 hr tablet    GLUCOPHAGE-XR    60 tablet    Take 2 tablets (1,000 mg) by mouth daily (with dinner)    Type 2 diabetes mellitus without complication, without long-term current use of insulin (H)       MULTI COMPLETE PO           ondansetron 8 MG tablet    ZOFRAN    30 tablet    Take 1 tablet (8 mg) by mouth every 8 hours as needed for nausea    Stem cell transplant candidate       pantoprazole 40 MG EC tablet    PROTONIX    90 tablet    Take 1 tablet (40 mg) by mouth daily    Stem cell transplant candidate       pravastatin 20 MG tablet    PRAVACHOL    30 tablet    Take 1 tablet (20 mg) by mouth daily    Type 2 diabetes mellitus without complication, without long-term current use of insulin (H)       psyllium Packet    METAMUCIL/KONSYL    90 packet    Take 1 packet by mouth 3 times daily    AML (acute myeloid leukemia) in remission (H)       sulfamethoxazole-trimethoprim 800-160  MG per tablet    BACTRIM DS/SEPTRA DS    30 tablet    Clinic will tell you when to start 1 tablet twice daily by mouth on Mondays and Tuesdays, start around day +28    Stem cell transplant candidate       tacrolimus 0.5 MG capsule    GENERIC EQUIVALENT    56 capsule    Take 2 capsules (1 mg) by mouth 2 times daily    Acute myeloid leukemia in remission (H)       triamcinolone 0.1 % cream    KENALOG    80 g    Apply sparingly to affected area three times daily as needed    AML (acute myeloid leukemia) in remission (H)       voriconazole 200 MG tablet    VFEND    60 tablet    Take 1 tablet (200 mg) by mouth 2 times daily    Stem cell transplant candidate       zolpidem 5 MG tablet    AMBIEN    45 tablet    Take 1-2 tablets (5-10 mg) by mouth nightly as needed for sleep    AML (acute myeloid leukemia) in remission (H)

## 2017-10-18 NOTE — NURSING NOTE
"Oncology Rooming Note    October 18, 2017 12:16 PM   Norman Real is a 56 year old male who presents for:    Chief Complaint   Patient presents with     Blood Draw     Labs drawn from CVC by RN. Line flushed with saline and heparin. Vs taken and pt checked in for appt(s)     RECHECK     provider appt, injection, labs, AML     Initial Vitals: /83 (BP Location: Right arm, Cuff Size: Adult Regular)  Pulse 80  Temp 97.8  F (36.6  C) (Oral)  Resp 16  Wt 89.5 kg (197 lb 4.8 oz)  SpO2 96%  BMI 30.01 kg/m2 Estimated body mass index is 30.01 kg/(m^2) as calculated from the following:    Height as of 9/29/17: 1.727 m (5' 7.99\").    Weight as of this encounter: 89.5 kg (197 lb 4.8 oz). Body surface area is 2.07 meters squared.  No Pain (0) Comment: Data Unavailable   No LMP for male patient.  Allergies reviewed: Yes  Medications reviewed: Yes    Medications: Medication refills not needed today.  Pharmacy name entered into Med ePad: [id number] - [place], [place] - [address] [place] [address]    Clinical concerns: none    0 minutes for nursing intake (face to face time)     CLARK JOHNSON RN              "

## 2017-10-18 NOTE — PROGRESS NOTES
Infusion Nursing Note:  Norman Real presents today for injection of AST- 803.  Dose 4, cycle 1..    Patient seen by provider today: Yes   present during visit today: Not Applicable.    Note: Pt s/p provider visit.  Monet research RN here for paperwork and observation of AST -803 injection.  Pt received Tylenol and Bendaryl as premeds for this injection.  Pt toleated injection to right lower quadrant of abdomen.    Treatment Conditions:  Results reviewed, labs MET treatment parameters, ok to proceed with treatment.      Post Infusion Assessment:  Patient tolerated injection without incident. VSS.    Discharge Plan:   Patient discharged in stable condition accompanied by: wife.    Padmini Guevara RN

## 2017-10-18 NOTE — PROGRESS NOTES
BMT Clinic Note      Patient ID:  Norman Real is a 56 year old male, currently day +84 s/p NMA allo sib PBSCT for AML       Diagnosis AMLU Acute myelogeneous leukemia, Unknown  HCT Type Allogeneic    Prep Regimen Cytoxan  Fludarabine  TBI   Donor Source Related PBSC    GVHD Prophylaxis   Mycophenolate  Primary BMT Provider Dr. Erna MD          INTERVAL  HISTORY      HPI: Norman is feeling good.  Had zyrtec again for itchy eyes. No significant rash with last dose of CYN747. No fever.  Eating a little less this last week. No nausea. No vomiting or diarrhea. No bleeding. Occasional dry eyes.   Review of Systems: 10 point ROS negative except as noted above.     PHYSICAL EXAM   /83 (BP Location: Right arm, Cuff Size: Adult Regular)  Pulse 80  Temp 97.8  F (36.6  C) (Oral)  Resp 16  Wt 89.5 kg (197 lb 4.8 oz)  SpO2 96%  BMI 30.01 kg/m2    General: NAD, interactive, appropriate, mild facial erythema  Eyes: SAMSON, sclera anicteric   Nose/Mouth/Throat: op clear, buccal mucosa moist  Lungs: CTA bilaterally  Cardiovascular: RRR, no M/R/G   Abdominal/Rectal: +BS, soft, NT, ND, No HSM   Lymphatics: trace ankle edema bilaterally  Skin: no rashes or petechaie.    Neuro: A&O   Additional Findings: Cooper site NT, no drainage.     LABS AND IMAGING - PAST 24 HOURS      Lab Results   Component Value Date    WBC 3.3 (L) 10/18/2017    ANEU 1.5 (L) 10/18/2017    HGB 11.1 (L) 10/18/2017    HCT 32.7 (L) 10/18/2017     10/18/2017     10/18/2017    POTASSIUM 4.2 10/18/2017    CHLORIDE 102 10/18/2017    CO2 25 10/18/2017     (H) 10/18/2017    BUN 14 10/18/2017    CR 1.26 (H) 10/18/2017    MAG 1.7 10/18/2017    INR 0.96 07/31/2017    BILITOTAL 0.6 10/18/2017    AST 22 10/18/2017    ALT 27 10/18/2017    ALKPHOS 100 10/18/2017    PROTTOTAL 7.4 10/18/2017    ALBUMIN 3.8 10/18/2017     Tac level good      ASSESSMENT BY SYSTEMS      Norman Salazar Real is a 55 yo male, day +84 s/p NMA allo sib PBSCT  for  AML       1. BMT: Transplant 7/27. Initial stem cell product only contained 2.33 CD34/kg, additional 0.66 CD34/kg on 7/27 for a total of 2.99.   -  BMBX showed no increase in blasts.  98% donor BM (8/15/17); 90% donor CD3; 100% donor CD15.     2. AML: Patient continues to have no evidence of recurrent leukemia. Started [id number] maintenance treatment on 9/27.  Cycle 1 Dose #4 of  today.     3. HEME: Keep Hgb>8 and plts>10K. No pre-meds.    4.  ID:  Afebrile. No active infections. He will finish vfend next week and we will change to fluconazole.  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  Improved CT 8/10.  Refilled vfend for 1 month then discontinue; then start fluconazole 100 mg daily; adjust Tacrolimus  - proph HD ACV (CMV+, HSV+, EBV+).   CMV negative 9/27.  - Cont to hold Bactrm due to lowish WBC count.  Pentamadine today.  No allergy to bactrim.  - EBV negative 9/20      5.  GI:  - Mild nausea: Taking Zofran and ativan PRN, symptoms minimal   - Protonix for GI prophy.   - Ursodiol for VOD prophy  - s/p left sided colectomy for recurrent diverticulitis.        6.  GVH: No aGVH. stopped MMF 8/25. Adjust tacrolimus next week when we stop vfend.   - On Tacrolimus: Taking 1 mg BID, level 12.7 on 9/27    7.  FEN/Renal: Creat stable.   - Hx of hypoMg.  On oral mag to 1000 mg QID (8tbs per day).  Check Mg next visit.        8. CV: Adequate BP control on losartan 50 mg and diltiazem  mg.  - Hx tachycardia with arrhythmia, s/p 3 ablations  - hold crestor through transplant      9. Endo:  Hx DM type II.   Blood sugar has been well controlled & ANC down to 5.7 today.  Per endocrine recs, DC Lantus & begin metformin 500mg QD with plans to increase to 1000mg QD in 1 week.        10. : Hx prostate cancer.     10. Neuro: History of chronic insomnia. Ambien to 5-10 mg QHS PRN      Flu shot planned for 10/25 2017  RTC Erna on 10/25 with labs;   [id number]  dose C1D4 today 10/18/17  Staging per protocol week on 10/31/17  Next week need to adjust tacrolimus as he will be stopping the vfend

## 2017-10-25 NOTE — LETTER
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 Erna MD          INTERVAL  HISTORY      HPI: Norman is feeling good.  No residual rash from [id number]. No fever.  Eating a little less this last 2 weeks. Mild nausea taking ativan PRN. Dry eyes, but not dry mouth. No vomiting or diarrhea. No bleeding.     Review of Systems: 10 point ROS negative except as noted above.     PHYSICAL EXAM   /82  Pulse 78  Temp 97.7  F (36.5  C)  Resp 18  Ht 1.727 m (5' 7.99\")  Wt 89.8 kg (198 lb)  SpO2 97%  BMI 30.11 kg/m2    General: NAD, interactive, appropriate, mild facial erythema  Eyes: SAMSON, sclera anicteric   Nose/Mouth/Throat: op clear, buccal mucosa moist  Lungs: CTA bilaterally  Cardiovascular: RRR, no M/R/G   Abdominal/Rectal: +BS, soft, NT, ND, No HSM   Lymphatics: trace ankle edema bilaterally  Skin: no rashes or petechaie.    Neuro: A&O   Additional Findings: Cooper site NT, no drainage.     LABS AND IMAGING - PAST 24 HOURS      Lab Results   Component Value Date    WBC 2.8 (L) 10/25/2017    ANEU 1.2 (L) 10/25/2017    HGB 10.7 (L) 10/25/2017    HCT 31.0 (L) 10/25/2017     10/25/2017     10/25/2017    POTASSIUM 4.3 10/25/2017    CHLORIDE 103 10/25/2017    CO2 26 10/25/2017     (H) 10/25/2017    BUN 14 10/25/2017    CR 1.21 10/25/2017    MAG 1.6 10/25/2017    INR 0.96 07/31/2017    BILITOTAL 0.5 10/25/2017    AST 16 10/25/2017    ALT 22 10/25/2017    ALKPHOS 92 10/25/2017    PROTTOTAL 7.3 10/25/2017    ALBUMIN 3.8 10/25/2017     Tac level pending     ASSESSMENT BY SYSTEMS      Norman Salazar Real is a 55 yo male, s/p NMA allo sib PBSCT for "  AML       1. BMT: Transplant 7/27. Initial stem cell product only contained 2.33 CD34/kg, additional 0.66 CD34/kg on 7/27 for a total of 2.99.   -  BMBX showed no increase in blasts.  98% donor BM (8/15/17); 90% donor CD3; 100% donor CD15.     2. AML: Patient continues to have no evidence of recurrent leukemia. Repeat marrow next week. Started [id number] maintenance treatment on 9/27.  Cycle 1 9/27/17    3. HEME: mil drop in WBC, ANC 1200. Reduce acyclovir. Pentamidine for now. Keep Hgb>8 and plts>10K. No pre-meds.    4.  ID:  Afebrile. No active infections. He finished vfend last night and will start fluconazole today.   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  Improved CT 8/10.  Refilled vfend for 1 month then discontinue; then start fluconazole 100 mg daily; adjust Tacrolimus  - proph HD ACV (CMV+, HSV+, EBV+).   CMV negative 9/27.  - Cont to hold Bactrm due to lowish WBC count.  Pentamadine 10/25.  No allergy to bactrim.  - EBV negative 9/20      5.  GI:   - Mild nausea: Taking Zofran and ativan PRN, symptoms minimal   - Protonix for GI prophy.   - Ursodiol for VOD prophy  - s/p left sided colectomy for recurrent diverticulitis.        6.  GVH: No aGVH. stopped MMF 8/25. Adjust tacrolimus to 1.5 mg BID on 10/28 unless direct otherwise due to today's level.     7.  FEN/Renal: Creat stable.   - Hx of hypoMg.  On oral mag to 1000 mg QID (8tbs per day).  Check Mg next visit.        8. CV: Adequate BP control on losartan 50 mg and diltiazem  mg.  - Hx tachycardia with arrhythmia, s/p 3 ablations  - hold crestor through transplant      9. Endo:  Hx DM type II.   Blood sugar has been well controlled & ANC down to 5.7 today.  Per endocrine recs, DC Lantus & begin metformin 500mg QD with plans to increase to 1000mg QD in 1 week.        10. : Hx prostate cancer.     10. Neuro: History of chronic insomnia. Ambien to 5-10 mg QHS PRN      Visit BMBX and labs with  provider on 10/31/17  Tacrolimus dose chage to 1.5 mg BID on 10/28/17   Hold Tacrolimus pills to repeat level on 10/31/17  LINDSEY Umanzor on 11/03/17  Schirmer's, pentamidine and Flushot  Today 10/25/17        Charanjit Umanzor MD

## 2017-10-25 NOTE — NURSING NOTE
"Oncology Rooming Note    October 25, 2017 12:07 PM   Norman Real is a 56 year old male who presents for:    Chief Complaint   Patient presents with     RECHECK     AML (acute myeloid leukemia)      Blood Draw     VS done, labs collected via CVC.  Caps changed and both lumens heparin locked by RN.     Initial Vitals: /82  Pulse 78  Temp 97.7  F (36.5  C)  Resp 18  Ht 1.727 m (5' 7.99\")  Wt 89.8 kg (198 lb)  SpO2 97%  BMI 30.11 kg/m2 Estimated body mass index is 30.11 kg/(m^2) as calculated from the following:    Height as of this encounter: 1.727 m (5' 7.99\").    Weight as of this encounter: 89.8 kg (198 lb). Body surface area is 2.08 meters squared.  No Pain (0) Comment: Data Unavailable   No LMP for male patient.  Allergies reviewed: Yes  Medications reviewed: Yes    Medications: Medication refills not needed today.  Pharmacy name entered into [facility]: [id number] - [address]     Clinical concerns:  No new concerns  Provider was notified.    5 minutes for nursing intake (face to face time)     Radha Lantigua MA              "

## 2017-10-25 NOTE — PROGRESS NOTES
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 Erna MD          INTERVAL  HISTORY      HPI: Norman is feeling good.  No residual rash from [id number]. No fever.  Eating a little less this last 2 weeks. Mild nausea taking ativan PRN. Dry eyes, but not dry mouth. No vomiting or diarrhea. No bleeding.     Review of Systems: 10 point ROS negative except as noted above.     PHYSICAL EXAM   /82  Pulse 78  Temp 97.7  F (36.5  C)  Resp 18  Ht 1.727 m (5' 7.99\")  Wt 89.8 kg (198 lb)  SpO2 97%  BMI 30.11 kg/m2    General: NAD, interactive, appropriate, mild facial erythema  Eyes: SAMSON, sclera anicteric   Nose/Mouth/Throat: op clear, buccal mucosa moist  Lungs: CTA bilaterally  Cardiovascular: RRR, no M/R/G   Abdominal/Rectal: +BS, soft, NT, ND, No HSM   Lymphatics: trace ankle edema bilaterally  Skin: no rashes or petechaie.    Neuro: A&O   Additional Findings: Cooper site NT, no drainage.     LABS AND IMAGING - PAST 24 HOURS      Lab Results   Component Value Date    WBC 2.8 (L) 10/25/2017    ANEU 1.2 (L) 10/25/2017    HGB 10.7 (L) 10/25/2017    HCT 31.0 (L) 10/25/2017     10/25/2017     10/25/2017    POTASSIUM 4.3 10/25/2017    CHLORIDE 103 10/25/2017    CO2 26 10/25/2017     (H) 10/25/2017    BUN 14 10/25/2017    CR 1.21 10/25/2017    MAG 1.6 10/25/2017    INR 0.96 07/31/2017    BILITOTAL 0.5 10/25/2017    AST 16 10/25/2017    ALT 22 10/25/2017    ALKPHOS 92 10/25/2017    PROTTOTAL 7.3 10/25/2017    ALBUMIN 3.8 10/25/2017     Tac level pending     ASSESSMENT BY SYSTEMS      Norman Real is a 55 yo male, s/p NMA allo sib PBSCT for  AML       1. BMT: Transplant 7/27. Initial stem cell product " only contained 2.33 CD34/kg, additional 0.66 CD34/kg on 7/27 for a total of 2.99.   -  BMBX showed no increase in blasts.  98% donor BM (8/15/17); 90% donor CD3; 100% donor CD15.     2. AML: Patient continues to have no evidence of recurrent leukemia. Repeat marrow next week. Started [id number] maintenance treatment on 9/27.  Cycle 1 9/27/17    3. HEME: mil drop in WBC, ANC 1200. Reduce acyclovir. Pentamidine for now. Keep Hgb>8 and plts>10K. No pre-meds.    4.  ID:  Afebrile. No active infections. He finished vfend last night and will start fluconazole today.   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  Improved CT 8/10.  Refilled vfend for 1 month then discontinue; then start fluconazole 100 mg daily; adjust Tacrolimus  - proph HD ACV (CMV+, HSV+, EBV+).   CMV negative 9/27.  - Cont to hold Bactrm due to lowish WBC count.  Pentamadine 10/25.  No allergy to bactrim.  - EBV negative 9/20      5.  GI:   - Mild nausea: Taking Zofran and ativan PRN, symptoms minimal   - Protonix for GI prophy.   - Ursodiol for VOD prophy  - s/p left sided colectomy for recurrent diverticulitis.        6.  GVH: No aGVH. stopped MMF 8/25. Adjust tacrolimus to 1.5 mg BID on 10/28 unless direct otherwise due to today's level.     7.  FEN/Renal: Creat stable.   - Hx of hypoMg.  On oral mag to 1000 mg QID (8tbs per day).  Check Mg next visit.        8. CV: Adequate BP control on losartan 50 mg and diltiazem  mg.  - Hx tachycardia with arrhythmia, s/p 3 ablations  - hold crestor through transplant      9. Endo:  Hx DM type II.   Blood sugar has been well controlled & ANC down to 5.7 today.  Per endocrine recs, DC Lantus & begin metformin 500mg QD with plans to increase to 1000mg QD in 1 week.        10. : Hx prostate cancer.     10. Neuro: History of chronic insomnia. Ambien to 5-10 mg QHS PRN      Visit BMBX and labs with provider on 10/31/17  Tacrolimus dose chage to 1.5 mg BID on  10/28/17   Hold Tacrolimus pills to repeat level on 10/31/17  RTC Erna on 11/03/17  Schirmer's, pentamidine and Flushot  Today 10/25/17

## 2017-10-25 NOTE — PATIENT INSTRUCTIONS
Visit BMBX and labs with provider on 10/31/17  Tacrolimus dose chage to 1.5 mg BID on 10/28/17   Hold Tacrolimus pills to repeat level on 10/31/17  C Erna on 11/03/17  Schirmer's, pentamidine and Flushot  Today 10/25/17      Add visit 10/31(is scheduled by [place])

## 2017-10-25 NOTE — MR AVS SNAPSHOT
After Visit Summary   10/25/2017    Norman Real    MRN: [id number]           Patient Information     Date Of Birth          1960        Visit Information        Provider Department      10/25/2017 11:30 AM Charanjit Umanzor MD [facility] Blood and Marrow Transplant        Today's Diagnoses     Stem cell transplant candidate    -  1    Acute myeloid leukemia in remission (H)        History of peripheral stem cell transplant (H)        Aspergillosis (H)        AML (acute myeloid leukemia) in remission (H)              [facility] [place]  [address] [place] [address]  Phone: [phone]  Clinic Hours:   Monday-Thursday:7am to 7pm   Friday: 7am to 5pm   Weekends and holidays:    8am to noon (in general)  If your fever is 100.5  or greater,   call the clinic.  After hours call the   hospital at [phone] or   [phone]. Ask for the BMT   fellow on-call           Care Instructions    Visit BMBX and labs with provider on 10/31/17  Tacrolimus dose chage to 1.5 mg BID on 10/28/17   Hold Tacrolimus pills to repeat level on 10/31/17  RTC Erna on 11/03/17  Schirmer's, pentamidine and Flushot  Today 10/25/17      Add visit 10/31(is scheduled by [place])          Follow-ups after your visit        Your next 10 appointments already scheduled     Oct 31, 2017 12:30 PM CDT   [facility] Draw with  [facility] DRAW   [facility] Draw ([facility])    [address] [place] [id number] [phone]            Oct 31, 2017  1:00 PM CDT   Bone Marrow Biopsy with  BMT MIMA #3, UU BONE MARROW BIOPSY   [facility] Blood and Marrow Transplant ([facility])    [address] [place] [address] [phone]            Oct 31, 2017  2:30 PM CDT   Return with UC BMT MIMA #3   [facility] Blood and Marrow Transplant ([facility])    [address] [place] [address]  [address]  [address] [facility] [address]   [phone]            Nov 01, 2017  9:30 AM CDT   (Arrive by 9:15 AM)   IR CVC TUNNEL REMOVAL LEFT with SAWYER4, LENA IMAGING NURSE, LENA IMAGING PA   [facility] US ([facility])    [address] [facility]  [address] [facility] [address]   [phone]           1. Your doctor will need to do a history and physical within 7 days before this procedure. 2. Your doctor will which medications should not be taken the morning of the exam. 3. Laboratory tests are to be obtained by your doctor prior to the exam (Basic Metabolic Panel, CBCP, PTT and INR) (No labs needed if you are having a tunneled catheter exchange or removal) 4. If you have allergies to x-ray contrast or iodine, contact your doctor or a Radiology nurse prior to the exam day for instructions. 5. Someone will need to drive you to and from the hospital. 6. If you are or may be pregnant, contact your doctor or a Radiology nurse prior to the day of the exam. 7. If you have diabetes, check with your doctor or a Radiology nurse to see if your insulin needs to be adjusted for the exam. 8. If you are taking a medication called Glucophage or Glucovance; these medications need to be held the day of the exam and for approximately 48 hours following. A blood sample must be drawn so your creatinine level can be checked before resuming this medication. 9. If you are taking Coumadin (to thin you blood) please contact your doctor or a Radiology nurse at least 3 days before the exam for special instructions. 10. You should not have received contrast within 48 hours of this exam. 11. The day before your exam you may eat your regular diet and are encouraged to drink at least 2 quarts of clear liquids. Drink no alcoholic beverages for 24 hours prior to the exam. 12. If you have a colostomy you will need to irrigate it with tap water at 8PM the evening before and again at 6AM the morning of  the exam. 13. Do not smoke for 24 hours prior to the procedure. 14. Birth to 4 years: - Breast feeding must be stopped 4 hours prior to exam - Solid food or formula must be stopped 6 hours prior to exam - Tube feedings must be stopped 6 hours prior to exam 15. 4-10 years old: - Nothing to eat or drink 6 hours prior to exam 16. 10+ years old: - Nothing to eat or drink 8 hours prior to exam 17. The morning of the exam you may brush your teeth and take medications as directed with a sip of water. 18. When discharged, you cannot drive until morning, and an adult must be with you until then. You should stay in the [facility] overnight. 19. Bring a list of all drugs you are taking; include supplements and over-the-counter medications. Wear comfortable clothes and leave your valuables at home.            Nov 02, 2017  8:10 AM CDT   (Arrive by 7:55 AM)   RETURN DIABETES with Gerald Weiss MD   [facility] ([facility])    [address] [place] [address]  3rd [facility] [address] [phone]            Nov 03, 2017 10:30 AM CDT   BMT Anniversary Visit with Charanjit Umanzor MD   [facility] Blood and Marrow Transplant ([facility])    [address] [place] [address] [phone]              Future tests that were ordered for you today     Open Future Orders        Priority Expected Expires Ordered    Comprehensive metabolic panel Routine 10/31/2017 10/31/2017 10/25/2017    CBC with platelets differential Routine 10/31/2017 10/31/2017 10/25/2017    CMV DNA quantification Routine 10/31/2017 10/31/2017 10/25/2017    Tacrolimus level Routine 10/31/2017 10/31/2017 10/25/2017            Who to contact     If you have questions or need follow up information about today's clinic visit or your schedule please contact [facility] BLOOD AND MARROW TRANSPLANT directly at [phone].  Normal or non-critical lab and imaging results will  "be communicated to you by MyChart, letter or phone within 4 business days after the clinic has received the results. If you do not hear from us within 7 days, please contact the clinic through [facility] or phone. If you have a critical or abnormal lab result, we will notify you by phone as soon as possible.  Submit refill requests through [employer] or call your pharmacy and they will forward the refill request to us. Please allow 3 business days for your refill to be completed.          Additional Information About Your Visit        [employer] Information     [employer] [employer] lets you send messages to your doctor, view your test results, renew your prescriptions, schedule appointments and more. To sign up, go to [email or web address] . Click on \"Log in\" on the left side of the screen, which will take you to the Welcome page. Then click on \"Sign up Now\" on the right side of the page.     You will be asked to enter the access code listed below, as well as some personal information. Please follow the directions to create your username and password.     Your access code is: [email or web address]  Expires: 2017  3:48 PM     Your access code will  in 90 days. If you need help or a new code, please call your [facility] clinic or [phone].        Care EveryWhere ID     This is your Care EveryWhere ID. This could be used by other organizations to access your [facility] medical records  [id number]        Your Vitals Were     Pulse Temperature Respirations Height Pulse Oximetry BMI (Body Mass Index)    78 97.7  F (36.5  C) 18 1.727 m (5' 7.99\") 97% 30.11 kg/m2       Blood Pressure from Last 3 Encounters:   10/25/17 146/82   10/18/17 137/80   10/18/17 138/83    Weight from Last 3 Encounters:   10/25/17 89.8 kg (198 lb)   10/18/17 89.5 kg (197 lb 4.8 oz)   10/11/17 89.4 kg (197 lb 3.2 oz)              We Performed the Following     CBC with platelets differential     CMV DNA quantification     Comprehensive metabolic panel     EBV DNA " PCR Quantitative Whole Blood     Magnesium     Tacrolimus level          Today's Medication Changes          These changes are accurate as of: 10/25/17  1:16 PM.  If you have any questions, ask your nurse or doctor.               Start taking these medicines.        Dose/Directions    fluconazole 100 MG tablet   Commonly known as:  DIFLUCAN   Used for:  Stem cell transplant candidate   Started by:  Charanjit Umanzor MD        Dose:  100 mg   Take 1 tablet (100 mg) by mouth daily   Quantity:  30 tablet   Refills:  11         These medicines have changed or have updated prescriptions.        Dose/Directions    acyclovir 800 MG tablet   Commonly known as:  ZOVIRAX   Indication:  Medication Treatment to Prevent Cytomegalovirus Disease   This may have changed:  when to take this   Used for:  Stem cell transplant candidate, Acute myeloid leukemia in remission (H), History of peripheral stem cell transplant (H), Aspergillosis (H)   Changed by:  Charanjit Umanzor MD        Dose:  800 mg   Take 1 tablet (800 mg) by mouth 2 times daily   Quantity:  150 tablet   Refills:  11       magnesium oxide 400 MG tablet   Commonly known as:  MAG-OX   This may have changed:    - how much to take  - additional instructions   Used for:  AML (acute myeloid leukemia) in remission (H)        8 tabs daily   Quantity:  100 tablet   Refills:  1            Where to get your medicines      These medications were sent to [facility]/pharmacy #[id number] - [place], [place]  [address] [place] [address]     Phone:  [phone]     fluconazole 100 MG tablet         Some of these will need a paper prescription and others can be bought over the counter.  Ask your nurse if you have questions.     Bring a paper prescription for each of these medications     LORazepam 0.5 MG tablet                Recent Review Flowsheet Data     BMT Recent Results Latest Ref Rng & Units 9/20/2017 9/27/2017 9/29/2017 10/4/2017  10/11/2017 10/18/2017 10/25/2017    WBC 4.0 - 11.0 10e9/L 3.2(L) 3.4(L) 2.5(L) 3.1(L) 3.5(L) 3.3(L) 2.8(L)    Hemoglobin 13.3 - 17.7 g/dL 11.1(L) 11.0(L) 11.0(L) 10.6(L) 10.6(L) 11.1(L) 10.7(L)    Platelet Count 150 - 450 10e9/L 157 145(L) 137(L) 158 182 193 182    Neutrophils (Absolute) 1.6 - 8.3 10e9/L 1.4(L) 1.8 1.2(L) 1.4(L) 2.0 1.5(L) 1.2(L)    INR 0.86 - 1.14 - - - - - - -    Sodium 133 - 144 mmol/L 139 139 138 136 136 135 137    Potassium 3.4 - 5.3 mmol/L 4.0 4.0 4.0 4.1 4.1 4.2 4.3    Chloride 94 - 109 mmol/L 105 105 106 103 102 102 103    Glucose 70 - 99 mg/dL 150(H) 152(H) 160(H) 172(H) 158(H) 159(H) 145(H)    Urea Nitrogen 7 - 30 mg/dL 12 14 12 12 13 14 14    Creatinine 0.66 - 1.25 mg/dL 1.22 1.16 1.25 1.23 1.10 1.26(H) 1.21    Calcium (Total) 8.5 - 10.1 mg/dL 8.8 8.6 8.7 8.6 9.0 9.0 8.8    Protein (Total) 6.8 - 8.8 g/dL 6.9 7.1 - 7.2 7.3 7.4 7.3    Albumin 3.4 - 5.0 g/dL 3.7 3.7 - 3.7 3.4 3.8 3.8    Bilirubin (Direct) 0.0 - 0.2 mg/dL - - - - - - -    Alkaline Phosphatase 40 - 150 U/L 76 80 - 90 90 100 92    AST 0 - 45 U/L 21 15 - 20 16 22 16    ALT 0 - 70 U/L 25 22 - 24 22 27 22    MCV 78 - 100 fl 92 91 92 92 91 92 91               Primary Care Provider    Physician No Ref-Primary       NO REF-PRIMARY PHYSICIAN        Equal Access to Services     ASHLYN FLANAGAN : Hadii aad ku hadasho Soomaali, waaxda luqadaha, qaybta kaalmada adeegyada, waxay idiin hayminorn adejohanna yovanaal lamelvin lópez. So [facility] [phone].    ATENCIÓN: Si habla español, tiene a gusman disposición servicios gratuitos de asistencia lingüística. Llame al [phone].    We comply with applicable federal civil rights laws and [place] laws. We do not discriminate on the basis of race, color, national origin, age, disability, sex, sexual orientation, or gender identity.            Thank you!     Thank you for choosing [facility] BLOOD AND MARROW TRANSPLANT  for your care. Our goal is always to provide you with excellent care. Hearing back from our  patients is one way we can continue to improve our services. Please take a few minutes to complete the written survey that you may receive in the mail after your visit with us. Thank you!             Your Updated Medication List - Protect others around you: Learn how to safely use, store and throw away your medicines at www.disposemymeds.org.          This list is accurate as of: 10/25/17  1:16 PM.  Always use your most recent med list.                   Brand Name Dispense Instructions for use Diagnosis    acyclovir 800 MG tablet    ZOVIRAX    150 tablet    Take 1 tablet (800 mg) by mouth 2 times daily    Stem cell transplant candidate, Acute myeloid leukemia in remission (H), History of peripheral stem cell transplant (H), Aspergillosis (H)       cholecalciferol 1000 UNITS capsule    vitamin  -D     Take 1 capsule by mouth daily        cyclobenzaprine 5 MG tablet    FLEXERIL     Take 1 tablet (5 mg) by mouth 3 times daily as needed for muscle spasms        diltiazem 360 MG 24 hr CD capsule    CARDIZEM CD; CARTIA XT    90 capsule    Take 1 capsule (360 mg) by mouth daily    AML (acute myeloid leukemia) in remission (H)       FLONASE NA      Spray in nostril as needed        fluconazole 100 MG tablet    DIFLUCAN    30 tablet    Take 1 tablet (100 mg) by mouth daily    Stem cell transplant candidate       guaiFENesin 600 MG 12 hr tablet    MUCINEX     Take 600 mg by mouth        heparin lock flush 10 UNIT/ML Soln injection     30 vial    5 mLs by Intracatheter route daily In each lumen    AML (acute myeloid leukemia) in remission (H)       LORazepam 0.5 MG tablet    ATIVAN    40 tablet    Take 1-2 tablets (0.5-1 mg) by mouth every 4 hours as needed for anxiety (nausea/vomiting/sleep)    Stem cell transplant candidate       losartan 50 MG tablet    COZAAR    30 tablet    Take 1 tablet (50 mg) by mouth daily    AML (acute myeloid leukemia) in remission (H)       magnesium oxide 400 MG tablet    MAG-OX    100 tablet     8 tabs daily    AML (acute myeloid leukemia) in remission (H)       metFORMIN 500 MG 24 hr tablet    GLUCOPHAGE-XR    60 tablet    Take 2 tablets (1,000 mg) by mouth daily (with dinner)    Type 2 diabetes mellitus without complication, without long-term current use of insulin (H)       MULTI COMPLETE PO           ondansetron 8 MG tablet    ZOFRAN    30 tablet    Take 1 tablet (8 mg) by mouth every 8 hours as needed for nausea    Stem cell transplant candidate       pantoprazole 40 MG EC tablet    PROTONIX    90 tablet    Take 1 tablet (40 mg) by mouth daily    Stem cell transplant candidate       pravastatin 20 MG tablet    PRAVACHOL    30 tablet    Take 1 tablet (20 mg) by mouth daily    Type 2 diabetes mellitus without complication, without long-term current use of insulin (H)       psyllium Packet    METAMUCIL/KONSYL    90 packet    Take 1 packet by mouth 3 times daily    AML (acute myeloid leukemia) in remission (H)       sulfamethoxazole-trimethoprim 800-160 MG per tablet    BACTRIM DS/SEPTRA DS    30 tablet    Clinic will tell you when to start 1 tablet twice daily by mouth on Mondays and Tuesdays, start around day +28    Stem cell transplant candidate       tacrolimus 0.5 MG capsule    GENERIC EQUIVALENT    56 capsule    Take 2 capsules (1 mg) by mouth 2 times daily    Acute myeloid leukemia in remission (H)       triamcinolone 0.1 % cream    KENALOG    80 g    Apply sparingly to affected area three times daily as needed    AML (acute myeloid leukemia) in remission (H)       voriconazole 200 MG tablet    VFEND    60 tablet    Take 1 tablet (200 mg) by mouth 2 times daily    Stem cell transplant candidate       zolpidem 5 MG tablet    AMBIEN    45 tablet    Take 1-2 tablets (5-10 mg) by mouth nightly as needed for sleep    AML (acute myeloid leukemia) in remission (H)

## 2017-10-25 NOTE — NURSING NOTE

## 2017-10-25 NOTE — NURSING NOTE
Chief Complaint   Patient presents with     RECHECK     AML (acute myeloid leukemia)      Blood Draw     VS done, labs collected via CVC.  Caps changed and both lumens heparin locked by RN.

## 2017-10-25 NOTE — NURSING NOTE
2 puffs of albuterol was given prior to pentamidine.     A Schirmer tear test was done on the patient. The right eye produced 21 and the left eye produced 32.    An IM injection was given and tolerated well. See MAR.    An dressing changed was done on the patient. See flow sheets.

## 2017-10-31 NOTE — PROGRESS NOTES
BMT Clinic Note      Patient ID:  Norman Real is a 57 yo man D+97 sp NMA allo sib PBSCT for AML       Diagnosis AMLU Acute myelogeneous leukemia, Unknown  HCT Type Allogeneic    Prep Regimen Cytoxan  Fludarabine  TBI   Donor Source Related PBSC    GVHD Prophylaxis   Mycophenolate  Primary BMT Provider Dr. Erna MD          INTERVAL  HISTORY      HPI: Here for follow-up and D100 BMbx. Reports occasional nausea, no vomiting, taking Ativan prn. He thought this was related to ALT but has been off for a while now and still having intermittent nausea. Normal stools. No rash, fevers, or cough. Dry eyes, L>R, Schirmer's test wnl last week. Mouth not dry but has occasional small sores/blisters on upper, inner lip. None today.      Review of Systems: 10 point ROS negative except as noted above.     PHYSICAL EXAM     VSS  General: NAD  Eyes: SAMSON, sclera anicteric   Nose/Mouth/Throat: op clear, buccal mucosa moist  Lungs: CTA bilaterally  Cardiovascular: RRR, no M/R/G   Abdominal/Rectal: +BS, soft, NT, ND, No HSM   Lymphatics: trace ankle edema bilaterally  Skin: no rashes or petechaie.    Neuro: A&O   Additional Findings: Cooper site NT, no drainage.     Labs:  Lab Results   Component Value Date    WBC 3.2 (L) 10/31/2017    ANEU 1.5 (L) 10/31/2017    HGB 10.9 (L) 10/31/2017    HCT 31.8 (L) 10/31/2017     10/31/2017     10/31/2017    POTASSIUM 3.8 10/31/2017    CHLORIDE 103 10/31/2017    CO2 27 10/31/2017     (H) 10/31/2017    BUN 18 10/31/2017    CR 1.28 (H) 10/31/2017    MAG 1.6 10/25/2017    INR 0.96 07/31/2017    BILITOTAL 0.5 10/31/2017    AST 14 10/31/2017    ALT 26 10/31/2017    ALKPHOS 90 10/31/2017    PROTTOTAL 7.2 10/31/2017    ALBUMIN 3.9 10/31/2017       ASSESSMENT BY SYSTEMS   Norman Real is a 57 yo man D+97 sp NMA allo sib PBSCT for AML        1. BMT: Transplant 7/27. Initial stem cell product only contained 2.33 CD34/kg, additional 0.66 CD34/kg on 7/27 for a total of  2.99.   -  D28 BMbx showed no increase in blasts.  98% donor BM (8/15/17); 90% donor CD3; 100% donor CD15.   - D100 BMbx (10/31), results pending.    2. AML: LKW220 maintenance treatment C1D1 9/27/17    3. HEME: Counts stable today. ANC 1.5. No bleeding, no transfusions needed.   - Keep Hgb>8 and plts>10K. No pre-meds.    4.  ID:  Afebrile. No active infections.    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  Improved CT 8/10. S/p Vfend, now on fluconazole prophy.   - proph HD ACV (CMV+, HSV+, EBV+). CMV & EBV negative 10/25.  - Cont to hold Bactrm due to lowish WBC count.  Pentamidine 10/25.    - flu shot 10/25    5.  GI:   - Mild, intermittent nausea: cont Ativan prn and monitor.  - Protonix for GI prophy.   - s/p left sided colectomy for recurrent diverticulitis.        6.  GVH: No aGVH. stopped MMF 8/25. Adjusted tacrolimus to 1.5 mg BID on 10/28 since Vfend d/c. Likely start tacro taper D100 (11/3).    7.  FEN/Renal: Creat stable.   - Hx of hypoMg. Cont MgOx 8 tabs daily.        8. CV: Adequate BP control on losartan 50 mg and diltiazem  mg.  - Hx tachycardia with arrhythmia, s/p 3 ablations  - hold crestor through transplant      9. Endo:  Hx DM type II. Per endocrine recs, DC Lantus & now on metformin. Endocrine f/u 11/2.        10. : Hx prostate cancer. No current issues.    11. Neuro: History of chronic insomnia. Ambien prn.      Plan:  BMbx today  Line out tomorrow  Endocrine f/u 11/2  RTC 11/3 for D100 review with Dr. Erna Shields, PA-C  [phone]

## 2017-10-31 NOTE — MR AVS SNAPSHOT
After Visit Summary   10/31/2017    Norman Real    MRN: [id number]           Patient Information     Date Of Birth          1960        Visit Information        Provider Department      10/31/2017 2:30 PM  BMT MIMA #3 [facility] Blood and Marrow Transplant        Today's Diagnoses     AML (acute myeloid leukemia) in remission (H)    -  1          [facility] ([facility])  [address] [place] [address]  Phone: [phone]  Clinic Hours:   Monday-Thursday:7am to 7pm   Friday: 7am to 5pm   Weekends and holidays:    8am to noon (in general)  If your fever is 100.5  or greater,   call the clinic.  After hours call the   hospital at [phone] or   [phone]. Ask for the BMT   fellow on-call            Follow-ups after your visit        Your next 10 appointments already scheduled     Oct 31, 2017  2:30 PM CDT   Return with  BMT MIMA #3   [facility] Blood and Marrow Transplant ([facility])    [address] [place] [address]  2nd [facility] [id number] [phone]            Nov 01, 2017  9:30 AM CDT   (Arrive by 9:15 AM)   IR CVC TUNNEL REMOVAL LEFT with LENAUS4,  IMAGING NURSE, LENA IMAGING PA   [facility] US ([facility])    [address] [place] [id number] [phone]           1. Your doctor will need to do a history and physical within 7 days before this procedure. 2. Your doctor will which medications should not be taken the morning of the exam. 3. Laboratory tests are to be obtained by your doctor prior to the exam (Basic Metabolic Panel, CBCP, PTT and INR) (No labs needed if you are having a tunneled catheter exchange or removal) 4. If you have allergies to x-ray contrast or iodine, contact your doctor or a Radiology nurse prior to the exam day for instructions. 5. Someone will need to drive you to and from the hospital. 6. If you are or may be pregnant, contact your doctor  or a Radiology nurse prior to the day of the exam. 7. If you have diabetes, check with your doctor or a Radiology nurse to see if your insulin needs to be adjusted for the exam. 8. If you are taking a medication called Glucophage or Glucovance; these medications need to be held the day of the exam and for approximately 48 hours following. A blood sample must be drawn so your creatinine level can be checked before resuming this medication. 9. If you are taking Coumadin (to thin you blood) please contact your doctor or a Radiology nurse at least 3 days before the exam for special instructions. 10. You should not have received contrast within 48 hours of this exam. 11. The day before your exam you may eat your regular diet and are encouraged to drink at least 2 quarts of clear liquids. Drink no alcoholic beverages for 24 hours prior to the exam. 12. If you have a colostomy you will need to irrigate it with tap water at 8PM the evening before and again at 6AM the morning of the exam. 13. Do not smoke for 24 hours prior to the procedure. 14. Birth to 4 years: - Breast feeding must be stopped 4 hours prior to exam - Solid food or formula must be stopped 6 hours prior to exam - Tube feedings must be stopped 6 hours prior to exam 15. 4-10 years old: - Nothing to eat or drink 6 hours prior to exam 16. 10+ years old: - Nothing to eat or drink 8 hours prior to exam 17. The morning of the exam you may brush your teeth and take medications as directed with a sip of water. 18. When discharged, you cannot drive until morning, and an adult must be with you until then. You should stay in the [facility] overnight. 19. Bring a list of all drugs you are taking; include supplements and over-the-counter medications. Wear comfortable clothes and leave your valuables at home.            Nov 02, 2017  8:10 AM CDT   (Arrive by 7:55 AM)   RETURN DIABETES with Gerald Weiss MD   [facility] ([facility])     [address] [facility]  3rd [facility] [address]   [phone]            Nov 03, 2017 10:30 AM CDT   BMT Anniversary Visit with Charanjit Umanzor MD   [facility] Blood and Marrow Transplant ([facility])    [address] [place] [id number]   [phone]            Nov 22, 2017 11:45 AM CST   [facility] with [facility] DRAW   [facility] ([facility])    [address] [place] [id number]   [phone]            Nov 22, 2017  1:00 PM CST   Return with UC BMT DOM   [facility] Blood and Marrow Transplant ([facility])    [address] [place] [id number]   [phone]            Nov 22, 2017  1:30 PM CST   Infusion 30 with UC BMT INFUSION, UC 5 ATC   [facility] Marrow [facility]plant ([facility])    [address] [place] [id number] [phone]              Who to contact     If you have questions or need follow up information about today's clinic visit or your schedule please contact [facility] B[facility]OD AND MARROW TRANSPLANT directly at [phone].  Normal or non-critical lab and imaging results will be communicated to you by [employer], letter or phone within 4 business days after the clinic has received the results. If you do not hear from us within 7 days, please contact the clinic through MyChart or phone. If you have a critical or abnormal lab result, we will notify you by phone as soon as possible.  Submit refill requests through [employer] or call your pharmacy and they will forward the refill request to us. Please allow 3 business days for your refill to be completed.          Additional Information About Your Visit        [employer] Information     [employer] lets you send messages to your doctor, view your test results, renew your prescriptions, schedule appointments and more.  "To sign up, go to [email or web address] . Click on \"Log in\" on the left side of the screen, which will take you to the Welcome page. Then click on \"Sign up Now\" on the right side of the page.     You will be asked to enter the access code listed below, as well as some personal information. Please follow the directions to create your username and password.     Your access code is: [email or web address]  Expires: 2017  3:48 PM     Your access code will  in 90 days. If you need help or a new code, please call your [facility] clinic or [phone].        Care EveryWhere ID     This is your Care EveryWhere ID. This could be used by other organizations to access your [facility] medical records  [id number]         Blood Pressure from Last 3 Encounters:   10/31/17 154/89   10/25/17 146/82   10/18/17 137/80    Weight from Last 3 Encounters:   10/31/17 89.6 kg (197 lb 9.6 oz)   10/25/17 89.8 kg (198 lb)   10/18/17 89.5 kg (197 lb 4.8 oz)              Today, you had the following     No orders found for display         Today's Medication Changes          These changes are accurate as of: 10/31/17  2:29 PM.  If you have any questions, ask your nurse or doctor.               These medicines have changed or have updated prescriptions.        Dose/Directions    magnesium oxide 400 MG tablet   Commonly known as:  MAG-OX   This may have changed:    - how much to take  - additional instructions   Used for:  AML (acute myeloid leukemia) in remission (H)        8 tabs daily   Quantity:  100 tablet   Refills:  1                Recent Review Flowsheet Data     BMT Recent Results Latest Ref Rng & Units 2017 2017 10/4/2017 10/11/2017 10/18/2017 10/25/2017 10/31/2017    WBC 4.0 - 11.0 10e9/L 3.4(L) 2.5(L) 3.1(L) 3.5(L) 3.3(L) 2.8(L) 3.2(L)    Hemoglobin 13.3 - 17.7 g/dL 11.0(L) 11.0(L) 10.6(L) 10.6(L) 11.1(L) 10.7(L) 10.9(L)    Platelet Count 150 - 450 10e9/L 145(L) 137(L) 158 182 193 182 163    Neutrophils (Absolute) 1.6 - " 8.3 10e9/L 1.8 1.2(L) 1.4(L) 2.0 1.5(L) 1.2(L) 1.5(L)    INR 0.86 - 1.14 - - - - - - -    Sodium 133 - 144 mmol/L 139 138 136 136 135 137 136    Potassium 3.4 - 5.3 mmol/L 4.0 4.0 4.1 4.1 4.2 4.3 3.8    Chloride 94 - 109 mmol/L 105 106 103 102 102 103 103    Glucose 70 - 99 mg/dL 152(H) 160(H) 172(H) 158(H) 159(H) 145(H) 152(H)    Urea Nitrogen 7 - 30 mg/dL 14 12 12 13 14 14 18    Creatinine 0.66 - 1.25 mg/dL 1.16 1.25 1.23 1.10 1.26(H) 1.21 1.28(H)    Calcium (Total) 8.5 - 10.1 mg/dL 8.6 8.7 8.6 9.0 9.0 8.8 8.4(L)    Protein (Total) 6.8 - 8.8 g/dL 7.1 - 7.2 7.3 7.4 7.3 7.2    Albumin 3.4 - 5.0 g/dL 3.7 - 3.7 3.4 3.8 3.8 3.9    Bilirubin (Direct) 0.0 - 0.2 mg/dL - - - - - - -    Alkaline Phosphatase 40 - 150 U/L 80 - 90 90 100 92 90    AST 0 - 45 U/L 15 - 20 16 22 16 14    ALT 0 - 70 U/L 22 - 24 22 27 22 26    MCV 78 - 100 fl 91 92 92 91 92 91 92               Primary Care Provider    Physician No Ref-Primary       NO REF-PRIMARY PHYSICIAN        Equal Access to Services     [facility] AH: Hadii aad ku hadsimoneo Sobrenda, waaxda luqadaha, qaybta kaalmada adeegyada, vishal stephensn darinel lópez. So [facility] [phone].    ATENCIÓN: Si habla español, tiene a gusman disposición servicios gratuitos de asistencia lingüística. Llame al [phone].    We comply with applicable federal civil rights laws and [place] laws. We do not discriminate on the basis of race, color, national origin, age, disability, sex, sexual orientation, or gender identity.            Thank you!     Thank you for choosing [facility] BLOOD AND MARROW TRANSPLANT  for your care. Our goal is always to provide you with excellent care. Hearing back from our patients is one way we can continue to improve our services. Please take a few minutes to complete the written survey that you may receive in the mail after your visit with us. Thank you!             Your Updated Medication List - Protect others around you: Learn how to safely use, store and  throw away your medicines at www.disposemymeds.org.          This list is accurate as of: 10/31/17  2:29 PM.  Always use your most recent med list.                   Brand Name Dispense Instructions for use Diagnosis    acyclovir 800 MG tablet    ZOVIRAX    150 tablet    Take 1 tablet (800 mg) by mouth 2 times daily    Stem cell transplant candidate, Acute myeloid leukemia in remission (H), History of peripheral stem cell transplant (H), Aspergillosis (H)       cholecalciferol 1000 UNITS capsule    vitamin  -D     Take 1 capsule by mouth daily        cyclobenzaprine 5 MG tablet    FLEXERIL     Take 1 tablet (5 mg) by mouth 3 times daily as needed for muscle spasms        diltiazem 360 MG 24 hr CD capsule    CARDIZEM CD; CARTIA XT    90 capsule    Take 1 capsule (360 mg) by mouth daily    AML (acute myeloid leukemia) in remission (H)       FLONASE NA      Spray in nostril as needed        fluconazole 100 MG tablet    DIFLUCAN    30 tablet    Take 1 tablet (100 mg) by mouth daily    Stem cell transplant candidate       guaiFENesin 600 MG 12 hr tablet    MUCINEX     Take 600 mg by mouth        heparin lock flush 10 UNIT/ML Soln injection     30 vial    5 mLs by Intracatheter route daily In each lumen    AML (acute myeloid leukemia) in remission (H)       LORazepam 0.5 MG tablet    ATIVAN    40 tablet    Take 1-2 tablets (0.5-1 mg) by mouth every 4 hours as needed for anxiety (nausea/vomiting/sleep)    Stem cell transplant candidate       losartan 50 MG tablet    COZAAR    30 tablet    Take 1 tablet (50 mg) by mouth daily    AML (acute myeloid leukemia) in remission (H)       magnesium oxide 400 MG tablet    MAG-OX    100 tablet    8 tabs daily    AML (acute myeloid leukemia) in remission (H)       metFORMIN 500 MG 24 hr tablet    GLUCOPHAGE-XR    60 tablet    Take 2 tablets (1,000 mg) by mouth daily (with dinner)    Type 2 diabetes mellitus without complication, without long-term current use of insulin (H)       MULTI  COMPLETE PO           ondansetron 8 MG tablet    ZOFRAN    30 tablet    Take 1 tablet (8 mg) by mouth every 8 hours as needed for nausea    Stem cell transplant candidate       pantoprazole 40 MG EC tablet    PROTONIX    90 tablet    Take 1 tablet (40 mg) by mouth daily    Stem cell transplant candidate       pravastatin 20 MG tablet    PRAVACHOL    30 tablet    Take 1 tablet (20 mg) by mouth daily    Type 2 diabetes mellitus without complication, without long-term current use of insulin (H)       psyllium Packet    METAMUCIL/KONSYL    90 packet    Take 1 packet by mouth 3 times daily    AML (acute myeloid leukemia) in remission (H)       sulfamethoxazole-trimethoprim 800-160 MG per tablet    BACTRIM DS/SEPTRA DS    30 tablet    Clinic will tell you when to start 1 tablet twice daily by mouth on Mondays and Tuesdays, start around day +28    Stem cell transplant candidate       tacrolimus 0.5 MG capsule    GENERIC EQUIVALENT    56 capsule    Take 2 capsules (1 mg) by mouth 2 times daily    Acute myeloid leukemia in remission (H)       triamcinolone 0.1 % cream    KENALOG    80 g    Apply sparingly to affected area three times daily as needed    AML (acute myeloid leukemia) in remission (H)       voriconazole 200 MG tablet    VFEND    60 tablet    Take 1 tablet (200 mg) by mouth 2 times daily    Stem cell transplant candidate       zolpidem 5 MG tablet    AMBIEN    45 tablet    Take 1-2 tablets (5-10 mg) by mouth nightly as needed for sleep    AML (acute myeloid leukemia) in remission (H)

## 2017-10-31 NOTE — MR AVS SNAPSHOT
After Visit Summary   10/31/2017    Norman Real    MRN: [id number]           Patient Information     Date Of Birth          1960        Visit Information        Provider Department      10/31/2017 1:00 PM UU BONE MARROW BIOPSY;  BMT MIMA #3 [facility] Blood and Marrow Transplant        Today's Diagnoses     Stem cell transplant candidate    -  1          [facility] ([facility])  [address] [place] [address]  Phone: [phone]  Clinic Hours:   Monday-Thursday:7am to 7pm   Friday: 7am to 5pm   Weekends and holidays:    8am to noon (in general)  If your fever is 100.5  or greater,   call the clinic.  After hours call the   hospital at [phone] or   [phone]. Ask for the BMT   fellow on-call            Follow-ups after your visit        Your next 10 appointments already scheduled     Oct 31, 2017  2:30 PM CDT   Return with  BMT MIMA #3   [facility] Blood and Marrow Transplant ([facility])    [address] [place] [address]  2nd [facility] [id number] [phone]            Nov 01, 2017  9:30 AM CDT   (Arrive by 9:15 AM)   IR CVC TUNNEL REMOVAL LEFT with LENAUS4,  IMAGING NURSE, UC IMAGING PA   [facility] US ([facility])    [address] [place] [id number] [phone]           1. Your doctor will need to do a history and physical within 7 days before this procedure. 2. Your doctor will which medications should not be taken the morning of the exam. 3. Laboratory tests are to be obtained by your doctor prior to the exam (Basic Metabolic Panel, CBCP, PTT and INR) (No labs needed if you are having a tunneled catheter exchange or removal) 4. If you have allergies to x-ray contrast or iodine, contact your doctor or a Radiology nurse prior to the exam day for instructions. 5. Someone will need to drive you to and from the hospital. 6. If you are or may be pregnant, contact  your doctor or a Radiology nurse prior to the day of the exam. 7. If you have diabetes, check with your doctor or a Radiology nurse to see if your insulin needs to be adjusted for the exam. 8. If you are taking a medication called Glucophage or Glucovance; these medications need to be held the day of the exam and for approximately 48 hours following. A blood sample must be drawn so your creatinine level can be checked before resuming this medication. 9. If you are taking Coumadin (to thin you blood) please contact your doctor or a Radiology nurse at least 3 days before the exam for special instructions. 10. You should not have received contrast within 48 hours of this exam. 11. The day before your exam you may eat your regular diet and are encouraged to drink at least 2 quarts of clear liquids. Drink no alcoholic beverages for 24 hours prior to the exam. 12. If you have a colostomy you will need to irrigate it with tap water at 8PM the evening before and again at 6AM the morning of the exam. 13. Do not smoke for 24 hours prior to the procedure. 14. Birth to 4 years: - Breast feeding must be stopped 4 hours prior to exam - Solid food or formula must be stopped 6 hours prior to exam - Tube feedings must be stopped 6 hours prior to exam 15. 4-10 years old: - Nothing to eat or drink 6 hours prior to exam 16. 10+ years old: - Nothing to eat or drink 8 hours prior to exam 17. The morning of the exam you may brush your teeth and take medications as directed with a sip of water. 18. When discharged, you cannot drive until morning, and an adult must be with you until then. You should stay in the [facility] overnight. 19. Bring a list of all drugs you are taking; include supplements and over-the-counter medications. Wear comfortable clothes and leave your valuables at home.            Nov 02, 2017  8:10 AM CDT   (Arrive by 7:55 AM)   RETURN DIABETES with Gerald Weiss MD   [facility])    [address] [place] [address]  3rd [facility] [address]   [phone]            Nov 03, 2017 10:30 AM CDT   BMT Anniversary Visit with Charanjit Umanzor MD   [facility] Blood and Marrow Transplant ([facility])    [address] [place] [id number]   [phone]            Nov 22, 2017 11:45 AM CST   Masonic Lab Draw with UC MASONIC LAB DRAW   [facility] Masonic Lab Draw ([facility])    [address] [place] [id number]   [phone]            Nov 22, 2017  1:00 PM CST   Return with UC BMT DOM   [facility] Blood and Marrow Transplant ([facility])    [address] [place] [id number]   [phone]            Nov 22, 2017  1:30 PM CST   Infusion 30 with UC BMT INFUSION, UC 5 ATC   [facility] Blood and Marrow Transplant ([facility])    [address] [place] [id number] [phone]              Who to contact     If you have questions or need follow up information about today's clinic visit or your schedule please contact [facility] BLOOD AND MARROW TRANSPLANT directly at [phone].  Normal or non-critical lab and imaging results will be communicated to you by Zingk[employer]art, letter or phone within 4 business days after the clinic has received the results. If you do not hear from us within 7 days, please contact the clinic through Zingkuhart or phone. If you have a critical or abnormal lab result, we will notify you by phone as soon as possible.  Submit refill requests through [employer] or call your pharmacy and they will forward the refill request to us. Please allow 3 business days for your refill to be completed.          Additional Information About Your Visit        [employer] Information     [employer] lets you send messages to your doctor, view your test results, renew your prescriptions, schedule  "appointments and more. To sign up, go to [email or web address] . Click on \"Log in\" on the left side of the screen, which will take you to the Welcome page. Then click on \"Sign up Now\" on the right side of the page.     You will be asked to enter the access code listed below, as well as some personal information. Please follow the directions to create your username and password.     Your access code is: [email or web address]  Expires: 2017  3:48 PM     Your access code will  in 90 days. If you need help or a new code, please call your [facility] clinic or [phone].        Care EveryWhere ID     This is your Care EveryWhere ID. This could be used by other organizations to access your [facility] medical records  [id number]        Your Vitals Were     Pulse Temperature Respirations Pulse Oximetry BMI (Body Mass Index)       75 97.4  F (36.3  C) (Oral) 16 97% 30.05 kg/m2        Blood Pressure from Last 3 Encounters:   10/31/17 154/89   10/25/17 146/82   10/18/17 137/80    Weight from Last 3 Encounters:   10/31/17 89.6 kg (197 lb 9.6 oz)   10/25/17 89.8 kg (198 lb)   10/18/17 89.5 kg (197 lb 4.8 oz)              We Performed the Following     Bone Marrow Aspirate (Charge)     Bone Marrow Biopsy (Charge)     CBC with platelets differential     CHROMOSOME BONE MARROW With Professional Interpretation     CMV DNA quantification     Comprehensive metabolic panel     DNA Marker Post Bmt Engraftment Bone Marrow     FISH With Professional Interpretation     Immunoglobulins A G and M     Leukemia Lymphoma Evaluation     Tacrolimus level          Today's Medication Changes          These changes are accurate as of: 10/31/17  2:07 PM.  If you have any questions, ask your nurse or doctor.               These medicines have changed or have updated prescriptions.        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)        8 tabs daily   Quantity:  100 tablet   Refills:  1                Recent Review Flowsheet Data     BMT Recent Results Latest Ref Rng & Units 9/27/2017 9/29/2017 10/4/2017 10/11/2017 10/18/2017 10/25/2017 10/31/2017    WBC 4.0 - 11.0 10e9/L 3.4(L) 2.5(L) 3.1(L) 3.5(L) 3.3(L) 2.8(L) 3.2(L)    Hemoglobin 13.3 - 17.7 g/dL 11.0(L) 11.0(L) 10.6(L) 10.6(L) 11.1(L) 10.7(L) 10.9(L)    Platelet Count 150 - 450 10e9/L 145(L) 137(L) 158 182 193 182 163    Neutrophils (Absolute) 1.6 - 8.3 10e9/L 1.8 1.2(L) 1.4(L) 2.0 1.5(L) 1.2(L) 1.5(L)    INR 0.86 - 1.14 - - - - - - -    Sodium 133 - 144 mmol/L 139 138 136 136 135 137 136    Potassium 3.4 - 5.3 mmol/L 4.0 4.0 4.1 4.1 4.2 4.3 3.8    Chloride 94 - 109 mmol/L 105 106 103 102 102 103 103    Glucose 70 - 99 mg/dL 152(H) 160(H) 172(H) 158(H) 159(H) 145(H) 152(H)    Urea Nitrogen 7 - 30 mg/dL 14 12 12 13 14 14 18    Creatinine 0.66 - 1.25 mg/dL 1.16 1.25 1.23 1.10 1.26(H) 1.21 1.28(H)    Calcium (Total) 8.5 - 10.1 mg/dL 8.6 8.7 8.6 9.0 9.0 8.8 8.4(L)    Protein (Total) 6.8 - 8.8 g/dL 7.1 - 7.2 7.3 7.4 7.3 7.2    Albumin 3.4 - 5.0 g/dL 3.7 - 3.7 3.4 3.8 3.8 3.9    Bilirubin (Direct) 0.0 - 0.2 mg/dL - - - - - - -    Alkaline Phosphatase 40 - 150 U/L 80 - 90 90 100 92 90    AST 0 - 45 U/L 15 - 20 16 22 16 14    ALT 0 - 70 U/L 22 - 24 22 27 22 26    MCV 78 - 100 fl 91 92 92 91 92 91 92               Primary Care Provider    Physician No Ref-Primary       NO REF-PRIMARY PHYSICIAN        Equal Access to Services     ASHLYN FLANAGAN : Hadii aad ku hadasho Sobrenda, waaxda luqadaha, qaybta kaalmada adeegyatimmy, vishal lópez. So [facility] [phone].    ATENCIÓN: Si habla español, tiene a gusman disposición servicios gratuitos de asistencia lingüística. Leyla al [phone].    We comply with applicable federal civil rights laws and [place] laws. We do not discriminate on the basis of race, color, national origin,  age, disability, sex, sexual orientation, or gender identity.            Thank you!     Thank you for choosing [facility] BLOOD AND MARROW TRANSPLANT  for your care. Our goal is always to provide you with excellent care. Hearing back from our patients is one way we can continue to improve our services. Please take a few minutes to complete the written survey that you may receive in the mail after your visit with us. Thank you!             Your Updated Medication List - Protect others around you: Learn how to safely use, store and throw away your medicines at www.disposemymeds.org.          This list is accurate as of: 10/31/17  2:07 PM.  Always use your most recent med list.                   Brand Name Dispense Instructions for use Diagnosis    acyclovir 800 MG tablet    ZOVIRAX    150 tablet    Take 1 tablet (800 mg) by mouth 2 times daily    Stem cell transplant candidate, Acute myeloid leukemia in remission (H), History of peripheral stem cell transplant (H), Aspergillosis (H)       cholecalciferol 1000 UNITS capsule    vitamin  -D     Take 1 capsule by mouth daily        cyclobenzaprine 5 MG tablet    FLEXERIL     Take 1 tablet (5 mg) by mouth 3 times daily as needed for muscle spasms        diltiazem 360 MG 24 hr CD capsule    CARDIZEM CD; CARTIA XT    90 capsule    Take 1 capsule (360 mg) by mouth daily    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)       FLONASE NA      Spray in nostril as needed        fluconazole 100 MG tablet    DIFLUCAN    30 tablet    Take 1 tablet (100 mg) by mouth daily    Stem cell transplant candidate       guaiFENesin 600 MG 12 hr tablet    MUCINEX     Take 600 mg by mouth        heparin lock flush 10 UNIT/ML Soln injection     30 vial    5 mLs by Intracatheter route daily In each lumen    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)       LORazepam 0.5 MG tablet    ATIVAN    40 tablet    Take 1-2 tablets (0.5-1 mg) by mouth every 4 hours as needed for anxiety  (nausea/vomiting/sleep)    Stem cell transplant candidate       losartan 50 MG tablet    COZAAR    30 tablet    Take 1 tablet (50 mg) by mouth daily    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)       magnesium oxide 400 MG tablet    MAG-OX    100 tablet    8 tabs daily    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)       metFORMIN 500 MG 24 hr tablet    GLUCOPHAGE-XR    60 tablet    Take 2 tablets (1,000 mg) by mouth daily (with dinner)    Type 2 diabetes mellitus without complication, without long-term current use of insulin (H)       MULTI COMPLETE PO           ondansetron 8 MG tablet    ZOFRAN    30 tablet    Take 1 tablet (8 mg) by mouth every 8 hours as needed for nausea    Stem cell transplant candidate       pantoprazole 40 MG EC tablet    PROTONIX    90 tablet    Take 1 tablet (40 mg) by mouth daily    Stem cell transplant candidate       pravastatin 20 MG tablet    PRAVACHOL    30 tablet    Take 1 tablet (20 mg) by mouth daily    Type 2 diabetes mellitus without complication, without long-term current use of insulin (H)       psyllium Packet    METAMUCIL/KONSYL    90 packet    Take 1 packet by mouth 3 times daily    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)       sulfamethoxazole-trimethoprim 800-160 MG per tablet    BACTRIM DS/SEPTRA DS    30 tablet    Clinic will tell you when to start 1 tablet twice daily by mouth on Mondays and Tuesdays, start around day +28    Stem cell transplant candidate       tacrolimus 0.5 MG capsule    GENERIC EQUIVALENT    56 capsule    Take 2 capsules (1 mg) by mouth 2 times daily    Acute myeloid leukemia in remission (H)       triamcinolone 0.1 % cream    KENALOG    80 g    Apply sparingly to affected area three times daily as needed    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

## 2017-10-31 NOTE — PROGRESS NOTES
BMT ONC Adult Bone Marrow Biopsy Procedure Note  October 31, 2017  There were no vitals taken for this visit.     Learning needs assessment complete within 12 months? YES    DIAGNOSIS: AML D+96 s/p allo sib PBSCT      PROCEDURE: Unilateral Bone Marrow Biopsy and Unilateral Aspirate    [place] [facility] [place]    Patient s identification was positively verified by verbal identification and invasive procedure safety checklist was completed. Informed consent was obtained. Following the administration of Midazolam 2mg IV as pre-medication, patient was placed in the prone position and prepped and draped in a sterile manner. Approximately 15 cc of 1% Lidocaine was used over the left posterior iliac spine. Following this a 3 mm incision was made. Trephine bone marrow core(s) was (were) obtained from the [facility]. Bone marrow aspirates were obtained from the IC. Aspirates were sent for morphology, immunophenotyping, cytogenetics and molecular diagnostics RFLP. A total of approximately 18 ml of marrow was aspirated. Following this procedure a sterile dressing was applied to the bone marrow biopsy site(s). The patient was placed in the supine position to maintain pressure on the biopsy site. Post-procedure wound care instructions were given.     Complications: NO    Pre-procedural pain: 0 out of 10 on the numeric pain rating scale.     Procedural pain: 7 out of 10 on the numeric pain rating scale.     Post-procedural pain assessment: 0 out of 10 on the numeric pain rating scale.     Interventions: YES; repositioned trephine needle    Length of procedure:20 minutes or less      Procedure performed by: Lisa Shields PA-C  [phone]

## 2017-10-31 NOTE — PROGRESS NOTES
BMT Teaching Flowsheet   Teaching Topic: post biopsy instructions    Person(s) involved in teaching: Patient  Motivation Level: High  Asks Questions: Yes  Eager to Learn: Yes  Cooperative: Yes  Receptive (willing/able to accept information): Yes    Patient demonstrates understanding of the following:   - Reason for the appointment, diagnosis and treatment plan: Yes  - Knowledge of proper use of medications and conditions for which they are ordered (with special attention to potential side effects or drug interactions): Yes  - Which situations necessitate calling provider and whom to contact: Yes    Teaching concerns addressed: reviewed activity restrictions if received premeds, potential for bleeding and actions to take if develops any of the issues below    Proper use and care of (medical equipment, care aids, etc.) Yes  Pain management techniques: Yes  Patient instructed on hand hygiene: Yes  How and/when to access community resources: Yes    Infection Control:  Patient demonstrates understanding of the following:   Surgical procedure site care taught NA  Signs and symptoms of infection taught Yes  Wound care taught Yes  Central venous catheter care taught NA    Instructional Materials Used/Given: verbal, print out of post biopsy instructions.     Time spent with patient: 5 minutes.    Specific Concerns: NA

## 2017-10-31 NOTE — NURSING NOTE
Patient arrives alert and oriented for bone marrow biopsy.  Wife at bedside as caregiver and .  Administered 2mg versed after consents were obtained.  Patient supine for 30 minutes following biopsy, both side rails up and call light within reach. After 30 minutes, dressing clean, dry and intact. Vital signs stable. See DOC flow sheets for details. Left ambulatory with family member, steady on his feet, alert and oriented.  Patient and wife are able to verbalize post biopsy care instructions.

## 2017-11-01 NOTE — PHARMACY-TAPERING SERVICE
Tacrolimus taper once approved by Dr. Umanzor    Saúl Monday Tuesday Wednesday Thursday Friday Saturday   29-Oct-2017 30-Oct-2017 31-Oct-2017 1-Nov-2017 2-Nov-2017 3-Nov-2017 4-Nov-2017   1.5 mg AM            1.5 mg PM 1.5 mg AM            1.5 mg PM 1.5 mg AM            1.5 mg PM 1.5 mg AM            1.5 mg PM 1.5 mg AM            1.5 mg PM 1.5 mg AM            1 mg PM 1.5 mg AM            1 mg PM   5-Nov-2017 6-Nov-2017 7-Nov-2017 8-Nov-2017 9-Nov-2017 10-Nov-2017 11-Nov-2017   1.5 mg AM            1 mg PM 1.5 mg AM            1 mg PM 1.5 mg AM            1 mg PM 1.5 mg AM            1 mg PM 1.5 mg AM            1 mg PM 1 mg AM            1 mg PM 1 mg AM            1 mg PM   12-Nov-2017 13-Nov-2017 14-Nov-2017 15-Nov-2017 16-Nov-2017 17-Nov-2017 18-Nov-2017   1 mg AM               1 mg PM 1 mg AM               1 mg PM 1 mg AM               1 mg PM 1 mg AM               1 mg PM 1 mg AM               1 mg PM 1 mg AM            0.5 mg PM 1 mg AM            0.5 mg PM   19-Nov-2017 20-Nov-2017 21-Nov-2017 22-Nov-2017 23-Nov-2017 24-Nov-2017 25-Nov-2017   1 mg AM            0.5 mg PM 1 mg AM            0.5 mg PM 1 mg AM            0.5 mg PM 1 mg AM            0.5 mg PM 1 mg AM            0.5 mg PM 0.5 mg AM            0.5 mg PM 0.5 mg AM            0.5 mg PM   26-Nov-2017 27-Nov-2017 28-Nov-2017 29-Nov-2017 30-Nov-2017 1-Dec-2017 2-Dec-2017   0.5 mg AM            0.5 mg PM 0.5 mg AM            0.5 mg PM 0.5 mg AM            0.5 mg PM 0.5 mg AM            0.5 mg PM 0.5 mg AM            0.5 mg PM 0.4 mg AM            0.4 mg PM 0.4 mg AM            0.4 mg PM   3-Dec-2017 4-Dec-2017 5-Dec-2017 6-Dec-2017 7-Dec-2017 8-Dec-2017 9-Dec-2017   0.4 mg AM            0.4 mg PM 0.4 mg AM            0.4 mg PM 0.4 mg AM            0.4 mg PM 0.4 mg AM            0.4 mg PM 0.4 mg AM            0.4 mg PM 0.3 mg AM            0.3 mg PM 0.3 mg AM            0.3 mg PM   10-Dec-2017 11-Dec-2017 12-Dec-2017 13-Dec-2017 14-Dec-2017  15-Dec-2017 16-Dec-2017   0.3 mg AM            0.3 mg PM 0.3 mg AM            0.3 mg PM 0.3 mg AM            0.3 mg PM 0.3 mg AM            0.3 mg PM 0.3 mg AM            0.3 mg PM 0.2 mg AM            0.2 mg PM 0.2 mg AM            0.2 mg PM   17-Dec-2017 18-Dec-2017 19-Dec-2017 20-Dec-2017 21-Dec-2017 22-Dec-2017 23-Dec-2017   0.2 mg AM            0.2 mg PM 0.2 mg AM            0.2 mg PM 0.2 mg AM            0.2 mg PM 0.2 mg AM            0.2 mg PM 0.2 mg AM            0.2 mg PM 0.1mg AM            0.1 mg PM 0.1mg AM            0.1 mg PM   24-Dec-2017 25-Dec-2017 26-Dec-2017 27-Dec-2017 28-Dec-2017 29-Dec-2017 30-Dec-2017   0.1mg AM            0.1 mg PM 0.1mg AM            0.1 mg PM 0.1mg AM            0.1 mg PM 0.1mg AM            0.1 mg PM 0.1mg AM            0.1 mg PM 0.1mg AM             0.1mg AM               31-Dec-2017 1-Akash-2018 2-Akash-2018 3-Akash-2018 4-Akash-2018 5-Akash-2018 6-Akash-2018   0.1mg AM             0.1mg AM             0.1mg AM             0.1mg AM             0.1mg AM             STOP

## 2017-11-02 NOTE — LETTER
11/2/2017       RE: Norman Real  PO   [facility] [address]     Dear Colleague,    Thank you for referring your patient, Norman Real, to the [facility] ENDOCRINOLOGY at [facility]. Please see a copy of my visit note below.    Endocrinology and Diabetes Clinic Follow-up   Date of Service: November 2, 2017    Reason for follow-up:type II diabetes    Type II Diabetes Mellitus - Glucoses appear well controlled with metformin 1000 mg daily. Has occasional nausea but is tolerable and not sure of cause. Thinks it may be related to the study injections he's getting. I told him it would be fine to divide doses and eat with food if worried about developing nausea. Regarding the statin, he would like to change to every other day dosing because of recent body/joint aches. I explained in detail we don't have a lot of evidence to support every other day dosing but taking a statin is better than not taking it. In the future, he could increased to daily dosing if joint aches/pains resolve or are found to be related to something else.      Plan:   1) metformin 1000 mg daily (can be in divided doses)   -consider increasing to 2000 mg daily if tolerates from GI perspective  2) continue pravastatin 20 mg at bedtime (ok to take every other day but discuss with PCP)   3) moving back home so no RTC planned, f/u with PCP  4) if glucose control an issue, would consider addition of low dose glipizide  5) as always, recommend diet and exercise as primary means of glucose management  5) recommend PCP check Hgb A1c in 1/2018    Subjective:   Norman Real is a 56 year old male with a past medical history of AML s/p transplant and now in remission, PVD, and HTN who presents for follow-up evaluation of type II diabetes. He was previously on 7 Units of Lantus daily so this was discontinued and he was started on metformin 1000 mg daily.     He will be returning to his home [place] in [facility]  in 1 days (had to be here for monitoring for 100 days after bone marrow transplant). Plans to follow-up with his PCP regarding diabetes. Of note, he was started on insulin during his bone marrow transplant 3 months ago due to hyperglycemia. He was on steroids at that time. Prior to admission, he was controlled only with metformin.     Denies hypoglycemic episodes. Reviewing glucometer data, his fasting sugars range between 100-150, occasionally he will have a post-meal excursion with a blood sugar up to 2015. A1c was 5.7% last month but this may be slightly inaccurate with recent bone marrow transplant. Denies any diabetes sequela.     He has had mild nausea which he thinks may be associated with the shots he has received for his leukemia. Wondering when he should be taking the metformin and if he can split the dose. Also, has noticed mild joint aches and pains particularly his shoulders and is wondering if he can switch the pravastatin to every other day dosing. Admits that since diagnosis and treatment of AML, he has never really felt normal so doesn't know if his aches are related to AML or statin vs other.     Complications:  Retinopathy - last eye exam 2017, no retinopathy  Neuropathy - monofilament testing normal  Nephropathy - no history of kidney disease  Statin - stopped during hospitalization  Aspirin - none  Skin - no skin abnormalities       Current Problem List:   Patient Active Problem List   Diagnosis     AML (acute myelogenous leukemia) (H)     Aspergillosis (H)     Stem cell transplant candidate     Allergic rhinitis     Anomalous atrioventricular excitation     Type 2 diabetes mellitus without complication, without long-term current use of insulin (H)     Diverticulitis of intestine     Dyslipidemia     Impotence of organic origin     History of malignant neoplasm of prostate     History of urethral stricture     Hypertension     Paroxysmal supraventricular tachycardia (H)     Peripheral vascular  disease (H)     Seborrheic eczema     Sleep disorder     Tobacco dependence in remission     AML (acute myeloid leukemia) (H)     AML (acute myeloid leukemia) in remission (H)     History of peripheral stem cell transplant (H)       Past Medical and Past Surgical History:  Past Medical History:   Diagnosis Date     AML (acute myeloid leukemia) in remission (H) 05/26/2017     Cholelithiasis 06/26/2017     Diabetes (H)      Hypertension      Prostate cancer (H) 2011    had radiatoin therapy     Tachycardia        Past Surgical History:   Procedure Laterality Date     CARDIAC SURGERY      ablation       Medications:   Current Outpatient Prescriptions   Medication Sig Dispense Refill     LORazepam (ATIVAN) 0.5 MG tablet Take 1-2 tablets (0.5-1 mg) by mouth every 4 hours as needed for anxiety (nausea/vomiting/sleep) 40 tablet 0     acyclovir (ZOVIRAX) 800 MG tablet Take 1 tablet (800 mg) by mouth 2 times daily 150 tablet 11     fluconazole (DIFLUCAN) 100 MG tablet Take 1 tablet (100 mg) by mouth daily 30 tablet 11     triamcinolone (KENALOG) 0.1 % cream Apply sparingly to affected area three times daily as needed 80 g 1     metFORMIN (GLUCOPHAGE-XR) 500 MG 24 hr tablet Take 2 tablets (1,000 mg) by mouth daily (with dinner) 60 tablet 11     pravastatin (PRAVACHOL) 20 MG tablet Take 1 tablet (20 mg) by mouth daily 30 tablet 11     diltiazem (CARDIZEM CD; CARTIA XT) 360 MG 24 hr CD capsule Take 1 capsule (360 mg) by mouth daily 90 capsule 1     magnesium oxide (MAG-OX) 400 MG tablet 8 tabs daily (Patient taking differently: 500 mg 8 tabs daily) 100 tablet 1     pantoprazole (PROTONIX) 40 MG EC tablet Take 1 tablet (40 mg) by mouth daily 90 tablet 1     voriconazole (VFEND) 200 MG tablet Take 1 tablet (200 mg) by mouth 2 times daily 60 tablet 1     losartan (COZAAR) 50 MG tablet Take 1 tablet (50 mg) by mouth daily 30 tablet 1     ondansetron (ZOFRAN) 8 MG tablet Take 1 tablet (8 mg) by mouth every 8 hours as needed for  "nausea 30 tablet 1     psyllium (METAMUCIL/KONSYL) Packet Take 1 packet by mouth 3 times daily 90 packet 1     sulfamethoxazole-trimethoprim (BACTRIM DS/SEPTRA DS) 800-160 MG per tablet Clinic will tell you when to start 1 tablet twice daily by mouth on Mondays and Tuesdays, start around day +28 30 tablet 1     zolpidem (AMBIEN) 5 MG tablet Take 1-2 tablets (5-10 mg) by mouth nightly as needed for sleep 45 tablet 0     cyclobenzaprine (FLEXERIL) 5 MG tablet Take 1 tablet (5 mg) by mouth 3 times daily as needed for muscle spasms       tacrolimus (PROGRAF - GENERIC EQUIVALENT) 0.5 MG capsule Take 2 capsules (1 mg) by mouth 2 times daily 56 capsule 0     heparin lock flush 10 UNIT/ML SOLN injection 5 mLs by Intracatheter route daily In each lumen 30 vial 3     guaiFENesin (MUCINEX) 600 MG 12 hr tablet Take 600 mg by mouth       Fluticasone Propionate (FLONASE NA) Spray in nostril as needed        Multiple Vitamins-Minerals (MULTI COMPLETE PO)        cholecalciferol (VITAMIN  -D) 1000 UNITS capsule Take 1 capsule by mouth daily          Allergies:   Allergies   Allergen Reactions     Ace Inhibitors Anaphylaxis, Cough and Hives     Terazosin Swelling     Other reaction(s): Sedation/Sleepy       Social History:  Social History   Substance Use Topics     Smoking status: Former Smoker     Quit date: 5/31/2010     Smokeless tobacco: Not on file     Alcohol use No         Family History:  Family History   Problem Relation Age of Onset     Chronic Obstructive Pulmonary Disease Mother        Review of Systems:  A 10-point ROS is otherwise negative except as noted in HPI.     Physical Examination:  Blood pressure 139/90, pulse 74, height 1.727 m (5' 8\"), weight 89.4 kg (197 lb).  Body mass index is 29.95 kg/(m^2).  Wt Readings from Last 4 Encounters:   11/02/17 89.4 kg (197 lb)   10/31/17 89.6 kg (197 lb 9.6 oz)   10/25/17 89.8 kg (198 lb)   10/18/17 89.5 kg (197 lb 4.8 oz)     GEN: Awake, alert, no distress.  HEENT: EOMI.   "   SKIN: No lesions.    MSK: Normal muscle bulk.  NEURO:No tremor.     Labs and Studies:   Results for orders placed or performed in visit on 11/01/17   INR point of care   Result Value Ref Range    INR Point of Care 0.9 0.86 - 1.14        Gerald Weiss MD (PGY-4)  Diabetes and Endocrinology Fellow  [facility]  Pager: [phone]    Norman seen and examined with Dr. Weiss. His note reflects our joint assessment and plan.    Devang Velazquez MD  Endocrinology and Diabetes  [phone]    Again, thank you for allowing me to participate in the care of your patient.      Sincerely,    Gerald Weiss MD

## 2017-11-02 NOTE — MR AVS SNAPSHOT
After Visit Summary   11/2/2017    Norman Real    MRN: [id number]           Patient Information     Date Of Birth          1960        Visit Information        Provider Department      11/2/2017 8:10 AM Gerald Weiss MD [facility] Endocrinology         Follow-ups after your visit        Your next 10 appointments already scheduled     Nov 03, 2017 10:30 AM CDT   BMT Anniversary Visit with Charanjit Umanzor MD   [facility] Blood and Marrow Transplant ([facility])    [address] [place] [id number]   [phone]            Nov 22, 2017 11:45 AM CST   [facility] Draw with [facility] DRAW   [facility] Draw ([facility])    [address] [place] [address]   [phone]            Nov 22, 2017  1:00 PM CST   Return with UC BMT DOM   [facility] Blood and Marrow Transplant ([facility])    [address] [place] [address]   [phone]            Nov 22, 2017  1:30 PM CST   Infusion 30 with UC BMT INFUSION, UC 5 ATC   [facility] Blood and Marrow Transplant ([facility])    [address] [place] [address]   [phone]            Nov 29, 2017 12:00 PM CST   [facility] Lab Draw with [facility] DRAW   [facility] Draw ([facility])    [address] [place] [address]   [phone]            Nov 29, 2017 12:30 PM CST   Return with UC BMT MIMA #4   [facility] Blood and Marrow Transplant ([facility])    [address] [place] [address]   [phone]            Nov 29, 2017  1:00 PM CST   Infusion 30 with UC BMT INFUSION, UC 2 ATC   [facility] Blood and Marrow Transplant ([facility])    [address] [place] [address]  "  [phone]            Dec 06, 2017 12:00 PM [facility]   [facility] Lab Draw with  [facility] DRAW   [facility] Lab Draw ([facility])    [address] [address] [phone]              Who to contact     Please call your clinic at [phone] to:    Ask questions about your health    Make or cancel appointments    Discuss your medicines    Learn about your test results    Speak to your doctor   If you have compliments or concerns about an experience at your clinic, or if you wish to file a complaint, please contact [facility] Patient Relations at [phone] or email us at [email or web address]         Additional Information About Your Visit        MessageParty Information     MessageParty is an electronic gateway that provides easy, online access to your medical records. With MessageParty, you can request a clinic appointment, read your test results, renew a prescription or communicate with your care team.     To sign up for Message[employer] visit the website at [email or web address]   You will be asked to enter the access code listed below, as well as some personal information. Please follow the directions to create your username and password.     Your access code is: [email or web address]  Expires: 2017  3:48 PM     Your access code will  in 90 days. If you need help or a new code, please contact your [facility] Clinic or call [phone] for assistance.        Care EveryWhere ID     This is your Care EveryWhere ID. This could be used by other organizations to access your [facility] medical records  [id number]        Your Vitals Were     Pulse Height BMI (Body Mass Index)             74 1.727 m (5' 8\") 29.95 kg/m2          Blood Pressure from Last 3 Encounters:   17 139/90   10/31/17 141/85   10/25/17 146/82    Weight from Last 3 Encounters:   17 89.4 kg (197 lb)   10/31/17 89.6 kg (197 " lb 9.6 oz)   10/25/17 89.8 kg (198 lb)              Today, you had the following     No orders found for display         Today's Medication Changes          These changes are accurate as of: 11/2/17  8:40 AM.  If you have any questions, ask your nurse or doctor.               These medicines have changed or have updated prescriptions.        Dose/Directions    magnesium oxide 400 MG tablet   Commonly known as:  MAG-OX   This may have changed:    - how much to take  - additional instructions   Used for:  AML (acute myeloid leukemia) in remission (H)        8 tabs daily   Quantity:  100 tablet   Refills:  1                Primary Care Provider    Physician No Ref-Primary       NO REF-PRIMARY PHYSICIAN        Equal Access to Services     [facility] : Hadii sapna Ratliff, joana manning, raven mchugh, vishal albright . So [facility] [phone].    ATENCIÓN: Si habla español, tiene a gusman disposición servicios gratuitos de asistencia lingüística. Llame al [phone].    We comply with applicable federal civil rights laws and [place] laws. We do not discriminate on the basis of race, color, national origin, age, disability, sex, sexual orientation, or gender identity.            Thank you!     Thank you for choosing [facility]  for your care. Our goal is always to provide you with excellent care. Hearing back from our patients is one way we can continue to improve our services. Please take a few minutes to complete the written survey that you may receive in the mail after your visit with us. Thank you!             Your Updated Medication List - Protect others around you: Learn how to safely use, store and throw away your medicines at www.disposemymeds.org.          This list is accurate as of: 11/2/17  8:40 AM.  Always use your most recent med list.                   Brand Name Dispense Instructions for use Diagnosis    acyclovir 800 MG tablet    ZOVIRAX    150  tablet    Take 1 tablet (800 mg) by mouth 2 times daily    Stem cell transplant candidate, Acute myeloid leukemia in remission (H), History of peripheral stem cell transplant (H), Aspergillosis (H)       cholecalciferol 1000 UNITS capsule    vitamin  -D     Take 1 capsule by mouth daily        cyclobenzaprine 5 MG tablet    FLEXERIL     Take 1 tablet (5 mg) by mouth 3 times daily as needed for muscle spasms        diltiazem 360 MG 24 hr CD capsule    CARDIZEM CD; CARTIA XT    90 capsule    Take 1 capsule (360 mg) by mouth daily    AML (acute myeloid leukemia) in remission (H)       FLONASE NA      Spray in nostril as needed        fluconazole 100 MG tablet    DIFLUCAN    30 tablet    Take 1 tablet (100 mg) by mouth daily    Stem cell transplant candidate       guaiFENesin 600 MG 12 hr tablet    MUCINEX     Take 600 mg by mouth        heparin lock flush 10 UNIT/ML Soln injection     30 vial    5 mLs by Intracatheter route daily In each lumen    AML (acute myeloid leukemia) in remission (H)       LORazepam 0.5 MG tablet    ATIVAN    40 tablet    Take 1-2 tablets (0.5-1 mg) by mouth every 4 hours as needed for anxiety (nausea/vomiting/sleep)    Stem cell transplant candidate       losartan 50 MG tablet    COZAAR    30 tablet    Take 1 tablet (50 mg) by mouth daily    AML (acute myeloid leukemia) in remission (H)       magnesium oxide 400 MG tablet    MAG-OX    100 tablet    8 tabs daily    AML (acute myeloid leukemia) in remission (H)       metFORMIN 500 MG 24 hr tablet    GLUCOPHAGE-XR    60 tablet    Take 2 tablets (1,000 mg) by mouth daily (with dinner)    Type 2 diabetes mellitus without complication, without long-term current use of insulin (H)       MULTI COMPLETE PO           ondansetron 8 MG tablet    ZOFRAN    30 tablet    Take 1 tablet (8 mg) by mouth every 8 hours as needed for nausea    Stem cell transplant candidate       pantoprazole 40 MG EC tablet    PROTONIX    90 tablet    Take 1 tablet (40 mg) by  mouth daily    Stem cell transplant candidate       pravastatin 20 MG tablet    PRAVACHOL    30 tablet    Take 1 tablet (20 mg) by mouth daily    Type 2 diabetes mellitus without complication, without long-term current use of insulin (H)       psyllium Packet    METAMUCIL/KONSYL    90 packet    Take 1 packet by mouth 3 times daily    AML (acute myeloid leukemia) in remission (H)       sulfamethoxazole-trimethoprim 800-160 MG per tablet    BACTRIM DS/SEPTRA DS    30 tablet    Clinic will tell you when to start 1 tablet twice daily by mouth on Mondays and Tuesdays, start around day +28    Stem cell transplant candidate       tacrolimus 0.5 MG capsule    GENERIC EQUIVALENT    56 capsule    Take 2 capsules (1 mg) by mouth 2 times daily    Acute myeloid leukemia in remission (H)       triamcinolone 0.1 % cream    KENALOG    80 g    Apply sparingly to affected area three times daily as needed    AML (acute myeloid leukemia) in remission (H)       voriconazole 200 MG tablet    VFEND    60 tablet    Take 1 tablet (200 mg) by mouth 2 times daily    Stem cell transplant candidate       zolpidem 5 MG tablet    AMBIEN    45 tablet    Take 1-2 tablets (5-10 mg) by mouth nightly as needed for sleep    AML (acute myeloid leukemia) in remission (H)

## 2017-11-02 NOTE — NURSING NOTE
Chief Complaint   Patient presents with     RECHECK     F/U TYPE II DM     Eliane Shelton, [facility]  Endocrinology & Diabetes 3G

## 2017-11-02 NOTE — PROGRESS NOTES
Endocrinology and Diabetes Clinic Follow-up   Date of Service: November 2, 2017    Reason for follow-up:type II diabetes    Type II Diabetes Mellitus - Glucoses appear well controlled with metformin 1000 mg daily. Has occasional nausea but is tolerable and not sure of cause. Thinks it may be related to the study injections he's getting. I told him it would be fine to divide doses and eat with food if worried about developing nausea. Regarding the statin, he would like to change to every other day dosing because of recent body/joint aches. I explained in detail we don't have a lot of evidence to support every other day dosing but taking a statin is better than not taking it. In the future, he could increased to daily dosing if joint aches/pains resolve or are found to be related to something else.      Plan:   1) metformin 1000 mg daily (can be in divided doses)   -consider increasing to 2000 mg daily if tolerates from GI perspective  2) continue pravastatin 20 mg at bedtime (ok to take every other day but discuss with PCP)   3) moving back home so no RTC planned, f/u with PCP  4) if glucose control an issue, would consider addition of low dose glipizide  5) as always, recommend diet and exercise as primary means of glucose management  5) recommend PCP check Hgb A1c in 1/2018    Subjective:   Norman Real is a 56 year old male with a past medical history of AML s/p transplant and now in remission, PVD, and HTN who presents for follow-up evaluation of type II diabetes. He was previously on 7 Units of Lantus daily so this was discontinued and he was started on metformin 1000 mg daily.     [place] [facility] [place] days (had to be here for monitoring for 100 days after bone marrow transplant). Plans to follow-up with his PCP regarding diabetes. Of note, he was started on insulin during his bone marrow transplant 3 months ago due to hyperglycemia. He was on steroids at that time.  Prior to admission, he was controlled only with metformin.     Denies hypoglycemic episodes. Reviewing glucometer data, his fasting sugars range between 100-150, occasionally he will have a post-meal excursion with a blood sugar up to 2015. A1c was 5.7% last month but this may be slightly inaccurate with recent bone marrow transplant. Denies any diabetes sequela.     He has had mild nausea which he thinks may be associated with the shots he has received for his leukemia. Wondering when he should be taking the metformin and if he can split the dose. Also, has noticed mild joint aches and pains particularly his shoulders and is wondering if he can switch the pravastatin to every other day dosing. Admits that since diagnosis and treatment of AML, he has never really felt normal so doesn't know if his aches are related to AML or statin vs other.     Complications:  Retinopathy - last eye exam 2017, no retinopathy  Neuropathy - monofilament testing normal  Nephropathy - no history of kidney disease  Statin - stopped during hospitalization  Aspirin - none  Skin - no skin abnormalities       Current Problem List:   Patient Active Problem List   Diagnosis     AML (acute myelogenous leukemia) (H)     Aspergillosis (H)     Stem cell transplant candidate     Allergic rhinitis     Anomalous atrioventricular excitation     Type 2 diabetes mellitus without complication, without long-term current use of insulin (H)     Diverticulitis of intestine     Dyslipidemia     Impotence of organic origin     History of malignant neoplasm of prostate     History of urethral stricture     Hypertension     Paroxysmal supraventricular tachycardia (H)     Peripheral vascular disease (H)     Seborrheic eczema     Sleep disorder     Tobacco dependence in remission     AML (acute myeloid leukemia) (H)     AML (acute myeloid leukemia) in remission (H)     History of peripheral stem cell transplant (H)       Past Medical and Past Surgical  History:  Past Medical History:   Diagnosis Date     AML (acute myeloid leukemia) in remission (H) 05/26/2017     Cholelithiasis 06/26/2017     Diabetes (H)      Hypertension      Prostate cancer (H) 2011    had radiatoin therapy     Tachycardia        Past Surgical History:   Procedure Laterality Date     CARDIAC SURGERY      ablation       Medications:   Current Outpatient Prescriptions   Medication Sig Dispense Refill     LORazepam (ATIVAN) 0.5 MG tablet Take 1-2 tablets (0.5-1 mg) by mouth every 4 hours as needed for anxiety (nausea/vomiting/sleep) 40 tablet 0     acyclovir (ZOVIRAX) 800 MG tablet Take 1 tablet (800 mg) by mouth 2 times daily 150 tablet 11     fluconazole (DIFLUCAN) 100 MG tablet Take 1 tablet (100 mg) by mouth daily 30 tablet 11     triamcinolone (KENALOG) 0.1 % cream Apply sparingly to affected area three times daily as needed 80 g 1     metFORMIN (GLUCOPHAGE-XR) 500 MG 24 hr tablet Take 2 tablets (1,000 mg) by mouth daily (with dinner) 60 tablet 11     pravastatin (PRAVACHOL) 20 MG tablet Take 1 tablet (20 mg) by mouth daily 30 tablet 11     diltiazem (CARDIZEM CD; CARTIA XT) 360 MG 24 hr CD capsule Take 1 capsule (360 mg) by mouth daily 90 capsule 1     magnesium oxide (MAG-OX) 400 MG tablet 8 tabs daily (Patient taking differently: 500 mg 8 tabs daily) 100 tablet 1     pantoprazole (PROTONIX) 40 MG EC tablet Take 1 tablet (40 mg) by mouth daily 90 tablet 1     voriconazole (VFEND) 200 MG tablet Take 1 tablet (200 mg) by mouth 2 times daily 60 tablet 1     losartan (COZAAR) 50 MG tablet Take 1 tablet (50 mg) by mouth daily 30 tablet 1     ondansetron (ZOFRAN) 8 MG tablet Take 1 tablet (8 mg) by mouth every 8 hours as needed for nausea 30 tablet 1     psyllium (METAMUCIL/KONSYL) Packet Take 1 packet by mouth 3 times daily 90 packet 1     sulfamethoxazole-trimethoprim (BACTRIM DS/SEPTRA DS) 800-160 MG per tablet Clinic will tell you when to start 1 tablet twice daily by mouth on Mondays and  "Tuesdays, start around day +28 30 tablet 1     zolpidem (AMBIEN) 5 MG tablet Take 1-2 tablets (5-10 mg) by mouth nightly as needed for sleep 45 tablet 0     cyclobenzaprine (FLEXERIL) 5 MG tablet Take 1 tablet (5 mg) by mouth 3 times daily as needed for muscle spasms       tacrolimus (PROGRAF - GENERIC EQUIVALENT) 0.5 MG capsule Take 2 capsules (1 mg) by mouth 2 times daily 56 capsule 0     heparin lock flush 10 UNIT/ML SOLN injection 5 mLs by Intracatheter route daily In each lumen 30 vial 3     guaiFENesin (MUCINEX) 600 MG 12 hr tablet Take 600 mg by mouth       Fluticasone Propionate (FLONASE NA) Spray in nostril as needed        Multiple Vitamins-Minerals (MULTI COMPLETE PO)        cholecalciferol (VITAMIN  -D) 1000 UNITS capsule Take 1 capsule by mouth daily          Allergies:   Allergies   Allergen Reactions     Ace Inhibitors Anaphylaxis, Cough and Hives     Terazosin Swelling     Other reaction(s): Sedation/Sleepy       Social History:  Social History   Substance Use Topics     Smoking status: Former Smoker     Quit date: 5/31/2010     Smokeless tobacco: Not on file     Alcohol use No         Family History:  Family History   Problem Relation Age of Onset     Chronic Obstructive Pulmonary Disease Mother        Review of Systems:  A 10-point ROS is otherwise negative except as noted in HPI.     Physical Examination:  Blood pressure 139/90, pulse 74, height 1.727 m (5' 8\"), weight 89.4 kg (197 lb).  Body mass index is 29.95 kg/(m^2).  Wt Readings from Last 4 Encounters:   11/02/17 89.4 kg (197 lb)   10/31/17 89.6 kg (197 lb 9.6 oz)   10/25/17 89.8 kg (198 lb)   10/18/17 89.5 kg (197 lb 4.8 oz)     GEN: Awake, alert, no distress.  HEENT: EOMI.     SKIN: No lesions.    MSK: Normal muscle bulk.  NEURO:No tremor.     Labs and Studies:   Results for orders placed or performed in visit on 11/01/17   INR point of care   Result Value Ref Range    INR Point of Care 0.9 0.86 - 1.14        Gerald Weiss MD " (PGY-4)  Diabetes and Endocrinology Fellow  [facility]  Pager: [phone]    Norman seen and examined with Dr. Weiss. His note reflects our joint assessment and plan.    Devang Velazquez MD  Endocrinology and Diabetes  [phone]

## 2017-11-03 NOTE — MR AVS SNAPSHOT
After Visit Summary   11/3/2017    Norman Real    MRN: [id number]           Patient Information     Date Of Birth          1960        Visit Information        Provider Department      11/3/2017 10:30 AM Charanjit Umanzor MD [facility] Blood and Marrow Transplant        Today's Diagnoses     Acute myeloid leukemia not having achieved remission (H)    -  1          [facility] ([facility])  [address] [place] [id number]  Phone: [phone]  Clinic Hours:   Monday-Thursday:7am to 7pm   Friday: 7am to 5pm   Weekends and holidays:    8am to noon (in general)  If your fever is 100.5  or greater,   call the clinic.  After hours call the   hospital at [phone] or   [phone]. Ask for the BMT   fellow on-call            Follow-ups after your visit        Your next 10 appointments already scheduled     Nov 22, 2017 11:45 AM CST   [facility] with [facility] DRAW   [facility] Draw ([facility])    [address] [place] [id number]   [phone]            Nov 22, 2017  1:00 PM CST   Return with UC BMT DOM   [facility] Blood and Marrow Transplant ([facility])    [address] [place] [id number]   [phone]            Nov 22, 2017  1:30 PM CST   Infusion 30 with UC BMT INFUSION, UC 5 ATC   [facility] Blood and Marrow Transplant ([facility])    [address] [place] [address]   [phone]            Nov 29, 2017 12:00 PM CST   [facility] Draw with [facility] DRAW   [facility] Draw ([facility])    [address] [place] [id number]   [phone]            Nov 29, 2017 12:30 PM CST   Return with UC BMT MIMA #4   [facility] Blood and Marrow Transplant ([facility])    [address] [place] [address]  [place] [address]   [phone]            Nov 29, 2017  1:00 PM CST   Infusion 30 with UC BMT INFUSION, UC 2 ATC   [facility] Blood and Marrow Transplant ([facility])    [address] [address]   [phone]            Dec 06, 2017 12:00 PM CST   [facility] Draw with [facility] DRAW   [facility] Draw ([facility])    [address] [address] [phone]            Dec 06, 2017 12:30 PM CST   Return with UC BMT MIMA #4   [facility] Blood and Marrow Transplant ([facility])    [address] [place] [id number] [phone]              Future tests that were ordered for you today     Open Future Orders        Priority Expected Expires Ordered    Bone marrow biopsy Routine  5/2/2018 11/3/2017    Leukemia Lymphoma Evaluation (Flow Cytometry) Routine  5/2/2018 11/3/2017    CHROMOSOME BONE MARROW With Professional Interpretation Routine  5/2/2018 11/3/2017    FISH With Professional Interpretation Routine  5/2/2018 11/3/2017    Next Generation Sequencing Oncology: Acute Myeloid Leukemia Panel Routine  5/2/2018 11/3/2017    DNA marker post BMT engraft bone marrow Routine  5/2/2018 11/3/2017    CBC with platelets differential Routine 12/15/2017 12/15/2017 11/3/2017    Comprehensive metabolic panel Routine 12/15/2017 12/15/2017 11/3/2017    DNA marker post bmt engraft bld Routine 12/15/2017 12/15/2017 11/3/2017            Who to contact     If you have questions or need follow up information about today's clinic visit or your schedule please contact [facility] directly at [phone].  Normal or non-critical lab and imaging results will be communicated to you by MyChart, letter or phone within 4 business days after the clinic has received the results. If you do not hear from us within 7 days, please contact the clinic  "through [employer] or phone. If you have a critical or abnormal lab result, we will notify you by phone as soon as possible.  Submit refill requests through Pet[employer] or call your pharmacy and they will forward the refill request to us. Please allow 3 business days for your refill to be completed.          Additional Information About Your Visit        Petflow Information     Petflow lets you send messages to your doctor, view your test results, renew your prescriptions, schedule appointments and more. To sign up, go to [email or web address] . Click on \"Log in\" on the left side of the screen, which will take you to the Welcome page. Then click on \"Sign up Now\" on the right side of the page.     You will be asked to enter the access code listed below, as well as some personal information. Please follow the directions to create your username and password.     Your access code is: [email or web address]  Expires: 2017  3:48 PM     Your access code will  in 90 days. If you need help or a new code, please call your [place] clinic or [phone].        Care EveryWhere ID     This is your Care EveryWhere ID. This could be used by other organizations to access your [place] medical records  [id number]        Your Vitals Were     Pulse Temperature Respirations Pulse Oximetry BMI (Body Mass Index)       78 97.4  F (36.3  C) (Oral) 16 96% 30.14 kg/m2        Blood Pressure from Last 3 Encounters:   17 151/88   17 139/90   10/31/17 141/85    Weight from Last 3 Encounters:   17 89.9 kg (198 lb 3.2 oz)   17 89.4 kg (197 lb)   10/31/17 89.6 kg (197 lb 9.6 oz)              We Performed the Following     DNA marker post bmt engraft bld          Today's Medication Changes          These changes are accurate as of: 11/3/17 12:00 PM.  If you have any questions, ask your nurse or doctor.               These medicines have changed or have updated prescriptions.        Dose/Directions    magnesium oxide 400 MG " tablet   Commonly known as:  MAG-OX   This may have changed:    - how much to take  - additional instructions   Used for:  AML (acute myeloid leukemia) in remission (H)        8 tabs daily   Quantity:  100 tablet   Refills:  1                Recent Review Flowsheet Data     BMT Recent Results Latest Ref Rng & Units 9/27/2017 9/29/2017 10/4/2017 10/11/2017 10/18/2017 10/25/2017 10/31/2017    WBC 4.0 - 11.0 10e9/L 3.4(L) 2.5(L) 3.1(L) 3.5(L) 3.3(L) 2.8(L) 3.2(L)    Hemoglobin 13.3 - 17.7 g/dL 11.0(L) 11.0(L) 10.6(L) 10.6(L) 11.1(L) 10.7(L) 10.9(L)    Platelet Count 150 - 450 10e9/L 145(L) 137(L) 158 182 193 182 163    Neutrophils (Absolute) 1.6 - 8.3 10e9/L 1.8 1.2(L) 1.4(L) 2.0 1.5(L) 1.2(L) 1.5(L)    INR 0.86 - 1.14 - - - - - - -    Sodium 133 - 144 mmol/L 139 138 136 136 135 137 136    Potassium 3.4 - 5.3 mmol/L 4.0 4.0 4.1 4.1 4.2 4.3 3.8    Chloride 94 - 109 mmol/L 105 106 103 102 102 103 103    Glucose 70 - 99 mg/dL 152(H) 160(H) 172(H) 158(H) 159(H) 145(H) 152(H)    Urea Nitrogen 7 - 30 mg/dL 14 12 12 13 14 14 18    Creatinine 0.66 - 1.25 mg/dL 1.16 1.25 1.23 1.10 1.26(H) 1.21 1.28(H)    Calcium (Total) 8.5 - 10.1 mg/dL 8.6 8.7 8.6 9.0 9.0 8.8 8.4(L)    Protein (Total) 6.8 - 8.8 g/dL 7.1 - 7.2 7.3 7.4 7.3 7.2    Albumin 3.4 - 5.0 g/dL 3.7 - 3.7 3.4 3.8 3.8 3.9    Bilirubin (Direct) 0.0 - 0.2 mg/dL - - - - - - -    Alkaline Phosphatase 40 - 150 U/L 80 - 90 90 100 92 90    AST 0 - 45 U/L 15 - 20 16 22 16 14    ALT 0 - 70 U/L 22 - 24 22 27 22 26    MCV 78 - 100 fl 91 92 92 91 92 91 92               Primary Care Provider    Physician No Ref-Primary       NO REF-PRIMARY PHYSICIAN        Equal Access to Services     [facility] : Hadii sapna Ratliff, joana manning, raven mchugh, vishal lópez. So [facility] [phone].    ATENCIÓN: Si habla español, tiene a gusman disposición servicios gratuitos de asistencia lingüística. Destinyame al [phone].    We comply with  applicable federal civil rights laws and [place] laws. We do not discriminate on the basis of race, color, national origin, age, disability, sex, sexual orientation, or gender identity.            Thank you!     Thank you for choosing [facility]ARROW TRANSPL[facility]  for your care. Our goal is always to provide you with excellent care. Hearing back from our patients is one way we can continue to improve our services. Please take a few minutes to complete the written survey that you may receive in the mail after your visit with us. Thank you!             Your Updated Medication List - Protect others around you: Learn how to safely use, store and throw away your medicines at www.disposemymeds.org.          This list is accurate as of: 11/3/17 12:00 PM.  Always use your most recent med list.                   Brand Name Dispense Instructions for use Diagnosis    acyclovir 800 MG tablet    ZOVIRAX    150 tablet    Take 1 tablet (800 mg) by mouth 2 times daily    Stem cell transplant candidate, Acute myeloid leukemia in remission (H), History of peripheral stem cell transplant (H), Aspergillosis (H)       cholecalciferol 1000 UNITS capsule    vitamin  -D     Take 1 capsule by mouth daily        cyclobenzaprine 5 MG tablet    FLEXERIL     Take 1 tablet (5 mg) by mouth 3 times daily as needed for muscle spasms        diltiazem 360 MG 24 hr CD capsule    CARDIZEM CD; CARTIA XT    90 capsule    Take 1 capsule (360 mg) by mouth daily    AML (acute myeloid leukemia) in remission (H)       FLONASE NA      Spray in nostril as needed        fluconazole 100 MG tablet    DIFLUCAN    30 tablet    Take 1 tablet (100 mg) by mouth daily    Stem cell transplant candidate       guaiFENesin 600 MG 12 hr tablet    MUCINEX     Take 600 mg by mouth        heparin lock flush 10 UNIT/ML Soln injection     30 vial    5 mLs by Intracatheter route daily In each lumen    AML (acute myeloid leukemia) in remission (H)       LORazepam 0.5 MG  tablet    ATIVAN    40 tablet    Take 1-2 tablets (0.5-1 mg) by mouth every 4 hours as needed for anxiety (nausea/vomiting/sleep)    Stem cell transplant candidate       losartan 50 MG tablet    COZAAR    30 tablet    Take 1 tablet (50 mg) by mouth daily    AML (acute myeloid leukemia) in remission (H)       magnesium oxide 400 MG tablet    MAG-OX    100 tablet    8 tabs daily    AML (acute myeloid leukemia) in remission (H)       metFORMIN 500 MG 24 hr tablet    GLUCOPHAGE-XR    60 tablet    Take 2 tablets (1,000 mg) by mouth daily (with dinner)    Type 2 diabetes mellitus without complication, without long-term current use of insulin (H)       MULTI COMPLETE PO           ondansetron 8 MG tablet    ZOFRAN    30 tablet    Take 1 tablet (8 mg) by mouth every 8 hours as needed for nausea    Stem cell transplant candidate       pantoprazole 40 MG EC tablet    PROTONIX    90 tablet    Take 1 tablet (40 mg) by mouth daily    Stem cell transplant candidate       pravastatin 20 MG tablet    PRAVACHOL    30 tablet    Take 1 tablet (20 mg) by mouth daily    Type 2 diabetes mellitus without complication, without long-term current use of insulin (H)       psyllium Packet    METAMUCIL/KONSYL    90 packet    Take 1 packet by mouth 3 times daily    AML (acute myeloid leukemia) in remission (H)       sulfamethoxazole-trimethoprim 800-160 MG per tablet    BACTRIM DS/SEPTRA DS    30 tablet    Clinic will tell you when to start 1 tablet twice daily by mouth on Mondays and Tuesdays, start around day +28    Stem cell transplant candidate       tacrolimus 0.5 MG capsule    GENERIC EQUIVALENT    56 capsule    Take 2 capsules (1 mg) by mouth 2 times daily    Acute myeloid leukemia in remission (H)       triamcinolone 0.1 % cream    KENALOG    80 g    Apply sparingly to affected area three times daily as needed    AML (acute myeloid leukemia) in remission (H)       voriconazole 200 MG tablet    VFEND    60 tablet    Take 1 tablet (200 mg)  by mouth 2 times daily    Stem cell transplant candidate       zolpidem 5 MG tablet    AMBIEN    45 tablet    Take 1-2 tablets (5-10 mg) by mouth nightly as needed for sleep    AML (acute myeloid leukemia) in remission (H)

## 2017-11-03 NOTE — PROGRESS NOTES
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 Erna MD          INTERVAL  HISTORY      HPI: Here for follow-up and D100 BMbx. Reports occasional nausea, no vomiting, taking Ativan prn. Normal stools. No rash, fevers, or cough. Dry eyes, L>R, Schirmer's test wnl last week. Mouth not dry but has occasional small sores/blisters on upper, inner lip. None today.      Review of Systems: 10 point ROS negative except as noted above.    BMBX  Collected: 10/31/2017   Received: 10/31/2017   Reported: 11/2/2017 18:46   Ordering Phy(s): RUY MILLER   Additional Phy(s): Copy to Cytogenetics     For improved result formatting, select 'View Enhanced Report Format'   under Linked Documents section.     TEST(S):   Unilateral Bone Marrow Biopsy/Aspiration     FINAL DIAGNOSIS:   Bone marrow, left posterior iliac crest, decalcified trephine biopsy,   touch imprint, direct aspirate smear, concentrated aspirate smear, and   peripheral blood smear:  no amount shock        Normocellular bone marrow (~40% cellular) with trilineage   hematopoiesis and increase in blasts (5-10%). See comment        Mild normochromic normocytic anemia; with absolute neutropenia and   rare circulating blasts.     COMMENT:   Flow cytometry immunophenotyping ([id number]) showed increased myeloid   blasts (8%). Findings are concerning for low level involvement by   persistent/recurrent acute leukemia. Clinical correlation is   recommended. Dr Brush concurs with the interpretation.  masses1/2017 12:33     Flow  Received: 10/31/2017 15:03  malaise and dry   Reported: 11/1/2017 15:37   Ordering Phy(s): RUY MILLER     For improved result formatting, select 'View Enhanced  "Report Format'   under Linked Documents section.   _________________________________________     SPECIMEN(S):   Bone Marrow, Left     INTERPRETATION:   Bone Marrow, Left:        Increased myeloid blasts (8%)        See comment     COMMENT:   The blasts form a discrete population and have immunophenotype   consistent with that reported at the time of diagnosis, however the   immunophenotype is not easily distinguishable from normal blast   population. Overall the findings are worrisome for recurrence of acute   myeloid leukemia. Dr Dunbar migraine and an inguinal lymph node that is something that really     González Brush has notified  Dr SAUMYA Umanzor on   11/1/2013 at 3:30PM.\"        Labs:  Lab Results   Component Value Date    WBC 3.2 (L) 10/31/2017    ANEU 1.5 (L) 10/31/2017    HGB 10.9 (L) 10/31/2017    HCT 31.8 (L) 10/31/2017     10/31/2017     10/31/2017    POTASSIUM 3.8 10/31/2017    CHLORIDE 103 10/31/2017    CO2 27 10/31/2017     (H) 10/31/2017    BUN 18 10/31/2017    CR 1.28 (H) 10/31/2017    MAG 1.6 10/25/2017    INR 0.9 11/01/2017    BILITOTAL 0.5 10/31/2017    AST 14 10/31/2017    ALT 26 10/31/2017    ALKPHOS 90 10/31/2017    PROTTOTAL 7.2 10/31/2017    ALBUMIN 3.9 10/31/2017       ASSESSMENT BY SYSTEMS   Norman Salazar Real is a 57 yo man 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 BMT: Transplant 7/27. Initial stem cell product only contained 2.33 CD34/kg, additional 0.66 CD34/kg on 7/27 for a total of 2.99.   -  D28 BMbx showed no increase in blasts.  98% donor BM (8/15/17); 90% donor CD3; 100% donor CD15.   - D100 BMbx (10/31), results pending.    2. AML:  the patient's bone marrow biopsy shows an increase in blasts both by flow cytometry and by morphology. While the reports are not conclusive there very strongly suggestive of relapsed AML, albeit early. I had a long conversation with the patient and his wife about the implications of an early relapse and the need for an " intervention in order to try to control the disease as soon as possible. I explained to the patient allows serious this is still potentially reversible and that my initial recommendation would be to proceed with a rapid taper of his immunosuppression. We will monitor him very closely with the assistance of his doctors in [place], and we'll have him come back for a bone marrow biopsy planned in 6 weeks, in mid December. Of course, if his disease was to take off he may require induction chemotherapy. Considering his overall health and excellent tolerance to transplant I would be inclined to proceed with aggressive clinical management, including reinduction chemotherapy followed by donor lymphocyte infusions if the patient is interested. The patient and his wife showed very good understanding of this discussions and are agreeable to this initial plan. I communicated with the [facility] and left a message so that Dr. Wagner can contact me next week. We will discuss with the research team whether or not he should be removed from the maintenance study with . [id number] maintenance treatment C1D1 9/27/17.    3. HEME: stable good counts with ANC 1.5. No bleeding, no transfusions needed.   - Keep Hgb>8 and plts>10K. No pre-meds.    4.  ID:  Afebrile. No active infections. Continue prophylaxis for now.  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  Improved CT 8/10. S/p Vfend, now on fluconazole prophy.   - proph HD ACV (CMV+, HSV+, EBV+). CMV & EBV negative 10/25.  - Cont to hold Bactrm due to lowish WBC count.  Pentamidine 10/25.    - flu shot 10/25    5.  GI: monitor for worsening nausea.  - Mild, intermittent nausea: cont Ativan prn and monitor.  - Protonix for GI prophy.   - s/p left sided colectomy for recurrent diverticulitis.        6.  GVH: No aGVH. stopped MMF 8/25. Proved 3 week long tacrolimus taper; discussed risk of acute GVHD; symptoms to  monitor    7.  FEN/Renal: Creat stable.   - Hx of hypoMg. Cont MgOx 8 tabs daily.        8. CV: Adequate BP control on losartan 50 mg and diltiazem  mg.  - Hx tachycardia with arrhythmia, s/p 3 ablations  - hold crestor through transplant      9. Endo:  Hx DM type II. Per endocrine recs, DC Lantus & now on metformin. Endocrine f/u 11/2.        10. : Hx prostate cancer. No current issues.    11. Neuro: History of chronic insomnia. Ambien prn.      Plan:  BMbx 12/13/17 orders in [facility] with Dr. Umanzor 12/15/17 with labs    We spent 60 minutes in today's visit, which dedicated to consultation about his disease and treatment options, and coordination of care.    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 Discuss with Dr Roland and he is off [id number] study; failed. Monitor for GVHD. Cancel appointments before 12/13; keep BMBX on 12/*13 and visit with Erna after that day. Spoke to the paitent on the phone about the above. Reserved Hope Morton.

## 2017-11-03 NOTE — TELEPHONE ENCOUNTER
SW spoke with pt today regarding a [facility] referral. Pt would like a referral made for 12/12/2017-12/15/2017. SW sent referral to the [facility]. Pt did not have any further questions or concerns.    LEANDRO Yeboah, [facility]  Phone: [phone]  Pager: [phone]

## 2017-11-03 NOTE — NURSING NOTE
"Oncology Rooming Note    November 3, 2017 10:34 AM   Norman Real is a 56 year old male who presents for:    Chief Complaint   Patient presents with     RECHECK     day 100 review- AML     Initial Vitals: /88  Pulse 78  Temp 97.4  F (36.3  C) (Oral)  Resp 16  Wt 89.9 kg (198 lb 3.2 oz)  SpO2 96%  BMI 30.14 kg/m2 Estimated body mass index is 30.14 kg/(m^2) as calculated from the following:    Height as of 11/2/17: 1.727 m (5' 8\").    Weight as of this encounter: 89.9 kg (198 lb 3.2 oz). Body surface area is 2.08 meters squared.  No Pain (0) Comment: Data Unavailable   No LMP for male patient.  Allergies reviewed: Yes  Medications reviewed: Yes    Medications: MEDICATION REFILL NEEDED TODAY  Pharmacy name entered into [employer]:    [id number] - [place], [place] - [address]  [id number] - [place], [place] - [address] [place] [address]    Clinical concerns: Ativan refill requested.     5 minutes for nursing intake (face to face time)     SATISH WOODARD CMA              "

## 2017-11-03 NOTE — LETTER
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 Erna MD          INTERVAL  HISTORY      HPI: Here for follow-up and D100 BMbx. Reports occasional nausea, no vomiting, taking Ativan prn. Normal stools. No rash, fevers, or cough. Dry eyes, L>R, Schirmer's test wnl last week. Mouth not dry but has occasional small sores/blisters on upper, inner lip. None today.      Review of Systems: 10 point ROS negative except as noted above.    BMBX  Collected: 10/31/2017   Received: 10/31/2017   Reported: 11/2/2017 18:46   Ordering Phy(s): RUY MILLER   Additional Phy(s): Copy to Cytogenetics     For improved result formatting, select 'View Enhanced Report Format'   under Linked Documents section.     TEST(S):   Unilateral Bone Marrow Biopsy/Aspiration     FINAL DIAGNOSIS:   Bone marrow, left posterior iliac crest, decalcified trephine biopsy,   touch imprint, direct aspirate smear, concentrated aspirate smear, and   peripheral blood smear:  no amount shock        Normocellular bone marrow (~40% cellular) with trilineage   hematopoiesis and increase in blasts (5-10%). See comment        Mild normochromic normocytic anemia; with absolute neutropenia and   rare circulating blasts.     COMMENT:   Flow cytometry immunophenotyping ([id number]) showed increased myeloid   blasts (8%). Findings are concerning for low level involvement by   persistent/recurrent acute leukemia. Clinical correlation is   recommended. Dr Brush concurs with the interpretation.  masses1/2017 12:33     Flow  Received: 10/31/2017 15:03  malaise and dry   Reported: 11/1/2017 15:37   Ordering Phy(s): RUY  "DENISE MILLER     For improved result formatting, select 'View Enhanced Report Format'   under Linked Documents section.   _________________________________________     SPECIMEN(S):   Bone Marrow, Left     INTERPRETATION:   Bone Marrow, Left:        Increased myeloid blasts (8%)        See comment     COMMENT:   The blasts form a discrete population and have immunophenotype   consistent with that reported at the time of diagnosis, however the   immunophenotype is not easily distinguishable from normal blast   population. Overall the findings are worrisome for recurrence of acute   myeloid leukemia. Dr Dunbar migraine and an inguinal lymph node that is something that really     González Brush has notified  Dr SAUMYA Umanzor on   11/1/2013 at 3:30PM.\"        Labs:  Lab Results   Component Value Date    WBC 3.2 (L) 10/31/2017    ANEU 1.5 (L) 10/31/2017    HGB 10.9 (L) 10/31/2017    HCT 31.8 (L) 10/31/2017     10/31/2017     10/31/2017    POTASSIUM 3.8 10/31/2017    CHLORIDE 103 10/31/2017    CO2 27 10/31/2017     (H) 10/31/2017    BUN 18 10/31/2017    CR 1.28 (H) 10/31/2017    MAG 1.6 10/25/2017    INR 0.9 11/01/2017    BILITOTAL 0.5 10/31/2017    AST 14 10/31/2017    ALT 26 10/31/2017    ALKPHOS 90 10/31/2017    PROTTOTAL 7.2 10/31/2017    ALBUMIN 3.9 10/31/2017       ASSESSMENT BY SYSTEMS   Norman Real is a 55 yo man 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 BMT: Transplant 7/27. Initial stem cell product only contained 2.33 CD34/kg, additional 0.66 CD34/kg on 7/27 for a total of 2.99.   -  D28 BMbx showed no increase in blasts.  98% donor BM (8/15/17); 90% donor CD3; 100% donor CD15.   - D100 BMbx (10/31), results pending.    2. AML:  the patient's bone marrow biopsy shows an increase in blasts both by flow cytometry and by morphology. While the reports are not conclusive there very strongly suggestive of relapsed AML, albeit early. I had a long conversation with the patient and " his wife about the implications of an early relapse and the need for an intervention in order to try to control the disease as soon as possible. I explained to the patient allows serious this is still potentially reversible and that my initial recommendation would be to proceed with a rapid taper of his immunosuppression. We will monitor him very closely with the assistance of his doctors in [place], and we'll have him come back for a bone marrow biopsy planned in 6 weeks, in mid December. Of course, if his disease was to take off he may require induction chemotherapy. Considering his overall health and excellent tolerance to transplant I would be inclined to proceed with aggressive clinical management, including reinduction chemotherapy followed by donor lymphocyte infusions if the patient is interested. The patient and his wife showed very good understanding of this discussions and are agreeable to this initial plan. I communicated with the [facility] and left a message so that Dr. Wagner can contact me next week. We will discuss with the research team whether or not he should be removed from the maintenance study with . [id number] maintenance treatment C1D1 9/27/17.    3. HEME: stable good counts with ANC 1.5. No bleeding, no transfusions needed.   - Keep Hgb>8 and plts>10K. No pre-meds.    4.  ID:  Afebrile. No active infections. Continue prophylaxis for now.  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  Improved CT 8/10. S/p Vfend, now on fluconazole prophy.   - proph HD ACV (CMV+, HSV+, EBV+). CMV & EBV negative 10/25.  - Cont to hold Bactrm due to lowish WBC count.  Pentamidine 10/25.    - flu shot 10/25    5.  GI: monitor for worsening nausea.  - Mild, intermittent nausea: cont Ativan prn and monitor.  - Protonix for GI prophy.   - s/p left sided colectomy for recurrent diverticulitis.        6.  GVH: No aGVH. stopped MMF 8/25. Proved 3  week long tacrolimus taper; discussed risk of acute GVHD; symptoms to monitor    7.  FEN/Renal: Creat stable.   - Hx of hypoMg. Cont MgOx 8 tabs daily.        8. CV: Adequate BP control on losartan 50 mg and diltiazem  mg.  - Hx tachycardia with arrhythmia, s/p 3 ablations  - hold crestor through transplant      9. Endo:  Hx DM type II. Per endocrine recs, DC Lantus & now on metformin. Endocrine f/u 11/2.        10. : Hx prostate cancer. No current issues.    11. Neuro: History of chronic insomnia. Ambien prn.      Plan:  BMbx 12/13/17 orders in [facility]  RTC with Dr. Umanzor 12/15/17 with labs    We spent 60 minutes in today's visit, which dedicated to consultation about his disease and treatment options, and coordination of care.      Charanjit Umanzor MD

## 2017-11-03 NOTE — LETTER
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 Erna MD          INTERVAL  HISTORY      HPI: Here for follow-up and D100 BMbx. Reports occasional nausea, no vomiting, taking Ativan prn. Normal stools. No rash, fevers, or cough. Dry eyes, L>R, Schirmer's test wnl last week. Mouth not dry but has occasional small sores/blisters on upper, inner lip. None today.      Review of Systems: 10 point ROS negative except as noted above.    BMBX  Collected: 10/31/2017   Received: 10/31/2017   Reported: 11/2/2017 18:46   Ordering Phy(s): RUY MILLER   Additional Phy(s): Copy to Cytogenetics     For improved result formatting, select 'View Enhanced Report Format'   under Linked Documents section.     TEST(S):   Unilateral Bone Marrow Biopsy/Aspiration     FINAL DIAGNOSIS:   Bone marrow, left posterior iliac crest, decalcified trephine biopsy,   touch imprint, direct aspirate smear, concentrated aspirate smear, and   peripheral blood smear:  no amount shock        Normocellular bone marrow (~40% cellular) with trilineage   hematopoiesis and increase in blasts (5-10%). See comment        Mild normochromic normocytic anemia; with absolute neutropenia and   rare circulating blasts.     COMMENT:   Flow cytometry immunophenotyping ([id number]) showed increased myeloid   blasts (8%). Findings are concerning for low level involvement by   persistent/recurrent acute leukemia. Clinical correlation is   recommended. Dr Brush concurs with the interpretation.  masses1/2017 12:33     Flow  Received: 10/31/2017 15:03  malaise and dry   Reported: 11/1/2017 15:37   Ordering Phy(s): RUY  "DENISE MILLER     For improved result formatting, select 'View Enhanced Report Format'   under Linked Documents section.   _________________________________________     SPECIMEN(S):   Bone Marrow, Left     INTERPRETATION:   Bone Marrow, Left:        Increased myeloid blasts (8%)        See comment     COMMENT:   The blasts form a discrete population and have immunophenotype   consistent with that reported at the time of diagnosis, however the   immunophenotype is not easily distinguishable from normal blast   population. Overall the findings are worrisome for recurrence of acute   myeloid leukemia. Dr Dunbar migraine and an inguinal lymph node that is something that really     González Brush has notified  Dr SAUMYA Umanzor on   11/1/2013 at 3:30PM.\"        Labs:  Lab Results   Component Value Date    WBC 3.2 (L) 10/31/2017    ANEU 1.5 (L) 10/31/2017    HGB 10.9 (L) 10/31/2017    HCT 31.8 (L) 10/31/2017     10/31/2017     10/31/2017    POTASSIUM 3.8 10/31/2017    CHLORIDE 103 10/31/2017    CO2 27 10/31/2017     (H) 10/31/2017    BUN 18 10/31/2017    CR 1.28 (H) 10/31/2017    MAG 1.6 10/25/2017    INR 0.9 11/01/2017    BILITOTAL 0.5 10/31/2017    AST 14 10/31/2017    ALT 26 10/31/2017    ALKPHOS 90 10/31/2017    PROTTOTAL 7.2 10/31/2017    ALBUMIN 3.9 10/31/2017       ASSESSMENT BY SYSTEMS   Norman Real is a 57 yo man 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 BMT: Transplant 7/27. Initial stem cell product only contained 2.33 CD34/kg, additional 0.66 CD34/kg on 7/27 for a total of 2.99.   -  D28 BMbx showed no increase in blasts.  98% donor BM (8/15/17); 90% donor CD3; 100% donor CD15.   - D100 BMbx (10/31), results pending.    2. AML:  the patient's bone marrow biopsy shows an increase in blasts both by flow cytometry and by morphology. While the reports are not conclusive there very strongly suggestive of relapsed AML, albeit early. I had a long conversation with the patient and " his wife about the implications of an early relapse and the need for an intervention in order to try to control the disease as soon as possible. I explained to the patient allows serious this is still potentially reversible and that my initial recommendation would be to proceed with a rapid taper of his immunosuppression. We will monitor him very closely with the assistance of his doctors in [place], and we'll have him come back for a bone marrow biopsy planned in 6 weeks, in mid December. Of course, if his disease was to take off he may require induction chemotherapy. Considering his overall health and excellent tolerance to transplant I would be inclined to proceed with aggressive clinical management, including reinduction chemotherapy followed by donor lymphocyte infusions if the patient is interested. The patient and his wife showed very good understanding of this discussions and are agreeable to this initial plan. I communicated with the [facility] and left a message so that Dr. Wagner can contact me next week. We will discuss with the research team whether or not he should be removed from the maintenance study with . [id number] maintenance treatment C1D1 9/27/17.    3. HEME: stable good counts with ANC 1.5. No bleeding, no transfusions needed.   - Keep Hgb>8 and plts>10K. No pre-meds.    4.  ID:  Afebrile. No active infections. Continue prophylaxis for now.  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  Improved CT 8/10. S/p Vfend, now on fluconazole prophy.   - proph HD ACV (CMV+, HSV+, EBV+). CMV & EBV negative 10/25.  - Cont to hold Bactrm due to lowish WBC count.  Pentamidine 10/25.    - flu shot 10/25    5.  GI: monitor for worsening nausea.  - Mild, intermittent nausea: cont Ativan prn and monitor.  - Protonix for GI prophy.   - s/p left sided colectomy for recurrent diverticulitis.        6.  GVH: No aGVH. stopped MMF 8/25. Proved 3  week long tacrolimus taper; discussed risk of acute GVHD; symptoms to monitor    7.  FEN/Renal: Creat stable.   - Hx of hypoMg. Cont MgOx 8 tabs daily.        8. CV: Adequate BP control on losartan 50 mg and diltiazem  mg.  - Hx tachycardia with arrhythmia, s/p 3 ablations  - hold crestor through transplant      9. Endo:  Hx DM type II. Per endocrine recs, DC Lantus & now on metformin. Endocrine f/u 11/2.        10. : Hx prostate cancer. No current issues.    11. Neuro: History of chronic insomnia. Ambien prn.      Plan:  BMbx 12/13/17 orders in [facility]  RTC with Dr. Umanzor 12/15/17 with labs    We spent 60 minutes in today's visit, which dedicated to consultation about his disease and treatment options, and coordination of care.    Charanjit Umanzor MD

## 2017-11-03 NOTE — LETTER
11/3/2017       RE: Norman Real  PO   [facility] [address]     Dear Colleague,    Thank you for referring your patient, Norman Real, to the [facility] BLOOD AND MARROW TRANSPLANT at [facility]. Please see a copy of my visit note below.    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 Erna MD          INTERVAL  HISTORY      HPI: Here for follow-up and D100 BMbx. Reports occasional nausea, no vomiting, taking Ativan prn. Normal stools. No rash, fevers, or cough. Dry eyes, L>R, Schirmer's test wnl last week. Mouth not dry but has occasional small sores/blisters on upper, inner lip. None today.      Review of Systems: 10 point ROS negative except as noted above.    BMBX  Collected: 10/31/2017   Received: 10/31/2017   Reported: 11/2/2017 18:46   Ordering Phy(s): RUY MILLER   Additional Phy(s): Copy to Cytogenetics     For improved result formatting, select 'View Enhanced Report Format'   under Linked Documents section.     TEST(S):   Unilateral Bone Marrow Biopsy/Aspiration     FINAL DIAGNOSIS:   Bone marrow, left posterior iliac crest, decalcified trephine biopsy,   touch imprint, direct aspirate smear, concentrated aspirate smear, and   peripheral blood smear:  no amount shock        Normocellular bone marrow (~40% cellular) with trilineage   hematopoiesis and increase in blasts (5-10%). See comment        Mild normochromic normocytic anemia; with absolute neutropenia and   rare circulating blasts.     COMMENT:   Flow cytometry immunophenotyping ([id number]) showed increased myeloid   blasts (8%). Findings are concerning for low level involvement by   persistent/recurrent acute leukemia.  "Clinical correlation is   recommended. Dr Brush concurs with the interpretation.  masses1/2017 12:33     Flow  Received: 10/31/2017 15:03  malaise and dry   Reported: 11/1/2017 15:37   Ordering Phy(s): RUY MILLER     For improved result formatting, select 'View Enhanced Report Format'   under Linked Documents section.   _________________________________________     SPECIMEN(S):   Bone Marrow, Left     INTERPRETATION:   Bone Marrow, Left:        Increased myeloid blasts (8%)        See comment     COMMENT:   The blasts form a discrete population and have immunophenotype   consistent with that reported at the time of diagnosis, however the   immunophenotype is not easily distinguishable from normal blast   population. Overall the findings are worrisome for recurrence of acute   myeloid leukemia. Dr Dunbar migraine and an inguinal lymph node that is something that really     Gripe FRANK Brush has notified  Dr SAUMYA Umanzor on   11/1/2013 at 3:30PM.\"        Labs:  Lab Results   Component Value Date    WBC 3.2 (L) 10/31/2017    ANEU 1.5 (L) 10/31/2017    HGB 10.9 (L) 10/31/2017    HCT 31.8 (L) 10/31/2017     10/31/2017     10/31/2017    POTASSIUM 3.8 10/31/2017    CHLORIDE 103 10/31/2017    CO2 27 10/31/2017     (H) 10/31/2017    BUN 18 10/31/2017    CR 1.28 (H) 10/31/2017    MAG 1.6 10/25/2017    INR 0.9 11/01/2017    BILITOTAL 0.5 10/31/2017    AST 14 10/31/2017    ALT 26 10/31/2017    ALKPHOS 90 10/31/2017    PROTTOTAL 7.2 10/31/2017    ALBUMIN 3.9 10/31/2017       ASSESSMENT BY SYSTEMS   Norman Salazar Real is a 57 yo man 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 BMT: Transplant 7/27. Initial stem cell product only contained 2.33 CD34/kg, additional 0.66 CD34/kg on 7/27 for a total of 2.99.   -  D28 BMbx showed no increase in blasts.  98% donor BM (8/15/17); 90% donor CD3; 100% donor CD15.   - D100 BMbx (10/31), results pending.    2. AML:  the patient's bone marrow biopsy shows " an increase in blasts both by flow cytometry and by morphology. While the reports are not conclusive there very strongly suggestive of relapsed AML, albeit early. I had a long conversation with the patient and his wife about the implications of an early relapse and the need for an intervention in order to try to control the disease as soon as possible. I explained to the patient allows serious this is still potentially reversible and that my initial recommendation would be to proceed with a rapid taper of his immunosuppression. We will monitor him very closely with the assistance of his doctors in [place], and we'll have him come back for a bone marrow biopsy planned in 6 weeks, in mid December. Of course, if his disease was to take off he may require induction chemotherapy. Considering his overall health and excellent tolerance to transplant I would be inclined to proceed with aggressive clinical management, including reinduction chemotherapy followed by donor lymphocyte infusions if the patient is interested. The patient and his wife showed very good understanding of this discussions and are agreeable to this initial plan. I communicated with the [facility] and left a message so that Dr. Wagner can contact me next week. We will discuss with the research team whether or not he should be removed from the maintenance study with . TNN276 maintenance treatment C1D1 9/27/17.    3. HEME: stable good counts with ANC 1.5. No bleeding, no transfusions needed.   - Keep Hgb>8 and plts>10K. No pre-meds.    4.  ID:  Afebrile. No active infections. Continue prophylaxis for now.  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  Improved CT 8/10. S/p Vfend, now on fluconazole prophy.   - proph HD ACV (CMV+, HSV+, EBV+). CMV & EBV negative 10/25.  - Cont to hold Bactrm due to lowish WBC count.  Pentamidine 10/25.    - flu shot 10/25    5.  GI: monitor for  worsening nausea.  - Mild, intermittent nausea: cont Ativan prn and monitor.  - Protonix for GI prophy.   - s/p left sided colectomy for recurrent diverticulitis.        6.  GVH: No aGVH. stopped MMF 8/25. Proved 3 week long tacrolimus taper; discussed risk of acute GVHD; symptoms to monitor    7.  FEN/Renal: Creat stable.   - Hx of hypoMg. Cont MgOx 8 tabs daily.        8. CV: Adequate BP control on losartan 50 mg and diltiazem  mg.  - Hx tachycardia with arrhythmia, s/p 3 ablations  - hold crestor through transplant      9. Endo:  Hx DM type II. Per endocrine recs, DC Lantus & now on metformin. Endocrine f/u 11/2.        10. : Hx prostate cancer. No current issues.    11. Neuro: History of chronic insomnia. Ambien prn.      Plan:  BMbx 12/13/17 orders in [facility]  RTC with Dr. Umanzor 12/15/17 with labs    We spent 60 minutes in today's visit, which dedicated to consultation about his disease and treatment options, and coordination of care.    Again, thank you for allowing me to participate in the care of your patient.      Sincerely,    Charanjit Umanzor MD

## 2017-11-30 NOTE — MR AVS SNAPSHOT
After Visit Summary   11/30/2017    Norman Real    MRN: [id number]           Patient Information     Date Of Birth          1960        Visit Information        Provider Department      11/30/2017 6:23 PM Charanjit Umanzor MD Blood and Marrow Transplant        Today's Diagnoses     Acute myeloid leukemia not having achieved remission (H)    -  1          [facility] ([facility]  [address] [place] [id number]  Phone: [phone]  Clinic Hours:   Monday-Thursday:7am to 7pm   Friday: 7am to 5pm   Weekends and holidays:    8am to noon (in general)  If your fever is 100.5  or greater,   call the clinic.  After hours call the   hospital at [phone] or   [phone]. Ask for the BMT   fellow on-call            Follow-ups after your visit        Your next 10 appointments already scheduled     Dec 13, 2017  9:30 AM CST   Masonic Lab Draw with [facility] MASONIC LAB DRAW   [facility] Masonic Lab Draw ([facility])    [address] [place] [id number] [phone]            Dec 13, 2017 10:00 AM CST   Bone Marrow Biopsy with UC BMT MIMA #4, UU BONE MARROW BIOPSY   [facility] Blood and Marrow Transplant ([facility])    [address] [place] [phone]   [phone]            Dec 13, 2017 11:00 AM CST   Return with UC BMT MIMA #4   [facility] Blood and Marrow Transplant ([facility])    [address] [place] [phone]   [phone]            Dec 13, 2017 11:30 AM CST   Infusion 30 with UC BMT INFUSION, UC 1 ATC   [facility] Blood and Marrow Transplant ([facility])    [address] [place] [phone]   [phone]            Dec 15, 2017 12:00 PM CST   Masonic Lab Draw with [facility]ON[facility] LAB DRAW   [facility] Lab Draw ([facility])    [address] [place] [address]  [address] [phone]   [phone]            Dec 15, 2017 12:30 PM CST   Return with Charanjit Umanzor MD   [facility] Blood and Marrow Transplant ([facility])    [address] [place] [id number] [phone]            2018 12:30 PM CST   Masonic Lab Draw with  MASONIC LAB DRAW   [facility] Masonic Lab Draw ([facility])    [address] [place] [id number] [phone]            2018  1:00 PM CST   Bone Marrow Biopsy with  BMT MIMA #2   [facility] Blood and Marrow Transplant ([facility])    [address] [place] [id number] [phone]              Who to contact     Please call your clinic at [phone] to:    Ask questions about your health    Make or cancel appointments    Discuss your medicines    Learn about your test results    Speak to your doctor   If you have compliments or concerns about an experience at your clinic, or if you wish to file a complaint, please contact [facility] Patient Relations at [phone] or email us at [email or web address]         Additional Information About Your Visit        ClarassanceharSeat 14A Information     NativeX is an electronic gateway that provides easy, online access to your medical records. With NativeX, you can request a clinic appointment, read your test results, renew a prescription or communicate with your care team.     To sign up for NativeX visit the website at [email or web address]   You will be asked to enter the access code listed below, as well as some personal information. Please follow the directions to create your username and password.     Your access code is: [email or web address]  Expires: 2018  6:30 AM     Your access code will  in 90 days. If you need help or a new code, please contact your [facility]  [place] [facility] or call [phone] for assistance.        Care EveryWhere ID     This is your Care EveryWhere ID. This could be used by other organizations to access your [place] medical records  [id number]         Blood Pressure from Last 3 Encounters:   11/03/17 151/88   11/02/17 139/90   10/31/17 141/85    Weight from Last 3 Encounters:   11/03/17 89.9 kg (198 lb 3.2 oz)   11/02/17 89.4 kg (197 lb)   10/31/17 89.6 kg (197 lb 9.6 oz)              Today, you had the following     No orders found for display         Today's Medication Changes          These changes are accurate as of: 11/30/17 11:59 PM.  If you have any questions, ask your nurse or doctor.               These medicines have changed or have updated prescriptions.        Dose/Directions    magnesium oxide 400 MG tablet   Commonly known as:  MAG-OX   This may have changed:    - how much to take  - additional instructions   Used for:  AML (acute myeloid leukemia) in remission (H)        8 tabs daily   Quantity:  100 tablet   Refills:  1                Recent Review Flowsheet Data     BMT Recent Results Latest Ref Rng & Units 9/27/2017 9/29/2017 10/4/2017 10/11/2017 10/18/2017 10/25/2017 10/31/2017    WBC 4.0 - 11.0 10e9/L 3.4(L) 2.5(L) 3.1(L) 3.5(L) 3.3(L) 2.8(L) 3.2(L)    Hemoglobin 13.3 - 17.7 g/dL 11.0(L) 11.0(L) 10.6(L) 10.6(L) 11.1(L) 10.7(L) 10.9(L)    Platelet Count 150 - 450 10e9/L 145(L) 137(L) 158 182 193 182 163    Neutrophils (Absolute) 1.6 - 8.3 10e9/L 1.8 1.2(L) 1.4(L) 2.0 1.5(L) 1.2(L) 1.5(L)    INR 0.86 - 1.14 - - - - - - -    Sodium 133 - 144 mmol/L 139 138 136 136 135 137 136    Potassium 3.4 - 5.3 mmol/L 4.0 4.0 4.1 4.1 4.2 4.3 3.8    Chloride 94 - 109 mmol/L 105 106 103 102 102 103 103    Glucose 70 - 99 mg/dL 152(H) 160(H) 172(H) 158(H) 159(H) 145(H) 152(H)    Urea Nitrogen 7 - 30 mg/dL 14 12 12 13 14 14 18    Creatinine 0.66 - 1.25 mg/dL 1.16 1.25 1.23 1.10 1.26(H) 1.21 1.28(H)    Calcium (Total) 8.5 - 10.1 mg/dL  8.6 8.7 8.6 9.0 9.0 8.8 8.4(L)    Protein (Total) 6.8 - 8.8 g/dL 7.1 - 7.2 7.3 7.4 7.3 7.2    Albumin 3.4 - 5.0 g/dL 3.7 - 3.7 3.4 3.8 3.8 3.9    Bilirubin (Direct) 0.0 - 0.2 mg/dL - - - - - - -    Alkaline Phosphatase 40 - 150 U/L 80 - 90 90 100 92 90    AST 0 - 45 U/L 15 - 20 16 22 16 14    ALT 0 - 70 U/L 22 - 24 22 27 22 26    MCV 78 - 100 fl 91 92 92 91 92 91 92               Primary Care Provider Fax #    Physician No Ref-Primary [phone]       No address on file        Equal Access to Services     ASHLYN FLANAGAN : Greg Ratliff, washiela luqadaha, qaybperla kaalmatimmy mchugh, vishal albright . So [facility] [phone].    ATENCIÓN: Si habla español, tiene a gusman disposición servicios gratuitos de asistencia lingüística. Destinyame al [phone].    We comply with applicable federal civil rights laws and [place] laws. We do not discriminate on the basis of race, color, national origin, age, disability, sex, sexual orientation, or gender identity.            Thank you!     Thank you for choosing BLOOD AND MARROW TRANSPLANT  for your care. Our goal is always to provide you with excellent care. Hearing back from our patients is one way we can continue to improve our services. Please take a few minutes to complete the written survey that you may receive in the mail after your visit with us. Thank you!             Your Updated Medication List - Protect others around you: Learn how to safely use, store and throw away your medicines at www.disposemymeds.org.          This list is accurate as of: 11/30/17 11:59 PM.  Always use your most recent med list.                   Brand Name Dispense Instructions for use Diagnosis    acyclovir 800 MG tablet    ZOVIRAX    150 tablet    Take 1 tablet (800 mg) by mouth 2 times daily    Stem cell transplant candidate, Acute myeloid leukemia in remission (H), History of peripheral stem cell transplant (H), Aspergillosis (H)       cholecalciferol 1000  UNITS capsule    vitamin  -D     Take 1 capsule by mouth daily        cyclobenzaprine 5 MG tablet    FLEXERIL     Take 1 tablet (5 mg) by mouth 3 times daily as needed for muscle spasms        diltiazem 360 MG 24 hr CD capsule    CARDIZEM CD; CARTIA XT    90 capsule    Take 1 capsule (360 mg) by mouth daily    AML (acute myeloid leukemia) in remission (H)       FLONASE NA      Spray in nostril as needed        guaiFENesin 600 MG 12 hr tablet    MUCINEX     Take 600 mg by mouth        heparin lock flush 10 UNIT/ML Soln injection     30 vial    5 mLs by Intracatheter route daily In each lumen    AML (acute myeloid leukemia) in remission (H)       LORazepam 0.5 MG tablet    ATIVAN    40 tablet    Take 1-2 tablets (0.5-1 mg) by mouth every 4 hours as needed for anxiety (nausea/vomiting/sleep)    Stem cell transplant candidate       losartan 50 MG tablet    COZAAR    30 tablet    Take 1 tablet (50 mg) by mouth daily    AML (acute myeloid leukemia) in remission (H)       magnesium oxide 400 MG tablet    MAG-OX    100 tablet    8 tabs daily    AML (acute myeloid leukemia) in remission (H)       metFORMIN 500 MG 24 hr tablet    GLUCOPHAGE-XR    60 tablet    Take 2 tablets (1,000 mg) by mouth daily (with dinner)    Type 2 diabetes mellitus without complication, without long-term current use of insulin (H)       MULTI COMPLETE PO           ondansetron 8 MG tablet    ZOFRAN    30 tablet    Take 1 tablet (8 mg) by mouth every 8 hours as needed for nausea    Stem cell transplant candidate       pantoprazole 40 MG EC tablet    PROTONIX    90 tablet    Take 1 tablet (40 mg) by mouth daily    Stem cell transplant candidate       pravastatin 20 MG tablet    PRAVACHOL    30 tablet    Take 1 tablet (20 mg) by mouth daily    Type 2 diabetes mellitus without complication, without long-term current use of insulin (H)       psyllium Packet    METAMUCIL/KONSYL    90 packet    Take 1 packet by mouth 3 times daily    AML (acute myeloid  leukemia) in remission (H)       sulfamethoxazole-trimethoprim 800-160 MG per tablet    BACTRIM DS/SEPTRA DS    30 tablet    Clinic will tell you when to start 1 tablet twice daily by mouth on Mondays and Tuesdays, start around day +28    Stem cell transplant candidate       tacrolimus 0.5 MG capsule    GENERIC EQUIVALENT    56 capsule    Take 2 capsules (1 mg) by mouth 2 times daily    Acute myeloid leukemia in remission (H)       triamcinolone 0.1 % cream    KENALOG    80 g    Apply sparingly to affected area three times daily as needed    AML (acute myeloid leukemia) in remission (H)       voriconazole 200 MG tablet    VFEND    60 tablet    Take 1 tablet (200 mg) by mouth 2 times daily    Stem cell transplant candidate       zolpidem 5 MG tablet    AMBIEN    45 tablet    Take 1-2 tablets (5-10 mg) by mouth nightly as needed for sleep    AML (acute myeloid leukemia) in remission (H)

## 2017-12-01 NOTE — PROGRESS NOTES
On 11/28 I received a call from the patient's primary hematologist, Dr. Pitts, to discuss the recent finding of circulating blasts in the patient's peripheral blood.  Those were identified late last week.  A bone marrow biopsy was obtained on 11/27 and the preliminary report at that time demonstrated approximately 30% blasts, consistent with relapsed acute leukemia with progression from the marrow of about a month ago.  Doctor Pitts and I discussed the number of possible alternatives of treatment for this patient including, but not limited to, aggressive reinduction chemotherapy, less aggressive palliative chemotherapy, and hospice care.  We also talked about the potential role for donor lymphocyte infusions in the management of this patient's clinical situation.  Our final decision was to recommend to the patient active therapy with aggressive induction chemotherapy.  The rationale for that recommendation is the patient's excellent overall clinical condition and young age.  He also tolerated the early transplant course very well with minimal complications and should be able to tolerate reinduction.  Dr. Pitts spoke with the patient last night to discuss with him the treatment alternatives.  Today I spoke with the patient at his request to further review his options and he wanted to hear from me my recommendation.  I reviewed with the patient the above and explained to him that while there may be higher risk with aggressive conventional induction chemotherapy, there is a better chance of getting disease control.  If he achieves a complete remission, we would then consider donor lymphocyte infusions, as long as he does not develop acute nbbgm-[id number]-pqsr disease.  The idea is that in some cases, the tissue damage caused by the induction chemotherapy exposes self-antigens of the patient to the donor cells and that can result in szqqz-mauebb-fmzt disease.  Of course, alloreactivity is what we would like so that  it would control the patient's leukemia as well.  The patient's wife asked whether or not the chemotherapy could kill the donor cells.  While it is a reasonable potential situation, this is typically not what happens.  Most patients undergoing induction chemotherapy post-transplant when they go into remission will recover full donor chimerism and hematopoiesis.  The patient and his wife asked a number of questions that were answered to their apparent satisfaction.  The patient wants to be aggressive and wants his best chance of long-term cure.  He feels well enough and does not think that palliative therapy would address his wishes.  Per his discussions with Dr. Pitts, he will be admitted on 12/04, next Monday.  He will then receive induction chemotherapy.  He will further discuss the regimen selected and particular doses and necessary adjustments with Dr. Pitts when he gets admitted.

## 2017-12-15 NOTE — NURSING NOTE
Clinical Data:     IRB # [id number]       PI Name:  Dr. Mandi Roland    Pager:  [phone]     :  Yovana Flowers   Pager:  [phone]   Phone:  [phone]      Clinical Trial Name:  Relapse Prophylaxis with IL-15 Super Agonist ALT-803 in Patients with Acute Myelogenous Leukemia and Myelodysplastic Syndrome Following Reduced Intensity Conditioning (AP) Allogeneic Stem Cell Transplantation      Informed Consent:       Dr. Charanjit Umanzor and I reviewed the consent form with Malathi.  She was provided with a copy of the IRB-approved treatment consent form, and all questions were answered before she agreed to participate by signing the informed consent document.  A copy of the signed form was provided to the patient.   HIPPA consent was also obtained and patient was provided with a copy.        Date:  9/20/2017     Consent Version Date:  4/3/2017      Consent obtained by:  Dr. Charanjit Umanzor

## 2018-01-01 ENCOUNTER — ONCOLOGY VISIT (OUTPATIENT)
Dept: TRANSPLANT | Facility: CLINIC | Age: 58
End: 2018-01-01
Attending: INTERNAL MEDICINE
Payer: COMMERCIAL

## 2018-01-01 ENCOUNTER — TELEPHONE (OUTPATIENT)
Dept: TRANSPLANT | Facility: CLINIC | Age: 58
End: 2018-01-01

## 2018-01-01 ENCOUNTER — TRANSFERRED RECORDS (OUTPATIENT)
Dept: HEALTH INFORMATION MANAGEMENT | Facility: CLINIC | Age: 58
End: 2018-01-01

## 2018-01-01 ENCOUNTER — HOSPITAL PATHOLOGY (OUTPATIENT)
Dept: OTHER | Facility: CLINIC | Age: 58
End: 2018-01-01

## 2018-01-01 ENCOUNTER — DOCUMENTATION ONLY (OUTPATIENT)
Dept: OTHER | Facility: CLINIC | Age: 58
End: 2018-01-01

## 2018-01-01 ENCOUNTER — APPOINTMENT (OUTPATIENT)
Dept: LAB | Facility: CLINIC | Age: 58
End: 2018-01-01
Attending: INTERNAL MEDICINE
Payer: COMMERCIAL

## 2018-01-01 ENCOUNTER — ONCOLOGY VISIT (OUTPATIENT)
Dept: TRANSPLANT | Facility: CLINIC | Age: 58
End: 2018-01-01

## 2018-01-01 VITALS
TEMPERATURE: 98 F | DIASTOLIC BLOOD PRESSURE: 80 MMHG | SYSTOLIC BLOOD PRESSURE: 142 MMHG | HEART RATE: 76 BPM | OXYGEN SATURATION: 99 % | RESPIRATION RATE: 16 BRPM

## 2018-01-01 VITALS
HEART RATE: 80 BPM | OXYGEN SATURATION: 99 % | SYSTOLIC BLOOD PRESSURE: 135 MMHG | BODY MASS INDEX: 28.46 KG/M2 | DIASTOLIC BLOOD PRESSURE: 81 MMHG | RESPIRATION RATE: 16 BRPM | WEIGHT: 187.2 LBS | TEMPERATURE: 97.7 F

## 2018-01-01 DIAGNOSIS — C92.00 ACUTE MYELOID LEUKEMIA NOT HAVING ACHIEVED REMISSION (H): Primary | ICD-10-CM

## 2018-01-01 DIAGNOSIS — Z94.84 HISTORY OF PERIPHERAL STEM CELL TRANSPLANT (H): Primary | ICD-10-CM

## 2018-01-01 DIAGNOSIS — Z94.84 HISTORY OF PERIPHERAL STEM CELL TRANSPLANT (H): ICD-10-CM

## 2018-01-01 DIAGNOSIS — C92.00 ACUTE MYELOID LEUKEMIA NOT HAVING ACHIEVED REMISSION (H): ICD-10-CM

## 2018-01-01 DIAGNOSIS — C92.01 AML (ACUTE MYELOID LEUKEMIA) IN REMISSION (H): Primary | ICD-10-CM

## 2018-01-01 DIAGNOSIS — C92.01 AML (ACUTE MYELOID LEUKEMIA) IN REMISSION (H): ICD-10-CM

## 2018-01-01 LAB
ALBUMIN SERPL-MCNC: 2.8 G/DL (ref 3.4–5)
ALP SERPL-CCNC: 97 U/L (ref 40–150)
ALT SERPL W P-5'-P-CCNC: 25 U/L (ref 0–70)
ALT SERPL-CCNC: 21 U/L (ref 0–55)
ANION GAP SERPL CALCULATED.3IONS-SCNC: 11 MMOL/L (ref 3–14)
ANISOCYTOSIS BLD QL SMEAR: SLIGHT
AST SERPL W P-5'-P-CCNC: 27 U/L (ref 0–45)
AST SERPL-CCNC: 24 U/L (ref 0–35)
BASOPHILS # BLD AUTO: 0 10E9/L (ref 0–0.2)
BASOPHILS NFR BLD AUTO: 0 %
BILIRUB SERPL-MCNC: 0.6 MG/DL (ref 0.2–1.3)
BLD PROD TYP BPU: NORMAL
BLD UNIT ID BPU: 0
BLOOD PRODUCT CODE: NORMAL
BPU ID: NORMAL
BUN SERPL-MCNC: 12 MG/DL (ref 7–30)
CALCIUM SERPL-MCNC: 8.2 MG/DL (ref 8.5–10.1)
CHLORIDE SERPL-SCNC: 105 MMOL/L (ref 94–109)
CO2 SERPL-SCNC: 20 MMOL/L (ref 20–32)
COPATH REPORT: NORMAL
CREAT SERPL-MCNC: 0.87 MG/DL (ref 0.8–1.3)
CREAT SERPL-MCNC: 0.88 MG/DL (ref 0.66–1.25)
DIFFERENTIAL METHOD BLD: ABNORMAL
EOSINOPHIL # BLD AUTO: 0 10E9/L (ref 0–0.7)
EOSINOPHIL NFR BLD AUTO: 4.5 %
ERYTHROCYTE [DISTWIDTH] IN BLOOD BY AUTOMATED COUNT: 15.7 % (ref 10–15)
GFR SERPL CREATININE-BSD FRML MDRD: 89 ML/MIN/1.7M2
GFR SERPL CREATININE-BSD FRML MDRD: 90 ML/MIN/1.73M2
GLUCOSE SERPL-MCNC: 237 MG/DL (ref 70–99)
GLUCOSE SERPL-MCNC: 97 MG/DL (ref 70–100)
HCT VFR BLD AUTO: 20.5 % (ref 40–53)
HGB BLD-MCNC: 7.2 G/DL (ref 13.3–17.7)
LYMPHOCYTES # BLD AUTO: 0.3 10E9/L (ref 0.8–5.3)
LYMPHOCYTES NFR BLD AUTO: 51.2 %
MCH RBC QN AUTO: 30.6 PG (ref 26.5–33)
MCHC RBC AUTO-ENTMCNC: 35.1 G/DL (ref 31.5–36.5)
MCV RBC AUTO: 87 FL (ref 78–100)
MONOCYTES # BLD AUTO: 0 10E9/L (ref 0–1.3)
MONOCYTES NFR BLD AUTO: 1.1 %
NEUTROPHILS # BLD AUTO: 0.1 10E9/L (ref 1.6–8.3)
NEUTROPHILS NFR BLD AUTO: 18.2 %
NRBC # BLD AUTO: 0 10*3/UL
NRBC BLD AUTO-RTO: 8 /100
OTHER CELLS # BLD MANUAL: 0.2 10E9/L
OTHER CELLS NFR BLD MANUAL: 25 %
PLATELET # BLD AUTO: 8 10E9/L (ref 150–450)
PLATELET # BLD EST: ABNORMAL 10*3/UL
POTASSIUM SERPL-SCNC: 3.9 MEQ/L (ref 3.5–5.3)
POTASSIUM SERPL-SCNC: 3.9 MMOL/L (ref 3.4–5.3)
PROT SERPL-MCNC: 6.6 G/DL (ref 6.8–8.8)
RBC # BLD AUTO: 2.35 10E12/L (ref 4.4–5.9)
SODIUM SERPL-SCNC: 136 MMOL/L (ref 133–144)
TRANSFUSION STATUS PATIENT QL: NORMAL
TRANSFUSION STATUS PATIENT QL: NORMAL
WBC # BLD AUTO: 0.6 10E9/L (ref 4–11)

## 2018-01-01 PROCEDURE — 81120 IDH1 COMMON VARIANTS: CPT | Performed by: INTERNAL MEDICINE

## 2018-01-01 PROCEDURE — 36430 TRANSFUSION BLD/BLD COMPNT: CPT

## 2018-01-01 PROCEDURE — 80053 COMPREHEN METABOLIC PANEL: CPT | Performed by: INTERNAL MEDICINE

## 2018-01-01 PROCEDURE — G0463 HOSPITAL OUTPT CLINIC VISIT: HCPCS | Mod: 25

## 2018-01-01 PROCEDURE — P9073 PLATELETS PHERESIS PATH REDU: HCPCS | Performed by: PHYSICIAN ASSISTANT

## 2018-01-01 PROCEDURE — 81245 FLT3 GENE: CPT | Performed by: INTERNAL MEDICINE

## 2018-01-01 PROCEDURE — 25000128 H RX IP 250 OP 636: Mod: ZF | Performed by: INTERNAL MEDICINE

## 2018-01-01 PROCEDURE — 81121 IDH2 COMMON VARIANTS: CPT | Performed by: INTERNAL MEDICINE

## 2018-01-01 PROCEDURE — 85025 COMPLETE CBC W/AUTO DIFF WBC: CPT | Performed by: INTERNAL MEDICINE

## 2018-01-01 RX ORDER — HEPARIN SODIUM,PORCINE 10 UNIT/ML
5 VIAL (ML) INTRAVENOUS ONCE
Status: COMPLETED | OUTPATIENT
Start: 2018-01-01 | End: 2018-01-01

## 2018-01-01 RX ORDER — DILTIAZEM HYDROCHLORIDE 120 MG/1
120 CAPSULE, COATED, EXTENDED RELEASE ORAL DAILY
Qty: 90 CAPSULE | Refills: 1 | COMMUNITY
Start: 2018-01-01

## 2018-01-01 RX ORDER — PROMETHAZINE HYDROCHLORIDE 25 MG/1
25 TABLET ORAL EVERY 6 HOURS PRN
COMMUNITY

## 2018-01-01 RX ADMIN — SODIUM CHLORIDE, PRESERVATIVE FREE 5 ML: 5 INJECTION INTRAVENOUS at 10:02

## 2018-01-01 ASSESSMENT — PAIN SCALES - GENERAL: PAINLEVEL: NO PAIN (0)

## 2018-01-11 PROBLEM — Z71.89 ACP (ADVANCE CARE PLANNING): Chronic | Status: ACTIVE | Noted: 2018-01-01

## 2018-01-11 NOTE — PROGRESS NOTES
Today I spoke with Mr. Real on the phone.  His wife was there with him and listening with us.  Mr. Whitlock has now underwent chemotherapy with clofarabine and cytarabine with no response of his AML.  He then received Mylotarg and the bone marrow obtained late last week still shows about 60-65% blasts.  However, Mylotarg may have delayed responses and he is still hypocellular with low blood counts.  In addition, the patient has Pseudomonas bacteremia.  Dr. Pitts and I discussed the findings and a planned this morning.  The patient has initial recovery of his blood counts and as long as he is a stable he will have a bone marrow biopsy repeated in the next week or so.  If there is evidence of response even if partial he may be eligible to receive a second course of Mylotarg.  If no response but the patient remains clinically stable and fit he may be a candidate for a targeted therapy with the IDH inhibitor or sorafenib.  With the next bone marrow biopsy with Dr. Pitts will send molecular testing to determine the positivity to IDH.  If the patient deteriorates clinically he would be transferred hospice care; he is already DNR/DNI.  I reassured the patient that Dr. Ledesma and I will continue to communicate and put our heads together in guiding his therapy.  He understands this remains a very difficult situation and that his prognosis may be poor.  I am also available to speak with the patient and he knows how to get in touch with our facility.

## 2018-01-11 NOTE — MR AVS SNAPSHOT
After Visit Summary   1/11/2018    Norman Real    MRN: [id number]           Patient Information     Date Of Birth          1960        Visit Information        Provider Department      1/11/2018 10:09 AM Charanjit Umanzor MD Blood and Marrow Transplant        Today's Diagnoses     Acute myeloid leukemia not having achieved remission (H)    -  1          [facility] ([facility])  [address] [place] [id number]  Phone: [phone]  Clinic Hours:   Monday-Thursday:7am to 7pm   Friday: 7am to 5pm   Weekends and holidays:    8am to noon (in general)  If your fever is 100.5  or greater,   call the clinic.  After hours call the   hospital at [phone] or   [phone]. Ask for the BMT   fellow on-call            Follow-ups after your visit        Your next 10 appointments already scheduled     Jan 31, 2018 10:30 AM CST   Return with Charanjit Umanzor MD   [facility] Blood and Marrow Transplant ([facility])    [address] [place] [address]  [address] [place] [id number] [phone]              Who to contact     Please call your clinic at [phone] to:    Ask questions about your health    Make or cancel appointments    Discuss your medicines    Learn about your test results    Speak to your doctor   If you have compliments or concerns about an experience at your clinic, or if you wish to file a complaint, please contact [facility] Patient Relations at [phone] or email us at [email or web address]         Additional Information About Your Visit        MyChart Information     CI[facility] is an electronic gateway that provides easy, online access to your medical records. With CIHI, you can request a clinic appointment, read your test results, renew a prescription or communicate with your care team.     To sign up for [facility]HI visit the website at [email or web address]   You will be  asked to enter the access code listed below, as well as some personal information. Please follow the directions to create your username and password.     Your access code is: [email or web address]  Expires: 2018  6:30 AM     Your access code will  in 90 days. If you need help or a new code, please contact your [facility] or call [phone] for assistance.        Care EveryWhere ID     This is your Care EveryWhere ID. This could be used by other organizations to access your [facility] medical records  [id number]         Blood Pressure from Last 3 Encounters:   17 151/88   17 139/90   10/31/17 141/85    Weight from Last 3 Encounters:   17 89.9 kg (198 lb 3.2 oz)   17 89.4 kg (197 lb)   10/31/17 89.6 kg (197 lb 9.6 oz)              Today, you had the following     No orders found for display         Today's Medication Changes          These changes are accurate as of: 18 10:16 AM.  If you have any questions, ask your nurse or doctor.               These medicines have changed or have updated prescriptions.        Dose/Directions    magnesium oxide 400 MG tablet   Commonly known as:  MAG-OX   This may have changed:    - how much to take  - additional instructions   Used for:  AML (acute myeloid leukemia) in remission (H)        8 tabs daily   Quantity:  100 tablet   Refills:  1                Recent Review Flowsheet Data     BMT Recent Results Latest Ref Rng & Units 2017 2017 10/4/2017 10/11/2017 10/18/2017 10/25/2017 10/31/2017    WBC 4.0 - 11.0 10e9/L 3.4(L) 2.5(L) 3.1(L) 3.5(L) 3.3(L) 2.8(L) 3.2(L)    Hemoglobin 13.3 - 17.7 g/dL 11.0(L) 11.0(L) 10.6(L) 10.6(L) 11.1(L) 10.7(L) 10.9(L)    Platelet Count 150 - 450 10e9/L 145(L) 137(L) 158 182 193 182 163    Neutrophils (Absolute) 1.6 - 8.3 10e9/L 1.8 1.2(L) 1.4(L) 2.0 1.5(L) 1.2(L) 1.5(L)    INR 0.86 - 1.14 - - - - - - -    Sodium 133 - 144 mmol/L 139 138 136 136 135 137 136    Potassium 3.4 - 5.3  mmol/L 4.0 4.0 4.1 4.1 4.2 4.3 3.8    Chloride 94 - 109 mmol/L 105 106 103 102 102 103 103    Glucose 70 - 99 mg/dL 152(H) 160(H) 172(H) 158(H) 159(H) 145(H) 152(H)    Urea Nitrogen 7 - 30 mg/dL 14 12 12 13 14 14 18    Creatinine 0.66 - 1.25 mg/dL 1.16 1.25 1.23 1.10 1.26(H) 1.21 1.28(H)    Calcium (Total) 8.5 - 10.1 mg/dL 8.6 8.7 8.6 9.0 9.0 8.8 8.4(L)    Protein (Total) 6.8 - 8.8 g/dL 7.1 - 7.2 7.3 7.4 7.3 7.2    Albumin 3.4 - 5.0 g/dL 3.7 - 3.7 3.4 3.8 3.8 3.9    Bilirubin (Direct) 0.0 - 0.2 mg/dL - - - - - - -    Alkaline Phosphatase 40 - 150 U/L 80 - 90 90 100 92 90    AST 0 - 45 U/L 15 - 20 16 22 16 14    ALT 0 - 70 U/L 22 - 24 22 27 22 26    MCV 78 - 100 fl 91 92 92 91 92 91 92               Primary Care Provider Fax #    Physician No Ref-Primary [phone]       No address on file        Equal Access to Services     ASHLYN FLANAGAN AH: Hadii aad ku jacko Sobrenda, waaxda luqadaha, qaybta kaalmada adesherley, vishal albright . [facility] [phone].    ATENCIÓN: Si habla español, tiene a gusman disposición servicios gratuitos de asistencia lingüística. Llame al [phone].    We comply with applicable federal civil rights laws and [place] laws. We do not discriminate on the basis of race, color, national origin, age, disability, sex, sexual orientation, or gender identity.            Thank you!     Thank you for choosing [facility]ARROW [facility]ANSPL[facility]  for your care. Our goal is always to provide you with excellent care. Hearing back from our patients is one way we can continue to improve our services. Please take a few minutes to complete the written survey that you may receive in the mail after your visit with us. Thank you!             Your Updated Medication List - Protect others around you: Learn how to safely use, store and throw away your medicines at www.disposemymeds.org.          This list is accurate as of: 1/11/18 10:16 AM.  Always use your most recent med list.                    Brand Name Dispense Instructions for use Diagnosis    acyclovir 800 MG tablet    ZOVIRAX    150 tablet    Take 1 tablet (800 mg) by mouth 2 times daily    Stem cell transplant candidate, Acute myeloid leukemia in remission (H), History of peripheral stem cell transplant (H), Aspergillosis (H)       cholecalciferol 1000 UNITS capsule    vitamin  -D     Take 1 capsule by mouth daily        cyclobenzaprine 5 MG tablet    FLEXERIL     Take 1 tablet (5 mg) by mouth 3 times daily as needed for muscle spasms        diltiazem 360 MG 24 hr CD capsule    CARDIZEM CD; CARTIA XT    90 capsule    Take 1 capsule (360 mg) by mouth daily    AML (acute myeloid leukemia) in remission (H)       FLONASE NA      Spray in nostril as needed        guaiFENesin 600 MG 12 hr tablet    MUCINEX     Take 600 mg by mouth        heparin lock flush 10 UNIT/ML Soln injection     30 vial    5 mLs by Intracatheter route daily In each lumen    AML (acute myeloid leukemia) in remission (H)       LORazepam 0.5 MG tablet    ATIVAN    40 tablet    Take 1-2 tablets (0.5-1 mg) by mouth every 4 hours as needed for anxiety (nausea/vomiting/sleep)    Stem cell transplant candidate       losartan 50 MG tablet    COZAAR    30 tablet    Take 1 tablet (50 mg) by mouth daily    AML (acute myeloid leukemia) in remission (H)       magnesium oxide 400 MG tablet    MAG-OX    100 tablet    8 tabs daily    AML (acute myeloid leukemia) in remission (H)       metFORMIN 500 MG 24 hr tablet    GLUCOPHAGE-XR    60 tablet    Take 2 tablets (1,000 mg) by mouth daily (with dinner)    Type 2 diabetes mellitus without complication, without long-term current use of insulin (H)       MULTI COMPLETE PO           ondansetron 8 MG tablet    ZOFRAN    30 tablet    Take 1 tablet (8 mg) by mouth every 8 hours as needed for nausea    Stem cell transplant candidate       pantoprazole 40 MG EC tablet    PROTONIX    90 tablet    Take 1 tablet (40 mg) by mouth daily    Stem cell transplant  candidate       pravastatin 20 MG tablet    PRAVACHOL    30 tablet    Take 1 tablet (20 mg) by mouth daily    Type 2 diabetes mellitus without complication, without long-term current use of insulin (H)       psyllium Packet    METAMUCIL/KONSYL    90 packet    Take 1 packet by mouth 3 times daily    AML (acute myeloid leukemia) in remission (H)       sulfamethoxazole-trimethoprim 800-160 MG per tablet    BACTRIM DS/SEPTRA DS    30 tablet    Clinic will tell you when to start 1 tablet twice daily by mouth on Mondays and Tuesdays, start around day +28    Stem cell transplant candidate       tacrolimus 0.5 MG capsule    GENERIC EQUIVALENT    56 capsule    Take 2 capsules (1 mg) by mouth 2 times daily    Acute myeloid leukemia in remission (H)       triamcinolone 0.1 % cream    KENALOG    80 g    Apply sparingly to affected area three times daily as needed    AML (acute myeloid leukemia) in remission (H)       voriconazole 200 MG tablet    VFEND    60 tablet    Take 1 tablet (200 mg) by mouth 2 times daily    Stem cell transplant candidate       zolpidem 5 MG tablet    AMBIEN    45 tablet    Take 1-2 tablets (5-10 mg) by mouth nightly as needed for sleep    AML (acute myeloid leukemia) in remission (H)

## 2018-01-23 NOTE — TELEPHONE ENCOUNTER
Per RN CC, pt would like a [facility] Referral for 1/30/2018-1/31/2018. SW sent referral to the [facility]. SW spoke with pt today regarding a [facility] referral and provided update that the referral was sent. Pt did not have any further questions or concerns.     LEANDRO Khalil, [facility]  Phone: [phone]  Pager: [phone]

## 2018-01-31 NOTE — PROGRESS NOTES
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 Erna MD          INTERVAL  HISTORY      HPI: Now 189 days after his allogeneic sibling transplant.    The patient was found to have relapsed acute leukemia h around the 100.  Since then we tapered off his immunosuppression but he did not develop graft versus host of graft versus leukemia effect.  He continued to be followed by Dr. Pitts in [place] where he received induction chemotherapy with clofarabine and cytarabine.  He had no response to that.  We then gave him salvage with Mylotarg.  His most recent bone marrow has 10-60% cellularity with 30% blasts.  This is down from 46% blasts at the initiation of the Mylotarg.  The most significant complication of the his induction was Pseudomonas sepsis.  The patient has been depressed but with a stable mood using citalopram.  He is taking prophylactic antibiotics.  He has been neutropenic and transfusion dependent.  He has been having outpatient management for the last 2 weeks.  The prolonged stay in the hospital was very difficult and hard on his mood.  We did with the most recent bone marrow sent testing for FLT3 and IDH mutations.  Those results are not yet available.    Today other than the depressed mood the patient feels well.  He has had no fevers.  He had a slight bruise on the next his left ear.  That does not seem to be progressing.  He is eating some but lost some weight.  He is drinking fluids.  His had no fevers at home.  He has no cough or shortness of breath.  He has no current nausea or vomiting.      Review of Systems: 10 point ROS negative except as noted above.          Labs:  Lab Results   Component Value Date    WBC 0.6 (LL)  01/31/2018    ANEU 0.1 (LL) 01/31/2018    HGB 7.2 (L) 01/31/2018    HCT 20.5 (L) 01/31/2018    PLT 8 (LL) 01/31/2018     01/31/2018    POTASSIUM 3.9 01/31/2018    CHLORIDE 105 01/31/2018    CO2 20 01/31/2018     (H) 01/31/2018    BUN 12 01/31/2018    CR 0.88 01/31/2018    MAG 1.6 10/25/2017    INR 0.9 11/01/2017    BILITOTAL 0.6 01/31/2018    AST 27 01/31/2018    ALT 25 01/31/2018    ALKPHOS 97 01/31/2018    PROTTOTAL 6.6 (L) 01/31/2018    ALBUMIN 2.8 (L) 01/31/2018       ASSESSMENT BY SYSTEMS   Norman Salazar Real is a 55 yo man D+189 sp NMA allo sib PBSCT for AML        1. BMT: Transplant 7/27. Initial stem cell product only contained 2.33 CD34/kg, additional 0.66 CD34/kg on 7/27 for a total of 2.99.   -  D28 BMbx showed no increase in blasts.  98% donor BM (8/15/17); 90% donor CD3; 100% donor CD15.     2. AML: See the recent patient's AML history is summarized in the HPI.  There are also telephone contact between me and the patient and his primary oncologist in [place].  In summary, the patient has persistent leukemia despite 2 lines of therapy.  At this time we talked to him about waiting for the results of the recent molecular testing to determine if there is targeted therapy that can be used.  I also asked our natural killer cell research nurse to determine if there is any protocols that he might be eligible now that he is 6 months posttransplant, despite his relapse at the 100.  Because this would involve come in to [place] and being hospitalized it is unlikely that the patient will agree to that, though.  The patient would prefer oral outpatient therapy of some kind.  He would probably be agreeable to a second round of Mylotarg in the outpatient setting.  He was not very encouraged about the possibility of more aggressive chemotherapy such as mitoxantrone etoposide.  We briefly talked about the need to control his leukemia before we were to attempt to donor lymphocyte infusions.  That is rarely  successful in the context of bulky leukemia presence of the bone marrow.  The patient and his wife asked questions that were answered to their apparent satisfaction.  I will communicate with them and with his primary doctor in [place] once the molecular testing and additional information on natural killer cell programs are available later this week.      3. HEME: PLT transfusion today. He will get RBC in [place] tomorrow.   - Keep Hgb>8 and plts>10K. No pre-meds.    4.  ID:  Afebrile. No active infections.   - Pentamidine 10/25.      5.  GI: well controlled GI symptoms. Reduced appetite.   - Mild, intermittent nausea: cont Ativan prn and monitor.  - Protonix for GI prophy.   - Hx s/p left sided colectomy for recurrent diverticulitis.        6.  GVH: No aGVH. stopped MMF 8/25. Off immuno suppression.     7.  FEN/Renal: Creat stable.   - Hx of hypoMg. Cont MgOx 8 tabs daily.        8. CV: Adequate BP control off losartan 50 mg and on diltiazem  mg.  - Hx tachycardia with arrhythmia, s/p 3 ablations  - hold crestor through transplant      9. Endo:  Hx DM type II. Per endocrine recs, DC Lantus & now on metformin. Endocrine f/u 11/2.        10. : Hx prostate cancer. No current issues.    11. Neuro: History of chronic insomnia. Ambien prn.      Plan:  I will communicate by phone when result available. No follow here planned.

## 2018-01-31 NOTE — NURSING NOTE
"Oncology Rooming Note    January 31, 2018 10:32 AM   Norman Real is a 57 year old male who presents for:    Chief Complaint   Patient presents with     Blood Draw     Labs drawn via cvc by RN     RECHECK     AML     Initial Vitals: /80 (BP Location: Right arm, Patient Position: Chair, Cuff Size: Adult Regular)  Pulse 95  Temp 97.7  F (36.5  C) (Oral)  Resp 16  Wt 84.9 kg (187 lb 3.2 oz)  SpO2 100%  BMI 28.46 kg/m2 Estimated body mass index is 28.46 kg/(m^2) as calculated from the following:    Height as of 11/2/17: 1.727 m (5' 8\").    Weight as of this encounter: 84.9 kg (187 lb 3.2 oz). Body surface area is 2.02 meters squared.  No Pain (0) Comment: Data Unavailable   No LMP for male patient.  Allergies reviewed: Yes  Medications reviewed: Yes    Medications: Unknown    Pharmacy name entered into [employer]:    [id number] - [place], [place] - [address]  [id number] - [place], [place] - [address] [place] [address]    Clinical concerns: Unable to complete rooming process.     10 minutes for nursing intake (face to face time)     SATISH WOODARD CMA              "

## 2018-01-31 NOTE — PROGRESS NOTES
Infusion Nursing Note:  Norman Real presents today for add-on transfusion.    Patient seen by provider today: Yes: Dr. Umanzor   present during visit today: Not Applicable.    Note: Labs were monitored.  Consent to Receive a Blood Transfusion was obtained by Provider and signed by Patient.    Intravenous Access:  PICC.    Treatment Conditions:  Patient received an add-on platelet transfusion for a platelet count of 8.  His Hgb is 7.2 and Patient is planning on receiving red blood cells in [place] tomorrow.      Post Infusion Assessment:  Patient tolerated transfusion without incident.    Discharge Plan:   Patient discharged in stable condition accompanied by: wife.    CURT JASSO RN

## 2018-01-31 NOTE — NURSING NOTE
Critical results called from lab-Plts 8k, hbg 7.2, ANC 0.1, WBC 0.6. Dr. Umanzor notified of critical results.

## 2018-01-31 NOTE — MR AVS SNAPSHOT
After Visit Summary   1/31/2018    Norman Real    MRN: [id number]           Patient Information     Date Of Birth          1960        Visit Information        Provider Department      1/31/2018 10:30 AM Charanjit Umanzor MD [facility] Blood and Marrow Transplant        Today's Diagnoses     History of peripheral stem cell transplant (H)    -  1    Acute myeloid leukemia not having achieved remission (H)        AML (acute myeloid leukemia) in remission (H)              [facility] ([facility] [place])  [address] [place] [address]  Phone: [phone]  Clinic Hours:   Monday-Thursday:7am to 7pm   Friday: 7am to 5pm   Weekends and holidays:    8am to noon (in general)  If your fever is 100.5  or greater,   call the clinic.  After hours call the   hospital at [phone] or   [phone]. Ask for the BMT   fellow on-call            Follow-ups after your visit        Future tests that were ordered for you today     Open Standing Orders        Priority Remaining Interval Expires Ordered    Platelets prepare order unit Routine 99/100 CONDITIONAL (SPECIFY) BLOOD  1/31/2018    Transfuse platelets unit Routine 99/100 TRANSFUSE 1 DOSE  1/31/2018            Who to contact     If you have questions or need follow up information about today's clinic visit or your schedule please contact [facility] BLOOD AND MARROW TRANSPLANT directly at [phone].  Normal or non-critical lab and imaging results will be communicated to you by MyChart, letter or phone within 4 business days after the clinic has received the results. If you do not hear from us within 7 days, please contact the clinic through MyChart or phone. If you have a critical or abnormal lab result, we will notify you by phone as soon as possible.  Submit refill requests through [employer] or call your pharmacy and they will forward the refill request to us. Please allow 3 business days for your refill to be completed.        "   Additional Information About Your Visit        MyChart Information     [facility] lets you send messages to your doctor, view your test results, renew your prescriptions, schedule appointments and more. To sign up, go to [email or web address] . Click on \"Log in\" on the left side of the screen, which will take you to the Welcome page. Then click on \"Sign up Now\" on the right side of the page.     You will be asked to enter the access code listed below, as well as some personal information. Please follow the directions to create your username and password.     Your access code is: [email or web address]  Expires: 2018  6:30 AM     Your access code will  in 90 days. If you need help or a new code, please call your [place] clinic or [phone].        Care EveryWhere ID     This is your Care EveryWhere ID. This could be used by other organizations to access your [place] medical records  [id number]        Your Vitals Were     Pulse Temperature Respirations Pulse Oximetry BMI (Body Mass Index)       95 97.7  F (36.5  C) (Oral) 16 100% 28.46 kg/m2        Blood Pressure from Last 3 Encounters:   18 125/80   17 151/88   17 139/90    Weight from Last 3 Encounters:   18 84.9 kg (187 lb 3.2 oz)   17 89.9 kg (198 lb 3.2 oz)   17 89.4 kg (197 lb)              We Performed the Following     Blood component     CBC with platelets differential     Comprehensive metabolic panel     Platelets prepare order unit          Today's Medication Changes          These changes are accurate as of 18 12:00 PM.  If you have any questions, ask your nurse or doctor.               These medicines have changed or have updated prescriptions.        Dose/Directions    CARDIZEM  MG 24 hr capsule   This may have changed:  Another medication with the same name was removed. Continue taking this medication, and follow the directions you see here.   Generic drug:  diltiazem   Changed by:  Erna, " Charanjit Heaton MD        Dose:  120 mg   Take 1 capsule (120 mg) by mouth daily   Quantity:  90 capsule   Refills:  1       magnesium oxide 400 MG tablet   Commonly known as:  MAG-OX   This may have changed:    - how much to take  - additional instructions   Used for:  AML (acute myeloid leukemia) in remission (H)        8 tabs daily   Quantity:  100 tablet   Refills:  1         Stop taking these medicines if you haven't already. Please contact your care team if you have questions.     losartan 50 MG tablet   Commonly known as:  COZAAR   Stopped by:  Charanjit Umanzor MD           tacrolimus 0.5 MG capsule   Commonly known as:  GENERIC EQUIVALENT   Stopped by:  Charanjit Umanzor MD                    Recent Review Flowsheet Data     BMT Recent Results Latest Ref Rng & Units 9/29/2017 10/4/2017 10/11/2017 10/18/2017 10/25/2017 10/31/2017 1/31/2018    WBC 4.0 - 11.0 10e9/L 2.5(L) 3.1(L) 3.5(L) 3.3(L) 2.8(L) 3.2(L) 0.6(LL)    Hemoglobin 13.3 - 17.7 g/dL 11.0(L) 10.6(L) 10.6(L) 11.1(L) 10.7(L) 10.9(L) 7.2(L)    Platelet Count 150 - 450 10e9/L 137(L) 158 182 193 182 163 8(LL)    Neutrophils (Absolute) 1.6 - 8.3 10e9/L 1.2(L) 1.4(L) 2.0 1.5(L) 1.2(L) 1.5(L) 0.1(LL)    INR 0.86 - 1.14 - - - - - - -    Sodium 133 - 144 mmol/L 138 136 136 135 137 136 136    Potassium 3.4 - 5.3 mmol/L 4.0 4.1 4.1 4.2 4.3 3.8 3.9    Chloride 94 - 109 mmol/L 106 103 102 102 103 103 105    Glucose 70 - 99 mg/dL 160(H) 172(H) 158(H) 159(H) 145(H) 152(H) 237(H)    Urea Nitrogen 7 - 30 mg/dL 12 12 13 14 14 18 12    Creatinine 0.66 - 1.25 mg/dL 1.25 1.23 1.10 1.26(H) 1.21 1.28(H) 0.88    Calcium (Total) 8.5 - 10.1 mg/dL 8.7 8.6 9.0 9.0 8.8 8.4(L) 8.2(L)    Protein (Total) 6.8 - 8.8 g/dL - 7.2 7.3 7.4 7.3 7.2 6.6(L)    Albumin 3.4 - 5.0 g/dL - 3.7 3.4 3.8 3.8 3.9 2.8(L)    Bilirubin (Direct) 0.0 - 0.2 mg/dL - - - - - - -    Alkaline Phosphatase 40 - 150 U/L - 90 90 100 92 90 97    AST 0 - 45 U/L - 20 16 22 16 14 27    ALT 0 - 70  U/L - 24 22 27 22 26 25    MCV 78 - 100 fl 92 92 91 92 91 92 87               Primary Care Provider Fax #    Physician No Ref-Primary [phone]       No address on file        Equal Access to Services     ASHLYN APARICIOCRISPIN : Greg Ratliff, waomarda luqadaha, qaybta kaalmada lolita, vishal lópez. So [facility] [phone].    ATENCIÓN: Si habla español, tiene a gusman disposición servicios gratuitos de asistencia lingüística. Llame al [phone].    We comply with applicable federal civil rights laws and [place] laws. We do not discriminate on the basis of race, color, national origin, age, disability, sex, sexual orientation, or gender identity.            Thank you!     Thank you for choosing [facility] BLOOD AND MARROW TRANSPLANT  for your care. Our goal is always to provide you with excellent care. Hearing back from our patients is one way we can continue to improve our services. Please take a few minutes to complete the written survey that you may receive in the mail after your visit with us. Thank you!             Your Updated Medication List - Protect others around you: Learn how to safely use, store and throw away your medicines at www.disposemymeds.org.          This list is accurate as of 1/31/18 12:00 PM.  Always use your most recent med list.                   Brand Name Dispense Instructions for use Diagnosis    acyclovir 800 MG tablet    ZOVIRAX    150 tablet    Take 1 tablet (800 mg) by mouth 2 times daily    Stem cell transplant candidate, Acute myeloid leukemia in remission (H), History of peripheral stem cell transplant (H), Aspergillosis (H)       CARDIZEM  MG 24 hr capsule   Generic drug:  diltiazem     90 capsule    Take 1 capsule (120 mg) by mouth daily        CELEXA PO      Take 20 mg by mouth        cholecalciferol 1000 UNITS capsule    vitamin  -D     Take 1 capsule by mouth daily        CIPROFLOXACIN PO      Take 500 mg by mouth         cyclobenzaprine 5 MG tablet    FLEXERIL     Take 1 tablet (5 mg) by mouth 3 times daily as needed for muscle spasms        FLONASE NA      Spray in nostril as needed        guaiFENesin 600 MG 12 hr tablet    MUCINEX     Take 600 mg by mouth        heparin lock flush 10 UNIT/ML Soln injection     30 vial    5 mLs by Intracatheter route daily In each lumen    AML (acute myeloid leukemia) in remission (H)       HYDROCHLOROTHIAZIDE PO      Take 50 mg by mouth        LORazepam 0.5 MG tablet    ATIVAN    40 tablet    Take 1-2 tablets (0.5-1 mg) by mouth every 4 hours as needed for anxiety (nausea/vomiting/sleep)    Stem cell transplant candidate       magnesium oxide 400 MG tablet    MAG-OX    100 tablet    8 tabs daily    AML (acute myeloid leukemia) in remission (H)       metFORMIN 500 MG 24 hr tablet    GLUCOPHAGE-XR    60 tablet    Take 2 tablets (1,000 mg) by mouth daily (with dinner)    Type 2 diabetes mellitus without complication, without long-term current use of insulin (H)       MULTI COMPLETE PO           ondansetron 8 MG tablet    ZOFRAN    30 tablet    Take 1 tablet (8 mg) by mouth every 8 hours as needed for nausea    Stem cell transplant candidate       pantoprazole 40 MG EC tablet    PROTONIX    90 tablet    Take 1 tablet (40 mg) by mouth daily    Stem cell transplant candidate       PEPCID PO      Take 20 mg by mouth        pravastatin 20 MG tablet    PRAVACHOL    30 tablet    Take 1 tablet (20 mg) by mouth daily    Type 2 diabetes mellitus without complication, without long-term current use of insulin (H)       promethazine 25 MG tablet    PHENERGAN     Take 25 mg by mouth every 6 hours as needed for nausea        psyllium Packet    METAMUCIL/KONSYL    90 packet    Take 1 packet by mouth 3 times daily    AML (acute myeloid leukemia) in remission (H)       sulfamethoxazole-trimethoprim 800-160 MG per tablet    BACTRIM DS/SEPTRA DS    30 tablet    Clinic will tell you when to start 1 tablet twice daily by  mouth on Mondays and Tuesdays, start around day +28    Stem cell transplant candidate       triamcinolone 0.1 % cream    KENALOG    80 g    Apply sparingly to affected area three times daily as needed    AML (acute myeloid leukemia) in remission (H)       voriconazole 200 MG tablet    VFEND    60 tablet    Take 1 tablet (200 mg) by mouth 2 times daily    Stem cell transplant candidate       zolpidem 5 MG tablet    AMBIEN    45 tablet    Take 1-2 tablets (5-10 mg) by mouth nightly as needed for sleep    AML (acute myeloid leukemia) in remission (H)

## 2018-01-31 NOTE — LETTER
1/31/2018      RE: Norman Real  PO   [facility] [address]       BMT Clinic Note      Patient ID:  Norman Real is a 57 yo man D+189 sp NMA allo sib PBSCT for AML       Diagnosis AMLU Acute myelogeneous leukemia, Unknown  HCT Type Allogeneic    Prep Regimen Cytoxan  Fludarabine  TBI   Donor Source Related PBSC    GVHD Prophylaxis   Mycophenolate  Primary BMT Provider Dr. Erna MD          INTERVAL  HISTORY      HPI: Now 189 days after his allogeneic sibling transplant.    The patient was found to have relapsed acute leukemia h around the 100.  Since then we tapered off his immunosuppression but he did not develop graft versus host of graft versus leukemia effect.  He continued to be followed by Dr. Pitts in [place] where he received induction chemotherapy with clofarabine and cytarabine.  He had no response to that.  We then gave him salvage with Mylotarg.  His most recent bone marrow has 10-60% cellularity with 30% blasts.  This is down from 46% blasts at the initiation of the Mylotarg.  The most significant complication of the his induction was Pseudomonas sepsis.  The patient has been depressed but with a stable mood using citalopram.  He is taking prophylactic antibiotics.  He has been neutropenic and transfusion dependent.  He has been having outpatient management for the last 2 weeks.  The prolonged stay in the hospital was very difficult and hard on his mood.  We did with the most recent bone marrow sent testing for FLT3 and IDH mutations.  Those results are not yet available.    Today other than the depressed mood the patient feels well.  He has had no fevers.  He had a slight bruise on the next his left ear.  That does not seem to be progressing.  He is eating some but lost some weight.  He is drinking fluids.  His had no fevers at home.  He has no cough or shortness of breath.  He has no current nausea or vomiting.      Review of Systems: 10 point ROS negative except as noted  above.          Labs:  Lab Results   Component Value Date    WBC 0.6 (LL) 01/31/2018    ANEU 0.1 (LL) 01/31/2018    HGB 7.2 (L) 01/31/2018    HCT 20.5 (L) 01/31/2018    PLT 8 (LL) 01/31/2018     01/31/2018    POTASSIUM 3.9 01/31/2018    CHLORIDE 105 01/31/2018    CO2 20 01/31/2018     (H) 01/31/2018    BUN 12 01/31/2018    CR 0.88 01/31/2018    MAG 1.6 10/25/2017    INR 0.9 11/01/2017    BILITOTAL 0.6 01/31/2018    AST 27 01/31/2018    ALT 25 01/31/2018    ALKPHOS 97 01/31/2018    PROTTOTAL 6.6 (L) 01/31/2018    ALBUMIN 2.8 (L) 01/31/2018       ASSESSMENT BY SYSTEMS   Norman Salazar Real is a 55 yo man D+189 sp NMA allo sib PBSCT for AML        1. BMT: Transplant 7/27. Initial stem cell product only contained 2.33 CD34/kg, additional 0.66 CD34/kg on 7/27 for a total of 2.99.   -  D28 BMbx showed no increase in blasts.  98% donor BM (8/15/17); 90% donor CD3; 100% donor CD15.     2. AML: See the recent patient's AML history is summarized in the HPI.  There are also telephone contact between me and the patient and his primary oncologist in [place].  In summary, the patient has persistent leukemia despite 2 lines of therapy.  At this time we talked to him about waiting for the results of the recent molecular testing to determine if there is targeted therapy that can be used.  I also asked our natural killer cell research nurse to determine if there is any protocols that he might be eligible now that he is 6 months posttransplant, despite his relapse at the 100.  Because this would involve come in to [place] and being hospitalized it is unlikely that the patient will agree to that, though.  The patient would prefer oral outpatient therapy of some kind.  He would probably be agreeable to a second round of Mylotarg in the outpatient setting.  He was not very encouraged about the possibility of more aggressive chemotherapy such as mitoxantrone etoposide.  We briefly talked about the need to control his leukemia  before we were to attempt to donor lymphocyte infusions.  That is rarely successful in the context of bulky leukemia presence of the bone marrow.  The patient and his wife asked questions that were answered to their apparent satisfaction.  I will communicate with them and with his primary doctor in [place] once the molecular testing and additional information on natural killer cell programs are available later this week.      3. HEME: PLT transfusion today. He will get RBC in [place] tomorrow.   - Keep Hgb>8 and plts>10K. No pre-meds.    4.  ID:  Afebrile. No active infections.   - Pentamidine 10/25.      5.  GI: well controlled GI symptoms. Reduced appetite.   - Mild, intermittent nausea: cont Ativan prn and monitor.  - Protonix for GI prophy.   - Hx s/p left sided colectomy for recurrent diverticulitis.        6.  GVH: No aGVH. stopped MMF 8/25. Off immuno suppression.     7.  FEN/Renal: Creat stable.   - Hx of hypoMg. Cont MgOx 8 tabs daily.        8. CV: Adequate BP control off losartan 50 mg and on diltiazem  mg.  - Hx tachycardia with arrhythmia, s/p 3 ablations  - hold crestor through transplant      9. Endo:  Hx DM type II. Per endocrine recs, DC Lantus & now on metformin. Endocrine f/u 11/2.        10. : Hx prostate cancer. No current issues.    11. Neuro: History of chronic insomnia. Ambien prn.      Plan:  I will communicate by phone when result available. No follow here planned.     Charanjit Umanzor MD

## 2018-01-31 NOTE — MR AVS SNAPSHOT
"              After Visit Summary   1/31/2018    Norman Real    MRN: [id number]           Patient Information     Date Of Birth          1960        Visit Information        Provider Department      1/31/2018 11:00 AM UC 5 ATC;  BMT INFUSION [facility] Blood and Marrow Transplant        Today's Diagnoses     AML (acute myeloid leukemia) in remission (H)    -  1    History of peripheral stem cell transplant (H)              [facility] ([facility] [place]  [address] [place] [address]  Phone: [phone]  Clinic Hours:   Monday-Thursday:7am to 7pm   Friday: 7am to 5pm   Weekends and holidays:    8am to noon (in general)  If your fever is 100.5  or greater,   call the clinic.  After hours call the   hospital at [phone] or   [phone]. Ask for the BMT   fellow on-call            Follow-ups after your visit        Who to contact     If you have questions or need follow up information about today's clinic visit or your schedule please contact [facility] BLOOD AND MARROW TRANSPLANT directly at [phone].  Normal or non-critical lab and imaging results will be communicated to you by [employer], letter or phone within 4 business days after the clinic has received the results. If you do not hear from us within 7 days, please contact the clinic through [employer] or phone. If you have a critical or abnormal lab result, we will notify you by phone as soon as possible.  Submit refill requests through [email or web address] or call your pharmacy and they will forward the refill request to us. Please allow 3 business days for your refill to be completed.          Additional Information About Your Visit        [email or web address] Information     [email or web address] lets you send messages to your doctor, view your test results, renew your prescriptions, schedule appointments and more. To sign up, go to [email or web address] . Click on \"Log in\" on the left side of the screen, which will take you to the Welcome page. Then click on \"Sign " "up Now\" on the right side of the page.     You will be asked to enter the access code listed below, as well as some personal information. Please follow the directions to create your username and password.     Your access code is: [email or web address]  Expires: 2018  6:30 AM     Your access code will  in 90 days. If you need help or a new code, please call your [place] clinic or [phone].        Care EveryWhere ID     This is your Care EveryWhere ID. This could be used by other organizations to access your [place] medical records  [id number]        Your Vitals Were     Pulse Temperature Respirations Pulse Oximetry          76 98  F (36.7  C) 16 99%         Blood Pressure from Last 3 Encounters:   18 142/80   18 135/81   17 151/88    Weight from Last 3 Encounters:   18 84.9 kg (187 lb 3.2 oz)   17 89.9 kg (198 lb 3.2 oz)   17 89.4 kg (197 lb)              Today, you had the following     No orders found for display         Today's Medication Changes          These changes are accurate as of 18  2:09 PM.  If you have any questions, ask your nurse or doctor.               These medicines have changed or have updated prescriptions.        Dose/Directions    CARDIZEM  MG 24 hr capsule   This may have changed:  Another medication with the same name was removed. Continue taking this medication, and follow the directions you see here.   Generic drug:  diltiazem   Changed by:  Charanjit Umanzor MD        Dose:  120 mg   Take 1 capsule (120 mg) by mouth daily   Quantity:  90 capsule   Refills:  1       magnesium oxide 400 MG tablet   Commonly known as:  MAG-OX   This may have changed:    - how much to take  - additional instructions   Used for:  AML (acute myeloid leukemia) in remission (H)        8 tabs daily   Quantity:  100 tablet   Refills:  1         Stop taking these medicines if you haven't already. Please contact your care team if you have questions.     " losartan 50 MG tablet   Commonly known as:  COZAAR   Stopped by:  Charanjit Umanzor MD           tacrolimus 0.5 MG capsule   Commonly known as:  GENERIC EQUIVALENT   Stopped by:  Charanjit Umanzor MD                    Recent Review Flowsheet Data     BMT Recent Results Latest Ref Rng & Units 9/29/2017 10/4/2017 10/11/2017 10/18/2017 10/25/2017 10/31/2017 1/31/2018    WBC 4.0 - 11.0 10e9/L 2.5(L) 3.1(L) 3.5(L) 3.3(L) 2.8(L) 3.2(L) 0.6(LL)    Hemoglobin 13.3 - 17.7 g/dL 11.0(L) 10.6(L) 10.6(L) 11.1(L) 10.7(L) 10.9(L) 7.2(L)    Platelet Count 150 - 450 10e9/L 137(L) 158 182 193 182 163 8(LL)    Neutrophils (Absolute) 1.6 - 8.3 10e9/L 1.2(L) 1.4(L) 2.0 1.5(L) 1.2(L) 1.5(L) 0.1(LL)    INR 0.86 - 1.14 - - - - - - -    Sodium 133 - 144 mmol/L 138 136 136 135 137 136 136    Potassium 3.4 - 5.3 mmol/L 4.0 4.1 4.1 4.2 4.3 3.8 3.9    Chloride 94 - 109 mmol/L 106 103 102 102 103 103 105    Glucose 70 - 99 mg/dL 160(H) 172(H) 158(H) 159(H) 145(H) 152(H) 237(H)    Urea Nitrogen 7 - 30 mg/dL 12 12 13 14 14 18 12    Creatinine 0.66 - 1.25 mg/dL 1.25 1.23 1.10 1.26(H) 1.21 1.28(H) 0.88    Calcium (Total) 8.5 - 10.1 mg/dL 8.7 8.6 9.0 9.0 8.8 8.4(L) 8.2(L)    Protein (Total) 6.8 - 8.8 g/dL - 7.2 7.3 7.4 7.3 7.2 6.6(L)    Albumin 3.4 - 5.0 g/dL - 3.7 3.4 3.8 3.8 3.9 2.8(L)    Bilirubin (Direct) 0.0 - 0.2 mg/dL - - - - - - -    Alkaline Phosphatase 40 - 150 U/L - 90 90 100 92 90 97    AST 0 - 45 U/L - 20 16 22 16 14 27    ALT 0 - 70 U/L - 24 22 27 22 26 25    MCV 78 - 100 fl 92 92 91 92 91 92 87               Primary Care Provider Fax #    Physician No Ref-Primary [phone]       No address on file        Equal Access to Services     ASHLYN FLANAGAN : Hadii sapna Ratliff, joana manning, raven mchugh, vishal lópez. [facility] [phone].    ATENCIÓN: Si habla español, tiene a gusman disposición servicios gratuitos de asistencia lingüística. Llame al [phone].    We  comply with applicable federal civil rights laws and [place] laws. We do not discriminate on the basis of race, color, national origin, age, disability, sex, sexual orientation, or gender identity.            Thank you!     Thank you for choosing [facility] BLOOD AND MARROW TRANSPLANT  for your care. Our goal is always to provide you with excellent care. Hearing back from our patients is one way we can continue to improve our services. Please take a few minutes to complete the written survey that you may receive in the mail after your visit with us. Thank you!             Your Updated Medication List - Protect others around you: Learn how to safely use, store and throw away your medicines at www.disposemymeds.org.          This list is accurate as of 1/31/18  2:09 PM.  Always use your most recent med list.                   Brand Name Dispense Instructions for use Diagnosis    acyclovir 800 MG tablet    ZOVIRAX    150 tablet    Take 1 tablet (800 mg) by mouth 2 times daily    Stem cell transplant candidate, Acute myeloid leukemia in remission (H), History of peripheral stem cell transplant (H), Aspergillosis (H)       CARDIZEM  MG 24 hr capsule   Generic drug:  diltiazem     90 capsule    Take 1 capsule (120 mg) by mouth daily        CELEXA PO      Take 20 mg by mouth        cholecalciferol 1000 UNITS capsule    vitamin  -D     Take 1 capsule by mouth daily        CIPROFLOXACIN PO      Take 500 mg by mouth        cyclobenzaprine 5 MG tablet    FLEXERIL     Take 1 tablet (5 mg) by mouth 3 times daily as needed for muscle spasms        FLONASE NA      Spray in nostril as needed        guaiFENesin 600 MG 12 hr tablet    MUCINEX     Take 600 mg by mouth        heparin lock flush 10 UNIT/ML Soln injection     30 vial    5 mLs by Intracatheter route daily In each lumen    AML (acute myeloid leukemia) in remission (H)       HYDROCHLOROTHIAZIDE PO      Take 50 mg by mouth        LORazepam 0.5 MG tablet    ATIVAN    40  tablet    Take 1-2 tablets (0.5-1 mg) by mouth every 4 hours as needed for anxiety (nausea/vomiting/sleep)    Stem cell transplant candidate       magnesium oxide 400 MG tablet    MAG-OX    100 tablet    8 tabs daily    AML (acute myeloid leukemia) in remission (H)       metFORMIN 500 MG 24 hr tablet    GLUCOPHAGE-XR    60 tablet    Take 2 tablets (1,000 mg) by mouth daily (with dinner)    Type 2 diabetes mellitus without complication, without long-term current use of insulin (H)       MULTI COMPLETE PO           ondansetron 8 MG tablet    ZOFRAN    30 tablet    Take 1 tablet (8 mg) by mouth every 8 hours as needed for nausea    Stem cell transplant candidate       pantoprazole 40 MG EC tablet    PROTONIX    90 tablet    Take 1 tablet (40 mg) by mouth daily    Stem cell transplant candidate       PEPCID PO      Take 20 mg by mouth        pravastatin 20 MG tablet    PRAVACHOL    30 tablet    Take 1 tablet (20 mg) by mouth daily    Type 2 diabetes mellitus without complication, without long-term current use of insulin (H)       promethazine 25 MG tablet    PHENERGAN     Take 25 mg by mouth every 6 hours as needed for nausea        psyllium Packet    METAMUCIL/KONSYL    90 packet    Take 1 packet by mouth 3 times daily    AML (acute myeloid leukemia) in remission (H)       sulfamethoxazole-trimethoprim 800-160 MG per tablet    BACTRIM DS/SEPTRA DS    30 tablet    Clinic will tell you when to start 1 tablet twice daily by mouth on Mondays and Tuesdays, start around day +28    Stem cell transplant candidate       triamcinolone 0.1 % cream    KENALOG    80 g    Apply sparingly to affected area three times daily as needed    AML (acute myeloid leukemia) in remission (H)       voriconazole 200 MG tablet    VFEND    60 tablet    Take 1 tablet (200 mg) by mouth 2 times daily    Stem cell transplant candidate       zolpidem 5 MG tablet    AMBIEN    45 tablet    Take 1-2 tablets (5-10 mg) by mouth nightly as needed for sleep     AML (acute myeloid leukemia) in remission (H)

## 2018-01-31 NOTE — NURSING NOTE
Chief Complaint   Patient presents with     Blood Draw     Labs drawn via cvc by RN     /80 (BP Location: Right arm, Patient Position: Chair, Cuff Size: Adult Regular)  Pulse 95  Temp 97.7  F (36.5  C) (Oral)  Wt 84.9 kg (187 lb 3.2 oz)  SpO2 100%  BMI 28.46 kg/m2    Vitals taken. Lines accessed by RN. Labs collected through red lumen and sent. Both lines flushed with NS & Heparin. Pt tolerated well. Pt checked in for next appointment.    Soledad Villafuerte RN

## 2021-06-07 NOTE — PROGRESS NOTES
BMT Daily Progress Note   August 15, 2017     Patient ID:  Norman Real is a 56 year old male, currently day + 20 of his HCT.      Diagnosis AMLU Acute myelogeneous leukemia, Unknown  HCT Type Allogeneic    Prep Regimen Cytoxan  Fludarabine  TBI   Donor Source Related PBSC    GVHD Prophylaxis   Mycophenolate  Primary BMT Provider Dr. Erna MD          INTERVAL  HISTORY      HPI: Norman returns feeling overall well. Still some fatigue but able to walk more. Notes bg down laterly to 89 highest 180. Not dizzy or lightheaded but knew to eat something when glucose got <90. He denies any nausea vomiting or diarrhea. Eating normal diet. Tolerating 6 magnesium tablets a day, only took 2 so far today. Day 21 bmbx done today. No fevers, cough or cold symptoms. No dysuria. Notes a little swelling in his ankles that resolves overnight.     Review of Systems: 10 point ROS negative except as noted above.     PHYSICAL EXAM   VSS: BP up 150/90, then 138/91    General: NAD, interactive, appropriate   Eyes: SAMSON, sclera anicteric   Nose/Mouth/Throat: OP clear, buccal mucosa moist, no ulcerations   Lungs: CTA bilaterally  Cardiovascular: RRR, no M/R/G   Abdominal/Rectal: +BS, soft, NT, ND, No HSM   Lymphatics: trace ankle edema bilaterally  Skin: no rashes or petechaie  Neuro: A&O   Additional Findings: Cooper site NT, no drainage.     LABS AND IMAGING - PAST 24 HOURS      Lab Results   Component Value Date    WBC 2.4 (L) 08/15/2017    ANEU 1.0 (L) 08/15/2017    HGB 11.2 (L) 08/15/2017    HCT 33.1 (L) 08/15/2017     08/15/2017     08/15/2017    POTASSIUM 4.2 08/15/2017    CHLORIDE 104 08/15/2017    CO2 25 08/15/2017     (H) 08/15/2017    BUN 14 08/15/2017    CR 0.96 08/15/2017    MAG 1.4 (L) 08/15/2017    INR 0.96 07/31/2017    BILITOTAL 0.3 08/15/2017    AST 20 08/15/2017    ALT 21 08/15/2017    ALKPHOS 76 08/15/2017    PROTTOTAL 7.2 08/15/2017    ALBUMIN 3.4 08/15/2017        ASSESSMENT BY SYSTEMS       Norman Real is a 56 year old male with AML, currently day + 20 for NMA allo sib PBSCT without ATG under protocol -32. He received additional stem cells from donor .        1. BMT: Completed prep with cytoxan, fludarabine, & TBI. Transplant . Initial stem cell product only contained 2.33 CD34/kg, additional 0.66 CD34/kg on  for a total of 2.99.   - GCSF started on , with early auto recovery/engraftment and ANC>2500 x2 consecutive days this was discontinued on . WBC count down 8/15 and neutrophil borderline neutropenic. Neutropenic precautions given  - Re-stage per protocol. Day 21 bmbx done 8/15      2.  HEME: Keep Hgb>8 and plts>10K. Hgb slightly down today. No reports of bleeding.  - No pre-meds.    3.  ID:  Afebrile.   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    CMV=neg.  - Bactrim or appropriate PCP therapy to start d. No allergy to bactrim.      4.  GI: No vomiting or diarrhea.    - Hx constipation, resolved (on high doses of oral mag)  - Mild nausea: Taking Zofran and ativan PRN, symptoms minimal   - Protonix for GI prophy.   - Ursodiol for VOD prophy  - s/p left sided colectomy for recurrent diverticulitis.  No issues with this during this admission.      5.  GVH: No aGVH to date   - Cont MMF through D30  - Tacrolimus: Taking 1 mg BID. Last level  = 13.6, pending today    6.  FEN/Renal: Cr stable  - Tac induced Hypoma.4, no muscle cramps, he declines IV magnesium today. Will increase oral mag to 800mg QID. (tac upper range normal) No diarrhea      7. CV: BP up slightly, with anticipation of bmbx today will wait for another higher pressure before changes to medication  - Hx HTN + TAC exacerbated: Continue increased losartan  62.5 mg. Increased  diltiazem XL 7/31 360 (max dose). Increase losartan next visit if bp remains elevated may be anxious regarding bmbx today.  - Hx tachycardia with arrhythmia, s/p 3 ablations  - hold crestor through transplant      8. Endo:   - Hx DM type II. Exacerbated by steroids. Notes glucoses range 130-300's. Discussed Endo referral for follow up within the next month (order placed in Epic) and increased Lantus to 10 units per day 8/7. Well controlled (Bg )  - Current DM Regimen: Lantus 10 units qam, carb coverage (5 units per meal, 2-3 units per snack, 4 units with Ensure), sliding scale with meals and bedtime. Lowest glucose 89 in the afternoon, will decrease lantus to 8units.  - Hold metformin 500mg/d on admission.      9. : Hx prostate cancer.   - Asymptomatic at this time.       10. Neuro: History of chronic insomnia.  - Insomnia: Ambien to 5-10 mg QHS PRN      11. Pulm: Hx spiculated pulm nodule (concern for malignancy w/ hx tobacco use) and GGO (concerning for fungal PNA). F/u CT chest in 4-6 weeks (approx 8/22) per Dr. Ahn recs in workup.    - Per Dr Ahn and with improvement seen on CT yesterday, ok to decrease Vfend to prophy (200mg bid) discussed with pt 8/11  - Note 6/30 BAL + for aspergillus galactomannan; culture negative final.     Note: Medications should be filled at [facility] pharmacy    Plan: tomorrow last day of levaquin (unless neutropenic), decrease long acting insulin to 8units/q 24 hrs. Neutropenic precautions given  RTC tomorrow with Dr Umanzor, possible GCSF, BP check    Ayde Shafer PAC  [phone]       Positive

## (undated) RX ORDER — LIDOCAINE HYDROCHLORIDE 10 MG/ML
INJECTION, SOLUTION EPIDURAL; INFILTRATION; INTRACAUDAL; PERINEURAL
Status: DISPENSED
Start: 2017-01-01

## (undated) RX ORDER — LIDOCAINE HYDROCHLORIDE 40 MG/ML
SOLUTION TOPICAL
Status: DISPENSED
Start: 2017-01-01

## (undated) RX ORDER — CEFAZOLIN SODIUM 2 G/100ML
INJECTION, SOLUTION INTRAVENOUS
Status: DISPENSED
Start: 2017-01-01

## (undated) RX ORDER — ALBUTEROL SULFATE 0.83 MG/ML
SOLUTION RESPIRATORY (INHALATION)
Status: DISPENSED
Start: 2017-01-01

## (undated) RX ORDER — FENTANYL CITRATE 50 UG/ML
INJECTION, SOLUTION INTRAMUSCULAR; INTRAVENOUS
Status: DISPENSED
Start: 2017-01-01

## (undated) RX ORDER — SODIUM CHLORIDE 9 MG/ML
INJECTION, SOLUTION INTRAVENOUS
Status: DISPENSED
Start: 2017-01-01

## (undated) RX ORDER — HEPARIN SODIUM,PORCINE 10 UNIT/ML
VIAL (ML) INTRAVENOUS
Status: DISPENSED
Start: 2017-01-01